# Patient Record
Sex: FEMALE | Race: BLACK OR AFRICAN AMERICAN | NOT HISPANIC OR LATINO | Employment: OTHER | URBAN - METROPOLITAN AREA
[De-identification: names, ages, dates, MRNs, and addresses within clinical notes are randomized per-mention and may not be internally consistent; named-entity substitution may affect disease eponyms.]

---

## 2017-01-13 DIAGNOSIS — K86.81 EXOCRINE PANCREATIC INSUFFICIENCY: ICD-10-CM

## 2017-01-13 RX ORDER — MILNACIPRAN HYDROCHLORIDE 100 MG/1
TABLET, FILM COATED ORAL
Qty: 60 TABLET | Refills: 1 | Status: SHIPPED | OUTPATIENT
Start: 2017-01-13 | End: 2017-02-20 | Stop reason: SDUPTHER

## 2017-01-13 RX ORDER — HYDROXYCHLOROQUINE SULFATE 200 MG/1
TABLET, FILM COATED ORAL
Qty: 45 TABLET | Refills: 2 | Status: SHIPPED | OUTPATIENT
Start: 2017-01-13 | End: 2017-04-14 | Stop reason: SDUPTHER

## 2017-01-13 RX ORDER — PANCRELIPASE 36000; 180000; 114000 [USP'U]/1; [USP'U]/1; [USP'U]/1
CAPSULE, DELAYED RELEASE PELLETS ORAL
Qty: 240 CAPSULE | Status: CANCELLED | OUTPATIENT
Start: 2017-01-13

## 2017-01-20 ENCOUNTER — HOSPITAL ENCOUNTER (OUTPATIENT)
Dept: RADIOLOGY | Facility: HOSPITAL | Age: 57
Discharge: HOME | End: 2017-01-20
Attending: SURGERY
Payer: MEDICARE

## 2017-01-20 DIAGNOSIS — C50.811 MALIGNANT NEOPLASM OF OVERLAPPING SITES OF RIGHT FEMALE BREAST: ICD-10-CM

## 2017-01-20 PROCEDURE — 77066 DX MAMMO INCL CAD BI: CPT | Mod: 26,,, | Performed by: RADIOLOGY

## 2017-01-20 PROCEDURE — 77062 BREAST TOMOSYNTHESIS BI: CPT | Mod: 26,,, | Performed by: RADIOLOGY

## 2017-01-20 PROCEDURE — 77066 DX MAMMO INCL CAD BI: CPT | Mod: TC

## 2017-01-25 ENCOUNTER — OFFICE VISIT (OUTPATIENT)
Dept: RHEUMATOLOGY | Facility: CLINIC | Age: 57
End: 2017-01-25
Payer: MEDICARE

## 2017-01-25 ENCOUNTER — LAB VISIT (OUTPATIENT)
Dept: LAB | Facility: HOSPITAL | Age: 57
End: 2017-01-25
Attending: INTERNAL MEDICINE
Payer: MEDICARE

## 2017-01-25 VITALS
WEIGHT: 130.69 LBS | HEART RATE: 94 BPM | DIASTOLIC BLOOD PRESSURE: 89 MMHG | BODY MASS INDEX: 22.31 KG/M2 | SYSTOLIC BLOOD PRESSURE: 154 MMHG | TEMPERATURE: 98 F | HEIGHT: 64 IN

## 2017-01-25 DIAGNOSIS — M32.9 SYSTEMIC LUPUS ERYTHEMATOSUS, UNSPECIFIED SLE TYPE, UNSPECIFIED ORGAN INVOLVEMENT STATUS: ICD-10-CM

## 2017-01-25 DIAGNOSIS — M89.9 DISORDER OF BONE AND CARTILAGE: ICD-10-CM

## 2017-01-25 DIAGNOSIS — M32.9 SYSTEMIC LUPUS ERYTHEMATOSUS, UNSPECIFIED SLE TYPE, UNSPECIFIED ORGAN INVOLVEMENT STATUS: Primary | ICD-10-CM

## 2017-01-25 DIAGNOSIS — M85.80 OSTEOPENIA: ICD-10-CM

## 2017-01-25 DIAGNOSIS — R53.83 FATIGUE, UNSPECIFIED TYPE: ICD-10-CM

## 2017-01-25 DIAGNOSIS — D84.9 IMMUNOSUPPRESSION: ICD-10-CM

## 2017-01-25 DIAGNOSIS — M94.9 DISORDER OF BONE AND CARTILAGE: ICD-10-CM

## 2017-01-25 DIAGNOSIS — M79.7 FIBROMYALGIA: ICD-10-CM

## 2017-01-25 LAB
BACTERIA #/AREA URNS AUTO: NORMAL /HPF
BILIRUB UR QL STRIP: NEGATIVE
CLARITY UR REFRACT.AUTO: ABNORMAL
COLOR UR AUTO: YELLOW
CREAT UR-MCNC: >450 MG/DL
GLUCOSE UR QL STRIP: NEGATIVE
HGB UR QL STRIP: NEGATIVE
HYALINE CASTS UR QL AUTO: 0 /LPF
KETONES UR QL STRIP: NEGATIVE
LEUKOCYTE ESTERASE UR QL STRIP: NEGATIVE
MICROSCOPIC COMMENT: NORMAL
NITRITE UR QL STRIP: NEGATIVE
PH UR STRIP: 5 [PH] (ref 5–8)
PROT UR QL STRIP: ABNORMAL
PROT UR-MCNC: 22 MG/DL
PROT/CREAT RATIO, UR: ABNORMAL
RBC #/AREA URNS AUTO: 1 /HPF (ref 0–4)
SP GR UR STRIP: >1.03 (ref 1–1.03)
URN SPEC COLLECT METH UR: ABNORMAL
UROBILINOGEN UR STRIP-ACNC: NEGATIVE EU/DL
WBC #/AREA URNS AUTO: 2 /HPF (ref 0–5)

## 2017-01-25 PROCEDURE — 82570 ASSAY OF URINE CREATININE: CPT

## 2017-01-25 PROCEDURE — 81001 URINALYSIS AUTO W/SCOPE: CPT

## 2017-01-25 PROCEDURE — 99999 PR PBB SHADOW E&M-EST. PATIENT-LVL III: CPT | Mod: PBBFAC,,, | Performed by: INTERNAL MEDICINE

## 2017-01-25 PROCEDURE — 99215 OFFICE O/P EST HI 40 MIN: CPT | Mod: S$PBB,,, | Performed by: INTERNAL MEDICINE

## 2017-01-25 RX ORDER — PREDNISONE 5 MG/1
5 TABLET ORAL DAILY
COMMUNITY
End: 2017-11-27 | Stop reason: SDUPTHER

## 2017-01-25 NOTE — PROGRESS NOTES
"Subjective:       Patient ID: Efe Horowitz is a 57 y.o. female.    Chief Complaint: Lupus      HPI:  Efe Horowitz is a 57 y.o. female with history of lupus since age 39.   Her manifestations include joint pains, headache, positive DERRICK, and a   double-stranded DNA. She also has an equivocal anticardiolipin IgM.   She has been treated with Plaquenil 1-1/2 tablets daily. Eye exam 11/2016 was normal.     In addition to lupus, she has a   history of ulcerative colitis, osteopenia, fibromyalgia as well.   In 2009 she had a colectomy with ileostomy and in May 2011 the ileostomy  was closed. History of bilateral carpal tunnel.      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  She was treated with surgical resection.  Now with another lesion breast suspected to be lymphoma.     Review of Systems   Constitutional: Negative for fatigue.   Eyes:        Dry eyes   Respiratory: Positive for cough.    Cardiovascular: Negative.    Gastrointestinal: Positive for diarrhea.        Difficulty swallowing   Endocrine: Negative.    Genitourinary: Negative.    Musculoskeletal: Negative.    Allergic/Immunologic: Negative.    Neurological: Positive for headaches.   Hematological: Negative.    Psychiatric/Behavioral: Negative.          Objective:     Visit Vitals    BP (!) 154/89 (BP Location: Left arm, Patient Position: Sitting, BP Method: Automatic)    Pulse 94    Temp 97.8 °F (36.6 °C) (Oral)    Ht 5' 4" (1.626 m)    Wt 59.3 kg (130 lb 11.2 oz)    BMI 22.43 kg/m2        Physical Exam   Constitutional: She is oriented to person, place, and time and well-developed, well-nourished, and in no distress.   HENT:   Head: Normocephalic and atraumatic.   Eyes: Conjunctivae and EOM are normal.   Neck: Neck supple.   Cardiovascular: Normal rate, regular rhythm and normal heart sounds.    Pulmonary/Chest: Effort normal and breath sounds normal.   Abdominal: Soft. Bowel sounds are normal.   Neurological: She is alert and " oriented to person, place, and time. Gait normal.   Skin: Skin is warm and dry.     Mild erythema on dorsum of foot where shoe strings cross   Psychiatric: Mood and affect normal.   Musculoskeletal: She exhibits no edema, tenderness or deformity.       LABS    Component      Latest Ref Rng & Units 11/15/2016   Sodium      136 - 145 mmol/L 140   Potassium      3.5 - 5.1 mmol/L 3.8   Chloride      95 - 110 mmol/L 105   CO2      23 - 29 mmol/L 26   Glucose      70 - 110 mg/dL 85   BUN, Bld      6 - 20 mg/dL 12   Creatinine      0.5 - 1.4 mg/dL 0.9   Calcium      8.7 - 10.5 mg/dL 9.2   Total Protein      6.0 - 8.4 g/dL 6.8   Albumin      3.5 - 5.2 g/dL 3.6   Total Bilirubin      0.1 - 1.0 mg/dL 0.3   Alkaline Phosphatase      55 - 135 U/L 52 (L)   AST      10 - 40 U/L 20   ALT      10 - 44 U/L 14   Anion Gap      8 - 16 mmol/L 9   eGFR if African American      >60 mL/min/1.73 m:2 >60.0   eGFR if non African American      >60 mL/min/1.73 m:2 >60.0   WBC      3.90 - 12.70 K/uL 7.81   RBC      4.00 - 5.40 M/uL 4.72   Hemoglobin      12.0 - 16.0 g/dL 12.4   Hematocrit      37.0 - 48.5 % 39.5   MCV      82 - 98 fL 84   MCH      27.0 - 31.0 pg 26.3 (L)   MCHC      32.0 - 36.0 % 31.4 (L)   RDW      11.5 - 14.5 % 14.4   Platelets      150 - 350 K/uL 274   MPV      9.2 - 12.9 fL 9.9   Gran #      1.8 - 7.7 K/uL 4.7          Assessment:       1. Lupus. Lupus manifestations include joint pains, headache, positive DERRICK, and a   double-stranded DNA. Now with ulcer in mouth for 3 weeks.  Now improved but nose with ulcers.  Also alopecia.  SLEDAI= 4 without labs  2. Fatigue   3. Encounter for long-term (current) use of other medications   4. Myalgia and myositis. Fibromyalgia on gabapentin and Savella   5. Trochanteric bursitis. Bilateral now requesting injection.  6. Shoulder pain. Stable   7. Vitamin D deficiency disease. Now normal  8. Suspected Shingles. Past time for anti-viral. Did not receive treatment. Rash has improved  9.  Migraine  10.  Osteopenia lumbar spine and femoral neck.  Reclast x1 stopped 3 years agoper patient due to improvement.   11.  Chronic steroid use.  Endocrine gives for adrenal insufficiency.  12.  Ulcerative colitis  13.  Hematuria.  Followed by nephrology    Plan:       1.  Labs and DEXA  2. Patient to continue Plaquenil 30 mg daily.  Eye exam normal 11/2016  3. Continue neurontin 800 mg/800 mg/ 1200 mg in evening which is helping.          RTO in 3-4 months.  Patient seen face to face for 40 minutes and greater than 50% spent in counseling regarding lupus symptom flare.

## 2017-01-25 NOTE — MR AVS SNAPSHOT
[facility] - Rheumatology  [address]  [place] [address]  Phone: [phone]  Fax: [phone]                  Efe Horowitz   2017 10:00 AM   Office Visit    Description:  Female : 1960   Provider:  Idania Allison MD   Department:  [facility] - Rheumatology           Reason for Visit     Lupus           Diagnoses this Visit        Comments    Systemic lupus erythematosus, unspecified SLE type, unspecified organ involvement status    -  Primary     Immunosuppression         Fibromyalgia         Fatigue, unspecified type         Osteopenia         Disorder of bone and cartilage                To Do List           Future Appointments        Provider Department Dept Phone    2017 10:40 AM LAB, APPOINTMENT [place] [facility] [phone]    2017 8:45 AM MD [facility] Breast Surgery [phone]    2017 10:00 AM [facility], DEXA1 [facility]-Bone Mineral Density [phone]    2017 10:30 AM Idania Allison MD [facility] Rheumatology [phone]      Goals (5 Years of Data)     None      Follow-Up and Disposition     Return in about 3 months (around 2017).    Follow-up and Disposition History      [facility]     [facility] -  Assistance  Registered nurses in the [facility] provide clinical advisement, health education, appointment booking, and other advisory services.  Call for this free service at [phone].             Medications           Message regarding Medications     Verify the changes and/or additions to your medication regime listed below are the same as discussed with your clinician today.  If any of these changes or additions are incorrect, please notify your healthcare provider.             Verify that the below list of medications is an accurate representation of the medications you are currently taking.  If none reported, the list may be  blank. If incorrect, please contact your healthcare provider. Carry this list with you in case of emergency.           Current Medications     predniSONE (DELTASONE) 5 MG tablet Take 5 mg by mouth once daily.    albuterol (ACCUNEB) 0.63 mg/3 mL Nebu Take 3 mLs (0.63 mg total) by nebulization every 6 (six) hours as needed.    atorvastatin (LIPITOR) 10 MG tablet Take 10 mg by mouth once daily.    cyanocobalamin 1,000 mcg/mL injection Inject 1 mL (1,000 mcg total) into the muscle once a week. 1 Solution Injection monthly.  B12 injection weekly for  3 weeks then every  2 weeks    ergocalciferol (VITAMIN D2) 50,000 unit Cap Take 2 capsules (100,000 Units total) by mouth every 7 days.    ferrous sulfate 325 mg (65 mg iron) Tab Take by mouth. 1 Tablet Oral Every day.  Take one 250mg vitamin C pill every 12 hours which will help your bodyabsorb more of the iron.    gabapentin (NEURONTIN) 800 MG tablet Take 1 tablet (800 mg total) by mouth 3 (three) times daily.    hydroxychloroquine (PLAQUENIL) 200 mg tablet TAKE ONE & ONE-HALF TABLETS BY MOUTH DAILY    lipase-protease-amylase 36,000-114,000- 180,000 unit CpDR Take 2 capsules by mouth 3 (three) times daily with meals. Take one with each snack    loperamide (IMODIUM) 2 mg capsule TAKE 1 TO 2 CAPSULES BY MOUTH FOUR TIMES DAILY    lorazepam (ATIVAN) 1 MG tablet Take 1 tablet (1 mg total) by mouth every evening. 1 Tablet Oral Q8H-12 hours    niacin 100 MG Tab Take by mouth. 1 Tablet Oral At bedtime    ondansetron (ZOFRAN) 4 MG tablet Take 1 tablet (4 mg total) by mouth every 12 (twelve) hours as needed for Nausea.    oxycodone-acetaminophen (PERCOCET) 5-325 mg per tablet Take 1 tablet by mouth every 4 (four) hours as needed for Pain.    polycarbophil (FIBERCON) 625 mg tablet Take by mouth. 1 Tablet Oral Every 12 hours.  Take with 12 ounces of water with each pill.    potassium chloride SA (K-DUR,KLOR-CON) 20 MEQ tablet Take 1 tablet (20 mEq total) by mouth every 12 (twelve)  "hours.    rizatriptan (MAXALT) 10 MG tablet Take 1 tablet (10 mg total) by mouth every 6 (six) hours as needed.    SAVELLA 100 mg Tab TAKE ONE TABLET BY MOUTH TWICE DAILY    zoledronic acid-mannitol & water (RECLAST) 5 mg/100 mL Soln Inject into the vein. 1 Solution Intravenous Yearly    zolpidem (AMBIEN) 5 MG Tab Take 1 tablet (5 mg total) by mouth nightly as needed.           Clinical Reference Information           Vital Signs - Last Recorded  Most recent update: 1/25/2017  9:47 AM by Nereida James MA    BP Pulse Temp Ht Wt BMI    (!) 154/89 (BP Location: Left arm, Patient Position: Sitting, BP Method: Automatic) 94 97.8 °F (36.6 °C) (Oral) 5' 4" (1.626 m) 59.3 kg (130 lb 11.2 oz) 22.43 kg/m2      Blood Pressure          Most Recent Value    BP  (!)  154/89      Allergies as of 1/25/2017     Aspirin    Hydrocortisone    Vicodin [Hydrocodone-acetaminophen]    Asacol [Mesalamine]      Immunizations Administered on Date of Encounter - 1/25/2017     None      Orders Placed During Today's Visit     Future Labs/Procedures Expected by Expires    Aldolase  1/25/2017 1/25/2018    Anti-DNA antibody, double-stranded  1/25/2017 1/25/2018    C-reactive protein  1/25/2017 1/25/2018    C3 complement  1/25/2017 1/25/2018    C4 complement  1/25/2017 1/25/2018    CBC auto differential  1/25/2017 1/25/2018    CK  1/25/2017 1/25/2018    Comprehensive metabolic panel  1/25/2017 1/25/2018    DXA Bone Density Spine And Hip  1/25/2017 1/25/2018    Protein / creatinine ratio, urine  1/25/2017 3/26/2018    Sedimentation rate, manual  1/25/2017 1/25/2018    Urinalysis  1/25/2017 3/26/2018      "

## 2017-01-26 ENCOUNTER — OFFICE VISIT (OUTPATIENT)
Dept: SURGERY | Facility: CLINIC | Age: 57
End: 2017-01-26
Payer: MEDICARE

## 2017-01-26 ENCOUNTER — PATIENT MESSAGE (OUTPATIENT)
Dept: RHEUMATOLOGY | Facility: CLINIC | Age: 57
End: 2017-01-26

## 2017-01-26 VITALS
HEART RATE: 87 BPM | WEIGHT: 130 LBS | TEMPERATURE: 98 F | BODY MASS INDEX: 22.2 KG/M2 | HEIGHT: 64 IN | SYSTOLIC BLOOD PRESSURE: 134 MMHG | DIASTOLIC BLOOD PRESSURE: 78 MMHG

## 2017-01-26 DIAGNOSIS — C50.211 MALIGNANT NEOPLASM OF UPPER-INNER QUADRANT OF RIGHT FEMALE BREAST: ICD-10-CM

## 2017-01-26 DIAGNOSIS — C50.911 MALIGNANT NEOPLASM OF RIGHT FEMALE BREAST, UNSPECIFIED SITE OF BREAST: Primary | ICD-10-CM

## 2017-01-26 PROCEDURE — 99213 OFFICE O/P EST LOW 20 MIN: CPT | Mod: PBBFAC,PO | Performed by: SURGERY

## 2017-01-26 PROCEDURE — 99214 OFFICE O/P EST MOD 30 MIN: CPT | Mod: S$PBB,,, | Performed by: SURGERY

## 2017-01-26 PROCEDURE — 99999 PR PBB SHADOW E&M-EST. PATIENT-LVL III: CPT | Mod: PBBFAC,,, | Performed by: SURGERY

## 2017-01-26 NOTE — LETTER
[facility] Breast Surgery  [address]  [facility] [address]  Phone: [phone] January 26, 2017      Manjula Mixon MD  [facility] [facility]  [address]  [facility] [address]    Patient: Efe Horowitz   MR Number: [id number]   YOB: 1960   Date of Visit: 1/26/2017     Dear Dr. Mixon:    I saw your patient Efe Horowitz in my Breast Surgery Clinic. Below are the relevant portions of my assessment and plan of care.    Efe Horowitz is a 57-year-old  female who presents with her niece today. She has no subjective complaints. Somehow, she had it incorrectly documented in her records that she had lymphoma when she does not.  She has a known medical history of primary adrenal insufficiency, arthritis, B12 deficiency, iron deficiency anemia, hypopituitary state and lupus who was diagnosed with a T1b triple negative, high-grade invasive ductal carcinoma in the spring of 2016. She underwent a right nipple-sparing mastectomy on 04/27/2016 with immediate autologous tissue IGNACIO flap reconstruction by Dr. Margo Mccloud. The final pathology revealed the high-grade T1b 7 mm lesion with negative sentinel lymph nodes x4. She had one additional negative node.     She presents today feeling tired. She states her vitamin D deficiency replacement dose has been changed from weekly to monthly and this may be contributing to her state of feeling tired. She did not require any adjuvant endocrine therapy or radiotherapy.  She saw Dr. Wright postoperatively for Med/Onc consultation who did not recommend systemic chemotherapy. She denies any weight loss or other changes other than some right upper extremity swelling and pain. She states that the pain is relieved with massaging and manipulation. She denies tenderness to her arm. She has excellent range of motion in her shoulder.  There is mild lymphedema of the right upper extremity. The right breast itself shows that  the nipple areolar complex is well healed with some mild hyperemia to the breast itself. There is no tenderness or cellulitis or signs of infection.  She has approximately a 5 x 6 cm area of fat necrosis in the right lower inner quadrant. She has good symmetry, but may require a left breast lift and mastopexy on the left side in the future. She may undergo revision to excise the area of fat necrosis.     She had a bilateral diag MMG on Friday 1-20-17 which revealed benign findings and the mass in the right lower inner quadrant to be consistent with fat necrosis as expected.  Her clinical breast exam remains unchanged from previous visit.    She had been given a prescription for outpatient physical therapy and occupational therapy due to the mild lymphedema noted in the right upper extremity. Otherwise, she will follow up with me in six months and will follow up with her plastic surgeon, Dr. Mccloud to discuss further revision with potential left mastopexy and lift and the excision of the fat necrosis in the right lower inner quadrant. We will also order a breast MRI per plastic surgery request In anticipation of breast revision surgery. Otherwise, there are no suspicious masses, nodules, densities or lymphadenopathy. There is some mild hyperemia without significant edema to the breast skin. It is nontender and she denies any fevers, chills or night sweats and there are no clinical signs of infection. Follow up with me   in six months as outlined above.     The patient does not have lymphoma.    If you have questions, please do not hesitate to call me. I look forward to following Dareen along with you.    Sincerely,      Sivakumar Muñiz MD  Medical Director, [facility] Breast [place]  Section of General and Oncologic Surgery  [facility]    RLC/hcr     CC  Miah Mccloud MD

## 2017-01-26 NOTE — TELEPHONE ENCOUNTER
Patient informed of mild increase protein in urine and low potassium and need to eat potassium rich foods.  She is to follow with primary doctor.

## 2017-01-26 NOTE — LETTER
January 26, 2017        Manjula Mixon MD  [address] [address]             [facility] Breast Surgery  [address] [facility] [address]  Phone: [phone]   Patient: Efe Horowitz   MR Number: [id number]   YOB: 1960   Date of Visit: 1/26/2017       Dear Dr. Mixon:    Thank you for referring Efe Horowitz to me for evaluation. Attached you will find relevant portions of my assessment and plan of care.    If you have questions, please do not hesitate to call me. I look forward to following Efe Horowitz along with you.    Sincerely,      Sivakumar Muñiz MD            CC  Miah Mccloud MD    Enclosure

## 2017-01-26 NOTE — PROGRESS NOTES
Efe Horowitz is a 57 year-old  female who presents   With her niece today.  She has no subjective complaints.  Somehow, she had it incorrectly  documented in her records that she had lymphoma when she does not.    She has a known medical history of primary adrenal insufficiency, arthritis, B12  deficiency, iron deficiency anemia, hypopituitary state and lupus who was   diagnosed with a T1b triple negative, high-grade invasive ductal carcinoma in   the spring of 2016.     She underwent a right nipple-sparing mastectomy on   04/27/2016 with immediate autologous tissue IGNACIO flap reconstruction by Dr. Margo Mccloud. The final pathology revealed the high-grade T1b 7 mm lesion with   negative sentinel lymph nodes x4. She had one additional negative node. She   presents today feeling tired. She states her vitamin D deficiency replacement   dose has been changed from weekly to monthly and this may be contributing to her  state of feeling tired. She did not require any adjuvant endocrine therapy or   radiotherapy. She saw Dr. Wright postoperatively for Med/Onc consultation who   did not recommend systemic chemotherapy. She denies any weight loss or other   changes other than some right upper extremity swelling and pain. She states   that the pain is relieved with massaging and manipulation. She denies   tenderness to her arm. She has excellent range of motion in her shoulder.   There is mild lymphedema of the right upper extremity. The right breast itself   shows that the nipple areolar complex is well healed with some mild hyperemia to  the breast itself. There is no tenderness or cellulitis or signs of infection.  She has approximately a 5 x 6 cm area of fat necrosis in the right lower inner  quadrant. She has good symmetry, but may require a left breast lift and   mastopexy on the left side in the future. She may undergo revision to excise   the area of fat necrosis.  She had a bilateral diag MMG on  Friday 1-20-17 which revealed  Benign findings and the mass in the R lower inner quadrant to be consistent with fat necrosis as expected.  Her clinical breast exam remains unchanged from previous visit.    She had been given a prescription for outpatient physical  therapy and occupational therapy due to the mild lymphedema noted in the right   upper extremity. Otherwise, she will follow up with me in six months and will   follow up with her plastic surgeon, Dr. Mccloud to discuss further revision with  potential left mastopexy and lift and the excision of the fat necrosis in the   right lower inner quadrant.  We will also order a breast MRI per plastic surgery request  In anticipation of breast revision surgery.  Otherwise, there are no suspicious masses, nodules,  densities or lymphadenopathy. There is some mild hyperemia without significant  edema to the breast skin. It is nontender and she denies any fevers, chills or  night sweats and there are no clinical signs of infection. Follow up with me   in six months as outlined above.    The patient does not have lymphoma.

## 2017-02-16 ENCOUNTER — HOSPITAL ENCOUNTER (OUTPATIENT)
Dept: RADIOLOGY | Facility: HOSPITAL | Age: 57
Discharge: HOME | End: 2017-02-16
Attending: SURGERY
Payer: MEDICARE

## 2017-02-16 DIAGNOSIS — C50.911 MALIGNANT NEOPLASM OF RIGHT FEMALE BREAST, UNSPECIFIED SITE OF BREAST: ICD-10-CM

## 2017-02-16 PROCEDURE — A9577 INJ MULTIHANCE: HCPCS | Performed by: SURGERY

## 2017-02-16 PROCEDURE — 77059 MRI BREAST BILATERAL: CPT | Mod: 26,,, | Performed by: RADIOLOGY

## 2017-02-16 PROCEDURE — 25500020 PHARM REV CODE 255: Performed by: SURGERY

## 2017-02-16 PROCEDURE — 0159T MRI BREAST BILATERAL: CPT | Mod: 26,,, | Performed by: RADIOLOGY

## 2017-02-16 PROCEDURE — 0159T MRI BREAST BILATERAL: CPT | Mod: TC

## 2017-02-16 RX ADMIN — GADOBENATE DIMEGLUMINE 13 ML: 529 INJECTION, SOLUTION INTRAVENOUS at 07:02

## 2017-02-20 RX ORDER — MILNACIPRAN HYDROCHLORIDE 100 MG/1
TABLET, FILM COATED ORAL
Qty: 60 TABLET | Refills: 1 | Status: SHIPPED | OUTPATIENT
Start: 2017-02-20 | End: 2017-05-12 | Stop reason: SDUPTHER

## 2017-02-27 ENCOUNTER — OFFICE VISIT (OUTPATIENT)
Dept: ENDOCRINOLOGY | Facility: CLINIC | Age: 57
End: 2017-02-27
Payer: MEDICARE

## 2017-02-27 VITALS
DIASTOLIC BLOOD PRESSURE: 78 MMHG | HEART RATE: 86 BPM | SYSTOLIC BLOOD PRESSURE: 142 MMHG | HEIGHT: 64 IN | WEIGHT: 128.31 LBS | BODY MASS INDEX: 21.91 KG/M2

## 2017-02-27 DIAGNOSIS — E27.40 HYPOADRENALISM: Primary | ICD-10-CM

## 2017-02-27 DIAGNOSIS — M81.8 OSTEOPOROSIS, IDIOPATHIC: ICD-10-CM

## 2017-02-27 DIAGNOSIS — R51.9 HEADACHE ABOVE THE EYE REGION: ICD-10-CM

## 2017-02-27 DIAGNOSIS — R26.9 ABNORMAL GAIT: ICD-10-CM

## 2017-02-27 DIAGNOSIS — E55.9 VITAMIN D DEFICIENCY DISEASE: ICD-10-CM

## 2017-02-27 PROCEDURE — 99214 OFFICE O/P EST MOD 30 MIN: CPT | Mod: S$PBB,,, | Performed by: INTERNAL MEDICINE

## 2017-02-27 PROCEDURE — 99999 PR PBB SHADOW E&M-EST. PATIENT-LVL III: CPT | Mod: PBBFAC,,, | Performed by: INTERNAL MEDICINE

## 2017-02-27 PROCEDURE — 99213 OFFICE O/P EST LOW 20 MIN: CPT | Mod: PBBFAC | Performed by: INTERNAL MEDICINE

## 2017-02-27 NOTE — MR AVS SNAPSHOT
[facility]  [address]  [facility] [address]  Phone: [phone]  Fax: [phone]                  Efe Horowitz   2017 9:30 AM   Office Visit    Description:  Female : 1960   Provider:  Darin Starkey II, MD   Department:  [facility]ab           Reason for Visit     Hypoadrenalism           Diagnoses this Visit        Comments    Hypoadrenalism    -  Primary     Vitamin D deficiency disease         Osteoporosis, idiopathic         Abnormal gait         Headache above the eye region                To Do List           Future Appointments        Provider Department Dept Phone    3/6/2017 6:45 AM [facility] MRI2 [facility] [phone]    3/21/2017 7:10 AM LAB, HEMONC CANCER BLDG [facility] [phone]    3/21/2017 8:00 AM MD [facility] - Hematology Oncology [phone]    4/15/2017 9:45 AM SPECIMEN, ALEJANDRA [facility] [phone]    4/15/2017 10:00 AM LAB, LAPALCO [facility] [phone]      Goals (5 Years of Data)     None      Follow-Up and Disposition     Return in about 6 months (around 2017).    Follow-up and Disposition History      [facility]     [facility] -  Assistance  Registered nurses in the [facility] provide clinical advisement, health education, appointment booking, and other advisory services.  Call for this free service at [phone].             Medications           Message regarding Medications     Verify the changes and/or additions to your medication regime listed below are the same as discussed with your clinician today.  If any of these changes or additions are incorrect, please notify your healthcare provider.        STOP taking these medications     potassium chloride SA (K-DUR,KLOR-CON) 20 MEQ tablet Take 1 tablet (20 mEq total) by mouth every 12 (twelve) hours.    zoledronic  acid-mannitol & water (RECLAST) 5 mg/100 mL Soln Inject into the vein. 1 Solution Intravenous Yearly           Verify that the below list of medications is an accurate representation of the medications you are currently taking.  If none reported, the list may be blank. If incorrect, please contact your healthcare provider. Carry this list with you in case of emergency.           Current Medications     albuterol (ACCUNEB) 0.63 mg/3 mL Nebu Take 3 mLs (0.63 mg total) by nebulization every 6 (six) hours as needed.    atorvastatin (LIPITOR) 10 MG tablet Take 10 mg by mouth once daily.    cyanocobalamin 1,000 mcg/mL injection Inject 1 mL (1,000 mcg total) into the muscle once a week. 1 Solution Injection monthly.  B12 injection weekly for  3 weeks then every  2 weeks    ergocalciferol (VITAMIN D2) 50,000 unit Cap Take 2 capsules (100,000 Units total) by mouth every 7 days.    ferrous sulfate 325 mg (65 mg iron) Tab Take by mouth. 1 Tablet Oral Every day.  Take one 250mg vitamin C pill every 12 hours which will help your bodyabsorb more of the iron.    gabapentin (NEURONTIN) 800 MG tablet Take 1 tablet (800 mg total) by mouth 3 (three) times daily.    hydroxychloroquine (PLAQUENIL) 200 mg tablet TAKE ONE & ONE-HALF TABLETS BY MOUTH DAILY    lipase-protease-amylase 36,000-114,000- 180,000 unit CpDR Take 2 capsules by mouth 3 (three) times daily with meals. Take one with each snack    loperamide (IMODIUM) 2 mg capsule TAKE 1 TO 2 CAPSULES BY MOUTH FOUR TIMES DAILY    lorazepam (ATIVAN) 1 MG tablet Take 1 tablet (1 mg total) by mouth every evening. 1 Tablet Oral Q8H-12 hours    niacin 100 MG Tab Take by mouth. 1 Tablet Oral At bedtime    ondansetron (ZOFRAN) 4 MG tablet Take 1 tablet (4 mg total) by mouth every 12 (twelve) hours as needed for Nausea.    oxycodone-acetaminophen (PERCOCET) 5-325 mg per tablet Take 1 tablet by mouth every 4 (four) hours as needed for Pain.    polycarbophil (FIBERCON) 625 mg tablet Take by  mouth. 1 Tablet Oral Every 12 hours.  Take with 12 ounces of water with each pill.    predniSONE (DELTASONE) 5 MG tablet Take 5 mg by mouth once daily.    rizatriptan (MAXALT) 10 MG tablet Take 1 tablet (10 mg total) by mouth every 6 (six) hours as needed.    SAVELLA 100 mg Tab TAKE ONE TABLET BY MOUTH TWICE DAILY    zolpidem (AMBIEN) 5 MG Tab Take 1 tablet (5 mg total) by mouth nightly as needed.           Clinical Reference Information           Your Vitals Were     BP                   142/78 (BP Location: Left arm, Patient Position: Sitting, BP Method: Manual)           Blood Pressure          Most Recent Value    BP  (!)  142/78      Allergies as of 2/27/2017     Aspirin    Hydrocortisone    Vicodin [Hydrocodone-acetaminophen]    Asacol [Mesalamine]      Immunizations Administered on Date of Encounter - 2/27/2017     None      Orders Placed During Today's Visit     Future Labs/Procedures Expected by Expires    MRI Brain W WO Contrast  2/27/2017 2/27/2018      Language Assistance Services     ATTENTION: Language assistance services are available, free of charge. Please call [phone].      ATENCIÓN: Si bolivar degroot, tiene a curtis disposición servicios gratuitos de asistencia lingüística. Llame al [phone].     WICHO Ý: N?u b?n nói Ti?ng Vi?t, có các d?ch v? h? tr? ngôn ng? mi?n phí dành cho b?n. G?i s? [phone].         [facility] complies with applicable Federal civil rights laws and does not discriminate on the basis of race, color, national origin, age, disability, or sex.

## 2017-02-27 NOTE — PROGRESS NOTES
Subjective:      Patient ID: Efe Horowitz is a 57 y.o. female.    Chief Complaint:  Hypoadrenalism      History of Present Illness  Ms. Horowitz is a 55 year old woman with adrenal insufficiency and osteoporosis who presents from her PCP for significant fatigue and weakness.  She had a viral illness a few months ago but could not get a Rx for an increased dose of steroids for almost a month.  She continued to take her usual 5mg daily until she got an appointment with her doctor but by then she had significant fatigue and weakness.  She was seen by her PCP 5/19/15 and was given an injection of methylprednisolone 125mg and a Rx for a steroid taper of prednisone (40mg for 4 days, 30mg for 4 days, etc., currently on 30mg).  She was also given Augmentin for bronchitis has had significant diarrhea 5-6x/day, currently on day 6 of 7 days.  Despite the injection of steroids and increased daily steroids she continues to feel weakness and fatigue.    Review of Systems   Constitutional: Positive for activity change, appetite change and fatigue.   HENT: Positive for voice change. Negative for congestion, ear pain, postnasal drip, rhinorrhea, sinus pressure and sore throat.    Eyes: Negative for photophobia and visual disturbance.   Respiratory: Negative for cough, choking, chest tightness and shortness of breath.    Cardiovascular: Negative for chest pain and palpitations.   Gastrointestinal: Positive for diarrhea and nausea. Negative for abdominal distention, abdominal pain, blood in stool, constipation and vomiting.   Genitourinary: Positive for hematuria. Negative for decreased urine volume, dysuria and urgency.        Microscopic hematuria, scheduled for a cystoscopy to investigate    Musculoskeletal: Positive for arthralgias, back pain, joint swelling and myalgias.   Skin: Negative for color change, pallor and rash.   Neurological: Positive for dizziness, weakness, light-headedness, numbness and headaches. Negative for  tremors, seizures, syncope and speech difficulty.        Dizziness/lightheadedness especially when standing   Hematological: Bruises/bleeds easily.   Psychiatric/Behavioral: Positive for confusion and decreased concentration.        Sleeping all the time       Objective:   Physical Exam   Constitutional: She is oriented to person, place, and time. She appears well-developed and well-nourished. No distress.   HENT:   Head: Normocephalic and atraumatic.   Nose: Nose normal.   Mouth/Throat: Oropharynx is clear and moist. No oropharyngeal exudate.   Voice is soft and hoarse    Eyes: Conjunctivae and EOM are normal. Pupils are equal, round, and reactive to light. Right eye exhibits no discharge. Left eye exhibits no discharge. No scleral icterus.   Neck: Normal range of motion. Neck supple. No tracheal deviation present. No thyromegaly present.   Cardiovascular: Normal rate, regular rhythm and normal heart sounds.    Pulmonary/Chest: Effort normal and breath sounds normal. No respiratory distress. She has no wheezes. She has no rales.   Abdominal: Soft. Bowel sounds are normal. She exhibits no distension. There is no tenderness.   Musculoskeletal: Normal range of motion. She exhibits no edema or tenderness.   Lymphadenopathy:     She has no cervical adenopathy.   Neurological: She is alert and oriented to person, place, and time. No cranial nerve deficit. Coordination normal.   Skin: Skin is warm and dry. No rash noted. She is not diaphoretic. No erythema. No pallor.   Psychiatric: She has a normal mood and affect. Her behavior is normal. Judgment and thought content normal.       Lab Review:   TSH  1.122  Ca    9.4  VitD   44    Assessment:     No diagnosis found.   Ms. Horowitz is a 55 year old woman with adrenal insufficiency and osteoporosis who presents from her PCP for significant fatigue and weakness.    Plan:     Adrenal Insufficiency   - continue steroid taper as prescribed by PCP (currently on 30mg daily for 4  days, then 20mg daily for 4 days, then 10mg daily for 4 days, then take 5mg daily continuously)     Osteoporosis   - has had 3 doses of Reclast, last dose 3/14/14, will get dexa scan as scheduled (due 10/2015) and assess for additional treatments   - last dexa done 10/17/13 showed t-score -2.2 at the lumbar spine  - continue 100,000 units vitamin D weekly  - calcium and vitamin D levels within normal limits    Fatigue   - will check TSH/FT4 today  - will check ESR, concern for flare of ulcerative colitis, SLE, fibromyalgia  - will check CBC/CMP    Diarrhea  - patient is on day 6 of 7 Augmentin for bronchitis but having significant diarrhea, instructed patient to stop medication now  - patient is instructed to alert gastroenterologist or PCP if diarrhea persists as this may be a sign of something more serious    Headaches Frequent with gait abnormality.

## 2017-03-06 ENCOUNTER — HOSPITAL ENCOUNTER (OUTPATIENT)
Dept: RADIOLOGY | Facility: HOSPITAL | Age: 57
Discharge: HOME | End: 2017-03-06
Attending: INTERNAL MEDICINE
Payer: MEDICARE

## 2017-03-06 DIAGNOSIS — M81.8 OSTEOPOROSIS, IDIOPATHIC: ICD-10-CM

## 2017-03-06 DIAGNOSIS — R26.9 ABNORMAL GAIT: ICD-10-CM

## 2017-03-06 DIAGNOSIS — R51.9 HEADACHE ABOVE THE EYE REGION: ICD-10-CM

## 2017-03-06 DIAGNOSIS — E27.40 HYPOADRENALISM: ICD-10-CM

## 2017-03-06 DIAGNOSIS — E55.9 VITAMIN D DEFICIENCY DISEASE: ICD-10-CM

## 2017-03-06 PROCEDURE — 25500020 PHARM REV CODE 255: Performed by: INTERNAL MEDICINE

## 2017-03-06 PROCEDURE — 70553 MRI BRAIN STEM W/O & W/DYE: CPT | Mod: TC

## 2017-03-06 PROCEDURE — 70553 MRI BRAIN STEM W/O & W/DYE: CPT | Mod: 26,GC,, | Performed by: RADIOLOGY

## 2017-03-06 PROCEDURE — A9585 GADOBUTROL INJECTION: HCPCS | Performed by: INTERNAL MEDICINE

## 2017-03-06 RX ORDER — GADOBUTROL 604.72 MG/ML
6 INJECTION INTRAVENOUS
Status: COMPLETED | OUTPATIENT
Start: 2017-03-06 | End: 2017-03-06

## 2017-03-06 RX ADMIN — GADOBUTROL 6 ML: 604.72 INJECTION INTRAVENOUS at 07:03

## 2017-03-08 ENCOUNTER — TELEPHONE (OUTPATIENT)
Dept: SURGERY | Facility: CLINIC | Age: 57
End: 2017-03-08

## 2017-03-08 NOTE — TELEPHONE ENCOUNTER
----- Message from Geovanni Mejia sent at 3/8/2017  4:18 PM CST -----  Pt said Dr. Muñiz was suppose to have her mmg results sent to Dr. Rios. Pt sees the doctor on tomorrow. Please call regarding this at [phone]

## 2017-03-14 DIAGNOSIS — Z00.6 EXAMINATION OF PARTICIPANT IN CLINICAL TRIAL: Primary | ICD-10-CM

## 2017-03-14 DIAGNOSIS — C50.919 MALIGNANT NEOPLASM OF FEMALE BREAST, UNSPECIFIED LATERALITY, UNSPECIFIED SITE OF BREAST: ICD-10-CM

## 2017-04-03 RX ORDER — PREDNISONE 5 MG/1
TABLET ORAL
Qty: 100 TABLET | Refills: 1 | Status: SHIPPED | OUTPATIENT
Start: 2017-04-03 | End: 2017-11-14 | Stop reason: SDUPTHER

## 2017-04-05 ENCOUNTER — TELEPHONE (OUTPATIENT)
Dept: NEPHROLOGY | Facility: CLINIC | Age: 57
End: 2017-04-05

## 2017-04-05 NOTE — TELEPHONE ENCOUNTER
----- Message from Yudy Ag MA sent at 4/5/2017 10:29 AM CDT -----  Contact: pt       ----- Message -----     From: Starla Austin     Sent: 4/4/2017  11:56 AM       To: Adri Cervantes Staff    Calling for lab results       Ph  [phone]  thanks

## 2017-04-05 NOTE — TELEPHONE ENCOUNTER
Please see below message from Starla.  Please ask the pt what lab results she would like to know as I don't see any recent labs in epic.

## 2017-04-10 ENCOUNTER — TELEPHONE (OUTPATIENT)
Dept: NEPHROLOGY | Facility: CLINIC | Age: 57
End: 2017-04-10

## 2017-04-10 NOTE — TELEPHONE ENCOUNTER
Her father has not been seen in over 6 months.  Please see if we can fit him the same day as her appoint on the .  If, not, please arrange f/u in 3-4 weeks.  Father's name Agustin Carmona  PAM [birth year]/13[birth year]35.

## 2017-04-10 NOTE — TELEPHONE ENCOUNTER
----- Message from Yudy Ag MA sent at 4/6/2017 10:50 AM CDT -----  Pt.says she has not done labs and is getting ready to do labs.   Would like to ask  You some questions before she has the labs done.    Pt. Says she would rather discuss this w/ you.     Pls call today, at any time. Cell:  [phone].

## 2017-04-14 RX ORDER — HYDROXYCHLOROQUINE SULFATE 200 MG/1
TABLET, FILM COATED ORAL
Qty: 45 TABLET | Refills: 0 | Status: SHIPPED | OUTPATIENT
Start: 2017-04-14 | End: 2017-05-12 | Stop reason: SDUPTHER

## 2017-04-18 ENCOUNTER — TELEPHONE (OUTPATIENT)
Dept: ENDOCRINOLOGY | Facility: CLINIC | Age: 57
End: 2017-04-18

## 2017-04-18 NOTE — TELEPHONE ENCOUNTER
Patient is having breast reconstruction next week. Dr. Tinsley wants to speak with you about the medications. [phone]

## 2017-04-18 NOTE — TELEPHONE ENCOUNTER
----- Message from Floyd Gonzáles sent at 4/18/2017 11:46 AM CDT -----  Contact: Dr. Tinsley  Requesting a call back in regards to dosage of medication before surgery.    Please call [phone].

## 2017-04-19 ENCOUNTER — LAB VISIT (OUTPATIENT)
Dept: LAB | Facility: HOSPITAL | Age: 57
End: 2017-04-19
Attending: INTERNAL MEDICINE
Payer: MEDICARE

## 2017-04-19 ENCOUNTER — OFFICE VISIT (OUTPATIENT)
Dept: HEMATOLOGY/ONCOLOGY | Facility: CLINIC | Age: 57
End: 2017-04-19
Payer: MEDICARE

## 2017-04-19 VITALS
DIASTOLIC BLOOD PRESSURE: 69 MMHG | HEART RATE: 83 BPM | SYSTOLIC BLOOD PRESSURE: 148 MMHG | RESPIRATION RATE: 20 BRPM | BODY MASS INDEX: 22.14 KG/M2 | TEMPERATURE: 98 F | WEIGHT: 129 LBS | OXYGEN SATURATION: 99 %

## 2017-04-19 DIAGNOSIS — C50.911 MALIGNANT NEOPLASM OF RIGHT FEMALE BREAST, UNSPECIFIED SITE OF BREAST: Primary | ICD-10-CM

## 2017-04-19 DIAGNOSIS — C50.919 MALIGNANT NEOPLASM OF FEMALE BREAST, UNSPECIFIED LATERALITY, UNSPECIFIED SITE OF BREAST: ICD-10-CM

## 2017-04-19 DIAGNOSIS — C50.211 MALIGNANT NEOPLASM OF UPPER-INNER QUADRANT OF RIGHT FEMALE BREAST: ICD-10-CM

## 2017-04-19 DIAGNOSIS — Z00.6 EXAMINATION OF PARTICIPANT IN CLINICAL TRIAL: ICD-10-CM

## 2017-04-19 DIAGNOSIS — R19.5 LOOSE STOOLS: ICD-10-CM

## 2017-04-19 LAB
ALBUMIN SERPL BCP-MCNC: 3.8 G/DL
ALP SERPL-CCNC: 61 U/L
ALT SERPL W/O P-5'-P-CCNC: 16 U/L
ANION GAP SERPL CALC-SCNC: 11 MMOL/L
ANISOCYTOSIS BLD QL SMEAR: SLIGHT
AST SERPL-CCNC: 23 U/L
BASOPHILS # BLD AUTO: 0.02 K/UL
BASOPHILS NFR BLD: 0.3 %
BILIRUB SERPL-MCNC: 0.3 MG/DL
BUN SERPL-MCNC: 13 MG/DL
CALCIUM SERPL-MCNC: 9.5 MG/DL
CHLORIDE SERPL-SCNC: 104 MMOL/L
CO2 SERPL-SCNC: 23 MMOL/L
CREAT SERPL-MCNC: 0.8 MG/DL
DIFFERENTIAL METHOD: NORMAL
DRUG STUDY SPECIMEN TYPE: NORMAL
DRUG STUDY TEST NAME: NORMAL
DRUG STUDY TEST RESULT: NORMAL
EOSINOPHIL # BLD AUTO: 0.1 K/UL
EOSINOPHIL NFR BLD: 2.4 %
ERYTHROCYTE [DISTWIDTH] IN BLOOD BY AUTOMATED COUNT: 14.4 %
EST. GFR  (AFRICAN AMERICAN): >60 ML/MIN/1.73 M^2
EST. GFR  (NON AFRICAN AMERICAN): >60 ML/MIN/1.73 M^2
GLUCOSE SERPL-MCNC: 79 MG/DL
HCT VFR BLD AUTO: 39.1 %
HGB BLD-MCNC: 13 G/DL
LYMPHOCYTES # BLD AUTO: 1.6 K/UL
LYMPHOCYTES NFR BLD: 27.8 %
MCH RBC QN AUTO: 27.6 PG
MCHC RBC AUTO-ENTMCNC: 33.2 %
MCV RBC AUTO: 83 FL
MONOCYTES # BLD AUTO: 0.4 K/UL
MONOCYTES NFR BLD: 6.9 %
NEUTROPHILS # BLD AUTO: 3.6 K/UL
NEUTROPHILS NFR BLD: 62.6 %
PLATELET # BLD AUTO: 257 K/UL
PLATELET BLD QL SMEAR: NORMAL
PMV BLD AUTO: 10.9 FL
POTASSIUM SERPL-SCNC: 3.5 MMOL/L
PROT SERPL-MCNC: 7.2 G/DL
RBC # BLD AUTO: 4.71 M/UL
SODIUM SERPL-SCNC: 138 MMOL/L
WBC # BLD AUTO: 5.82 K/UL

## 2017-04-19 PROCEDURE — 80053 COMPREHEN METABOLIC PANEL: CPT

## 2017-04-19 PROCEDURE — 85025 COMPLETE CBC W/AUTO DIFF WBC: CPT

## 2017-04-19 PROCEDURE — 99214 OFFICE O/P EST MOD 30 MIN: CPT | Mod: S$PBB,,, | Performed by: INTERNAL MEDICINE

## 2017-04-19 PROCEDURE — 99000 SPECIMEN HANDLING OFFICE-LAB: CPT

## 2017-04-19 PROCEDURE — 99999 PR PBB SHADOW E&M-EST. PATIENT-LVL III: CPT | Mod: PBBFAC,,, | Performed by: INTERNAL MEDICINE

## 2017-04-19 RX ORDER — LOPERAMIDE HYDROCHLORIDE 2 MG/1
CAPSULE ORAL
Qty: 240 CAPSULE | Refills: 5 | Status: SHIPPED | OUTPATIENT
Start: 2017-04-19 | End: 2017-10-03 | Stop reason: SDUPTHER

## 2017-04-19 NOTE — PROGRESS NOTES
Subjective:       Patient ID: Efe Horowitz is a 57 y.o. female.    Chief Complaint: Follow-up and Diarrhea    HPI     Diarrhea began yesterday AM- starting to improve today  Took Immodium  No fevers or chills  Felt like she drank a lot of fluids    Presents for follow-up of triple negative breast cancer  Has a final pending reconstruction surgery with Dr. Mccloud - to remove fat necrosis and reduce bilateral breasts.  In the interval saw GI and has Upper EUS scheduled to evaluate for pancreatic insufficiency- now on Creon.     Diagnosis: Triple negative Stage 1 ([id number]) right breast carcinoma  Oncology History:  - 1/18/16 screening mammogram:  There is a focal asymmetry seen in the posterior upper-inner region of the right breast.  - 2/1/16 diagnostic mammogram and ultrasound: irregular solid mass with poorly defined margins  measuring 5 millimeters  - 2/19/16 core biopsy  FINAL PATHOLOGIC DIAGNOSIS  RIGHT BREAST, 1:00, BIOPSY:  Invasive ductal carcinoma, high-grade (duct formation - 3; nuclear pleomorphism - 3, mitosis - 2)  ER - Negative (0)  AZ - Negative (0)  HER2 - Negative (0)  - 4/27/16 mastectomy and SLN biopsy  FINAL PATHOLOGIC DIAGNOSIS  RIGHT MASTECTOMY SPECIMEN SHOWING A 7 MM AREA OF INVASIVE DUCT CARCINOMA, GRADE 3  WITH NEGATIVE MARGINS.    TUMOR SIZE: 0.7 CM  HISTOLOGIC TYPE: INVASIVE DUCT CARCINOMA  HISTOLOGIC GRADE: 3, 3, 2; GRADE 3  DCIS: NOT IDENTIFIED  MARGINS: ALL MARGINS OF RESECTION ARE NEGATIVE FOR TUMOR. CLOSEST MARGIN IS THE  POSTERIOR MARGIN AT 2.5 CM  IMMUNE RECEPTOR STUDIES: MS 16-5/1/04: ER - Negative (0)  AZ - Negative (0)  HER2 - Negative (0)  LYMPH NODES: Total number 4- negative  - Genetic testing performed  - With tumor > 0.6 cm she just made guideline cut off to consider adjuvant chemotherapy with TC   However, with her active wound healing issues at that time and other comorbidities adjuvant chemotherapy was not be pursued      Does have multiple comorbidities including  history of lupus and fibromyalgia- she is managed by rheumatology and reports recent flare   - Reports history of easy bruising and states she was borderline on testing for von willebrands in [facility]    Review of Systems   Constitutional: Negative for activity change, appetite change, chills, fatigue (chronic), fever and unexpected weight change.        Reports sweats a lot- taken off estrogen   HENT: Negative for congestion, dental problem, ear pain, hearing loss, mouth sores, nosebleeds, postnasal drip, rhinorrhea, sinus pressure, sneezing, sore throat, tinnitus and trouble swallowing.    Eyes: Negative for redness and visual disturbance (chronic- sees optho next week).   Respiratory: Negative for cough, chest tightness, shortness of breath and wheezing.    Cardiovascular: Negative for chest pain, palpitations and leg swelling.   Gastrointestinal: Positive for diarrhea (related to GI transit hx- better with Immmodium). Negative for abdominal distention, abdominal pain, blood in stool, constipation, nausea and vomiting.        + dysphagia- hx ring- has GI follow up next month   Genitourinary: Negative for decreased urine volume, difficulty urinating, dysuria, frequency, hematuria and urgency.   Musculoskeletal: Positive for arthralgias (wrists, hands). Negative for back pain, gait problem, joint swelling, myalgias, neck pain and neck stiffness.   Skin: Negative for color change, pallor, rash and wound.        Healing well this time   Neurological: Positive for headaches (migraine hx). Negative for dizziness, weakness, light-headedness (still reports chronic issue) and numbness.   Hematological: Negative for adenopathy. Does not bruise/bleed easily.   Psychiatric/Behavioral: Negative for decreased concentration, dysphoric mood and sleep disturbance. The patient is not nervous/anxious.        Objective:      Physical Exam   Constitutional: She is oriented to person, place, and time. She appears well-developed and  well-nourished. No distress.   Presents alone   HENT:   Head: Normocephalic and atraumatic.   Right Ear: External ear normal.   Left Ear: External ear normal.   Nose: Nose normal.   Mouth/Throat: Oropharynx is clear and moist. No oropharyngeal exudate.   + mouth sores   Eyes: Conjunctivae and EOM are normal. Pupils are equal, round, and reactive to light. Right eye exhibits no discharge. Left eye exhibits no discharge. No scleral icterus. Right pupil is round and reactive. Left pupil is round and reactive.   Neck: Normal range of motion. Neck supple. No tracheal deviation present. No thyromegaly present.   Cardiovascular: Normal rate, regular rhythm and intact distal pulses.  Exam reveals no gallop and no friction rub.    Murmur heard.  Pulmonary/Chest: Effort normal and breath sounds normal. No respiratory distress. She has no wheezes. She has no rales. She exhibits no tenderness.   Breasts- right breast no concerning masses (area of fat necrosis) or LAD  Left breast no masses or LAD   Abdominal: Soft. Bowel sounds are normal. She exhibits no distension and no mass. There is no hepatosplenomegaly. There is no tenderness. There is no rebound and no guarding.   No organomegaly   Musculoskeletal: Normal range of motion. She exhibits no edema or tenderness.   Lymphadenopathy:        Head (right side): No submandibular adenopathy present.        Head (left side): No submandibular adenopathy present.     She has no cervical adenopathy.        Right cervical: No superficial cervical, no deep cervical and no posterior cervical adenopathy present.       Left cervical: No superficial cervical, no deep cervical and no posterior cervical adenopathy present.        Right: No inguinal and no supraclavicular adenopathy present.        Left: No inguinal and no supraclavicular adenopathy present.   Neurological: She is alert and oriented to person, place, and time. She has normal strength. No cranial nerve deficit or sensory  deficit. She exhibits normal muscle tone. Coordination normal.   Skin: Skin is warm and dry. No bruising, no lesion, no petechiae and no rash noted. She is not diaphoretic. No erythema. No pallor.   Breast and abdominal wound sites healing well   Psychiatric: She has a normal mood and affect. Her behavior is normal. Judgment and thought content normal. Her mood appears not anxious. She does not exhibit a depressed mood.   Nursing note and vitals reviewed.      Assessment:       1. Malignant neoplasm of right female breast, unspecified site of breast    2. Loose stools        Plan:     1. NEEMA clinically  RTC 3 months  Knows to call for any issues  Next mammogram 1/2018  2. Follows with GI and on prophylactic regimen

## 2017-04-20 ENCOUNTER — TELEPHONE (OUTPATIENT)
Dept: NEPHROLOGY | Facility: CLINIC | Age: 57
End: 2017-04-20

## 2017-04-20 ENCOUNTER — PATIENT MESSAGE (OUTPATIENT)
Dept: RHEUMATOLOGY | Facility: CLINIC | Age: 57
End: 2017-04-20

## 2017-04-20 NOTE — TELEPHONE ENCOUNTER
Please see below message from Starla. Please ask the pt what lab results she would like to know as I don't see any recent labs in epic.     Pt stated that Dr. Rush has already address

## 2017-04-26 ENCOUNTER — TELEPHONE (OUTPATIENT)
Dept: OPHTHALMOLOGY | Facility: CLINIC | Age: 57
End: 2017-04-26

## 2017-05-01 ENCOUNTER — PATIENT MESSAGE (OUTPATIENT)
Dept: RHEUMATOLOGY | Facility: CLINIC | Age: 57
End: 2017-05-01

## 2017-05-06 ENCOUNTER — TELEPHONE (OUTPATIENT)
Dept: GASTROENTEROLOGY | Facility: CLINIC | Age: 57
End: 2017-05-06

## 2017-05-06 DIAGNOSIS — K86.3 CYST AND PSEUDOCYST OF PANCREAS: Primary | ICD-10-CM

## 2017-05-06 DIAGNOSIS — K86.2 CYST AND PSEUDOCYST OF PANCREAS: Primary | ICD-10-CM

## 2017-05-12 RX ORDER — MILNACIPRAN HYDROCHLORIDE 100 MG/1
TABLET, FILM COATED ORAL
Qty: 60 TABLET | Refills: 1 | Status: SHIPPED | OUTPATIENT
Start: 2017-05-12 | End: 2017-07-08 | Stop reason: SDUPTHER

## 2017-05-15 RX ORDER — HYDROXYCHLOROQUINE SULFATE 200 MG/1
TABLET, FILM COATED ORAL
Qty: 45 TABLET | Refills: 2 | Status: SHIPPED | OUTPATIENT
Start: 2017-05-15 | End: 2017-08-03 | Stop reason: SDUPTHER

## 2017-05-17 ENCOUNTER — CLINICAL SUPPORT (OUTPATIENT)
Dept: OPHTHALMOLOGY | Facility: CLINIC | Age: 57
End: 2017-05-17
Payer: MEDICARE

## 2017-05-17 ENCOUNTER — PATIENT MESSAGE (OUTPATIENT)
Dept: ENDOSCOPY | Facility: HOSPITAL | Age: 57
End: 2017-05-17

## 2017-05-17 ENCOUNTER — OFFICE VISIT (OUTPATIENT)
Dept: OPTOMETRY | Facility: CLINIC | Age: 57
End: 2017-05-17
Payer: MEDICARE

## 2017-05-17 DIAGNOSIS — H04.123 DRY EYE SYNDROME, BILATERAL: ICD-10-CM

## 2017-05-17 DIAGNOSIS — Z79.899 ENCOUNTER FOR LONG-TERM (CURRENT) USE OF HIGH-RISK MEDICATION: ICD-10-CM

## 2017-05-17 DIAGNOSIS — M32.9 LUPUS: ICD-10-CM

## 2017-05-17 DIAGNOSIS — M32.9 SYSTEMIC LUPUS ERYTHEMATOSUS, UNSPECIFIED SLE TYPE, UNSPECIFIED ORGAN INVOLVEMENT STATUS: Primary | ICD-10-CM

## 2017-05-17 DIAGNOSIS — Z79.899 LONG-TERM USE OF PLAQUENIL: ICD-10-CM

## 2017-05-17 PROCEDURE — 99999 PR PBB SHADOW E&M-EST. PATIENT-LVL I: CPT | Mod: PBBFAC,,, | Performed by: OPTOMETRIST

## 2017-05-17 PROCEDURE — 92134 CPTRZ OPH DX IMG PST SGM RTA: CPT | Mod: 26,S$PBB,, | Performed by: OPTOMETRIST

## 2017-05-17 PROCEDURE — 92014 COMPRE OPH EXAM EST PT 1/>: CPT | Mod: S$PBB,,, | Performed by: OPTOMETRIST

## 2017-05-17 PROCEDURE — 92083 EXTENDED VISUAL FIELD XM: CPT | Mod: 26,S$PBB,, | Performed by: OPTOMETRIST

## 2017-05-17 NOTE — PROGRESS NOTES
Subjective:       Patient ID: Efe Horowitz is a 57 y.o. female      Chief Complaint   Patient presents with    Concerns About Ocular Health     Plaquenil check; 300mg QD; taking since 1999     History of Present Illness  Dls: 11/2/16 Dr. Magallon    Pt here for 6 month plaquenil ck.   Pt states no changes in vision. Pt c/o dry eyes ou.     Eye meds:   systane gel ou prn     Assessment/Plan:     1. Systemic lupus erythematosus, unspecified SLE type, unspecified organ involvement status  2. Long-term use of Plaquenil  No evidence of plaquenil maculopathy on exam, OCT WNL OU, HVF 10-2 WNL OU. Can continue with plaquenil therapy.  Return in 6 months for HVF 10-2 and OCT macula (rheumatology wants pt followed every 6 months)   - Finch Visual Field - OU - Extended - Both Eyes; Future   - OCT- Retina; Future    3. Dry eye syndrome, bilateral  Continue systane QID OU    Return in about 6 months (around 11/17/2017), or if symptoms worsen or fail to improve, for Plaquenil check, HVF 10-2, OCT macula.

## 2017-05-22 ENCOUNTER — TELEPHONE (OUTPATIENT)
Dept: GASTROENTEROLOGY | Facility: CLINIC | Age: 57
End: 2017-05-22

## 2017-05-25 ENCOUNTER — HOSPITAL ENCOUNTER (OUTPATIENT)
Dept: RADIOLOGY | Facility: CLINIC | Age: 57
Discharge: HOME | End: 2017-05-25
Attending: INTERNAL MEDICINE
Payer: MEDICARE

## 2017-05-25 ENCOUNTER — OFFICE VISIT (OUTPATIENT)
Dept: RHEUMATOLOGY | Facility: CLINIC | Age: 57
End: 2017-05-25
Payer: MEDICARE

## 2017-05-25 VITALS
SYSTOLIC BLOOD PRESSURE: 163 MMHG | BODY MASS INDEX: 21.85 KG/M2 | HEART RATE: 78 BPM | WEIGHT: 128 LBS | DIASTOLIC BLOOD PRESSURE: 86 MMHG | TEMPERATURE: 98 F | HEIGHT: 64 IN

## 2017-05-25 DIAGNOSIS — M94.9 DISORDER OF BONE AND CARTILAGE: ICD-10-CM

## 2017-05-25 DIAGNOSIS — M79.7 FIBROMYALGIA: ICD-10-CM

## 2017-05-25 DIAGNOSIS — R53.83 FATIGUE, UNSPECIFIED TYPE: ICD-10-CM

## 2017-05-25 DIAGNOSIS — D84.9 IMMUNOSUPPRESSION: ICD-10-CM

## 2017-05-25 DIAGNOSIS — M89.9 DISORDER OF BONE AND CARTILAGE: ICD-10-CM

## 2017-05-25 DIAGNOSIS — M32.9 SYSTEMIC LUPUS ERYTHEMATOSUS, UNSPECIFIED SLE TYPE, UNSPECIFIED ORGAN INVOLVEMENT STATUS: Primary | ICD-10-CM

## 2017-05-25 DIAGNOSIS — M85.80 OSTEOPENIA: ICD-10-CM

## 2017-05-25 DIAGNOSIS — M32.9 SYSTEMIC LUPUS ERYTHEMATOSUS, UNSPECIFIED SLE TYPE, UNSPECIFIED ORGAN INVOLVEMENT STATUS: ICD-10-CM

## 2017-05-25 PROCEDURE — 99214 OFFICE O/P EST MOD 30 MIN: CPT | Mod: S$PBB,,, | Performed by: INTERNAL MEDICINE

## 2017-05-25 PROCEDURE — 99213 OFFICE O/P EST LOW 20 MIN: CPT | Mod: PBBFAC,25 | Performed by: INTERNAL MEDICINE

## 2017-05-25 PROCEDURE — 77080 DXA BONE DENSITY AXIAL: CPT | Mod: 26,,, | Performed by: INTERNAL MEDICINE

## 2017-05-25 PROCEDURE — 99999 PR PBB SHADOW E&M-EST. PATIENT-LVL III: CPT | Mod: PBBFAC,,, | Performed by: INTERNAL MEDICINE

## 2017-05-25 ASSESSMENT — ROUTINE ASSESSMENT OF PATIENT INDEX DATA (RAPID3)
PATIENT GLOBAL ASSESSMENT SCORE: 3.5
PAIN SCORE: 6.5
PSYCHOLOGICAL DISTRESS SCORE: 0
TOTAL RAPID3 SCORE: 4
FATIGUE SCORE: 7.5
WHEN YOU AWAKENED IN THE MORNING OVER THE LAST WEEK, PLEASE INDICATE THE AMOUNT OF TIME IT TAKES UNTIL YOU ARE AS LIMBER AS YOU WILL BE FOR THE DAY: 15 MINUTES
MDHAQ FUNCTION SCORE: .6
AM STIFFNESS SCORE: 1, YES

## 2017-05-25 NOTE — PROGRESS NOTES
"Subjective:       Patient ID: Efe Horowitz is a 57 y.o. female.    Chief Complaint: Lupus      HPI:  Efe Horowitz is a 57 y.o. female with history of lupus since age 39.   Her manifestations include joint pains, headache, positive DERRICK, and a   double-stranded DNA. She also has an equivocal anticardiolipin IgM.   She has been treated with Plaquenil 1-1/2 tablets daily. Eye exam 11/2016 was normal.      In addition to lupus, she has a   history of ulcerative colitis, osteopenia, fibromyalgia as well.   In 2009 she had a colectomy with ileostomy and in May 2011 the ileostomy  was closed. History of bilateral carpal tunnel.       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  She was treated with surgical resection.  Another lesion breast suspected to be lymphoma was later felt to be fibrous tissue.       May 1, 2017 had right breast fibrous material removed and reconstruction on [facility] breasts at [facility].     Denies pain today      Review of Systems   Constitutional: Positive for fatigue.   HENT:        Mouth ulcers   Eyes: Negative.    Respiratory: Negative.    Cardiovascular: Negative.    Gastrointestinal: Positive for diarrhea.   Endocrine: Negative.    Genitourinary: Negative.    Musculoskeletal: Negative.    Skin: Negative.    Neurological: Positive for headaches.        Tingling in right foot on dorsum in area of prior fracture   Hematological: Negative.    Psychiatric/Behavioral: Negative.          Objective:   BP (!) 163/86 (BP Location: Left arm, Patient Position: Sitting, BP Method: Automatic)   Pulse 78   Temp 98 °F (36.7 °C) (Oral)   Ht 5' 4" (1.626 m)   Wt 58.1 kg (128 lb)   BMI 21.97 kg/m²      Physical Exam   Constitutional: She is oriented to person, place, and time and well-developed, well-nourished, and in no distress.   HENT:   Head: Normocephalic and atraumatic.   Eyes: Conjunctivae and EOM are normal.   Neck: Neck supple.   Cardiovascular: Normal rate, regular rhythm and normal " heart sounds.    Pulmonary/Chest: Effort normal and breath sounds normal.   Abdominal: Soft. Bowel sounds are normal.   Neurological: She is alert and oriented to person, place, and time. Gait normal.   Skin: Skin is warm and dry.     Psychiatric: Mood and affect normal.   Musculoskeletal: She exhibits no edema or deformity.           LABS    Component      Latest Ref Rng & Units 4/19/2017 4/19/2017           8:21 AM  8:21 AM   WBC      3.90 - 12.70 K/uL  5.82   RBC      4.00 - 5.40 M/uL  4.71   Hemoglobin      12.0 - 16.0 g/dL  13.0   Hematocrit      37.0 - 48.5 %  39.1   MCV      82 - 98 fL  83   MCH      27.0 - 31.0 pg  27.6   MCHC      32.0 - 36.0 %  33.2   RDW      11.5 - 14.5 %  14.4   Platelets      150 - 350 K/uL  257   MPV      9.2 - 12.9 fL  10.9   Gran #      1.8 - 7.7 K/uL  3.6   Lymph #      1.0 - 4.8 K/uL  1.6   Mono #      0.3 - 1.0 K/uL  0.4   Eos #      0.0 - 0.5 K/uL  0.1   Baso #      0.00 - 0.20 K/uL  0.02   Gran%      38.0 - 73.0 %  62.6   Lymph%      18.0 - 48.0 %  27.8   Mono%      4.0 - 15.0 %  6.9   Eosinophil%      0.0 - 8.0 %  2.4   Basophil%      0.0 - 1.9 %  0.3   Platelet Estimate        Appears normal   Aniso        Slight   Differential Method        Automated   Sodium      136 - 145 mmol/L 140 138   Potassium      3.5 - 5.1 mmol/L 3.5 3.5   Chloride      95 - 110 mmol/L 104 104   CO2      23 - 29 mmol/L 25 23   Glucose      70 - 110 mg/dL 75 79   BUN, Bld      6 - 20 mg/dL 12 13   Creatinine      0.5 - 1.4 mg/dL 0.8 0.8   Calcium      8.7 - 10.5 mg/dL 9.7 9.5   Total Protein      6.0 - 8.4 g/dL  7.2   Albumin      3.5 - 5.2 g/dL 3.7 3.8   Total Bilirubin      0.1 - 1.0 mg/dL  0.3   Alkaline Phosphatase      55 - 135 U/L  61   AST      10 - 40 U/L  23   ALT      10 - 44 U/L  16   Anion Gap      8 - 16 mmol/L 11 11   eGFR if African American      >60 mL/min/1.73 m:2 >60.0 >60.0   eGFR if non African American      >60 mL/min/1.73 m:2 >60.0 >60.0   Phosphorus      2.7 - 4.5 mg/dL 3.5     Drug Study Test Name        Drug Study   Drug Study Specimen Type        BLOOD   Drug Study Test Result        Result sent directly to physician   Complement (C-4)      11 - 44 mg/dL  36   Complement (C-3)      50 - 180 mg/dL  104     Component      Latest Ref Rng & Units 1/25/2017             WBC      3.90 - 12.70 K/uL 7.52   RBC      4.00 - 5.40 M/uL 4.90   Hemoglobin      12.0 - 16.0 g/dL 13.5   Hematocrit      37.0 - 48.5 % 41.6   MCV      82 - 98 fL 85   MCH      27.0 - 31.0 pg 27.6   MCHC      32.0 - 36.0 % 32.5   RDW      11.5 - 14.5 % 14.4   Platelets      150 - 350 K/uL 307   MPV      9.2 - 12.9 fL 10.7   Gran #      1.8 - 7.7 K/uL 5.8   Lymph #      1.0 - 4.8 K/uL 1.2   Mono #      0.3 - 1.0 K/uL 0.5   Eos #      0.0 - 0.5 K/uL 0.1   Baso #      0.00 - 0.20 K/uL 0.03   Gran%      38.0 - 73.0 % 76.8 (H)   Lymph%      18.0 - 48.0 % 16.0 (L)   Mono%      4.0 - 15.0 % 6.0   Eosinophil%      0.0 - 8.0 % 0.7   Basophil%      0.0 - 1.9 % 0.4   Platelet Estimate          Aniso          Differential Method       Automated   Sodium      136 - 145 mmol/L 143   Potassium      3.5 - 5.1 mmol/L 3.0 (L)   Chloride      95 - 110 mmol/L 103   CO2      23 - 29 mmol/L 28   Glucose      70 - 110 mg/dL 79   BUN, Bld      6 - 20 mg/dL 15   Creatinine      0.5 - 1.4 mg/dL 0.9   Calcium      8.7 - 10.5 mg/dL 9.5   Total Protein      6.0 - 8.4 g/dL 7.7   Albumin      3.5 - 5.2 g/dL 4.0   Total Bilirubin      0.1 - 1.0 mg/dL 0.5   Alkaline Phosphatase      55 - 135 U/L 63   AST      10 - 40 U/L 19   ALT      10 - 44 U/L 15   Anion Gap      8 - 16 mmol/L 12   eGFR if African American      >60 mL/min/1.73 m:2 >60.0   eGFR if non African American      >60 mL/min/1.73 m:2 >60.0   Specimen UA       Urine, Unspecified   Color, UA      Yellow, Straw, Amanda Yellow   Appearance, UA      Clear Hazy (A)   pH, UA      5.0 - 8.0 5.0   Specific Gravity, UA      1.005 - 1.030 >1.030 (A)   Protein, UA      Negative 1+ (A)   Glucose, UA       Negative Negative   Ketones, UA      Negative Negative   Bilirubin (UA)      Negative Negative   Occult Blood UA      Negative Negative   Nitrite, UA      Negative Negative   Urobilinogen, UA      <2.0 EU/dL Negative   Leukocytes, UA      Negative Negative   Phosphorus      2.7 - 4.5 mg/dL    RBC, UA      0 - 4 /hpf 1   WBC, UA      0 - 5 /hpf 2   Bacteria, UA      None-Occ /hpf Occasional   Hyaline Casts, UA      0-1/lpf /lpf 0   Microscopic Comment       SEE COMMENT   Protein, Urine Random      0 - 15 mg/dL 22 (H)   Creatinine, Random Ur      15.0 - 325.0 mg/dL >450.0 (H)   Prot/Creat Ratio, Ur      0.00 - 0.20 Unable to calculate   Complement (C-4)      11 - 44 mg/dL 38   Complement (C-3)      50 - 180 mg/dL 112   CPK      20 - 180 U/L 136   CRP      0.0 - 8.2 mg/L 0.7   Aldolase      1.2 - 7.6 U/L 4.5   ds DNA Ab      Negative 1:10 Negative 1:10   Sed Rate      0 - 20 mm/Hr 3      Assessment:       1. Lupus. Lupus manifestations include joint pains, headache, positive DERRICK, and a   double-stranded DNA. Now with ulcer in mouth for 3 weeks.  Now improved but nose with ulcers.  Also alopecia.  SLEDAI= 2 without labs  2. Fatigue   3. Encounter for long-term (current) use of other medications   4. Myalgia and myositis. Fibromyalgia on gabapentin and Savella   5. Trochanteric bursitis. Bilateral now requesting injection.  6. Shoulder pain. Stable   7. Vitamin D deficiency disease. Now normal  8. Suspected Shingles. Past time for anti-viral. Did not receive treatment. Rash has improved  9. Migraine.  Three times a week.  Treated with Maxalt by primary doctor which helps.     10.  Osteopenia lumbar spine and femoral neck.  Reclast x1 stopped 3 years ago per patient due to improvement.   Preliminary DEXA osteopenia of femoral neck and lumbar spine FRAX with steroid included does not suggest treatment.  11.  Chronic steroid use.  Endocrine gives for adrenal insufficiency.  12.  Ulcerative colitis  13.  Hematuria.  Followed  by nephrology      Plan:       1.  Labs   2. Patient to continue Plaquenil 300 mg daily.  Eye exam normal 5/2017  3. Continue neurontin 800 mg/800 mg/ 1200 mg in evening which is still helping.   4. Elevated BP.  Repeat /80           RTO in 3-4 months.  Patient seen face to face for 40 minutes and greater than 50% spent in counseling regarding lupus symptom flare.

## 2017-05-26 ENCOUNTER — PATIENT MESSAGE (OUTPATIENT)
Dept: RHEUMATOLOGY | Facility: CLINIC | Age: 57
End: 2017-05-26

## 2017-05-30 ENCOUNTER — TELEPHONE (OUTPATIENT)
Dept: GASTROENTEROLOGY | Facility: CLINIC | Age: 57
End: 2017-05-30

## 2017-05-30 NOTE — TELEPHONE ENCOUNTER
Attempted to contact patient to schedule EUS. Left message with family member to have patient call me back.

## 2017-05-31 ENCOUNTER — TELEPHONE (OUTPATIENT)
Dept: GASTROENTEROLOGY | Facility: CLINIC | Age: 57
End: 2017-05-31

## 2017-05-31 NOTE — TELEPHONE ENCOUNTER
----- Message from Ondina Desai sent at 5/31/2017 10:10 AM CDT -----  Contact: self - [phone]  Mendez - returning your call - please call patient at [phone]

## 2017-06-02 ENCOUNTER — PATIENT MESSAGE (OUTPATIENT)
Dept: RHEUMATOLOGY | Facility: CLINIC | Age: 57
End: 2017-06-02

## 2017-06-07 ENCOUNTER — TELEPHONE (OUTPATIENT)
Dept: RHEUMATOLOGY | Facility: CLINIC | Age: 57
End: 2017-06-07

## 2017-06-07 NOTE — TELEPHONE ENCOUNTER
----- Message from Darin Starkey II, MD sent at 6/5/2017 11:33 AM CDT -----  I agree with you. This note was typed with all fingers.  Thanks  belem

## 2017-06-21 ENCOUNTER — LAB VISIT (OUTPATIENT)
Dept: LAB | Facility: HOSPITAL | Age: 57
End: 2017-06-21
Attending: INTERNAL MEDICINE
Payer: MEDICARE

## 2017-06-21 ENCOUNTER — TELEPHONE (OUTPATIENT)
Dept: GASTROENTEROLOGY | Facility: CLINIC | Age: 57
End: 2017-06-21

## 2017-06-21 DIAGNOSIS — K31.83 ACHLORHYDRIA: ICD-10-CM

## 2017-06-21 DIAGNOSIS — E55.9 VITAMIN D DEFICIENCY: ICD-10-CM

## 2017-06-21 DIAGNOSIS — K52.9 IBD (INFLAMMATORY BOWEL DISEASE): ICD-10-CM

## 2017-06-21 DIAGNOSIS — D64.9 LOW HEMOGLOBIN: ICD-10-CM

## 2017-06-21 DIAGNOSIS — K52.9 IBD (INFLAMMATORY BOWEL DISEASE): Primary | ICD-10-CM

## 2017-06-21 LAB
25(OH)D3+25(OH)D2 SERPL-MCNC: 41 NG/ML
FERRITIN SERPL-MCNC: 53 NG/ML
HGB BLD-MCNC: 12.2 G/DL
IRON SERPL-MCNC: 119 UG/DL
MAGNESIUM SERPL-MCNC: 2 MG/DL
SATURATED IRON: 29 %
TOTAL IRON BINDING CAPACITY: 411 UG/DL
TRANSFERRIN SERPL-MCNC: 278 MG/DL
VIT B12 SERPL-MCNC: 756 PG/ML

## 2017-06-21 PROCEDURE — 82306 VITAMIN D 25 HYDROXY: CPT

## 2017-06-21 PROCEDURE — 82607 VITAMIN B-12: CPT

## 2017-06-21 PROCEDURE — 83735 ASSAY OF MAGNESIUM: CPT

## 2017-06-21 PROCEDURE — 83540 ASSAY OF IRON: CPT

## 2017-06-21 PROCEDURE — 36415 COLL VENOUS BLD VENIPUNCTURE: CPT

## 2017-06-21 PROCEDURE — 85018 HEMOGLOBIN: CPT

## 2017-06-21 PROCEDURE — 82728 ASSAY OF FERRITIN: CPT

## 2017-06-22 ENCOUNTER — TELEPHONE (OUTPATIENT)
Dept: GASTROENTEROLOGY | Facility: CLINIC | Age: 57
End: 2017-06-22

## 2017-06-22 NOTE — TELEPHONE ENCOUNTER
----- Message from Reese Villalta MD sent at 6/21/2017  5:17 PM CDT -----  Yvonne - please tell Efe that she is not anemic

## 2017-06-22 NOTE — TELEPHONE ENCOUNTER
----- Message from Reese Villalta MD sent at 6/21/2017  6:28 PM CDT -----  Jessica - please tell Efe that her labs look good.

## 2017-07-05 ENCOUNTER — OFFICE VISIT (OUTPATIENT)
Dept: NEPHROLOGY | Facility: CLINIC | Age: 57
End: 2017-07-05
Payer: MEDICARE

## 2017-07-05 VITALS
BODY MASS INDEX: 21.86 KG/M2 | WEIGHT: 128.06 LBS | SYSTOLIC BLOOD PRESSURE: 148 MMHG | OXYGEN SATURATION: 99 % | DIASTOLIC BLOOD PRESSURE: 78 MMHG | HEART RATE: 86 BPM | HEIGHT: 64 IN

## 2017-07-05 DIAGNOSIS — I10 ESSENTIAL HYPERTENSION: Primary | ICD-10-CM

## 2017-07-05 DIAGNOSIS — M32.9 SYSTEMIC LUPUS ERYTHEMATOSUS, UNSPECIFIED SLE TYPE, UNSPECIFIED ORGAN INVOLVEMENT STATUS: ICD-10-CM

## 2017-07-05 PROCEDURE — 99214 OFFICE O/P EST MOD 30 MIN: CPT | Mod: S$PBB,,, | Performed by: INTERNAL MEDICINE

## 2017-07-05 PROCEDURE — 99999 PR PBB SHADOW E&M-EST. PATIENT-LVL III: CPT | Mod: PBBFAC,,, | Performed by: INTERNAL MEDICINE

## 2017-07-05 PROCEDURE — 99213 OFFICE O/P EST LOW 20 MIN: CPT | Mod: PBBFAC | Performed by: INTERNAL MEDICINE

## 2017-07-05 NOTE — PROGRESS NOTES
Subjective:       Patient ID: Efe Horowitz is a 57 y.o. Black or  female who presents for follow-up evaluation of No chief complaint on file.    HPI This is a 57-year-old -American female with   history of systemic lupus diagnosed 16 years ago, fibromyalgia, ulcerative   colitis and appendectomy, lupus who is coming in for f/u . The patient denies any macroscopic hematuria. She complains of fatigue  And HA's at times. Her creatinines have been stable. She has  no   proteinuria on the urine protein creatinine ratios for the past two to three   years and has had microscopic hematuria on UAs off and on for the past three   years or so but no sig RBC's. She states her blood pressures at home in the 140's/70-80. Follows in oncology with breast cancer.    PAST MEDICAL HISTORY: Lupus 16 years ago, fibromyalgia, colitis, appendectomy,   and cholecystectomy, breast cancer.     SOCIAL HISTORY: Does not smoke, does not drink. She works part-time as a   .     FAMILY HISTORY: Her dad has CKD from hypertension and diabetes. Her nephew had   a kidney transplant after a motor vehicle accident.    Review of Systems   Constitutional: Positive for fatigue.   Eyes: Negative for discharge.   Respiratory: Negative for cough, shortness of breath and wheezing.    Cardiovascular: Negative for chest pain and palpitations.   Gastrointestinal: Negative for abdominal pain, diarrhea, nausea and vomiting.   Genitourinary: Negative for dysuria, frequency, hematuria and urgency.   Musculoskeletal: Positive for back pain.   Skin: Negative for color change and rash.   Psychiatric/Behavioral: Negative for confusion.   All other systems reviewed and are negative.      Objective:      Physical Exam   Constitutional: She is oriented to person, place, and time. She appears well-developed and well-nourished.   HENT:   Right Ear: External ear normal.   Left Ear: External ear normal.   Nose: Nose normal.    Mouth/Throat: Normal dentition.   Eyes: Conjunctivae, EOM and lids are normal. Pupils are equal, round, and reactive to light.   Neck: Trachea normal. No thyroid mass present.   Cardiovascular: Normal rate and regular rhythm.  Exam reveals no friction rub.    No murmur heard.  No lower extremity edema   Pulmonary/Chest: Effort normal and breath sounds normal. No respiratory distress. She has no decreased breath sounds. She has no rales.   Abdominal: Soft. Bowel sounds are normal. She exhibits no mass. There is no tenderness. There is no rebound. No hernia.   Musculoskeletal: She exhibits no edema.   Neurological: She is alert and oriented to person, place, and time.   Skin: Skin is warm and dry. No rash noted. No erythema.   Psychiatric: She has a normal mood and affect. Judgment normal. Her mood appears not anxious. She does not exhibit a depressed mood.   Oriented to time, place and person.   Vitals reviewed.      Assessment:       No diagnosis found.    Plan:         This is a 57 year-old -American female with   longstanding systemic lupus, but no history of lupus nephritis, who is coming in   for f/u per:   1. Renal. Her creatinines have been stable at 0.8. She has 100 mg to no   proteinuria for the past two or three years with microscopic hematuria on   several UAs intermittently but no sig RBC's. No proteinuria currently. She does not appear to have lupus nephritis, but will need to be monitored closely. I do not think renal biopsy is warranted at   this time, but she does need close followup and all of this was explained in   detail to the patient. She denies any NSAIDs. Consider ace-I if bp's lenin. Hydrate well.   2. Blood pressures are slightly higher -asked her to monitor bp's and to take HCTZ 25 mg every day instead of every other day and call us.  we can initiate ace-I if needed additionally in the future.   3. Microscopic hematuria. renal ultrasound stable. She denies any   history of kidney  stones. Saw urology-ct urogram negative. Recent UA negative for proteinuria. Her complements have been stable.   4.Anemia: hgb stable along with iron.   Return in 6 months.

## 2017-07-06 DIAGNOSIS — K52.9 IBD (INFLAMMATORY BOWEL DISEASE): Primary | ICD-10-CM

## 2017-07-06 RX ORDER — POLYETHYLENE GLYCOL 3350, SODIUM SULFATE ANHYDROUS, SODIUM BICARBONATE, SODIUM CHLORIDE, POTASSIUM CHLORIDE 236; 22.74; 6.74; 5.86; 2.97 G/4L; G/4L; G/4L; G/4L; G/4L
4 POWDER, FOR SOLUTION ORAL ONCE
Qty: 4000 ML | Refills: 0 | Status: SHIPPED | OUTPATIENT
Start: 2017-07-06 | End: 2017-07-06

## 2017-07-10 RX ORDER — MILNACIPRAN HYDROCHLORIDE 100 MG/1
TABLET, FILM COATED ORAL
Qty: 60 TABLET | Refills: 1 | Status: SHIPPED | OUTPATIENT
Start: 2017-07-10 | End: 2017-09-06 | Stop reason: SDUPTHER

## 2017-07-24 ENCOUNTER — SURGERY (OUTPATIENT)
Age: 57
End: 2017-07-24

## 2017-07-24 ENCOUNTER — HOSPITAL ENCOUNTER (OUTPATIENT)
Facility: HOSPITAL | Age: 57
Discharge: HOME | End: 2017-07-24
Attending: INTERNAL MEDICINE | Admitting: INTERNAL MEDICINE
Payer: MEDICARE

## 2017-07-24 ENCOUNTER — ANESTHESIA (OUTPATIENT)
Dept: ENDOSCOPY | Facility: HOSPITAL | Age: 57
End: 2017-07-24
Payer: MEDICARE

## 2017-07-24 ENCOUNTER — ANESTHESIA EVENT (OUTPATIENT)
Dept: ENDOSCOPY | Facility: HOSPITAL | Age: 57
End: 2017-07-24
Payer: MEDICARE

## 2017-07-24 VITALS
WEIGHT: 126 LBS | BODY MASS INDEX: 21.51 KG/M2 | RESPIRATION RATE: 24 BRPM | SYSTOLIC BLOOD PRESSURE: 118 MMHG | HEART RATE: 91 BPM | HEIGHT: 64 IN | DIASTOLIC BLOOD PRESSURE: 74 MMHG | TEMPERATURE: 98 F | OXYGEN SATURATION: 100 % | RESPIRATION RATE: 18 BRPM

## 2017-07-24 DIAGNOSIS — K52.9 IBD (INFLAMMATORY BOWEL DISEASE): ICD-10-CM

## 2017-07-24 PROCEDURE — 44386 ENDOSCOPY BOWEL POUCH/BIOP: CPT | Performed by: INTERNAL MEDICINE

## 2017-07-24 PROCEDURE — 63600175 PHARM REV CODE 636 W HCPCS: Performed by: NURSE ANESTHETIST, CERTIFIED REGISTERED

## 2017-07-24 PROCEDURE — D9220A PRA ANESTHESIA: Mod: CRNA,,, | Performed by: NURSE ANESTHETIST, CERTIFIED REGISTERED

## 2017-07-24 PROCEDURE — 37000008 HC ANESTHESIA 1ST 15 MINUTES: Performed by: INTERNAL MEDICINE

## 2017-07-24 PROCEDURE — 25000003 PHARM REV CODE 250: Performed by: INTERNAL MEDICINE

## 2017-07-24 PROCEDURE — 27201012 HC FORCEPS, HOT/COLD, DISP: Performed by: INTERNAL MEDICINE

## 2017-07-24 PROCEDURE — 37000009 HC ANESTHESIA EA ADD 15 MINS: Performed by: INTERNAL MEDICINE

## 2017-07-24 PROCEDURE — 44386 ENDOSCOPY BOWEL POUCH/BIOP: CPT | Mod: ,,, | Performed by: INTERNAL MEDICINE

## 2017-07-24 PROCEDURE — 88305 TISSUE EXAM BY PATHOLOGIST: CPT | Performed by: PATHOLOGY

## 2017-07-24 PROCEDURE — 88305 TISSUE EXAM BY PATHOLOGIST: CPT | Mod: 26,,, | Performed by: PATHOLOGY

## 2017-07-24 PROCEDURE — D9220A PRA ANESTHESIA: Mod: ANES,,, | Performed by: ANESTHESIOLOGY

## 2017-07-24 RX ORDER — LIDOCAINE HCL/PF 100 MG/5ML
SYRINGE (ML) INTRAVENOUS
Status: DISCONTINUED | OUTPATIENT
Start: 2017-07-24 | End: 2017-07-24

## 2017-07-24 RX ORDER — PROPOFOL 10 MG/ML
VIAL (ML) INTRAVENOUS
Status: DISCONTINUED | OUTPATIENT
Start: 2017-07-24 | End: 2017-07-24

## 2017-07-24 RX ORDER — PROPOFOL 10 MG/ML
VIAL (ML) INTRAVENOUS CONTINUOUS PRN
Status: DISCONTINUED | OUTPATIENT
Start: 2017-07-24 | End: 2017-07-24

## 2017-07-24 RX ORDER — SODIUM CHLORIDE 9 MG/ML
INJECTION, SOLUTION INTRAVENOUS CONTINUOUS
Status: DISCONTINUED | OUTPATIENT
Start: 2017-07-24 | End: 2017-07-24 | Stop reason: HOSPADM

## 2017-07-24 RX ADMIN — PROPOFOL 150 MCG/KG/MIN: 10 INJECTION, EMULSION INTRAVENOUS at 03:07

## 2017-07-24 RX ADMIN — LIDOCAINE HYDROCHLORIDE 50 MG: 20 INJECTION, SOLUTION INTRAVENOUS at 03:07

## 2017-07-24 RX ADMIN — SODIUM CHLORIDE: 0.9 INJECTION, SOLUTION INTRAVENOUS at 02:07

## 2017-07-24 RX ADMIN — PROPOFOL 100 MG: 10 INJECTION, EMULSION INTRAVENOUS at 03:07

## 2017-07-24 NOTE — ANESTHESIA PREPROCEDURE EVALUATION
07/24/2017  Efe Horowitz is a 57 y.o., female.    Pre-op Assessment    I have reviewed the Patient Summary Reports.      I have reviewed the Medications.     Review of Systems  Anesthesia Hx:  No problems with previous Anesthesia Denies Hx of Anesthetic complications  History of prior surgery of interest to airway management or planning:  Denies Personal Hx of Anesthesia complications.   Social:  Non-Smoker, No Alcohol Use    Hematology/Oncology:         -- Anemia: Current/Recent Cancer. Breast   EENT/Dental:EENT/Dental Normal   Cardiovascular:    Denies Angina.    Pulmonary:   Asthma Denies Shortness of breath.    Renal/:  Renal/ Normal     Hepatic/GI:   PUD, GERD    Neurological:   Neuromuscular Disease, Headaches    Endocrine:  Endocrine Normal        Physical Exam  General:  Well nourished, Obesity    Airway/Jaw/Neck:  Airway Findings: Mouth Opening: Normal Tongue: Normal  General Airway Assessment: Adult  Mallampati: II  Jaw/Neck Findings:  Neck ROM: Normal ROM     Eyes/Ears/Nose:  EYES/EARS/NOSE FINDINGS: Normal   Dental:  Dental Findings: In tact   Chest/Lungs:  Chest/Lungs Findings: Clear to auscultation, Normal Respiratory Rate     Heart/Vascular:  Heart Findings: Rate: Normal  Rhythm: Regular Rhythm  Sounds: Normal     Abdomen:  Abdomen Findings: Normal    Musculoskeletal:  Musculoskeletal Findings: Normal   Skin:  Skin Findings: Normal    Mental Status:  Mental Status Findings:  Cooperative, Alert and Oriented         Anesthesia Plan  Type of Anesthesia, risks & benefits discussed:  Anesthesia Type:  general  Patient's Preference:   Intra-op Monitoring Plan: standard ASA monitors  Intra-op Monitoring Plan Comments:   Post Op Pain Control Plan:   Post Op Pain Control Plan Comments:   Induction:   IV  Beta Blocker:  Patient is not currently on a Beta-Blocker (No further documentation  required).       Informed Consent: Patient understands risks and agrees with Anesthesia plan.  Questions answered. Anesthesia consent signed with patient.  ASA Score: 3     Day of Surgery Review of History & Physical:    H&P update referred to the provider.         Ready For Surgery From Anesthesia Perspective.     Patient Active Problem List   Diagnosis    Attention to other artificial opening of digestive tract    Iron deficiency anemia    Proteins serum plasma low    Vitamin D deficiency disease    Vitamin B12 deficiency    Hyperlipidemia    UC (ulcerative colitis)    Weakness generalized    Chronic use of steroids    Lumbar and sacral spondylarthritis    Migraine syndrome    Fatigue    Encounter for long-term (current) use of other medications    Hypoadrenalism    Ulcerative colitis    S/P total colectomy    GERD (gastroesophageal reflux disease)    Vitamin D imbalance    Hypokalemia    Osteoporosis, idiopathic    Myalgia and myositis    Trochanteric bursitis    Shoulder pain    Encounter for monitoring proton pump inhibitor therapy    Dysphagia    Acute bronchitis    Wheezing on auscultation    Nausea    Senile osteoporosis    Long term current use of systemic steroids    Chronic LBP    Fibromyalgia    Lupus (systemic lupus erythematosus)    Microhematuria    Bilateral low back pain without sciatica    Proteinuria    Immunosuppression    Malignant neoplasm of upper-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    Preop testing    Loose stools    Malignant neoplasm of right breast    Wound healing, delayed    Exocrine pancreatic insufficiency    Iron deficiency    Family history of pancreatic cancer    Osteopenia    Disorder of bone and cartilage    Abnormal gait    Headache above the eye region    IBD (inflammatory bowel disease)

## 2017-07-24 NOTE — TRANSFER OF CARE
"Anesthesia Transfer of Care Note    Patient: Efe Horowitz    Procedure(s) Performed: Procedure(s) (LRB):  ILEOSCOPY (N/A)    Patient location: PACU    Anesthesia Type: general    Transport from OR: Transported from OR on room air with adequate spontaneous ventilation    Post pain: adequate analgesia    Post assessment: no apparent anesthetic complications    Post vital signs: stable    Level of consciousness: sedated    Nausea/Vomiting: no nausea/vomiting    Complications: none    Transfer of care protocol was followed      Last vitals:   Visit Vitals  /73   Pulse 87   Temp 36.7 °C (98 °F)   Resp 16   Ht 5' 4" (1.626 m)   Wt 57.2 kg (126 lb)   SpO2 100%   Breastfeeding? No   BMI 21.63 kg/m²     "

## 2017-07-24 NOTE — ANESTHESIA POSTPROCEDURE EVALUATION
"Anesthesia Post Evaluation    Patient: Efe Horowitz    Procedure(s) Performed: Procedure(s) (LRB):  ILEOSCOPY (N/A)    Final Anesthesia Type: general  Patient location during evaluation: GI PACU  Patient participation: Yes- Able to Participate  Level of consciousness: awake and alert and oriented  Post-procedure vital signs: reviewed and stable  Pain management: adequate  Airway patency: patent  PONV status at discharge: No PONV  Anesthetic complications: no      Cardiovascular status: blood pressure returned to baseline and hemodynamically stable  Respiratory status: unassisted, spontaneous ventilation and room air  Hydration status: euvolemic  Follow-up not needed.        Visit Vitals  /78   Pulse 86   Temp 36.7 °C (98 °F)   Resp 18   Ht 5' 4" (1.626 m)   Wt 57.2 kg (126 lb)   SpO2 99%   Breastfeeding? No   BMI 21.63 kg/m²       Pain/Natalie Score: Pain Assessment Performed: Yes (7/24/2017  4:26 PM)  Presence of Pain: denies (7/24/2017  4:26 PM)  Natalie Score: 10 (7/24/2017  4:15 PM)      "

## 2017-07-24 NOTE — H&P
[facility]  History & Physical    Subjective:      Chief Complaint/Reason for Admission:     ileoscopy    Efe Horwoitz is a 57 y.o. female.    Past Medical History:   Diagnosis Date    Acid reflux     Adrenal insufficiency, primary     Arthritis     Asthma     B12 deficiency anemia     Blood transfusion 2010    Breast cancer 2/2016    Right breast infiltrating ductal CA    Cataract     Chronic pain     Deep vein thrombosis     Dry eyes     Esophagitis, unspecified     Fibromyalgia     Heart murmur     Hyperlipidemia     Hypopituitary dwarfism     Iron deficiency anemia     Lupus     Migraines, neuralgic     Nonspecific ulcerative colitis     OP (osteoporosis)     history of reclast    Osteopenia     Schatzki's ring     Steroid sulfatase deficiency     Ulcerative colitis     Vitamin D deficiency disease      Past Surgical History:   Procedure Laterality Date    APPENDECTOMY      BREAST BIOPSY Right 2/2016    IDC    BREAST SURGERY      CHOLECYSTECTOMY      COLON SURGERY      HYSTERECTOMY      OOPHORECTOMY Bilateral     restorative proctectomy      total abdominal colectomy with ileostomy  2010     Family History   Problem Relation Age of Onset    Diabetes Father     Hyperlipidemia Father     Hypertension Father     Hyperlipidemia Mother     Cancer Maternal Aunt      pancreatic cancer, late 50s at dx    Pancreatic cancer Maternal Aunt     Cancer Maternal Aunt      Breast CA    Breast cancer Maternal Aunt 55    Cancer Cousin      breast CA    Breast cancer Cousin      50s at dx    Breast cancer Other     No Known Problems Sister     No Known Problems Brother     Cancer Maternal Uncle      pancrease cancer    Pancreatic cancer Maternal Uncle     No Known Problems Paternal Aunt     No Known Problems Paternal Uncle     No Known Problems Maternal Grandmother     No Known Problems Maternal Grandfather     No Known Problems Paternal Grandmother      No Known Problems Paternal Grandfather     Amblyopia Neg Hx     Blindness Neg Hx     Cataracts Neg Hx     Macular degeneration Neg Hx     Retinal detachment Neg Hx     Strabismus Neg Hx     Stroke Neg Hx     Thyroid disease Neg Hx     Glaucoma Neg Hx     Ovarian cancer Neg Hx     Celiac disease Neg Hx     Colon cancer Neg Hx     Colon polyps Neg Hx     Esophageal cancer Neg Hx     Liver cancer Neg Hx     Rectal cancer Neg Hx     Stomach cancer Neg Hx     Ulcerative colitis Neg Hx     Inflammatory bowel disease Neg Hx      Social History   Substance Use Topics    Smoking status: Never Smoker    Smokeless tobacco: Never Used    Alcohol use No       PTA Medications   Medication Sig    atorvastatin (LIPITOR) 10 MG tablet Take 10 mg by mouth once daily.    ergocalciferol (VITAMIN D2) 50,000 unit Cap Take 2 capsules (100,000 Units total) by mouth every 7 days. (Patient taking differently: Take 50,000 Units by mouth every 7 days. Every Wednesday)    ferrous sulfate 325 mg (65 mg iron) Tab Take by mouth. 1 Tablet Oral Every day.  Take one 250mg vitamin C pill every 12 hours which will help your bodyabsorb more of the iron.    gabapentin (NEURONTIN) 800 MG tablet Take 1 tablet (800 mg total) by mouth 3 (three) times daily. (Patient taking differently: Take 800 mg by mouth 4 (four) times daily. )    hydroxychloroquine (PLAQUENIL) 200 mg tablet TAKE 1 AND 1/2 TABLETS BY MOUTH DAILY    lipase-protease-amylase 36,000-114,000- 180,000 unit CpDR Take 2 capsules by mouth 3 (three) times daily with meals. Take one with each snack    loperamide (IMODIUM) 2 mg capsule TAKE 1 TO 2 CAPSULES BY MOUTH FOUR TIMES DAILY    lorazepam (ATIVAN) 1 MG tablet Take 1 tablet (1 mg total) by mouth every evening. 1 Tablet Oral Q8H-12 hours    niacin 100 MG Tab Take by mouth. 1 Tablet Oral At bedtime    ondansetron (ZOFRAN) 4 MG tablet Take 1 tablet (4 mg total) by mouth every 12 (twelve) hours as needed for  "Nausea.    oxycodone-acetaminophen (PERCOCET) 5-325 mg per tablet Take 1 tablet by mouth every 4 (four) hours as needed for Pain.    polycarbophil (FIBERCON) 625 mg tablet Take by mouth. 1 Tablet Oral Every 12 hours.  Take with 12 ounces of water with each pill.    predniSONE (DELTASONE) 5 MG tablet Take 5 mg by mouth once daily.    predniSONE (DELTASONE) 5 MG tablet TAKE AS DIRECTED INCREASE to 10mg FOR 3 days then resume 5mg dose    rizatriptan (MAXALT) 10 MG tablet Take 1 tablet (10 mg total) by mouth every 6 (six) hours as needed. (Patient taking differently: Take 10 mg by mouth every 6 (six) hours as needed for Migraine. )    SAVELLA 100 mg Tab TAKE ONE TABLET BY MOUTH DAILY    zolpidem (AMBIEN) 5 MG Tab Take 1 tablet (5 mg total) by mouth nightly as needed.    albuterol (ACCUNEB) 0.63 mg/3 mL Nebu Take 3 mLs (0.63 mg total) by nebulization every 6 (six) hours as needed.    cyanocobalamin 1,000 mcg/mL injection Inject 1 mL (1,000 mcg total) into the muscle once a week. 1 Solution Injection monthly.  B12 injection weekly for  3 weeks then every  2 weeks (Patient taking differently: Inject 1,000 mcg into the muscle once a week. Every Saturday)     Review of patient's allergies indicates:   Allergen Reactions    Aspirin      Other reaction(s): "hemorrhage"  hemorrhage    Hydrocortisone      Other reaction(s): sick  sick    Vicodin [hydrocodone-acetaminophen]      Other reaction(s): hyperactivity    Asacol [mesalamine]      Other reaction(s): Hallucinations  hallucinations        Review of Systems   Constitutional: Negative for chills, fever and weight loss.   Respiratory: Negative for shortness of breath and wheezing.    Cardiovascular: Negative for chest pain.   Gastrointestinal: Negative for abdominal pain, blood in stool, constipation, diarrhea and melena.       Objective:      Vital Signs (Most Recent)  Temp: 98.5 °F (36.9 °C) (07/24/17 1442)  Pulse: 93 (07/24/17 1442)  Resp: 16 (07/24/17 " 1442)  BP: (!) 158/83 (07/24/17 1442)  SpO2: 100 % (07/24/17 1442)    Vital Signs Range (Last 24H):  Temp:  [98.5 °F (36.9 °C)]   Pulse:  [93]   Resp:  [16]   BP: (158)/(83)   SpO2:  [100 %]     Physical Exam   Constitutional: She appears well-developed and well-nourished.   Cardiovascular: Normal rate.    Pulmonary/Chest: Effort normal and breath sounds normal.   Abdominal: Soft. Bowel sounds are normal.   Skin: Skin is warm and dry.   Psychiatric: She has a normal mood and affect. Her behavior is normal. Judgment and thought content normal.           Assessment:      Active Hospital Problems    Diagnosis  POA    IBD (inflammatory bowel disease) [K52.9]  Yes      Resolved Hospital Problems    Diagnosis Date Resolved POA   No resolved problems to display.       Plan:    ileoscopy for IBD surveillance

## 2017-07-25 ENCOUNTER — OFFICE VISIT (OUTPATIENT)
Dept: SURGERY | Facility: CLINIC | Age: 57
End: 2017-07-25
Payer: MEDICARE

## 2017-07-25 VITALS
SYSTOLIC BLOOD PRESSURE: 146 MMHG | TEMPERATURE: 98 F | BODY MASS INDEX: 21.68 KG/M2 | HEART RATE: 85 BPM | WEIGHT: 127 LBS | HEIGHT: 64 IN | DIASTOLIC BLOOD PRESSURE: 78 MMHG

## 2017-07-25 DIAGNOSIS — Z17.1 MALIGNANT NEOPLASM OF UPPER-INNER QUADRANT OF RIGHT BREAST IN FEMALE, ESTROGEN RECEPTOR NEGATIVE: Primary | ICD-10-CM

## 2017-07-25 DIAGNOSIS — C50.211 MALIGNANT NEOPLASM OF UPPER-INNER QUADRANT OF RIGHT BREAST IN FEMALE, ESTROGEN RECEPTOR NEGATIVE: Primary | ICD-10-CM

## 2017-07-25 PROCEDURE — 99999 PR PBB SHADOW E&M-EST. PATIENT-LVL III: CPT | Mod: PBBFAC,,, | Performed by: SURGERY

## 2017-07-25 PROCEDURE — 99213 OFFICE O/P EST LOW 20 MIN: CPT | Mod: S$PBB,,, | Performed by: SURGERY

## 2017-07-25 PROCEDURE — 99213 OFFICE O/P EST LOW 20 MIN: CPT | Mod: PBBFAC,PO | Performed by: SURGERY

## 2017-07-25 NOTE — LETTER
[facility]  [address]  [facility] [address]  Phone: [phone]  Fax: [phone] July 27, 2017      Manjula Mixon MD  [address] [place] [id number]    Patient: Efe Horowitz   MR Number: [id number]   YOB: 1960   Date of Visit: 7/25/2017     Dear Dr. Mixon:    Thank you for referring Efe Horowitz to me for evaluation.     The patient presents for routine follow-up.  She has a known medical history of primary adrenal insufficiency, arthritis, B12 deficiency, iron deficiency anemia, hypopituitary state and lupus who was diagnosed with a T1b triple negative, high-grade invasive ductal carcinoma in the Spring of 2016.     She underwent a right nipple-sparing mastectomy on 04/27/2016 with immediate autologous tissue IGNACIO flap reconstruction by Dr. Margo Mccloud. The final pathology revealed the high-grade T1b 7 mm lesion with negative sentinel lymph nodes x4. She had one additional negative node.     She underwent right nipple sparing mastectomy in April 2016 for a stage IA lymph node negative T1b 7 mm grade 3 triple negative cancer from the right breast.  Surgical margins were clear by 2.5 cm and she had 4 negative sentinel nodes.    She has completed all of her reconstruction with Dr. Mccloud and has an excellent symmetrical cosmetic result.  There are no suspicious masses nodules densities skin changes or lymphadenopathy on either side.  There are no suspicious skin changes.  Good symmetry with inferior radial incisions following reconstruction and revision.  No significant fat necrosis noted clinically today on exam.    Clinically [facility].  She was not recommended for any adjuvant therapy because of the small T1b node negative cancer although triple negative. We will refer her to survivorship clinic  She will follow-up with me again in 6 months for routine clinical examination as part of ongoing breast cancer screening  surveillance follow-up.  We will place an order for a contralateral left diagnostic mammogram at that point as well in January 2018 when she would be due for annual mammogram on the remaining left breast.    If you have questions, please do not hesitate to call me. I look forward to following Efe Horowitz along with you.    Sincerely,      Sivakumar Muñiz MD  Medical Director, [facility]  Section of General and Oncologic Surgery  [facility]    RL[facility]/hcr

## 2017-07-26 NOTE — PROGRESS NOTES
Subjective:      Patient ID: Efe Horowitz is a 57 y.o. female.    Chief Complaint: Breast Cancer Screening (CBE/Hx of Right Breast Cancer ([facility]ORSHIP))      HPI: (PF, EPF - 1-3) (Detailed, Comp, - 4) patient presents for survivorship visit. Previously seen by Dr Muñiz, also by me for genetic counseling. Followed by Dr Wright however she was not recommended for adjuvant chemotherapy.     2- core biopsy right breast with triple negative IDC  4- right mastectomy with 7mm IDC, negative SN. No adjuvant therapy. IGNACIO flap reconstruction  Negative Integrated [facility] 2/2016    Bilateral mmg 1/2017 with fat necrosis associated with right IGNACIO flap, no abnormality left breast      Review of Systems   Constitutional: Negative for appetite change, fatigue and fever.   Respiratory: Negative for cough and shortness of breath.    Cardiovascular: Negative for chest pain.   Musculoskeletal: Negative for back pain.     Objective:   Physical Exam   Pulmonary/Chest:   Clinical exam not conducted , patient seen by Dr Snyder 2 days ago with normal exam reported      Assessment:       1. Triple negative malignant neoplasm of breast    2. Family history of breast cancer    3. Encounter for genetic counseling    4. Family history of pancreatic cancer        Plan:     see survivorship care plan  Clinically [facility]  Patient to f/u with Dr Muñiz in Jan 2018 with left diag mmg.     Discussed additional family history of breast and pancreatic cancer reported today, previous negative BRCA1/2. Discussed other tumor suppressor genes which can account for hereditary cancers and option for multigene testing. Patient desires to proceed. Discussed types of results and medical management based on any mutation reported would be specific to at risk cancers associated with that gene. Buccal sample collected and sent to [facility] for updated myRisk, will phone her once results are available.  Time in counseling today  45 min , total time 45 min

## 2017-07-27 ENCOUNTER — TELEPHONE (OUTPATIENT)
Dept: GASTROENTEROLOGY | Facility: CLINIC | Age: 57
End: 2017-07-27

## 2017-07-27 ENCOUNTER — OFFICE VISIT (OUTPATIENT)
Dept: SURGERY | Facility: CLINIC | Age: 57
End: 2017-07-27
Payer: MEDICARE

## 2017-07-27 VITALS
WEIGHT: 128 LBS | SYSTOLIC BLOOD PRESSURE: 154 MMHG | DIASTOLIC BLOOD PRESSURE: 95 MMHG | HEIGHT: 64 IN | TEMPERATURE: 98 F | BODY MASS INDEX: 21.85 KG/M2 | HEART RATE: 85 BPM

## 2017-07-27 DIAGNOSIS — Z80.3 FAMILY HISTORY OF BREAST CANCER: ICD-10-CM

## 2017-07-27 DIAGNOSIS — Z80.0 FAMILY HISTORY OF PANCREATIC CANCER: ICD-10-CM

## 2017-07-27 DIAGNOSIS — Z85.3 HX OF BREAST CANCER: Primary | ICD-10-CM

## 2017-07-27 DIAGNOSIS — C50.919 TRIPLE NEGATIVE MALIGNANT NEOPLASM OF BREAST: Primary | ICD-10-CM

## 2017-07-27 PROCEDURE — 99214 OFFICE O/P EST MOD 30 MIN: CPT | Mod: S$PBB,,, | Performed by: NURSE PRACTITIONER

## 2017-07-27 PROCEDURE — 99999 PR PBB SHADOW E&M-EST. PATIENT-LVL IV: CPT | Mod: PBBFAC,,, | Performed by: NURSE PRACTITIONER

## 2017-07-27 PROCEDURE — 99214 OFFICE O/P EST MOD 30 MIN: CPT | Mod: PBBFAC,PO | Performed by: NURSE PRACTITIONER

## 2017-07-27 NOTE — TELEPHONE ENCOUNTER
----- Message from Reese Villalta MD sent at 7/26/2017  5:12 PM CDT -----  Jessica - please tell Efe that her Ileoscopy pathology was benign and no dysplasia.    SPECIMEN  1) Ileo anal anastomosis; rectum cuff area, history of IBD. Rule out dysplasia.    FINAL PATHOLOGIC DIAGNOSIS    ILEOANAL ANASTOMOSIS (BIOPSY):  -Squamous and colonic junctional mucosa with mild reactive and hyperplastic changes  -Negative for significant active inflammation  -No granulomas  -No viral inclusions  -No dysplasia  Diagnosed by: Maren Beauchamp M.D.

## 2017-07-27 NOTE — PROGRESS NOTES
The patient presents for routine follow-up.    She has a known medical history of primary adrenal insufficiency, arthritis, B12  deficiency, iron deficiency anemia, hypopituitary state and lupus who was   diagnosed with a T1b triple negative, high-grade invasive ductal carcinoma in   the spring of 2016.      She underwent a right nipple-sparing mastectomy on   04/27/2016 with immediate autologous tissue IGNACIO flap reconstruction by Dr. Margo Mccloud. The final pathology revealed the high-grade T1b 7 mm lesion with   negative sentinel lymph nodes x4. She had one additional negative node.     She underwent right nipple sparing mastectomy in April 2016 for a stage IA lymph node negative T1b 7 mm grade 3 triple negative cancer from the right breast.  Surgical margins were clear by 2.5 cm and she had 4 negative sentinel nodes.    She has completed all of her reconstruction with Dr. Mccloud and has an excellent symmetrical cosmetic result.  There are no suspicious masses nodules densities skin changes or lymphadenopathy on either side.  There are no suspicious skin changes.  Good symmetry with inferior radial incisions following reconstruction and revision.  No significant fat necrosis noted clinically today on exam    Clinically [facility]  She was not recommended for any adjuvant therapy because of the small T1b node negative cancer although triple negative.  We will refer her to survivorship clinic  She will follow-up with me again in 6 months for routine clinical examination as part of ongoing breast cancer screening surveillance follow-up.  We will place an order for a contralateral left diagnostic mammogram at that point as well in January 2018 when she would be due for annual mammogram on the remaining left breast.

## 2017-07-27 NOTE — LETTER
July 27, 2017      Sivakumar Muñiz MD  [address] [facility] [address]           [facility] Breast Surgery  [address] [facility] [address]  Phone: [phone]  Fax: [phone]          Patient: Efe Horowitz   MR Number: [id number]   YOB: 1960   Date of Visit: 7/27/2017       Dear Dr. Sivakumar Muñiz:    Thank you for referring Efe Horowitz to me for evaluation. Attached you will find relevant portions of my assessment and plan of care.    If you have questions, please do not hesitate to call me. I look forward to following Efe Horowitz along with you.    Sincerely,    Shannan Smith, ABIGAIL    Enclosure  CC:  No Recipients    If you would like to receive this communication electronically, please contact [email or web address] or [phone] to request more information on [employer] access.    For providers and/or their staff who would like to refer a patient to [facility], please contact us through our one-stop-shop provider referral line, [facility], at [phone].    If you feel you have received this communication in error or would no longer like to receive these types of communications, please e-mail [email or web address]

## 2017-07-31 ENCOUNTER — TELEPHONE (OUTPATIENT)
Dept: ENDOSCOPY | Facility: HOSPITAL | Age: 57
End: 2017-07-31

## 2017-08-03 RX ORDER — HYDROXYCHLOROQUINE SULFATE 200 MG/1
TABLET, FILM COATED ORAL
Qty: 45 TABLET | Refills: 0 | Status: SHIPPED | OUTPATIENT
Start: 2017-08-03 | End: 2017-09-06 | Stop reason: SDUPTHER

## 2017-08-07 ENCOUNTER — TELEPHONE (OUTPATIENT)
Dept: ENDOSCOPY | Facility: HOSPITAL | Age: 57
End: 2017-08-07

## 2017-08-08 ENCOUNTER — DOCUMENTATION ONLY (OUTPATIENT)
Dept: SURGERY | Facility: CLINIC | Age: 57
End: 2017-08-08

## 2017-08-08 NOTE — PROGRESS NOTES
Results received from [facility], negative [facility] with Ifeanyi, she was phoned and results discussed

## 2017-08-10 ENCOUNTER — TELEPHONE (OUTPATIENT)
Dept: ENDOSCOPY | Facility: HOSPITAL | Age: 57
End: 2017-08-10

## 2017-08-28 ENCOUNTER — SURGERY (OUTPATIENT)
Age: 57
End: 2017-08-28

## 2017-08-28 ENCOUNTER — ANESTHESIA (OUTPATIENT)
Dept: ENDOSCOPY | Facility: HOSPITAL | Age: 57
End: 2017-08-28
Payer: MEDICARE

## 2017-08-28 ENCOUNTER — HOSPITAL ENCOUNTER (OUTPATIENT)
Facility: HOSPITAL | Age: 57
Discharge: HOME | End: 2017-08-28
Attending: INTERNAL MEDICINE | Admitting: INTERNAL MEDICINE
Payer: MEDICARE

## 2017-08-28 ENCOUNTER — ANESTHESIA EVENT (OUTPATIENT)
Dept: ENDOSCOPY | Facility: HOSPITAL | Age: 57
End: 2017-08-28
Payer: MEDICARE

## 2017-08-28 VITALS
RESPIRATION RATE: 18 BRPM | TEMPERATURE: 98 F | OXYGEN SATURATION: 99 % | DIASTOLIC BLOOD PRESSURE: 68 MMHG | HEIGHT: 63 IN | WEIGHT: 126 LBS | BODY MASS INDEX: 22.32 KG/M2 | SYSTOLIC BLOOD PRESSURE: 120 MMHG | HEART RATE: 73 BPM

## 2017-08-28 DIAGNOSIS — K86.2 PANCREAS CYST: ICD-10-CM

## 2017-08-28 PROCEDURE — 43259 EGD US EXAM DUODENUM/JEJUNUM: CPT | Performed by: INTERNAL MEDICINE

## 2017-08-28 PROCEDURE — 37000009 HC ANESTHESIA EA ADD 15 MINS: Performed by: INTERNAL MEDICINE

## 2017-08-28 PROCEDURE — D9220A PRA ANESTHESIA: Mod: CRNA,,, | Performed by: NURSE ANESTHETIST, CERTIFIED REGISTERED

## 2017-08-28 PROCEDURE — D9220A PRA ANESTHESIA: Mod: ANES,,, | Performed by: ANESTHESIOLOGY

## 2017-08-28 PROCEDURE — 43259 EGD US EXAM DUODENUM/JEJUNUM: CPT | Mod: ,,, | Performed by: INTERNAL MEDICINE

## 2017-08-28 PROCEDURE — 63600175 PHARM REV CODE 636 W HCPCS: Performed by: NURSE ANESTHETIST, CERTIFIED REGISTERED

## 2017-08-28 PROCEDURE — 25000003 PHARM REV CODE 250: Performed by: NURSE ANESTHETIST, CERTIFIED REGISTERED

## 2017-08-28 PROCEDURE — 37000008 HC ANESTHESIA 1ST 15 MINUTES: Performed by: INTERNAL MEDICINE

## 2017-08-28 RX ORDER — SODIUM CHLORIDE 9 MG/ML
INJECTION, SOLUTION INTRAVENOUS CONTINUOUS PRN
Status: DISCONTINUED | OUTPATIENT
Start: 2017-08-28 | End: 2017-08-28

## 2017-08-28 RX ORDER — FENTANYL CITRATE 50 UG/ML
INJECTION, SOLUTION INTRAMUSCULAR; INTRAVENOUS
Status: DISCONTINUED | OUTPATIENT
Start: 2017-08-28 | End: 2017-08-28

## 2017-08-28 RX ORDER — LIDOCAINE HCL/PF 100 MG/5ML
SYRINGE (ML) INTRAVENOUS
Status: DISCONTINUED | OUTPATIENT
Start: 2017-08-28 | End: 2017-08-28

## 2017-08-28 RX ORDER — PROPOFOL 10 MG/ML
VIAL (ML) INTRAVENOUS CONTINUOUS PRN
Status: DISCONTINUED | OUTPATIENT
Start: 2017-08-28 | End: 2017-08-28

## 2017-08-28 RX ORDER — ONDANSETRON 2 MG/ML
INJECTION INTRAMUSCULAR; INTRAVENOUS
Status: DISCONTINUED | OUTPATIENT
Start: 2017-08-28 | End: 2017-08-28

## 2017-08-28 RX ORDER — PROPOFOL 10 MG/ML
VIAL (ML) INTRAVENOUS
Status: DISCONTINUED | OUTPATIENT
Start: 2017-08-28 | End: 2017-08-28

## 2017-08-28 RX ADMIN — FENTANYL CITRATE 25 MCG: 50 INJECTION, SOLUTION INTRAMUSCULAR; INTRAVENOUS at 10:08

## 2017-08-28 RX ADMIN — LIDOCAINE HYDROCHLORIDE 50 MG: 20 INJECTION, SOLUTION INTRAVENOUS at 10:08

## 2017-08-28 RX ADMIN — PROPOFOL 150 MCG/KG/MIN: 10 INJECTION, EMULSION INTRAVENOUS at 10:08

## 2017-08-28 RX ADMIN — PROPOFOL 30 MG: 10 INJECTION, EMULSION INTRAVENOUS at 10:08

## 2017-08-28 RX ADMIN — PROPOFOL 10 MG: 10 INJECTION, EMULSION INTRAVENOUS at 10:08

## 2017-08-28 RX ADMIN — SODIUM CHLORIDE: 0.9 INJECTION, SOLUTION INTRAVENOUS at 09:08

## 2017-08-28 RX ADMIN — ONDANSETRON 4 MG: 2 INJECTION INTRAMUSCULAR; INTRAVENOUS at 10:08

## 2017-08-28 RX ADMIN — FENTANYL CITRATE 50 MCG: 50 INJECTION, SOLUTION INTRAMUSCULAR; INTRAVENOUS at 10:08

## 2017-08-28 NOTE — ANESTHESIA RELEASE NOTE
"Anesthesia Release from PACU Note    Patient: Efe Horowitz    Procedure(s) Performed: Procedure(s) (LRB):  ULTRASOUND-ENDOSCOPIC-UPPER (N/A)    Anesthesia type: general    Post pain: Adequate analgesia    Post assessment: no apparent anesthetic complications, tolerated procedure well and no evidence of recall    Last Vitals:   Visit Vitals  /64 (BP Location: Left arm, Patient Position: Lying)   Pulse 81   Temp 36.6 °C (97.9 °F) (Temporal)   Resp 18   Ht 5' 3" (1.6 m)   Wt 57.2 kg (126 lb)   SpO2 96%   Breastfeeding? No   BMI 22.32 kg/m²       Post vital signs: stable    Level of consciousness: awake, alert  and oriented    Nausea/Vomiting: no nausea/no vomiting    Complications: none    Airway Patency: patent    Respiratory: unassisted    Cardiovascular: stable and blood pressure at baseline    Hydration: euvolemic  "

## 2017-08-28 NOTE — DISCHARGE INSTRUCTIONS
Endoscopic Ultrasound (EUS)    An endoscopic ultrasound (EUS) is a test to look at the inside of your gastrointestinal (GI) tract. It's commonly used to look for cancers or growths in the esophagus, stomach, pancreas, liver, and rectum. It can help to stage cancer (see how advanced a cancer is). EUS may also be used to help diagnose certain diseases or to drain cysts or abscesses.  What is EUS?  EUS shows both ultrasound images and live video of the GI tract. During the test, a flexible tube called an endoscope (scope) is used. At the end of the scope is a tiny video camera and light. The video camera sends live images to a monitor. The scope also contains a very small ultrasound device. This uses sound waves to create images and send them to a monitor.  A needle is passed through the scope. The needle can be used take a small sample of tissue for testing. This is called a biopsy. The needle can be used to take a sample of fluid. This is called fine-needle aspiration (FNA).  Risks and possible complications of EUS  Risks and possible complications include the following:  · Bleeding  · Infection  · A perforation (hole) in the digestive tract   · Risks of sedation or anesthesia   Before the test  Be prepared prior to the test:  · Tell your healthcare provider what medicine you take. This includes vitamins, herbs, and over-the-counter medicine. It also includes any blood thinners, such as warfarin, clopidogrel, ibuprofen, or daily aspirin. Ask your healthcare provider if you need to stop taking some or all of them before the test.  · You may be prescribed antibiotics to take before or after the test. This depends on the area being studied and what is done during the test. These medicines help prevent infection.  · Carefully follow the instructions for preparing for the test to make sure results are accurate. Instructions may include:  ¨ If youre having an EUS of the upper GI tract (esophagus, stomach, duodenum,  pancreas, liver):  § Do not eat or drink for 6 hours before the test.  ¨ If youre having an EUS of the lower GI tract (rectum):  § Before the test, do bowel prep as instructed to clean your rectum of stool. This may involve a clear liquid diet and using a laxative (liquid or pills) the night before the test. Or it may mean doing one or more enemas the morning of the test.  § Do not eat or drink for 6 hours before the test.  · Be sure to arrive on time at the facility. Bring your identification and health insurance card. Leave valuables at home. If you have them, bring X-rays or other test results with you.  Let the healthcare provider know  For your safety, tell the healthcare provider if you:  · Take insulin. Your dose may need to be changed on the day of your test.  · Are allergic to latex.  · Have any other allergies.  · Are taking blood thinners.   During the test  An endoscopic ultrasound usually takes place in a hospital. The procedure itself may take 1 to 2 hours. You will likely go home soon afterward. During the test:  · You lie on your left side on an exam table.  · An intravenous (IV) line will be put into a vein in your arm or hand. This line supplies fluids and medicines. To keep you comfortable during the test, you will be given a sedative medicine. This medicine prevents discomfort and will make you sleepy.  · If you are having an EUS of the upper GI tract, local anesthetic may be sprayed in your throat. This will help you be more comfortable as the healthcare provider inserts the scope. The healthcare provider then gently puts the flexible scope into your mouth or nose and down your throat.  · If youre having an EUS of the lower GI tract, the healthcare provider gently puts the flexible scope into your anus.  · During the test, the scope sends live video and ultrasound images from inside your body to nearby monitors. These are used to examine your GI tract. Specialized procedures, such as drainage,  are done as needed.  · The healthcare provider may discuss the results with you soon after the test. Biopsy results take several  days.  · In most cases, you can go home within a few hours of the test. When you leave the facility, have an adult family member or friend drive you, even if you don't feel that sleepy.  After the test  Here is what to expect after the test:  · You may feel tired from the sedative. This should wear off by the end of the day.  · If you had an upper digestive endoscopy, your throat may feel sore for a day or two. Over-the-counter sore throat lozenges and spray should help.  · You can eat and drink normally as soon as the test is done.  When to call the healthcare provider  Call your healthcare provider if you notice any of the following:  · Fever of 100.4°F (38.0°C) or higher, or as advised by your healthcare provider  · Shortness of breath  · Vomiting blood, blood in stool, or black stools  · Coughing or hoarse voice that wont go away   Date Last Reviewed: 7/1/2016  © 2588-4759 [employer]. [address] [place] [address]. All rights reserved. This information is not intended as a substitute for professional medical care. Always follow your healthcare professional's instructions.

## 2017-08-28 NOTE — H&P
History & Physical - Short Stay  Gastroenterology      SUBJECTIVE:     Procedure: EUS    Chief Complaint/Indication for Procedure: pancreas cyst    History of Present Illness:  Patient is a 57 y.o. female with pancreas cyst coming for EUS surveillance. Aunt and Uncle from mother side had pancreas cancer. Pt had breast Ca.    PTA Medications   Medication Sig    atorvastatin (LIPITOR) 10 MG tablet Take 10 mg by mouth once daily.    cyanocobalamin 1,000 mcg/mL injection Inject 1 mL (1,000 mcg total) into the muscle once a week. 1 Solution Injection monthly.  B12 injection weekly for  3 weeks then every  2 weeks (Patient taking differently: Inject 1,000 mcg into the muscle once a week. Every Saturday)    ergocalciferol (VITAMIN D2) 50,000 unit Cap Take 2 capsules (100,000 Units total) by mouth every 7 days. (Patient taking differently: Take 50,000 Units by mouth every 7 days. Every Wednesday)    ferrous sulfate 325 mg (65 mg iron) Tab Take by mouth. 1 Tablet Oral Every day.  Take one 250mg vitamin C pill every 12 hours which will help your bodyabsorb more of the iron.    gabapentin (NEURONTIN) 800 MG tablet Take 1 tablet (800 mg total) by mouth 3 (three) times daily. (Patient taking differently: Take 800 mg by mouth 4 (four) times daily. )    hydroxychloroquine (PLAQUENIL) 200 mg tablet TAKE 1 AND 1/2 TABLETS BY MOUTH DAILY    lipase-protease-amylase 36,000-114,000- 180,000 unit CpDR Take 2 capsules by mouth 3 (three) times daily with meals. Take one with each snack    loperamide (IMODIUM) 2 mg capsule TAKE 1 TO 2 CAPSULES BY MOUTH FOUR TIMES DAILY    lorazepam (ATIVAN) 1 MG tablet Take 1 tablet (1 mg total) by mouth every evening. 1 Tablet Oral Q8H-12 hours    niacin 100 MG Tab Take by mouth. 1 Tablet Oral At bedtime    ondansetron (ZOFRAN) 4 MG tablet Take 1 tablet (4 mg total) by mouth every 12 (twelve) hours as needed for Nausea.    oxycodone-acetaminophen (PERCOCET) 5-325 mg per tablet Take 1 tablet by  "mouth every 4 (four) hours as needed for Pain.    polycarbophil (FIBERCON) 625 mg tablet Take by mouth. 1 Tablet Oral Every 12 hours.  Take with 12 ounces of water with each pill.    predniSONE (DELTASONE) 5 MG tablet Take 5 mg by mouth once daily.    predniSONE (DELTASONE) 5 MG tablet TAKE AS DIRECTED INCREASE to 10mg FOR 3 days then resume 5mg dose    rizatriptan (MAXALT) 10 MG tablet Take 1 tablet (10 mg total) by mouth every 6 (six) hours as needed. (Patient taking differently: Take 10 mg by mouth every 6 (six) hours as needed for Migraine. )    SAVELLA 100 mg Tab TAKE ONE TABLET BY MOUTH DAILY    zolpidem (AMBIEN) 5 MG Tab Take 1 tablet (5 mg total) by mouth nightly as needed.    albuterol (ACCUNEB) 0.63 mg/3 mL Nebu Take 3 mLs (0.63 mg total) by nebulization every 6 (six) hours as needed.       Review of patient's allergies indicates:   Allergen Reactions    Aspirin      Other reaction(s): "hemorrhage"  hemorrhage    Hydrocortisone      Other reaction(s): sick  sick    Vicodin [hydrocodone-acetaminophen]      Other reaction(s): hyperactivity    Asacol [mesalamine]      Other reaction(s): Hallucinations  hallucinations        Past Medical History:   Diagnosis Date    Acid reflux     Adrenal insufficiency, primary     Arthritis     Asthma     B12 deficiency anemia     Blood transfusion 2010    Breast cancer 2/2016    Right breast infiltrating ductal CA    Cataract     Chronic pain     Deep vein thrombosis     Dry eyes     Esophagitis, unspecified     Fibromyalgia     Heart murmur     Hyperlipidemia     Hypopituitary dwarfism     Iron deficiency anemia     Lupus     Migraines, neuralgic     Nonspecific ulcerative colitis     OP (osteoporosis)     history of reclast    Osteopenia     Schatzki's ring     Steroid sulfatase deficiency     Ulcerative colitis     Vitamin D deficiency disease      Past Surgical History:   Procedure Laterality Date    APPENDECTOMY      BREAST BIOPSY " Right 2/2016    [facility]    BREAST SURGERY      CHOLECYSTECTOMY      COLON SURGERY      HYSTERECTOMY      OOPHORECTOMY Bilateral     restorative proctectomy      total abdominal colectomy with ileostomy  2010     Family History   Problem Relation Age of Onset    Diabetes Father     Hyperlipidemia Father     Hypertension Father     Hyperlipidemia Mother     Cancer Maternal Aunt      pancreatic cancer, late 50s at dx    Pancreatic cancer Maternal Aunt     Breast cancer Maternal Aunt 55    Breast cancer Cousin 28    Breast cancer Other 45    No Known Problems Sister     No Known Problems Brother     Cancer Maternal Uncle 65     pancreatic cancer    Pancreatic cancer Maternal Uncle     No Known Problems Paternal Aunt     No Known Problems Paternal Uncle     No Known Problems Maternal Grandmother     No Known Problems Maternal Grandfather     No Known Problems Paternal Grandmother     No Known Problems Paternal Grandfather     Breast cancer Maternal Cousin 50    Cancer Other 25     liver cancer    Amblyopia Neg Hx     Blindness Neg Hx     Cataracts Neg Hx     Macular degeneration Neg Hx     Retinal detachment Neg Hx     Strabismus Neg Hx     Stroke Neg Hx     Thyroid disease Neg Hx     Glaucoma Neg Hx     Ovarian cancer Neg Hx     Celiac disease Neg Hx     Colon cancer Neg Hx     Colon polyps Neg Hx     Esophageal cancer Neg Hx     Liver cancer Neg Hx     Rectal cancer Neg Hx     Stomach cancer Neg Hx     Ulcerative colitis Neg Hx     Inflammatory bowel disease Neg Hx      Social History   Substance Use Topics    Smoking status: Never Smoker    Smokeless tobacco: Never Used    Alcohol use No       Review of Systems:  Constitutional: no fever or chills  Gastrointestinal: no nausea or vomiting, no abdominal pain or change in bowel habits    OBJECTIVE:     Vital Signs (Most Recent)  Temp: 98.1 °F (36.7 °C) (08/28/17 0947)  Pulse: 79 (08/28/17 0947)  Resp: 18 (08/28/17 0947)  BP:  (!) 160/72 (08/28/17 0947)  SpO2: 100 % (08/28/17 0947)    Physical Exam:  General: well developed, well nourished  Abdomen: soft, non-tender non-distented; bowel sounds normal; no masses,  no organomegaly         ASSESSMENT/PLAN:     Patient is a 57 y.o. female with pancreas cyst coming for EUS surveillance.     Plan: EUS    Anesthesia Plan: Moderate Sedation    ASA Grade: ASA 2 - Patient with mild systemic disease with no functional limitations

## 2017-08-28 NOTE — ANESTHESIA PREPROCEDURE EVALUATION
08/28/2017  Efe Horowitz is a 57 y.o., female.    Pre-op Assessment    I have reviewed the Patient Summary Reports.      I have reviewed the Medications.     Review of Systems  Anesthesia Hx:  No problems with previous Anesthesia Denies Hx of Anesthetic complications  History of prior surgery of interest to airway management or planning:  Denies Personal Hx of Anesthesia complications.   Social:  Non-Smoker, No Alcohol Use    Hematology/Oncology:         -- Anemia: Current/Recent Cancer. Breast   EENT/Dental:EENT/Dental Normal   Cardiovascular:    Denies Angina.    Pulmonary:   Asthma Denies Shortness of breath.    Renal/:  Renal/ Normal     Hepatic/GI:   PUD, GERD    Neurological:   Neuromuscular Disease, Headaches    Endocrine:  Endocrine Normal        Physical Exam  General:  Well nourished, Obesity    Airway/Jaw/Neck:  Airway Findings: Mouth Opening: Normal Tongue: Normal  General Airway Assessment: Adult  Mallampati: II  Jaw/Neck Findings:  Neck ROM: Normal ROM     Eyes/Ears/Nose:  EYES/EARS/NOSE FINDINGS: Normal   Dental:  Dental Findings: In tact   Chest/Lungs:  Chest/Lungs Findings: Clear to auscultation, Normal Respiratory Rate     Heart/Vascular:  Heart Findings: Rate: Normal  Rhythm: Regular Rhythm  Sounds: Normal     Abdomen:  Abdomen Findings: Normal    Musculoskeletal:  Musculoskeletal Findings: Normal   Skin:  Skin Findings: Normal    Mental Status:  Mental Status Findings:  Cooperative, Alert and Oriented         Anesthesia Plan  Type of Anesthesia, risks & benefits discussed:  Anesthesia Type:  general  Patient's Preference:   Intra-op Monitoring Plan: standard ASA monitors  Intra-op Monitoring Plan Comments:   Post Op Pain Control Plan:   Post Op Pain Control Plan Comments:   Induction:   IV  Beta Blocker:  Patient is not currently on a Beta-Blocker (No further documentation  required).       Informed Consent: Patient understands risks and agrees with Anesthesia plan.  Questions answered. Anesthesia consent signed with patient.  ASA Score: 3     Day of Surgery Review of History & Physical:    H&P update referred to the provider.         Ready For Surgery From Anesthesia Perspective.     Patient Active Problem List   Diagnosis    Attention to other artificial opening of digestive tract    Iron deficiency anemia    Proteins serum plasma low    Vitamin D deficiency disease    Vitamin B12 deficiency    Hyperlipidemia    UC (ulcerative colitis)    Weakness generalized    Chronic use of steroids    Lumbar and sacral spondylarthritis    Migraine syndrome    Fatigue    Encounter for long-term (current) use of other medications    Hypoadrenalism    Ulcerative colitis    S/P total colectomy    GERD (gastroesophageal reflux disease)    Vitamin D imbalance    Hypokalemia    Osteoporosis, idiopathic    Myalgia and myositis    Trochanteric bursitis    Shoulder pain    Encounter for monitoring proton pump inhibitor therapy    Dysphagia    Acute bronchitis    Wheezing on auscultation    Nausea    Senile osteoporosis    Long term current use of systemic steroids    Chronic LBP    Fibromyalgia    Lupus (systemic lupus erythematosus)    Microhematuria    Bilateral low back pain without sciatica    Proteinuria    Immunosuppression    Malignant neoplasm of upper-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    Preop testing    Loose stools    Malignant neoplasm of right breast    Wound healing, delayed    Exocrine pancreatic insufficiency    Iron deficiency    Family history of pancreatic cancer    Osteopenia    Disorder of bone and cartilage    Abnormal gait    Headache above the eye region    IBD (inflammatory bowel disease)    Pancreas cyst

## 2017-08-28 NOTE — PLAN OF CARE
Discharge instructions given and explained to patient and family with verbalization of understanding all instructions. Prescription given and explained next time and doses of each medication. Patients v/s stable, denies n/v and tolerating po, rates pain level tolerable, IV removed, and family at bedside for patient discharge home.     Waiting for Dr. Jagdeep Finn to come talk to patient prior to discharge.

## 2017-08-28 NOTE — ANESTHESIA POSTPROCEDURE EVALUATION
"Anesthesia Post Evaluation    Patient: Efe Horowitz    Procedure(s) Performed: Procedure(s) (LRB):  ULTRASOUND-ENDOSCOPIC-UPPER (N/A)    Final Anesthesia Type: general  Patient location during evaluation: PACU  Patient participation: Yes- Able to Participate  Level of consciousness: awake and alert  Post-procedure vital signs: reviewed and stable  Pain management: adequate  Airway patency: patent  PONV status at discharge: No PONV  Anesthetic complications: no      Cardiovascular status: blood pressure returned to baseline and hemodynamically stable  Respiratory status: unassisted, spontaneous ventilation and room air  Hydration status: euvolemic  Follow-up not needed.        Visit Vitals  /64 (BP Location: Left arm, Patient Position: Lying)   Pulse 81   Temp 36.6 °C (97.9 °F) (Temporal)   Resp 18   Ht 5' 3" (1.6 m)   Wt 57.2 kg (126 lb)   SpO2 96%   Breastfeeding? No   BMI 22.32 kg/m²       Pain/Natalie Score: Pain Assessment Performed: Yes (8/28/2017 10:41 AM)  Presence of Pain: denies (8/28/2017 10:41 AM)  Pain Rating Prior to Med Admin: 7 (8/28/2017  9:48 AM)  Natalie Score: 9 (8/28/2017 10:41 AM)      "

## 2017-08-28 NOTE — PROGRESS NOTES
Per sarah, Dr. Jagdeep Finn just finished the procedure he was in, but he is now at a conference.  Will page MD and wait for return call.

## 2017-08-28 NOTE — PATIENT INSTRUCTIONS
Discharge Summary/Instructions after an Endoscopic Procedure  Patient Name: Efe Horowitz  Patient MRN: [id number]  Patient YOB: 1960 Monday, August 28, 2017  Hong Finn MD  RESTRICTIONS:  During your procedure today, you received medications for sedation.  These   medications may affect your judgment, balance and coordination.  Therefore,   for 24 hours, you have the following restrictions:   - DO NOT drive a car, operate machinery, make legal/financial decisions,   sign important papers or drink alcohol.    ACTIVITY:  The following day: return to full activity including work, except no heavy   lifting, straining or running for 3 days if polyps were removed.  DIET:  Eat and drink normally unless instructed otherwise.  TREATMENT FOR COMMON SIDE EFFECTS:  - Mild abdominal pain, belching, bloating or excessive gas: rest, eat   lightly and use a heating pad.  - Sore Throat: treat with throat lozenges and/or gargle with warm salt   water.  SYMPTOMS TO WATCH FOR AND REPORT TO YOUR PHYSICIAN:  1. Abdominal pain or bloating, other than gas cramps.  2. Chest pain.  3. Back pain.  4. Chills or fever occurring within 24 hours after the procedure.  5. Rectal bleeding, which would show as bright red, maroon, or black stools.   (A tablespoon of blood from the rectum is not serious, especially if   hemorrhoids are present.)  6. Vomiting.  7. Weakness or dizziness.  8. Because air was used during the procedure, expelling large amounts of air   from your rectum or belching is normal.  9. If a bowel prep was taken, you may not have a bowel movement for 1-3   days.  This is normal.  GO DIRECTLY TO THE EMERGENCY ROOM IF YOU HAVE ANY OF THE FOLLOWING:   Difficulty breathing  Chills and/or fever over 101 F   Persistent vomiting and/or vomiting blood   Severe abdominal pain   Severe chest pain   Black, tarry stools   Bleeding- more than one tablespoon  Your doctor recommends these additional instructions:  If any  biopsies were taken, your doctorâs clinic will call you in 1 to 2   weeks with any results.  You are being discharged to home.   Resume your previous diet.   Continue your present medications.  For questions, problems or results please call your physician - Hong Finn MD at Work:  [phone].  [facility] [place], EMERGENCY ROOM PHONE NUMBER: [phone]  IF A COMPLICATION OR EMERGENCY SITUATION ARISES AND YOU ARE UNABLE TO REACH   YOUR PHYSICIAN - GO DIRECTLY TO THE EMERGENCY ROOM.  Hong Finn MD  8/28/2017 11:06:40 AM  This report has been verified and signed electronically.

## 2017-08-28 NOTE — TRANSFER OF CARE
"Anesthesia Transfer of Care Note    Patient: Efe Horowitz    Procedure(s) Performed: Procedure(s) (LRB):  ULTRASOUND-ENDOSCOPIC-UPPER (N/A)    Patient location: PACU    Anesthesia Type: general    Transport from OR: Transported from OR on room air with adequate spontaneous ventilation    Post pain: adequate analgesia    Post assessment: no apparent anesthetic complications and tolerated procedure well    Post vital signs: stable    Level of consciousness: awake, alert and oriented    Nausea/Vomiting: no nausea/vomiting    Complications: none    Transfer of care protocol was followed      Last vitals:   Visit Vitals  BP (!) 160/72 (BP Location: Left arm, Patient Position: Lying)   Pulse 79   Temp 36.7 °C (98.1 °F) (Oral)   Resp 18   Ht 5' 3" (1.6 m)   Wt 57.2 kg (126 lb)   SpO2 100%   Breastfeeding? No   BMI 22.32 kg/m²     "

## 2017-08-28 NOTE — PROGRESS NOTES
When patient was updated, she said she wants to just be discharged and have Dr. Jagdeep Finn call her.  Aram notified of this and voicemail left for Dr. Jagdeep Finn to notify of this.

## 2017-08-28 NOTE — PROGRESS NOTES
Patient disconnected from monitor and dressed while waiting for Dr. Jagdeep Finn still.  Completely recovered from anesthesia.

## 2017-09-06 DIAGNOSIS — K86.81 EXOCRINE PANCREATIC INSUFFICIENCY: ICD-10-CM

## 2017-09-06 RX ORDER — PANCRELIPASE 36000; 180000; 114000 [USP'U]/1; [USP'U]/1; [USP'U]/1
CAPSULE, DELAYED RELEASE PELLETS ORAL
Qty: 240 CAPSULE | Status: CANCELLED | OUTPATIENT
Start: 2017-09-06

## 2017-09-07 RX ORDER — HYDROXYCHLOROQUINE SULFATE 200 MG/1
TABLET, FILM COATED ORAL
Qty: 45 TABLET | Refills: 2 | Status: SHIPPED | OUTPATIENT
Start: 2017-09-07 | End: 2017-11-29 | Stop reason: SDUPTHER

## 2017-09-26 ENCOUNTER — HOSPITAL ENCOUNTER (OUTPATIENT)
Dept: RADIOLOGY | Facility: HOSPITAL | Age: 57
Discharge: HOME | End: 2017-09-26
Attending: INTERNAL MEDICINE
Payer: MEDICARE

## 2017-09-26 ENCOUNTER — OFFICE VISIT (OUTPATIENT)
Dept: RHEUMATOLOGY | Facility: CLINIC | Age: 57
End: 2017-09-26
Payer: MEDICARE

## 2017-09-26 VITALS
DIASTOLIC BLOOD PRESSURE: 92 MMHG | TEMPERATURE: 98 F | HEART RATE: 83 BPM | WEIGHT: 128.69 LBS | SYSTOLIC BLOOD PRESSURE: 133 MMHG | BODY MASS INDEX: 21.97 KG/M2 | HEIGHT: 64 IN

## 2017-09-26 DIAGNOSIS — M79.671 PAIN IN BOTH FEET: ICD-10-CM

## 2017-09-26 DIAGNOSIS — M79.641 PAIN IN BOTH HANDS: ICD-10-CM

## 2017-09-26 DIAGNOSIS — M79.7 FIBROMYALGIA: ICD-10-CM

## 2017-09-26 DIAGNOSIS — M79.672 PAIN IN BOTH FEET: ICD-10-CM

## 2017-09-26 DIAGNOSIS — M32.9 SYSTEMIC LUPUS ERYTHEMATOSUS, UNSPECIFIED SLE TYPE, UNSPECIFIED ORGAN INVOLVEMENT STATUS: Primary | ICD-10-CM

## 2017-09-26 DIAGNOSIS — D84.9 IMMUNOSUPPRESSION: ICD-10-CM

## 2017-09-26 DIAGNOSIS — M79.642 PAIN IN BOTH HANDS: ICD-10-CM

## 2017-09-26 DIAGNOSIS — R53.83 FATIGUE, UNSPECIFIED TYPE: ICD-10-CM

## 2017-09-26 PROCEDURE — 99999 PR PBB SHADOW E&M-EST. PATIENT-LVL III: CPT | Mod: PBBFAC,,, | Performed by: INTERNAL MEDICINE

## 2017-09-26 PROCEDURE — 3080F DIAST BP >= 90 MM HG: CPT | Mod: ,,, | Performed by: INTERNAL MEDICINE

## 2017-09-26 PROCEDURE — 99214 OFFICE O/P EST MOD 30 MIN: CPT | Mod: S$PBB,,, | Performed by: INTERNAL MEDICINE

## 2017-09-26 PROCEDURE — 77077 JOINT SURVEY SINGLE VIEW: CPT | Mod: 26,,, | Performed by: RADIOLOGY

## 2017-09-26 PROCEDURE — 77077 JOINT SURVEY SINGLE VIEW: CPT | Mod: TC

## 2017-09-26 PROCEDURE — 99213 OFFICE O/P EST LOW 20 MIN: CPT | Mod: PBBFAC,25 | Performed by: INTERNAL MEDICINE

## 2017-09-26 PROCEDURE — 3075F SYST BP GE 130 - 139MM HG: CPT | Mod: ,,, | Performed by: INTERNAL MEDICINE

## 2017-09-26 ASSESSMENT — ROUTINE ASSESSMENT OF PATIENT INDEX DATA (RAPID3)
FATIGUE SCORE: 7
PAIN SCORE: 8
PSYCHOLOGICAL DISTRESS SCORE: 1.1
MDHAQ FUNCTION SCORE: 1.2
WHEN YOU AWAKENED IN THE MORNING OVER THE LAST WEEK, PLEASE INDICATE THE AMOUNT OF TIME IT TAKES UNTIL YOU ARE AS LIMBER AS YOU WILL BE FOR THE DAY: 1 HOUR
PATIENT GLOBAL ASSESSMENT SCORE: 7.5
TOTAL RAPID3 SCORE: 6.5
AM STIFFNESS SCORE: 1, YES

## 2017-09-26 NOTE — PROGRESS NOTES
Subjective:       Patient ID: Efe Horowitz is a 57 y.o. female.    Chief Complaint: Lupus      HPI:  Efe Horowitz is a 57 y.o. female with history of lupus since age 39.   Her manifestations include joint pains, headache, positive DERRICK, and a   double-stranded DNA. She also has an equivocal anticardiolipin IgM.   She has been treated with Plaquenil 1-1/2 tablets daily. Eye exam 11/2016 was normal.      In addition to lupus, she has a   history of ulcerative colitis, osteopenia, fibromyalgia as well.   In 2009 she had a colectomy with ileostomy and in May 2011 the ileostomy  was closed. History of bilateral carpal tunnel.       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  She was treated with surgical resection.  Another lesion breast suspected to be lymphoma was later felt to be fibrous tissue.      May 1, 2017 had right breast fibrous material removed and reconstruction on both breasts at [facility].            Currently doing acupuncture by Dr. Mao on Prytania which helps headaches.  Also receiving it for hands and feet.  Pain is 7/10 sharp hands and feet.    All day stiffness.  Improves with rubbing, rest and heat.  Worsens with hitting.   She goes 2x a week for 6 weeks.      Esophageal US was negative for pancreatic cancer.  She has risk due to uncle and aunt (maternal) with pancreas cancer and her breast cancer.      Review of Systems   Constitutional: Positive for fatigue.   HENT:        Oral ulcer 3 weeks ago; improves with warm liquid  Also frequently in nose that respond OTC ointment   Eyes: Negative.    Respiratory: Negative.    Cardiovascular: Negative.    Gastrointestinal: Negative.    Endocrine: Negative.    Genitourinary: Negative.    Musculoskeletal: Positive for arthralgias.   Skin: Negative.    Allergic/Immunologic: Negative.    Neurological: Positive for headaches.   Hematological: Negative.    Psychiatric/Behavioral: Negative.          Objective:   BP (!) 133/92 (BP Location:  "Right arm, Patient Position: Sitting, BP Method: Small (Automatic))   Pulse 83   Temp 97.9 °F (36.6 °C) (Oral)   Ht 5' 4" (1.626 m)   Wt 58.4 kg (128 lb 11.2 oz)   BMI 22.09 kg/m²      Physical Exam   Constitutional: She is oriented to person, place, and time and well-developed, well-nourished, and in no distress.   HENT:   Head: Normocephalic and atraumatic.   Eyes: Conjunctivae and EOM are normal.   Neck: Neck supple.   Cardiovascular: Normal rate, regular rhythm and normal heart sounds.    Pulmonary/Chest: Effort normal and breath sounds normal.   Abdominal: Soft. Bowel sounds are normal.   Neurological: She is alert and oriented to person, place, and time. Gait normal.   Skin: Skin is warm and dry.     Psychiatric: Mood and affect normal.   Musculoskeletal:   28 joint count: 0 swollen and 20 tender (MCPs)  Positive squeeze test MTPs           LABS    Component      Latest Ref Rng & Units 6/21/2017 5/25/2017   WBC      3.90 - 12.70 K/uL  5.70   RBC      4.00 - 5.40 M/uL  4.20   Hemoglobin      12.0 - 16.0 g/dL 12.2 11.7 (L)   Hematocrit      37.0 - 48.5 %  36.4 (L)   MCV      82 - 98 fL  87   MCH      27.0 - 31.0 pg  27.9   MCHC      32.0 - 36.0 %  32.1   RDW      11.5 - 14.5 %  14.8 (H)   Platelets      150 - 350 K/uL  186   MPV      9.2 - 12.9 fL  11.4   Gran #      1.8 - 7.7 K/uL  3.6   Lymph #      1.0 - 4.8 K/uL  1.4   Mono #      0.3 - 1.0 K/uL  0.5   Eos #      0.0 - 0.5 K/uL  0.3   Baso #      0.00 - 0.20 K/uL  0.03   Gran%      38.0 - 73.0 %  62.8   Lymph%      18.0 - 48.0 %  23.9   Mono%      4.0 - 15.0 %  8.4   Eosinophil%      0.0 - 8.0 %  4.4   Basophil%      0.0 - 1.9 %  0.5   Platelet Estimate        Appears normal   Differential Method        Automated   Sodium      136 - 145 mmol/L  142   Potassium      3.5 - 5.1 mmol/L  3.5   Chloride      95 - 110 mmol/L  104   CO2      23 - 29 mmol/L  26   Glucose      70 - 110 mg/dL  61 (L)   BUN, Bld      6 - 20 mg/dL  11   Creatinine      0.5 - 1.4 " mg/dL  0.8   Calcium      8.7 - 10.5 mg/dL  9.7   Total Protein      6.0 - 8.4 g/dL  7.5   Albumin      3.5 - 5.2 g/dL  4.0   Total Bilirubin      0.1 - 1.0 mg/dL  0.3   Alkaline Phosphatase      55 - 135 U/L  78   AST      10 - 40 U/L  17   ALT      10 - 44 U/L  12   Anion Gap      8 - 16 mmol/L  12   eGFR if African American      >60 mL/min/1.73 m:2  >60.0   eGFR if non African American      >60 mL/min/1.73 m:2  >60.0   Specimen UA        Urine, Unspecified   Color, UA      Yellow, Straw, Amanda  Yellow   Appearance, UA      Clear  Clear   pH, UA      5.0 - 8.0  5.0   Specific Gravity, UA      1.005 - 1.030  1.020   Protein, UA      Negative  Negative   Glucose, UA      Negative  Negative   Ketones, UA      Negative  Negative   Bilirubin (UA)      Negative  Negative   Occult Blood UA      Negative  1+ (A)   Nitrite, UA      Negative  Negative   Urobilinogen, UA      <2.0 EU/dL  Negative   Leukocytes, UA      Negative  Negative   RBC, UA      0 - 4 /hpf  1   WBC, UA      0 - 5 /hpf  0   Bacteria, UA      None-Occ /hpf  Occasional   Microscopic Comment        SEE COMMENT   Iron      30 - 160 ug/dL 119    Transferrin      200 - 375 mg/dL 278    TIBC      250 - 450 ug/dL 411    Saturated Iron      20 - 50 % 29    Protein, Urine Random      0 - 15 mg/dL  <7   Creatinine, Random Ur      15.0 - 325.0 mg/dL  128.0   Prot/Creat Ratio, Ur      0.00 - 0.20  Unable to calculate   Aldolase      1.2 - 7.6 U/L  4.5   Complement (C-3)      50 - 180 mg/dL  126   Complement (C-4)      11 - 44 mg/dL  41   CPK      20 - 180 U/L  76   CRP      0.0 - 8.2 mg/L  4.0   Sed Rate      0 - 20 mm/Hr  11   ds DNA Ab      Negative 1:10  Negative 1:10   Magnesium      1.6 - 2.6 mg/dL 2.0    Vitamin B-12      210 - 950 pg/mL 756    Vit D, 25-Hydroxy      30 - 96 ng/mL 41    Ferritin      20.0 - 300.0 ng/mL 53         Assessment:       1. Lupus. Lupus manifestations include joint pains, headache, positive DERRICK, and a   double-stranded DNA. Now with  ulcer in mouth for 3 weeks.  Now with ulcers oral.  Also alopecia.  SLEDAI=  without labs  2. Fatigue   3. Encounter for long-term (current) use of other medications   4. Myalgia and myositis. Fibromyalgia on gabapentin and Savella   5. Trochanteric bursitis. Bilateral now requesting injection.  6. Shoulder pain. Stable   7. Vitamin D deficiency disease. Now normal  8. Suspected Shingles. Past time for anti-viral. Did not receive treatment. Rash has improved  9. Migraine.  Doing acupuncture with Dr. Mao.  Treated with Maxalt by primary doctor which helps.     10.  Osteopenia lumbar spine and femoral neck.  Reclast x1 stopped 3 years ago per patient due to improvement.   Preliminary DEXA osteopenia of femoral neck and lumbar spine FRAX with steroid included does not suggest treatment.  11.  Chronic steroid use.  Endocrine gives for adrenal insufficiency.  12.  Ulcerative colitis  13.  Hematuria.  Followed by nephrology  14.  Hand pain and feet bilateral MCPs as well as MTPs  Plan:       1.  Labs.  Check RF and CCP.  Arthritis survey x-rays  2. Patient to continue Plaquenil 300 mg daily.  Eye exam normal 5/2017  3. Continue neurontin 800 mg/800 mg/ 1200 mg in evening is still helping.            RTO in 3-4 months.  Patient seen face to face for 40 minutes and greater than 50% spent in counseling regarding lupus symptom flare.

## 2017-09-27 ENCOUNTER — PATIENT MESSAGE (OUTPATIENT)
Dept: RHEUMATOLOGY | Facility: CLINIC | Age: 57
End: 2017-09-27

## 2017-09-27 NOTE — TELEPHONE ENCOUNTER
Potassium is low.  Patient to discuss with primary doctor.   Patient also informed of no evidence of RA on labs or x-rays and no evidence of lupus flare.

## 2017-10-05 RX ORDER — LOPERAMIDE HYDROCHLORIDE 2 MG/1
CAPSULE ORAL
Qty: 240 CAPSULE | Refills: 5 | Status: SHIPPED | OUTPATIENT
Start: 2017-10-05 | End: 2018-04-03 | Stop reason: SDUPTHER

## 2017-11-02 RX ORDER — MILNACIPRAN HYDROCHLORIDE 100 MG/1
TABLET, FILM COATED ORAL
Qty: 60 TABLET | Refills: 1 | Status: SHIPPED | OUTPATIENT
Start: 2017-11-02 | End: 2017-12-26 | Stop reason: SDUPTHER

## 2017-11-14 RX ORDER — PREDNISONE 5 MG/1
TABLET ORAL
Qty: 100 TABLET | Refills: 0 | Status: SHIPPED | OUTPATIENT
Start: 2017-11-14 | End: 2018-07-24 | Stop reason: SDUPTHER

## 2017-11-27 ENCOUNTER — TELEPHONE (OUTPATIENT)
Dept: ENDOCRINOLOGY | Facility: CLINIC | Age: 57
End: 2017-11-27

## 2017-11-27 DIAGNOSIS — E27.40 HYPOADRENALISM: Primary | ICD-10-CM

## 2017-11-27 RX ORDER — PREDNISONE 5 MG/1
5 TABLET ORAL DAILY
Qty: 90 TABLET | Refills: 3 | Status: SHIPPED | OUTPATIENT
Start: 2017-11-27 | End: 2020-03-02 | Stop reason: SDUPTHER

## 2017-11-27 RX ORDER — LIDOCAINE 50 MG/G
1 PATCH TOPICAL DAILY
Qty: 30 PATCH | Refills: 12 | Status: SHIPPED | OUTPATIENT
Start: 2017-11-27 | End: 2022-11-07 | Stop reason: SDUPTHER

## 2017-11-29 RX ORDER — HYDROXYCHLOROQUINE SULFATE 200 MG/1
TABLET, FILM COATED ORAL
Qty: 45 TABLET | Refills: 2 | Status: SHIPPED | OUTPATIENT
Start: 2017-11-29 | End: 2018-02-16 | Stop reason: SDUPTHER

## 2017-12-01 ENCOUNTER — OFFICE VISIT (OUTPATIENT)
Dept: OPTOMETRY | Facility: CLINIC | Age: 57
End: 2017-12-01
Payer: MEDICARE

## 2017-12-01 ENCOUNTER — CLINICAL SUPPORT (OUTPATIENT)
Dept: OPHTHALMOLOGY | Facility: CLINIC | Age: 57
End: 2017-12-01
Payer: MEDICARE

## 2017-12-01 DIAGNOSIS — Z79.899 LONG-TERM USE OF PLAQUENIL: ICD-10-CM

## 2017-12-01 DIAGNOSIS — H25.13 NUCLEAR SCLEROSIS OF BOTH EYES: ICD-10-CM

## 2017-12-01 DIAGNOSIS — M32.9 SYSTEMIC LUPUS ERYTHEMATOSUS, UNSPECIFIED SLE TYPE, UNSPECIFIED ORGAN INVOLVEMENT STATUS: Primary | ICD-10-CM

## 2017-12-01 PROCEDURE — 92014 COMPRE OPH EXAM EST PT 1/>: CPT | Mod: S$PBB,,, | Performed by: OPTOMETRIST

## 2017-12-01 PROCEDURE — 99999 PR PBB SHADOW E&M-EST. PATIENT-LVL I: CPT | Mod: PBBFAC,,, | Performed by: OPTOMETRIST

## 2017-12-01 PROCEDURE — 92134 CPTRZ OPH DX IMG PST SGM RTA: CPT | Mod: PBBFAC,PO | Performed by: OPTOMETRIST

## 2017-12-01 PROCEDURE — 99211 OFF/OP EST MAY X REQ PHY/QHP: CPT | Mod: PBBFAC,PO | Performed by: OPTOMETRIST

## 2017-12-01 PROCEDURE — 92083 EXTENDED VISUAL FIELD XM: CPT | Mod: PBBFAC,PO | Performed by: OPTOMETRIST

## 2017-12-01 NOTE — PROGRESS NOTES
Subjective:       Patient ID: Efe Horowitz is a 57 y.o. female      Chief Complaint   Patient presents with    Concerns About Ocular Health     Plaquenil exam      History of Present Illness  Plaquenil f/u exam   DLS 5- Dr. Magallon      Pt here for 6 month plaquenil ck.   Pt states no change noticed in va since last visit denies pain. Still   using reading glasses OTC working well.   (-)Flashes (-)Floaters  (-)Photophobia  (-)Glare    Eye meds:   systane gel ou prn---  Dry eyes gtts help but still feels dryness OU    Denies eye sx     Fam hx :Glaucoma & Cataract - Father         Assessment/Plan:     1. Systemic lupus erythematosus, unspecified SLE type, unspecified organ involvement status  2. Long-term use of Plaquenil  No evidence of plaquenil maculopathy on exam, can continue with plaquenil therapy. OCT and HVF test stable on exam today. Color vision normal. Return in 6 months for HVF 10-2 and OCT macula.   - Finch Visual Field - OU - Extended - Both Eyes  - OCT- Retina  - Finch Visual Field - OU - Extended - Both Eyes; Future  - Posterior Segment OCT Retina-Both eyes; Future    3. Nuclear sclerosis of both eyes  Educated pt on presence of cataracts and effects on vision. No surgery at this time. Recheck in one year.    Return in about 6 months (around 6/1/2018) for Plaquenil check, HVF 10-2, OCT macula.

## 2017-12-27 RX ORDER — MILNACIPRAN HYDROCHLORIDE 100 MG/1
TABLET, FILM COATED ORAL
Qty: 60 TABLET | Refills: 1 | Status: SHIPPED | OUTPATIENT
Start: 2017-12-27 | End: 2018-02-16 | Stop reason: SDUPTHER

## 2018-01-09 ENCOUNTER — LAB VISIT (OUTPATIENT)
Dept: LAB | Facility: HOSPITAL | Age: 58
End: 2018-01-09
Attending: INTERNAL MEDICINE
Payer: MEDICARE

## 2018-01-09 DIAGNOSIS — I10 ESSENTIAL HYPERTENSION: ICD-10-CM

## 2018-01-09 DIAGNOSIS — E27.40 HYPOADRENALISM: ICD-10-CM

## 2018-01-09 DIAGNOSIS — M32.9 SYSTEMIC LUPUS ERYTHEMATOSUS, UNSPECIFIED SLE TYPE, UNSPECIFIED ORGAN INVOLVEMENT STATUS: ICD-10-CM

## 2018-01-09 LAB
ALBUMIN SERPL BCP-MCNC: 3.8 G/DL
ALP SERPL-CCNC: 67 U/L
ALP SERPL-CCNC: 67 U/L
ALT SERPL W/O P-5'-P-CCNC: 18 U/L
ALT SERPL W/O P-5'-P-CCNC: 18 U/L
ANION GAP SERPL CALC-SCNC: 10 MMOL/L
AST SERPL-CCNC: 21 U/L
AST SERPL-CCNC: 21 U/L
BILIRUB SERPL-MCNC: 0.3 MG/DL
BILIRUB SERPL-MCNC: 0.3 MG/DL
BUN SERPL-MCNC: 18 MG/DL
C3 SERPL-MCNC: 115 MG/DL
C4 SERPL-MCNC: 39 MG/DL
CALCIUM SERPL-MCNC: 9.4 MG/DL
CHLORIDE SERPL-SCNC: 100 MMOL/L
CO2 SERPL-SCNC: 31 MMOL/L
CREAT SERPL-MCNC: 0.8 MG/DL
EST. GFR  (AFRICAN AMERICAN): >60 ML/MIN/1.73 M^2
EST. GFR  (NON AFRICAN AMERICAN): >60 ML/MIN/1.73 M^2
GLUCOSE SERPL-MCNC: 74 MG/DL
MAGNESIUM SERPL-MCNC: 2 MG/DL
PHOSPHATE SERPL-MCNC: 3.5 MG/DL
POTASSIUM SERPL-SCNC: 2.8 MMOL/L
PROT SERPL-MCNC: 7.5 G/DL
PROT SERPL-MCNC: 7.5 G/DL
PTH-INTACT SERPL-MCNC: 79 PG/ML
SODIUM SERPL-SCNC: 141 MMOL/L

## 2018-01-09 PROCEDURE — 86160 COMPLEMENT ANTIGEN: CPT

## 2018-01-09 PROCEDURE — 86160 COMPLEMENT ANTIGEN: CPT | Mod: 59

## 2018-01-09 PROCEDURE — 80053 COMPREHEN METABOLIC PANEL: CPT

## 2018-01-09 PROCEDURE — 83735 ASSAY OF MAGNESIUM: CPT

## 2018-01-09 PROCEDURE — 83970 ASSAY OF PARATHORMONE: CPT

## 2018-01-09 PROCEDURE — 36415 COLL VENOUS BLD VENIPUNCTURE: CPT | Mod: PO

## 2018-01-09 PROCEDURE — 80069 RENAL FUNCTION PANEL: CPT

## 2018-01-12 ENCOUNTER — TELEPHONE (OUTPATIENT)
Dept: NEPHROLOGY | Facility: CLINIC | Age: 58
End: 2018-01-12

## 2018-01-12 LAB — RENIN PLAS-CCNC: 1.2 NG/ML/H

## 2018-01-16 ENCOUNTER — OFFICE VISIT (OUTPATIENT)
Dept: ENDOCRINOLOGY | Facility: CLINIC | Age: 58
End: 2018-01-16
Payer: MEDICARE

## 2018-01-16 ENCOUNTER — HOSPITAL ENCOUNTER (OUTPATIENT)
Dept: RADIOLOGY | Facility: HOSPITAL | Age: 58
Discharge: HOME | End: 2018-01-16
Attending: INTERNAL MEDICINE
Payer: MEDICARE

## 2018-01-16 VITALS
DIASTOLIC BLOOD PRESSURE: 80 MMHG | WEIGHT: 127.19 LBS | SYSTOLIC BLOOD PRESSURE: 120 MMHG | HEART RATE: 67 BPM | HEIGHT: 64 IN | BODY MASS INDEX: 21.71 KG/M2

## 2018-01-16 DIAGNOSIS — E87.6 HYPOKALEMIA: ICD-10-CM

## 2018-01-16 DIAGNOSIS — R05.9 COUGH: ICD-10-CM

## 2018-01-16 DIAGNOSIS — M81.8 OSTEOPOROSIS, IDIOPATHIC: Primary | ICD-10-CM

## 2018-01-16 DIAGNOSIS — M81.8 OSTEOPOROSIS, IDIOPATHIC: ICD-10-CM

## 2018-01-16 PROCEDURE — 99999 PR PBB SHADOW E&M-EST. PATIENT-LVL III: CPT | Mod: PBBFAC,,, | Performed by: INTERNAL MEDICINE

## 2018-01-16 PROCEDURE — 99213 OFFICE O/P EST LOW 20 MIN: CPT | Mod: PBBFAC | Performed by: INTERNAL MEDICINE

## 2018-01-16 PROCEDURE — 71046 X-RAY EXAM CHEST 2 VIEWS: CPT | Mod: 26,,, | Performed by: RADIOLOGY

## 2018-01-16 PROCEDURE — 99214 OFFICE O/P EST MOD 30 MIN: CPT | Mod: S$PBB,,, | Performed by: INTERNAL MEDICINE

## 2018-01-16 PROCEDURE — 71046 X-RAY EXAM CHEST 2 VIEWS: CPT | Mod: TC

## 2018-01-16 NOTE — PROGRESS NOTES
Subjective:      Patient ID: Efe Horowitz is a 58 y.o. female.    Chief Complaint:  Hypothyroidism      History of Present Illness  Ms. Horowitz is a 55 year old woman with adrenal insufficiency and osteoporosis who presents from her PCP for significant fatigue and weakness.  She had a viral illness a few months ago but could not get a Rx for an increased dose of steroids for almost a month.  She continued to take her usual 5mg daily until she got an appointment with her doctor but by then she had significant fatigue and weakness.  She was seen by her PCP 5/19/15 and was given an injection of methylprednisolone 125mg and a Rx for a steroid taper of prednisone (40mg for 4 days, 30mg for 4 days, etc., currently on 30mg).  She was also given Augmentin for bronchitis has had significant diarrhea 5-6x/day, currently on day 6 of 7 days.  Despite the injection of steroids and increased daily steroids she continues to feel weakness and fatigue.    Review of Systems   Constitutional: Positive for activity change, appetite change and fatigue.   HENT: Positive for voice change. Negative for congestion, ear pain, postnasal drip, rhinorrhea, sinus pressure and sore throat.    Eyes: Negative for photophobia and visual disturbance.   Respiratory: Negative for cough, choking, chest tightness and shortness of breath.    Cardiovascular: Negative for chest pain and palpitations.   Gastrointestinal: Positive for diarrhea and nausea. Negative for abdominal distention, abdominal pain, blood in stool, constipation and vomiting.   Genitourinary: Positive for hematuria. Negative for decreased urine volume, dysuria and urgency.        Microscopic hematuria, scheduled for a cystoscopy to investigate    Musculoskeletal: Positive for arthralgias, back pain, gait problem, joint swelling and myalgias.   Skin: Negative for color change, pallor and rash.   Neurological: Positive for dizziness, weakness, light-headedness, numbness and  headaches. Negative for tremors, seizures, syncope and speech difficulty.        Dizziness/lightheadedness especially when standing   Hematological: Bruises/bleeds easily.   Psychiatric/Behavioral: Positive for confusion and decreased concentration.        Sleeping all the time       Objective:   Physical Exam   Constitutional: She is oriented to person, place, and time. She appears well-developed and well-nourished. No distress.   HENT:   Head: Normocephalic and atraumatic.   Nose: Nose normal.   Mouth/Throat: Oropharynx is clear and moist. No oropharyngeal exudate.   Voice is soft and hoarse    Eyes: Conjunctivae and EOM are normal. Pupils are equal, round, and reactive to light. Right eye exhibits no discharge. Left eye exhibits no discharge. No scleral icterus.   Neck: Normal range of motion. Neck supple. No tracheal deviation present. No thyromegaly present.   Cardiovascular: Normal rate, regular rhythm and normal heart sounds.    Pulmonary/Chest: Effort normal and breath sounds normal. No respiratory distress. She has no wheezes. She has no rales.   Abdominal: Soft. Bowel sounds are normal. She exhibits no distension. There is no tenderness.   Musculoskeletal: Normal range of motion. She exhibits no edema or tenderness.   Lymphadenopathy:     She has no cervical adenopathy.   Neurological: She is alert and oriented to person, place, and time. No cranial nerve deficit. Coordination normal.   Skin: Skin is warm and dry. No rash noted. She is not diaphoretic. No erythema. No pallor.   Psychiatric: She has a normal mood and affect. Her behavior is normal. Judgment and thought content normal.       Lab Review:   TSH  1.122  Ca    9.4  VitD   44    Assessment:     No diagnosis found.   Ms. Horowitz is a 55 year old woman with adrenal insufficiency and osteoporosis who presents from her PCP for significant fatigue and weakness.    Plan:     Adrenal Insufficiency   - continue steroid taper as prescribed by PCP (currently  on 30mg daily for 4 days, then 20mg daily for 4 days, then 10mg daily for 4 days, then take 5mg daily continuously)     Osteoporosis   - has had 3 doses of Reclast, last dose 3/14/14, will get dexa scan as scheduled (due 10/2015) and assess for additional treatments   - last dexa done 10/17/13 showed t-score -2.2 at the lumbar spine  - continue 100,000 units vitamin D weekly  - calcium and vitamin D levels within normal limits    Fatigue   - will check TSH/FT4 today  - will check ESR, concern for flare of ulcerative colitis, SLE, fibromyalgia  - will check CBC/CMP    Diarrhea  - patient is on day 6 of 7 Augmentin for bronchitis but having significant diarrhea, instructed patient to stop medication now  - patient is instructed to alert gastroenterologist or PCP if diarrhea persists as this may be a sign of something more serious    Hypokalemia  Hold HCTZ for now.  Chest xray today because of 6 week cough

## 2018-01-17 ENCOUNTER — TELEPHONE (OUTPATIENT)
Dept: RHEUMATOLOGY | Facility: CLINIC | Age: 58
End: 2018-01-17

## 2018-01-17 NOTE — TELEPHONE ENCOUNTER
Patient reports increase in RBC in urine that endocrinology was concerned.  The lab was ordered by Dr. Rush nephrology  She has had a cold for 4 weeks.    Currently no foam in urine and no hematuria or dark urine.  Labs show  RBCs in urine in the past.    Will schedule for follow up and monitor.  Patient to report any changes ASAP.

## 2018-01-18 ENCOUNTER — TELEPHONE (OUTPATIENT)
Dept: RHEUMATOLOGY | Facility: CLINIC | Age: 58
End: 2018-01-18

## 2018-01-25 ENCOUNTER — OFFICE VISIT (OUTPATIENT)
Dept: RHEUMATOLOGY | Facility: CLINIC | Age: 58
End: 2018-01-25
Payer: MEDICARE

## 2018-01-25 VITALS
DIASTOLIC BLOOD PRESSURE: 92 MMHG | SYSTOLIC BLOOD PRESSURE: 163 MMHG | WEIGHT: 129.31 LBS | HEART RATE: 96 BPM | TEMPERATURE: 98 F | BODY MASS INDEX: 22.07 KG/M2 | HEIGHT: 64 IN

## 2018-01-25 DIAGNOSIS — M79.7 FIBROMYALGIA: ICD-10-CM

## 2018-01-25 DIAGNOSIS — M32.9 SYSTEMIC LUPUS ERYTHEMATOSUS, UNSPECIFIED SLE TYPE, UNSPECIFIED ORGAN INVOLVEMENT STATUS: Primary | ICD-10-CM

## 2018-01-25 DIAGNOSIS — D84.9 IMMUNOSUPPRESSION: ICD-10-CM

## 2018-01-25 DIAGNOSIS — M70.62 TROCHANTERIC BURSITIS OF BOTH HIPS: ICD-10-CM

## 2018-01-25 DIAGNOSIS — M70.61 TROCHANTERIC BURSITIS OF BOTH HIPS: ICD-10-CM

## 2018-01-25 PROCEDURE — 99214 OFFICE O/P EST MOD 30 MIN: CPT | Mod: 25,S$PBB,, | Performed by: INTERNAL MEDICINE

## 2018-01-25 PROCEDURE — 99999 PR PBB SHADOW E&M-EST. PATIENT-LVL III: CPT | Mod: PBBFAC,,, | Performed by: INTERNAL MEDICINE

## 2018-01-25 PROCEDURE — 20610 DRAIN/INJ JOINT/BURSA W/O US: CPT | Mod: 50,S$PBB,, | Performed by: INTERNAL MEDICINE

## 2018-01-25 PROCEDURE — 20610 DRAIN/INJ JOINT/BURSA W/O US: CPT | Mod: 50,PBBFAC | Performed by: INTERNAL MEDICINE

## 2018-01-25 PROCEDURE — 99213 OFFICE O/P EST LOW 20 MIN: CPT | Mod: PBBFAC,25 | Performed by: INTERNAL MEDICINE

## 2018-01-25 RX ORDER — TRIAMCINOLONE ACETONIDE 40 MG/ML
80 INJECTION, SUSPENSION INTRA-ARTICULAR; INTRAMUSCULAR
Status: COMPLETED | OUTPATIENT
Start: 2018-01-25 | End: 2018-01-25

## 2018-01-25 RX ADMIN — TRIAMCINOLONE ACETONIDE 80 MG: 40 INJECTION, SUSPENSION INTRA-ARTICULAR; INTRAMUSCULAR at 02:01

## 2018-01-25 ASSESSMENT — ROUTINE ASSESSMENT OF PATIENT INDEX DATA (RAPID3)
FATIGUE SCORE: 8.5
AM STIFFNESS SCORE: 1, YES
PATIENT GLOBAL ASSESSMENT SCORE: 8
WHEN YOU AWAKENED IN THE MORNING OVER THE LAST WEEK, PLEASE INDICATE THE AMOUNT OF TIME IT TAKES UNTIL YOU ARE AS LIMBER AS YOU WILL BE FOR THE DAY: 1 HOUR
MDHAQ FUNCTION SCORE: .5
TOTAL RAPID3 SCORE: 5.89
PSYCHOLOGICAL DISTRESS SCORE: 0
PAIN SCORE: 8

## 2018-01-25 ASSESSMENT — SYSTEMIC LUPUS ERYTHEMATOSUS DISEASE ACTIVITY INDEX (SLEDAI): TOTAL_SCORE: 6

## 2018-01-25 NOTE — PROGRESS NOTES
"Subjective:       Patient ID: Efe Horowitz is a 58 y.o. female.    Chief Complaint: Lupus      HPI:  Efe Horowitz is a 58 y.o. female with history of lupus since age 39.   Her manifestations include joint pains, headache, positive DERRICK, and a   double-stranded DNA. She also has an equivocal anticardiolipin IgM.   She has been treated with Plaquenil 1-1/2 tablets daily. Eye exam 12/2017 was normal.      In addition to lupus, she has a   history of ulcerative colitis, osteopenia, fibromyalgia as well.   In 2009 she had a colectomy with ileostomy and in May 2011 the ileostomy  was closed. History of bilateral carpal tunnel.       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  She was treated with surgical resection.  Another lesion breast suspected to be lymphoma was later felt to be fibrous tissue.      May 1, 2017 had right breast fibrous material removed and reconstruction on both breasts at [facility].       Interval History:  Bilateral hand pain for 4 months.  Improve with exercises and warm water.  Right hip pain x 2weeks that travels down to knee.  Previously improved with trochanteric bursa.  Pain 8/10 ache.      Review of Systems   Constitutional: Positive for fatigue.   HENT: Positive for mouth sores.    Respiratory: Positive for cough (now resolving) and shortness of breath (now resolving; uses breathing machine; Primary gave shot for breathing).    Endocrine: Negative.    Genitourinary: Negative.    Musculoskeletal: Positive for arthralgias.   Allergic/Immunologic: Negative.    Neurological: Negative.    Hematological: Negative.    Psychiatric/Behavioral: Negative.          Objective:   BP (!) 163/92 (BP Location: Right arm, Patient Position: Sitting, BP Method: Small (Automatic))   Pulse 96   Temp 98.1 °F (36.7 °C) (Oral)   Ht 5' 4" (1.626 m)   Wt 58.7 kg (129 lb 4.8 oz)   BMI 22.19 kg/m²      Physical Exam   Constitutional: She is oriented to person, place, and time and well-developed, " well-nourished, and in no distress.   HENT:   Head: Normocephalic and atraumatic.   Eyes: Conjunctivae and EOM are normal.   Neck: Neck supple.   Cardiovascular: Normal rate, regular rhythm and normal heart sounds.    Pulmonary/Chest: Effort normal and breath sounds normal.   Abdominal: Soft. Bowel sounds are normal.   Neurological: She is alert and oriented to person, place, and time. Gait normal.   Skin: Skin is warm and dry.     Psychiatric: Mood and affect normal.   Musculoskeletal:   Right trochanteric bursa painful on palpation           LABS    Component      Latest Ref Rng & Units 1/9/2018 1/9/2018 1/9/2018 1/9/2018           7:55 AM  7:55 AM  7:55 AM  7:44 AM   Sodium      136 - 145 mmol/L 141 141 141    Potassium      3.5 - 5.1 mmol/L 2.8 (L) 2.8 (L) 2.8 (L)    Chloride      95 - 110 mmol/L 100 100 100    CO2      23 - 29 mmol/L 31 (H) 31 (H) 31 (H)    Glucose      70 - 110 mg/dL 74 74 74    BUN, Bld      6 - 20 mg/dL 18 18 18    Creatinine      0.5 - 1.4 mg/dL 0.8 0.8 0.8    Calcium      8.7 - 10.5 mg/dL 9.4 9.4 9.4    Total Protein      6.0 - 8.4 g/dL 7.5 7.5     Albumin      3.5 - 5.2 g/dL 3.8 3.8 3.8    Total Bilirubin      0.1 - 1.0 mg/dL 0.3 0.3     Alkaline Phosphatase      55 - 135 U/L 67 67     AST      10 - 40 U/L 21 21     ALT      10 - 44 U/L 18 18     Anion Gap      8 - 16 mmol/L 10 10 10    eGFR if African American      >60 mL/min/1.73 m:2 >60.0 >60.0 >60.0    eGFR if non African American      >60 mL/min/1.73 m:2 >60.0 >60.0 >60.0    Phosphorus      2.7 - 4.5 mg/dL   3.5    Specimen UA          Urine, Clean Catch   Color, UA      Yellow, Straw, Amanda    Yellow   Appearance, UA      Clear    Hazy (A)   pH, UA      5.0 - 8.0    5.0   Specific Gravity, UA      1.005 - 1.030    1.030   Protein, UA      Negative    Negative   Glucose, UA      Negative    Negative   Ketones, UA      Negative    Negative   Bilirubin (UA)      Negative    Negative   Occult Blood UA      Negative    1+ (A)   Nitrite,  UA      Negative    Negative   Urobilinogen, UA      <2.0 EU/dL    Negative   Leukocytes, UA      Negative    1+ (A)   RBC, UA      0 - 4 /hpf    6 (H)   WBC, UA      0 - 5 /hpf    5   Bacteria, UA      None-Occ /hpf    Occasional   Squam Epithel, UA      /hpf    1   Microscopic Comment          SEE COMMENT   Protein, Urine Random      0 - 15 mg/dL    11   Creatinine, Random Ur      15.0 - 325.0 mg/dL    248.0   Prot/Creat Ratio, Ur      0.00 - 0.20    0.04   PTH      9.0 - 77.0 pg/mL   79.0 (H)    Complement (C-3)      50 - 180 mg/dL   115    Complement (C-4)      11 - 44 mg/dL   39    Renin Activity      ng/mL/h   1.2    Magnesium      1.6 - 2.6 mg/dL   2.0       X-ray with OA changes in right knee, neck and hands R>L  Assessment:       1. Lupus. Lupus manifestations include joint pains, headache, positive DERRICK, and a   double-stranded DNA. Now with recent ulcers oral.  Also alopecia.  SLEDAI=6 without labs  2. Fatigue   3. Encounter for long-term (current) use of other medications   4. Myalgia and myositis. Fibromyalgia on gabapentin and Savella   5. Trochanteric bursitis. Bilateral now requesting injection.  6. Shoulder pain. Stable   7. Vitamin D deficiency disease. Now normal  8. Suspected Shingles. Past time for anti-viral. Did not receive treatment. Rash has improved  9. Migraine.  Doing acupuncture with Dr. Mao.  Treated with Maxalt by primary doctor which helps.     10.  Osteopenia lumbar spine and femoral neck.  Reclast x1 stopped 3 years ago per patient due to improvement.   Preliminary DEXA osteopenia of femoral neck and lumbar spine FRAX with steroid included does not suggest treatment.  11.  Chronic steroid use.  Endocrine gives for adrenal insufficiency.  12.  Ulcerative colitis  13.  Hematuria.  Followed by nephrology  13.  Hematuria.  Followed by nephrology  14.  Stress with dealing with son with mental illness  Plan:       1.  Labs.    2. Patient to continue Plaquenil 300 mg daily.  Eye exam  normal 5/2017  3. Continue neurontin 800 mg/800 mg/1200 mg in evening.  4. Procedure Note   I  have explained the risks, benefits, and alternatives of aspiration of the joint.  The patient voices understanding and questions have been answered.  The patient agrees to proceed.    The bilateral trochanteric bursa  was prepped with 2% chlorhexidine gluconate and 70% isopropyl alcohol (ChloraPrep).  The area was numbed with topical refrigerant.  A 21 g needle was introduced into trochanteric bursa.  No fluid aspirated.  40 mg Kenalog and 2 cc lidocaine injected.  Hemostasis obtained.   The patient tolerated the procedure well.                RTO in 3-4 months.  Patient seen face to face for 40 minutes and greater than 50% spent in counseling regarding lupus symptom flare.

## 2018-02-16 RX ORDER — HYDROXYCHLOROQUINE SULFATE 200 MG/1
TABLET, FILM COATED ORAL
Qty: 45 TABLET | Refills: 0 | Status: SHIPPED | OUTPATIENT
Start: 2018-02-16 | End: 2018-04-03 | Stop reason: SDUPTHER

## 2018-02-16 RX ORDER — MILNACIPRAN HYDROCHLORIDE 100 MG/1
TABLET, FILM COATED ORAL
Qty: 60 TABLET | Refills: 1 | Status: SHIPPED | OUTPATIENT
Start: 2018-02-16 | End: 2018-05-08 | Stop reason: SDUPTHER

## 2018-02-27 ENCOUNTER — TELEPHONE (OUTPATIENT)
Dept: NEPHROLOGY | Facility: CLINIC | Age: 58
End: 2018-02-27

## 2018-03-02 DIAGNOSIS — C50.911 MALIGNANT NEOPLASM OF RIGHT FEMALE BREAST, UNSPECIFIED ESTROGEN RECEPTOR STATUS, UNSPECIFIED SITE OF BREAST: Primary | ICD-10-CM

## 2018-03-05 ENCOUNTER — OFFICE VISIT (OUTPATIENT)
Dept: NEPHROLOGY | Facility: CLINIC | Age: 58
End: 2018-03-05
Payer: MEDICARE

## 2018-03-05 ENCOUNTER — LAB VISIT (OUTPATIENT)
Dept: LAB | Facility: HOSPITAL | Age: 58
End: 2018-03-05
Payer: MEDICARE

## 2018-03-05 VITALS
WEIGHT: 126.31 LBS | OXYGEN SATURATION: 99 % | BODY MASS INDEX: 21.56 KG/M2 | HEIGHT: 64 IN | HEART RATE: 98 BPM | DIASTOLIC BLOOD PRESSURE: 68 MMHG | SYSTOLIC BLOOD PRESSURE: 144 MMHG

## 2018-03-05 DIAGNOSIS — I10 ESSENTIAL HYPERTENSION: ICD-10-CM

## 2018-03-05 DIAGNOSIS — I10 ESSENTIAL HYPERTENSION: Primary | ICD-10-CM

## 2018-03-05 LAB
CREAT UR-MCNC: 32 MG/DL
PROT UR-MCNC: <7 MG/DL
PROT/CREAT RATIO, UR: NORMAL

## 2018-03-05 PROCEDURE — 99213 OFFICE O/P EST LOW 20 MIN: CPT | Mod: S$PBB,,, | Performed by: INTERNAL MEDICINE

## 2018-03-05 PROCEDURE — 82570 ASSAY OF URINE CREATININE: CPT

## 2018-03-05 PROCEDURE — 99999 PR PBB SHADOW E&M-EST. PATIENT-LVL II: CPT | Mod: PBBFAC,,, | Performed by: INTERNAL MEDICINE

## 2018-03-05 PROCEDURE — 99212 OFFICE O/P EST SF 10 MIN: CPT | Mod: PBBFAC | Performed by: INTERNAL MEDICINE

## 2018-03-05 NOTE — PROGRESS NOTES
Subjective:       Patient ID: Efe Horowitz is a 58 y.o. Black or  female who presents for follow-up evaluation of No chief complaint on file.    HPI This is a 58 -year-old -American female with   history of systemic lupus diagnosed 16 years ago, fibromyalgia, ulcerative   colitis and appendectomy, lupus who is coming in for f/u . The patient denies any macroscopic hematuria. She complains of fatigue  And HA's at times. Her creatinines have been stable. She has  no   proteinuria on the urine protein creatinine ratios for the past two to three   years and has had microscopic hematuria on UAs off and on for the past three   years or so but no sig RBC's. She states her blood pressures at home in the 140's/70-80. Follows in oncology with breast cancer.    PAST MEDICAL HISTORY: Lupus 16 years ago, fibromyalgia, colitis, appendectomy,   and cholecystectomy, breast cancer.     SOCIAL HISTORY: Does not smoke, does not drink. She works part-time as a   .     FAMILY HISTORY: Her dad has CKD from hypertension and diabetes. Her nephew had   a kidney transplant after a motor vehicle accident.    Review of Systems   Constitutional: Positive for fatigue.   Eyes: Negative for discharge.   Respiratory: Negative for cough, shortness of breath and wheezing.    Cardiovascular: Negative for chest pain and palpitations.   Gastrointestinal: Negative for abdominal pain, diarrhea, nausea and vomiting.   Genitourinary: Negative for dysuria, frequency, hematuria and urgency.   Musculoskeletal: Negative for back pain.        R knee pain.   Skin: Negative for color change and rash.   Psychiatric/Behavioral: Negative for confusion.   All other systems reviewed and are negative.      Objective:      Physical Exam   Constitutional: She is oriented to person, place, and time. She appears well-developed and well-nourished.   HENT:   Right Ear: External ear normal.   Left Ear: External ear normal.   Nose: Nose  normal.   Mouth/Throat: Normal dentition.   Eyes: Conjunctivae, EOM and lids are normal. Pupils are equal, round, and reactive to light.   Neck: Trachea normal. No thyroid mass present.   Cardiovascular: Normal rate and regular rhythm.  Exam reveals no friction rub.    No murmur heard.  No lower extremity edema   Pulmonary/Chest: Effort normal and breath sounds normal. No respiratory distress. She has no decreased breath sounds. She has no rales.   Abdominal: Soft. Bowel sounds are normal. She exhibits no mass. There is no tenderness. There is no rebound. No hernia.   Musculoskeletal: She exhibits no edema.   Neurological: She is alert and oriented to person, place, and time.   Skin: Skin is warm and dry. No rash noted. No erythema.   Psychiatric: She has a normal mood and affect. Judgment normal. Her mood appears not anxious. She does not exhibit a depressed mood.   Oriented to time, place and person.   Vitals reviewed.      Assessment:       No diagnosis found.    Plan:         This is a 57 year-old -American female with   longstanding systemic lupus, but no history of lupus nephritis, who is coming in   for f/u per:   1. Renal. Her creatinines have been stable at 0.8. She has 100 mg to no   proteinuria for the past two or three years with microscopic hematuria on   several UAs intermittently but no sig RBC's. No proteinuria currently. She does not appear to have lupus nephritis, but will need to be monitored closely. I do not think renal biopsy is warranted at   this time, but she does need close followup and all of this was explained in   detail to the patient. She denies any NSAIDs. Consider ace-I if bp's lenin. Hydrate well.   2. Blood pressures are slightly higher -asked her to monitor bp's and on  HCTZ 25 mg  we can initiate ace-I if needed additionally in the future. Potassium low and started on kcl 20 meq daily by pcp. Recheck potassium and yony and pra levels.   3. Microscopic hematuria. renal  ultrasound stable. She denies any   history of kidney stones. Saw urology-ct urogram negative. Recent UA negative for proteinuria. Her complements have been stable.   4.Anemia: hgb stable along with iron.   Return in 6 months.

## 2018-03-27 ENCOUNTER — HOSPITAL ENCOUNTER (OUTPATIENT)
Dept: RADIOLOGY | Facility: HOSPITAL | Age: 58
Discharge: HOME | End: 2018-03-27
Attending: SURGERY
Payer: MEDICARE

## 2018-03-27 ENCOUNTER — OFFICE VISIT (OUTPATIENT)
Dept: SURGERY | Facility: CLINIC | Age: 58
End: 2018-03-27
Payer: MEDICARE

## 2018-03-27 VITALS
DIASTOLIC BLOOD PRESSURE: 88 MMHG | BODY MASS INDEX: 21.51 KG/M2 | HEIGHT: 64 IN | WEIGHT: 126 LBS | HEART RATE: 75 BPM | SYSTOLIC BLOOD PRESSURE: 141 MMHG | TEMPERATURE: 98 F

## 2018-03-27 DIAGNOSIS — Z17.1 MALIGNANT NEOPLASM OF UPPER-INNER QUADRANT OF RIGHT BREAST IN FEMALE, ESTROGEN RECEPTOR NEGATIVE: Primary | ICD-10-CM

## 2018-03-27 DIAGNOSIS — C50.911 MALIGNANT NEOPLASM OF RIGHT FEMALE BREAST, UNSPECIFIED ESTROGEN RECEPTOR STATUS, UNSPECIFIED SITE OF BREAST: ICD-10-CM

## 2018-03-27 DIAGNOSIS — C50.211 MALIGNANT NEOPLASM OF UPPER-INNER QUADRANT OF RIGHT BREAST IN FEMALE, ESTROGEN RECEPTOR NEGATIVE: Primary | ICD-10-CM

## 2018-03-27 PROCEDURE — 99213 OFFICE O/P EST LOW 20 MIN: CPT | Mod: S$PBB,,, | Performed by: SURGERY

## 2018-03-27 PROCEDURE — 77061 BREAST TOMOSYNTHESIS UNI: CPT | Mod: TC,PO,LT

## 2018-03-27 PROCEDURE — 77065 DX MAMMO INCL CAD UNI: CPT | Mod: TC,PO,LT

## 2018-03-27 PROCEDURE — 99213 OFFICE O/P EST LOW 20 MIN: CPT | Mod: PBBFAC,PO | Performed by: SURGERY

## 2018-03-27 PROCEDURE — 99999 PR PBB SHADOW E&M-EST. PATIENT-LVL III: CPT | Mod: PBBFAC,,, | Performed by: SURGERY

## 2018-03-27 PROCEDURE — 77065 DX MAMMO INCL CAD UNI: CPT | Mod: 26,LT,, | Performed by: RADIOLOGY

## 2018-03-27 PROCEDURE — 77061 BREAST TOMOSYNTHESIS UNI: CPT | Mod: 26,LT,, | Performed by: RADIOLOGY

## 2018-03-28 NOTE — PROGRESS NOTES
The patient presents for routine follow-up.     She has a known medical history of primary adrenal insufficiency, arthritis, B12  deficiency, iron deficiency anemia, hypopituitary state and lupus who was   diagnosed with a T1b triple negative, high-grade invasive ductal carcinoma in   the spring of 2016.      She underwent a right nipple-sparing mastectomy on   04/27/2016 with immediate autologous tissue IGNACIO flap reconstruction by Dr. Margo Mccloud. The final pathology revealed the high-grade T1b 7 mm lesion with   negative sentinel lymph nodes x4. She had one additional negative node.      She underwent right nipple sparing mastectomy in April 2016 for a stage IA lymph node negative T1b 7 mm grade 3 triple negative cancer from the right breast.  Surgical margins were clear by 2.5 cm and she had 4 negative sentinel nodes.     She has completed all of her reconstruction with Dr. Mccloud and has an excellent symmetrical cosmetic result.  There are no suspicious masses nodules densities skin changes or lymphadenopathy on either side.  There are no suspicious skin changes.  Good symmetry with inferior radial incisions following reconstruction and revision.  Some minimal peripheral fat necrosis noted bilaterally clinically today on exam     Clinically [facility]  She was not recommended for any adjuvant therapy because of the small T1b node negative cancer although triple negative.  She has been evaluated in survivorship clinic  She will follow-up with me again in 1 year for routine clinical examination as part of ongoing breast cancer screening surveillance follow-up.  We will place an order for a contralateral left diagnostic mammogram at that point as well in one year when she would be due for annual mammogram on the remaining left breast.

## 2018-04-03 RX ORDER — HYDROXYCHLOROQUINE SULFATE 200 MG/1
TABLET, FILM COATED ORAL
Qty: 45 TABLET | Refills: 0 | Status: SHIPPED | OUTPATIENT
Start: 2018-04-03 | End: 2018-05-08 | Stop reason: SDUPTHER

## 2018-04-03 RX ORDER — LOPERAMIDE HYDROCHLORIDE 2 MG/1
CAPSULE ORAL
Qty: 240 CAPSULE | Refills: 3 | Status: SHIPPED | OUTPATIENT
Start: 2018-04-03 | End: 2019-05-11 | Stop reason: SDUPTHER

## 2018-04-04 NOTE — TELEPHONE ENCOUNTER
----- Message from Bryant Holt MD sent at 4/3/2018  5:32 PM CDT -----  Contact: self  I would recommend her see her primary doctor to make sure it is not a herpes infection.  ----- Message -----  From: Nereida James MA  Sent: 4/3/2018   3:48 PM  To: Bryant Holt MD    Please advise pt  ----- Message -----  From: Thaddeus Interiano  Sent: 4/3/2018   3:37 PM  To: Chela [facility]OW[facility] Staff    Patient states per Dr Jiang on last visit to call and speak to Rashida if she should have the sores to appear again in her mouth. Patient states now the sores are in mouth, tongue, lips on neck and chest area.No solution found before the template release date of 8/31/2018 Patient can be reached at

## 2018-04-04 NOTE — TELEPHONE ENCOUNTER
Spoke with pt and informed her of the suggestion per Dr. Holt. She will reach out to her primary doctor today.  Rashida

## 2018-04-18 ENCOUNTER — HOSPITAL ENCOUNTER (OUTPATIENT)
Dept: RADIOLOGY | Facility: HOSPITAL | Age: 58
Discharge: HOME | End: 2018-04-18
Attending: INTERNAL MEDICINE
Payer: MEDICARE

## 2018-04-18 DIAGNOSIS — M54.31 SCIATICA OF RIGHT SIDE: Primary | ICD-10-CM

## 2018-04-18 DIAGNOSIS — M54.31 RIGHT SIDED SCIATICA: Primary | ICD-10-CM

## 2018-04-18 DIAGNOSIS — M48.061 SPINAL STENOSIS OF LUMBAR REGION: ICD-10-CM

## 2018-04-18 DIAGNOSIS — K86.81 EXOCRINE PANCREATIC INSUFFICIENCY: ICD-10-CM

## 2018-04-18 DIAGNOSIS — M54.31 SCIATICA OF RIGHT SIDE: ICD-10-CM

## 2018-04-18 PROCEDURE — 72100 X-RAY EXAM L-S SPINE 2/3 VWS: CPT | Mod: 26,,, | Performed by: RADIOLOGY

## 2018-04-18 PROCEDURE — 73521 X-RAY EXAM HIPS BI 2 VIEWS: CPT | Mod: 26,,, | Performed by: RADIOLOGY

## 2018-04-18 PROCEDURE — 72120 X-RAY BEND ONLY L-S SPINE: CPT | Mod: 26,,, | Performed by: RADIOLOGY

## 2018-04-18 PROCEDURE — 72120 X-RAY BEND ONLY L-S SPINE: CPT | Mod: TC,FY,PO

## 2018-04-18 PROCEDURE — 73521 X-RAY EXAM HIPS BI 2 VIEWS: CPT | Mod: TC,FY,PO

## 2018-04-20 DIAGNOSIS — K86.81 EXOCRINE PANCREATIC INSUFFICIENCY: ICD-10-CM

## 2018-04-20 RX ORDER — PANCRELIPASE 36000; 180000; 114000 [USP'U]/1; [USP'U]/1; [USP'U]/1
CAPSULE, DELAYED RELEASE PELLETS ORAL
Qty: 240 CAPSULE | Status: CANCELLED | OUTPATIENT
Start: 2018-04-20

## 2018-04-20 RX ORDER — MILNACIPRAN HYDROCHLORIDE 100 MG/1
TABLET, FILM COATED ORAL
Qty: 60 TABLET | OUTPATIENT
Start: 2018-04-20

## 2018-04-27 ENCOUNTER — OFFICE VISIT (OUTPATIENT)
Dept: HEMATOLOGY/ONCOLOGY | Facility: CLINIC | Age: 58
End: 2018-04-27
Payer: MEDICARE

## 2018-04-27 ENCOUNTER — HOSPITAL ENCOUNTER (OUTPATIENT)
Dept: RADIOLOGY | Facility: HOSPITAL | Age: 58
Discharge: HOME | End: 2018-04-27
Attending: INTERNAL MEDICINE
Payer: MEDICARE

## 2018-04-27 VITALS
OXYGEN SATURATION: 99 % | TEMPERATURE: 98 F | WEIGHT: 127.19 LBS | SYSTOLIC BLOOD PRESSURE: 185 MMHG | BODY MASS INDEX: 21.71 KG/M2 | HEART RATE: 89 BPM | DIASTOLIC BLOOD PRESSURE: 81 MMHG | HEIGHT: 64 IN | RESPIRATION RATE: 16 BRPM

## 2018-04-27 DIAGNOSIS — M32.9 SYSTEMIC LUPUS ERYTHEMATOSUS, UNSPECIFIED SLE TYPE, UNSPECIFIED ORGAN INVOLVEMENT STATUS: ICD-10-CM

## 2018-04-27 DIAGNOSIS — M79.604 PAIN OF RIGHT LOWER EXTREMITY: ICD-10-CM

## 2018-04-27 DIAGNOSIS — C50.011 MALIGNANT NEOPLASM OF AREOLA OF RIGHT BREAST IN FEMALE, ESTROGEN RECEPTOR NEGATIVE: ICD-10-CM

## 2018-04-27 DIAGNOSIS — Z17.1 MALIGNANT NEOPLASM OF AREOLA OF RIGHT BREAST IN FEMALE, ESTROGEN RECEPTOR NEGATIVE: ICD-10-CM

## 2018-04-27 DIAGNOSIS — M79.604 PAIN OF RIGHT LOWER EXTREMITY: Primary | ICD-10-CM

## 2018-04-27 PROCEDURE — 73590 X-RAY EXAM OF LOWER LEG: CPT | Mod: TC,RT

## 2018-04-27 PROCEDURE — 73590 X-RAY EXAM OF LOWER LEG: CPT | Mod: 26,RT,, | Performed by: RADIOLOGY

## 2018-04-27 PROCEDURE — 99214 OFFICE O/P EST MOD 30 MIN: CPT | Mod: S$PBB,,, | Performed by: INTERNAL MEDICINE

## 2018-04-27 PROCEDURE — 99999 PR PBB SHADOW E&M-EST. PATIENT-LVL V: CPT | Mod: PBBFAC,,, | Performed by: INTERNAL MEDICINE

## 2018-04-27 PROCEDURE — 99215 OFFICE O/P EST HI 40 MIN: CPT | Mod: PBBFAC,25 | Performed by: INTERNAL MEDICINE

## 2018-04-27 NOTE — PROGRESS NOTES
Subjective:       Patient ID: Eef Horowitz is a 58 y.o. female.    Chief Complaint: Follow-up    HPI     Presents for follow-up of triple negative breast cancer    Reports in the interval her lupus has flared  Required recent steroid boost for symptoms  Biggest issue is pain in her right tibia  Reports that she discussed with her PCP - had recent hip and back imaging but not this area yet  Pain is all the time and makes her uncomfortable to walk at times     Diagnosis: Triple negative Stage 1 ([id number]) right breast carcinoma  Oncology History:  - 1/18/16 screening mammogram:  There is a focal asymmetry seen in the posterior upper-inner region of the right breast.  - 2/1/16 diagnostic mammogram and ultrasound: irregular solid mass with poorly defined margins  measuring 5 millimeters  - 2/19/16 core biopsy  FINAL PATHOLOGIC DIAGNOSIS  RIGHT BREAST, 1:00, BIOPSY:  Invasive ductal carcinoma, high-grade (duct formation - 3; nuclear pleomorphism - 3, mitosis - 2)  ER - Negative (0)  KS - Negative (0)  HER2 - Negative (0)  - 4/27/16 mastectomy and SLN biopsy  FINAL PATHOLOGIC DIAGNOSIS  RIGHT MASTECTOMY SPECIMEN SHOWING A 7 MM AREA OF INVASIVE DUCT CARCINOMA, GRADE 3  WITH NEGATIVE MARGINS.    TUMOR SIZE: 0.7 CM  HISTOLOGIC TYPE: INVASIVE DUCT CARCINOMA  HISTOLOGIC GRADE: 3, 3, 2; GRADE 3  DCIS: NOT IDENTIFIED  MARGINS: ALL MARGINS OF RESECTION ARE NEGATIVE FOR TUMOR. CLOSEST MARGIN IS THE  POSTERIOR MARGIN AT 2.5 CM  IMMUNE RECEPTOR STUDIES: MS 16-5/1/04: ER - Negative (0)  KS - Negative (0)  HER2 - Negative (0)  LYMPH NODES: Total number 4- negative  - Genetic testing performed  - With tumor > 0.6 cm she just made guideline cut off to consider adjuvant chemotherapy with TC   However, with her active wound healing issues at that time and other comorbidities adjuvant chemotherapy was not be pursued   - She completed all of her reconstruction with Dr. Mccloud   Does have multiple comorbidities including history of  lupus and fibromyalgia- she is managed by rheumatology and reports recent flare   - Reports history of easy bruising and states she was borderline on testing for von willebrands in [facility]    Review of Systems   Constitutional: Negative for activity change, appetite change, chills, fatigue (chronic), fever and unexpected weight change.        Reports sweats a lot- taken off estrogen   HENT: Negative for congestion, dental problem, ear pain, hearing loss, mouth sores, nosebleeds, postnasal drip, rhinorrhea, sinus pressure, sneezing, sore throat, tinnitus and trouble swallowing.    Eyes: Negative for redness and visual disturbance (chronic- sees optho next week).   Respiratory: Positive for shortness of breath (chronic, relates to her hiatal hernia). Negative for cough, chest tightness and wheezing.    Cardiovascular: Negative for chest pain, palpitations and leg swelling.   Gastrointestinal: Positive for nausea. Negative for abdominal distention, abdominal pain, blood in stool, constipation, diarrhea and vomiting.        + dysphagia- hx ring  + GERD, + hiatal hernia   Genitourinary: Negative for decreased urine volume, difficulty urinating, dysuria, frequency, hematuria and urgency.   Musculoskeletal: Positive for arthralgias (see above) and back pain. Negative for gait problem, joint swelling, myalgias, neck pain and neck stiffness.   Skin: Negative for color change, pallor, rash and wound.        Healing well this time   Neurological: Positive for headaches (migraine hx, chronic). Negative for dizziness, weakness, light-headedness (still reports chronic issue) and numbness.   Hematological: Negative for adenopathy. Does not bruise/bleed easily.   Psychiatric/Behavioral: Negative for decreased concentration, dysphoric mood and sleep disturbance. The patient is not nervous/anxious.        Objective:      Physical Exam   Constitutional: She is oriented to person, place, and time. She appears well-developed and  well-nourished. No distress.   Presents alone   HENT:   Head: Normocephalic and atraumatic.   Right Ear: External ear normal.   Left Ear: External ear normal.   Nose: Nose normal.   Mouth/Throat: Oropharynx is clear and moist. No oropharyngeal exudate.   + mouth sores   Eyes: Conjunctivae and EOM are normal. Pupils are equal, round, and reactive to light. Right eye exhibits no discharge. Left eye exhibits no discharge. No scleral icterus. Right pupil is round and reactive. Left pupil is round and reactive.   Neck: Normal range of motion. Neck supple. No tracheal deviation present. No thyromegaly present.   Cardiovascular: Normal rate, regular rhythm and intact distal pulses.  Exam reveals no gallop and no friction rub.    Murmur heard.  Pulmonary/Chest: Effort normal and breath sounds normal. No respiratory distress. She has no wheezes. She has no rales. She exhibits no tenderness.   Breasts- right breast no concerning masses (area of fat necrosis) or LAD  Left breast no masses or LAD   Abdominal: Soft. Bowel sounds are normal. She exhibits no distension and no mass. There is no hepatosplenomegaly. There is no tenderness. There is no rebound and no guarding.   No organomegaly   Musculoskeletal: Normal range of motion. She exhibits no edema or tenderness.   Lymphadenopathy:        Head (right side): No submandibular adenopathy present.        Head (left side): No submandibular adenopathy present.     She has no cervical adenopathy.        Right cervical: No superficial cervical, no deep cervical and no posterior cervical adenopathy present.       Left cervical: No superficial cervical, no deep cervical and no posterior cervical adenopathy present.        Right: No inguinal and no supraclavicular adenopathy present.        Left: No inguinal and no supraclavicular adenopathy present.   Neurological: She is alert and oriented to person, place, and time. She has normal strength. No cranial nerve deficit or sensory  deficit. She exhibits normal muscle tone. Coordination normal.   Skin: Skin is warm and dry. No bruising, no lesion, no petechiae and no rash noted. She is not diaphoretic. No erythema. No pallor.   Breast and abdominal wound sites healing well   Psychiatric: She has a normal mood and affect. Her behavior is normal. Judgment and thought content normal. Her mood appears not anxious. She does not exhibit a depressed mood.   Nursing note and vitals reviewed.    Labs- reviewed  Assessment:       1. Pain of right lower extremity    2. Malignant neoplasm of areola of right breast in female, estrogen receptor negative    3. Systemic lupus erythematosus, unspecified SLE type, unspecified organ involvement status        Plan:     1. xrays today  2. She was not able to receive adjuvant chemotherapy due to delayed wound healing past 12 weeks and a SLE flare  She remains on surveillance  RTC 3 months with labs  Next mammogram due 3/2019  Has labs next week with Dr. Tinsley and we will check those as well  3. Patient to contact Dr. Julian with concerns for flare        Distress Screening Results: Psychosocial Distress screening score of Distress Score: 0 noted and reviewed. No intervention indicated.

## 2018-04-30 DIAGNOSIS — D50.9 IRON DEFICIENCY ANEMIA, UNSPECIFIED IRON DEFICIENCY ANEMIA TYPE: Primary | ICD-10-CM

## 2018-05-08 ENCOUNTER — LAB VISIT (OUTPATIENT)
Dept: LAB | Facility: HOSPITAL | Age: 58
End: 2018-05-08
Payer: MEDICARE

## 2018-05-08 DIAGNOSIS — Z79.899 NEED FOR PROPHYLACTIC CHEMOTHERAPY: ICD-10-CM

## 2018-05-08 DIAGNOSIS — K51.90 MILD CHRONIC ULCERATIVE COLITIS: ICD-10-CM

## 2018-05-08 DIAGNOSIS — N08 LUPOID NEPHRITIS: Primary | ICD-10-CM

## 2018-05-08 DIAGNOSIS — E55.9 AVITAMINOSIS D: ICD-10-CM

## 2018-05-08 DIAGNOSIS — M32.10 LUPOID NEPHRITIS: Primary | ICD-10-CM

## 2018-05-08 DIAGNOSIS — D50.8 IRON DEFICIENCY ANEMIA SECONDARY TO INADEQUATE DIETARY IRON INTAKE: ICD-10-CM

## 2018-05-08 LAB
25(OH)D3+25(OH)D2 SERPL-MCNC: 37 NG/ML
ALBUMIN SERPL BCP-MCNC: 3.8 G/DL
ALP SERPL-CCNC: 65 U/L
ALT SERPL W/O P-5'-P-CCNC: 16 U/L
ANION GAP SERPL CALC-SCNC: 10 MMOL/L
AST SERPL-CCNC: 20 U/L
BASOPHILS # BLD AUTO: 0.06 K/UL
BASOPHILS NFR BLD: 0.8 %
BILIRUB SERPL-MCNC: 0.3 MG/DL
BUN SERPL-MCNC: 11 MG/DL
CALCIUM SERPL-MCNC: 9.6 MG/DL
CHLORIDE SERPL-SCNC: 103 MMOL/L
CHOLEST SERPL-MCNC: 199 MG/DL
CHOLEST/HDLC SERPL: 2.6 {RATIO}
CO2 SERPL-SCNC: 30 MMOL/L
CREAT SERPL-MCNC: 0.7 MG/DL
CRP SERPL-MCNC: 2.5 MG/L
DIFFERENTIAL METHOD: ABNORMAL
EOSINOPHIL # BLD AUTO: 0.2 K/UL
EOSINOPHIL NFR BLD: 2.1 %
ERYTHROCYTE [DISTWIDTH] IN BLOOD BY AUTOMATED COUNT: 14.8 %
ERYTHROCYTE [SEDIMENTATION RATE] IN BLOOD BY WESTERGREN METHOD: 4 MM/HR
EST. GFR  (AFRICAN AMERICAN): >60 ML/MIN/1.73 M^2
EST. GFR  (NON AFRICAN AMERICAN): >60 ML/MIN/1.73 M^2
ESTIMATED AVG GLUCOSE: 117 MG/DL
FOLATE SERPL-MCNC: 10 NG/ML
GLUCOSE SERPL-MCNC: 73 MG/DL
HBA1C MFR BLD HPLC: 5.7 %
HCT VFR BLD AUTO: 41.7 %
HDLC SERPL-MCNC: 78 MG/DL
HDLC SERPL: 39.2 %
HGB BLD-MCNC: 12.6 G/DL
IMM GRANULOCYTES # BLD AUTO: 0.07 K/UL
IMM GRANULOCYTES NFR BLD AUTO: 1 %
IRON SERPL-MCNC: 82 UG/DL
LDLC SERPL CALC-MCNC: 99.6 MG/DL
LYMPHOCYTES # BLD AUTO: 2.1 K/UL
LYMPHOCYTES NFR BLD: 28.4 %
MAGNESIUM SERPL-MCNC: 2.1 MG/DL
MCH RBC QN AUTO: 27 PG
MCHC RBC AUTO-ENTMCNC: 30.2 G/DL
MCV RBC AUTO: 89 FL
MONOCYTES # BLD AUTO: 0.8 K/UL
MONOCYTES NFR BLD: 10.3 %
NEUTROPHILS # BLD AUTO: 4.2 K/UL
NEUTROPHILS NFR BLD: 57.4 %
NONHDLC SERPL-MCNC: 121 MG/DL
NRBC BLD-RTO: 0 /100 WBC
PLATELET # BLD AUTO: 360 K/UL
PMV BLD AUTO: 10.4 FL
POTASSIUM SERPL-SCNC: 3.6 MMOL/L
PROT SERPL-MCNC: 7 G/DL
RBC # BLD AUTO: 4.67 M/UL
SATURATED IRON: 19 %
SODIUM SERPL-SCNC: 143 MMOL/L
TOTAL IRON BINDING CAPACITY: 440 UG/DL
TRANSFERRIN SERPL-MCNC: 297 MG/DL
TRIGL SERPL-MCNC: 107 MG/DL
VIT B12 SERPL-MCNC: 885 PG/ML
WBC # BLD AUTO: 7.26 K/UL

## 2018-05-08 PROCEDURE — 85025 COMPLETE CBC W/AUTO DIFF WBC: CPT

## 2018-05-08 PROCEDURE — 83036 HEMOGLOBIN GLYCOSYLATED A1C: CPT

## 2018-05-08 PROCEDURE — 85651 RBC SED RATE NONAUTOMATED: CPT

## 2018-05-08 PROCEDURE — 36415 COLL VENOUS BLD VENIPUNCTURE: CPT | Mod: PO

## 2018-05-08 PROCEDURE — 82746 ASSAY OF FOLIC ACID SERUM: CPT

## 2018-05-08 PROCEDURE — 82306 VITAMIN D 25 HYDROXY: CPT

## 2018-05-08 PROCEDURE — 83735 ASSAY OF MAGNESIUM: CPT

## 2018-05-08 PROCEDURE — 80061 LIPID PANEL: CPT

## 2018-05-08 PROCEDURE — 83540 ASSAY OF IRON: CPT

## 2018-05-08 PROCEDURE — 80053 COMPREHEN METABOLIC PANEL: CPT

## 2018-05-08 PROCEDURE — 82607 VITAMIN B-12: CPT

## 2018-05-08 PROCEDURE — 86140 C-REACTIVE PROTEIN: CPT

## 2018-05-08 RX ORDER — HYDROXYCHLOROQUINE SULFATE 200 MG/1
TABLET, FILM COATED ORAL
Qty: 45 TABLET | Refills: 3 | Status: SHIPPED | OUTPATIENT
Start: 2018-05-08 | End: 2018-09-04 | Stop reason: SDUPTHER

## 2018-05-09 RX ORDER — MILNACIPRAN HYDROCHLORIDE 100 MG/1
TABLET, FILM COATED ORAL
Qty: 60 TABLET | Refills: 1 | Status: SHIPPED | OUTPATIENT
Start: 2018-05-09 | End: 2021-05-17

## 2018-05-16 DIAGNOSIS — M47.26 OTHER SPONDYLOSIS WITH RADICULOPATHY, LUMBAR REGION: Primary | ICD-10-CM

## 2018-05-16 DIAGNOSIS — M16.6 OTHER BILATERAL SECONDARY OSTEOARTHRITIS OF HIP: ICD-10-CM

## 2018-05-21 ENCOUNTER — HOSPITAL ENCOUNTER (OUTPATIENT)
Dept: RADIOLOGY | Facility: HOSPITAL | Age: 58
Discharge: HOME | End: 2018-05-21
Attending: INTERNAL MEDICINE
Payer: MEDICARE

## 2018-05-21 DIAGNOSIS — M16.6 OTHER BILATERAL SECONDARY OSTEOARTHRITIS OF HIP: ICD-10-CM

## 2018-05-21 DIAGNOSIS — M47.26 OTHER SPONDYLOSIS WITH RADICULOPATHY, LUMBAR REGION: ICD-10-CM

## 2018-05-21 PROCEDURE — 73721 MRI JNT OF LWR EXTRE W/O DYE: CPT | Mod: TC,LT

## 2018-05-21 PROCEDURE — 73721 MRI JNT OF LWR EXTRE W/O DYE: CPT | Mod: 26,RT,, | Performed by: RADIOLOGY

## 2018-05-21 PROCEDURE — 73721 MRI JNT OF LWR EXTRE W/O DYE: CPT | Mod: 26,LT,, | Performed by: RADIOLOGY

## 2018-05-21 PROCEDURE — 72148 MRI LUMBAR SPINE W/O DYE: CPT | Mod: TC

## 2018-05-21 PROCEDURE — 73721 MRI JNT OF LWR EXTRE W/O DYE: CPT | Mod: TC,RT

## 2018-05-21 PROCEDURE — 72148 MRI LUMBAR SPINE W/O DYE: CPT | Mod: 26,,, | Performed by: RADIOLOGY

## 2018-05-28 ENCOUNTER — TELEPHONE (OUTPATIENT)
Dept: GASTROENTEROLOGY | Facility: CLINIC | Age: 58
End: 2018-05-28

## 2018-05-28 DIAGNOSIS — K86.3 CYST AND PSEUDOCYST OF PANCREAS: Primary | ICD-10-CM

## 2018-05-28 DIAGNOSIS — K86.2 CYST AND PSEUDOCYST OF PANCREAS: Primary | ICD-10-CM

## 2018-06-04 ENCOUNTER — TELEPHONE (OUTPATIENT)
Dept: GASTROENTEROLOGY | Facility: CLINIC | Age: 58
End: 2018-06-04

## 2018-06-07 ENCOUNTER — TELEPHONE (OUTPATIENT)
Dept: GASTROENTEROLOGY | Facility: CLINIC | Age: 58
End: 2018-06-07

## 2018-06-07 NOTE — TELEPHONE ENCOUNTER
Spoke with patient. EUS scheduled for 8/6 at 8a. Reviewed prep instructions. Ms Horowitz verbalized understanding.

## 2018-06-27 ENCOUNTER — TELEPHONE (OUTPATIENT)
Dept: ENDOCRINOLOGY | Facility: CLINIC | Age: 58
End: 2018-06-27

## 2018-06-27 DIAGNOSIS — E27.40 ADRENAL INSUFFICIENCY: ICD-10-CM

## 2018-06-27 DIAGNOSIS — E55.9 VITAMIN D DEFICIENCY: Primary | ICD-10-CM

## 2018-06-27 NOTE — TELEPHONE ENCOUNTER
Labs scheduled for 7/18/18 as ordered per Dr. Starkey.  Appointment reminder letter mailed to address on file.

## 2018-07-03 RX ORDER — MILNACIPRAN HYDROCHLORIDE 100 MG/1
TABLET, FILM COATED ORAL
Qty: 60 TABLET | OUTPATIENT
Start: 2018-07-03

## 2018-07-11 ENCOUNTER — TELEPHONE (OUTPATIENT)
Dept: ENDOSCOPY | Facility: HOSPITAL | Age: 58
End: 2018-07-11

## 2018-07-12 ENCOUNTER — TELEPHONE (OUTPATIENT)
Dept: GASTROENTEROLOGY | Facility: CLINIC | Age: 58
End: 2018-07-12

## 2018-07-12 NOTE — TELEPHONE ENCOUNTER
----- Message from Ondina Desai sent at 7/12/2018 12:30 PM CDT -----  Contact: self - [phone]  lan - calling about her pancreas meds - please call patient at [phone]

## 2018-07-24 ENCOUNTER — OFFICE VISIT (OUTPATIENT)
Dept: ENDOCRINOLOGY | Facility: CLINIC | Age: 58
End: 2018-07-24
Payer: MEDICARE

## 2018-07-24 VITALS
WEIGHT: 125 LBS | SYSTOLIC BLOOD PRESSURE: 170 MMHG | HEART RATE: 98 BPM | RESPIRATION RATE: 20 BRPM | HEIGHT: 64 IN | BODY MASS INDEX: 21.34 KG/M2 | DIASTOLIC BLOOD PRESSURE: 102 MMHG

## 2018-07-24 DIAGNOSIS — E27.40 HYPOADRENALISM: Primary | ICD-10-CM

## 2018-07-24 PROCEDURE — 99213 OFFICE O/P EST LOW 20 MIN: CPT | Mod: PBBFAC | Performed by: INTERNAL MEDICINE

## 2018-07-24 PROCEDURE — 99999 PR PBB SHADOW E&M-EST. PATIENT-LVL III: CPT | Mod: PBBFAC,,, | Performed by: INTERNAL MEDICINE

## 2018-07-24 PROCEDURE — 99214 OFFICE O/P EST MOD 30 MIN: CPT | Mod: S$PBB,,, | Performed by: INTERNAL MEDICINE

## 2018-07-24 RX ORDER — PREDNISONE 5 MG/1
TABLET ORAL
Qty: 100 TABLET | Refills: 0 | Status: SHIPPED | OUTPATIENT
Start: 2018-07-24 | End: 2018-10-16 | Stop reason: SDUPTHER

## 2018-07-24 RX ORDER — AMLODIPINE BESYLATE 2.5 MG/1
2.5 TABLET ORAL DAILY
Qty: 30 TABLET | Refills: 11 | Status: SHIPPED | OUTPATIENT
Start: 2018-07-24 | End: 2019-07-10 | Stop reason: SDUPTHER

## 2018-07-24 NOTE — PROGRESS NOTES
Subjective:      Patient ID: Efe Horowitz is a 58 y.o. female.    Chief Complaint:  Osteoporosis      History of Present Illness  Ms. Horowitz is a 55 year old woman with adrenal insufficiency and osteoporosis who presents from her PCP for significant fatigue and weakness.  She had a viral illness a few months ago but could not get a Rx for an increased dose of steroids for almost a month.  She continued to take her usual 5mg daily until she got an appointment with her doctor but by then she had significant fatigue and weakness.  She was seen by her PCP 5/19/15 and was given an injection of methylprednisolone 125mg and a Rx for a steroid taper of prednisone (40mg for 4 days, 30mg for 4 days, etc., currently on 30mg).  She was also given Augmentin for bronchitis has had significant diarrhea 5-6x/day, currently on day 6 of 7 days.  Despite the injection of steroids and increased daily steroids she continues to feel weakness and fatigue.    Review of Systems   Constitutional: Positive for activity change, appetite change and fatigue.   HENT: Positive for voice change. Negative for congestion, ear pain, postnasal drip, rhinorrhea, sinus pressure and sore throat.         Sores in mouth   Eyes: Negative for photophobia and visual disturbance.   Respiratory: Negative for cough, choking, chest tightness and shortness of breath.    Cardiovascular: Negative for chest pain and palpitations.   Gastrointestinal: Positive for diarrhea and nausea. Negative for abdominal distention, abdominal pain, blood in stool, constipation and vomiting.   Genitourinary: Positive for hematuria. Negative for decreased urine volume, dysuria and urgency.        Microscopic hematuria, scheduled for a cystoscopy to investigate    Musculoskeletal: Positive for arthralgias, back pain, gait problem, joint swelling and myalgias.   Skin: Negative for color change, pallor and rash.   Neurological: Positive for dizziness, weakness, light-headedness,  numbness and headaches. Negative for tremors, seizures, syncope and speech difficulty.        Dizziness/lightheadedness especially when standing   Hematological: Bruises/bleeds easily.   Psychiatric/Behavioral: Positive for confusion and decreased concentration.        Sleeping all the time       Objective:   Physical Exam   Constitutional: She is oriented to person, place, and time. She appears well-developed and well-nourished. No distress.   HENT:   Head: Normocephalic and atraumatic.   Nose: Nose normal.   Mouth/Throat: Oropharynx is clear and moist. No oropharyngeal exudate.   Voice is soft and hoarse    Eyes: Conjunctivae and EOM are normal. Pupils are equal, round, and reactive to light. Right eye exhibits no discharge. Left eye exhibits no discharge. No scleral icterus.   Neck: Normal range of motion. Neck supple. No tracheal deviation present. No thyromegaly present.   Cardiovascular: Normal rate, regular rhythm and normal heart sounds.    Pulmonary/Chest: Effort normal and breath sounds normal. No respiratory distress. She has no wheezes. She has no rales.   Abdominal: Soft. Bowel sounds are normal. She exhibits no distension. There is no tenderness.   Musculoskeletal: Normal range of motion. She exhibits no edema or tenderness.   Lymphadenopathy:     She has no cervical adenopathy.   Neurological: She is alert and oriented to person, place, and time. No cranial nerve deficit. Coordination normal.   Skin: Skin is warm and dry. No rash noted. She is not diaphoretic. No erythema. No pallor.   Psychiatric: She has a normal mood and affect. Her behavior is normal. Judgment and thought content normal.       Lab Review:   TSH  1.122  Ca    9.4  VitD   44    Assessment:     No diagnosis found.   Ms. Horowitz is a 55 year old woman with adrenal insufficiency and osteoporosis who presents from her PCP for significant fatigue and weakness.    Plan:     Adrenal Insufficiency   - continue steroid taper as prescribed by  PCP (currently on 30mg daily for 4 days, then 20mg daily for 4 days, then 10mg daily for 4 days, then take 5mg daily continuously)     Osteoporosis   - has had 3 doses of Reclast, last dose 3/14/14, will get dexa scan as scheduled (due 10/2015) and assess for additional treatments   - last dexa done 10/17/13 showed t-score -2.2 at the lumbar spine  - continue 100,000 units vitamin D weekly  - calcium and vitamin D levels within normal limits    Fatigue   - will check TSH/FT4 today  - will check ESR, concern for flare of ulcerative colitis, SLE, fibromyalgia  - will check CBC/CMP    Diarrhea  - patient is on day 6 of 7 Augmentin for bronchitis but having significant diarrhea, instructed patient to stop medication now  - patient is instructed to alert gastroenterologist or PCP if diarrhea persists as this may be a sign of something more serious    Hypertension  Add Norvasc 2.5 daily as needed for hypertension.  Increase prednisone to 7.5 mg daily during stress or nausea.

## 2018-07-26 NOTE — PROGRESS NOTES
Subjective:       Patient ID: Efe Horowitz is a 58 y.o. female.    Chief Complaint: Breast Cancer    HPI     Presents for follow-up of triple negative breast cancer. Reports in the interval her lupus has flared and she required a steroid boost last week. Pain is all the time and makes her uncomfortable to walk at times. She continues to have occasional mouth sores. No new symptoms noted.      Diagnosis: Triple negative Stage 1 ([id number]) right breast carcinoma  Oncology History:  - 1/18/16 screening mammogram:  There is a focal asymmetry seen in the posterior upper-inner region of the right breast.  - 2/1/16 diagnostic mammogram and ultrasound: irregular solid mass with poorly defined margins  measuring 5 millimeters  - 2/19/16 core biopsy  FINAL PATHOLOGIC DIAGNOSIS  RIGHT BREAST, 1:00, BIOPSY:  Invasive ductal carcinoma, high-grade (duct formation - 3; nuclear pleomorphism - 3, mitosis - 2)  ER - Negative (0)  MS - Negative (0)  HER2 - Negative (0)  - 4/27/16 mastectomy and SLN biopsy  FINAL PATHOLOGIC DIAGNOSIS  RIGHT MASTECTOMY SPECIMEN SHOWING A 7 MM AREA OF INVASIVE DUCT CARCINOMA, GRADE 3  WITH NEGATIVE MARGINS.    TUMOR SIZE: 0.7 CM  HISTOLOGIC TYPE: INVASIVE DUCT CARCINOMA  HISTOLOGIC GRADE: 3, 3, 2; GRADE 3  DCIS: NOT IDENTIFIED  MARGINS: ALL MARGINS OF RESECTION ARE NEGATIVE FOR TUMOR. CLOSEST MARGIN IS THE  POSTERIOR MARGIN AT 2.5 CM  IMMUNE RECEPTOR STUDIES: MS 16-5/1/04: ER - Negative (0)  MS - Negative (0)  HER2 - Negative (0)  LYMPH NODES: Total number 4- negative  - Genetic testing performed  - With tumor > 0.6 cm she just made guideline cut off to consider adjuvant chemotherapy with TC   However, with her active wound healing issues at that time and other comorbidities adjuvant chemotherapy was not be pursued   - She completed all of her reconstruction with Dr. Mccloud   Does have multiple comorbidities including history of lupus and fibromyalgia- she is managed by rheumatology and reports  recent flare   - Reports history of easy bruising and states she was borderline on testing for von [employer]br[employer] in [facility]    Review of Systems   Constitutional: Negative for activity change, appetite change, chills, fatigue (chronic), fever and unexpected weight change.        Reports sweats a lot- taken off estrogen   HENT: Negative for congestion, dental problem, ear pain, hearing loss, mouth sores, nosebleeds, postnasal drip, rhinorrhea, sinus pressure, sneezing, sore throat, tinnitus and trouble swallowing.    Eyes: Negative for redness and visual disturbance (chronic- sees optho next week).   Respiratory: Positive for shortness of breath (chronic, relates to her hiatal hernia). Negative for cough, chest tightness and wheezing.    Cardiovascular: Negative for chest pain, palpitations and leg swelling.   Gastrointestinal: Positive for nausea. Negative for abdominal distention, abdominal pain, blood in stool, constipation, diarrhea and vomiting.        + dysphagia- hx ring  + GERD, + hiatal hernia   Genitourinary: Negative for decreased urine volume, difficulty urinating, dysuria, frequency, hematuria and urgency.   Musculoskeletal: Positive for arthralgias (see above) and back pain. Negative for gait problem, joint swelling, myalgias, neck pain and neck stiffness.   Skin: Negative for color change, pallor, rash and wound.        Healing well this time   Neurological: Positive for light-headedness (still reports chronic issue) and headaches (migraine hx, chronic). Negative for dizziness, weakness and numbness.   Hematological: Negative for adenopathy. Does not bruise/bleed easily.   Psychiatric/Behavioral: Negative for decreased concentration, dysphoric mood and sleep disturbance. The patient is not nervous/anxious.        Objective:      Physical Exam   Constitutional: She is oriented to person, place, and time. She appears well-developed and well-nourished. No distress.   Presents alone   HENT:   Head:  Normocephalic and atraumatic.   Right Ear: External ear normal.   Left Ear: External ear normal.   Nose: Nose normal.   Mouth/Throat: Oropharynx is clear and moist. No oropharyngeal exudate.   Eyes: Conjunctivae and EOM are normal. Pupils are equal, round, and reactive to light. Right eye exhibits no discharge. Left eye exhibits no discharge. No scleral icterus. Right pupil is round and reactive. Left pupil is round and reactive.   Neck: Normal range of motion. Neck supple. No tracheal deviation present. No thyromegaly present.   Cardiovascular: Normal rate, regular rhythm and intact distal pulses.  Exam reveals no gallop and no friction rub.    Murmur (soft systolic) heard.  Pulmonary/Chest: Effort normal and breath sounds normal. No respiratory distress. She has no wheezes. She has no rales. She exhibits no tenderness.   Breasts- right breast no concerning masses (area of fat necrosis) or LAD. Area of hyperpigmentation to upper outer quad that has been there for >1 year according to patient.   Left breast no masses or LAD   Abdominal: Soft. Bowel sounds are normal. She exhibits no distension and no mass. There is no hepatosplenomegaly. There is no tenderness. There is no rebound and no guarding.   No organomegaly   Musculoskeletal: Normal range of motion. She exhibits no edema or tenderness.   Lymphadenopathy:        Head (right side): No submandibular adenopathy present.        Head (left side): No submandibular adenopathy present.     She has no cervical adenopathy.        Right cervical: No superficial cervical, no deep cervical and no posterior cervical adenopathy present.       Left cervical: No superficial cervical, no deep cervical and no posterior cervical adenopathy present.        Right: No inguinal and no supraclavicular adenopathy present.        Left: No inguinal and no supraclavicular adenopathy present.   Neurological: She is alert and oriented to person, place, and time. She has normal strength. No  cranial nerve deficit or sensory deficit. She exhibits normal muscle tone. Coordination normal.   Skin: Skin is warm and dry. No bruising, no lesion, no petechiae and no rash noted. She is not diaphoretic. No erythema. No pallor.   Breast and abdominal wound sites healing well   Psychiatric: She has a normal mood and affect. Her behavior is normal. Judgment and thought content normal. Her mood appears not anxious. She does not exhibit a depressed mood.   Nursing note and vitals reviewed.      WBC   Date Value Ref Range Status   07/30/2018 6.32 3.90 - 12.70 K/uL Final     Hemoglobin   Date Value Ref Range Status   07/30/2018 13.0 12.0 - 16.0 g/dL Final     Hematocrit   Date Value Ref Range Status   07/30/2018 43.0 37.0 - 48.5 % Final     Platelets   Date Value Ref Range Status   07/30/2018 182 150 - 350 K/uL Final     Gran # (ANC)   Date Value Ref Range Status   07/30/2018 3.4 1.8 - 7.7 K/uL Final     Comment:     The ANC is based on a white cell differential from an   automated cell counter. It has not been microscopically   reviewed for the presence of abnormal cells. Clinical   correlation is required.         Chemistry        Component Value Date/Time     07/30/2018 0923    K 3.5 07/30/2018 0923     07/30/2018 0923    CO2 26 07/30/2018 0923    BUN 7 07/30/2018 0923    CREATININE 0.7 07/30/2018 0923    GLU 95 07/30/2018 0923        Component Value Date/Time    CALCIUM 9.2 07/30/2018 0923    ALKPHOS 63 07/30/2018 0923    AST 23 07/30/2018 0923    ALT 18 07/30/2018 0923    BILITOT 0.6 07/30/2018 0923    ESTGFRAFRICA >60.0 07/30/2018 0923    EGFRNONAA >60.0 07/30/2018 0923            Assessment:       1. Malignant neoplasm of upper-inner quadrant of right breast in female, estrogen receptor negative    2. Pancreas cyst    3. Systemic lupus erythematosus, unspecified SLE type, unspecified organ involvement status        Plan:       Patient is doing well and is clinically stable. Labs reviewed. RTC in 3  months to see Dr. Wright with a CBC, CMP.   Next mammogram due 3/2019. She remains on surveillance.   2. Ultrasound scheduled for next week according to patient.  3. Continue follow up with rheumatology Dr. Jiang.           Patient is in agreement with the proposed treatment plan. All questions were answered to the patient's satisfaction. Pt knows to call clinic if anything is needed before the next clinic visit.      CE [facility]  Hematology & Oncology  [address] [place] [id number]  ph. [phone]  Fax. [phone]     30 minutes were spent in coordination of patient's care, record review and counseling. More than 50% of the time was face-to-face.              Distress Screening Results: Psychosocial Distress screening score of Distress Score: 0 noted and reviewed. No intervention indicated.

## 2018-07-30 ENCOUNTER — LAB VISIT (OUTPATIENT)
Dept: LAB | Facility: HOSPITAL | Age: 58
End: 2018-07-30
Attending: INTERNAL MEDICINE
Payer: MEDICARE

## 2018-07-30 ENCOUNTER — OFFICE VISIT (OUTPATIENT)
Dept: HEMATOLOGY/ONCOLOGY | Facility: CLINIC | Age: 58
End: 2018-07-30
Payer: MEDICARE

## 2018-07-30 VITALS
HEIGHT: 63 IN | SYSTOLIC BLOOD PRESSURE: 137 MMHG | DIASTOLIC BLOOD PRESSURE: 82 MMHG | RESPIRATION RATE: 16 BRPM | WEIGHT: 127.19 LBS | OXYGEN SATURATION: 100 % | HEART RATE: 85 BPM | TEMPERATURE: 98 F | BODY MASS INDEX: 22.54 KG/M2

## 2018-07-30 DIAGNOSIS — C50.211 MALIGNANT NEOPLASM OF UPPER-INNER QUADRANT OF RIGHT BREAST IN FEMALE, ESTROGEN RECEPTOR NEGATIVE: Primary | ICD-10-CM

## 2018-07-30 DIAGNOSIS — Z17.1 MALIGNANT NEOPLASM OF UPPER-INNER QUADRANT OF RIGHT BREAST IN FEMALE, ESTROGEN RECEPTOR NEGATIVE: Primary | ICD-10-CM

## 2018-07-30 DIAGNOSIS — K86.2 PANCREAS CYST: ICD-10-CM

## 2018-07-30 DIAGNOSIS — M32.9 SYSTEMIC LUPUS ERYTHEMATOSUS, UNSPECIFIED SLE TYPE, UNSPECIFIED ORGAN INVOLVEMENT STATUS: ICD-10-CM

## 2018-07-30 DIAGNOSIS — D50.9 IRON DEFICIENCY ANEMIA, UNSPECIFIED IRON DEFICIENCY ANEMIA TYPE: ICD-10-CM

## 2018-07-30 LAB
ALBUMIN SERPL BCP-MCNC: 3.6 G/DL
ALP SERPL-CCNC: 63 U/L
ALT SERPL W/O P-5'-P-CCNC: 18 U/L
ANION GAP SERPL CALC-SCNC: 11 MMOL/L
AST SERPL-CCNC: 23 U/L
BILIRUB SERPL-MCNC: 0.6 MG/DL
BUN SERPL-MCNC: 7 MG/DL
CALCIUM SERPL-MCNC: 9.2 MG/DL
CHLORIDE SERPL-SCNC: 105 MMOL/L
CO2 SERPL-SCNC: 26 MMOL/L
CREAT SERPL-MCNC: 0.7 MG/DL
ERYTHROCYTE [DISTWIDTH] IN BLOOD BY AUTOMATED COUNT: 14.1 %
EST. GFR  (AFRICAN AMERICAN): >60 ML/MIN/1.73 M^2
EST. GFR  (NON AFRICAN AMERICAN): >60 ML/MIN/1.73 M^2
GLUCOSE SERPL-MCNC: 95 MG/DL
HCT VFR BLD AUTO: 43 %
HGB BLD-MCNC: 13 G/DL
IMM GRANULOCYTES # BLD AUTO: 0.04 K/UL
MCH RBC QN AUTO: 27.3 PG
MCHC RBC AUTO-ENTMCNC: 30.2 G/DL
MCV RBC AUTO: 90 FL
NEUTROPHILS # BLD AUTO: 3.4 K/UL
PLATELET # BLD AUTO: 182 K/UL
PMV BLD AUTO: 10.8 FL
POTASSIUM SERPL-SCNC: 3.5 MMOL/L
PROT SERPL-MCNC: 6.9 G/DL
RBC # BLD AUTO: 4.77 M/UL
SODIUM SERPL-SCNC: 142 MMOL/L
WBC # BLD AUTO: 6.32 K/UL

## 2018-07-30 PROCEDURE — 80053 COMPREHEN METABOLIC PANEL: CPT

## 2018-07-30 PROCEDURE — 99999 PR PBB SHADOW E&M-EST. PATIENT-LVL V: CPT | Mod: PBBFAC,,, | Performed by: NURSE PRACTITIONER

## 2018-07-30 PROCEDURE — 99215 OFFICE O/P EST HI 40 MIN: CPT | Mod: PBBFAC | Performed by: NURSE PRACTITIONER

## 2018-07-30 PROCEDURE — 99213 OFFICE O/P EST LOW 20 MIN: CPT | Mod: S$PBB,,, | Performed by: NURSE PRACTITIONER

## 2018-07-30 PROCEDURE — 36415 COLL VENOUS BLD VENIPUNCTURE: CPT

## 2018-07-30 PROCEDURE — 85027 COMPLETE CBC AUTOMATED: CPT

## 2018-08-05 ENCOUNTER — ANESTHESIA EVENT (OUTPATIENT)
Dept: ENDOSCOPY | Facility: HOSPITAL | Age: 58
End: 2018-08-05
Payer: MEDICARE

## 2018-08-06 ENCOUNTER — ANESTHESIA (OUTPATIENT)
Dept: ENDOSCOPY | Facility: HOSPITAL | Age: 58
End: 2018-08-06
Payer: MEDICARE

## 2018-08-06 ENCOUNTER — HOSPITAL ENCOUNTER (OUTPATIENT)
Facility: HOSPITAL | Age: 58
Discharge: HOME | End: 2018-08-06
Attending: INTERNAL MEDICINE | Admitting: INTERNAL MEDICINE
Payer: MEDICARE

## 2018-08-06 VITALS
TEMPERATURE: 98 F | WEIGHT: 123 LBS | DIASTOLIC BLOOD PRESSURE: 60 MMHG | OXYGEN SATURATION: 98 % | HEART RATE: 78 BPM | SYSTOLIC BLOOD PRESSURE: 136 MMHG | RESPIRATION RATE: 14 BRPM | BODY MASS INDEX: 21 KG/M2 | HEIGHT: 64 IN

## 2018-08-06 DIAGNOSIS — K86.2 PANCREAS CYST: Primary | ICD-10-CM

## 2018-08-06 PROCEDURE — 43242 EGD US FINE NEEDLE BX/ASPIR: CPT | Mod: ,,, | Performed by: INTERNAL MEDICINE

## 2018-08-06 PROCEDURE — 88305 TISSUE EXAM BY PATHOLOGIST: CPT | Performed by: PATHOLOGY

## 2018-08-06 PROCEDURE — 25000003 PHARM REV CODE 250: Performed by: INTERNAL MEDICINE

## 2018-08-06 PROCEDURE — 37000009 HC ANESTHESIA EA ADD 15 MINS: Performed by: INTERNAL MEDICINE

## 2018-08-06 PROCEDURE — 88341 IMHCHEM/IMCYTCHM EA ADD ANTB: CPT | Mod: 26,,, | Performed by: PATHOLOGY

## 2018-08-06 PROCEDURE — 63600175 PHARM REV CODE 636 W HCPCS: Performed by: NURSE ANESTHETIST, CERTIFIED REGISTERED

## 2018-08-06 PROCEDURE — D9220A PRA ANESTHESIA: Mod: ANES,,, | Performed by: ANESTHESIOLOGY

## 2018-08-06 PROCEDURE — 88342 IMHCHEM/IMCYTCHM 1ST ANTB: CPT | Mod: 26,,, | Performed by: PATHOLOGY

## 2018-08-06 PROCEDURE — D9220A PRA ANESTHESIA: Mod: CRNA,,, | Performed by: NURSE ANESTHETIST, CERTIFIED REGISTERED

## 2018-08-06 PROCEDURE — 37000008 HC ANESTHESIA 1ST 15 MINUTES: Performed by: INTERNAL MEDICINE

## 2018-08-06 PROCEDURE — 88341 IMHCHEM/IMCYTCHM EA ADD ANTB: CPT | Performed by: PATHOLOGY

## 2018-08-06 PROCEDURE — 88305 TISSUE EXAM BY PATHOLOGIST: CPT | Mod: 26,,, | Performed by: PATHOLOGY

## 2018-08-06 PROCEDURE — 43242 EGD US FINE NEEDLE BX/ASPIR: CPT | Performed by: INTERNAL MEDICINE

## 2018-08-06 RX ORDER — HYDROMORPHONE HYDROCHLORIDE 1 MG/ML
0.2 INJECTION, SOLUTION INTRAMUSCULAR; INTRAVENOUS; SUBCUTANEOUS EVERY 5 MIN PRN
Status: DISCONTINUED | OUTPATIENT
Start: 2018-08-06 | End: 2018-08-06 | Stop reason: HOSPADM

## 2018-08-06 RX ORDER — PROPOFOL 10 MG/ML
VIAL (ML) INTRAVENOUS
Status: DISCONTINUED | OUTPATIENT
Start: 2018-08-06 | End: 2018-08-06

## 2018-08-06 RX ORDER — PROPOFOL 10 MG/ML
VIAL (ML) INTRAVENOUS CONTINUOUS PRN
Status: DISCONTINUED | OUTPATIENT
Start: 2018-08-06 | End: 2018-08-06

## 2018-08-06 RX ORDER — SODIUM CHLORIDE 0.9 % (FLUSH) 0.9 %
3 SYRINGE (ML) INJECTION
Status: DISCONTINUED | OUTPATIENT
Start: 2018-08-06 | End: 2018-08-06 | Stop reason: HOSPADM

## 2018-08-06 RX ORDER — SODIUM CHLORIDE 9 MG/ML
INJECTION, SOLUTION INTRAVENOUS CONTINUOUS
Status: DISCONTINUED | OUTPATIENT
Start: 2018-08-06 | End: 2018-08-06 | Stop reason: HOSPADM

## 2018-08-06 RX ORDER — LIDOCAINE HCL/PF 100 MG/5ML
SYRINGE (ML) INTRAVENOUS
Status: DISCONTINUED | OUTPATIENT
Start: 2018-08-06 | End: 2018-08-06

## 2018-08-06 RX ORDER — FENTANYL CITRATE 50 UG/ML
INJECTION, SOLUTION INTRAMUSCULAR; INTRAVENOUS
Status: DISCONTINUED | OUTPATIENT
Start: 2018-08-06 | End: 2018-08-06

## 2018-08-06 RX ADMIN — FENTANYL CITRATE 25 MCG: 50 INJECTION, SOLUTION INTRAMUSCULAR; INTRAVENOUS at 08:08

## 2018-08-06 RX ADMIN — LIDOCAINE HYDROCHLORIDE 100 MG: 20 INJECTION, SOLUTION INTRAVENOUS at 08:08

## 2018-08-06 RX ADMIN — PROPOFOL 30 MG: 10 INJECTION, EMULSION INTRAVENOUS at 08:08

## 2018-08-06 RX ADMIN — SODIUM CHLORIDE: 0.9 INJECTION, SOLUTION INTRAVENOUS at 07:08

## 2018-08-06 RX ADMIN — PROPOFOL 150 MCG/KG/MIN: 10 INJECTION, EMULSION INTRAVENOUS at 08:08

## 2018-08-06 NOTE — DISCHARGE INSTRUCTIONS
Endoscopic Ultrasound (EUS)    An endoscopic ultrasound (EUS) is a test to look at the inside of your gastrointestinal (GI) tract. It's commonly used to look for cancers or growths in the esophagus, stomach, pancreas, liver, and rectum. It can help to stage cancer (see how advanced a cancer is). EUS may also be used to help diagnose certain diseases or to drain cysts or abscesses.  What is EUS?  EUS shows both ultrasound images and live video of the GI tract. During the test, a flexible tube called an endoscope (scope) is used. At the end of the scope is a tiny video camera and light. The video camera sends live images to a monitor. The scope also contains a very small ultrasound device. This uses sound waves to create images and send them to a monitor.  A needle is passed through the scope. The needle can be used take a small sample of tissue for testing. This is called a biopsy. The needle can be used to take a sample of fluid. This is called fine-needle aspiration (FNA).  Risks and possible complications of EUS  Risks and possible complications include the following:  · Bleeding  · Infection  · A perforation (hole) in the digestive tract   · Risks of sedation or anesthesia   Before the test  Be prepared prior to the test:  · Tell your healthcare provider what medicine you take. This includes vitamins, herbs, and over-the-counter medicine. It also includes any blood thinners, such as warfarin, clopidogrel, ibuprofen, or daily aspirin. Ask your healthcare provider if you need to stop taking some or all of them before the test.  · You may be prescribed antibiotics to take before or after the test. This depends on the area being studied and what is done during the test. These medicines help prevent infection.  · Carefully follow the instructions for preparing for the test to make sure results are accurate. Instructions may include:  ¨ If youre having an EUS of the upper GI tract (esophagus, stomach, duodenum,  pancreas, liver):  § Do not eat or drink for 6 hours before the test.  ¨ If youre having an EUS of the lower GI tract (rectum):  § Before the test, do bowel prep as instructed to clean your rectum of stool. This may involve a clear liquid diet and using a laxative (liquid or pills) the night before the test. Or it may mean doing one or more enemas the morning of the test.  § Do not eat or drink for 6 hours before the test.  · Be sure to arrive on time at the facility. Bring your identification and health insurance card. Leave valuables at home. If you have them, bring X-rays or other test results with you.  Let the healthcare provider know  For your safety, tell the healthcare provider if you:  · Take insulin. Your dose may need to be changed on the day of your test.  · Are allergic to latex.  · Have any other allergies.  · Are taking blood thinners.   During the test  An endoscopic ultrasound usually takes place in a hospital. The procedure itself may take 1 to 2 hours. You will likely go home soon afterward. During the test:  · You lie on your left side on an exam table.  · An intravenous (IV) line will be put into a vein in your arm or hand. This line supplies fluids and medicines. To keep you comfortable during the test, you will be given a sedative medicine. This medicine prevents discomfort and will make you sleepy.  · If you are having an EUS of the upper GI tract, local anesthetic may be sprayed in your throat. This will help you be more comfortable as the healthcare provider inserts the scope. The healthcare provider then gently puts the flexible scope into your mouth or nose and down your throat.  · If youre having an EUS of the lower GI tract, the healthcare provider gently puts the flexible scope into your anus.  · During the test, the scope sends live video and ultrasound images from inside your body to nearby monitors. These are used to examine your GI tract. Specialized procedures, such as drainage,  are done as needed.  · The healthcare provider may discuss the results with you soon after the test. Biopsy results take several  days.  · In most cases, you can go home within a few hours of the test. When you leave the facility, have an adult family member or friend drive you, even if you don't feel that sleepy.  After the test  Here is what to expect after the test:  · You may feel tired from the sedative. This should wear off by the end of the day.  · If you had an upper digestive endoscopy, your throat may feel sore for a day or two. Over-the-counter sore throat lozenges and spray should help.  · You can eat and drink normally as soon as the test is done.  When to call the healthcare provider  Call your healthcare provider if you notice any of the following:  · Fever of 100.4°F (38.0°C) or higher, or as advised by your healthcare provider  · Shortness of breath  · Vomiting blood, blood in stool, or black stools  · Coughing or hoarse voice that wont go away   Date Last Reviewed: 7/1/2016  © 3386-2263 [employer]. [address] [place] [id number]. All rights reserved. This information is not intended as a substitute for professional medical care. Always follow your healthcare professional's instructions.

## 2018-08-06 NOTE — ANESTHESIA PREPROCEDURE EVALUATION
08/06/2018  Efe Horowitz is a 58 y.o., female     Patient Active Problem List   Diagnosis    Attention to other artificial opening of digestive tract    Iron deficiency anemia    Proteins serum plasma low    Vitamin D deficiency disease    Vitamin B12 deficiency    Hyperlipidemia    UC (ulcerative colitis)    Weakness generalized    Chronic use of steroids    Lumbar and sacral spondylarthritis    Migraine syndrome    Fatigue    Encounter for long-term (current) use of other medications    Hypoadrenalism    Ulcerative colitis    S/P total colectomy    GERD (gastroesophageal reflux disease)    Vitamin D imbalance    Hypokalemia    Osteoporosis, idiopathic    Myalgia and myositis    Trochanteric bursitis    Shoulder pain    Encounter for monitoring proton pump inhibitor therapy    Dysphagia    Acute bronchitis    Wheezing on auscultation    Nausea    Senile osteoporosis    Long term current use of systemic steroids    Chronic LBP    Fibromyalgia    Lupus (systemic lupus erythematosus)    Microhematuria    Bilateral low back pain without sciatica    Proteinuria    Immunosuppression    Malignant neoplasm of upper-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    Preop testing    Loose stools    Malignant neoplasm of right breast    Wound healing, delayed    Exocrine pancreatic insufficiency    Iron deficiency    Family history of pancreatic cancer    Osteopenia    Disorder of bone and cartilage    Abnormal gait    Headache above the eye region    IBD (inflammatory bowel disease)    Pancreas cyst    Pain in both hands    Pain in both feet    Cough         Anesthesia Evaluation    I have reviewed the Patient Summary Reports.    I have reviewed the Nursing Notes.   I have reviewed the Medications.     Review of Systems  Anesthesia Hx:  No problems with previous  Anesthesia    Cardiovascular:  Cardiovascular Normal     Pulmonary:   Asthma    Renal/:  Renal/ Normal     Hepatic/GI:   PUD, GERD    Neurological:   Neuromuscular Disease, Headaches    Endocrine:   Adrenal insufficiency, on 5mg prednisone daily       Physical Exam  General:  Well nourished    Airway/Jaw/Neck:  Airway Findings: Mouth Opening: Normal Tongue: Normal  General Airway Assessment: Adult  Mallampati: II  TM Distance: Normal, at least 6 cm       Chest/Lungs:  Chest/Lungs Findings: Clear to auscultation, Normal Respiratory Rate     Heart/Vascular:  Heart Findings: Rate: Normal  Rhythm: Regular Rhythm             Anesthesia Plan  Type of Anesthesia, risks & benefits discussed:  Anesthesia Type:  general, MAC  Patient's Preference:   Intra-op Monitoring Plan: standard ASA monitors  Intra-op Monitoring Plan Comments:   Post Op Pain Control Plan: multimodal analgesia and IV/PO Opioids PRN  Post Op Pain Control Plan Comments:   Induction:   IV  Beta Blocker:  Patient is not currently on a Beta-Blocker (No further documentation required).       Informed Consent: Patient understands risks and agrees with Anesthesia plan.  Questions answered. Anesthesia consent signed with patient.  ASA Score: 3     Day of Surgery Review of History & Physical:            Ready For Surgery From Anesthesia Perspective.

## 2018-08-06 NOTE — [employer] PATIENT INSTRUCTIONS
Discharge Summary/Instructions after an Endoscopic Procedure  Patient Name: Efe Horowitz  Patient MRN: [id number]  Patient YOB: 1960 Monday, August 06, 2018  Hong Finn MD  RESTRICTIONS:  During your procedure today, you received medications for sedation.  These   medications may affect your judgment, balance and coordination.  Therefore,   for 24 hours, you have the following restrictions:   - DO NOT drive a car, operate machinery, make legal/financial decisions,   sign important papers or drink alcohol.    ACTIVITY:  Today: no heavy lifting, straining or running due to procedural   sedation/anesthesia.  The following day: return to full activity including work.  DIET:  Eat and drink normally unless instructed otherwise.     TREATMENT FOR COMMON SIDE EFFECTS:  - Mild abdominal pain, nausea, belching, bloating or excessive gas:  rest,   eat lightly and use a heating pad.  - Sore Throat: treat with throat lozenges and/or gargle with warm salt   water.  - Because air was used during the procedure, expelling large amounts of air   from your rectum or belching is normal.  - If a bowel prep was taken, you may not have a bowel movement for 1-3 days.    This is normal.  SYMPTOMS TO WATCH FOR AND REPORT TO YOUR PHYSICIAN:  1. Abdominal pain or bloating, other than gas cramps.  2. Chest pain.  3. Back pain.  4. Signs of infection such as: chills or fever occurring within 24 hours   after the procedure.  5. Rectal bleeding, which would show as bright red, maroon, or black stools.   (A tablespoon of blood from the rectum is not serious, especially if   hemorrhoids are present.)  6. Vomiting.  7. Weakness or dizziness.  GO DIRECTLY TO THE NEAREST EMERGENCY ROOM IF YOU HAVE ANY OF THE FOLLOWING:      Difficulty breathing              Chills and/or fever over 101 F   Persistent vomiting and/or vomiting blood   Severe abdominal pain   Severe chest pain   Black, tarry stools   Bleeding- more than one  tablespoon   Any other symptom or condition that you feel may need urgent attention  Your doctor recommends these additional instructions:  If any biopsies were taken, your doctors clinic will contact you in 1 to 2   weeks with any results.  - Discharge patient to home.   - Resume previous diet.   - Continue present medications.   - Return to referring physician.   - Await cytology results.   - MRCP in 1 year for surveillance.  For questions, problems or results please call your physician - Hong Finn MD at Work:  [phone].  [facility], EMERGENCY ROOM PHONE NUMBER: [phone]  IF A COMPLICATION OR EMERGENCY SITUATION ARISES AND YOU ARE UNABLE TO REACH   YOUR PHYSICIAN - GO DIRECTLY TO THE EMERGENCY ROOM.  Hong Finn MD  8/6/2018 9:18:03 AM  This report has been verified and signed electronically.  [employer]

## 2018-08-06 NOTE — PLAN OF CARE
Plan of care discussed with patient, her brother, and her father. Understanding verbalized. Pt states can discuss results with her brother and her father

## 2018-08-06 NOTE — H&P
History & Physical - Short Stay  Gastroenterology      SUBJECTIVE:     Procedure: EUS    Chief Complaint/Indication for Procedure: pancreas cyst    History of Present Illness:  Patient is a 58 y.o. female  With pancreas cyst, hx of breast ca and fhx of pan ca coming for surveillance.     PTA Medications   Medication Sig    amLODIPine (NORVASC) 2.5 MG tablet Take 1 tablet (2.5 mg total) by mouth once daily.    atorvastatin (LIPITOR) 10 MG tablet Take 10 mg by mouth once daily.    cyanocobalamin 1,000 mcg/mL injection Inject 1 mL (1,000 mcg total) into the muscle once a week. 1 Solution Injection monthly.  B12 injection weekly for  3 weeks then every  2 weeks (Patient taking differently: Inject 1,000 mcg into the muscle once a week. Every Saturday)    ergocalciferol (VITAMIN D2) 50,000 unit Cap Take 2 capsules (100,000 Units total) by mouth every 7 days. (Patient taking differently: Take 50,000 Units by mouth every 7 days. Every Wednesday)    ferrous sulfate 325 mg (65 mg iron) Tab Take by mouth. 1 Tablet Oral Every day.  Take one 250mg vitamin C pill every 12 hours which will help your bodyabsorb more of the iron.    gabapentin (NEURONTIN) 800 MG tablet Take 1 tablet (800 mg total) by mouth 3 (three) times daily. (Patient taking differently: Take 800 mg by mouth 4 (four) times daily. )    hydroxychloroquine (PLAQUENIL) 200 mg tablet TAKE 1 AND 1/2 TABLETS BY MOUTH EVERY DAY    lidocaine (LIDODERM) 5 % Place 1 patch onto the skin once daily. Remove & Discard patch within 12 hours or as directed by MD    lipase-protease-amylase 36,000-114,000- 180,000 unit CpDR Take 2 capsules by mouth 3 (three) times daily with meals. Take one with each snack    loperamide (IMODIUM) 2 mg capsule TAKE 1 TO 2 CAPSULES BY MOUTH FOUR TIMES DAILY    lorazepam (ATIVAN) 1 MG tablet Take 1 tablet (1 mg total) by mouth every evening. 1 Tablet Oral Q8H-12 hours    niacin 100 MG Tab Take by mouth. 1 Tablet Oral At bedtime     "ondansetron (ZOFRAN) 4 MG tablet Take 1 tablet (4 mg total) by mouth every 12 (twelve) hours as needed for Nausea.    oxycodone-acetaminophen (PERCOCET) 5-325 mg per tablet Take 1 tablet by mouth every 4 (four) hours as needed for Pain.    polycarbophil (FIBERCON) 625 mg tablet Take by mouth. 1 Tablet Oral Every 12 hours.  Take with 12 ounces of water with each pill.    predniSONE (DELTASONE) 5 MG tablet Take 1 tablet (5 mg total) by mouth once daily.    predniSONE (DELTASONE) 5 MG tablet TAKE AS DIRECTED INCREASE to 10mg FOR 3 days then resume 5mg dose    rizatriptan (MAXALT) 10 MG tablet Take 1 tablet (10 mg total) by mouth every 6 (six) hours as needed. (Patient taking differently: Take 10 mg by mouth every 6 (six) hours as needed for Migraine. )    SAVELLA 100 mg Tab TAKE ONE TABLET BY MOUTH TWICE DAILY    zolpidem (AMBIEN) 5 MG Tab Take 1 tablet (5 mg total) by mouth nightly as needed.    albuterol (ACCUNEB) 0.63 mg/3 mL Nebu Take 3 mLs (0.63 mg total) by nebulization every 6 (six) hours as needed.       Review of patient's allergies indicates:   Allergen Reactions    Aspirin      Other reaction(s): "hemorrhage"  hemorrhage    Hydrocortisone Nausea Only     Other reaction(s): sick  sick    Lactose intolerance (lactase) [lactase] Nausea And Vomiting    Vicodin [hydrocodone-acetaminophen]      Other reaction(s): hyperactivity    Asacol [mesalamine] Hallucinations     Other reaction(s): Hallucinations  hallucinations        Past Medical History:   Diagnosis Date    Acid reflux     Adrenal insufficiency, primary     Arthritis     Asthma     B12 deficiency anemia     Blood transfusion 2010    Breast cancer 2/2016    Right breast infiltrating ductal CA    Cataract     Chronic pain     Deep vein thrombosis     Dry eyes     Esophagitis, unspecified     Fibromyalgia     General anesthetics causing adverse effect in therapeutic use     "slow to wake up bc I don't have an immune system    Heart " murmur     History of colon polyps     Hyperlipidemia     Hypopituitary dwarfism     Iron deficiency anemia     Lupus     Migraines, neuralgic     Nonspecific ulcerative colitis     OP (osteoporosis)     history of reclast    Osteopenia     Schatzki's ring     Steroid sulfatase deficiency     Ulcerative colitis     Vitamin D deficiency disease      Past Surgical History:   Procedure Laterality Date    APPENDECTOMY      BREAST BIOPSY Right 2/2016    [facility]    BREAST SURGERY      CHOLECYSTECTOMY      COLON SURGERY      HYSTERECTOMY      OOPHORECTOMY Bilateral     restorative proctectomy      total abdominal colectomy with ileostomy  2010    TOTAL REDUCTION MAMMOPLASTY Left 2017     Family History   Problem Relation Age of Onset    Diabetes Father     Hyperlipidemia Father     Hypertension Father     Hyperlipidemia Mother     Cancer Maternal Aunt         pancreatic cancer, late 50s at dx    Pancreatic cancer Maternal Aunt     Breast cancer Maternal Aunt 55    Breast cancer Cousin 28    Breast cancer Other 45    No Known Problems Sister     No Known Problems Brother     Cancer Maternal Uncle 65        pancreatic cancer    Pancreatic cancer Maternal Uncle     No Known Problems Paternal Aunt     No Known Problems Paternal Uncle     No Known Problems Maternal Grandmother     No Known Problems Maternal Grandfather     No Known Problems Paternal Grandmother     No Known Problems Paternal Grandfather     Breast cancer Maternal Cousin 50    Cancer Other 25        liver cancer    Amblyopia Neg Hx     Blindness Neg Hx     Cataracts Neg Hx     Macular degeneration Neg Hx     Retinal detachment Neg Hx     Strabismus Neg Hx     Stroke Neg Hx     Thyroid disease Neg Hx     Glaucoma Neg Hx     Ovarian cancer Neg Hx     Celiac disease Neg Hx     Colon cancer Neg Hx     Colon polyps Neg Hx     Esophageal cancer Neg Hx     Liver cancer Neg Hx     Rectal cancer Neg Hx     Stomach  cancer Neg Hx     Ulcerative colitis Neg Hx     Inflammatory bowel disease Neg Hx      Social History   Substance Use Topics    Smoking status: Never Smoker    Smokeless tobacco: Never Used    Alcohol use No            OBJECTIVE:     Vital Signs (Most Recent)  Temp: 97.9 °F (36.6 °C) (08/06/18 0728)  Pulse: 79 (08/06/18 0728)  Resp: 18 (08/06/18 0728)  BP: (!) 152/87 (08/06/18 0730)  SpO2: 99 % (08/06/18 0728)       ASSESSMENT/PLAN:     Patient is a 58 y.o. female  With pancreas cyst, hx of breast ca and fhx of pan ca coming for surveillance.     Plan: EUS    Anesthesia Plan: Moderate Sedation    ASA Grade: ASA 2 - Patient with mild systemic disease with no functional limitations

## 2018-08-06 NOTE — PLAN OF CARE
Plan of care reviewed with pt & family, all verbalized understanding, pt progressing with plan of care, denies nausea, back pain is at baseline, tolerating PO, reviewed all DC instructions, home meds, when to call MD, when to follow-up, answered questions.

## 2018-08-06 NOTE — TRANSFER OF CARE
"Anesthesia Transfer of Care Note    Patient: Efe Horowitz    Procedure(s) Performed: Procedure(s) (LRB):  ULTRASOUND, ENDOSCOPIC, UPPER GI TRACT (N/A)    Patient location: PACU    Anesthesia Type: general    Transport from OR: Transported from OR on room air with adequate spontaneous ventilation    Post pain: adequate analgesia    Post assessment: no apparent anesthetic complications    Post vital signs: stable    Level of consciousness: awake and alert    Nausea/Vomiting: no nausea/vomiting    Complications: none    Transfer of care protocol was followed      Last vitals:   Visit Vitals  /74 (BP Location: Left arm, Patient Position: Lying)   Pulse 83   Temp 36.7 °C (98.1 °F) (Temporal)   Resp 18   Ht 5' 4" (1.626 m)   Wt 55.8 kg (123 lb)   SpO2 100%   Breastfeeding? No   BMI 21.11 kg/m²     "

## 2018-08-06 NOTE — ANESTHESIA POSTPROCEDURE EVALUATION
"Anesthesia Post Evaluation    Patient: Efe Horowitz    Procedure(s) Performed: Procedure(s) (LRB):  ULTRASOUND, ENDOSCOPIC, UPPER GI TRACT (N/A)    Final Anesthesia Type: general  Patient location during evaluation: PACU  Patient participation: Yes- Able to Participate  Level of consciousness: awake and alert  Post-procedure vital signs: reviewed and stable  Pain management: adequate  Airway patency: patent  PONV status at discharge: No PONV  Anesthetic complications: no      Cardiovascular status: blood pressure returned to baseline  Respiratory status: unassisted  Hydration status: euvolemic  Follow-up not needed.        Visit Vitals  /60   Pulse 78   Temp 36.7 °C (98.1 °F) (Temporal)   Resp 14   Ht 5' 4" (1.626 m)   Wt 55.8 kg (123 lb)   SpO2 98%   Breastfeeding? No   BMI 21.11 kg/m²       Pain/Natalie Score: Pain Assessment Performed: Yes (8/6/2018  9:38 AM)  Presence of Pain: complains of pain/discomfort (8/6/2018  9:38 AM)  Pain Rating Prior to Med Admin: 7 (8/6/2018  9:38 AM)  Natalie Score: 10 (8/6/2018  9:38 AM)      "

## 2018-08-09 ENCOUNTER — TELEPHONE (OUTPATIENT)
Dept: GASTROENTEROLOGY | Facility: CLINIC | Age: 58
End: 2018-08-09

## 2018-08-09 NOTE — TELEPHONE ENCOUNTER
Patient called and stated that she was having nosebleeds and passing some blood from her nose through her mouth. She was feeling weak. Discussed with Dr maddy Finn. Informed patient it is not likely from the procedure she has on Monday, but due to the bleeding and weakness, it was recommended that she go to Ed. Ms Horowitz verbalized understating.

## 2018-08-11 ENCOUNTER — PATIENT MESSAGE (OUTPATIENT)
Dept: ENDOSCOPY | Facility: HOSPITAL | Age: 58
End: 2018-08-11

## 2018-08-12 NOTE — TELEPHONE ENCOUNTER
Message   Received: Yesterday   Message Contents   MD So Ojeda MA   Caller: Unspecified (2 days ago,  6:02 PM)             ER

## 2018-08-21 ENCOUNTER — TELEPHONE (OUTPATIENT)
Dept: GASTROENTEROLOGY | Facility: CLINIC | Age: 58
End: 2018-08-21

## 2018-08-21 NOTE — TELEPHONE ENCOUNTER
----- Message from Mireya Turpin sent at 8/21/2018 11:48 AM CDT -----  Contact: self [phone]  Pt called asking to speak to Jessica in regarding her Biopsy results    Contact: self [phone]

## 2018-08-23 ENCOUNTER — TELEPHONE (OUTPATIENT)
Dept: GASTROENTEROLOGY | Facility: CLINIC | Age: 58
End: 2018-08-23

## 2018-08-23 NOTE — TELEPHONE ENCOUNTER
----- Message from Jocelyn Chas sent at 8/23/2018 10:18 AM CDT -----  Contact: Self- [phone]  Jagdeep Finn- pt called to determine if her biopsy results were in yet- please contact pt at [phone]

## 2018-09-04 RX ORDER — HYDROXYCHLOROQUINE SULFATE 200 MG/1
TABLET, FILM COATED ORAL
Qty: 45 TABLET | Refills: 3 | Status: SHIPPED | OUTPATIENT
Start: 2018-09-04 | End: 2018-12-12 | Stop reason: SDUPTHER

## 2018-10-16 ENCOUNTER — LAB VISIT (OUTPATIENT)
Dept: LAB | Facility: HOSPITAL | Age: 58
End: 2018-10-16
Attending: INTERNAL MEDICINE
Payer: MEDICARE

## 2018-10-16 ENCOUNTER — OFFICE VISIT (OUTPATIENT)
Dept: GASTROENTEROLOGY | Facility: CLINIC | Age: 58
End: 2018-10-16
Payer: MEDICARE

## 2018-10-16 VITALS
HEIGHT: 60 IN | HEART RATE: 94 BPM | DIASTOLIC BLOOD PRESSURE: 81 MMHG | SYSTOLIC BLOOD PRESSURE: 134 MMHG | BODY MASS INDEX: 24.54 KG/M2 | WEIGHT: 125 LBS

## 2018-10-16 DIAGNOSIS — K52.9 IBD (INFLAMMATORY BOWEL DISEASE): ICD-10-CM

## 2018-10-16 DIAGNOSIS — R10.84 GENERALIZED ABDOMINAL PAIN: ICD-10-CM

## 2018-10-16 DIAGNOSIS — K86.2 PANCREAS CYST: ICD-10-CM

## 2018-10-16 DIAGNOSIS — Z79.899 ENCOUNTER FOR MONITORING LONG-TERM PROTON PUMP INHIBITOR THERAPY: ICD-10-CM

## 2018-10-16 DIAGNOSIS — E87.6 LOW BLOOD POTASSIUM: Primary | ICD-10-CM

## 2018-10-16 DIAGNOSIS — K52.9 IBD (INFLAMMATORY BOWEL DISEASE): Primary | ICD-10-CM

## 2018-10-16 DIAGNOSIS — Z51.81 ENCOUNTER FOR MONITORING LONG-TERM PROTON PUMP INHIBITOR THERAPY: ICD-10-CM

## 2018-10-16 DIAGNOSIS — K86.81 EXOCRINE PANCREATIC INSUFFICIENCY: ICD-10-CM

## 2018-10-16 LAB
25(OH)D3+25(OH)D2 SERPL-MCNC: 39 NG/ML
ALBUMIN SERPL BCP-MCNC: 4.1 G/DL
ALP SERPL-CCNC: 70 U/L
ALT SERPL W/O P-5'-P-CCNC: 17 U/L
ANION GAP SERPL CALC-SCNC: 10 MMOL/L
AST SERPL-CCNC: 30 U/L
BASOPHILS # BLD AUTO: 0.05 K/UL
BASOPHILS NFR BLD: 0.7 %
BILIRUB DIRECT SERPL-MCNC: 0.2 MG/DL
BILIRUB SERPL-MCNC: 0.5 MG/DL
BUN SERPL-MCNC: 12 MG/DL
CALCIUM SERPL-MCNC: 10.1 MG/DL
CHLORIDE SERPL-SCNC: 103 MMOL/L
CO2 SERPL-SCNC: 30 MMOL/L
CREAT SERPL-MCNC: 0.9 MG/DL
DIFFERENTIAL METHOD: ABNORMAL
EOSINOPHIL # BLD AUTO: 0.1 K/UL
EOSINOPHIL NFR BLD: 1.6 %
ERYTHROCYTE [DISTWIDTH] IN BLOOD BY AUTOMATED COUNT: 14.5 %
EST. GFR  (AFRICAN AMERICAN): >60 ML/MIN/1.73 M^2
EST. GFR  (NON AFRICAN AMERICAN): >60 ML/MIN/1.73 M^2
GLUCOSE SERPL-MCNC: 88 MG/DL
HCT VFR BLD AUTO: 42 %
HGB BLD-MCNC: 13.4 G/DL
IMM GRANULOCYTES # BLD AUTO: 0.03 K/UL
IMM GRANULOCYTES NFR BLD AUTO: 0.4 %
INR PPP: 0.9
LIPASE SERPL-CCNC: 31 U/L
LYMPHOCYTES # BLD AUTO: 0.9 K/UL
LYMPHOCYTES NFR BLD: 12.3 %
MAGNESIUM SERPL-MCNC: 2.2 MG/DL
MCH RBC QN AUTO: 27.3 PG
MCHC RBC AUTO-ENTMCNC: 31.9 G/DL
MCV RBC AUTO: 86 FL
MONOCYTES # BLD AUTO: 0.4 K/UL
MONOCYTES NFR BLD: 6.1 %
NEUTROPHILS # BLD AUTO: 5.5 K/UL
NEUTROPHILS NFR BLD: 78.9 %
NRBC BLD-RTO: 0 /100 WBC
PLATELET # BLD AUTO: 344 K/UL
PMV BLD AUTO: 10.2 FL
POTASSIUM SERPL-SCNC: 3.1 MMOL/L
PROT SERPL-MCNC: 7.7 G/DL
PROTHROMBIN TIME: 9.9 SEC
RBC # BLD AUTO: 4.9 M/UL
SODIUM SERPL-SCNC: 143 MMOL/L
VIT B12 SERPL-MCNC: 929 PG/ML
WBC # BLD AUTO: 7.02 K/UL

## 2018-10-16 PROCEDURE — 84590 ASSAY OF VITAMIN A: CPT

## 2018-10-16 PROCEDURE — 99214 OFFICE O/P EST MOD 30 MIN: CPT | Mod: S$PBB,,, | Performed by: INTERNAL MEDICINE

## 2018-10-16 PROCEDURE — 99214 OFFICE O/P EST MOD 30 MIN: CPT | Mod: PBBFAC | Performed by: INTERNAL MEDICINE

## 2018-10-16 PROCEDURE — 80048 BASIC METABOLIC PNL TOTAL CA: CPT

## 2018-10-16 PROCEDURE — 80076 HEPATIC FUNCTION PANEL: CPT

## 2018-10-16 PROCEDURE — 83735 ASSAY OF MAGNESIUM: CPT

## 2018-10-16 PROCEDURE — 82306 VITAMIN D 25 HYDROXY: CPT

## 2018-10-16 PROCEDURE — 83516 IMMUNOASSAY NONANTIBODY: CPT | Mod: 59

## 2018-10-16 PROCEDURE — 85610 PROTHROMBIN TIME: CPT

## 2018-10-16 PROCEDURE — 84446 ASSAY OF VITAMIN E: CPT

## 2018-10-16 PROCEDURE — 85025 COMPLETE CBC W/AUTO DIFF WBC: CPT

## 2018-10-16 PROCEDURE — 99999 PR PBB SHADOW E&M-EST. PATIENT-LVL IV: CPT | Mod: PBBFAC,,, | Performed by: INTERNAL MEDICINE

## 2018-10-16 PROCEDURE — 82607 VITAMIN B-12: CPT

## 2018-10-16 PROCEDURE — 83690 ASSAY OF LIPASE: CPT

## 2018-10-16 RX ORDER — POTASSIUM CHLORIDE 750 MG/1
20 TABLET, EXTENDED RELEASE ORAL DAILY
Qty: 180 TABLET | Refills: 3 | Status: SHIPPED | OUTPATIENT
Start: 2018-10-16 | End: 2019-05-21 | Stop reason: DRUGHIGH

## 2018-10-16 NOTE — PROGRESS NOTES
CHIEF COMPLAINT:  Abdominal pain.    HISTORY OF PRESENT ILLNESS:  This is a 58-year-old -American female with   a past medical history of GERD, ulcerative colitis, status post colon resection   for inflammatory bowel disease and also underwent a IGNACIO free flap procedure for   breast cancer.  She has fibromyalgia, osteoporosis, history of right breast   cancer, had a full complete therapeutic mastectomy with nipple sparing technique   with right axillary sentinel node biopsy x2 and injection of the right breast   with dye and sentinel dissection by Dr. Muñiz, followed by Heme/Onc.  No   chemo or radiation was recommended.  The patient is not on any antiinflammatory   bowel disease medicines.  She has a low fecal elastase.  Her imaging of her   pancreas showed no pancreatic lesion.  She takes Creon 36,000 lipase unit pills.    She takes 2 pills with each meal, 1 with a snack.  She knows not to take the   Creon if she is skipping meals or not eating.  She does not feel like there is   much difference with it.  It does seem to her that it makes her have more   diarrhea when she is on the Creon.  She takes Imodium, using 1 to 2 pills a day.    REVIEW OF SYSTEMS:  CONSTITUTIONAL:  No fever, fatigue or weight loss.  ENT:  No difficulty swallowing or sore throat.  No odynophagia.  No dysphagia.    No hoarseness.  CARDIOVASCULAR:  No chest pain or palpitations.  RESPIRATORY:  No shortness of breath or cough.  GENITOURINARY:  No dysuria, urgency or frequency.  MUSCULOSKELETAL:  She has fibromyalgia.  SKIN:  No itching or rash.  NEUROLOGIC:  No headache, syncope or stroke.  PSYCHIATRIC:  No uncontrolled depression or anxiety.  ENDOCRINE:  No cold or heat intolerance.  LYMPHATICS:  No lymphadenopathy.  GASTROINTESTINAL:  No nausea or vomiting.  No abdominal pain.  No blood in her   stool.  She averages about 3 bowel movements a day, often pasty.    ALLERGIES:  SHE IS ALLERGIC TO ASACOL, VICODIN, HYDROCORTISONE AND  ASPIRIN.    PAST MEDICAL HISTORY:  As above.    PAST SURGICAL HISTORY:  Breast cancer surgery on her right breast, IGNACIO flap and   a free flap removal of the right breast.  She has had abdominal hysterectomy,   appendectomy, bilateral salpingo-oophorectomy, total proctocolectomy with   restorative proctocolectomy and ileostomy closure and laparoscopic   cholecystectomy.    FAMILY HISTORY:  Nobody with colon cancer.  Nobody with inflammatory bowel   disease.  Mom's sister with pancreatic cancer.  Mom's brother with pancreatic   cancer.  Two second-degree family members with pancreatic cancer.  They are both   smokers.  She does not have a first-degree relative with pancreatic cancer.    She knows if she gets a third second-degree relative with pancreatic cancer or   first-degree relative with pancreatic cancer, she needs to let us know.  She   will need pancreatic surveillance, so she can undergo screening.  Her 2   second-degree relatives and her first-degree relative should undergo genetic   testing for cancers.  Nobody with FAP, attenuated FAP, MAP or Benjamin syndrome.    SOCIAL HISTORY:  A nonsmoker and nondrinker.  She was  once,    about 18-3/4 years ago.  She has a biological son who is 40.  She adopted her   great nephew who is 11.    PHYSICAL EXAMINATION:  VITAL SIGNS:  With chaperone in the room today, Jessica, blood pressure is   134/81, pulse is 94, 5 feet tall and 125 pounds.  GENERAL APPEARANCE:  Well nourished, well developed, not in any acute distress.  ABDOMEN:  Soft.  No guarding.  No rebound.  No tenderness.  No palpable   organomegaly.  No bruits.  No pulsatile masses.  No stigmata of chronic liver   disease.  No appreciative ascites or hernias.  Normoactive bowel sounds.  CARDIOVASCULAR:  S1 and S2 without murmurs, gallops or rubs.  RESPIRATORY:  Clear to auscultation bilaterally without wheezes, rhonchi or   rales.  SKIN:  No petechiae or rash on exposed skin areas.  NEUROLOGIC:   Alert and oriented x4.  PSYCHIATRIC:  Normal speech, mentation and affect.  LYMPHATICS:  No cervical or supraclavicular lymphadenopathy.  ENDOCRINE:  No appreciative thyromegaly.    MEDICAL DECISION MAKING:  As above.  CT enterography talk given.  EGD talk   given.    IMPRESSION AND PLAN:  Abdominal pain, history of inflammatory bowel disease and   a pancreatic cyst.  She is followed by [facility] for pancreatic cyst.    EUS is up-to-date.  We will plan for CT enterography of the small bowel to rule   out small bowel Crohn's.  We will do an EGD and a flex sig for further   evaluation to look for recurrence of small bowel disease.  We will do fat   soluble vitamins.  I will have the patient return to clinic in 3 months.      SEC/IN  dd: 10/16/2018 17:32:59 (CDT)  td: 10/17/2018 13:18:18 (CDT)  Doc ID   #[id number]  Job ID #[id number]    CC:

## 2018-10-17 ENCOUNTER — TELEPHONE (OUTPATIENT)
Dept: GASTROENTEROLOGY | Facility: CLINIC | Age: 58
End: 2018-10-17

## 2018-10-17 NOTE — TELEPHONE ENCOUNTER
----- Message from Reese Villalta MD sent at 10/16/2018  3:04 PM CDT -----  Jessica -please tell Efe that her serum potassium is low again and recommend she take her potasium pill as directed Rx sent to her pharmacy and recommend recheck potassium with a BMP lab in 2 weeks - orders placed and recommend she follow up her low potassium level with her Primary care MD at next visit.

## 2018-10-18 DIAGNOSIS — Z12.11 COLON CANCER SCREENING: Primary | ICD-10-CM

## 2018-10-18 LAB
GLIADIN PEPTIDE IGA SER-ACNC: 12 UNITS
GLIADIN PEPTIDE IGG SER-ACNC: 2 UNITS
IGA SERPL-MCNC: 206 MG/DL
TTG IGA SER IA-ACNC: 7 UNITS
TTG IGG SER IA-ACNC: 3 UNITS

## 2018-10-18 RX ORDER — POLYETHYLENE GLYCOL 3350, SODIUM SULFATE ANHYDROUS, SODIUM BICARBONATE, SODIUM CHLORIDE, POTASSIUM CHLORIDE 236; 22.74; 6.74; 5.86; 2.97 G/4L; G/4L; G/4L; G/4L; G/4L
4 POWDER, FOR SOLUTION ORAL ONCE
Qty: 4000 ML | Refills: 0 | Status: SHIPPED | OUTPATIENT
Start: 2018-10-18 | End: 2018-10-18

## 2018-10-19 LAB
A-TOCOPHEROL VIT E SERPL-MCNC: 1655 UG/DL (ref 500–1800)
VIT A SERPL-MCNC: 59 UG/DL (ref 38–106)

## 2018-10-22 ENCOUNTER — HOSPITAL ENCOUNTER (OUTPATIENT)
Dept: RADIOLOGY | Facility: HOSPITAL | Age: 58
Discharge: HOME | End: 2018-10-22
Attending: INTERNAL MEDICINE
Payer: MEDICARE

## 2018-10-22 DIAGNOSIS — R10.84 GENERALIZED ABDOMINAL PAIN: ICD-10-CM

## 2018-10-22 PROCEDURE — 25500020 PHARM REV CODE 255: Performed by: INTERNAL MEDICINE

## 2018-10-22 PROCEDURE — 74177 CT ABD & PELVIS W/CONTRAST: CPT | Mod: TC

## 2018-10-22 PROCEDURE — 74177 CT ABD & PELVIS W/CONTRAST: CPT | Mod: 26,,, | Performed by: RADIOLOGY

## 2018-10-22 RX ADMIN — IOHEXOL 125 ML: 350 INJECTION, SOLUTION INTRAVENOUS at 07:10

## 2018-10-31 ENCOUNTER — LAB VISIT (OUTPATIENT)
Dept: LAB | Facility: HOSPITAL | Age: 58
End: 2018-10-31
Attending: INTERNAL MEDICINE
Payer: MEDICARE

## 2018-10-31 DIAGNOSIS — E87.6 LOW BLOOD POTASSIUM: ICD-10-CM

## 2018-10-31 LAB
ANION GAP SERPL CALC-SCNC: 9 MMOL/L
BUN SERPL-MCNC: 13 MG/DL
CALCIUM SERPL-MCNC: 9.8 MG/DL
CHLORIDE SERPL-SCNC: 103 MMOL/L
CO2 SERPL-SCNC: 29 MMOL/L
CREAT SERPL-MCNC: 0.8 MG/DL
EST. GFR  (AFRICAN AMERICAN): >60 ML/MIN/1.73 M^2
EST. GFR  (NON AFRICAN AMERICAN): >60 ML/MIN/1.73 M^2
GLUCOSE SERPL-MCNC: 92 MG/DL
POTASSIUM SERPL-SCNC: 3.7 MMOL/L
SODIUM SERPL-SCNC: 141 MMOL/L

## 2018-10-31 PROCEDURE — 80048 BASIC METABOLIC PNL TOTAL CA: CPT

## 2018-10-31 PROCEDURE — 36415 COLL VENOUS BLD VENIPUNCTURE: CPT | Mod: PO

## 2018-11-16 ENCOUNTER — LAB VISIT (OUTPATIENT)
Dept: LAB | Facility: HOSPITAL | Age: 58
End: 2018-11-16
Attending: INTERNAL MEDICINE
Payer: MEDICARE

## 2018-11-16 ENCOUNTER — OFFICE VISIT (OUTPATIENT)
Dept: HEMATOLOGY/ONCOLOGY | Facility: CLINIC | Age: 58
End: 2018-11-16
Payer: MEDICARE

## 2018-11-16 VITALS
RESPIRATION RATE: 18 BRPM | TEMPERATURE: 98 F | HEART RATE: 96 BPM | WEIGHT: 125.25 LBS | DIASTOLIC BLOOD PRESSURE: 66 MMHG | SYSTOLIC BLOOD PRESSURE: 141 MMHG | HEIGHT: 60 IN | BODY MASS INDEX: 24.59 KG/M2 | OXYGEN SATURATION: 99 %

## 2018-11-16 DIAGNOSIS — C50.211 MALIGNANT NEOPLASM OF UPPER-INNER QUADRANT OF RIGHT BREAST IN FEMALE, ESTROGEN RECEPTOR NEGATIVE: ICD-10-CM

## 2018-11-16 DIAGNOSIS — Z17.1 MALIGNANT NEOPLASM OF UPPER-INNER QUADRANT OF RIGHT BREAST IN FEMALE, ESTROGEN RECEPTOR NEGATIVE: ICD-10-CM

## 2018-11-16 DIAGNOSIS — C50.211 MALIGNANT NEOPLASM OF UPPER-INNER QUADRANT OF RIGHT BREAST IN FEMALE, ESTROGEN RECEPTOR NEGATIVE: Primary | ICD-10-CM

## 2018-11-16 DIAGNOSIS — Z17.1 MALIGNANT NEOPLASM OF UPPER-INNER QUADRANT OF RIGHT BREAST IN FEMALE, ESTROGEN RECEPTOR NEGATIVE: Primary | ICD-10-CM

## 2018-11-16 LAB
ALBUMIN SERPL BCP-MCNC: 4.1 G/DL
ALP SERPL-CCNC: 64 U/L
ALT SERPL W/O P-5'-P-CCNC: 19 U/L
ANION GAP SERPL CALC-SCNC: 9 MMOL/L
AST SERPL-CCNC: 26 U/L
BILIRUB SERPL-MCNC: 0.3 MG/DL
BUN SERPL-MCNC: 15 MG/DL
CALCIUM SERPL-MCNC: 10.3 MG/DL
CHLORIDE SERPL-SCNC: 104 MMOL/L
CO2 SERPL-SCNC: 29 MMOL/L
CREAT SERPL-MCNC: 0.8 MG/DL
ERYTHROCYTE [DISTWIDTH] IN BLOOD BY AUTOMATED COUNT: 13.8 %
EST. GFR  (AFRICAN AMERICAN): >60 ML/MIN/1.73 M^2
EST. GFR  (NON AFRICAN AMERICAN): >60 ML/MIN/1.73 M^2
GLUCOSE SERPL-MCNC: 93 MG/DL
HCT VFR BLD AUTO: 38.9 %
HGB BLD-MCNC: 11.9 G/DL
IMM GRANULOCYTES # BLD AUTO: 0.02 K/UL
MCH RBC QN AUTO: 26.2 PG
MCHC RBC AUTO-ENTMCNC: 30.6 G/DL
MCV RBC AUTO: 86 FL
NEUTROPHILS # BLD AUTO: 5.7 K/UL
PLATELET # BLD AUTO: 330 K/UL
PMV BLD AUTO: 10.2 FL
POTASSIUM SERPL-SCNC: 4.1 MMOL/L
PROT SERPL-MCNC: 7.5 G/DL
RBC # BLD AUTO: 4.55 M/UL
SODIUM SERPL-SCNC: 142 MMOL/L
WBC # BLD AUTO: 7.31 K/UL

## 2018-11-16 PROCEDURE — 36415 COLL VENOUS BLD VENIPUNCTURE: CPT

## 2018-11-16 PROCEDURE — 80053 COMPREHEN METABOLIC PANEL: CPT

## 2018-11-16 PROCEDURE — 99213 OFFICE O/P EST LOW 20 MIN: CPT | Mod: PBBFAC | Performed by: INTERNAL MEDICINE

## 2018-11-16 PROCEDURE — 85027 COMPLETE CBC AUTOMATED: CPT

## 2018-11-16 PROCEDURE — 99214 OFFICE O/P EST MOD 30 MIN: CPT | Mod: S$PBB,,, | Performed by: INTERNAL MEDICINE

## 2018-11-16 PROCEDURE — 99999 PR PBB SHADOW E&M-EST. PATIENT-LVL III: CPT | Mod: PBBFAC,,, | Performed by: INTERNAL MEDICINE

## 2018-11-16 NOTE — PROGRESS NOTES
Subjective:       Patient ID: Efe Horowitz is a 58 y.o. female.    Chief Complaint: Follow-up    HPI     Presents for follow-up of triple negative breast cancer.   Weather is making her lupus and arthritis act up  She currently has a discoid rash and mouth sores  Weight stable  No new pain issues    Has upcoming GI scope with Dr. Villalta     Diagnosis: Triple negative Stage 1 ([id number]) right breast carcinoma  Oncology History:  - 1/18/16 screening mammogram:  There is a focal asymmetry seen in the posterior upper-inner region of the right breast.  - 2/1/16 diagnostic mammogram and ultrasound: irregular solid mass with poorly defined margins  measuring 5 millimeters  - 2/19/16 core biopsy  FINAL PATHOLOGIC DIAGNOSIS  RIGHT BREAST, 1:00, BIOPSY:  Invasive ductal carcinoma, high-grade (duct formation - 3; nuclear pleomorphism - 3, mitosis - 2)  ER - Negative (0)  MI - Negative (0)  HER2 - Negative (0)  - 4/27/16 mastectomy and SLN biopsy  FINAL PATHOLOGIC DIAGNOSIS  RIGHT MASTECTOMY SPECIMEN SHOWING A 7 MM AREA OF INVASIVE DUCT CARCINOMA, GRADE 3  WITH NEGATIVE MARGINS.    TUMOR SIZE: 0.7 CM  HISTOLOGIC TYPE: INVASIVE DUCT CARCINOMA  HISTOLOGIC GRADE: 3, 3, 2; GRADE 3  DCIS: NOT IDENTIFIED  MARGINS: ALL MARGINS OF RESECTION ARE NEGATIVE FOR TUMOR. CLOSEST MARGIN IS THE  POSTERIOR MARGIN AT 2.5 CM  IMMUNE RECEPTOR STUDIES: MS 16-5/1/04: ER - Negative (0)  MI - Negative (0)  HER2 - Negative (0)  LYMPH NODES: Total number 4- negative  - Genetic testing performed  - With tumor > 0.6 cm she just made guideline cut off to consider adjuvant chemotherapy with TC   However, with her active wound healing issues at that time and other comorbidities adjuvant chemotherapy was not be pursued   - She completed all of her reconstruction with Dr. Mccloud   Does have multiple comorbidities including history of lupus and fibromyalgia- she is managed by rheumatology and reports recent flare   - Reports history of easy bruising  and states she was borderline on testing for von willebrands in [facility]    Review of Systems   Constitutional: Negative for activity change, appetite change, chills, fatigue (chronic), fever and unexpected weight change.        Reports sweats a lot- taken off estrogen   HENT: Negative for congestion, dental problem, ear pain, hearing loss, mouth sores, nosebleeds, postnasal drip, rhinorrhea, sinus pressure, sneezing, sore throat, tinnitus and trouble swallowing.    Eyes: Negative for redness and visual disturbance (chronic- sees optho next week).   Respiratory: Positive for shortness of breath (chronic, relates to her hiatal hernia). Negative for cough, chest tightness and wheezing.    Cardiovascular: Negative for chest pain, palpitations and leg swelling.   Gastrointestinal: Positive for nausea. Negative for abdominal distention, abdominal pain, blood in stool, constipation, diarrhea and vomiting.        + dysphagia- hx ring  + GERD, + hiatal hernia   Genitourinary: Negative for decreased urine volume, difficulty urinating, dysuria, frequency, hematuria and urgency.   Musculoskeletal: Positive for arthralgias (see above) and back pain. Negative for gait problem, joint swelling, myalgias, neck pain and neck stiffness.   Skin: Negative for color change, pallor, rash and wound.        Healing well this time   Neurological: Positive for light-headedness (still reports chronic issue) and headaches (migraine hx, chronic). Negative for dizziness, weakness and numbness.   Hematological: Negative for adenopathy. Does not bruise/bleed easily.   Psychiatric/Behavioral: Negative for decreased concentration, dysphoric mood and sleep disturbance. The patient is not nervous/anxious.        Objective:      Physical Exam   Constitutional: She is oriented to person, place, and time. She appears well-developed and well-nourished. No distress.   Presents alone   HENT:   Head: Normocephalic and atraumatic.   Right Ear: External ear  normal.   Left Ear: External ear normal.   Nose: Nose normal.   Mouth/Throat: Oropharynx is clear and moist. No oropharyngeal exudate.   Eyes: Conjunctivae and EOM are normal. Pupils are equal, round, and reactive to light. Right eye exhibits no discharge. Left eye exhibits no discharge. No scleral icterus. Right pupil is round and reactive. Left pupil is round and reactive.   Neck: Normal range of motion. Neck supple. No tracheal deviation present. No thyromegaly present.   Cardiovascular: Normal rate, regular rhythm and intact distal pulses. Exam reveals no gallop and no friction rub.   Murmur (soft systolic) heard.  Pulmonary/Chest: Effort normal and breath sounds normal. No respiratory distress. She has no wheezes. She has no rales. She exhibits no tenderness.   Breasts- right breast no concerning masses (area of fat necrosis) or LAD. Area of hyperpigmentation to upper outer quad that has been there for >1 year according to patient.   Left breast no masses or LAD   Abdominal: Soft. Bowel sounds are normal. She exhibits no distension and no mass. There is no hepatosplenomegaly. There is no tenderness. There is no rebound and no guarding.   No organomegaly   Musculoskeletal: Normal range of motion. She exhibits no edema or tenderness.   Lymphadenopathy:        Head (right side): No submandibular adenopathy present.        Head (left side): No submandibular adenopathy present.     She has no cervical adenopathy.        Right cervical: No superficial cervical, no deep cervical and no posterior cervical adenopathy present.       Left cervical: No superficial cervical, no deep cervical and no posterior cervical adenopathy present.        Right: No inguinal and no supraclavicular adenopathy present.        Left: No inguinal and no supraclavicular adenopathy present.   Neurological: She is alert and oriented to person, place, and time. She has normal strength. No cranial nerve deficit or sensory deficit. She exhibits  normal muscle tone. Coordination normal.   Skin: Skin is warm and dry. No bruising, no lesion, no petechiae and no rash noted. She is not diaphoretic. No erythema. No pallor.   Breast and abdominal wound sites healing well   Psychiatric: She has a normal mood and affect. Her behavior is normal. Judgment and thought content normal. Her mood appears not anxious. She does not exhibit a depressed mood.   Nursing note and vitals reviewed.   Labs- reviewed   Assessment:       1. Malignant neoplasm of upper-inner quadrant of right breast in female, estrogen receptor negative        Plan:     1. Continue q 3 month follow up  Knows to call for any clinical changes    She requests follow-up with Dr. Jiang      25 minutes total

## 2018-11-19 ENCOUNTER — TELEPHONE (OUTPATIENT)
Dept: RHEUMATOLOGY | Facility: CLINIC | Age: 58
End: 2018-11-19

## 2018-11-19 ENCOUNTER — LAB VISIT (OUTPATIENT)
Dept: LAB | Facility: HOSPITAL | Age: 58
End: 2018-11-19
Attending: INTERNAL MEDICINE
Payer: MEDICARE

## 2018-11-19 DIAGNOSIS — M94.9 DISORDER OF BONE AND CARTILAGE: ICD-10-CM

## 2018-11-19 DIAGNOSIS — M89.9 DISORDER OF BONE AND CARTILAGE: ICD-10-CM

## 2018-11-19 DIAGNOSIS — R53.83 FATIGUE, UNSPECIFIED TYPE: ICD-10-CM

## 2018-11-19 DIAGNOSIS — D84.9 IMMUNOSUPPRESSION: ICD-10-CM

## 2018-11-19 DIAGNOSIS — M32.9 SYSTEMIC LUPUS ERYTHEMATOSUS, UNSPECIFIED SLE TYPE, UNSPECIFIED ORGAN INVOLVEMENT STATUS: ICD-10-CM

## 2018-11-19 DIAGNOSIS — M32.9 SYSTEMIC LUPUS ERYTHEMATOSUS, UNSPECIFIED SLE TYPE, UNSPECIFIED ORGAN INVOLVEMENT STATUS: Primary | ICD-10-CM

## 2018-11-19 DIAGNOSIS — E55.9 VITAMIN D DEFICIENCY DISEASE: ICD-10-CM

## 2018-11-19 LAB
BASOPHILS # BLD AUTO: 0.01 K/UL
BASOPHILS NFR BLD: 0.1 %
C3 SERPL-MCNC: 115 MG/DL
C4 SERPL-MCNC: 41 MG/DL
CK SERPL-CCNC: 122 U/L
CRP SERPL-MCNC: 0.8 MG/L
DIFFERENTIAL METHOD: ABNORMAL
EOSINOPHIL # BLD AUTO: 0.1 K/UL
EOSINOPHIL NFR BLD: 1.1 %
ERYTHROCYTE [DISTWIDTH] IN BLOOD BY AUTOMATED COUNT: 14.1 %
ERYTHROCYTE [SEDIMENTATION RATE] IN BLOOD BY WESTERGREN METHOD: 12 MM/HR
HCT VFR BLD AUTO: 40.2 %
HGB BLD-MCNC: 12.7 G/DL
LYMPHOCYTES # BLD AUTO: 2 K/UL
LYMPHOCYTES NFR BLD: 23.9 %
MCH RBC QN AUTO: 27 PG
MCHC RBC AUTO-ENTMCNC: 31.6 G/DL
MCV RBC AUTO: 86 FL
MONOCYTES # BLD AUTO: 0.7 K/UL
MONOCYTES NFR BLD: 8.1 %
NEUTROPHILS # BLD AUTO: 5.7 K/UL
NEUTROPHILS NFR BLD: 66.6 %
PLATELET # BLD AUTO: 353 K/UL
PMV BLD AUTO: 10.3 FL
RBC # BLD AUTO: 4.7 M/UL
WBC # BLD AUTO: 8.49 K/UL

## 2018-11-19 PROCEDURE — 86225 DNA ANTIBODY NATIVE: CPT

## 2018-11-19 PROCEDURE — 85652 RBC SED RATE AUTOMATED: CPT

## 2018-11-19 PROCEDURE — 86160 COMPLEMENT ANTIGEN: CPT

## 2018-11-19 PROCEDURE — 82550 ASSAY OF CK (CPK): CPT

## 2018-11-19 PROCEDURE — 85025 COMPLETE CBC W/AUTO DIFF WBC: CPT

## 2018-11-19 PROCEDURE — 86140 C-REACTIVE PROTEIN: CPT

## 2018-11-19 PROCEDURE — 86160 COMPLEMENT ANTIGEN: CPT | Mod: 59

## 2018-11-19 PROCEDURE — 36415 COLL VENOUS BLD VENIPUNCTURE: CPT

## 2018-11-19 NOTE — TELEPHONE ENCOUNTER
----- Message from Shaila Wright MD sent at 11/19/2018  2:42 PM CST -----  Patient has a current SLE flare  Any way you can get her in?

## 2018-11-19 NOTE — TELEPHONE ENCOUNTER
Received message that patient is having lupus flare.  Patient reports sores in mouth and nose.  [facility] 4:30 pm  And come to office at 8 am tomorrow.

## 2018-11-20 ENCOUNTER — OFFICE VISIT (OUTPATIENT)
Dept: RHEUMATOLOGY | Facility: CLINIC | Age: 58
End: 2018-11-20
Payer: MEDICARE

## 2018-11-20 VITALS — HEIGHT: 60 IN | BODY MASS INDEX: 24.46 KG/M2

## 2018-11-20 DIAGNOSIS — D84.9 IMMUNOSUPPRESSION: ICD-10-CM

## 2018-11-20 DIAGNOSIS — M32.9 SYSTEMIC LUPUS ERYTHEMATOSUS, UNSPECIFIED SLE TYPE, UNSPECIFIED ORGAN INVOLVEMENT STATUS: Primary | ICD-10-CM

## 2018-11-20 DIAGNOSIS — M85.80 OSTEOPENIA, UNSPECIFIED LOCATION: ICD-10-CM

## 2018-11-20 DIAGNOSIS — R53.83 FATIGUE, UNSPECIFIED TYPE: ICD-10-CM

## 2018-11-20 LAB — DSDNA AB SER-ACNC: NORMAL [IU]/ML

## 2018-11-20 PROCEDURE — 99214 OFFICE O/P EST MOD 30 MIN: CPT | Mod: S$PBB,,, | Performed by: INTERNAL MEDICINE

## 2018-11-20 PROCEDURE — 99213 OFFICE O/P EST LOW 20 MIN: CPT | Mod: PBBFAC | Performed by: INTERNAL MEDICINE

## 2018-11-20 PROCEDURE — 99999 PR PBB SHADOW E&M-EST. PATIENT-LVL III: CPT | Mod: PBBFAC,,, | Performed by: INTERNAL MEDICINE

## 2018-11-20 ASSESSMENT — ROUTINE ASSESSMENT OF PATIENT INDEX DATA (RAPID3)
FATIGUE SCORE: 7.5
TOTAL RAPID3 SCORE: 6
MDHAQ FUNCTION SCORE: .6
PSYCHOLOGICAL DISTRESS SCORE: 0
AM STIFFNESS SCORE: 1, YES
PATIENT GLOBAL ASSESSMENT SCORE: 6.5
PAIN SCORE: 9.5
WHEN YOU AWAKENED IN THE MORNING OVER THE LAST WEEK, PLEASE INDICATE THE AMOUNT OF TIME IT TAKES UNTIL YOU ARE AS LIMBER AS YOU WILL BE FOR THE DAY: 2 HOURS

## 2018-11-20 NOTE — PROGRESS NOTES
Subjective:       Patient ID: Efe Horowitz is a 58 y.o. female.    Chief Complaint: Lupus    HPI:  Efe Horowitz is a 58 y.o. female with history of lupus since age 39.   Her manifestations include joint pains, headache, positive DERRICK, and a   double-stranded DNA. She also has an equivocal anticardiolipin IgM.   She has been treated with Plaquenil 1-1/2 tablets daily. Eye exam 12/2017 was normal.      In addition to lupus, she has a   history of ulcerative colitis, osteopenia, fibromyalgia as well.   In 2009 she had a colectomy with ileostomy and in May 2011 the ileostomy  was closed. History of bilateral carpal tunnel.       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  She was treated with surgical resection.  Another lesion breast suspected to be lymphoma was later felt to be fibrous tissue.      May 1, 2017 had right breast fibrous material removed and reconstruction on both breasts at [facility].       Interval History:  Reports recurrent oral and nose.  Also had ulcers chest.  Last episode was a week ago.  Saw primary doctor previously regarding oral ulcers and was given steroid shot and swish medication.   She is having significant abdominal issues.  GI will do scope.    Bilateral hand pain continues.  Also feet and back hurt.   Improve with exercises and warm water.  Right hip pain now improved.  Pain 9/10 ache.      Review of Systems   Constitutional: Positive for fatigue.   HENT: Positive for mouth sores.    Eyes: Positive for redness.        Dry eyes   Respiratory: Positive for cough (occasionall green sputum). Negative for shortness of breath.    Gastrointestinal: Positive for diarrhea.   Endocrine: Negative.    Genitourinary: Negative.    Musculoskeletal: Positive for arthralgias.   Skin: Negative.    Allergic/Immunologic: Negative.    Neurological: Negative.    Hematological: Negative.    Psychiatric/Behavioral: Negative.          Objective:   Ht 5' (1.524 m)   BMI 24.46 kg/m²   131/72    HR=88      Physical Exam   Constitutional: She is oriented to person, place, and time and well-developed, well-nourished, and in no distress.   HENT:   Head: Normocephalic and atraumatic.   Eyes: Conjunctivae and EOM are normal.   Neck: Neck supple.   Cardiovascular: Normal rate, regular rhythm and normal heart sounds.    Pulmonary/Chest: Effort normal and breath sounds normal.   Abdominal: Soft. Bowel sounds are normal.   Neurological: She is alert and oriented to person, place, and time. Gait normal.   Skin: Skin is warm and dry.     Psychiatric: Mood and affect normal.   Musculoskeletal:   Right trochanteric bursa painful on palpation           LABS    Component      Latest Ref Rng & Units 1/9/2018 1/9/2018 1/9/2018 1/9/2018           7:55 AM  7:55 AM  7:55 AM  7:44 AM   Sodium      136 - 145 mmol/L 141 141 141    Potassium      3.5 - 5.1 mmol/L 2.8 (L) 2.8 (L) 2.8 (L)    Chloride      95 - 110 mmol/L 100 100 100    CO2      23 - 29 mmol/L 31 (H) 31 (H) 31 (H)    Glucose      70 - 110 mg/dL 74 74 74    BUN, Bld      6 - 20 mg/dL 18 18 18    Creatinine      0.5 - 1.4 mg/dL 0.8 0.8 0.8    Calcium      8.7 - 10.5 mg/dL 9.4 9.4 9.4    Total Protein      6.0 - 8.4 g/dL 7.5 7.5     Albumin      3.5 - 5.2 g/dL 3.8 3.8 3.8    Total Bilirubin      0.1 - 1.0 mg/dL 0.3 0.3     Alkaline Phosphatase      55 - 135 U/L 67 67     AST      10 - 40 U/L 21 21     ALT      10 - 44 U/L 18 18     Anion Gap      8 - 16 mmol/L 10 10 10    eGFR if African American      >60 mL/min/1.73 m:2 >60.0 >60.0 >60.0    eGFR if non African American      >60 mL/min/1.73 m:2 >60.0 >60.0 >60.0    Phosphorus      2.7 - 4.5 mg/dL   3.5    Specimen UA          Urine, Clean Catch   Color, UA      Yellow, Straw, Amanda    Yellow   Appearance, UA      Clear    Hazy (A)   pH, UA      5.0 - 8.0    5.0   Specific Gravity, UA      1.005 - 1.030    1.030   Protein, UA      Negative    Negative   Glucose, UA      Negative    Negative   Ketones, UA      Negative     Negative   Bilirubin (UA)      Negative    Negative   Occult Blood UA      Negative    1+ (A)   Nitrite, UA      Negative    Negative   Urobilinogen, UA      <2.0 EU/dL    Negative   Leukocytes, UA      Negative    1+ (A)   RBC, UA      0 - 4 /hpf    6 (H)   WBC, UA      0 - 5 /hpf    5   Bacteria, UA      None-Occ /hpf    Occasional   Squam Epithel, UA      /hpf    1   Microscopic Comment          SEE COMMENT   Protein, Urine Random      0 - 15 mg/dL    11   Creatinine, Random Ur      15.0 - 325.0 mg/dL    248.0   Prot/Creat Ratio, Ur      0.00 - 0.20    0.04   PTH      9.0 - 77.0 pg/mL   79.0 (H)    Complement (C-3)      50 - 180 mg/dL   115    Complement (C-4)      11 - 44 mg/dL   39    Renin Activity      ng/mL/h   1.2    Magnesium      1.6 - 2.6 mg/dL   2.0         Assessment:       1. Lupus. Lupus manifestations include joint pains, headache, positive DERRICK, and a   double-stranded DNA. With recent ulcers oral.  Also alopecia.  SLEDAI=2 still pending dsDNA  2. Fatigue   3. Encounter for long-term (current) use of other medications   4. Myalgia and myositis. Fibromyalgia on gabapentin and Savella   5. Trochanteric bursitis. Bilateral now requesting injection.  6. Shoulder pain. Stable   7. Vitamin D deficiency disease. Now normal  8. Suspected Shingles. Past time for anti-viral. Did not receive treatment. Rash has improved  9. Migraine.  Doing acupuncture with Dr. Mao.  Treated with Maxalt by primary doctor which helps.     10.  Osteopenia lumbar spine and femoral neck.  Reclast x1 stopped 3 years ago per patient due to improvement.   Preliminary DEXA osteopenia of femoral neck and lumbar spine FRAX with steroid included does not suggest treatment.  11.  Chronic steroid use.  Endocrine gives for adrenal insufficiency.  12.  Ulcerative colitis  13.  Hematuria.  Followed by nephrology    14.  Stress with dealing with son with mental illness  Plan:       1.  Labs reviewed.  Await DSDNA.    2. Patient to continue  Plaquenil 300 mg daily.  Eye exam normal 6/2018  3. Continue neurontin 800 mg/800 mg/1200 mg in evening.                RTO in 3 months/prn.

## 2018-12-10 ENCOUNTER — HOSPITAL ENCOUNTER (OUTPATIENT)
Facility: HOSPITAL | Age: 58
Discharge: HOME | End: 2018-12-10
Attending: INTERNAL MEDICINE | Admitting: INTERNAL MEDICINE
Payer: MEDICARE

## 2018-12-10 ENCOUNTER — TELEPHONE (OUTPATIENT)
Dept: GASTROENTEROLOGY | Facility: CLINIC | Age: 58
End: 2018-12-10

## 2018-12-10 ENCOUNTER — ANESTHESIA (OUTPATIENT)
Dept: ENDOSCOPY | Facility: HOSPITAL | Age: 58
End: 2018-12-10
Payer: MEDICARE

## 2018-12-10 ENCOUNTER — DOCUMENTATION ONLY (OUTPATIENT)
Dept: GASTROENTEROLOGY | Facility: CLINIC | Age: 58
End: 2018-12-10

## 2018-12-10 ENCOUNTER — ANESTHESIA EVENT (OUTPATIENT)
Dept: ENDOSCOPY | Facility: HOSPITAL | Age: 58
End: 2018-12-10
Payer: MEDICARE

## 2018-12-10 VITALS
OXYGEN SATURATION: 100 % | WEIGHT: 125 LBS | SYSTOLIC BLOOD PRESSURE: 122 MMHG | BODY MASS INDEX: 21.34 KG/M2 | RESPIRATION RATE: 20 BRPM | HEIGHT: 64 IN | DIASTOLIC BLOOD PRESSURE: 72 MMHG | TEMPERATURE: 98 F | HEART RATE: 88 BPM

## 2018-12-10 DIAGNOSIS — K60.2 ANAL FISSURE: ICD-10-CM

## 2018-12-10 DIAGNOSIS — K62.5 ANAL BLEEDING: Primary | ICD-10-CM

## 2018-12-10 DIAGNOSIS — K52.9 IBD (INFLAMMATORY BOWEL DISEASE): ICD-10-CM

## 2018-12-10 PROCEDURE — 43239 EGD BIOPSY SINGLE/MULTIPLE: CPT | Mod: 51,GC,, | Performed by: INTERNAL MEDICINE

## 2018-12-10 PROCEDURE — 25000003 PHARM REV CODE 250: Performed by: INTERNAL MEDICINE

## 2018-12-10 PROCEDURE — 63600175 PHARM REV CODE 636 W HCPCS: Performed by: NURSE ANESTHETIST, CERTIFIED REGISTERED

## 2018-12-10 PROCEDURE — 44386 ENDOSCOPY BOWEL POUCH/BIOP: CPT | Performed by: INTERNAL MEDICINE

## 2018-12-10 PROCEDURE — 27201012 HC FORCEPS, HOT/COLD, DISP: Performed by: INTERNAL MEDICINE

## 2018-12-10 PROCEDURE — 43239 EGD BIOPSY SINGLE/MULTIPLE: CPT | Performed by: INTERNAL MEDICINE

## 2018-12-10 PROCEDURE — 37000008 HC ANESTHESIA 1ST 15 MINUTES: Performed by: INTERNAL MEDICINE

## 2018-12-10 PROCEDURE — 88305 TISSUE EXAM BY PATHOLOGIST: CPT | Performed by: PATHOLOGY

## 2018-12-10 PROCEDURE — 44386 ENDOSCOPY BOWEL POUCH/BIOP: CPT | Mod: ,,, | Performed by: INTERNAL MEDICINE

## 2018-12-10 PROCEDURE — E9220 PRA ENDO ANESTHESIA: HCPCS | Mod: ,,, | Performed by: NURSE ANESTHETIST, CERTIFIED REGISTERED

## 2018-12-10 PROCEDURE — 37000009 HC ANESTHESIA EA ADD 15 MINS: Performed by: INTERNAL MEDICINE

## 2018-12-10 PROCEDURE — 25000003 PHARM REV CODE 250: Performed by: NURSE ANESTHETIST, CERTIFIED REGISTERED

## 2018-12-10 RX ORDER — GLYCOPYRROLATE 0.2 MG/ML
INJECTION INTRAMUSCULAR; INTRAVENOUS
Status: DISCONTINUED | OUTPATIENT
Start: 2018-12-10 | End: 2018-12-10

## 2018-12-10 RX ORDER — FENTANYL CITRATE 50 UG/ML
INJECTION, SOLUTION INTRAMUSCULAR; INTRAVENOUS
Status: DISCONTINUED | OUTPATIENT
Start: 2018-12-10 | End: 2018-12-10

## 2018-12-10 RX ORDER — PROPOFOL 10 MG/ML
VIAL (ML) INTRAVENOUS
Status: DISCONTINUED | OUTPATIENT
Start: 2018-12-10 | End: 2018-12-10

## 2018-12-10 RX ORDER — LIDOCAINE HCL/PF 100 MG/5ML
SYRINGE (ML) INTRAVENOUS
Status: DISCONTINUED | OUTPATIENT
Start: 2018-12-10 | End: 2018-12-10

## 2018-12-10 RX ORDER — SODIUM CHLORIDE 0.9 % (FLUSH) 0.9 %
3 SYRINGE (ML) INJECTION
Status: DISCONTINUED | OUTPATIENT
Start: 2018-12-10 | End: 2018-12-10 | Stop reason: HOSPADM

## 2018-12-10 RX ORDER — PROPOFOL 10 MG/ML
VIAL (ML) INTRAVENOUS CONTINUOUS PRN
Status: DISCONTINUED | OUTPATIENT
Start: 2018-12-10 | End: 2018-12-10

## 2018-12-10 RX ORDER — SODIUM CHLORIDE 9 MG/ML
INJECTION, SOLUTION INTRAVENOUS CONTINUOUS
Status: DISCONTINUED | OUTPATIENT
Start: 2018-12-10 | End: 2018-12-10 | Stop reason: HOSPADM

## 2018-12-10 RX ADMIN — PROPOFOL 100 MCG/KG/MIN: 10 INJECTION, EMULSION INTRAVENOUS at 12:12

## 2018-12-10 RX ADMIN — FENTANYL CITRATE 50 MCG: 50 INJECTION, SOLUTION INTRAMUSCULAR; INTRAVENOUS at 11:12

## 2018-12-10 RX ADMIN — LIDOCAINE HYDROCHLORIDE 60 MG: 20 INJECTION, SOLUTION INTRAVENOUS at 11:12

## 2018-12-10 RX ADMIN — SODIUM CHLORIDE: 0.9 INJECTION, SOLUTION INTRAVENOUS at 10:12

## 2018-12-10 RX ADMIN — PROPOFOL 30 MG: 10 INJECTION, EMULSION INTRAVENOUS at 11:12

## 2018-12-10 RX ADMIN — GLYCOPYRROLATE 0.2 MG: 0.2 INJECTION, SOLUTION INTRAMUSCULAR; INTRAVENOUS at 11:12

## 2018-12-10 RX ADMIN — PROPOFOL 70 MG: 10 INJECTION, EMULSION INTRAVENOUS at 11:12

## 2018-12-10 NOTE — DISCHARGE INSTRUCTIONS
Sigmoidoscopy    Sigmoidoscopy is a procedure used to view the lower colon and rectum. This test can help find the source of belly pain, rectal bleeding, and changes in bowel habits. Sigmoidoscopy is also used as part of the screening for colorectal cancer. It is done using a sigmoidoscope, a flexible tube with a viewing lens and light.  If youre 50 or over, the American Cancer Society recommends having this test in addition to stool tests, every 5 years to screen for colorectal cancer. Your healthcare provider may also recommend other colon cancer screening methods such as colonoscopy.   Getting ready  Here is how to prepare:  · Be sure to tell your healthcare provider about any medicines you take. Also tell your healthcare provider about any health conditions you may have.  · Ask your healthcare provider about the risks of the test. These include bleeding and bowel puncture.  · Your rectum and colon must be empty for the test, so be sure to follow the diet and bowel prep instructions. Otherwise the test may need to be rescheduled.  During the test  Here is what to expect:  · The test is done in the healthcare providers office or in a hospital endoscopy unit. You may wear a gown or a drape over your lower body.  · The procedure usually takes 10 to 20 minutes.  · The healthcare provider does a digital rectal exam to check for anal and rectal problems. The rectum is lubricated and the scope inserted.  · You may have a feeling similar to needing to have a bowel movement. You may also feel pressure when air is pumped into the colon This is done so that the healthcare provider can get a better view. Its expected that you will pass gas during the procedure.  After the test  Here is what to expect:  · Usually youll discuss the results with your healthcare provider right away, unless youre having other tests.  · If biopsies (tissue samples) were taken, you'll want to ask when to contact the for results.   · Try to  pass all the gas right after the test. Otherwise you may have bloating and cramping.  · After the test you can go back to your normal eating and other activities.  When to call your healthcare provider  Call if you have any of the following after the procedure:  · Pain in your belly  · Fever  · Dizziness or weakness  · Excessive rectal bleeding. Slight bleeding or spotting is normal, especially if a biopsy was taken.   Date Last Reviewed: 7/1/2016 © 2000-2017 [employer]. [address] [place] [address]. All rights reserved. This information is not intended as a substitute for professional medical care. Always follow your healthcare professional's instructions.        Upper GI Endoscopy     During endoscopy, a long, flexible tube is used to view the inside of your upper GI tract.      Upper GI endoscopy allows your healthcare provider to look directly into the beginning of your gastrointestinal (GI) tract. The esophagus, stomach, and duodenum (the first part of the small intestine) make up the upper GI tract.   Before the exam  Follow these and any other instructions you are given before your endoscopy. If you dont follow the healthcare providers instructions carefully, the test may need to be canceled or done over:  · Don't eat or drink anything after midnight the night before your exam. If your exam is in the afternoon, drink only clear liquids in the morning. Don't eat or drink anything for 8 hours before the exam. In some cases, you may be able to take medicines with sips of water until 2 hours before the procedure. Speak with your healthcare provider about this.   · Bring your X-rays and any other test results you have.  · Because you will be sedated, arrange for an adult to drive you home after the exam.  · Tell your healthcare provider before the exam if you are taking any medicines or have any medical problems.  The procedure  Here is what to expect:  · You will lie on the  endoscopy table. Usually patients lie on the left side.  · You will be monitored and given oxygen.  · Your throat may be numbed with a spray or gargle. You are given medicine through an intravenous (IV) line that will help you relax and remain comfortable. You may be awake or asleep during the procedure.  · The healthcare provider will put the endoscope in your mouth and down your esophagus. It is thinner than most pieces of food that you swallow. It will not affect your breathing. The medicine helps keep you from gagging.  · Air is put into your GI tract to expand it. It can make you burp.  · During the procedure, the healthcare provider can take biopsies (tissue samples), remove abnormalities, such as polyps, or treat abnormalities through a variety of devices placed through the endoscope. You will not feel this.   · The endoscope carries images of your upper GI tract to a video screen. If you are awake, you may be able to look at the images.  · After the procedure is done, you will rest for a time. An adult must drive you home.  When to call your healthcare provider  Contact your healthcare provider if you have:  · Black or tarry stools, or blood in your stool  · Fever  · Pain in your belly that does not go away  · Nausea and vomiting, or vomiting blood   Date Last Reviewed: 7/1/2016  © 4512-1884 [employer]. [address] [place] [address], [place] [address]. All rights reserved. This information is not intended as a substitute for professional medical care. Always follow your healthcare professional's instructions.

## 2018-12-10 NOTE — TRANSFER OF CARE
"Anesthesia Transfer of Care Note    Patient: Efe Horowitz    Procedure(s) Performed: Procedure(s) (LRB):  EGD (ESOPHAGOGASTRODUODENOSCOPY) (N/A)  SIGMOIDOSCOPY, FLEXIBLE (N/A)    Patient location: GI    Anesthesia Type: general    Transport from OR: Transported from OR on room air with adequate spontaneous ventilation    Post pain: adequate analgesia    Post assessment: no apparent anesthetic complications and tolerated procedure well    Post vital signs: stable    Level of consciousness: sedated    Nausea/Vomiting: no nausea/vomiting    Complications: none    Transfer of care protocol was followed      Last vitals:   Visit Vitals  /84 (BP Location: Left arm, Patient Position: Lying)   Pulse 81   Temp 36.6 °C (97.9 °F) (Skin)   Resp 16   Ht 5' 4" (1.626 m)   Wt 56.7 kg (125 lb)   SpO2 99%   Breastfeeding? No   BMI 21.46 kg/m²     "

## 2018-12-10 NOTE — H&P
"[facility]  History & Physical    Subjective:      Chief Complaint/Reason for Admission:    EGd and flex sig    Efe Horowitz is a 58 y.o. female.    Past Medical History:   Diagnosis Date    Acid reflux     Adrenal insufficiency, primary     Arthritis     Asthma     B12 deficiency anemia     Blood transfusion 2010    Breast cancer 2/2016    Right breast infiltrating ductal CA    Cataract     Chronic pain     Deep vein thrombosis     Dry eyes     Esophagitis, unspecified     Fibromyalgia     General anesthetics causing adverse effect in therapeutic use     "slow to wake up bc I don't have an immune system    Heart murmur     History of colon polyps     Hyperlipidemia     Hypopituitary dwarfism     Iron deficiency anemia     Lupus     Migraines, neuralgic     Nonspecific ulcerative colitis     OP (osteoporosis)     history of reclast    Osteopenia     Pancreatic cyst     Schatzki's ring     Steroid sulfatase deficiency     Ulcerative colitis     Vitamin D deficiency disease      Past Surgical History:   Procedure Laterality Date    APPENDECTOMY      BIOPSY-SENTINEL NODE Right 4/27/2016    [facility] OR    BREAST BIOPSY Right 2/2016    [facility]    BREAST SURGERY      CHOLECYSTECTOMY      COLON SURGERY      IGNACIO FREE FLAP Right 4/27/2016    [facility]formed by [facility]iah [facility]cloud [facility] at [facility] OR    DISSECTION-AXILLARY LYMPH NODE Right 4/27/2016    Performed by [facility] at [facility] OR    EGD (ESOPHAGOGASTRODUODENOSCOPY) N/A 4/3/2013    Performed by Reese Villalta MD at [facility] (4TH FLR)    ENDOSCOPIC ULTRASOUND OF UPPER GASTROINTESTINAL TRACT N/A 8/6/2018    Procedure: ULTRASOUND, ENDOSCOPIC, UPPER GI TRACT;  Surgeon: Hong Finn MD;  Location: [facility] (2ND FLR);  Service: Endoscopy;  Laterality: N/A;    ESOPHAGOGASTRODUODENOSCOPY (EGD) N/A 6/13/2016    Performed by Reese Villalta MD at [facility] (4TH FLR)    " ESOPHAGOGASTRODUODENOSCOPY (EGD) N/A 7/31/2015    [facility] at [facility] (4[facility] F[facility])    HYSTERECTOMY      ILEOSCOPY N/A 7/24/2017    Performed by [facility]alta MD at [facility] ENDO (4TH FLR)    ILEOSCOPY N/A 9/25/2013    Performed by [facility] at [facility] (4TH FLR)    INJECTION-SENTINEL NODE Right 4/27/2016    Performed by [facility] at [facility] OR    MASTECTOMY- NIPPLE SPARING RIGHT Right 4/27/2016    Performed by [facility]umar [facility]iz [facility] at [facility] OR    MOTILITY STUDY, ESOPHAGUS, USING HIGH RESOLUTION MANOMETRY N/A 4/3/2013    Performed by Reese [facility]alta MD at [facility] (4TH FLR)    OOPHORECTOMY Bilateral     PH MONITORING, ESOPHAGUS, WIRELESS, (OFF REFLUX MEDS) N/A 4/3/2013    Performed by Reese [facility]alta [facility] at [facility] ENDO (4TH FLR)    restorative proctectomy      total abdominal colectomy with ileostomy  2010    TOTAL REDUCTION MAMMOPLASTY Left 2017    ULTRASOUND, ENDOSCOPIC, UPPER GI TRACT N/A 8/6/2018    Performed by Hong Finn MD at [facility] ENDO (2ND FLR)    ULTRASOUND-ENDOSCOPIC-UPPER N/A 8/28/2017    Performed by Hong Finn [facility] at [facility] ENDO (2ND FLR)    ULTRASOUND-ENDOSCOPIC-UPPER N/A 8/29/2016    Performed by Ramón Mendez MD at [facility] ENDO (2ND FLR)    UPPER ENDOSCOPIC ULTRASOUND W/ FNA       Family History   Problem Relation Age of Onset    Diabetes Father     Hyperlipidemia Father     Hypertension Father     Hyperlipidemia Mother     Cancer Maternal Aunt         pancreatic cancer, late 50s at dx    Pancreatic cancer Maternal Aunt     Breast cancer Maternal Aunt 55    Breast cancer Cousin 28    Breast cancer Other 45    No Known Problems Sister     No Known Problems Brother     Cancer Maternal Uncle 65        pancreatic cancer    Pancreatic cancer Maternal Uncle     No Known Problems Paternal Aunt     No Known Problems Paternal Uncle     No Known Problems Maternal Grandmother     No Known Problems Maternal Grandfather      No Known Problems Paternal Grandmother     No Known Problems Paternal Grandfather     Breast cancer Maternal Cousin 50    Cancer Other 25        liver cancer    Amblyopia Neg Hx     Blindness Neg Hx     Cataracts Neg Hx     Macular degeneration Neg Hx     Retinal detachment Neg Hx     Strabismus Neg Hx     Stroke Neg Hx     Thyroid disease Neg Hx     Glaucoma Neg Hx     Ovarian cancer Neg Hx     Celiac disease Neg Hx     Colon cancer Neg Hx     Colon polyps Neg Hx     Esophageal cancer Neg Hx     Liver cancer Neg Hx     Rectal cancer Neg Hx     Stomach cancer Neg Hx     Ulcerative colitis Neg Hx     Inflammatory bowel disease Neg Hx      Social History     Tobacco Use    Smoking status: Never Smoker    Smokeless tobacco: Never Used   Substance Use Topics    Alcohol use: No     Alcohol/week: 0.0 oz    Drug use: No       PTA Medications   Medication Sig    albuterol (ACCUNEB) 0.63 mg/3 mL Nebu Take 3 mLs (0.63 mg total) by nebulization every 6 (six) hours as needed.    amLODIPine (NORVASC) 2.5 MG tablet Take 1 tablet (2.5 mg total) by mouth once daily.    atorvastatin (LIPITOR) 10 MG tablet Take 10 mg by mouth once daily.    cyanocobalamin 1,000 mcg/mL injection Inject 1 mL (1,000 mcg total) into the muscle once a week. 1 Solution Injection monthly.  B12 injection weekly for  3 weeks then every  2 weeks (Patient taking differently: Inject 1,000 mcg into the muscle once a week. Every Saturday)    ergocalciferol (VITAMIN D2) 50,000 unit Cap Take 2 capsules (100,000 Units total) by mouth every 7 days. (Patient taking differently: Take 50,000 Units by mouth every 7 days. Every Wednesday)    ferrous sulfate 325 mg (65 mg iron) Tab Take by mouth. 1 Tablet Oral Every day.  Take one 250mg vitamin C pill every 12 hours which will help your bodyabsorb more of the iron.    gabapentin (NEURONTIN) 800 MG tablet Take 1 tablet (800 mg total) by mouth 3 (three) times daily. (Patient taking  "differently: Take 800 mg by mouth 4 (four) times daily. )    hydroxychloroquine (PLAQUENIL) 200 mg tablet TAKE 1 AND 1/2 TABLETS BY MOUTH EVERY DAY    lidocaine (LIDODERM) 5 % Place 1 patch onto the skin once daily. Remove & Discard patch within 12 hours or as directed by MD    lipase-protease-amylase 36,000-114,000- 180,000 unit CpDR Take 2 capsules by mouth 3 (three) times daily with meals. Take one with each snack    loperamide (IMODIUM) 2 mg capsule TAKE 1 TO 2 CAPSULES BY MOUTH FOUR TIMES DAILY    lorazepam (ATIVAN) 1 MG tablet Take 1 tablet (1 mg total) by mouth every evening. 1 Tablet Oral Q8H-12 hours    niacin 100 MG Tab Take by mouth. 1 Tablet Oral At bedtime    ondansetron (ZOFRAN) 4 MG tablet Take 1 tablet (4 mg total) by mouth every 12 (twelve) hours as needed for Nausea.    polycarbophil (FIBERCON) 625 mg tablet Take by mouth. 1 Tablet Oral Every 12 hours.  Take with 12 ounces of water with each pill.    potassium chloride SA (K-DUR,KLOR-CON) 10 MEQ tablet Take 2 tablets (20 mEq total) by mouth once daily.    predniSONE (DELTASONE) 5 MG tablet Take 1 tablet (5 mg total) by mouth once daily.    rizatriptan (MAXALT) 10 MG tablet Take 1 tablet (10 mg total) by mouth every 6 (six) hours as needed. (Patient taking differently: Take 10 mg by mouth every 6 (six) hours as needed for Migraine. )    SAVELLA 100 mg Tab TAKE ONE TABLET BY MOUTH TWICE DAILY    zolpidem (AMBIEN) 5 MG Tab Take 1 tablet (5 mg total) by mouth nightly as needed.    oxycodone-acetaminophen (PERCOCET) 5-325 mg per tablet Take 1 tablet by mouth every 4 (four) hours as needed for Pain.     Review of patient's allergies indicates:   Allergen Reactions    Aspirin      Other reaction(s): "hemorrhage"  hemorrhage    Hydrocortisone Nausea Only     Other reaction(s): sick  sick    Lactose intolerance (lactase) [lactase] Nausea And Vomiting    Vicodin [hydrocodone-acetaminophen]      Other reaction(s): hyperactivity    Asacol " [mesalamine] Hallucinations     Other reaction(s): Hallucinations  hallucinations        Review of Systems   Constitutional: Negative for chills, fever and weight loss.   Respiratory: Negative for shortness of breath and wheezing.    Cardiovascular: Negative for chest pain.   Gastrointestinal: Positive for abdominal pain. Negative for blood in stool, constipation and melena.       Objective:      Vital Signs (Most Recent)  Temp: 97.9 °F (36.6 °C) (12/10/18 1041)  Pulse: 81 (12/10/18 1041)  Resp: 16 (12/10/18 1041)  BP: 131/84 (12/10/18 1041)  SpO2: 99 % (12/10/18 1041)    Vital Signs Range (Last 24H):  Temp:  [97.9 °F (36.6 °C)]   Pulse:  [81]   Resp:  [16]   BP: (131)/(84)   SpO2:  [99 %]     Physical Exam   Constitutional: She appears well-developed and well-nourished.   Cardiovascular: Normal rate.   Pulmonary/Chest: Effort normal.   Abdominal: Soft.   Skin: Skin is warm and dry.   Psychiatric: She has a normal mood and affect. Her behavior is normal. Judgment and thought content normal.         Assessment:      Active Hospital Problems    Diagnosis  POA    IBD (inflammatory bowel disease) [K52.9]  Yes      Resolved Hospital Problems   No resolved problems to display.       Plan:    EGD and flex sign for abdominal pain and CTE unrevealing.

## 2018-12-10 NOTE — ANESTHESIA PREPROCEDURE EVALUATION
"   Past Medical History:   Diagnosis Date    Acid reflux     Adrenal insufficiency, primary     Arthritis     Asthma     B12 deficiency anemia     Blood transfusion 2010    Breast cancer 2/2016    Right breast infiltrating ductal CA    Cataract     Chronic pain     Deep vein thrombosis     Dry eyes     Esophagitis, unspecified     Fibromyalgia     General anesthetics causing adverse effect in therapeutic use     "slow to wake up bc I don't have an immune system    Heart murmur     History of colon polyps     Hyperlipidemia     Hypopituitary dwarfism     Iron deficiency anemia     Lupus     Migraines, neuralgic     Nonspecific ulcerative colitis     OP (osteoporosis)     history of reclast    Osteopenia     Pancreatic cyst     Schatzki's ring     Steroid sulfatase deficiency     Ulcerative colitis     Vitamin D deficiency disease      Past Surgical History:   Procedure Laterality Date    APPENDECTOMY      BIOPSY-SENTINEL NODE Right 4/27/2016    Performed by [facility]akumar Mu[facility]iz MD at [facility] OR    BREAST BIOPSY Right 2/2016    [facility]    BREAST SURGERY      CHOLECYSTECTOMY      COLON SURGERY      IGNACIO FREE FLAP Right 4/27/2016    Performed by Miah Mccloud MD at [facility] OR    DISSECTION-AXILLARY LYMPH NODE Right 4/27/2016    Performed by [facility]umar Muñiz MD at [facility] OR    EGD (ESOPHAGOGASTRODUODENOSCOPY) N/A 4/3/2013    Performed by Reese Villalta MD at [facility] (4TH FLR)    ENDOSCOPIC ULTRASOUND OF UPPER GASTROINTESTINAL TRACT N/A 8/6/2018    Procedure: ULTRASOUND, ENDOSCOPIC, UPPER GI TRACT;  Surgeon: Hong Finn MD;  Location: [facility] (2ND FLR);  Service: Endoscopy;  Laterality: N/A;    ESOPHAGOGASTRODUODENOSCOPY (EGD) N/A 6/13/2016    Performed by Reese Villalta MD at [facility] (4TH FLR)    ESOPHAGOGASTRODUODENOSCOPY (EGD) N/A 7/31/2015    Performed by Reese Villalta MD at [facility] (4TH FLR)    HYSTERECTOMY      ILEOSCOPY N/A 7/24/2017    Performed by Reese" ADAM Villalta MD at [facility] ENDO (4TH FLR)    ILEOSCOPY N/A 9/25/2013    Performed by Reese Villalta MD at [facility] ENDO (4TH FLR)    INJECTION-SENTINEL NODE Right 4/27/2016    Performed by [facility] MD at [facility] OR    MASTECTOMY- NIPPLE SPARING RIGHT Right 4/27/2016    Performed by [facility] MD at [facility] OR    MOTILITY STUDY, ESOPHAGUS, USING HIGH RESOLUTION MANOMETRY N/A 4/3/2013    Performed by Reese Villalta MD at [facility] ENDO (4TH FLR)    OOPHORECTOMY Bilateral     PH MONITORING, ESOPHAGUS, WIRELESS, (OFF REFLUX MEDS) N/A 4/3/2013    Performed by Reese Villalta MD at [facility] ENDO (4TH FLR)    restorative proctectomy      total abdominal colectomy with ileostomy  2010    TOTAL REDUCTION MAMMOPLASTY Left 2017    ULTRASOUND, ENDOSCOPIC, UPPER GI TRACT N/A 8/6/2018    Performed by Hong Finn MD at [facility] ENDO (2ND FLR)    ULTRASOUND-ENDOSCOPIC-UPPER N/A 8/28/2017    Performed by Hong Finn MD at [facility] ENDO (2ND FLR)    ULTRASOUND-ENDOSCOPIC-UPPER N/A 8/29/2016    Performed by Ramón Mendez MD at [facility] (2ND FLR)    UPPER ENDOSCOPIC ULTRASOUND W/ FNA       Patient Active Problem List   Diagnosis    Attention to other artificial opening of digestive tract    Iron deficiency anemia    Proteins serum plasma low    Vitamin D deficiency disease    Vitamin B12 deficiency    Hyperlipidemia    UC (ulcerative colitis)    Weakness generalized    Chronic use of steroids    Lumbar and sacral spondylarthritis    Migraine syndrome    Fatigue    Encounter for long-term (current) use of other medications    Hypoadrenalism    Ulcerative colitis    S/P total colectomy    GERD (gastroesophageal reflux disease)    Vitamin D imbalance    Hypokalemia    Myalgia and myositis    Trochanteric bursitis    Shoulder pain    Encounter for monitoring proton pump inhibitor therapy    Dysphagia    Acute bronchitis    Wheezing on auscultation    Nausea    Senile osteoporosis     Long term current use of systemic steroids    Chronic LBP    Fibromyalgia    Lupus (systemic lupus erythematosus)    Microhematuria    Bilateral low back pain without sciatica    Proteinuria    Immunosuppression    Malignant neoplasm of upper-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    Preop testing    Loose stools    Malignant neoplasm of right breast    Wound healing, delayed    Exocrine pancreatic insufficiency    Iron deficiency    Family history of pancreatic cancer    Osteopenia    Disorder of bone and cartilage    Abnormal gait    Headache above the eye region    IBD (inflammatory bowel disease)    Pancreas cyst    Pain in both hands    Pain in both feet    Cough     Body mass index is 21.46 kg/m².  2D Echo:  No results found for this or any previous visit.    Please See ROS/PMH and Active Problem List above                                                                                                              12/10/2018  Efe Horowitz is a 58 y.o., female.    Anesthesia Evaluation    I have reviewed the Patient Summary Reports.    I have reviewed the Nursing Notes.   I have reviewed the Medications.     Review of Systems  Anesthesia Hx:  No problems with previous Anesthesia  History of prior surgery of interest to airway management or planning: Previous anesthesia: General, MAC 04/27/16; Placement Time: 0726; Method of Intubation: Beauchamp; Inserted by: CRNA; Airway Device: Endotracheal Tube; Mask Ventilation: Easy - oral; Intubated: Postinduction; Blade: Ramone #3; Airway Device Size: 7.0; Style: Cuffed; Cuff Inflation: Minimal occlusive pressure; Inflation Amount: 6; Placement Verified By: Auscultation, Capnometry; Grade: Grade I; Complicating Factors: None; Intubation Findings: Positive EtCO2, Bilateral breath sounds, Atraumatic/Condition of teeth unchanged;  Depth of Inserti with general anesthesia.  Procedure performed at an [facility] Facility.  08/06/18 ULTRASOUND,  ENDOSCOPIC, UPPER GI TRACT (N/  with MAC.  Procedure performed at an [facility] Facility.   Cardiovascular:  Cardiovascular Normal     Pulmonary:   Asthma    Renal/:  Renal/ Normal     Hepatic/GI:   PUD, GERD    Neurological:   Neuromuscular Disease, Headaches    Endocrine:   Adrenal insufficiency, on 5mg prednisone daily       Physical Exam  General:  Well nourished    Airway/Jaw/Neck:  Airway Findings: Mouth Opening: Normal Tongue: Normal  General Airway Assessment: Adult  Mallampati: II  TM Distance: Normal, at least 6 cm       Chest/Lungs:  Chest/Lungs Findings: Clear to auscultation, Normal Respiratory Rate  No abnormal breath sounds, asthma under good contro.     Heart/Vascular:  Heart Findings: Rate: Normal  Rhythm: Regular Rhythm        Mental Status:  Mental Status Findings:  Cooperative, Alert and Oriented         Anesthesia Plan  Type of Anesthesia, risks & benefits discussed:  Anesthesia Type:  general  Patient's Preference: gen  Intra-op Monitoring Plan: standard ASA monitors  Intra-op Monitoring Plan Comments:   Post Op Pain Control Plan: multimodal analgesia and IV/PO Opioids PRN  Post Op Pain Control Plan Comments: Iv>po  Induction:   IV  Beta Blocker:  Patient is not currently on a Beta-Blocker (No further documentation required).       Informed Consent: Patient understands risks and agrees with Anesthesia plan.  Questions answered. Anesthesia consent signed with patient.  ASA Score: 3     Day of Surgery Review of History & Physical:    H&P update referred to the surgeon.         Ready For Surgery From Anesthesia Perspective.

## 2018-12-10 NOTE — PROGRESS NOTES
Jessica - please tell Efe I sent Rx to [facility] in [place] for a topical medicine to apply every 8 hours to distal rectal area after each BM.  Very important to was hands well after applying medication she can use it for 7 days.    Jessica - please schedule Efe for CRS clinic apt with Dr. Maxwell for follow up anal fissure.

## 2018-12-10 NOTE — [employer]OVATION PATIENT INSTRUCTIONS
Discharge Summary/Instructions after an Endoscopic Procedure  Patient Name: Efe Horowitz  Patient MRN: [id number]  Patient YOB: 1960  Monday, December 10, 2018  Reese Villalta MD  RESTRICTIONS:  During your procedure today, you received medications for sedation.  These   medications may affect your judgment, balance and coordination.  Therefore,   for 24 hours, you have the following restrictions:   - DO NOT drive a car, operate machinery, make legal/financial decisions,   sign important papers or drink alcohol.    ACTIVITY:  Today: no heavy lifting, straining or running due to procedural   sedation/anesthesia.  The following day: return to full activity including work.  DIET:  Eat and drink normally unless instructed otherwise.     TREATMENT FOR COMMON SIDE EFFECTS:  - Mild abdominal pain, nausea, belching, bloating or excessive gas:  rest,   eat lightly and use a heating pad.  - Sore Throat: treat with throat lozenges and/or gargle with warm salt   water.  - Because air was used during the procedure, expelling large amounts of air   from your rectum or belching is normal.  - If a bowel prep was taken, you may not have a bowel movement for 1-3 days.    This is normal.  SYMPTOMS TO WATCH FOR AND REPORT TO YOUR PHYSICIAN:  1. Abdominal pain or bloating, other than gas cramps.  2. Chest pain.  3. Back pain.  4. Signs of infection such as: chills or fever occurring within 24 hours   after the procedure.  5. Rectal bleeding, which would show as bright red, maroon, or black stools.   (A tablespoon of blood from the rectum is not serious, especially if   hemorrhoids are present.)  6. Vomiting.  7. Weakness or dizziness.  GO DIRECTLY TO THE NEAREST EMERGENCY ROOM IF YOU HAVE ANY OF THE FOLLOWING:      Difficulty breathing              Chills and/or fever over 101 F   Persistent vomiting and/or vomiting blood   Severe abdominal pain   Severe chest pain   Black, tarry stools   Bleeding- more than one  tablespoon   Any other symptom or condition that you feel may need urgent attention  Your doctor recommends these additional instructions:  If any biopsies were taken, your doctors clinic will contact you in 1 to 2   weeks with any results.  - Discharge patient to home.   - Follow an antireflux regimen.   - Await pathology results.   - Telephone endoscopist for pathology results in 2 weeks.   - Return to GI clinic.   - The findings and recommendations were discussed with the patient.  For questions, problems or results please call your physician - Reese Villalta MD at Work:  [phone].  [facility], EMERGENCY ROOM PHONE NUMBER: [phone]  IF A COMPLICATION OR EMERGENCY SITUATION ARISES AND YOU ARE UNABLE TO REACH   YOUR PHYSICIAN - GO DIRECTLY TO THE EMERGENCY ROOM.  Reese Villalta MD  12/10/2018 12:04:18 PM  This report has been verified and signed electronically.  [employer]OVATION

## 2018-12-10 NOTE — PROVATION PATIENT INSTRUCTIONS
Discharge Summary/Instructions after an Endoscopic Procedure  Patient Name: Efe Horowitz  Patient MRN: [id number]  Patient YOB: 1960  Monday, December 10, 2018  Reese Villalta MD  RESTRICTIONS:  During your procedure today, you received medications for sedation.  These   medications may affect your judgment, balance and coordination.  Therefore,   for 24 hours, you have the following restrictions:   - DO NOT drive a car, operate machinery, make legal/financial decisions,   sign important papers or drink alcohol.    ACTIVITY:  Today: no heavy lifting, straining or running due to procedural   sedation/anesthesia.  The following day: return to full activity including work.  DIET:  Eat and drink normally unless instructed otherwise.     TREATMENT FOR COMMON SIDE EFFECTS:  - Mild abdominal pain, nausea, belching, bloating or excessive gas:  rest,   eat lightly and use a heating pad.  - Sore Throat: treat with throat lozenges and/or gargle with warm salt   water.  - Because air was used during the procedure, expelling large amounts of air   from your rectum or belching is normal.  - If a bowel prep was taken, you may not have a bowel movement for 1-3 days.    This is normal.  SYMPTOMS TO WATCH FOR AND REPORT TO YOUR PHYSICIAN:  1. Abdominal pain or bloating, other than gas cramps.  2. Chest pain.  3. Back pain.  4. Signs of infection such as: chills or fever occurring within 24 hours   after the procedure.  5. Rectal bleeding, which would show as bright red, maroon, or black stools.   (A tablespoon of blood from the rectum is not serious, especially if   hemorrhoids are present.)  6. Vomiting.  7. Weakness or dizziness.  GO DIRECTLY TO THE NEAREST EMERGENCY ROOM IF YOU HAVE ANY OF THE FOLLOWING:      Difficulty breathing              Chills and/or fever over 101 F   Persistent vomiting and/or vomiting blood   Severe abdominal pain   Severe chest pain   Black, tarry stools   Bleeding- more than one  tablespoon   Any other symptom or condition that you feel may need urgent attention  Your doctor recommends these additional instructions:  If any biopsies were taken, your doctors clinic will contact you in 1 to 2   weeks with any results.  - Discharge patient to home.   - Await pathology results.   - Telephone endoscopist for pathology results in 2 weeks.   - Return to GI clinic.   - The findings and recommendations were discussed with the patient.  For questions, problems or results please call your physician - Reese Villalta MD at Work:  [phone].  [facility], EMERGENCY ROOM PHONE NUMBER: [phone]  IF A COMPLICATION OR EMERGENCY SITUATION ARISES AND YOU ARE UNABLE TO REACH   YOUR PHYSICIAN - GO DIRECTLY TO THE EMERGENCY ROOM.  Reese Villalta MD  12/10/2018 12:32:25 PM  This report has been verified and signed electronically.  [employer]ATION

## 2018-12-11 NOTE — ANESTHESIA POSTPROCEDURE EVALUATION
"Anesthesia Post Evaluation    Patient: Efe Horowitz    Procedure(s) Performed: Procedure(s) (LRB):  EGD (ESOPHAGOGASTRODUODENOSCOPY) (N/A)  SIGMOIDOSCOPY, FLEXIBLE (N/A)    Final Anesthesia Type: general  Patient location during evaluation: PACU  Patient participation: Yes- Able to Participate  Level of consciousness: awake and alert  Post-procedure vital signs: reviewed and stable  Pain management: adequate  Airway patency: patent  PONV status at discharge: No PONV  Anesthetic complications: no      Cardiovascular status: blood pressure returned to baseline  Respiratory status: unassisted  Hydration status: euvolemic  Follow-up not needed.  Comments: Pt seen prior to DC, note entered now.          Visit Vitals  /72   Pulse 88   Temp 36.4 °C (97.5 °F)   Resp 20   Ht 5' 4" (1.626 m)   Wt 56.7 kg (125 lb)   SpO2 100%   Breastfeeding? No   BMI 21.46 kg/m²       Pain/Natalie Score: Natalie Score: 10 (12/10/2018 12:51 PM)        "

## 2018-12-12 DIAGNOSIS — K86.81 EXOCRINE PANCREATIC INSUFFICIENCY: ICD-10-CM

## 2018-12-12 DIAGNOSIS — M54.31 SCIATICA OF RIGHT SIDE: ICD-10-CM

## 2018-12-13 RX ORDER — HYDROXYCHLOROQUINE SULFATE 200 MG/1
TABLET, FILM COATED ORAL
Qty: 45 TABLET | Refills: 3 | Status: SHIPPED | OUTPATIENT
Start: 2018-12-13 | End: 2019-04-24 | Stop reason: SDUPTHER

## 2018-12-17 ENCOUNTER — TELEPHONE (OUTPATIENT)
Dept: ENDOSCOPY | Facility: HOSPITAL | Age: 58
End: 2018-12-17

## 2018-12-17 RX ORDER — PANCRELIPASE 36000; 180000; 114000 [USP'U]/1; [USP'U]/1; [USP'U]/1
CAPSULE, DELAYED RELEASE PELLETS ORAL
Qty: 240 CAPSULE | Refills: 5 | Status: SHIPPED | OUTPATIENT
Start: 2018-12-17 | End: 2019-09-10 | Stop reason: SDUPTHER

## 2018-12-19 ENCOUNTER — TELEPHONE (OUTPATIENT)
Dept: GASTROENTEROLOGY | Facility: CLINIC | Age: 58
End: 2018-12-19

## 2018-12-19 NOTE — TELEPHONE ENCOUNTER
----- Message from Ondina Desai sent at 12/19/2018 10:09 AM CST -----  Contact: self - [phone]  lan - returning your call - please call patient at

## 2019-01-11 ENCOUNTER — OFFICE VISIT (OUTPATIENT)
Dept: SURGERY | Facility: CLINIC | Age: 59
End: 2019-01-11
Payer: MEDICARE

## 2019-01-11 VITALS
HEIGHT: 60 IN | DIASTOLIC BLOOD PRESSURE: 71 MMHG | HEART RATE: 80 BPM | SYSTOLIC BLOOD PRESSURE: 145 MMHG | WEIGHT: 124.56 LBS | BODY MASS INDEX: 24.45 KG/M2

## 2019-01-11 DIAGNOSIS — K62.5 RECTAL BLEEDING: Primary | ICD-10-CM

## 2019-01-11 PROCEDURE — 46600 DIAGNOSTIC ANOSCOPY SPX: CPT | Mod: S$PBB,,, | Performed by: COLON & RECTAL SURGERY

## 2019-01-11 PROCEDURE — 99999 PR PBB SHADOW E&M-EST. PATIENT-LVL III: CPT | Mod: PBBFAC,,, | Performed by: COLON & RECTAL SURGERY

## 2019-01-11 PROCEDURE — 99999 PR PBB SHADOW E&M-EST. PATIENT-LVL III: ICD-10-PCS | Mod: PBBFAC,,, | Performed by: COLON & RECTAL SURGERY

## 2019-01-11 PROCEDURE — 46600 DIAGNOSTIC ANOSCOPY SPX: CPT | Mod: PBBFAC | Performed by: COLON & RECTAL SURGERY

## 2019-01-11 PROCEDURE — 99203 OFFICE O/P NEW LOW 30 MIN: CPT | Mod: 25,S$PBB,, | Performed by: COLON & RECTAL SURGERY

## 2019-01-11 PROCEDURE — 99213 OFFICE O/P EST LOW 20 MIN: CPT | Mod: PBBFAC | Performed by: COLON & RECTAL SURGERY

## 2019-01-11 PROCEDURE — 46600 PR DIAG2STIC A2SCOPY: ICD-10-PCS | Mod: S$PBB,,, | Performed by: COLON & RECTAL SURGERY

## 2019-01-11 PROCEDURE — 99203 PR OFFICE/OUTPT VISIT, NEW, LEVL III, 30-44 MIN: ICD-10-PCS | Mod: 25,S$PBB,, | Performed by: COLON & RECTAL SURGERY

## 2019-01-11 NOTE — PROGRESS NOTES
Subjective:       Patient ID: Efe Horowitz is a 59 y.o. female.    Chief Complaint: Pilonidal Disease    HPI new patient. Known to me from resort of procto colectomy in 2011.  She has done well since that time but over the last 4 months has had intermittent rectal bleeding. She has had alternating issues with hard stool and loose stool.  A 1 point she had stopped her Imodium but now has resumed it. Denies abdominal pain. She had pouchoscopy by Dr. Renee last month which showed normal pouch mucosa.    Review of Systems   Constitutional: Negative for chills and fever.   Respiratory: Negative for cough and shortness of breath.    Cardiovascular: Negative for chest pain and palpitations.   Gastrointestinal: Negative for nausea and vomiting.   Genitourinary: Negative for dysuria and urgency.   Neurological: Negative for seizures and numbness.       Objective:      Physical Exam   Constitutional: She is oriented to person, place, and time. She appears well-developed and well-nourished.   Eyes: Conjunctivae and EOM are normal.   Pulmonary/Chest: Effort normal. No respiratory distress.   Abdominal: Soft. She exhibits no distension.   Genitourinary:   Genitourinary Comments: Anoscopy:  No evidence of internal hemorrhoid with prolapse.  Normal perianal skin. Digital rectal exam no mass.   Musculoskeletal: Normal range of motion. She exhibits no edema.   Neurological: She is alert and oriented to person, place, and time.   Skin: Skin is warm and dry.   Psychiatric: She has a normal mood and affect. Her behavior is normal.       Assessment:       1. Rectal bleeding        Plan:       Regular bowel function with diet and p.r.n. Imodium.  Topical Calmoseptine.  Follow-up as needed.

## 2019-01-11 NOTE — LETTER
January 11, 2019      Reese Villalta MD  [address]  [facility] [address]           [facility]  [address]  [facility] [address]  Phone: [phone]          Patient: Efe Horowitz   MR Number: [id number]   YOB: 1960   Date of Visit: 1/11/2019       Dear Dr. Reese Villalta:    Thank you for referring Efe Horowitz to me for evaluation. Attached you will find relevant portions of my assessment and plan of care.    If you have questions, please do not hesitate to call me. I look forward to following Efe Horowitz along with you.    Sincerely,    Aleajndro Maxwell MD    Enclosure  CC:  No Recipients    If you would like to receive this communication electronically, please contact [email or web address] or [phone] to request more information on [employer] Link access.    For providers and/or their staff who would like to refer a patient to [facility], please contact us through our one-stop-shop provider referral line, [facility] [place], at [phone].    If you feel you have received this communication in error or would no longer like to receive these types of communications, please e-mail [email or web address]

## 2019-01-24 ENCOUNTER — TELEPHONE (OUTPATIENT)
Dept: GASTROENTEROLOGY | Facility: CLINIC | Age: 59
End: 2019-01-24

## 2019-01-24 NOTE — TELEPHONE ENCOUNTER
----- Message from Jocelyn Doherty sent at 1/24/2019 10:24 AM CST -----  Contact: Self- [phone]  Ambar- pt called to schedule f/u appt- received notice in the mail- please contact pt at [phone]

## 2019-02-15 ENCOUNTER — LAB VISIT (OUTPATIENT)
Dept: LAB | Facility: HOSPITAL | Age: 59
End: 2019-02-15
Attending: INTERNAL MEDICINE
Payer: MEDICARE

## 2019-02-15 DIAGNOSIS — R53.83 FATIGUE, UNSPECIFIED TYPE: ICD-10-CM

## 2019-02-15 DIAGNOSIS — M32.9 SYSTEMIC LUPUS ERYTHEMATOSUS, UNSPECIFIED SLE TYPE, UNSPECIFIED ORGAN INVOLVEMENT STATUS: ICD-10-CM

## 2019-02-15 DIAGNOSIS — D84.9 IMMUNOSUPPRESSION: ICD-10-CM

## 2019-02-15 LAB
ALBUMIN SERPL BCP-MCNC: 3.7 G/DL
ALP SERPL-CCNC: 55 U/L
ALT SERPL W/O P-5'-P-CCNC: 16 U/L
ANION GAP SERPL CALC-SCNC: 11 MMOL/L
AST SERPL-CCNC: 22 U/L
BASOPHILS # BLD AUTO: 0.05 K/UL
BASOPHILS NFR BLD: 0.8 %
BILIRUB SERPL-MCNC: 0.4 MG/DL
BUN SERPL-MCNC: 12 MG/DL
C3 SERPL-MCNC: 107 MG/DL
C4 SERPL-MCNC: 35 MG/DL
CALCIUM SERPL-MCNC: 9.4 MG/DL
CHLORIDE SERPL-SCNC: 100 MMOL/L
CK SERPL-CCNC: 159 U/L
CO2 SERPL-SCNC: 29 MMOL/L
CREAT SERPL-MCNC: 0.8 MG/DL
CRP SERPL-MCNC: 0.9 MG/L
DIFFERENTIAL METHOD: ABNORMAL
EOSINOPHIL # BLD AUTO: 0.2 K/UL
EOSINOPHIL NFR BLD: 3 %
ERYTHROCYTE [DISTWIDTH] IN BLOOD BY AUTOMATED COUNT: 14.6 %
EST. GFR  (AFRICAN AMERICAN): >60 ML/MIN/1.73 M^2
EST. GFR  (NON AFRICAN AMERICAN): >60 ML/MIN/1.73 M^2
GLUCOSE SERPL-MCNC: 83 MG/DL
HCT VFR BLD AUTO: 39.7 %
HGB BLD-MCNC: 12.1 G/DL
IMM GRANULOCYTES # BLD AUTO: 0.03 K/UL
IMM GRANULOCYTES NFR BLD AUTO: 0.5 %
LYMPHOCYTES # BLD AUTO: 1.6 K/UL
LYMPHOCYTES NFR BLD: 27.4 %
MCH RBC QN AUTO: 26.3 PG
MCHC RBC AUTO-ENTMCNC: 30.5 G/DL
MCV RBC AUTO: 86 FL
MONOCYTES # BLD AUTO: 0.7 K/UL
MONOCYTES NFR BLD: 11.1 %
NEUTROPHILS # BLD AUTO: 3.4 K/UL
NEUTROPHILS NFR BLD: 57.2 %
NRBC BLD-RTO: 0 /100 WBC
PLATELET # BLD AUTO: 306 K/UL
PMV BLD AUTO: 10.8 FL
POTASSIUM SERPL-SCNC: 3.4 MMOL/L
PROT SERPL-MCNC: 6.7 G/DL
RBC # BLD AUTO: 4.6 M/UL
SODIUM SERPL-SCNC: 140 MMOL/L
WBC # BLD AUTO: 5.94 K/UL

## 2019-02-15 PROCEDURE — 86160 COMPLEMENT ANTIGEN: CPT | Mod: 59

## 2019-02-15 PROCEDURE — 82550 ASSAY OF CK (CPK): CPT

## 2019-02-15 PROCEDURE — 86160 COMPLEMENT ANTIGEN: CPT

## 2019-02-15 PROCEDURE — 85025 COMPLETE CBC W/AUTO DIFF WBC: CPT

## 2019-02-15 PROCEDURE — 86140 C-REACTIVE PROTEIN: CPT

## 2019-02-15 PROCEDURE — 86225 DNA ANTIBODY NATIVE: CPT

## 2019-02-15 PROCEDURE — 80053 COMPREHEN METABOLIC PANEL: CPT

## 2019-02-15 PROCEDURE — 36415 COLL VENOUS BLD VENIPUNCTURE: CPT | Mod: PO

## 2019-02-18 ENCOUNTER — PATIENT MESSAGE (OUTPATIENT)
Dept: RHEUMATOLOGY | Facility: CLINIC | Age: 59
End: 2019-02-18

## 2019-02-18 LAB — DSDNA AB SER-ACNC: NORMAL [IU]/ML

## 2019-02-19 ENCOUNTER — OFFICE VISIT (OUTPATIENT)
Dept: RHEUMATOLOGY | Facility: CLINIC | Age: 59
End: 2019-02-19
Payer: MEDICARE

## 2019-02-19 VITALS
HEART RATE: 86 BPM | DIASTOLIC BLOOD PRESSURE: 77 MMHG | BODY MASS INDEX: 25.76 KG/M2 | SYSTOLIC BLOOD PRESSURE: 124 MMHG | HEIGHT: 60 IN | WEIGHT: 131.19 LBS

## 2019-02-19 DIAGNOSIS — R43.0 LOSS OF SMELL: ICD-10-CM

## 2019-02-19 DIAGNOSIS — M70.61 TROCHANTERIC BURSITIS OF BOTH HIPS: ICD-10-CM

## 2019-02-19 DIAGNOSIS — M32.9 SYSTEMIC LUPUS ERYTHEMATOSUS, UNSPECIFIED SLE TYPE, UNSPECIFIED ORGAN INVOLVEMENT STATUS: Primary | ICD-10-CM

## 2019-02-19 DIAGNOSIS — M70.62 TROCHANTERIC BURSITIS OF BOTH HIPS: ICD-10-CM

## 2019-02-19 DIAGNOSIS — M79.7 FIBROMYALGIA: ICD-10-CM

## 2019-02-19 DIAGNOSIS — R53.83 FATIGUE, UNSPECIFIED TYPE: ICD-10-CM

## 2019-02-19 DIAGNOSIS — D84.9 IMMUNOSUPPRESSION: ICD-10-CM

## 2019-02-19 PROCEDURE — 99215 PR OFFICE/OUTPT VISIT, EST, LEVL V, 40-54 MIN: ICD-10-PCS | Mod: 25,S$PBB,, | Performed by: INTERNAL MEDICINE

## 2019-02-19 PROCEDURE — 20610 DRAIN/INJ JOINT/BURSA W/O US: CPT | Mod: 50,S$PBB,, | Performed by: INTERNAL MEDICINE

## 2019-02-19 PROCEDURE — 20610 DRAIN/INJ JOINT/BURSA W/O US: CPT | Mod: 50,PBBFAC | Performed by: INTERNAL MEDICINE

## 2019-02-19 PROCEDURE — 99999 PR PBB SHADOW E&M-EST. PATIENT-LVL IV: CPT | Mod: PBBFAC,,, | Performed by: INTERNAL MEDICINE

## 2019-02-19 PROCEDURE — 99999 PR PBB SHADOW E&M-EST. PATIENT-LVL IV: ICD-10-PCS | Mod: PBBFAC,,, | Performed by: INTERNAL MEDICINE

## 2019-02-19 PROCEDURE — 99215 OFFICE O/P EST HI 40 MIN: CPT | Mod: 25,S$PBB,, | Performed by: INTERNAL MEDICINE

## 2019-02-19 PROCEDURE — 99214 OFFICE O/P EST MOD 30 MIN: CPT | Mod: PBBFAC,25 | Performed by: INTERNAL MEDICINE

## 2019-02-19 PROCEDURE — 20610 PR DRAIN/INJECT LARGE JOINT/BURSA: ICD-10-PCS | Mod: 50,S$PBB,, | Performed by: INTERNAL MEDICINE

## 2019-02-19 RX ORDER — TRIAMCINOLONE ACETONIDE 40 MG/ML
80 INJECTION, SUSPENSION INTRA-ARTICULAR; INTRAMUSCULAR
Status: COMPLETED | OUTPATIENT
Start: 2019-02-19 | End: 2019-02-19

## 2019-02-19 RX ADMIN — TRIAMCINOLONE ACETONIDE 80 MG: 40 INJECTION, SUSPENSION INTRA-ARTICULAR; INTRAMUSCULAR at 10:02

## 2019-02-19 ASSESSMENT — ROUTINE ASSESSMENT OF PATIENT INDEX DATA (RAPID3)
FATIGUE SCORE: 6
AM STIFFNESS SCORE: 1, YES
TOTAL RAPID3 SCORE: 5.39
PATIENT GLOBAL ASSESSMENT SCORE: 6
PSYCHOLOGICAL DISTRESS SCORE: 0
MDHAQ FUNCTION SCORE: .5
WHEN YOU AWAKENED IN THE MORNING OVER THE LAST WEEK, PLEASE INDICATE THE AMOUNT OF TIME IT TAKES UNTIL YOU ARE AS LIMBER AS YOU WILL BE FOR THE DAY: 1.50
PAIN SCORE: 8.5

## 2019-03-18 RX ORDER — PREDNISONE 5 MG/1
TABLET ORAL
Qty: 100 TABLET | Refills: 0 | Status: SHIPPED | OUTPATIENT
Start: 2019-03-18 | End: 2019-04-23

## 2019-04-01 ENCOUNTER — OFFICE VISIT (OUTPATIENT)
Dept: OTOLARYNGOLOGY | Facility: CLINIC | Age: 59
End: 2019-04-01
Payer: MEDICARE

## 2019-04-01 VITALS
WEIGHT: 126.31 LBS | DIASTOLIC BLOOD PRESSURE: 84 MMHG | SYSTOLIC BLOOD PRESSURE: 141 MMHG | HEART RATE: 101 BPM | HEIGHT: 64 IN | BODY MASS INDEX: 21.56 KG/M2

## 2019-04-01 DIAGNOSIS — R43.0 ANOSMIA: Primary | ICD-10-CM

## 2019-04-01 PROCEDURE — 3077F PR MOST RECENT SYSTOLIC BLOOD PRESSURE >= 140 MM HG: ICD-10-PCS | Mod: CPTII,S$GLB,, | Performed by: OTOLARYNGOLOGY

## 2019-04-01 PROCEDURE — 3077F SYST BP >= 140 MM HG: CPT | Mod: CPTII,S$GLB,, | Performed by: OTOLARYNGOLOGY

## 2019-04-01 PROCEDURE — 3008F PR BODY MASS INDEX (BMI) DOCUMENTED: ICD-10-PCS | Mod: CPTII,S$GLB,, | Performed by: OTOLARYNGOLOGY

## 2019-04-01 PROCEDURE — 99204 PR OFFICE/OUTPT VISIT, NEW, LEVL IV, 45-59 MIN: ICD-10-PCS | Mod: 25,S$GLB,, | Performed by: OTOLARYNGOLOGY

## 2019-04-01 PROCEDURE — 31231 NASAL ENDOSCOPY DX: CPT | Mod: S$GLB,,, | Performed by: OTOLARYNGOLOGY

## 2019-04-01 PROCEDURE — 99999 PR PBB SHADOW E&M-EST. PATIENT-LVL III: CPT | Mod: PBBFAC,,, | Performed by: OTOLARYNGOLOGY

## 2019-04-01 PROCEDURE — 99204 OFFICE O/P NEW MOD 45 MIN: CPT | Mod: 25,S$GLB,, | Performed by: OTOLARYNGOLOGY

## 2019-04-01 PROCEDURE — 3079F DIAST BP 80-89 MM HG: CPT | Mod: CPTII,S$GLB,, | Performed by: OTOLARYNGOLOGY

## 2019-04-01 PROCEDURE — 3079F PR MOST RECENT DIASTOLIC BLOOD PRESSURE 80-89 MM HG: ICD-10-PCS | Mod: CPTII,S$GLB,, | Performed by: OTOLARYNGOLOGY

## 2019-04-01 PROCEDURE — 99999 PR PBB SHADOW E&M-EST. PATIENT-LVL III: ICD-10-PCS | Mod: PBBFAC,,, | Performed by: OTOLARYNGOLOGY

## 2019-04-01 PROCEDURE — 3008F BODY MASS INDEX DOCD: CPT | Mod: CPTII,S$GLB,, | Performed by: OTOLARYNGOLOGY

## 2019-04-01 PROCEDURE — 31231 PR NASAL ENDOSCOPY, DX: ICD-10-PCS | Mod: S$GLB,,, | Performed by: OTOLARYNGOLOGY

## 2019-04-01 NOTE — LETTER
April 2, 2019      Idania Allison MD  [address]  [facility] [address]           [facility] - O[facility]hinol[facility]ology  [address]  [facility] [address]  Phone: [phone]  Fax: [phone]          Patient: Efe Horowitz   MR Number: [id number]   YOB: 1960   Date of Visit: 4/1/2019       Dear Dr. Idania Allison:    Thank you for referring Efe Horowitz to me for evaluation. Attached you will find relevant portions of my assessment and plan of care.    If you have questions, please do not hesitate to call me. I look forward to following Efe Horowitz along with you.    Sincerely,    [place] ROBERTH Choi III, MD    Enclosure  CC:  No Recipients    If you would like to receive this communication electronically, please contact [email or web address] or [phone] to request more information on [employer] Link access.    For providers and/or their staff who would like to refer a patient to [facility], please contact us through our one-stop-shop provider referral line, [facility], at [phone].    If you feel you have received this communication in error or would no longer like to receive these types of communications, please e-mail [email or web address]

## 2019-04-03 ENCOUNTER — HOSPITAL ENCOUNTER (OUTPATIENT)
Dept: RADIOLOGY | Facility: HOSPITAL | Age: 59
Discharge: HOME | End: 2019-04-03
Attending: OTOLARYNGOLOGY
Payer: MEDICARE

## 2019-04-03 ENCOUNTER — TELEPHONE (OUTPATIENT)
Dept: SURGERY | Facility: CLINIC | Age: 59
End: 2019-04-03

## 2019-04-03 DIAGNOSIS — Z85.3 HISTORY OF MALIGNANT NEOPLASM OF RIGHT BREAST: Primary | ICD-10-CM

## 2019-04-03 DIAGNOSIS — R43.0 ANOSMIA: ICD-10-CM

## 2019-04-03 PROCEDURE — 70486 CT MAXILLOFACIAL W/O DYE: CPT | Mod: TC

## 2019-04-03 PROCEDURE — 70486 CT MEDTRONIC SINUSES WITHOUT: ICD-10-PCS | Mod: 26,,, | Performed by: RADIOLOGY

## 2019-04-03 PROCEDURE — 70486 CT MAXILLOFACIAL W/O DYE: CPT | Mod: 26,,, | Performed by: RADIOLOGY

## 2019-04-03 NOTE — TELEPHONE ENCOUNTER
Called and spoke with patient. She is scheduled for both her mammo and her appointment for 4/23/19 at 10:15am, and 11am with Dr. Muñiz. Patient voiced understanding of appointment date and time. Reminder letters sent to patient.    ----- Message from Martina Edmondson RN sent at 4/3/2019  2:36 PM CDT -----  Peterson Garcia,  The order is in for her contralateral mammo, will you please schedule her whenever you can.  ThanksMartina  ----- Message -----  From: Radha Estes MA  Sent: 4/2/2019   2:30 PM  To: Martina Edmondson RN        ----- Message -----  From: Edward Interiano  Sent: 4/1/2019   3:03 PM  To: Antonino KEEN Staff    Reason for call:Pt calling to schedule CBE and MMG, pt doesn't have any mammo orders in her chart.        Communication Preference:[phone]    Additional Information:

## 2019-04-03 NOTE — CONSULTS
Ms. Horowitz presents referred by Dr. Jiang for consultation.    VITAL SIGNS:  Per nurses' notes.    CHIEF COMPLAINT:  Anosmia.    HISTORY OF PRESENT ILLNESS:  This is a 59-year-old female who states that she   began losing her sense of smell last December, approximately three and a half   months ago.  She states that her taste is beginning to decrease as well.  Her   NOSE score is 35.  She does state that she gets sinus infections relatively   regularly two to three times annually treated with antibiotics and steroids.    ADDITIONAL PAST MEDICAL HISTORY:  Positive for acid reflux, adrenal   insufficiency, fibromyalgia, breast cancer, lupus and heart murmur as well as   ulcerative colitis.    PAST SURGICAL HISTORY:  Positive for abdominal colectomy with ileostomy,   cholecystectomy, hysterectomy, appendectomy, breast surgery, upper GI and lower   GI scopes.    FAMILY HISTORY: Positive for breast cancer, diabetes and hypertension.    SOCIAL HISTORY:  Nonsmoker.  Nondrinker.    MEDICATIONS AND ALLERGIES:  Per MedCard.    PHYSICAL EXAMINATION:  GENERAL APPEARANCE:  Well-developed, well-nourished 59-year-old black female in   no apparent distress.  Communication ability is good.  EAR NOTE AND THROAT:  Ears are clear.  External nose shows good valve support.    Intranasally, septum relatively straight, 1 to 2+ turbinates.  Airway is clear   at this time.  Examination with a flexible nasal endoscopy with scope #[id number]   shows no masses, polyps, or other lesions intranasally.  No purulence noted.    The tonsils and palate are normal.  NECK:  Supple.  Thyroid with no masses.  LYMPHATICS:  No nodes.  RESPIRATORY:  Effort is normal.  EYES:  Ocular mobility normal.  NEUROLOGIC:  Cranial nerves 2-12 are grossly intact.    LUNGS:  Auscultation is clear.    The remainder of exam is essentially normal.    IMPRESSION AND PLAN:  A 59-year-old black female with progressive anosmia and   dysgeusia over the last 3-1/2 months:  I have  discussed my findings with her in   detail as well as my recommendations for treatment.  My recommendation would be   CT scan of her sinuses.  She will contact us after this is completed to arrange   for followup.      HARVEY/IN  dd: 04/02/2019 10:33:31 (CDT)  td: 04/02/2019 19:50:57 (CDT)  Doc ID   #[id number]  Job ID #[id number]    CC:

## 2019-04-09 ENCOUNTER — TELEPHONE (OUTPATIENT)
Dept: OTOLARYNGOLOGY | Facility: CLINIC | Age: 59
End: 2019-04-09

## 2019-04-23 ENCOUNTER — OFFICE VISIT (OUTPATIENT)
Dept: SURGERY | Facility: CLINIC | Age: 59
End: 2019-04-23
Payer: MEDICARE

## 2019-04-23 ENCOUNTER — HOSPITAL ENCOUNTER (OUTPATIENT)
Dept: RADIOLOGY | Facility: HOSPITAL | Age: 59
Discharge: HOME | End: 2019-04-23
Attending: SURGERY
Payer: MEDICARE

## 2019-04-23 VITALS
BODY MASS INDEX: 21.63 KG/M2 | DIASTOLIC BLOOD PRESSURE: 85 MMHG | TEMPERATURE: 97 F | HEIGHT: 64 IN | HEART RATE: 92 BPM | SYSTOLIC BLOOD PRESSURE: 165 MMHG

## 2019-04-23 VITALS — BODY MASS INDEX: 21.51 KG/M2 | WEIGHT: 126 LBS | HEIGHT: 64 IN

## 2019-04-23 DIAGNOSIS — Z85.3 HISTORY OF MALIGNANT NEOPLASM OF RIGHT BREAST: ICD-10-CM

## 2019-04-23 DIAGNOSIS — Z85.3 HX OF BREAST CANCER: Primary | ICD-10-CM

## 2019-04-23 PROCEDURE — 99999 PR PBB SHADOW E&M-EST. PATIENT-LVL III: CPT | Mod: PBBFAC,,, | Performed by: SURGERY

## 2019-04-23 PROCEDURE — 3077F PR MOST RECENT SYSTOLIC BLOOD PRESSURE >= 140 MM HG: ICD-10-PCS | Mod: CPTII,S$GLB,, | Performed by: SURGERY

## 2019-04-23 PROCEDURE — 3008F PR BODY MASS INDEX (BMI) DOCUMENTED: ICD-10-PCS | Mod: CPTII,S$GLB,, | Performed by: SURGERY

## 2019-04-23 PROCEDURE — 99213 OFFICE O/P EST LOW 20 MIN: CPT | Mod: S$GLB,,, | Performed by: SURGERY

## 2019-04-23 PROCEDURE — 77065 MAMMO DIGITAL DIAGNOSTIC LEFT WITH TOMOSYNTHESIS_CAD: ICD-10-PCS | Mod: 26,LT,, | Performed by: RADIOLOGY

## 2019-04-23 PROCEDURE — 3079F DIAST BP 80-89 MM HG: CPT | Mod: CPTII,S$GLB,, | Performed by: SURGERY

## 2019-04-23 PROCEDURE — 3008F BODY MASS INDEX DOCD: CPT | Mod: CPTII,S$GLB,, | Performed by: SURGERY

## 2019-04-23 PROCEDURE — 77061 BREAST TOMOSYNTHESIS UNI: CPT | Mod: 26,LT,, | Performed by: RADIOLOGY

## 2019-04-23 PROCEDURE — 99213 PR OFFICE/OUTPT VISIT, EST, LEVL III, 20-29 MIN: ICD-10-PCS | Mod: S$GLB,,, | Performed by: SURGERY

## 2019-04-23 PROCEDURE — 3077F SYST BP >= 140 MM HG: CPT | Mod: CPTII,S$GLB,, | Performed by: SURGERY

## 2019-04-23 PROCEDURE — 77065 DX MAMMO INCL CAD UNI: CPT | Mod: 26,LT,, | Performed by: RADIOLOGY

## 2019-04-23 PROCEDURE — 77065 DX MAMMO INCL CAD UNI: CPT | Mod: TC,PO,LT

## 2019-04-23 PROCEDURE — 77061 MAMMO DIGITAL DIAGNOSTIC LEFT WITH TOMOSYNTHESIS_CAD: ICD-10-PCS | Mod: 26,LT,, | Performed by: RADIOLOGY

## 2019-04-23 PROCEDURE — 99999 PR PBB SHADOW E&M-EST. PATIENT-LVL III: ICD-10-PCS | Mod: PBBFAC,,, | Performed by: SURGERY

## 2019-04-23 PROCEDURE — 77061 BREAST TOMOSYNTHESIS UNI: CPT | Mod: TC,PO,LT

## 2019-04-23 PROCEDURE — 3079F PR MOST RECENT DIASTOLIC BLOOD PRESSURE 80-89 MM HG: ICD-10-PCS | Mod: CPTII,S$GLB,, | Performed by: SURGERY

## 2019-04-23 NOTE — MEDICAL/APP STUDENT
Efe Horowitz is a 59 y.o. female with a history of right total complete therapeutic mastectomy with nipple sparring technique and 2 right axillary sentinel lymph node biopsies due to T1bN0 triple negative tumor of right breast. Patient now presents for 1 month follow up. Complains of some itching under armpit and redness around right nipple. Otherwise with no complaints.    Mammogram earlier today was with no evidence of malignancy.    PMH  Lupus  Ulcerative colitis  Fibromyalgia  B12 deficiency  Vit D deficiency  Fibroids    Current Outpatient Medications on File Prior to Visit   Medication Sig Dispense Refill    albuterol (ACCUNEB) 0.63 mg/3 mL Nebu Take 3 mLs (0.63 mg total) by nebulization every 6 (six) hours as needed. 3 mL 6    amLODIPine (NORVASC) 2.5 MG tablet Take 1 tablet (2.5 mg total) by mouth once daily. 30 tablet 11    atorvastatin (LIPITOR) 10 MG tablet Take 10 mg by mouth once daily.      CREON 36,000-114,000- 180,000 unit CpDR TAKE TWO CAPSULES BY MOUTH THREE TIMES DAILY WITH meals AND ONE CAPSULE WITH each snack 240 capsule 5    cyanocobalamin 1,000 mcg/mL injection Inject 1 mL (1,000 mcg total) into the muscle once a week. 1 Solution Injection monthly.  B12 injection weekly for  3 weeks then every  2 weeks (Patient taking differently: Inject 1,000 mcg into the muscle once a week. Every Saturday) 30 mL 6    diltiazem HCl (DILTIAZEM 2% CREAM) APPLY TO ANAL AREA EVERY 8 HOURS FOR 7 DAYS  1    ergocalciferol (VITAMIN D2) 50,000 unit Cap Take 2 capsules (100,000 Units total) by mouth every 7 days. (Patient taking differently: Take 50,000 Units by mouth every 7 days. Every Wednesday) 8 capsule 6    ferrous sulfate 325 mg (65 mg iron) Tab Take by mouth. 1 Tablet Oral Every day.  Take one 250mg vitamin C pill every 12 hours which will help your bodyabsorb more of the iron.      gabapentin (NEURONTIN) 800 MG tablet Take 1 tablet (800 mg total) by mouth 3 (three) times daily. (Patient  taking differently: Take 800 mg by mouth 4 (four) times daily. ) 270 tablet 6    hydroxychloroquine (PLAQUENIL) 200 mg tablet TAKE 1 AND 1/2 TABLETS BY MOUTH EVERY DAY 45 tablet 3    lidocaine (LIDODERM) 5 % Place 1 patch onto the skin once daily. Remove & Discard patch within 12 hours or as directed by MD 30 patch 12    loperamide (IMODIUM) 2 mg capsule TAKE 1 TO 2 CAPSULES BY MOUTH FOUR TIMES DAILY 240 capsule 3    lorazepam (ATIVAN) 1 MG tablet Take 1 tablet (1 mg total) by mouth every evening. 1 Tablet Oral Q8H-12 hours 30 tablet 5    niacin 100 MG Tab Take by mouth. 1 Tablet Oral At bedtime      ondansetron (ZOFRAN) 4 MG tablet Take 1 tablet (4 mg total) by mouth every 12 (twelve) hours as needed for Nausea. 30 tablet 6    polycarbophil (FIBERCON) 625 mg tablet Take by mouth. 1 Tablet Oral Every 12 hours.  Take with 12 ounces of water with each pill.      potassium chloride SA (K-DUR,KLOR-CON) 10 MEQ tablet Take 2 tablets (20 mEq total) by mouth once daily. 180 tablet 3    predniSONE (DELTASONE) 5 MG tablet Take 1 tablet (5 mg total) by mouth once daily. 90 tablet 3    rizatriptan (MAXALT) 10 MG tablet Take 1 tablet (10 mg total) by mouth every 6 (six) hours as needed. (Patient taking differently: Take 10 mg by mouth every 6 (six) hours as needed for Migraine. ) 30 tablet 6    SAVELLA 100 mg Tab TAKE ONE TABLET BY MOUTH TWICE DAILY 60 tablet 1    zolpidem (AMBIEN) 5 MG Tab Take 1 tablet (5 mg total) by mouth nightly as needed. 30 tablet 4    [DISCONTINUED] predniSONE (DELTASONE) 5 MG tablet TAKE TWO TABLETS BY MOUTH EVERY DAY FOR 3 DAYS then TAKE ONE TABLET DAILY 100 tablet 0     No current facility-administered medications on file prior to visit.            PSHx   Hysterectomy (due to fibroids)  Oophorectomy  Cholecystectomy  Total colectomy with reconstruction    FH  Patient reports that 4 aunts and 3 cousins have had breast cancer    SH  Not employed  Lives at home with parents  Has 2 sons, (1  adopted)    Denies smoking cigarettes  Denies drinking alcohol    Physical Exam     Vitals:    04/23/19 1207   BP: (!) 165/85   Pulse: 92   Temp: 96.9 °F (36.1 °C)     Patient right breast with some mild erythema around nipple. Otherwise surgical scars are well healed. Denies tenderness. No swelling, erythema, abnormal warmth.    Assessment and Plan  Patient is a 60 yo female with a history of right breast cancer treated with complete therapeutic mastectomy with nipple sparring technique and 2 sentinel node biopsies who presents for 1 year mammogram and follow-up evaluation.    Will schedule patient for follow-up in one year as history, physical exam, and imaging are all benign.     Rodolfo LAUREN  04/23/2019 1:35PM

## 2019-04-24 RX ORDER — HYDROXYCHLOROQUINE SULFATE 200 MG/1
TABLET, FILM COATED ORAL
Qty: 45 TABLET | Refills: 3 | Status: SHIPPED | OUTPATIENT
Start: 2019-04-24 | End: 2019-09-10 | Stop reason: SDUPTHER

## 2019-04-26 DIAGNOSIS — Z79.899 LONG-TERM USE OF PLAQUENIL: ICD-10-CM

## 2019-04-26 DIAGNOSIS — M32.9 SYSTEMIC LUPUS ERYTHEMATOSUS, UNSPECIFIED SLE TYPE, UNSPECIFIED ORGAN INVOLVEMENT STATUS: Primary | ICD-10-CM

## 2019-04-27 PROBLEM — Z85.3 HX OF BREAST CANCER: Status: ACTIVE | Noted: 2019-04-27

## 2019-04-27 NOTE — PROGRESS NOTES
Efe Horowitz is a 59 y.o. female with a history of right total complete therapeutic mastectomy with nipple sparring technique and 2 right axillary sentinel lymph node biopsies due to T1bN0 triple negative tumor of right breast. Patient now presents for 1 month follow up. Complains of some itching under armpit and redness around right nipple. Otherwise with no complaints.     Mammogram earlier today was with no evidence of malignancy.     PMH  Lupus  Ulcerative colitis  Fibromyalgia  B12 deficiency  Vit D deficiency  Fibroids            Current Outpatient Medications on File Prior to Visit   Medication Sig Dispense Refill    albuterol (ACCUNEB) 0.63 mg/3 mL Nebu Take 3 mLs (0.63 mg total) by nebulization every 6 (six) hours as needed. 3 mL 6    amLODIPine (NORVASC) 2.5 MG tablet Take 1 tablet (2.5 mg total) by mouth once daily. 30 tablet 11    atorvastatin (LIPITOR) 10 MG tablet Take 10 mg by mouth once daily.        CREON 36,000-114,000- 180,000 unit CpDR TAKE TWO CAPSULES BY MOUTH THREE TIMES DAILY WITH meals AND ONE CAPSULE WITH each snack 240 capsule 5    cyanocobalamin 1,000 mcg/mL injection Inject 1 mL (1,000 mcg total) into the muscle once a week. 1 Solution Injection monthly.  B12 injection weekly for  3 weeks then every  2 weeks (Patient taking differently: Inject 1,000 mcg into the muscle once a week. Every Saturday) 30 mL 6    diltiazem HCl (DILTIAZEM 2% CREAM) APPLY TO ANAL AREA EVERY 8 HOURS FOR 7 DAYS   1    ergocalciferol (VITAMIN D2) 50,000 unit Cap Take 2 capsules (100,000 Units total) by mouth every 7 days. (Patient taking differently: Take 50,000 Units by mouth every 7 days. Every Wednesday) 8 capsule 6    ferrous sulfate 325 mg (65 mg iron) Tab Take by mouth. 1 Tablet Oral Every day.  Take one 250mg vitamin C pill every 12 hours which will help your bodyabsorb more of the iron.        gabapentin (NEURONTIN) 800 MG tablet Take 1 tablet (800 mg total) by mouth 3 (three) times daily.  (Patient taking differently: Take 800 mg by mouth 4 (four) times daily. ) 270 tablet 6    hydroxychloroquine (PLAQUENIL) 200 mg tablet TAKE 1 AND 1/2 TABLETS BY MOUTH EVERY DAY 45 tablet 3    lidocaine (LIDODERM) 5 % Place 1 patch onto the skin once daily. Remove & Discard patch within 12 hours or as directed by MD 30 patch 12    loperamide (IMODIUM) 2 mg capsule TAKE 1 TO 2 CAPSULES BY MOUTH FOUR TIMES DAILY 240 capsule 3    lorazepam (ATIVAN) 1 MG tablet Take 1 tablet (1 mg total) by mouth every evening. 1 Tablet Oral Q8H-12 hours 30 tablet 5    niacin 100 MG Tab Take by mouth. 1 Tablet Oral At bedtime        ondansetron (ZOFRAN) 4 MG tablet Take 1 tablet (4 mg total) by mouth every 12 (twelve) hours as needed for Nausea. 30 tablet 6    polycarbophil (FIBERCON) 625 mg tablet Take by mouth. 1 Tablet Oral Every 12 hours.  Take with 12 ounces of water with each pill.        potassium chloride SA (K-DUR,KLOR-CON) 10 MEQ tablet Take 2 tablets (20 mEq total) by mouth once daily. 180 tablet 3    predniSONE (DELTASONE) 5 MG tablet Take 1 tablet (5 mg total) by mouth once daily. 90 tablet 3    rizatriptan (MAXALT) 10 MG tablet Take 1 tablet (10 mg total) by mouth every 6 (six) hours as needed. (Patient taking differently: Take 10 mg by mouth every 6 (six) hours as needed for Migraine. ) 30 tablet 6    SAVELLA 100 mg Tab TAKE ONE TABLET BY MOUTH TWICE DAILY 60 tablet 1    zolpidem (AMBIEN) 5 MG Tab Take 1 tablet (5 mg total) by mouth nightly as needed. 30 tablet 4    [DISCONTINUED] predniSONE (DELTASONE) 5 MG tablet TAKE TWO TABLETS BY MOUTH EVERY DAY FOR 3 DAYS then TAKE ONE TABLET DAILY 100 tablet 0      No current facility-administered medications on file prior to visit.                PSHx   Hysterectomy (due to fibroids)  Oophorectomy  Cholecystectomy  Total colectomy with reconstruction     FH  Patient reports that 4 aunts and 3 cousins have had breast cancer     SH  Not employed  Lives at home with  parents  Has 2 sons, (1 adopted)     Denies smoking cigarettes  Denies drinking alcohol     Physical Exam          Vitals:     04/23/19 1207   BP: (!) 165/85   Pulse: 92   Temp: 96.9 °F (36.1 °C)      Patient right breast with some mild erythema around nipple. Otherwise surgical scars are well healed. Denies tenderness. No swelling, erythema, abnormal warmth.     Assessment and Plan  Patient is a 58 yo female with a history of right breast cancer treated with complete therapeutic mastectomy with nipple sparring technique and 2 sentinel node biopsies who presents for 1 year mammogram and follow-up evaluation.     Will schedule patient for follow-up in one year as history, physical exam, and imaging are all benign.      Rodolfo Salas  M3  04/23/2019 1:35PM           I have personally taken the history and examined this patient and agree with the resident's note as stated above.  NEEMA  Left MMG today was WNL.  Images reviewed.  Pt with loss of cleavage at midline with reconstruction but otherwise she is NEEMA  No suspicious masses, skin changes, or LAD on B CBE.  F/U with me again in 1 year for B clinical exam with left screening MMG with reena.

## 2019-05-07 ENCOUNTER — CLINICAL SUPPORT (OUTPATIENT)
Dept: OPHTHALMOLOGY | Facility: CLINIC | Age: 59
End: 2019-05-07
Payer: MEDICARE

## 2019-05-07 ENCOUNTER — TELEPHONE (OUTPATIENT)
Dept: ENDOSCOPY | Facility: HOSPITAL | Age: 59
End: 2019-05-07

## 2019-05-07 ENCOUNTER — OFFICE VISIT (OUTPATIENT)
Dept: OPTOMETRY | Facility: CLINIC | Age: 59
End: 2019-05-07
Payer: MEDICARE

## 2019-05-07 DIAGNOSIS — Z79.899 LONG-TERM USE OF PLAQUENIL: ICD-10-CM

## 2019-05-07 DIAGNOSIS — H25.13 NUCLEAR SCLEROSIS OF BOTH EYES: ICD-10-CM

## 2019-05-07 DIAGNOSIS — H16.223 KERATOCONJUNCTIVITIS SICCA OF BOTH EYES NOT SPECIFIED AS SJOGREN'S: ICD-10-CM

## 2019-05-07 DIAGNOSIS — M32.9 SYSTEMIC LUPUS ERYTHEMATOSUS, UNSPECIFIED SLE TYPE, UNSPECIFIED ORGAN INVOLVEMENT STATUS: ICD-10-CM

## 2019-05-07 DIAGNOSIS — R93.2 ABNORMAL FINDINGS ON DIAGNOSTIC IMAGING OF LIVER AND BILIARY TRACT: ICD-10-CM

## 2019-05-07 DIAGNOSIS — M32.9 SYSTEMIC LUPUS ERYTHEMATOSUS, UNSPECIFIED SLE TYPE, UNSPECIFIED ORGAN INVOLVEMENT STATUS: Primary | ICD-10-CM

## 2019-05-07 DIAGNOSIS — K86.2 PANCREATIC CYST: ICD-10-CM

## 2019-05-07 PROCEDURE — 92083 HUMPHREY VISUAL FIELD - OU - BOTH EYES: ICD-10-PCS | Mod: S$GLB,,, | Performed by: OPTOMETRIST

## 2019-05-07 PROCEDURE — 99999 PR PBB SHADOW E&M-EST. PATIENT-LVL I: ICD-10-PCS | Mod: PBBFAC,,, | Performed by: OPTOMETRIST

## 2019-05-07 PROCEDURE — 92014 COMPRE OPH EXAM EST PT 1/>: CPT | Mod: S$GLB,,, | Performed by: OPTOMETRIST

## 2019-05-07 PROCEDURE — 92134 POSTERIOR SEGMENT OCT RETINA (OCULAR COHERENCE TOMOGRAPHY)-BOTH EYES: ICD-10-PCS | Mod: S$GLB,,, | Performed by: OPTOMETRIST

## 2019-05-07 PROCEDURE — 92134 CPTRZ OPH DX IMG PST SGM RTA: CPT | Mod: S$GLB,,, | Performed by: OPTOMETRIST

## 2019-05-07 PROCEDURE — 92083 EXTENDED VISUAL FIELD XM: CPT | Mod: S$GLB,,, | Performed by: OPTOMETRIST

## 2019-05-07 PROCEDURE — 99999 PR PBB SHADOW E&M-EST. PATIENT-LVL I: CPT | Mod: PBBFAC,,, | Performed by: OPTOMETRIST

## 2019-05-07 PROCEDURE — 92014 PR EYE EXAM, EST PATIENT,COMPREHESV: ICD-10-PCS | Mod: S$GLB,,, | Performed by: OPTOMETRIST

## 2019-05-07 RX ORDER — CYCLOSPORINE 0.5 MG/ML
1 EMULSION OPHTHALMIC 2 TIMES DAILY
Qty: 60 VIAL | Refills: 11 | Status: SHIPPED | OUTPATIENT
Start: 2019-05-07 | End: 2020-05-11 | Stop reason: SDUPTHER

## 2019-05-07 NOTE — PROGRESS NOTES
Subjective:       Patient ID: Efe Horowitz is a 59 y.o. female      Chief Complaint   Patient presents with    Concerns About Ocular Health    Plaquenil Eye Exam     History of Present Illness  HPI     Dls: 12/1/17 Dr. Magallon     58 y/o female presents today for plaquenil ck.  Pt states no changes in vision. Pt c/o dry eyes ou.   Pt wears otc readers.     No tearing  + itching  No burning  No pain  + ha's  No floaters  No flashes    Eye meds  systane gel ou bid          Last edited by Sofie Marmolejo on 5/7/2019  2:58 PM. (History)      Assessment/Plan:     1. Systemic lupus erythematosus, unspecified SLE type, unspecified organ involvement status  2. Long-term use of Plaquenil  No evidence of plaquenil maculopathy on exam, can continue with plaquenil therapy. OCT RNFL and HVF WNL OU today. Color vision normal. Return in 1 years for DFE, HVF 10-2, and OCT macula.     3. Keratoconjunctivitis sicca of both eyes not specified as Sjogren's  Pt using Systane BID OU but still having MICHAEL. Start restasis BID OU. Advised pt may take 4-6 weeks before notice any improvement in symptoms. If unable to get restasis, can try refresh gena 3 OTC.    - cycloSPORINE (RESTASIS) 0.05 % ophthalmic emulsion; Place 0.4 mLs (1 drop total) into both eyes 2 (two) times daily.  Dispense: 60 vial; Refill: 11    4. Nuclear sclerosis of both eyes  Educated pt on presence of cataracts and effects on vision. No surgery at this time. Recheck in one year.    Follow up in about 1 year (around 5/7/2020) for Plaquenil check, HVF 10-2, OCT macula.

## 2019-05-16 ENCOUNTER — TELEPHONE (OUTPATIENT)
Dept: GASTROENTEROLOGY | Facility: CLINIC | Age: 59
End: 2019-05-16

## 2019-05-16 ENCOUNTER — PATIENT MESSAGE (OUTPATIENT)
Dept: RHEUMATOLOGY | Facility: CLINIC | Age: 59
End: 2019-05-16

## 2019-05-16 ENCOUNTER — LAB VISIT (OUTPATIENT)
Dept: LAB | Facility: HOSPITAL | Age: 59
End: 2019-05-16
Attending: INTERNAL MEDICINE
Payer: MEDICARE

## 2019-05-16 ENCOUNTER — OFFICE VISIT (OUTPATIENT)
Dept: GASTROENTEROLOGY | Facility: CLINIC | Age: 59
End: 2019-05-16
Payer: MEDICARE

## 2019-05-16 VITALS
HEIGHT: 64 IN | BODY MASS INDEX: 21.94 KG/M2 | SYSTOLIC BLOOD PRESSURE: 139 MMHG | DIASTOLIC BLOOD PRESSURE: 91 MMHG | WEIGHT: 128.5 LBS | HEART RATE: 96 BPM

## 2019-05-16 DIAGNOSIS — Z51.81 ENCOUNTER FOR MONITORING LONG-TERM PROTON PUMP INHIBITOR THERAPY: ICD-10-CM

## 2019-05-16 DIAGNOSIS — Z79.899 ENCOUNTER FOR MONITORING LONG-TERM PROTON PUMP INHIBITOR THERAPY: ICD-10-CM

## 2019-05-16 DIAGNOSIS — K21.9 GASTROESOPHAGEAL REFLUX DISEASE, ESOPHAGITIS PRESENCE NOT SPECIFIED: Primary | ICD-10-CM

## 2019-05-16 DIAGNOSIS — K21.9 GASTROESOPHAGEAL REFLUX DISEASE, ESOPHAGITIS PRESENCE NOT SPECIFIED: ICD-10-CM

## 2019-05-16 DIAGNOSIS — E87.6 LOW BLOOD POTASSIUM: Primary | ICD-10-CM

## 2019-05-16 DIAGNOSIS — K44.9 HIATAL HERNIA: ICD-10-CM

## 2019-05-16 LAB
25(OH)D3+25(OH)D2 SERPL-MCNC: 53 NG/ML (ref 30–96)
ALBUMIN SERPL BCP-MCNC: 4.2 G/DL (ref 3.5–5.2)
ALP SERPL-CCNC: 59 U/L (ref 55–135)
ALT SERPL W/O P-5'-P-CCNC: 20 U/L (ref 10–44)
ANION GAP SERPL CALC-SCNC: 11 MMOL/L (ref 8–16)
AST SERPL-CCNC: 25 U/L (ref 10–40)
BILIRUB SERPL-MCNC: 0.4 MG/DL (ref 0.1–1)
BUN SERPL-MCNC: 17 MG/DL (ref 8–23)
CALCIUM SERPL-MCNC: 9.9 MG/DL (ref 8.7–10.5)
CHLORIDE SERPL-SCNC: 102 MMOL/L (ref 95–110)
CO2 SERPL-SCNC: 29 MMOL/L (ref 23–29)
CREAT SERPL-MCNC: 0.9 MG/DL (ref 0.5–1.4)
EST. GFR  (AFRICAN AMERICAN): >60 ML/MIN/1.73 M^2
EST. GFR  (NON AFRICAN AMERICAN): >60 ML/MIN/1.73 M^2
GLUCOSE SERPL-MCNC: 78 MG/DL (ref 70–110)
MAGNESIUM SERPL-MCNC: 2.2 MG/DL (ref 1.6–2.6)
POTASSIUM SERPL-SCNC: 3.2 MMOL/L (ref 3.5–5.1)
PROT SERPL-MCNC: 7.8 G/DL (ref 6–8.4)
SODIUM SERPL-SCNC: 142 MMOL/L (ref 136–145)
VIT B12 SERPL-MCNC: 704 PG/ML (ref 210–950)

## 2019-05-16 PROCEDURE — 99999 PR PBB SHADOW E&M-EST. PATIENT-LVL IV: ICD-10-PCS | Mod: PBBFAC,,, | Performed by: INTERNAL MEDICINE

## 2019-05-16 PROCEDURE — 99213 PR OFFICE/OUTPT VISIT, EST, LEVL III, 20-29 MIN: ICD-10-PCS | Mod: S$GLB,,, | Performed by: INTERNAL MEDICINE

## 2019-05-16 PROCEDURE — 80053 COMPREHEN METABOLIC PANEL: CPT | Mod: 91

## 2019-05-16 PROCEDURE — 99213 OFFICE O/P EST LOW 20 MIN: CPT | Mod: S$GLB,,, | Performed by: INTERNAL MEDICINE

## 2019-05-16 PROCEDURE — 3008F PR BODY MASS INDEX (BMI) DOCUMENTED: ICD-10-PCS | Mod: CPTII,S$GLB,, | Performed by: INTERNAL MEDICINE

## 2019-05-16 PROCEDURE — 3008F BODY MASS INDEX DOCD: CPT | Mod: CPTII,S$GLB,, | Performed by: INTERNAL MEDICINE

## 2019-05-16 PROCEDURE — 82607 VITAMIN B-12: CPT

## 2019-05-16 PROCEDURE — 99999 PR PBB SHADOW E&M-EST. PATIENT-LVL IV: CPT | Mod: PBBFAC,,, | Performed by: INTERNAL MEDICINE

## 2019-05-16 PROCEDURE — 82306 VITAMIN D 25 HYDROXY: CPT

## 2019-05-16 PROCEDURE — 3075F SYST BP GE 130 - 139MM HG: CPT | Mod: CPTII,S$GLB,, | Performed by: INTERNAL MEDICINE

## 2019-05-16 PROCEDURE — 83735 ASSAY OF MAGNESIUM: CPT

## 2019-05-16 PROCEDURE — 3080F DIAST BP >= 90 MM HG: CPT | Mod: CPTII,S$GLB,, | Performed by: INTERNAL MEDICINE

## 2019-05-16 PROCEDURE — 3080F PR MOST RECENT DIASTOLIC BLOOD PRESSURE >= 90 MM HG: ICD-10-PCS | Mod: CPTII,S$GLB,, | Performed by: INTERNAL MEDICINE

## 2019-05-16 PROCEDURE — 3075F PR MOST RECENT SYSTOLIC BLOOD PRESS GE 130-139MM HG: ICD-10-PCS | Mod: CPTII,S$GLB,, | Performed by: INTERNAL MEDICINE

## 2019-05-16 RX ORDER — POTASSIUM CHLORIDE 20 MEQ/1
20 TABLET, EXTENDED RELEASE ORAL DAILY
Qty: 30 TABLET | Refills: 2 | Status: SHIPPED | OUTPATIENT
Start: 2019-05-16 | End: 2019-08-14

## 2019-05-16 RX ORDER — PANTOPRAZOLE SODIUM 20 MG/1
20 TABLET, DELAYED RELEASE ORAL EVERY 12 HOURS
Qty: 60 TABLET | Refills: 11 | Status: SHIPPED | OUTPATIENT
Start: 2019-05-16 | End: 2020-10-13 | Stop reason: SDUPTHER

## 2019-05-16 NOTE — TELEPHONE ENCOUNTER
----- Message from Reese Villalta MD sent at 5/16/2019  3:20 PM CDT -----  Yuri - please tell Dardenise that her serum potassium is low and recommend she take one K-dur 20 potassium mEq pill each day and follow up her low potassium with her primary care MD to see if long term potassium replacement is needed.    Please order repeat bmp in 2 weeks - orders placed.

## 2019-05-16 NOTE — PROGRESS NOTES
[facility] Consultation Note    Reason for Consult:  The primary encounter diagnosis was Gastroesophageal reflux disease, esophagitis presence not specified. Diagnoses of Hiatal hernia and Encounter for monitoring long-term proton pump inhibitor therapy were also pertinent to this visit.    PCP:   Manjula Mixon       Referring MD:  No referring provider defined for this encounter.    Initial History of Present Illness (HPI):  This is a 59 y.o. female here for evaluation of for reflux and long-term PPI use.  Patient's fecal elastase is normal. Her Creon she will let me know how she is doing off Creon in about 4 weeks.  She feels like her reflux is better controlled on twice daily PPI will switch her to pantoprazole 20 mg every 12 hr best taken 45 min before breakfast 45 min before dinner.  She will let me know if she can afford that.  EGD shows hiatal hernia but no Miranda's no erosive esophagitis.  She really feels like once a day does not control her symptoms she is not interested in surgery.    Abdominal pain - no  Reflux - the os but well controlled on twice daily PPI  Dysphagia - no   Bowel habits - normal  GI bleeding - none  NSAID usage - none    Interval HPI 05/16/2019:  The patient's last visit with me was on 10/16/2018.      ROS:  Constitutional: No fevers, chills, No weight loss  ENT:  No heartburn no dysphagia no odynophagia no hoarseness  CV: No chest pain, no palpitation  Pulm: No cough, No shortness of breath, no wheezing  Ophtho: No vision changes  GI: see HPI  Derm: No rash, no itching  Heme: No lymphadenopathy, No easy bruising  MSK: No significant arthritis  : No dysuria, No hematuria  Endo: No hot or cold intolerance  Neuro: No syncope, No seizure, no strokes  Psych: No uncontrolled anxiety, No uncontrolled depression    Medical History:  has a past medical history of Acid reflux, Adrenal insufficiency, primary, Arthritis, Asthma, B12 deficiency anemia,  Blood transfusion (2010), Breast cancer (2/2016), Cataract, Chronic pain, Deep vein thrombosis, Dry eyes, Esophagitis, unspecified, Fibromyalgia, General anesthetics causing adverse effect in therapeutic use, Heart murmur, History of colon polyps, Hyperlipidemia, Hypopituitary dwarfism, Iron deficiency anemia, Lupus, Migraines, neuralgic, Nonspecific ulcerative colitis, OP (osteoporosis), Osteopenia, Pancreatic cyst, Schatzki's ring, Steroid sulfatase deficiency, Ulcerative colitis, and Vitamin D deficiency disease.    Surgical History:  has a past surgical history that includes restorative proctectomy; total abdominal colectomy with ileostomy (2010); Cholecystectomy; Hysterectomy; Oophorectomy (Bilateral); Appendectomy; Breast biopsy (Right, 2/2016); Colon surgery; Breast surgery; Total Reduction Mammoplasty (Left, 2017); Upper endoscopic ultrasound w/ FNA; Endoscopic ultrasound of upper gastrointestinal tract (N/A, 8/6/2018); Esophagogastroduodenoscopy (N/A, 12/10/2018); Flexible sigmoidoscopy (N/A, 12/10/2018); Mastectomy; and Breast reconstruction.    Family History: family history includes Breast cancer (age of onset: 28) in her cousin; Breast cancer (age of onset: 45) in her other; Breast cancer (age of onset: 50) in her maternal cousin; Breast cancer (age of onset: 55) in her maternal aunt; Cancer in her maternal aunt; Cancer (age of onset: 25) in her other; Cancer (age of onset: 65) in her maternal uncle; Diabetes in her father; Hyperlipidemia in her father and mother; Hypertension in her father; No Known Problems in her brother, maternal grandfather, maternal grandmother, paternal aunt, paternal grandfather, paternal grandmother, paternal uncle, and sister; Pancreatic cancer in her maternal aunt and maternal uncle..     Social History:  reports that she has never smoked. She has never used smokeless tobacco. She reports that she does not drink alcohol or use drugs.    Review of patient's allergies  "indicates:   Allergen Reactions    Aspirin      Other reaction(s): "hemorrhage"  hemorrhage    Hydrocortisone Nausea Only     Other reaction(s): sick  sick    Lactose intolerance (lactase) [lactase] Nausea And Vomiting    Vicodin [hydrocodone-acetaminophen]      Other reaction(s): hyperactivity    Asacol [mesalamine] Hallucinations     Other reaction(s): Hallucinations  hallucinations       Medication List with Changes/Refills   New Medications    PANTOPRAZOLE (PROTONIX) 20 MG TABLET    Take 1 tablet (20 mg total) by mouth every 12 (twelve) hours.   Current Medications    ALBUTEROL (ACCUNEB) 0.63 MG/3 ML NEBU    Take 3 mLs (0.63 mg total) by nebulization every 6 (six) hours as needed.    AMLODIPINE (NORVASC) 2.5 MG TABLET    Take 1 tablet (2.5 mg total) by mouth once daily.    ATORVASTATIN (LIPITOR) 10 MG TABLET    Take 10 mg by mouth once daily.    CREON 36,000-114,000- 180,000 UNIT CPDR    TAKE TWO CAPSULES BY MOUTH THREE TIMES DAILY WITH meals AND ONE CAPSULE WITH each snack    CYANOCOBALAMIN 1,000 MCG/ML INJECTION    Inject 1 mL (1,000 mcg total) into the muscle once a week. 1 Solution Injection monthly.  B12 injection weekly for  3 weeks then every  2 weeks    CYCLOSPORINE (RESTASIS) 0.05 % OPHTHALMIC EMULSION    Place 0.4 mLs (1 drop total) into both eyes 2 (two) times daily.    DILTIAZEM HCL (DILTIAZEM 2% CREAM)    APPLY TO ANAL AREA EVERY 8 HOURS FOR 7 DAYS    ERGOCALCIFEROL (VITAMIN D2) 50,000 UNIT CAP    Take 2 capsules (100,000 Units total) by mouth every 7 days.    FERROUS SULFATE 325 MG (65 MG IRON) TAB    Take by mouth. 1 Tablet Oral Every day.  Take one 250mg vitamin C pill every 12 hours which will help your bodyabsorb more of the iron.    GABAPENTIN (NEURONTIN) 800 MG TABLET    Take 1 tablet (800 mg total) by mouth 3 (three) times daily.    HYDROXYCHLOROQUINE (PLAQUENIL) 200 MG TABLET    TAKE 1 AND 1/2 TABLETS BY MOUTH EVERY DAY    LIDOCAINE (LIDODERM) 5 %    Place 1 patch onto the skin once " "daily. Remove & Discard patch within 12 hours or as directed by MD    LOPERAMIDE (IMODIUM) 2 MG CAPSULE    TAKE 1 TO 2 CAPSULES BY MOUTH FOUR TIMES DAILY    LORAZEPAM (ATIVAN) 1 MG TABLET    Take 1 tablet (1 mg total) by mouth every evening. 1 Tablet Oral Q8H-12 hours    NIACIN 100 MG TAB    Take by mouth. 1 Tablet Oral At bedtime    ONDANSETRON (ZOFRAN) 4 MG TABLET    Take 1 tablet (4 mg total) by mouth every 12 (twelve) hours as needed for Nausea.    POLYCARBOPHIL (FIBERCON) 625 MG TABLET    Take by mouth. 1 Tablet Oral Every 12 hours.  Take with 12 ounces of water with each pill.    POTASSIUM CHLORIDE SA (K-DUR,KLOR-CON) 10 MEQ TABLET    Take 2 tablets (20 mEq total) by mouth once daily.    PREDNISONE (DELTASONE) 5 MG TABLET    Take 1 tablet (5 mg total) by mouth once daily.    RIZATRIPTAN (MAXALT) 10 MG TABLET    Take 1 tablet (10 mg total) by mouth every 6 (six) hours as needed.    SAVELLA 100 MG TAB    TAKE ONE TABLET BY MOUTH TWICE DAILY    ZOLPIDEM (AMBIEN) 5 MG TAB    Take 1 tablet (5 mg total) by mouth nightly as needed.         Objective Findings:    Vital Signs:  BP (!) 139/91   Pulse 96   Ht 5' 4" (1.626 m)   Wt 58.3 kg (128 lb 8.5 oz)   BMI 22.06 kg/m²   Body mass index is 22.06 kg/m².    Physical Exam:  General Appearance: Well appearing in no acute distress  Eyes:    No scleral icterus  ENT:  No lesions or masses   Lungs: CTA bilaterally, no wheezes, no rhonchi, no rales  Heart:  S1, S2 normal, no murmurs heard  Abdomen:  Non distended, soft, no guarding, no rebound, no tenderness, no appreciated ascites, no bruits, no hepatosplenomegaly,  No CVA tenderness, no appreciated hernias  Musculoskeletal:  No major joint deformities  Skin: No petechiae or rash on exposed skin areas  Neurologic:  Alert and oriented x4  Psychiatric:  Normal speech mentation and affect    Labs:  Lab Results   Component Value Date    WBC 5.94 02/15/2019    HGB 12.1 02/15/2019    HCT 39.7 02/15/2019     02/15/2019    " CHOL 199 05/08/2018    TRIG 107 05/08/2018    HDL 78 (H) 05/08/2018    ALT 16 02/15/2019    AST 22 02/15/2019     02/15/2019    K 3.4 (L) 02/15/2019     02/15/2019    CREATININE 0.8 02/15/2019    BUN 12 02/15/2019    CO2 29 02/15/2019    TSH 0.686 06/06/2016    INR 0.9 10/16/2018    HGBA1C 5.7 (H) 05/08/2018           Imaging:    Endoscopy:      Medical Decision Making:        Assessment:  1. Gastroesophageal reflux disease, esophagitis presence not specified    2. Hiatal hernia    3. Encounter for monitoring long-term proton pump inhibitor therapy         Recommendations:   1.  GERD well controlled on double-dose PPI will try the lowest dose that controls her symptoms once a day does not control her symptoms.  Will switch to pantoprazole 20 mg every 12 hr she will let me know in a month how it is controlling her heartburn symptoms.  EGD is up-to-date  No worrisome symptoms or findings.  2.  Fecal elastase now normal she will get off her Creon show message me in 4 weeks to see if she feels like she needs it again if she does will recheck a stool for fecal elastase.  3.  Return to GI clinic p.r.n.  No follow-ups on file.      Order summary:  Orders Placed This Encounter    Vitamin D    Vitamin B12    Magnesium    Comprehensive metabolic panel    CBC auto differential    pantoprazole (PROTONIX) 20 MG tablet         Thank you so much for allowing me to participate in the care of Efe StephensonEliel Villalta MD

## 2019-05-16 NOTE — TELEPHONE ENCOUNTER
Dr. Villalta,  April/Beverly in the lab states pt needs a recollect on lab work, She states blood was clotted for CBC ,  Please advise

## 2019-05-16 NOTE — TELEPHONE ENCOUNTER
----- Message from Ondina Desai sent at 5/16/2019 12:51 PM CDT -----  Contact: april/lab - [id number]  lan    Needs Advice    Reason for call: needs recollect on patient - purple top was clotted        Communication Preference: april/lab - [id number]    Additional Information:

## 2019-05-19 RX ORDER — LOPERAMIDE HYDROCHLORIDE 2 MG/1
CAPSULE ORAL
Qty: 240 CAPSULE | Refills: 3 | Status: SHIPPED | OUTPATIENT
Start: 2019-05-19 | End: 2019-09-10 | Stop reason: SDUPTHER

## 2019-05-21 ENCOUNTER — LAB VISIT (OUTPATIENT)
Dept: LAB | Facility: HOSPITAL | Age: 59
End: 2019-05-21
Attending: INTERNAL MEDICINE
Payer: MEDICARE

## 2019-05-21 ENCOUNTER — OFFICE VISIT (OUTPATIENT)
Dept: RHEUMATOLOGY | Facility: CLINIC | Age: 59
End: 2019-05-21
Payer: MEDICARE

## 2019-05-21 ENCOUNTER — TELEPHONE (OUTPATIENT)
Dept: GASTROENTEROLOGY | Facility: CLINIC | Age: 59
End: 2019-05-21

## 2019-05-21 VITALS
WEIGHT: 130.06 LBS | DIASTOLIC BLOOD PRESSURE: 89 MMHG | HEART RATE: 92 BPM | SYSTOLIC BLOOD PRESSURE: 143 MMHG | HEIGHT: 64 IN | BODY MASS INDEX: 22.2 KG/M2

## 2019-05-21 DIAGNOSIS — M32.9 SYSTEMIC LUPUS ERYTHEMATOSUS, UNSPECIFIED SLE TYPE, UNSPECIFIED ORGAN INVOLVEMENT STATUS: Primary | ICD-10-CM

## 2019-05-21 DIAGNOSIS — D84.9 IMMUNOSUPPRESSION: ICD-10-CM

## 2019-05-21 DIAGNOSIS — E78.5 HYPERLIPIDEMIA, UNSPECIFIED HYPERLIPIDEMIA TYPE: ICD-10-CM

## 2019-05-21 DIAGNOSIS — K21.9 GASTROESOPHAGEAL REFLUX DISEASE, ESOPHAGITIS PRESENCE NOT SPECIFIED: ICD-10-CM

## 2019-05-21 DIAGNOSIS — R53.83 FATIGUE, UNSPECIFIED TYPE: ICD-10-CM

## 2019-05-21 DIAGNOSIS — M79.7 FIBROMYALGIA: ICD-10-CM

## 2019-05-21 DIAGNOSIS — R43.0 LOSS OF SMELL: ICD-10-CM

## 2019-05-21 LAB
BASOPHILS # BLD AUTO: 0.03 K/UL (ref 0–0.2)
BASOPHILS NFR BLD: 0.4 % (ref 0–1.9)
CHOLEST SERPL-MCNC: 276 MG/DL (ref 120–199)
CHOLEST/HDLC SERPL: 3.3 {RATIO} (ref 2–5)
DIFFERENTIAL METHOD: ABNORMAL
EOSINOPHIL # BLD AUTO: 0.1 K/UL (ref 0–0.5)
EOSINOPHIL NFR BLD: 0.7 % (ref 0–8)
ERYTHROCYTE [DISTWIDTH] IN BLOOD BY AUTOMATED COUNT: 14.9 % (ref 11.5–14.5)
HCT VFR BLD AUTO: 41.7 % (ref 37–48.5)
HDLC SERPL-MCNC: 84 MG/DL (ref 40–75)
HDLC SERPL: 30.4 % (ref 20–50)
HGB BLD-MCNC: 13.3 G/DL (ref 12–16)
IMM GRANULOCYTES # BLD AUTO: 0.06 K/UL (ref 0–0.04)
IMM GRANULOCYTES NFR BLD AUTO: 0.7 % (ref 0–0.5)
LDLC SERPL CALC-MCNC: 151.6 MG/DL (ref 63–159)
LYMPHOCYTES # BLD AUTO: 1.1 K/UL (ref 1–4.8)
LYMPHOCYTES NFR BLD: 13.1 % (ref 18–48)
MCH RBC QN AUTO: 27.8 PG (ref 27–31)
MCHC RBC AUTO-ENTMCNC: 31.9 G/DL (ref 32–36)
MCV RBC AUTO: 87 FL (ref 82–98)
MONOCYTES # BLD AUTO: 0.5 K/UL (ref 0.3–1)
MONOCYTES NFR BLD: 6.1 % (ref 4–15)
NEUTROPHILS # BLD AUTO: 6.6 K/UL (ref 1.8–7.7)
NEUTROPHILS NFR BLD: 79 % (ref 38–73)
NONHDLC SERPL-MCNC: 192 MG/DL
NRBC BLD-RTO: 0 /100 WBC
PLATELET # BLD AUTO: 314 K/UL (ref 150–350)
PMV BLD AUTO: 9.9 FL (ref 9.2–12.9)
RBC # BLD AUTO: 4.78 M/UL (ref 4–5.4)
TRIGL SERPL-MCNC: 202 MG/DL (ref 30–150)
WBC # BLD AUTO: 8.35 K/UL (ref 3.9–12.7)

## 2019-05-21 PROCEDURE — 3079F PR MOST RECENT DIASTOLIC BLOOD PRESSURE 80-89 MM HG: ICD-10-PCS | Mod: CPTII,S$GLB,, | Performed by: INTERNAL MEDICINE

## 2019-05-21 PROCEDURE — 99999 PR PBB SHADOW E&M-EST. PATIENT-LVL IV: CPT | Mod: PBBFAC,,, | Performed by: INTERNAL MEDICINE

## 2019-05-21 PROCEDURE — 3079F DIAST BP 80-89 MM HG: CPT | Mod: CPTII,S$GLB,, | Performed by: INTERNAL MEDICINE

## 2019-05-21 PROCEDURE — 99214 OFFICE O/P EST MOD 30 MIN: CPT | Mod: S$GLB,,, | Performed by: INTERNAL MEDICINE

## 2019-05-21 PROCEDURE — 3077F PR MOST RECENT SYSTOLIC BLOOD PRESSURE >= 140 MM HG: ICD-10-PCS | Mod: CPTII,S$GLB,, | Performed by: INTERNAL MEDICINE

## 2019-05-21 PROCEDURE — 99999 PR PBB SHADOW E&M-EST. PATIENT-LVL IV: ICD-10-PCS | Mod: PBBFAC,,, | Performed by: INTERNAL MEDICINE

## 2019-05-21 PROCEDURE — 3008F BODY MASS INDEX DOCD: CPT | Mod: CPTII,S$GLB,, | Performed by: INTERNAL MEDICINE

## 2019-05-21 PROCEDURE — 99214 PR OFFICE/OUTPT VISIT, EST, LEVL IV, 30-39 MIN: ICD-10-PCS | Mod: S$GLB,,, | Performed by: INTERNAL MEDICINE

## 2019-05-21 PROCEDURE — 80061 LIPID PANEL: CPT

## 2019-05-21 PROCEDURE — 85025 COMPLETE CBC W/AUTO DIFF WBC: CPT

## 2019-05-21 PROCEDURE — 3077F SYST BP >= 140 MM HG: CPT | Mod: CPTII,S$GLB,, | Performed by: INTERNAL MEDICINE

## 2019-05-21 PROCEDURE — 3008F PR BODY MASS INDEX (BMI) DOCUMENTED: ICD-10-PCS | Mod: CPTII,S$GLB,, | Performed by: INTERNAL MEDICINE

## 2019-05-21 ASSESSMENT — SYSTEMIC LUPUS ERYTHEMATOSUS DISEASE ACTIVITY INDEX (SLEDAI): TOTAL_SCORE: 2

## 2019-05-21 ASSESSMENT — ROUTINE ASSESSMENT OF PATIENT INDEX DATA (RAPID3)
TOTAL RAPID3 SCORE: 5.78
FATIGUE SCORE: 8
WHEN YOU AWAKENED IN THE MORNING OVER THE LAST WEEK, PLEASE INDICATE THE AMOUNT OF TIME IT TAKES UNTIL YOU ARE AS LIMBER AS YOU WILL BE FOR THE DAY: 30 MINUTES
MDHAQ FUNCTION SCORE: .4
PAIN SCORE: 8
PSYCHOLOGICAL DISTRESS SCORE: 0
PATIENT GLOBAL ASSESSMENT SCORE: 8
AM STIFFNESS SCORE: 1, YES

## 2019-05-21 NOTE — PROGRESS NOTES
"Subjective:       Patient ID: Efe Horowitz is a 59 y.o. female.    Chief Complaint: Lupus    HPI:  Efe Horowitz is a 59 y.o. female with history of lupus since age 39.   Her manifestations include joint pains, headache, positive DERRICK, and a   double-stranded DNA. She also has an equivocal anticardiolipin IgM.   She has been treated with Plaquenil 1-1/2 tablets daily. Eye exam 12/2017 was normal.      In addition to lupus, she has a   history of ulcerative colitis, osteopenia, fibromyalgia as well.   In 2009 she had a colectomy with ileostomy and in May 2011 the ileostomy  was closed. History of bilateral carpal tunnel.       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  She was treated with surgical resection.  Another lesion breast suspected to be lymphoma was later felt to be fibrous tissue.      May 1, 2017 had right breast fibrous material removed and reconstruction on both breasts at [facility].       Interval History:  Reports recurrent oral ulcers, nasal ulcers and hair loss.   Hair loss improving.  Ulcers were last month.     Review of Systems   Constitutional: Positive for fatigue.   HENT: Negative for mouth sores.         Tongue sore   Eyes: Positive for redness.        Dry eyes   Respiratory: Negative for cough and shortness of breath.    Gastrointestinal: Positive for diarrhea.   Endocrine: Negative.    Genitourinary: Negative.    Musculoskeletal: Positive for arthralgias.   Skin: Negative.    Allergic/Immunologic: Negative.    Neurological: Positive for headaches.   Hematological: Negative.    Psychiatric/Behavioral: Negative.          Objective:   BP (!) 143/89   Pulse 92   Ht 5' 4" (1.626 m)   Wt 59 kg (130 lb 1.1 oz)   BMI 22.33 kg/m²         Physical Exam   Constitutional: She is oriented to person, place, and time and well-developed, well-nourished, and in no distress.   HENT:   Head: Normocephalic and atraumatic.   Eyes: Conjunctivae and EOM are normal.   Neck: Neck supple. "   Cardiovascular: Normal rate, regular rhythm and normal heart sounds.    Pulmonary/Chest: Effort normal and breath sounds normal.   Abdominal: Soft. Bowel sounds are normal.   Neurological: She is alert and oriented to person, place, and time. Gait normal.   Skin: Skin is warm and dry.     Psychiatric: Mood and affect normal.   Musculoskeletal:   No pain on palpation bilateral trochanteric bursa.           LABS      Component      Latest Ref Rng & Units 5/16/2019 5/16/2019 5/16/2019          12:09 PM 12:09 PM 11:37 AM   WBC      3.90 - 12.70 K/uL  7.23    RBC      4.00 - 5.40 M/uL  4.68    Hemoglobin      12.0 - 16.0 g/dL  12.9    Hematocrit      37.0 - 48.5 %  40.7    MCV      82 - 98 fL  87    MCH      27.0 - 31.0 pg  27.6    MCHC      32.0 - 36.0 g/dL  31.7 (L)    RDW      11.5 - 14.5 %  15.0 (H)    Platelets      150 - 350 K/uL  298    MPV      9.2 - 12.9 fL  10.5    Immature Granulocytes      0.0 - 0.5 %  0.7 (H)    Gran # (ANC)      1.8 - 7.7 K/uL  5.2    Immature Grans (Abs)      0.00 - 0.04 K/uL  0.05 (H)    Lymph #      1.0 - 4.8 K/uL  1.3    Mono #      0.3 - 1.0 K/uL  0.6    Eos #      0.0 - 0.5 K/uL  0.1    Baso #      0.00 - 0.20 K/uL  0.04    nRBC      0 /100 WBC  0    Gran%      38.0 - 73.0 %  72.3    Lymph%      18.0 - 48.0 %  17.3 (L)    Mono%      4.0 - 15.0 %  7.9    Eosinophil%      0.0 - 8.0 %  1.2    Basophil%      0.0 - 1.9 %  0.6    Differential Method        Automated    Sodium      136 - 145 mmol/L 141 142    Potassium      3.5 - 5.1 mmol/L 3.3 (L) 3.2 (L)    Chloride      95 - 110 mmol/L 103 102    CO2      23 - 29 mmol/L 27 29    Glucose      70 - 110 mg/dL 78 78    BUN, Bld      8 - 23 mg/dL 18 17    Creatinine      0.5 - 1.4 mg/dL 0.9 0.9    Calcium      8.7 - 10.5 mg/dL 10.2 9.9    PROTEIN TOTAL      6.0 - 8.4 g/dL 7.8 7.8    Albumin      3.5 - 5.2 g/dL 4.2 4.2    BILIRUBIN TOTAL      0.1 - 1.0 mg/dL 0.4 0.4    Alkaline Phosphatase      55 - 135 U/L 60 59    AST      10 - 40 U/L 27 25     ALT      10 - 44 U/L 22 20    Anion Gap      8 - 16 mmol/L 11 11    eGFR if African American      >60 mL/min/1.73 m:2 >60.0 >60.0    eGFR if non African American      >60 mL/min/1.73 m:2 >60.0 >60.0    Specimen UA         Urine, Unspecified   Color, UA      Yellow, Straw, Amanda   Yellow   Appearance, UA      Clear   Clear   pH, UA      5.0 - 8.0   5.0   Specific Gravity, UA      1.005 - 1.030   1.015   Protein, UA      Negative   Negative   Glucose, UA      Negative   Negative   Ketones, UA      Negative   Negative   Bilirubin (UA)      Negative   Negative   Occult Blood UA      Negative   Negative   NITRITE UA      Negative   Negative   Leukocytes, UA      Negative   Negative   Protein, Urine Random      0 - 15 mg/dL   <7   Creatinine, Random Ur      15.0 - 325.0 mg/dL   57.0   Prot/Creat Ratio, Ur      0.00 - 0.20   Unable to calculate   ds DNA Ab      Negative 1:10  Negative 1:10    Complement (C-3)      50 - 180 mg/dL  124    Complement (C-4)      11 - 44 mg/dL  41    CRP      0.0 - 8.2 mg/L  1.6    Sed Rate      0 - 36 mm/Hr  15    Vit D, 25-Hydroxy      30 - 96 ng/mL  53    Vitamin B-12      210 - 950 pg/mL  704    Magnesium      1.6 - 2.6 mg/dL  2.2        Assessment:       1. Lupus. Lupus manifestations include joint pains, headache, positive DERRICK, and a   double-stranded DNA. One month ago had ulcers oral and nasal as well as scabs on scalp.  Also alopecia.  SLEDAI=2  2. Fatigue   3. Encounter for long-term (current) use of other medications   4. Myalgia and myositis. Fibromyalgia on gabapentin and Savella   5. Trochanteric bursitis. Bilateral now requesting injection.  6. Shoulder pain. Stable   7. Vitamin D deficiency disease. Now normal  8. Suspected Shingles.   9. Migraine.  Did acupuncture with Dr. Tinsley.  Treated with Maxalt by primary doctor which helps.     10.  Osteopenia lumbar spine and femoral neck.  Reclast x1 stopped 3 years ago per patient due to improvement.   11.  Chronic steroid use.   Endocrine gives for adrenal insufficiency.  12.  Ulcerative colitis  13.  Hematuria.  Followed by nephrology  14.  Stress with dealing with son with mental illness  15.  Loss of smell since Nov/Dec.  May relate to sinus issues.  Evaluated by ENT but no cause found.  Continues to have loss of smell  Plan:       1.  Labs reviewed.    2. Patient to continue Plaquenil 300 mg daily.  Eye exam normal 5/2019  3. Continue neurontin 800 mg/800 mg/1200 mg in evening.                 RTO in 3 months/prn.

## 2019-05-23 ENCOUNTER — PATIENT MESSAGE (OUTPATIENT)
Dept: RHEUMATOLOGY | Facility: CLINIC | Age: 59
End: 2019-05-23

## 2019-06-18 ENCOUNTER — TELEPHONE (OUTPATIENT)
Dept: GASTROENTEROLOGY | Facility: CLINIC | Age: 59
End: 2019-06-18

## 2019-06-18 ENCOUNTER — TELEPHONE (OUTPATIENT)
Dept: ENDOSCOPY | Facility: HOSPITAL | Age: 59
End: 2019-06-18

## 2019-06-18 NOTE — TELEPHONE ENCOUNTER
----- Message from Jocelyn Doherty sent at 6/18/2019 12:01 PM CDT -----  Contact: Self- [phone]  Ambar- pt returning missed call from staff in regards to scheduling MRI- please contact pt at [phone]

## 2019-06-18 NOTE — TELEPHONE ENCOUNTER
----- Message from Jocelyn Doherty sent at 6/18/2019 12:01 PM CDT -----  Contact: Self- [phone]  Ambar- pt returning missed call from staff in regards to scheduling MRI- please contact pt at [phone]

## 2019-07-10 ENCOUNTER — TELEPHONE (OUTPATIENT)
Dept: ENDOCRINOLOGY | Facility: CLINIC | Age: 59
End: 2019-07-10

## 2019-07-10 RX ORDER — AMLODIPINE BESYLATE 2.5 MG/1
TABLET ORAL
Qty: 30 TABLET | Refills: 0 | Status: SHIPPED | OUTPATIENT
Start: 2019-07-10 | End: 2021-02-08 | Stop reason: SDUPTHER

## 2019-07-10 NOTE — TELEPHONE ENCOUNTER
Called spoke with pt regarding Rx Refill informed pt that  is retired advising pt to reach out to PCP for father refills but rx was sent over to her pharmacy. Pt stated she will be she her provider this week and will get it done then Also schedule pt an appointment to see  per her request

## 2019-08-13 ENCOUNTER — HOSPITAL ENCOUNTER (OUTPATIENT)
Dept: RADIOLOGY | Facility: HOSPITAL | Age: 59
Discharge: HOME | End: 2019-08-13
Attending: INTERNAL MEDICINE
Payer: MEDICARE

## 2019-08-13 DIAGNOSIS — K86.2 PANCREATIC CYST: ICD-10-CM

## 2019-08-13 DIAGNOSIS — R93.2 ABNORMAL FINDINGS ON DIAGNOSTIC IMAGING OF LIVER AND BILIARY TRACT: ICD-10-CM

## 2019-08-13 PROCEDURE — 74183 MRI ABDOMEN WITH AND WO_INC MRCP: ICD-10-PCS | Mod: 26,,, | Performed by: RADIOLOGY

## 2019-08-13 PROCEDURE — A9585 GADOBUTROL INJECTION: HCPCS | Performed by: INTERNAL MEDICINE

## 2019-08-13 PROCEDURE — 76376 3D RENDER W/INTRP POSTPROCES: CPT | Mod: 26,,, | Performed by: RADIOLOGY

## 2019-08-13 PROCEDURE — 76376 3D RENDER W/INTRP POSTPROCES: CPT | Mod: TC

## 2019-08-13 PROCEDURE — 76376 MRI ABDOMEN WITH AND WO_INC MRCP: ICD-10-PCS | Mod: 26,,, | Performed by: RADIOLOGY

## 2019-08-13 PROCEDURE — 74183 MRI ABD W/O CNTR FLWD CNTR: CPT | Mod: 26,,, | Performed by: RADIOLOGY

## 2019-08-13 PROCEDURE — 25500020 PHARM REV CODE 255: Performed by: INTERNAL MEDICINE

## 2019-08-13 RX ORDER — GADOBUTROL 604.72 MG/ML
10 INJECTION INTRAVENOUS
Status: COMPLETED | OUTPATIENT
Start: 2019-08-13 | End: 2019-08-13

## 2019-08-13 RX ADMIN — GADOBUTROL 10 ML: 604.72 INJECTION INTRAVENOUS at 10:08

## 2019-08-14 ENCOUNTER — TELEPHONE (OUTPATIENT)
Dept: ENDOSCOPY | Facility: HOSPITAL | Age: 59
End: 2019-08-14

## 2019-08-14 NOTE — TELEPHONE ENCOUNTER
----- Message from Hong Finn MD sent at 8/14/2019 10:13 AM CDT -----  Stable panc cyst. Repeat mrcp in 1 year

## 2019-08-15 LAB
CREAT SERPL-MCNC: 0.7 MG/DL (ref 0.5–1.4)
SAMPLE: NORMAL

## 2019-08-23 ENCOUNTER — HOSPITAL ENCOUNTER (OUTPATIENT)
Dept: RADIOLOGY | Facility: HOSPITAL | Age: 59
Discharge: HOME | End: 2019-08-23
Attending: INTERNAL MEDICINE
Payer: MEDICARE

## 2019-08-23 DIAGNOSIS — M54.50 LOW BACK PAIN: ICD-10-CM

## 2019-08-23 PROCEDURE — 72148 MRI LUMBAR SPINE W/O DYE: CPT | Mod: 26,,, | Performed by: RADIOLOGY

## 2019-08-23 PROCEDURE — 72148 MRI LUMBAR SPINE W/O DYE: CPT | Mod: TC

## 2019-08-23 PROCEDURE — 72148 MRI LUMBAR SPINE WITHOUT CONTRAST: ICD-10-PCS | Mod: 26,,, | Performed by: RADIOLOGY

## 2019-08-28 ENCOUNTER — TELEPHONE (OUTPATIENT)
Dept: GASTROENTEROLOGY | Facility: CLINIC | Age: 59
End: 2019-08-28

## 2019-08-28 ENCOUNTER — PATIENT MESSAGE (OUTPATIENT)
Dept: SURGERY | Facility: CLINIC | Age: 59
End: 2019-08-28

## 2019-08-28 NOTE — TELEPHONE ENCOUNTER
----- Message from Margaret Ho sent at 8/28/2019 11:39 AM CDT -----  Contact: pt  Type:  Needs Medical Advice    Who Called: call pt   Would the patient rather a call back or a response via [facility]? call  Best Call Back Number: [phone]  Additional Information:

## 2019-08-28 NOTE — TELEPHONE ENCOUNTER
Returned patient's call.     Mrs. Horowitz is calling to speak with Dr. Villalta about her surgery.     Message routed to Dr. Villalta.

## 2019-09-10 ENCOUNTER — TELEPHONE (OUTPATIENT)
Dept: RHEUMATOLOGY | Facility: CLINIC | Age: 59
End: 2019-09-10

## 2019-09-10 DIAGNOSIS — K86.81 EXOCRINE PANCREATIC INSUFFICIENCY: ICD-10-CM

## 2019-09-10 DIAGNOSIS — M54.31 SCIATICA OF RIGHT SIDE: ICD-10-CM

## 2019-09-10 RX ORDER — PANCRELIPASE 36000; 180000; 114000 [USP'U]/1; [USP'U]/1; [USP'U]/1
CAPSULE, DELAYED RELEASE PELLETS ORAL
Qty: 240 CAPSULE | Refills: 5 | Status: SHIPPED | OUTPATIENT
Start: 2019-09-10 | End: 2020-08-04

## 2019-09-10 RX ORDER — HYDROXYCHLOROQUINE SULFATE 200 MG/1
TABLET, FILM COATED ORAL
Qty: 45 TABLET | Refills: 3 | Status: SHIPPED | OUTPATIENT
Start: 2019-09-10 | End: 2020-01-21

## 2019-09-10 NOTE — TELEPHONE ENCOUNTER
----- Message from Traci Alegria MA sent at 9/9/2019  4:36 PM CDT -----  Contact: self/ [phone]  Seeing a Neurosurgeon tomorrow over by [facility] - I've re-scheduled your appt to 9/17- she is possibly having surgery on 9/25/19, when would she need to hold the Plaquenil before surgery ??  Please advise #L  ----- Message -----  From: Elder Stapleton  Sent: 9/9/2019  11:06 AM  To: Chela Emerson A Staff    Patient would like a call back to speak with a nurse about her appt for tomorrow. Patient would like to come in before 1/6/19. Patient can not come in tomorrow due to pre-testing for a surgery that she will  Be having.

## 2019-09-10 NOTE — TELEPHONE ENCOUNTER
Staff to inform patient that Plaquenil is a mild immunosuppressant and does not need to be held.  If neurosurgeon would prefer it be held then she can hold it one week before procedure and resume once surgeon says it is healed.

## 2019-09-11 ENCOUNTER — OFFICE VISIT (OUTPATIENT)
Dept: ENDOCRINOLOGY | Facility: CLINIC | Age: 59
End: 2019-09-11
Payer: MEDICARE

## 2019-09-11 VITALS
WEIGHT: 123.88 LBS | HEIGHT: 64 IN | BODY MASS INDEX: 21.15 KG/M2 | HEART RATE: 75 BPM | SYSTOLIC BLOOD PRESSURE: 120 MMHG | DIASTOLIC BLOOD PRESSURE: 80 MMHG

## 2019-09-11 DIAGNOSIS — M80.00XG AGE-RELATED OSTEOPOROSIS WITH CURRENT PATHOLOGICAL FRACTURE WITH DELAYED HEALING: ICD-10-CM

## 2019-09-11 DIAGNOSIS — Z78.0 ASYMPTOMATIC POSTMENOPAUSAL ESTROGEN DEFICIENCY: Primary | ICD-10-CM

## 2019-09-11 DIAGNOSIS — E27.40 HYPOADRENALISM: ICD-10-CM

## 2019-09-11 DIAGNOSIS — Z79.52 LONG TERM CURRENT USE OF SYSTEMIC STEROIDS: ICD-10-CM

## 2019-09-11 PROCEDURE — 3079F DIAST BP 80-89 MM HG: CPT | Mod: CPTII,S$GLB,, | Performed by: INTERNAL MEDICINE

## 2019-09-11 PROCEDURE — 99214 OFFICE O/P EST MOD 30 MIN: CPT | Mod: S$GLB,,, | Performed by: INTERNAL MEDICINE

## 2019-09-11 PROCEDURE — 99999 PR PBB SHADOW E&M-EST. PATIENT-LVL III: ICD-10-PCS | Mod: PBBFAC,,, | Performed by: INTERNAL MEDICINE

## 2019-09-11 PROCEDURE — 3074F SYST BP LT 130 MM HG: CPT | Mod: CPTII,S$GLB,, | Performed by: INTERNAL MEDICINE

## 2019-09-11 PROCEDURE — 3008F PR BODY MASS INDEX (BMI) DOCUMENTED: ICD-10-PCS | Mod: CPTII,S$GLB,, | Performed by: INTERNAL MEDICINE

## 2019-09-11 PROCEDURE — 3079F PR MOST RECENT DIASTOLIC BLOOD PRESSURE 80-89 MM HG: ICD-10-PCS | Mod: CPTII,S$GLB,, | Performed by: INTERNAL MEDICINE

## 2019-09-11 PROCEDURE — 3008F BODY MASS INDEX DOCD: CPT | Mod: CPTII,S$GLB,, | Performed by: INTERNAL MEDICINE

## 2019-09-11 PROCEDURE — 99999 PR PBB SHADOW E&M-EST. PATIENT-LVL III: CPT | Mod: PBBFAC,,, | Performed by: INTERNAL MEDICINE

## 2019-09-11 PROCEDURE — 3074F PR MOST RECENT SYSTOLIC BLOOD PRESSURE < 130 MM HG: ICD-10-PCS | Mod: CPTII,S$GLB,, | Performed by: INTERNAL MEDICINE

## 2019-09-11 PROCEDURE — 99214 PR OFFICE/OUTPT VISIT, EST, LEVL IV, 30-39 MIN: ICD-10-PCS | Mod: S$GLB,,, | Performed by: INTERNAL MEDICINE

## 2019-09-11 NOTE — LETTER
September 11, 2019        Dr. Jareth Gastelum - Endocrinology 6th [place]  [address]  [facility] [address]  Phone: [phone]   Patient: Efe Horowitz   MR Number: [id number]   YOB: 1960   Date of Visit: 9/11/2019       Dear Dr. Templeton and Pre Operative Team:    Ms. Efe Horowitz is a 59 year old woman with secondary adrenal insufficiency on chronic steroid treatment. Before general anesthesia she will need a stress dose of hydrocortisone 100 mg IV X 1 before surgery and one dose after surgery. She will need to resume her usual prednisone dose following surgery.         If you have questions, please do not hesitate to call me. I look forward to following Efe Horowitz along with you.    Sincerely,      Ena Srinivasan MD

## 2019-09-11 NOTE — ASSESSMENT & PLAN NOTE
Long history of steroid use due to UC  Unable to locate 21 OH Ab results  For now continue prednisone, would consider switching to HC in the future  Has surgery planned for later in the month    Letter written explaining need for stress dose steroids (HC 50 mg IV every eight hours to give first dose before surgery)  Continue prednisone once discharged home     Sick day rules reviewed and hand out provided.

## 2019-09-11 NOTE — ASSESSMENT & PLAN NOTE
Try and switch to HC for better side effect profile  Will reach out once recovery from surgery is complete.

## 2019-09-11 NOTE — ASSESSMENT & PLAN NOTE
Risk factors include:  Steroid use (at times high dose steroids), postmenopausal status and vertebral and foot fractures   Considering her high risk for fractures consider:  Anabolic therapy following surgery v reclast v prolia     Has elevated PTH in the past, unclear. D levels are normal.   Alk phos is normal   Has history of breast cancer, but did not receive any radiation treatment or other exposure to radiation.

## 2019-09-11 NOTE — LETTER
September 11, 2019                     [facility] - Endocrinology 6th [place]  [address]  [facility] [address]  Phone: [phone]   Patient: Efe Horowitz   MR Number: [id number]   YOB: 1960   Date of Visit: 9/11/2019       Dear Dr. Templeton and Pre Operative Team:    Ms. Efe Horowitz is a 59 year old woman with secondary adrenal insufficiency on chronic steroid treatment. Before general anesthesia she will need a stress dose of hydrocortisone 50 mg IV first dose before surgery and second dose once after surgery. May need additional steroids depending on ability to take medications. She will need to resume her usual prednisone dose following surgery.         If you have questions, please do not hesitate to call me. I look forward to following Efe Horowitz along with you.      If you have questions, please do not hesitate to call me. I look forward to following Efe Horowitz along with you.    Sincerely,      Ena Srinivasan MD            CC  No Recipients    Enclosure

## 2019-09-14 ENCOUNTER — TELEPHONE (OUTPATIENT)
Dept: ENDOCRINOLOGY | Facility: CLINIC | Age: 59
End: 2019-09-14

## 2019-09-14 DIAGNOSIS — M80.00XG AGE-RELATED OSTEOPOROSIS WITH CURRENT PATHOLOGICAL FRACTURE WITH DELAYED HEALING: Primary | ICD-10-CM

## 2019-09-14 DIAGNOSIS — M48.50XA VERTEBRAL COMPRESSION FRACTURE: ICD-10-CM

## 2019-09-14 NOTE — TELEPHONE ENCOUNTER
Results for orders placed or performed in visit on 09/11/19   PTH, intact   Result Value Ref Range    PTH, Intact 59.0 9.0 - 77.0 pg/mL   Renal function panel   Result Value Ref Range    Glucose 83 70 - 110 mg/dL    Sodium 143 136 - 145 mmol/L    Potassium 4.1 3.5 - 5.1 mmol/L    Chloride 106 95 - 110 mmol/L    CO2 24 23 - 29 mmol/L    BUN, Bld 16 8 - 23 mg/dL    Calcium 9.8 8.7 - 10.5 mg/dL    Creatinine 0.8 0.5 - 1.4 mg/dL    Albumin 4.1 3.5 - 5.2 g/dL    Phosphorus 2.9 2.7 - 4.5 mg/dL    eGFR if African American >60.0 >60 mL/min/1.73 m^2    eGFR if non African American >60.0 >60 mL/min/1.73 m^2    Anion Gap 13 8 - 16 mmol/L       Normal alk phos (60 U/L 5/2019)  Normal PTH  Betzy calcium and vitamin D (53 ng/ml 5/2019)    Try tymlos prior to kyphoplasty.

## 2019-09-16 ENCOUNTER — HOSPITAL ENCOUNTER (OUTPATIENT)
Dept: RADIOLOGY | Facility: CLINIC | Age: 59
Discharge: HOME | End: 2019-09-16
Attending: INTERNAL MEDICINE
Payer: MEDICARE

## 2019-09-16 ENCOUNTER — TELEPHONE (OUTPATIENT)
Dept: PHARMACY | Facility: CLINIC | Age: 59
End: 2019-09-16

## 2019-09-16 ENCOUNTER — OFFICE VISIT (OUTPATIENT)
Dept: RHEUMATOLOGY | Facility: CLINIC | Age: 59
End: 2019-09-16
Payer: MEDICARE

## 2019-09-16 VITALS
WEIGHT: 127 LBS | HEART RATE: 78 BPM | DIASTOLIC BLOOD PRESSURE: 78 MMHG | SYSTOLIC BLOOD PRESSURE: 143 MMHG | BODY MASS INDEX: 22.5 KG/M2 | HEIGHT: 63 IN

## 2019-09-16 DIAGNOSIS — Z78.0 ASYMPTOMATIC POSTMENOPAUSAL ESTROGEN DEFICIENCY: ICD-10-CM

## 2019-09-16 DIAGNOSIS — M32.9 SYSTEMIC LUPUS ERYTHEMATOSUS, UNSPECIFIED SLE TYPE, UNSPECIFIED ORGAN INVOLVEMENT STATUS: Primary | ICD-10-CM

## 2019-09-16 DIAGNOSIS — M80.00XG AGE-RELATED OSTEOPOROSIS WITH CURRENT PATHOLOGICAL FRACTURE WITH DELAYED HEALING: ICD-10-CM

## 2019-09-16 DIAGNOSIS — D84.9 IMMUNOSUPPRESSION: ICD-10-CM

## 2019-09-16 DIAGNOSIS — M79.7 FIBROMYALGIA: ICD-10-CM

## 2019-09-16 DIAGNOSIS — R53.83 FATIGUE, UNSPECIFIED TYPE: ICD-10-CM

## 2019-09-16 PROCEDURE — 99214 PR OFFICE/OUTPT VISIT, EST, LEVL IV, 30-39 MIN: ICD-10-PCS | Mod: S$GLB,,, | Performed by: INTERNAL MEDICINE

## 2019-09-16 PROCEDURE — 3008F PR BODY MASS INDEX (BMI) DOCUMENTED: ICD-10-PCS | Mod: CPTII,S$GLB,, | Performed by: INTERNAL MEDICINE

## 2019-09-16 PROCEDURE — 99999 PR PBB SHADOW E&M-EST. PATIENT-LVL III: CPT | Mod: PBBFAC,,, | Performed by: INTERNAL MEDICINE

## 2019-09-16 PROCEDURE — 77080 DEXA BONE DENSITY SPINE HIP: ICD-10-PCS | Mod: 26,,, | Performed by: INTERNAL MEDICINE

## 2019-09-16 PROCEDURE — 3077F PR MOST RECENT SYSTOLIC BLOOD PRESSURE >= 140 MM HG: ICD-10-PCS | Mod: CPTII,S$GLB,, | Performed by: INTERNAL MEDICINE

## 2019-09-16 PROCEDURE — 3078F DIAST BP <80 MM HG: CPT | Mod: CPTII,S$GLB,, | Performed by: INTERNAL MEDICINE

## 2019-09-16 PROCEDURE — 99214 OFFICE O/P EST MOD 30 MIN: CPT | Mod: S$GLB,,, | Performed by: INTERNAL MEDICINE

## 2019-09-16 PROCEDURE — 77080 DXA BONE DENSITY AXIAL: CPT | Mod: TC

## 2019-09-16 PROCEDURE — 3078F PR MOST RECENT DIASTOLIC BLOOD PRESSURE < 80 MM HG: ICD-10-PCS | Mod: CPTII,S$GLB,, | Performed by: INTERNAL MEDICINE

## 2019-09-16 PROCEDURE — 3008F BODY MASS INDEX DOCD: CPT | Mod: CPTII,S$GLB,, | Performed by: INTERNAL MEDICINE

## 2019-09-16 PROCEDURE — 77080 DXA BONE DENSITY AXIAL: CPT | Mod: 26,,, | Performed by: INTERNAL MEDICINE

## 2019-09-16 PROCEDURE — 99999 PR PBB SHADOW E&M-EST. PATIENT-LVL III: ICD-10-PCS | Mod: PBBFAC,,, | Performed by: INTERNAL MEDICINE

## 2019-09-16 PROCEDURE — 3077F SYST BP >= 140 MM HG: CPT | Mod: CPTII,S$GLB,, | Performed by: INTERNAL MEDICINE

## 2019-09-16 ASSESSMENT — ROUTINE ASSESSMENT OF PATIENT INDEX DATA (RAPID3)
PATIENT GLOBAL ASSESSMENT SCORE: 7.5
PAIN SCORE: 10
AM STIFFNESS SCORE: 1, YES
FATIGUE SCORE: 7.5
PSYCHOLOGICAL DISTRESS SCORE: 2.2
TOTAL RAPID3 SCORE: 6.61
MDHAQ FUNCTION SCORE: .7

## 2019-09-16 NOTE — PROGRESS NOTES
"Subjective:       Patient ID: Efe Horowitz is a 59 y.o. female.    Chief Complaint: Lupus    HPI:  Efe Horowitz is a 59 y.o. female with history of lupus since age 39.   Her manifestations include joint pains, headache, positive DERRICK, and a   double-stranded DNA. She also has an equivocal anticardiolipin IgM.   She has been treated with Plaquenil 1-1/2 tablets daily. Eye exam 12/2017 was normal.      In addition to lupus, she has a   history of ulcerative colitis, osteopenia, fibromyalgia as well.   In 2009 she had a colectomy with ileostomy and in May 2011 the ileostomy  was closed. History of bilateral carpal tunnel.       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  She was treated with surgical resection.  Another lesion breast suspected to be lymphoma was later felt to be fibrous tissue.      May 1, 2017 had right breast fibrous material removed and reconstruction on both breasts at [facility].       Interval History:  Developed back pain and found to have L3 vertebral fractures (top and bottom) on MRI.  [place] [facility] [place]   [place]. Srinivasan in endocrine recommended [facility].     Reports recurrent nasal ulcers (2 weeks ago) and hair loss.   Hair loss with lesions.    Review of Systems   Constitutional: Positive for fatigue.   HENT: Negative for mouth sores.         Tongue sore   Eyes: Positive for redness.        Dry eyes   Respiratory: Negative for cough and shortness of breath.    Gastrointestinal: Positive for diarrhea.   Endocrine: Negative.    Genitourinary: Negative.    Musculoskeletal: Positive for arthralgias.   Skin: Negative.    Allergic/Immunologic: Negative.    Neurological: Positive for headaches.   Hematological: Negative.    Psychiatric/Behavioral: Negative.          Objective:   BP (!) 143/78 (BP Location: Right arm, Patient Position: Sitting, BP Method: Medium (Automatic))   Pulse 78   Ht 5' 3" (1.6 m)   Wt 57.6 kg (126 lb 15.8 oz)   BMI 22.49 kg/m²    "      Physical Exam   Constitutional: She is oriented to person, place, and time and well-developed, well-nourished, and in no distress.   HENT:   Head: Normocephalic and atraumatic.   Eyes: Conjunctivae and EOM are normal.   Neck: Neck supple.   Cardiovascular: Normal rate, regular rhythm and normal heart sounds.    Pulmonary/Chest: Effort normal and breath sounds normal.   Abdominal: Soft. Bowel sounds are normal.   Neurological: She is alert and oriented to person, place, and time. Gait normal.   Skin: Skin is warm and dry.     Psychiatric: Mood and affect normal.   Musculoskeletal:   No pain on palpation bilateral trochanteric bursa.           LABS      Component      Latest Ref Rng & Units 9/11/2019 8/13/2019 5/21/2019 5/16/2019             12:09 PM   WBC      3.90 - 12.70 K/uL   8.35    RBC      4.00 - 5.40 M/uL   4.78    Hemoglobin      12.0 - 16.0 g/dL   13.3    Hematocrit      37.0 - 48.5 %   41.7    MCV      82 - 98 fL   87    MCH      27.0 - 31.0 pg   27.8    MCHC      32.0 - 36.0 g/dL   31.9 (L)    RDW      11.5 - 14.5 %   14.9 (H)    Platelets      150 - 350 K/uL   314    MPV      9.2 - 12.9 fL   9.9    Immature Granulocytes      0.0 - 0.5 %   0.7 (H)    Gran # (ANC)      1.8 - 7.7 K/uL   6.6    Immature Grans (Abs)      0.00 - 0.04 K/uL   0.06 (H)    Lymph #      1.0 - 4.8 K/uL   1.1    Mono #      0.3 - 1.0 K/uL   0.5    Eos #      0.0 - 0.5 K/uL   0.1    Baso #      0.00 - 0.20 K/uL   0.03    nRBC      0 /100 WBC   0    Gran%      38.0 - 73.0 %   79.0 (H)    Lymph%      18.0 - 48.0 %   13.1 (L)    Mono%      4.0 - 15.0 %   6.1    Eosinophil%      0.0 - 8.0 %   0.7    Basophil%      0.0 - 1.9 %   0.4    Differential Method         Automated    Sodium      136 - 145 mmol/L 143  142 141   Potassium      3.5 - 5.1 mmol/L 4.1  3.7 3.3 (L)   Chloride      95 - 110 mmol/L 106  103 103   CO2      23 - 29 mmol/L 24  28 27   Glucose      70 - 110 mg/dL 83  86 78   BUN, Bld      8 - 23 mg/dL 16  9 18    Creatinine      0.5 - 1.4 mg/dL 0.8  0.7 0.9   Calcium      8.7 - 10.5 mg/dL 9.8  10.2 10.2   PROTEIN TOTAL      6.0 - 8.4 g/dL    7.8   Albumin      3.5 - 5.2 g/dL 4.1   4.2   BILIRUBIN TOTAL      0.1 - 1.0 mg/dL    0.4   Alkaline Phosphatase      55 - 135 U/L    60   AST      10 - 40 U/L    27   ALT      10 - 44 U/L    22   Anion Gap      8 - 16 mmol/L 13  11 11   eGFR if African American      >60 mL/min/1.73 m:2 >60.0  >60.0 >60.0   eGFR if non African American      >60 mL/min/1.73 m:2 >60.0  >60.0 >60.0   Phosphorus      2.7 - 4.5 mg/dL 2.9      Cholesterol      120 - 199 mg/dL   276 (H)    Triglycerides      30 - 150 mg/dL   202 (H)    HDL      40 - 75 mg/dL   84 (H)    LDL Cholesterol External      63.0 - 159.0 mg/dL   151.6    Hdl/Cholesterol Ratio      20.0 - 50.0 %   30.4    Total Cholesterol/HDL Ratio      2.0 - 5.0   3.3    Non-HDL Cholesterol      mg/dL   192    POC Creatinine      0.5 - 1.4 mg/dL  0.7     Sample        VENOUS     PTH      9.0 - 77.0 pg/mL 59.0        Component      Latest Ref Rng & Units 5/16/2019 5/16/2019          12:09 PM 11:37 AM   WBC      3.90 - 12.70 K/uL 7.23    RBC      4.00 - 5.40 M/uL 4.68    Hemoglobin      12.0 - 16.0 g/dL 12.9    Hematocrit      37.0 - 48.5 % 40.7    MCV      82 - 98 fL 87    MCH      27.0 - 31.0 pg 27.6    MCHC      32.0 - 36.0 g/dL 31.7 (L)    RDW      11.5 - 14.5 % 15.0 (H)    Platelets      150 - 350 K/uL 298    MPV      9.2 - 12.9 fL 10.5    Immature Granulocytes      0.0 - 0.5 % 0.7 (H)    Gran # (ANC)      1.8 - 7.7 K/uL 5.2    Immature Grans (Abs)      0.00 - 0.04 K/uL 0.05 (H)    Lymph #      1.0 - 4.8 K/uL 1.3    Mono #      0.3 - 1.0 K/uL 0.6    Eos #      0.0 - 0.5 K/uL 0.1    Baso #      0.00 - 0.20 K/uL 0.04    nRBC      0 /100 WBC 0    Gran%      38.0 - 73.0 % 72.3    Lymph%      18.0 - 48.0 % 17.3 (L)    Mono%      4.0 - 15.0 % 7.9    Eosinophil%      0.0 - 8.0 % 1.2    Basophil%      0.0 - 1.9 % 0.6    Differential Method       Automated     Sodium      136 - 145 mmol/L 142    Potassium      3.5 - 5.1 mmol/L 3.2 (L)    Chloride      95 - 110 mmol/L 102    CO2      23 - 29 mmol/L 29    Glucose      70 - 110 mg/dL 78    BUN, Bld      8 - 23 mg/dL 17    Creatinine      0.5 - 1.4 mg/dL 0.9    Calcium      8.7 - 10.5 mg/dL 9.9    PROTEIN TOTAL      6.0 - 8.4 g/dL 7.8    Albumin      3.5 - 5.2 g/dL 4.2    BILIRUBIN TOTAL      0.1 - 1.0 mg/dL 0.4    Alkaline Phosphatase      55 - 135 U/L 59    AST      10 - 40 U/L 25    ALT      10 - 44 U/L 20    Anion Gap      8 - 16 mmol/L 11    eGFR if African American      >60 mL/min/1.73 m:2 >60.0    eGFR if non African American      >60 mL/min/1.73 m:2 >60.0    Phosphorus      2.7 - 4.5 mg/dL     Specimen UA        Urine, Unspecified   Color, UA      Yellow, Straw, Amanda  Yellow   Appearance, UA      Clear  Clear   pH, UA      5.0 - 8.0  5.0   Specific Gravity, UA      1.005 - 1.030  1.015   Protein, UA      Negative  Negative   Glucose, UA      Negative  Negative   Ketones, UA      Negative  Negative   Bilirubin (UA)      Negative  Negative   Occult Blood UA      Negative  Negative   NITRITE UA      Negative  Negative   Leukocytes, UA      Negative  Negative   Cholesterol      120 - 199 mg/dL     Triglycerides      30 - 150 mg/dL     HDL      40 - 75 mg/dL     LDL Cholesterol External      63.0 - 159.0 mg/dL     Hdl/Cholesterol Ratio      20.0 - 50.0 %     Total Cholesterol/HDL Ratio      2.0 - 5.0     Non-HDL Cholesterol      mg/dL     Protein, Urine Random      0 - 15 mg/dL  <7   Creatinine, Random Ur      15.0 - 325.0 mg/dL  57.0   Prot/Creat Ratio, Ur      0.00 - 0.20  Unable to calculate   POC Creatinine      0.5 - 1.4 mg/dL     Sample           ds DNA Ab      Negative 1:10 Negative 1:10    Complement (C-3)      50 - 180 mg/dL 124    Complement (C-4)      11 - 44 mg/dL 41    CRP      0.0 - 8.2 mg/L 1.6    Sed Rate      0 - 36 mm/Hr 15    Vit D, 25-Hydroxy      30 - 96 ng/mL 53    Vitamin B-12      210 - 950  pg/mL 704    Magnesium      1.6 - 2.6 mg/dL 2.2        Assessment:       1. Lupus. Lupus manifestations include joint pains, headache, positive DERRICK, and a   double-stranded DNA. Has fatigue and alopecia.  SLEDAI=2 without labs  2. Fatigue   3. Encounter for long-term (current) use of other medications   4. Myalgia and myositis. Fibromyalgia on gabapentin and Savella   5. Trochanteric bursitis. Bilateral now requesting injection.  6. Shoulder pain. Stable   7. Vitamin D deficiency disease. Now normal  8. Suspected Shingles.   9. Migraine.  Did acupuncture with Dr. Tinsley.  Treated with Maxalt by primary doctor which helps.     10.  Osteoporosis based vertebral fracture.  Reclast in the past.  Dr. Srinivasan plans to do Forteo.   11.  Chronic steroid use.  Endocrine gives for adrenal insufficiency.  12.  Ulcerative colitis  13.  Hematuria.  Followed by nephrology  14.  Stress with dealing with son with mental illness  15.  Loss of smell since Nov/Dec 2018.  May relate to sinus issues.  Evaluated by ENT but no cause found.  Continues to have loss of smell  Plan:       1.  Labs reviewed.    2. Patient to continue Plaquenil 300 mg daily.  Eye exam normal 5/2019  3. Continue neurontin 800 mg/800 mg/1200 mg in evening.  4. Proceed with Forteo from endocrine  5. Patient to get flu vaccination.                 RTO in 3 months/prn.

## 2019-09-16 NOTE — TELEPHONE ENCOUNTER
Informed Patient  that [facility] received prescription for Tymlos and prior authorization is required.  [facility] will be back in touch once insurance determination is received.

## 2019-09-16 NOTE — Clinical Note
The patient informed me that you will be giving her Forteo and wanted to know my thoughts.  I have no objections to her using Forteo.

## 2019-09-17 ENCOUNTER — PATIENT MESSAGE (OUTPATIENT)
Dept: RHEUMATOLOGY | Facility: CLINIC | Age: 59
End: 2019-09-17

## 2019-09-18 DIAGNOSIS — E87.6 LOW BLOOD POTASSIUM: ICD-10-CM

## 2019-09-18 RX ORDER — POTASSIUM CHLORIDE 750 MG/1
TABLET, EXTENDED RELEASE ORAL
Qty: 180 TABLET | Refills: 3 | Status: SHIPPED | OUTPATIENT
Start: 2019-09-18 | End: 2020-08-10

## 2019-09-20 ENCOUNTER — TELEPHONE (OUTPATIENT)
Dept: ENDOCRINOLOGY | Facility: CLINIC | Age: 59
End: 2019-09-20

## 2019-09-20 NOTE — TELEPHONE ENCOUNTER
Talk to jessie for [facility]. Nita contact us about pa for tylos inj but dr saini sent it  To [facility] where they do PA themselves. Alvin had confirmed they are working on the PA with [facility].

## 2019-09-20 NOTE — TELEPHONE ENCOUNTER
Left a message regard her IV script said 100 but I dr saini letter and chart notes said to patient take 50mg IV. Gave clinic number to call back.

## 2019-09-20 NOTE — TELEPHONE ENCOUNTER
DOCUMENTATION ONLY  The prior authorization request for Tymlos 80 mcg was denied by the patient's insurance.   Forwarded to the clinical pharmacist for review. Mi RAGSDALE

## 2019-09-20 NOTE — TELEPHONE ENCOUNTER
----- Message from Marybel Mitchell sent at 9/20/2019  8:36 AM CDT -----  Contact:    Teresa  [phone]     [facility]  Pharmacy  /       Checking the status of a PA for medication  abaloparatide (TYMLOS) 80 mcg (3,120 mcg/1.56 mL) PnIj ....    They have two questions that needs to be answered ... Ref #  [id number]...   Call  Back to verify

## 2019-09-20 NOTE — TELEPHONE ENCOUNTER
----- Message from Yudith Zhong sent at 9/20/2019  9:55 AM CDT -----  Contact: self [phone]      ----- Message -----  From: Elif Comer  Sent: 9/20/2019   8:58 AM  To: Nellie CASTANO Staff    Pt states she had a script to get a shot states it says 100mg Iv states the other script says 50 mg which one is correct states she is having surgery one has a signature and the other one dont please call back to discuss

## 2019-09-23 ENCOUNTER — TELEPHONE (OUTPATIENT)
Dept: PHARMACY | Facility: CLINIC | Age: 59
End: 2019-09-23

## 2019-09-23 RX ORDER — LOPERAMIDE HYDROCHLORIDE 2 MG/1
CAPSULE ORAL
Qty: 240 CAPSULE | Refills: 3 | Status: SHIPPED | OUTPATIENT
Start: 2019-09-23 | End: 2021-06-22

## 2019-09-24 ENCOUNTER — TELEPHONE (OUTPATIENT)
Dept: PHARMACY | Facility: CLINIC | Age: 59
End: 2019-09-24

## 2019-09-24 DIAGNOSIS — M48.56XS NON-TRAUMATIC COMPRESSION FRACTURE OF THIRD LUMBAR VERTEBRA, SEQUELA: ICD-10-CM

## 2019-09-24 DIAGNOSIS — M80.00XG AGE-RELATED OSTEOPOROSIS WITH CURRENT PATHOLOGICAL FRACTURE WITH DELAYED HEALING: Primary | ICD-10-CM

## 2019-09-24 RX ORDER — TERIPARATIDE 250 UG/ML
20 INJECTION, SOLUTION SUBCUTANEOUS DAILY
Qty: 2.4 ML | Refills: 11 | Status: SHIPPED | OUTPATIENT
Start: 2019-09-24 | End: 2020-10-08 | Stop reason: SDUPTHER

## 2019-09-24 NOTE — TELEPHONE ENCOUNTER
LVM for callback to inform patient that [facility] received prescription for Forteo and prior authorization is required.  [facility] will be back in touch once insurance determination is received.

## 2019-09-26 NOTE — TELEPHONE ENCOUNTER
DOCUMENTATION ONLY:  Prior authorization for  Forteo 20 mcg #2.4 mL/28 approved from 09/26/2019 to 09/25/2021    Co-pay: $0.00    Patient Assistance IS NOT required. Sending to the clinical pharmacist for  and shipment. Mi RAGSDALE

## 2019-10-01 ENCOUNTER — PATIENT MESSAGE (OUTPATIENT)
Dept: RHEUMATOLOGY | Facility: CLINIC | Age: 59
End: 2019-10-01

## 2019-10-01 ENCOUNTER — TELEPHONE (OUTPATIENT)
Dept: ENDOCRINOLOGY | Facility: CLINIC | Age: 59
End: 2019-10-01

## 2019-10-01 ENCOUNTER — TELEPHONE (OUTPATIENT)
Dept: PHARMACY | Facility: CLINIC | Age: 59
End: 2019-10-01

## 2019-10-01 DIAGNOSIS — M80.00XG AGE-RELATED OSTEOPOROSIS WITH CURRENT PATHOLOGICAL FRACTURE WITH DELAYED HEALING: Primary | ICD-10-CM

## 2019-10-01 NOTE — TELEPHONE ENCOUNTER
----- Message from Idania Allison MD sent at 9/16/2019 10:13 AM CDT -----  The patient informed me that you will be giving her Forteo and wanted to know my thoughts.  I have no objections to her using Forteo.

## 2019-10-15 NOTE — TELEPHONE ENCOUNTER
Initial Forteo consult completed on 10/16. Forteo will be shipped on 10/16 to arrive at patient's temporary home address on 10/17 via [employer] [place]. $0 copay. Patient plans to start Forteo on 10/17.  Address confirmed, CC on file. Confirmed 2 patient identifiers - name and . Therapy Appropriate.    Indication: osteoporosis  Goals of Therapy: slow disease progression, strengthen bones    Injection experience: patient is familiar with insulin delivery pens that she has assisted her father with.     Counseled patient on administration directions:  -Keep your FORTEO delivery device in the refrigerator between 36° to 46°F (2° to 8°C).  -Do not use FORTEO after the expiration date printed on the delivery device and packaging. Throw away the -FORTEO delivery device after 28 days even if it has medicine in it   - Wash hands before and after injection.  - Monthly RX will come with pen needles and alcohol swabs.  - Patient may inject in either the tops of the thighs or abdomen- but at least 2 inches away from belly button.  Patient was instructed to rotate injections sites.  - Patient is to wipe down the injection site with the alcohol pad, wait to dry.    1. Pull off white cap,   2. Screw on new pen needle, remove outer cap.   3. Set dose by pulling out black injection button until it stops - make sure red line shows.  4. Remove inner needle cover.  5. Gently squeeze the area of the cleaned skin and hold it firmly. Insert needle straight into the skin. Push black injection button until it stops - hold and count to 5 to ensure complete dose administered.  6. Pull the needle from skin and confirm dose - black button all the way in confirms full dose administered.  7. Use large needle cover to remove needle safely and recap.    - Patient will use sharps container to discard all injectable items.    Missed Doses: never take 2 doses in one day    DDIs: Medication list reviewed. No DDIs or allergies encountered with Forteo.     Patient  was counseled on possible side effects: dizziness, joint pain, upset stomach.  Patient advised to administer injection while seated and to watch for symptoms of orthostatic hypotension.    PMH:  Smoking: never  Alcohol: never  Falls: none  Fracture History: 2: 2018: foot; 2019: back.   Pain: 8; Health QoL 7 (10-best). Missed work: (disabled); Canceled Plans x2. Exercises at home on treadmill about 3x weekly. Annual 5 mile cancer walk. Stretches often. No stairs in home, but they don't give her trouble. Arm strength - post surgery don't lift more than 10 pounds. Fine with vacuum, etc.      Patient informed that online website has step-by-step instruction video (website address sent via [employer]) and a 24 hour nursing contact number; Provided Forteo Connect  support number ([phone]). Advised that [facility] also has an on-call pharmacist 24 hours a day. Patient verbalized understanding. Consultation included: the importance of compliance; the importance of laboratory monitoring; and the importance of keeping all follow up appointments. Reviewed importance of Ca + Vit D supplements and weight bearing exercises. Fall risk factors reviewed.  Patient understands to report any medication changes to [facility] and provider. All questions answered and addressed to patients satisfaction. RPh will touchbase with patient 1 week from start, [facility] to contact patient in 3 weeks for refills.     Silvia Almaguer, PharmD  Specialty Pharmacy Clinical Pharmacist  [facility]  P: [phone]

## 2019-11-01 ENCOUNTER — LAB VISIT (OUTPATIENT)
Dept: LAB | Facility: HOSPITAL | Age: 59
End: 2019-11-01
Attending: INTERNAL MEDICINE
Payer: MEDICARE

## 2019-11-01 DIAGNOSIS — M80.00XG AGE-RELATED OSTEOPOROSIS WITH CURRENT PATHOLOGICAL FRACTURE WITH DELAYED HEALING: ICD-10-CM

## 2019-11-01 LAB
ANION GAP SERPL CALC-SCNC: 11 MMOL/L (ref 8–16)
BUN SERPL-MCNC: 14 MG/DL (ref 8–23)
CALCIUM SERPL-MCNC: 9.7 MG/DL (ref 8.7–10.5)
CHLORIDE SERPL-SCNC: 104 MMOL/L (ref 95–110)
CO2 SERPL-SCNC: 27 MMOL/L (ref 23–29)
CREAT SERPL-MCNC: 0.8 MG/DL (ref 0.5–1.4)
EST. GFR  (AFRICAN AMERICAN): >60 ML/MIN/1.73 M^2
EST. GFR  (NON AFRICAN AMERICAN): >60 ML/MIN/1.73 M^2
GLUCOSE SERPL-MCNC: 85 MG/DL (ref 70–110)
POTASSIUM SERPL-SCNC: 3.9 MMOL/L (ref 3.5–5.1)
SODIUM SERPL-SCNC: 142 MMOL/L (ref 136–145)

## 2019-11-01 PROCEDURE — 36415 COLL VENOUS BLD VENIPUNCTURE: CPT | Mod: PO

## 2019-11-01 PROCEDURE — 80048 BASIC METABOLIC PNL TOTAL CA: CPT

## 2019-11-07 ENCOUNTER — TELEPHONE (OUTPATIENT)
Dept: PHARMACY | Facility: CLINIC | Age: 59
End: 2019-11-07

## 2019-11-07 NOTE — TELEPHONE ENCOUNTER
Refill call regarding Forteo at [facility]. Will prepare for shipment on  to arrive 11/15 with pt consent. Copay 0.00. Patient has not reported any new allergies/ side effects. Pt taking medication(s) as directed with no questions or concerns for RPH. Also no new medications. and Address verified.   ~9 days on hand medication expires on

## 2019-12-12 ENCOUNTER — TELEPHONE (OUTPATIENT)
Dept: PHARMACY | Facility: CLINIC | Age: 59
End: 2019-12-12

## 2019-12-16 NOTE — PROGRESS NOTES
Subjective:      Patient ID: Efe Horowitz is a 59 y.o. female.    Chief Complaint:  No chief complaint on file.    History of Present Illness  Efe Horowitz is here for follow up of osteoporosis and AI.  Previously seen by Dr. Srinivasan.  Last seen 9/11/19.  This is their first visit with me.      S/p lumbar kyphoplasty 9/25/19. Tolerated procedure well.  Overall, doing well.     With regards to osteoporosis:   Diagnosed 1990s.    BMD: 9/16/19   Lumbar Spine: Lumbar bone mineral density L1-L4 is 0.881g/cm2, which is a T-score of -1.5. The Z-score is -0.8.  Bone mineral density at L3/L4 is increased compared to adjacent vertebrae, consistent with diagnosis of vertebral compression fracture.  Total Hip: Total hip bone mineral density is 0.819g/cm2.  The T-score is -1.0, and the Z-score is -0.5.  Femoral neck: Bone mineral density is 0.710g/cm2 and the T-score is -1.3 and the Z-score is -0.6 g/cm2.  There is a 4.0% risk of a major osteoporotic fracture and a 0.3% risk of hip fracture in the next 10 years (FRAX adjusted with Trabecular Bone Score).  Compared with previous DXA, BMD at the lumbar spine has declined by 3.2%, and the BMD at the total hip has remained stable.  IMPRESSION:  1.  Osteoporosis based on osteopenic range bone density and presence of fragility fracture.  2.  Bone mineral density at L3/L4 is increased compared to adjacent vertebrae, consistent with a diagnosis of vertebral compression fracture.  3.  Compared with previous DXA, BMD at the lumbar spine has declined by 3.2%, and the BMD at the total hip has remained stable.  RECOMMENDATIONS of [facility]:  1.  Calcium 8497-6161 mg daily and vitamin D 800-1000 units daily, adequate exercise.  2.  Agree with treatment plan of starting abaloparatide.  3.  Repeat BMD in 2 years    Medications:   Actonel  Reclast 2013, 2014, 2017  Forteo 9/2019 - takes nightly     Denies any SE.     Calcium intake: None  Vit  D intake: Ergo 50,000 twice weekly     Weight bearing exercise: Active.   Falls: Denies  Fractures: vertebral fracture  Significant height loss (>2 inches): Denies    Family history: + grandmother    Menopause:   Hysterectomy in 1985, ovaries intact.    Tobacco Use: Denies  Alcohol Use: Denies  Glucocorticoid History: + daily prednisone 5mg  Anticoagulant Use: Denies.   GERD/PPI Use: +  History of Malabsorption: Denies  Antiseizure Medications: Denies  History of Thyroid Disease: Denies. Recent thyroid labs indicate subclinical hyperthyroidism.   Kidney Stones/ Kidney Disease: Denies  Personal history of kidney stones: Denies  Family history of kidney stones: Denies.  Family history of bone disease or fracture: Denies.  Has history of right breast cancer (2018), but did not receive any radiation treatment or other exposure to radiation.     With regards to AI:  Diagnosed 2004; appears to be secondary to chronic steroid use for UC (diagnosed at 18y.o.)    Current Meds:  Prednisone 5mg daily (per patient, can NOT take HC)     Denies fatigue  + weakness, occasionally  Denies loss of appetite  Denies weight loss   Denies hyperpigmentation on palms of hands and soles of feet  Denies  hypotensive symptoms- lightheadedness when standing  Denies nausea and vomiting    Denies history of adrenal crisis.    Has a medic alert tag: NO     Has an emergent Dexamethasone prefilled injection pen: Patient thinks so, will check and let me know.      Review of Systems   Constitutional: Negative for fatigue.   Eyes: Negative for visual disturbance.   Respiratory: Negative for shortness of breath.    Cardiovascular: Negative for chest pain.   Gastrointestinal: Negative for abdominal pain.   Musculoskeletal: Negative for arthralgias.   Skin: Negative for wound.   Neurological: Positive for weakness. Negative for headaches.   Hematological: Does not bruise/bleed easily.   Psychiatric/Behavioral: Negative for sleep disturbance.  "    Objective:   Physical Exam   Neck: No thyromegaly present.   Cardiovascular: Normal rate.   No edema present   Pulmonary/Chest: Effort normal.   Abdominal: Soft.   Vitals reviewed.    Visit Vitals  /82 (BP Location: Right arm, Patient Position: Sitting, BP Method: Medium (Automatic))   Resp 16   Ht 5' 3" (1.6 m)   Wt 56.8 kg (125 lb 3.5 oz)   BMI 22.18 kg/m²     Body mass index is 22.18 kg/m².    Lab Review:   Lab Results   Component Value Date    HGBA1C 5.7 (H) 05/08/2018    HGBA1C 5.9 01/12/2013       Lab Results   Component Value Date    CHOL 276 (H) 05/21/2019    HDL 84 (H) 05/21/2019    LDLCALC 151.6 05/21/2019    TRIG 202 (H) 05/21/2019    CHOLHDL 30.4 05/21/2019     Lab Results   Component Value Date     11/01/2019    K 3.9 11/01/2019     11/01/2019    CO2 27 11/01/2019    GLU 85 11/01/2019    BUN 14 11/01/2019    CREATININE 0.8 11/01/2019    CALCIUM 9.7 11/01/2019    PROT 7.7 09/16/2019    ALBUMIN 4.3 09/16/2019    BILITOT 0.3 09/16/2019    ALKPHOS 79 09/16/2019    AST 22 09/16/2019    ALT 18 09/16/2019    ANIONGAP 11 11/01/2019    ESTGFRAFRICA >60.0 11/01/2019    EGFRNONAA >60.0 11/01/2019    TSH 0.360 (L) 09/16/2019     Vit D, 25-Hydroxy   Date Value Ref Range Status   05/16/2019 53 30 - 96 ng/mL Final     Comment:     Vitamin D deficiency.........<10 ng/mL                              Vitamin D insufficiency......10-29 ng/mL       Vitamin D sufficiency........> or equal to 30 ng/mL  Vitamin D toxicity............>100 ng/mL       Assessment and Plan     1. Age-related osteoporosis with current pathological fracture with delayed healing  Comprehensive metabolic panel    Vitamin D    PTH, intact    Procollagen Type 1 Propeptide   2. Hypoadrenalism     3. Vitamin D deficiency disease     4. Malignant neoplasm of upper-inner quadrant of right breast in female, estrogen receptor negative     5. Hyperlipidemia, unspecified hyperlipidemia type     6. Subclinical hyperthyroidism  TSH   7. " Vitamin D deficiency  Vitamin D   8. Abnormal TSH       Age-related osteoporosis with current pathological fracture with delayed healing  -- Risks include menopause, +FH, glucocorticoid use, PPI therapy.  -- Reviewed basics of quantity versus quality  -- Reassuring they are not fracturing recently.  History of vertebral fracture, s/p lumbar kyphoplasty 9/25/19.  -- Recommend:  -Pharmacological therapy is recommended for patients with osteopenia if the 10 year probability of a hip fracture is >3% and 10 year probability of other major osteoporotic fractures is >20%.  Treatment options and potential side effects discussed for PO bisphosphonates, reclast, Denosumab, and Teriparatide.   -Treatment: Forteo (started 9/2019).    PTH, calcium, vitamin D, alk phos all unremarkable.  Denies SE.  Right breast cancer 2018 without radiation treatment.    Repeat labs with PTH Vit D CMP and P1NP prior to appointment in 6 months.     -Calcium and Vitamin D RDD provided.  -Exercise: recommended.  -Fall precautions made in the home.  -Alerted that if dental work needs to be done it should be done prior to initiating therapy. Dental health: UTD.  -- Repeat DEXA scan 9/2021.    Hypoadrenalism  -- Chronic steroid use from .  -- CONTINUE: Prednisone 5mg daily.  Discussed switching to HC- patient reports can not use HC?   -- Sick day precautions discussed in detail.  -- Encouraged patient to get a medic alert tag.  -- Patient will let me know if she has an emergency injection at home.       Vitamin D deficiency disease  -- CONTINUE: ergo 50,000 twice weekly.    Malignant neoplasm of upper-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  -- Noting not on active treatment or radiation treatment.     Hyperlipidemia  -- Continue current medications.     Subclinical hyperthyroidism  -- Check TFTs with next set of labs.     Follow up in about 6 months (around 6/18/2020).    Case discussed with Dr. Srinivasan.  Recommendations were discussed with the patient  in detail  The patient verbalized understanding and agrees with the plan outlined as above.

## 2019-12-18 ENCOUNTER — OFFICE VISIT (OUTPATIENT)
Dept: ENDOCRINOLOGY | Facility: CLINIC | Age: 59
End: 2019-12-18
Payer: MEDICARE

## 2019-12-18 VITALS
SYSTOLIC BLOOD PRESSURE: 130 MMHG | DIASTOLIC BLOOD PRESSURE: 82 MMHG | BODY MASS INDEX: 22.19 KG/M2 | WEIGHT: 125.25 LBS | RESPIRATION RATE: 16 BRPM | HEIGHT: 63 IN

## 2019-12-18 DIAGNOSIS — E05.90 SUBCLINICAL HYPERTHYROIDISM: ICD-10-CM

## 2019-12-18 DIAGNOSIS — E78.5 HYPERLIPIDEMIA, UNSPECIFIED HYPERLIPIDEMIA TYPE: ICD-10-CM

## 2019-12-18 DIAGNOSIS — E27.40 HYPOADRENALISM: ICD-10-CM

## 2019-12-18 DIAGNOSIS — M80.00XG AGE-RELATED OSTEOPOROSIS WITH CURRENT PATHOLOGICAL FRACTURE WITH DELAYED HEALING: Primary | ICD-10-CM

## 2019-12-18 DIAGNOSIS — C50.211 MALIGNANT NEOPLASM OF UPPER-INNER QUADRANT OF RIGHT BREAST IN FEMALE, ESTROGEN RECEPTOR NEGATIVE: ICD-10-CM

## 2019-12-18 DIAGNOSIS — R79.89 ABNORMAL TSH: ICD-10-CM

## 2019-12-18 DIAGNOSIS — Z17.1 MALIGNANT NEOPLASM OF UPPER-INNER QUADRANT OF RIGHT BREAST IN FEMALE, ESTROGEN RECEPTOR NEGATIVE: ICD-10-CM

## 2019-12-18 DIAGNOSIS — E55.9 VITAMIN D DEFICIENCY DISEASE: ICD-10-CM

## 2019-12-18 DIAGNOSIS — E55.9 VITAMIN D DEFICIENCY: ICD-10-CM

## 2019-12-18 PROCEDURE — 99214 PR OFFICE/OUTPT VISIT, EST, LEVL IV, 30-39 MIN: ICD-10-PCS | Mod: S$GLB,,, | Performed by: NURSE PRACTITIONER

## 2019-12-18 PROCEDURE — 3075F PR MOST RECENT SYSTOLIC BLOOD PRESS GE 130-139MM HG: ICD-10-PCS | Mod: CPTII,S$GLB,, | Performed by: NURSE PRACTITIONER

## 2019-12-18 PROCEDURE — 3008F PR BODY MASS INDEX (BMI) DOCUMENTED: ICD-10-PCS | Mod: CPTII,S$GLB,, | Performed by: NURSE PRACTITIONER

## 2019-12-18 PROCEDURE — 99214 OFFICE O/P EST MOD 30 MIN: CPT | Mod: S$GLB,,, | Performed by: NURSE PRACTITIONER

## 2019-12-18 PROCEDURE — 3008F BODY MASS INDEX DOCD: CPT | Mod: CPTII,S$GLB,, | Performed by: NURSE PRACTITIONER

## 2019-12-18 PROCEDURE — 3075F SYST BP GE 130 - 139MM HG: CPT | Mod: CPTII,S$GLB,, | Performed by: NURSE PRACTITIONER

## 2019-12-18 PROCEDURE — 99999 PR PBB SHADOW E&M-EST. PATIENT-LVL V: CPT | Mod: PBBFAC,,, | Performed by: NURSE PRACTITIONER

## 2019-12-18 PROCEDURE — 3079F DIAST BP 80-89 MM HG: CPT | Mod: CPTII,S$GLB,, | Performed by: NURSE PRACTITIONER

## 2019-12-18 PROCEDURE — 3079F PR MOST RECENT DIASTOLIC BLOOD PRESSURE 80-89 MM HG: ICD-10-PCS | Mod: CPTII,S$GLB,, | Performed by: NURSE PRACTITIONER

## 2019-12-18 PROCEDURE — 99999 PR PBB SHADOW E&M-EST. PATIENT-LVL V: ICD-10-PCS | Mod: PBBFAC,,, | Performed by: NURSE PRACTITIONER

## 2019-12-18 NOTE — ASSESSMENT & PLAN NOTE
-- Risks include menopause, +FH, glucocorticoid use, PPI therapy.  -- Reviewed basics of quantity versus quality  -- Reassuring they are not fracturing recently.  History of vertebral fracture, s/p lumbar kyphoplasty 9/25/19.  -- Recommend:  -Pharmacological therapy is recommended for patients with osteopenia if the 10 year probability of a hip fracture is >3% and 10 year probability of other major osteoporotic fractures is >20%.  Treatment options and potential side effects discussed for PO bisphosphonates, reclast, Denosumab, and Teriparatide.   -Treatment: Forteo (started 9/2019).    PTH, calcium, vitamin D, alk phos all unremarkable.  Denies SE.  Right breast cancer 2018 without radiation treatment.    Repeat labs with PTH Vit D CMP and P1NP prior to appointment in 6 months.     -Calcium and Vitamin D RDD provided.  -Exercise: recommended.  -Fall precautions made in the home.  -Alerted that if dental work needs to be done it should be done prior to initiating therapy. Dental health: UTD.  -- Repeat DEXA scan 9/2021.

## 2019-12-18 NOTE — ASSESSMENT & PLAN NOTE
-- Chronic steroid use from UC.  -- CONTINUE: Prednisone 5mg daily.  Discussed switching to HC- patient reports can not use HC?   -- Sick day precautions discussed in detail.  -- Encouraged patient to get a medic alert tag.  -- Patient will let me know if she has an emergency injection at home.

## 2019-12-31 ENCOUNTER — OFFICE VISIT (OUTPATIENT)
Dept: URGENT CARE | Facility: CLINIC | Age: 59
End: 2019-12-31
Payer: MEDICARE

## 2019-12-31 VITALS
TEMPERATURE: 101 F | RESPIRATION RATE: 18 BRPM | BODY MASS INDEX: 22.15 KG/M2 | WEIGHT: 125 LBS | HEART RATE: 100 BPM | HEIGHT: 63 IN | DIASTOLIC BLOOD PRESSURE: 86 MMHG | OXYGEN SATURATION: 98 % | SYSTOLIC BLOOD PRESSURE: 137 MMHG

## 2019-12-31 DIAGNOSIS — J20.9 ACUTE PURULENT BRONCHITIS: Primary | ICD-10-CM

## 2019-12-31 DIAGNOSIS — R50.9 FEVER, UNSPECIFIED FEVER CAUSE: ICD-10-CM

## 2019-12-31 LAB
CTP QC/QA: YES
FLUAV AG NPH QL: NEGATIVE
FLUBV AG NPH QL: NEGATIVE

## 2019-12-31 PROCEDURE — 87804 INFLUENZA ASSAY W/OPTIC: CPT | Mod: QW,S$GLB,, | Performed by: NURSE PRACTITIONER

## 2019-12-31 PROCEDURE — 71046 X-RAY EXAM CHEST 2 VIEWS: CPT | Mod: S$GLB,,, | Performed by: RADIOLOGY

## 2019-12-31 PROCEDURE — 87804 POCT INFLUENZA A/B: ICD-10-PCS | Mod: 59,QW,S$GLB, | Performed by: NURSE PRACTITIONER

## 2019-12-31 PROCEDURE — 71046 XR CHEST PA AND LATERAL: ICD-10-PCS | Mod: S$GLB,,, | Performed by: RADIOLOGY

## 2019-12-31 PROCEDURE — 99214 PR OFFICE/OUTPT VISIT, EST, LEVL IV, 30-39 MIN: ICD-10-PCS | Mod: 25,S$GLB,, | Performed by: NURSE PRACTITIONER

## 2019-12-31 PROCEDURE — 99214 OFFICE O/P EST MOD 30 MIN: CPT | Mod: 25,S$GLB,, | Performed by: NURSE PRACTITIONER

## 2019-12-31 RX ORDER — DOXYCYCLINE 100 MG/1
100 CAPSULE ORAL 2 TIMES DAILY
Qty: 14 CAPSULE | Refills: 0 | Status: SHIPPED | OUTPATIENT
Start: 2019-12-31 | End: 2020-01-07

## 2019-12-31 RX ORDER — BENZONATATE 100 MG/1
100 CAPSULE ORAL 3 TIMES DAILY PRN
Qty: 30 CAPSULE | Refills: 0 | Status: SHIPPED | OUTPATIENT
Start: 2019-12-31 | End: 2020-01-10

## 2019-12-31 NOTE — PATIENT INSTRUCTIONS
PLEASE READ YOUR DISCHARGE INSTRUCTIONS ENTIRELY AS IT CONTAINS IMPORTANT INFORMATION.        Please return or see your primary care doctor if you develop new or worsening symptoms.     Please arrange follow up with your primary medical clinic as soon as possible. You must understand that you've received an Urgent Care treatment only and that you may be released before all of your medical problems are known or treated. You, the patient, will arrange for follow up as instructed. If your symptoms worsen or fail to improve you should go to the Emergency Room.    What Is Acute Bronchitis?  Acute bronchitis is when the airways in your lungs (bronchial tubes) become red and swollen (inflamed). It is usually caused by a viral infection. But it can also occur because of a bacteria or allergen. Symptoms include a cough that produces yellow or greenish mucus and can last for days or sometimes weeks.  Inside healthy lungs    Air travels in and out of the lungs through the airways. The linings of these airways produce sticky mucus. This mucus traps particles that enter the lungs. Tiny structures called cilia then sweep the particles out of the airways.     Healthy airway: Airways are normally open. Air moves in and out easily.      Healthy cilia: Tiny, hairlike cilia sweep mucus and particles up and out of the airways.   Lungs with bronchitis  Bronchitis often occurs with a cold or the flu virus. The airways become inflamed (red and swollen). There is a deep hacking cough from the extra mucus. Other symptoms may include:  · Wheezing  · Chest discomfort  · Shortness of breath  · Mild fever  A second infection, this time due to bacteria, may then occur. And airways irritated by allergens or smoke are more likely to get infected.        Inflamed airway: Inflammation and extra mucus narrow the airway, causing shortness of breath.      Impaired cilia: Extra mucus impairs cilia, causing congestion and wheezing. Smoking makes the  problem worse.   Making a diagnosis  A physical exam, health history, and certain tests help your healthcare provider make the diagnosis.  Health history  Your healthcare provider will ask you about your symptoms.  The exam  Your provider listens to your chest for signs of congestion. He or she may also check your ears, nose, and throat.  Possible tests  · A sputum test for bacteria. This requires a sample of mucus from your lungs.  · A nasal or throat swab. This tests to see if you have a bacterial infection.  · A chest X-ray. This is done if your healthcare provider thinks you have pneumonia.  · Tests to check for an underlying condition. Other tests may be done to check for things such as allergies, asthma, or COPD (chronic obstructive pulmonary disease). You may need to see a specialist for more lung function testing.  Treating a cough  The main treatment for bronchitis is easing symptoms. Avoiding smoke, allergens, and other things that trigger coughing can often help. If the infection is bacterial, you may be given antibiotics. During the illness, it's important to get plenty of sleep. To ease symptoms:  · Dont smoke. Also avoid secondhand smoke.  · Use a humidifier. Or try breathing in steam from a hot shower. This may help loosen mucus.  · Drink a lot of water and juice. They can soothe the throat and may help thin mucus.  · Sit up or use extra pillows when in bed. This helps to lessen coughing and congestion.  · Ask your provider about using medicine. Ask about using cough medicine, pain and fever medicine, or a decongestant.  Antibiotics  Most cases of bronchitis are caused by cold or flu viruses. They dont need antibiotics to treat them, even if your mucus is thick and green or yellow. Antibiotics dont treat viral illness and antibiotics have not been shown to have any benefit in cases of acute bronchitis. Taking antibiotics when they are not needed increases your risk of getting an infection later  that is antibiotic-resistant. Antibiotics can also cause severe cases of diarrhea that require other antibiotics to treat.  It is important that you accept your healthcare provider's opinion to not use antibiotics. Your provider will prescribe antibiotics if the infection is caused by bacteria. If they are prescribed:  · Take all of the medicine. Take the medicine until it is used up, even if symptoms have improved. If you dont, the bronchitis may come back.  · Take the medicines as directed. For instance, some medicines should be taken with food.  · Ask about side effects. Ask your provider or pharmacist what side effects are common, and what to do about them.  Follow-up care  You should see your provider again in 2 to 3 weeks. By this time, symptoms should have improved. An infection that lasts longer may mean you have a more serious problem.  Prevention  · Avoid tobacco smoke. If you smoke, quit. Stay away from smoky places. Ask friends and family not to smoke around you, or in your home or car.  · Get checked for allergies.  · Ask your provider about getting a yearly flu shot. Also ask about pneumococcal or pneumonia shots.  · Wash your hands often. This helps reduce the chance of picking up viruses that cause colds and flu.  Call your healthcare provider if:  · Symptoms worsen, or you have new symptoms  · Breathing problems worsen or  become severe  · Symptoms dont get better within a week, or within 3 days of taking antibiotics   Date Last Reviewed: 2/1/2017  © 0506-7234 [employer]. [address] [place] [address], [place] [address]. All rights reserved. This information is not intended as a substitute for professional medical care. Always follow your healthcare professional's instructions.

## 2019-12-31 NOTE — PROGRESS NOTES
"Subjective:       Patient ID: Efe Horowitz is a 59 y.o. female.    Vitals:  height is 5' 3" (1.6 m) and weight is 56.7 kg (125 lb). Her temperature is 101 °F (38.3 °C) (abnormal). Her blood pressure is 137/86 and her pulse is 100. Her respiration is 18 and oxygen saturation is 98%.     Chief Complaint: URI    59-year-old female with multiple co morbidities here with complaint of cough and congestion for past 3 days, fever started this morning, patient reports she takes steroids 5 mg every day and was told by her doctor to increase the dosage if she start to cough as patient has history of asthma, patient took Tylenol this    URI    This is a new problem. Episode onset: 2. The problem has been gradually worsening. The maximum temperature recorded prior to her arrival was 100.4 - 100.9 F. Associated symptoms include congestion, coughing, headaches, rhinorrhea, sinus pain and a sore throat. Pertinent negatives include no ear pain, nausea, rash, vomiting or wheezing. She has tried nothing for the symptoms. The treatment provided no relief.       Constitution: Positive for fatigue and fever. Negative for chills and sweating.   HENT: Positive for congestion, postnasal drip, sinus pain, sinus pressure and sore throat. Negative for ear pain and voice change.    Neck: Negative for painful lymph nodes.   Eyes: Negative for eye redness.   Respiratory: Positive for cough and sputum production. Negative for chest tightness, bloody sputum, COPD, shortness of breath, stridor, wheezing and asthma.    Gastrointestinal: Negative for nausea and vomiting.   Musculoskeletal: Negative for muscle ache.   Skin: Negative for rash.   Allergic/Immunologic: Negative for seasonal allergies and asthma.   Neurological: Positive for headaches.   Hematologic/Lymphatic: Negative for swollen lymph nodes.       Objective:      Physical Exam   Constitutional: She is oriented to person, place, and time. She appears well-developed and " well-nourished. She is cooperative.  Non-toxic appearance. She does not have a sickly appearance. She does not appear ill. No distress.   HENT:   Head: Normocephalic and atraumatic.   Right Ear: Hearing, tympanic membrane, external ear and ear canal normal.   Left Ear: Hearing, tympanic membrane, external ear and ear canal normal.   Nose: No mucosal edema, rhinorrhea or nasal deformity. No epistaxis. Right sinus exhibits maxillary sinus tenderness. Right sinus exhibits no frontal sinus tenderness. Left sinus exhibits maxillary sinus tenderness. Left sinus exhibits no frontal sinus tenderness.   Mouth/Throat: Uvula is midline, oropharynx is clear and moist and mucous membranes are normal. No trismus in the jaw. Normal dentition. No uvula swelling. No oropharyngeal exudate, posterior oropharyngeal edema, posterior oropharyngeal erythema, tonsillar abscesses or cobblestoning.   Thick PND   Eyes: Conjunctivae and lids are normal. No scleral icterus.   Neck: Trachea normal, full passive range of motion without pain and phonation normal. Neck supple. No neck rigidity. No edema and no erythema present.   Cardiovascular: Normal rate, regular rhythm, normal heart sounds, intact distal pulses and normal pulses.   Pulmonary/Chest: Effort normal and breath sounds normal. No respiratory distress. She has no decreased breath sounds. She has no wheezes. She has no rhonchi.   Abdominal: Normal appearance.   Musculoskeletal: Normal range of motion. She exhibits no edema or deformity.   Lymphadenopathy:     She has no cervical adenopathy.        Right cervical: No superficial cervical adenopathy present.       Left cervical: No superficial cervical adenopathy present.   Neurological: She is alert and oriented to person, place, and time. She exhibits normal muscle tone. Coordination normal.   Skin: Skin is warm, dry, intact, not diaphoretic and not pale.   Psychiatric: She has a normal mood and affect. Her speech is normal and  behavior is normal. Judgment and thought content normal. Cognition and memory are normal.   Nursing note and vitals reviewed.    Results for orders placed or performed in visit on 12/31/19   POCT Influenza A/B   Result Value Ref Range    Rapid Influenza A Ag Negative Negative    Rapid Influenza B Ag Negative Negative     Acceptable Yes      X-ray Chest Pa And Lateral    Result Date: 12/31/2019  EXAMINATION: XR CHEST PA AND LATERAL CLINICAL HISTORY: Fever, unspecified FINDINGS: Two views: Heart mediastinum are normal lungs are clear there is postoperative change in mild DJD.     No acute process seen. Electronically signed by: Willy Pal MD Date:    12/31/2019 Time:    12:19    Patient will follow up with primary if symptom worsens or does not improve and will confirm the steroid dosage. Patient voiced understanding and in agreement with current treatment plan.        Assessment:       1. Acute purulent bronchitis    2. Fever, unspecified fever cause        Plan:         Acute purulent bronchitis  -     doxycycline (MONODOX) 100 MG capsule; Take 1 capsule (100 mg total) by mouth 2 (two) times daily. for 7 days  Dispense: 14 capsule; Refill: 0  -     benzonatate (TESSALON) 100 MG capsule; Take 1 capsule (100 mg total) by mouth 3 (three) times daily as needed for Cough.  Dispense: 30 capsule; Refill: 0    Fever, unspecified fever cause  -     POCT Influenza A/B  -     X-Ray Chest PA And Lateral; Future; Expected date: 12/31/2019      Patient Instructions     PLEASE READ YOUR DISCHARGE INSTRUCTIONS ENTIRELY AS IT CONTAINS IMPORTANT INFORMATION.        Please return or see your primary care doctor if you develop new or worsening symptoms.     Please arrange follow up with your primary medical clinic as soon as possible. You must understand that you've received an Urgent Care treatment only and that you may be released before all of your medical problems are known or treated. You, the patient, will  arrange for follow up as instructed. If your symptoms worsen or fail to improve you should go to the Emergency Room.    What Is Acute Bronchitis?  Acute bronchitis is when the airways in your lungs (bronchial tubes) become red and swollen (inflamed). It is usually caused by a viral infection. But it can also occur because of a bacteria or allergen. Symptoms include a cough that produces yellow or greenish mucus and can last for days or sometimes weeks.  Inside healthy lungs    Air travels in and out of the lungs through the airways. The linings of these airways produce sticky mucus. This mucus traps particles that enter the lungs. Tiny structures called cilia then sweep the particles out of the airways.     Healthy airway: Airways are normally open. Air moves in and out easily.      Healthy cilia: Tiny, hairlike cilia sweep mucus and particles up and out of the airways.   Lungs with bronchitis  Bronchitis often occurs with a cold or the flu virus. The airways become inflamed (red and swollen). There is a deep hacking cough from the extra mucus. Other symptoms may include:  · Wheezing  · Chest discomfort  · Shortness of breath  · Mild fever  A second infection, this time due to bacteria, may then occur. And airways irritated by allergens or smoke are more likely to get infected.        Inflamed airway: Inflammation and extra mucus narrow the airway, causing shortness of breath.      Impaired cilia: Extra mucus impairs cilia, causing congestion and wheezing. Smoking makes the problem worse.   Making a diagnosis  A physical exam, health history, and certain tests help your healthcare provider make the diagnosis.  Health history  Your healthcare provider will ask you about your symptoms.  The exam  Your provider listens to your chest for signs of congestion. He or she may also check your ears, nose, and throat.  Possible tests  · A sputum test for bacteria. This requires a sample of mucus from your lungs.  · A nasal or  throat swab. This tests to see if you have a bacterial infection.  · A chest X-ray. This is done if your healthcare provider thinks you have pneumonia.  · Tests to check for an underlying condition. Other tests may be done to check for things such as allergies, asthma, or COPD (chronic obstructive pulmonary disease). You may need to see a specialist for more lung function testing.  Treating a cough  The main treatment for bronchitis is easing symptoms. Avoiding smoke, allergens, and other things that trigger coughing can often help. If the infection is bacterial, you may be given antibiotics. During the illness, it's important to get plenty of sleep. To ease symptoms:  · Dont smoke. Also avoid secondhand smoke.  · Use a humidifier. Or try breathing in steam from a hot shower. This may help loosen mucus.  · Drink a lot of water and juice. They can soothe the throat and may help thin mucus.  · Sit up or use extra pillows when in bed. This helps to lessen coughing and congestion.  · Ask your provider about using medicine. Ask about using cough medicine, pain and fever medicine, or a decongestant.  Antibiotics  Most cases of bronchitis are caused by cold or flu viruses. They dont need antibiotics to treat them, even if your mucus is thick and green or yellow. Antibiotics dont treat viral illness and antibiotics have not been shown to have any benefit in cases of acute bronchitis. Taking antibiotics when they are not needed increases your risk of getting an infection later that is antibiotic-resistant. Antibiotics can also cause severe cases of diarrhea that require other antibiotics to treat.  It is important that you accept your healthcare provider's opinion to not use antibiotics. Your provider will prescribe antibiotics if the infection is caused by bacteria. If they are prescribed:  · Take all of the medicine. Take the medicine until it is used up, even if symptoms have improved. If you dont, the bronchitis may  come back.  · Take the medicines as directed. For instance, some medicines should be taken with food.  · Ask about side effects. Ask your provider or pharmacist what side effects are common, and what to do about them.  Follow-up care  You should see your provider again in 2 to 3 weeks. By this time, symptoms should have improved. An infection that lasts longer may mean you have a more serious problem.  Prevention  · Avoid tobacco smoke. If you smoke, quit. Stay away from smoky places. Ask friends and family not to smoke around you, or in your home or car.  · Get checked for allergies.  · Ask your provider about getting a yearly flu shot. Also ask about pneumococcal or pneumonia shots.  · Wash your hands often. This helps reduce the chance of picking up viruses that cause colds and flu.  Call your healthcare provider if:  · Symptoms worsen, or you have new symptoms  · Breathing problems worsen or  become severe  · Symptoms dont get better within a week, or within 3 days of taking antibiotics   Date Last Reviewed: 2/1/2017  © 9802-2537 [employer], [employer]. [address] [place] [address], [place] [address]. All rights reserved. This information is not intended as a substitute for professional medical care. Always follow your healthcare professional's instructions.

## 2020-01-10 ENCOUNTER — TELEPHONE (OUTPATIENT)
Dept: PHARMACY | Facility: CLINIC | Age: 60
End: 2020-01-10

## 2020-01-21 RX ORDER — HYDROXYCHLOROQUINE SULFATE 200 MG/1
TABLET, FILM COATED ORAL
Qty: 45 TABLET | Refills: 3 | Status: SHIPPED | OUTPATIENT
Start: 2020-01-21 | End: 2021-07-20

## 2020-02-03 ENCOUNTER — TELEPHONE (OUTPATIENT)
Dept: PHARMACY | Facility: CLINIC | Age: 60
End: 2020-02-03

## 2020-02-11 NOTE — TELEPHONE ENCOUNTER
Refill call regarding Forteo at [facility]. Will prepare for shipment with consent of patient on  to arrive . Copay 8.95 AR, patient mails in a money order to cover payment. Patient has not reported any new allergies/ side effects. Pt taking medication(s) as directed with no questions or concerns for [facility]. Also no new medications. and Address verified. 7 days of medication on hand with swabs being shipped on this shipment.

## 2020-03-02 RX ORDER — PREDNISONE 5 MG/1
5 TABLET ORAL DAILY
Qty: 90 TABLET | Refills: 3 | Status: SHIPPED | OUTPATIENT
Start: 2020-03-02 | End: 2021-02-15

## 2020-03-02 NOTE — TELEPHONE ENCOUNTER
----- Message from Thaddeus Interiano sent at 3/2/2020 10:10 AM CST -----  Contact: Self  Rx Refill/Request     Is this a Refill or New Rx:  Refill    Rx Name and Strength:  predniSONE (DELTASONE) 5 MG tablet    Preferred Pharmacy with phone number: [facility]/pharmacy #[id number] [phone] (Phone)  [phone] (Fax)    Communication Preference: [phone]    Additional Information: Pt is completely out of her medication Pt states she have been out of her medication for a month Pt ask for a call

## 2020-03-03 ENCOUNTER — OFFICE VISIT (OUTPATIENT)
Dept: URGENT CARE | Facility: CLINIC | Age: 60
End: 2020-03-03
Payer: MEDICARE

## 2020-03-03 VITALS
RESPIRATION RATE: 16 BRPM | HEIGHT: 63 IN | TEMPERATURE: 97 F | WEIGHT: 125 LBS | BODY MASS INDEX: 22.15 KG/M2 | OXYGEN SATURATION: 98 % | HEART RATE: 78 BPM | DIASTOLIC BLOOD PRESSURE: 74 MMHG | SYSTOLIC BLOOD PRESSURE: 117 MMHG

## 2020-03-03 DIAGNOSIS — S92.354A NONDISPLACED FRACTURE OF FIFTH METATARSAL BONE, RIGHT FOOT, INITIAL ENCOUNTER FOR CLOSED FRACTURE: Primary | ICD-10-CM

## 2020-03-03 PROCEDURE — 99214 PR OFFICE/OUTPT VISIT, EST, LEVL IV, 30-39 MIN: ICD-10-PCS | Mod: S$GLB,,, | Performed by: PHYSICIAN ASSISTANT

## 2020-03-03 PROCEDURE — 73630 XR FOOT COMPLETE 3 VIEW RIGHT: ICD-10-PCS | Mod: RT,S$GLB,, | Performed by: RADIOLOGY

## 2020-03-03 PROCEDURE — 99214 OFFICE O/P EST MOD 30 MIN: CPT | Mod: S$GLB,,, | Performed by: PHYSICIAN ASSISTANT

## 2020-03-03 PROCEDURE — 73630 X-RAY EXAM OF FOOT: CPT | Mod: RT,S$GLB,, | Performed by: RADIOLOGY

## 2020-03-03 NOTE — PROGRESS NOTES
"Subjective:       Patient ID: Eef Horowitz is a 60 y.o. female.    Vitals:  height is 5' 3" (1.6 m) and weight is 56.7 kg (125 lb). Her temperature is 97.2 °F (36.2 °C). Her blood pressure is 117/74 and her pulse is 78. Her respiration is 16 and oxygen saturation is 98%.     Chief Complaint: Foot Pain    Pt states this morning she was walking her dog and she stepped on something, maybe a rock and heard a pop in her right foot. Only foot is in pain and hurts to walk on. She did not take any medication or ice the area yet. Pt reports a history of osteoporosis and believes she fractured her foot.    Foot Pain   This is a new problem. The current episode started today. The problem occurs constantly. The problem has been unchanged. Associated symptoms include arthralgias and joint swelling. Pertinent negatives include no chest pain, chills, congestion, coughing, fatigue, fever, headaches, myalgias, nausea, rash, sore throat, vertigo, vomiting or weakness. She has tried nothing for the symptoms.       Constitution: Negative for chills, fatigue and fever.   HENT: Negative for congestion and sore throat.    Neck: Negative for painful lymph nodes.   Cardiovascular: Negative for chest pain and leg swelling.   Eyes: Negative for double vision and blurred vision.   Respiratory: Negative for cough and shortness of breath.    Gastrointestinal: Negative for nausea, vomiting and diarrhea.   Genitourinary: Negative for dysuria, frequency, urgency and history of kidney stones.   Musculoskeletal: Positive for pain, trauma, joint pain and joint swelling. Negative for muscle cramps and muscle ache.   Skin: Negative for color change, pale, rash and bruising.   Allergic/Immunologic: Negative for seasonal allergies.   Neurological: Negative for dizziness, history of vertigo, light-headedness, passing out and headaches.   Hematologic/Lymphatic: Negative for swollen lymph nodes.   Psychiatric/Behavioral: Negative for nervous/anxious, " sleep disturbance and depression. The patient is not nervous/anxious.        Objective:      Physical Exam   Constitutional: She is oriented to person, place, and time. She appears well-developed and well-nourished. She is cooperative.  Non-toxic appearance. She does not appear ill. No distress.   HENT:   Head: Normocephalic and atraumatic.   Right Ear: External ear normal.   Left Ear: External ear normal.   Nose: Nose normal.   Mouth/Throat: Oropharynx is clear and moist.   Eyes: Conjunctivae, EOM and lids are normal. Right eye exhibits no discharge. Left eye exhibits no discharge. No scleral icterus.   Neck: Trachea normal, normal range of motion, full passive range of motion without pain and phonation normal. Neck supple.   Cardiovascular: Normal rate, regular rhythm and normal heart sounds. Exam reveals no gallop.   No murmur heard.  Pulmonary/Chest: Effort normal and breath sounds normal. No respiratory distress. She has no decreased breath sounds. She has no wheezes. She has no rhonchi. She has no rales.   Musculoskeletal: She exhibits no edema or deformity.        Right foot: There is tenderness and bony tenderness. There is normal range of motion, no swelling, normal capillary refill, no crepitus, no deformity and no laceration.        Feet:    Neurological: She is alert and oriented to person, place, and time. Coordination normal.   Skin: Skin is warm, dry, intact, not diaphoretic and not pale. not right foot  Psychiatric: She has a normal mood and affect. Her speech is normal and behavior is normal. Judgment and thought content normal. Cognition and memory are normal.   Nursing note and vitals reviewed.        Assessment:       1. Nondisplaced fracture of fifth metatarsal bone, right foot, initial encounter for closed fracture      Xr Foot Complete 3 View Right    Result Date: 3/3/2020  EXAMINATION: XR FOOT COMPLETE 3 VIEW RIGHT CLINICAL HISTORY: Unspecified injury of right foot, initial encounter  FINDINGS: There is a fracture of the base of the 5th metatarsal.  There is good alignment and no complication. Electronically signed by: Willy Pal MD Date:    03/03/2020 Time:    09:51    Plan:       Patient has a post op shoe she is wearing. She requests one for her foot. Gave patient script and she will take to supply [employer]. Discussed rice therapy. Also referred to ortho for follow up.    Nondisplaced fracture of fifth metatarsal bone, right foot, initial encounter for closed fracture  -     XR FOOT COMPLETE 3 VIEW RIGHT; Future; Expected date: 03/03/2020  -     Cancel: ORTHOPEDIC BRACING FOR HOME USE - LOWER EXTREMITY  -     ORTHOPEDIC BRACING FOR HOME USE - LOWER EXTREMITY  -     Ambulatory referral/consult to Orthopedics      Patient Instructions   Take prescription to orthopedic store for a post-op shoe.   Elevate extremity, apply ice, and take ibuprofen or tylenol as needed for pain.  Follow up with ortho (call [phone] to schedule an appointment.)    Please follow up with your primary care provider within 2-5 days if your signs and symptoms have not resolved or worsen.     If your condition worsens or fails to improve we recommend that you receive another evaluation at the emergency room immediately or contact your primary medical clinic to discuss your concerns.   You must understand that you have received an Urgent Care treatment only and that you may be released before all of your medical problems are known or treated. You, the patient, will arrange for follow up care as instructed.         Foot Fracture  You have a broken bone (fracture) in your foot. This will cause pain, swelling, and often bruising. It will usually take about 4 to 8 weeks to heal. A foot fracture may be treated with a special shoe, splint, cast, or boot.  Home care  Follow these guidelines when caring for yourself at home:  · You may be given a splint, cast, shoe, or boot to keep the injured area from moving. Unless you were  told otherwise, use crutches or a walker. Dont put weight on the injured foot until your health care provider says you can do so. (You can rent crutches and a walker at many pharmacies and surgical or orthopedic supply stores.) Dont put weight on a splint, or it will break.  · Keep your leg elevated to reduce pain and swelling. When sleeping, put a pillow under the injured leg. When sitting, support the injured leg so it is above your waist. This is very important during the first 2 days (48 hours).  · Put an ice pack on the injured area. Do this for 20 minutes every 1 to 2 hours the first day for pain relief. You can make an ice pack by wrapping a plastic bag of ice cubes in a thin towel. As the ice melts, be careful that the splint, cast, boot, or shoe doesnt get wet. You can place the ice pack directly over the splint or cast. Unless told otherwise, you can open the boot or shoe to apply the ice pack. Continue using the ice pack 3 to 4 times a day for the next 2 days. Then use the ice pack as needed to ease pain and swelling.  · Keep the splint, cast, boot, or shoe dry. When bathing, protect it with a large plastic bag, rubber-banded at the top end. If a fiberglass splint or cast or boot gets wet, you can dry it with a hair dryer. Unless told otherwise, you can take off the boot or shoe to bathe.  · You may use acetaminophen or ibuprofen to control pain, unless another pain medicine was prescribed. If you have chronic liver or kidney disease, talk with your healthcare provider before using these medicines. Also talk with your provider if youve had a stomach ulcer or gastrointestinal bleeding.  · Dont put creams or objects under the cast if you have itching.  Follow-up care  Follow up with your healthcare provider, or as advised. This is to make sure the bone is healing the way it should. If you were given a splint, it may be changed to a cast or boot at your follow-up visit.  X-rays may be taken. You will be  told of any new findings that may affect your care.  When to seek medical advice  Call your healthcare provider right away if any of these occur:  · The cast or splint cracks  · The plaster cast or splint becomes wet or soft  · The fiberglass cast or splint stays wet for more than 24 hours  · Bad odor from the cast or wound fluid stains the cast  · Tightness or pain under the cast or splint gets worse  · Toes become swollen, cold, blue, numb, or tingly  · You cant move your toes  · Skin around cast or splint becomes red  · Fever of 100.4ºF (38ºC) or higher, or as directed by your healthcare provider  Date Last Reviewed: 2/1/2017 © 2000-2017 [employer]. [address] [place] [address], [place] [address]. All rights reserved. This information is not intended as a substitute for professional medical care. Always follow your healthcare professional's instructions.

## 2020-03-03 NOTE — PATIENT INSTRUCTIONS
Take prescription to orthopedic store for a post-op shoe.   Elevate extremity, apply ice, and take ibuprofen or tylenol as needed for pain.  Follow up with ortho (call [phone] to schedule an appointment.)    Please follow up with your primary care provider within 2-5 days if your signs and symptoms have not resolved or worsen.     If your condition worsens or fails to improve we recommend that you receive another evaluation at the emergency room immediately or contact your primary medical clinic to discuss your concerns.   You must understand that you have received an Urgent Care treatment only and that you may be released before all of your medical problems are known or treated. You, the patient, will arrange for follow up care as instructed.         Foot Fracture  You have a broken bone (fracture) in your foot. This will cause pain, swelling, and often bruising. It will usually take about 4 to 8 weeks to heal. A foot fracture may be treated with a special shoe, splint, cast, or boot.  Home care  Follow these guidelines when caring for yourself at home:  · You may be given a splint, cast, shoe, or boot to keep the injured area from moving. Unless you were told otherwise, use crutches or a walker. Dont put weight on the injured foot until your health care provider says you can do so. (You can rent crutches and a walker at many pharmacies and surgical or orthopedic supply stores.) Dont put weight on a splint, or it will break.  · Keep your leg elevated to reduce pain and swelling. When sleeping, put a pillow under the injured leg. When sitting, support the injured leg so it is above your waist. This is very important during the first 2 days (48 hours).  · Put an ice pack on the injured area. Do this for 20 minutes every 1 to 2 hours the first day for pain relief. You can make an ice pack by wrapping a plastic bag of ice cubes in a thin towel. As the ice melts, be careful that the splint, cast, boot, or shoe  doesnt get wet. You can place the ice pack directly over the splint or cast. Unless told otherwise, you can open the boot or shoe to apply the ice pack. Continue using the ice pack 3 to 4 times a day for the next 2 days. Then use the ice pack as needed to ease pain and swelling.  · Keep the splint, cast, boot, or shoe dry. When bathing, protect it with a large plastic bag, rubber-banded at the top end. If a fiberglass splint or cast or boot gets wet, you can dry it with a hair dryer. Unless told otherwise, you can take off the boot or shoe to bathe.  · You may use acetaminophen or ibuprofen to control pain, unless another pain medicine was prescribed. If you have chronic liver or kidney disease, talk with your healthcare provider before using these medicines. Also talk with your provider if youve had a stomach ulcer or gastrointestinal bleeding.  · Dont put creams or objects under the cast if you have itching.  Follow-up care  Follow up with your healthcare provider, or as advised. This is to make sure the bone is healing the way it should. If you were given a splint, it may be changed to a cast or boot at your follow-up visit.  X-rays may be taken. You will be told of any new findings that may affect your care.  When to seek medical advice  Call your healthcare provider right away if any of these occur:  · The cast or splint cracks  · The plaster cast or splint becomes wet or soft  · The fiberglass cast or splint stays wet for more than 24 hours  · Bad odor from the cast or wound fluid stains the cast  · Tightness or pain under the cast or splint gets worse  · Toes become swollen, cold, blue, numb, or tingly  · You cant move your toes  · Skin around cast or splint becomes red  · Fever of 100.4ºF (38ºC) or higher, or as directed by your healthcare provider  Date Last Reviewed: 2/1/2017  © 3976-3946 [employer]. [address] [place] [address], [place] [address]. All rights reserved. This information is not  intended as a substitute for professional medical care. Always follow your healthcare professional's instructions.

## 2020-03-05 ENCOUNTER — OFFICE VISIT (OUTPATIENT)
Dept: ORTHOPEDICS | Facility: CLINIC | Age: 60
End: 2020-03-05
Payer: MEDICARE

## 2020-03-05 VITALS — BODY MASS INDEX: 22.15 KG/M2 | WEIGHT: 125 LBS | HEIGHT: 63 IN

## 2020-03-05 DIAGNOSIS — S92.351A DISPLACED FRACTURE OF FIFTH METATARSAL BONE, RIGHT FOOT, INITIAL ENCOUNTER FOR CLOSED FRACTURE: Primary | ICD-10-CM

## 2020-03-05 PROCEDURE — 3008F PR BODY MASS INDEX (BMI) DOCUMENTED: ICD-10-PCS | Mod: CPTII,S$GLB,, | Performed by: PHYSICIAN ASSISTANT

## 2020-03-05 PROCEDURE — 99214 PR OFFICE/OUTPT VISIT, EST, LEVL IV, 30-39 MIN: ICD-10-PCS | Mod: S$GLB,,, | Performed by: PHYSICIAN ASSISTANT

## 2020-03-05 PROCEDURE — 99999 PR PBB SHADOW E&M-EST. PATIENT-LVL IV: CPT | Mod: PBBFAC,,, | Performed by: PHYSICIAN ASSISTANT

## 2020-03-05 PROCEDURE — 99999 PR PBB SHADOW E&M-EST. PATIENT-LVL IV: ICD-10-PCS | Mod: PBBFAC,,, | Performed by: PHYSICIAN ASSISTANT

## 2020-03-05 PROCEDURE — 3008F BODY MASS INDEX DOCD: CPT | Mod: CPTII,S$GLB,, | Performed by: PHYSICIAN ASSISTANT

## 2020-03-05 PROCEDURE — 99214 OFFICE O/P EST MOD 30 MIN: CPT | Mod: S$GLB,,, | Performed by: PHYSICIAN ASSISTANT

## 2020-03-05 NOTE — PROGRESS NOTES
Subjective:      Patient ID: Efe Horowitz is a 60 y.o. female.    Chief Complaint: Pain and Injury of the Right Foot    HPI    Patient is a 60 year old female who presents to clinic for follow up from urgent care for right fifth metatarsal fracture that occurred on 03/02/2020 secondary to inverting her ankle. Patient has been in a post op shoe. She stated that her pain is controlled and she is able to ambulate unassisted with minimal pain. She is not taking pain medication. Denied numbness or tingling.     Reports history of osteoporosis.     Review of Systems   Constitution: Negative for chills and fever.   Cardiovascular: Negative for chest pain.   Respiratory: Negative for cough and shortness of breath.    Skin: Negative for color change, dry skin, itching, nail changes, poor wound healing and rash.   Musculoskeletal:        Right fifth metatarsal fracture.    Neurological: Negative for dizziness.   Psychiatric/Behavioral: Negative for altered mental status. The patient is not nervous/anxious.    All other systems reviewed and are negative.        Objective:      General    Constitutional: She is oriented to person, place, and time. She appears well-developed and well-nourished. No distress.   HENT:   Head: Atraumatic.   Eyes: Conjunctivae are normal.   Cardiovascular: Normal rate.    Pulmonary/Chest: Effort normal.   Neurological: She is alert and oriented to person, place, and time.   Psychiatric: She has a normal mood and affect. Her behavior is normal.         Right Ankle/Foot Exam     Tenderness   The patient is tender to palpation of the fifth metatarsal base.    Range of Motion   The patient has normal right ankle ROM.    Other   Sensation: normal    Comments:  Strength not tested due to fracture.           RADS: reviewed by myself:   There is a fracture of the base of the 5th metatarsal.  There is good alignment and no complication.  Assessment:       Encounter Diagnosis   Name Primary?     Displaced fracture of fifth metatarsal bone, right foot, initial encounter for closed fracture Yes          Plan:       Discussed plan with the patient. At this time she will continued the post operative shoe with ambulation. Rest ice and elevate to reduce swelling. She is to return to clinic in 3 weeks at that time x-ray of her foot is needed weightbearing. Will probably continue shoe 6-8 week pending healing.

## 2020-03-06 ENCOUNTER — TELEPHONE (OUTPATIENT)
Dept: PHARMACY | Facility: CLINIC | Age: 60
End: 2020-03-06

## 2020-03-18 ENCOUNTER — TELEPHONE (OUTPATIENT)
Dept: ORTHOPEDICS | Facility: CLINIC | Age: 60
End: 2020-03-18

## 2020-03-18 DIAGNOSIS — S92.351A DISPLACED FRACTURE OF FIFTH METATARSAL BONE, RIGHT FOOT, INITIAL ENCOUNTER FOR CLOSED FRACTURE: Primary | ICD-10-CM

## 2020-03-18 NOTE — TELEPHONE ENCOUNTER
----- Message from Keyshawn Newman sent at 3/18/2020  1:23 PM CDT -----  Contact: self   pt stated that she is returning the missed call in regards to her 03/26 appointment and her x- ray. shes asking for a call back       Contact info-  [phone]

## 2020-03-18 NOTE — TELEPHONE ENCOUNTER
Notified pt. Due to the COVID-19 concern. Pt will be schedule at the [facility] for xray of right foot 3/24/20 at 8:00 am. JC Fermin PA-C will notified pt with x-ray results. Patient states verbal understanding and has no further questions.

## 2020-03-24 ENCOUNTER — TELEPHONE (OUTPATIENT)
Dept: ORTHOPEDICS | Facility: CLINIC | Age: 60
End: 2020-03-24

## 2020-03-24 ENCOUNTER — HOSPITAL ENCOUNTER (OUTPATIENT)
Dept: RADIOLOGY | Facility: HOSPITAL | Age: 60
Discharge: HOME | End: 2020-03-24
Attending: PHYSICIAN ASSISTANT
Payer: MEDICARE

## 2020-03-24 DIAGNOSIS — S92.351A DISPLACED FRACTURE OF FIFTH METATARSAL BONE, RIGHT FOOT, INITIAL ENCOUNTER FOR CLOSED FRACTURE: ICD-10-CM

## 2020-03-24 PROCEDURE — 73630 X-RAY EXAM OF FOOT: CPT | Mod: TC,FY,PO,RT

## 2020-03-24 PROCEDURE — 73630 X-RAY EXAM OF FOOT: CPT | Mod: 26,RT,, | Performed by: RADIOLOGY

## 2020-03-24 PROCEDURE — 73630 XR FOOT COMPLETE 3 VIEW RIGHT: ICD-10-PCS | Mod: 26,RT,, | Performed by: RADIOLOGY

## 2020-03-24 NOTE — TELEPHONE ENCOUNTER
Called and Informed patient of appointment on 4/21/20 @ 7 am and mailed. Patient states verbal understanding and has no further questions.

## 2020-03-24 NOTE — TELEPHONE ENCOUNTER
Called and reviewed x-ray results with the patient;   RADS: Healing fracture at the base of the 5th metatarsal bone remain unchanged position as compared to the previous study.    She reports that she is doing ok. She stated that she is using the post op shoe to assist with ambulation. Informed her to continue this for the next 3-4 weeks then can transition into a regular shoe as the pain allows. She will follow up in clinic in 4 weeks. Appointment to be made.

## 2020-03-30 ENCOUNTER — TELEPHONE (OUTPATIENT)
Dept: GASTROENTEROLOGY | Facility: CLINIC | Age: 60
End: 2020-03-30

## 2020-03-30 NOTE — TELEPHONE ENCOUNTER
Called pt.  Appt offered and accepted for 5/6 at 10:30 a.m.  ----- Message from Yokasta Nascimento MA sent at 3/30/2020  9:03 AM CDT -----  Contact: [phone]  Pt would like to set up her May recall appt.

## 2020-04-07 ENCOUNTER — TELEPHONE (OUTPATIENT)
Dept: PHARMACY | Facility: CLINIC | Age: 60
End: 2020-04-07

## 2020-04-13 ENCOUNTER — TELEPHONE (OUTPATIENT)
Dept: PHARMACY | Facility: CLINIC | Age: 60
End: 2020-04-13

## 2020-04-13 ENCOUNTER — TELEPHONE (OUTPATIENT)
Dept: ENDOSCOPY | Facility: HOSPITAL | Age: 60
End: 2020-04-13

## 2020-04-13 DIAGNOSIS — R93.5 ABNORMAL FINDINGS ON DIAGNOSTIC IMAGING OF OTHER ABDOMINAL REGIONS, INCLUDING RETROPERITONEUM: ICD-10-CM

## 2020-04-13 DIAGNOSIS — K86.2 PANCREATIC CYST: ICD-10-CM

## 2020-04-14 ENCOUNTER — TELEPHONE (OUTPATIENT)
Dept: ORTHOPEDICS | Facility: CLINIC | Age: 60
End: 2020-04-14

## 2020-04-14 DIAGNOSIS — S92.351A DISPLACED FRACTURE OF FIFTH METATARSAL BONE, RIGHT FOOT, INITIAL ENCOUNTER FOR CLOSED FRACTURE: Primary | ICD-10-CM

## 2020-04-14 NOTE — TELEPHONE ENCOUNTER
Left voice message;  Due to the COVID-19 concern. Pt will be schedule at the [facility] for xray of right  foot 4/21/20 at 9:30 am. JC Fermin PA-C will notified pt with x-ray results. Virtual visit on 4/21/20 at 10:00 am. Pt is aware that she need to be available 15 min before the virtual visit.

## 2020-04-16 ENCOUNTER — TELEPHONE (OUTPATIENT)
Dept: PHARMACY | Facility: CLINIC | Age: 60
End: 2020-04-16

## 2020-04-16 NOTE — TELEPHONE ENCOUNTER
Refill and followup call for Forteo. Patient confirmed need of the refill. Will [employer] on  to arrive on  with patient consent. Copay $0 at 004. Address confirmed. Patient has 4 doses remaining (4 day supply)/ next injection due (daily). Patient denies missed doses and no side effects. Stomach is numb and doesn't feel injections. Rotates injection sites. Also does a cholesterol injection every 2 weeks.  No new medications/allergies/medical conditions. Relevant labs are stable. No ER/Urgent care visits in past month. No Sharps container, pen needles, or alcohol swabs needed. Patient taking the medication as directed.   Ms. Horowitz reports:   Tried and Failed meds: reclast  Smoker: never  Alcohol: never  Hx Oral glucocorticoids >5 mg/d of prednisone for >3 months (ever): yes  Recent Falls: none  Fracture Hx: broke foot 1 month ago; vertebral fracture in 2019 addressed with surgery.   Recent trips to Urgent Care or the Emergency Room: yes: before Coronavirus fractured foot. Wearing a boot for 2 more weeks.   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    Allergies reviewed and med rec completed. No DDIs with Tymlos encountered. Patient understands to report any medication changes to [facility] and provider.  Patient denies any further questions. Confirmed 2 patient identifiers - Name and .

## 2020-04-21 ENCOUNTER — OFFICE VISIT (OUTPATIENT)
Dept: ORTHOPEDICS | Facility: CLINIC | Age: 60
End: 2020-04-21
Payer: MEDICARE

## 2020-04-21 ENCOUNTER — HOSPITAL ENCOUNTER (OUTPATIENT)
Dept: RADIOLOGY | Facility: HOSPITAL | Age: 60
Discharge: HOME | End: 2020-04-21
Attending: PHYSICIAN ASSISTANT
Payer: MEDICARE

## 2020-04-21 DIAGNOSIS — S92.351A DISPLACED FRACTURE OF FIFTH METATARSAL BONE, RIGHT FOOT, INITIAL ENCOUNTER FOR CLOSED FRACTURE: Primary | ICD-10-CM

## 2020-04-21 DIAGNOSIS — S92.351A DISPLACED FRACTURE OF FIFTH METATARSAL BONE, RIGHT FOOT, INITIAL ENCOUNTER FOR CLOSED FRACTURE: ICD-10-CM

## 2020-04-21 PROCEDURE — 73630 XR FOOT COMPLETE 3 VIEW RIGHT: ICD-10-PCS | Mod: 26,RT,, | Performed by: RADIOLOGY

## 2020-04-21 PROCEDURE — 73630 X-RAY EXAM OF FOOT: CPT | Mod: 26,RT,, | Performed by: RADIOLOGY

## 2020-04-21 PROCEDURE — 99442 PR PHYSICIAN TELEPHONE EVALUATION 11-20 MIN: ICD-10-PCS | Mod: 95,,, | Performed by: PHYSICIAN ASSISTANT

## 2020-04-21 PROCEDURE — 73630 X-RAY EXAM OF FOOT: CPT | Mod: TC,FY,PO,RT

## 2020-04-21 PROCEDURE — 99442 PR PHYSICIAN TELEPHONE EVALUATION 11-20 MIN: CPT | Mod: 95,,, | Performed by: PHYSICIAN ASSISTANT

## 2020-04-21 NOTE — PROGRESS NOTES
Attempted virtual visit which did not work. Visit done by phone visit.       Patient ID: Efe Horowitz is a 60 y.o. female.    Chief Complaint: No chief complaint on file.    HPI    Patient is a 60 year old female who was seen for follow up f for right fifth metatarsal fracture that occurred on 03/02/2020 secondary to inverting her ankle. Patient has been in a post op shoe. She stated that her pain is controlled and she is able to ambulate unassisted with minimal pain. She is not taking pain medication. Denied numbness or tingling.     Reports history of osteoporosis.     Review of Systems   Constitution: Negative for chills and fever.   Cardiovascular: Negative for chest pain.   Respiratory: Negative for cough and shortness of breath.    Skin: Negative for color change, dry skin, itching, nail changes, poor wound healing and rash.   Musculoskeletal:        Right fifth metatarsal fracture.    Neurological: Negative for dizziness.   Psychiatric/Behavioral: Negative for altered mental status. The patient is not nervous/anxious.    All other systems reviewed and are negative.          RADS: reviewed by myself:   Fracture involving the base of the right 5th metatarsal bone with no detrimental change in the position and alignment of the fracture fragments when compared to the prior examination.    Fracture line is still quite evident without evidence for bridging callus formation  Assessment:       Encounter Diagnosis   Name Primary?    Displaced fracture of fifth metatarsal bone, right foot, initial encounter for closed fracture Yes          Plan:       Discussed plan with the patient. At this time she will continued the post operative shoe with ambulation and wean to regular shoe as pain allows. Rest ice and elevate to reduce swelling. She is to return to clinic in 6-8 weeks at that time x-ray of her foot is needed weightbearing.    Telemedicine/Virtual Visit Documentation:     The patient location is:  home    The chief complaint leading to consultation is: see HPI    VISIT TYPE X   Virtual visit with synchronous audio and video    Telephone E/M service x     Total time spent with patient: see X stanislav on chart below.   More than half of the time was spent counseling or coordinating care including prognosis, differential diagnosis, risks and benefits of treatment, instructions, compliance risk reductions     EST MINUTES X   55347 5    95724 10    22179 15    84566 25    51689 40    NEW     19445 10    41268 20    05758 30    42126 45    53423 60    PHONE      5-10    55343 11-20 x   99443 21-30

## 2020-05-06 ENCOUNTER — OFFICE VISIT (OUTPATIENT)
Dept: GASTROENTEROLOGY | Facility: CLINIC | Age: 60
End: 2020-05-06
Payer: MEDICARE

## 2020-05-06 DIAGNOSIS — R10.84 GENERALIZED ABDOMINAL PAIN: ICD-10-CM

## 2020-05-06 DIAGNOSIS — R10.31 RIGHT LOWER QUADRANT ABDOMINAL PAIN: ICD-10-CM

## 2020-05-06 DIAGNOSIS — R10.32 LLQ PAIN: ICD-10-CM

## 2020-05-06 DIAGNOSIS — Z51.81 ENCOUNTER FOR MONITORING LONG-TERM PROTON PUMP INHIBITOR THERAPY: Primary | ICD-10-CM

## 2020-05-06 DIAGNOSIS — Z79.899 ENCOUNTER FOR MONITORING LONG-TERM PROTON PUMP INHIBITOR THERAPY: Primary | ICD-10-CM

## 2020-05-06 DIAGNOSIS — K86.81 EXOCRINE PANCREATIC INSUFFICIENCY: ICD-10-CM

## 2020-05-06 PROCEDURE — 99214 PR OFFICE/OUTPT VISIT, EST, LEVL IV, 30-39 MIN: ICD-10-PCS | Mod: 95,,, | Performed by: INTERNAL MEDICINE

## 2020-05-06 PROCEDURE — 99214 OFFICE O/P EST MOD 30 MIN: CPT | Mod: 95,,, | Performed by: INTERNAL MEDICINE

## 2020-05-06 NOTE — Clinical Note
Alize please schedule patient for fasting lab work tomorrow at 8:00 a.m. Thursday May 7, 2020 at [facility].  Orders placedPlease schedule her in urgent CT scan of the abdomen pelvis for abdominal pain orders placed.Please have her return to clinic video visit with me in 4 weeks via telemedicine

## 2020-05-06 NOTE — PROGRESS NOTES
The patient location is: At home  The chief complaint leading to consultation is:  Right lower and left lower quadrant abdominal pain and epigastric pain  Visit type:  Telemedicine video visit per [facility] [place] department of Health COVID-19 pandemic mandates.  Total time spent with patient:  20 min  Each patient to whom he or she provides medical services by telemedicine is:  (1) informed of the relationship between the physician and patient and the respective role of any other health care provider with respect to management of the patient; and (2) notified that he or she may decline to receive medical services by telemedicine and may withdraw from such care at any time.    Notes:  See below        [facility] Consultation Note    Reason for Consult:  The primary encounter diagnosis was Encounter for monitoring long-term proton pump inhibitor therapy. Diagnoses of Exocrine pancreatic insufficiency, Right lower quadrant abdominal pain, LLQ pain, and Generalized abdominal pain were also pertinent to this visit.    PCP:   Manjula Mixon       Referring MD:  No referring provider defined for this encounter.    Initial History of Present Illness (HPI):  This is a 60 y.o. female telemedicine patient history total proctocolectomy for ulcerative colitis.  Patient states she has been having abdominal pain left lower left upper quadrant pain maybe a 5/10 on a 10 point scale it is intermittent comes and goes can be pretty severe along with epigastric pain.  She does have a pancreatic cyst that is followed by advanced endoscopy she has an MRI scheduled for August of 2020.  Patient denies any fever chills no shortness of breath no chest pain no coughing no COVID-19 symptoms her bowel movements have been stable.  She is worried some things going wrong with her abdomen.  She is taking her pancreas exocrine replacement enzymes.  She knows not to take home if she skipping meals.  She only takes an  when she eats a large meal or snacks.  Patient states her abdominal pain is new getting worse she is very concerned about it.  No dysuria urgency or frequency no hematuria    Abdominal pain - as above  Reflux - well controlled on her PPI  Dysphagia - no   Bowel habits - normal normal status post total proctocolectomy  GI bleeding - none  NSAID usage - rarely    Interval HPI 05/06/2020:  The patient's last visit with me was on 5/16/2019.      ROS:  Constitutional: No fevers, chills, No weight loss  ENT:  No heartburn no dysphagia no odynophagia no hoarseness  CV: No chest pain, no palpitation  Pulm: No cough, No shortness of breath, no wheezing  Ophtho: No vision changes  GI: see HPI  Derm: No rash, no itching  Heme: No lymphadenopathy, No easy bruising  MSK:  Some arthritis  : No dysuria, No hematuria  Endo: No hot or cold intolerance  Neuro: No syncope, No seizure, no strokes  Psych: No uncontrolled anxiety, No uncontrolled depression    Medical History:  has a past medical history of Acid reflux, Adrenal insufficiency, primary, Arthritis, Asthma, B12 deficiency anemia, Blood transfusion (2010), Breast cancer (2/2016), Cataract, Chronic pain, Deep vein thrombosis, Dry eyes, Esophagitis, unspecified, Fibromyalgia, General anesthetics causing adverse effect in therapeutic use, Heart murmur, History of colon polyps, Hyperlipidemia, Hypopituitary dwarfism, Iron deficiency anemia, Lupus, Migraines, neuralgic, Nonspecific ulcerative colitis, OP (osteoporosis), Osteopenia, Pancreatic cyst, Schatzki's ring, Steroid sulfatase deficiency, Ulcerative colitis, and Vitamin D deficiency disease.    Surgical History:  has a past surgical history that includes restorative proctectomy; total abdominal colectomy with ileostomy (2010); Cholecystectomy; Hysterectomy; Oophorectomy (Bilateral); Appendectomy; Breast biopsy (Right, 2/2016); Colon surgery; Breast surgery; Total Reduction Mammoplasty (Left, 2017); Upper endoscopic  "ultrasound w/ FNA; Endoscopic ultrasound of upper gastrointestinal tract (N/A, 8/6/2018); Esophagogastroduodenoscopy (N/A, 12/10/2018); Flexible sigmoidoscopy (N/A, 12/10/2018); Mastectomy; and Breast reconstruction.    Family History: family history includes Breast cancer (age of onset: 28) in her cousin; Breast cancer (age of onset: 45) in her other; Breast cancer (age of onset: 50) in her maternal cousin; Breast cancer (age of onset: 55) in her maternal aunt; Cancer in her maternal aunt; Cancer (age of onset: 25) in her other; Cancer (age of onset: 65) in her maternal uncle; Diabetes in her father; Hyperlipidemia in her father and mother; Hypertension in her father; No Known Problems in her brother, maternal grandfather, maternal grandmother, paternal aunt, paternal grandfather, paternal grandmother, paternal uncle, and sister; Pancreatic cancer in her maternal aunt and maternal uncle..     Social History:  reports that she has never smoked. She has never used smokeless tobacco. She reports that she does not drink alcohol or use drugs.    Review of patient's allergies indicates:   Allergen Reactions    Aspirin      Other reaction(s): "hemorrhage"  hemorrhage    Hydrocortisone Nausea Only     Other reaction(s): sick  sick    Lactose intolerance (lactase) [lactase] Nausea And Vomiting    Vicodin [hydrocodone-acetaminophen]      Other reaction(s): hyperactivity    Asacol [mesalamine] Hallucinations     Other reaction(s): Hallucinations  hallucinations       Medication List with Changes/Refills   Current Medications    ALBUTEROL (ACCUNEB) 0.63 MG/3 ML NEBU    Take 3 mLs (0.63 mg total) by nebulization every 6 (six) hours as needed.    ALIROCUMAB (PRALUENT PEN) 75 MG/ML PNIJ    Inject 75 mg into the skin every 14 (fourteen) days.    AMLODIPINE (NORVASC) 2.5 MG TABLET    TAKE ONE TABLET BY MOUTH EVERY DAY    ATORVASTATIN (LIPITOR) 10 MG TABLET    Take 10 mg by mouth once daily.    CREON 36,000-114,000- 180,000 " UNIT CPDR    TAKE TWO CAPSULES BY MOUTH THREE TIMES DAILY WITH meals AND ONE CAPSULE WITH each snack    CYANOCOBALAMIN 1,000 MCG/ML INJECTION    Inject 1 mL (1,000 mcg total) into the muscle once a week. 1 Solution Injection monthly.  B12 injection weekly for  3 weeks then every  2 weeks    CYCLOSPORINE (RESTASIS) 0.05 % OPHTHALMIC EMULSION    Place 0.4 mLs (1 drop total) into both eyes 2 (two) times daily.    DILTIAZEM HCL (DILTIAZEM 2% CREAM)    APPLY TO ANAL AREA EVERY 8 HOURS FOR 7 DAYS    ERGOCALCIFEROL (VITAMIN D2) 50,000 UNIT CAP    Take 2 capsules (100,000 Units total) by mouth every 7 days.    FERROUS SULFATE 325 MG (65 MG IRON) TAB    Take by mouth. 1 Tablet Oral Every day.  Take one 250mg vitamin C pill every 12 hours which will help your bodyabsorb more of the iron.    GABAPENTIN (NEURONTIN) 800 MG TABLET    Take 1 tablet (800 mg total) by mouth 3 (three) times daily.    HYDROXYCHLOROQUINE (PLAQUENIL) 200 MG TABLET    TAKE 1 AND 1/2 TABLETS BY MOUTH EVERY DAY    LIDOCAINE (LIDODERM) 5 %    Place 1 patch onto the skin once daily. Remove & Discard patch within 12 hours or as directed by MD    LOPERAMIDE (IMODIUM) 2 MG CAPSULE    TAKE 1 TO 2 CAPSULES BY MOUTH FOUR TIMES DAILY    LORAZEPAM (ATIVAN) 1 MG TABLET    Take 1 tablet (1 mg total) by mouth every evening. 1 Tablet Oral Q8H-12 hours    MULTIVITAMIN/IRON/FOLIC ACID (CENTRUM WOMEN ORAL)    Take 1 tablet by mouth once daily.    NIACIN 100 MG TAB    Take by mouth. 1 Tablet Oral At bedtime    ONDANSETRON (ZOFRAN) 4 MG TABLET    Take 1 tablet (4 mg total) by mouth every 12 (twelve) hours as needed for Nausea.    PANTOPRAZOLE (PROTONIX) 20 MG TABLET    Take 1 tablet (20 mg total) by mouth every 12 (twelve) hours.    POLYCARBOPHIL (FIBERCON) 625 MG TABLET    Take by mouth. 1 Tablet Oral Every 12 hours.  Take with 12 ounces of water with each pill.    POTASSIUM CHLORIDE SA (K-DUR,KLOR-CON) 10 MEQ TABLET    TAKE TWO TABLETS BY MOUTH once DAILY    PREDNISONE  (DELTASONE) 5 MG TABLET    Take 1 tablet (5 mg total) by mouth once daily.    RIZATRIPTAN (MAXALT) 10 MG TABLET    Take 1 tablet (10 mg total) by mouth every 6 (six) hours as needed.    SAVELLA 100 MG TAB    TAKE ONE TABLET BY MOUTH TWICE DAILY    TERIPARATIDE (FORTEO) 20 MCG/DOSE - 600 MCG/2.4 ML PNIJ    Inject 0.08 mLs (20 mcg total) into the skin once daily.    ZOLPIDEM (AMBIEN) 5 MG TAB    Take 1 tablet (5 mg total) by mouth nightly as needed.         Objective Findings:    Vital Signs:  There were no vitals taken for this visit.  There is no height or weight on file to calculate BMI.    Physical Exam:  Telemedicine video visit per [facility] [place] department of Health COVID-19 pandemic mandates  General Appearance: Well appearing in no acute distress  Neurologic:  Alert and oriented x4  Psychiatric:  Normal speech mentation and affect    Labs:  Lab Results   Component Value Date    WBC 6.99 09/16/2019    HGB 12.8 09/16/2019    HCT 42.2 09/16/2019     09/16/2019    CHOL 276 (H) 05/21/2019    TRIG 202 (H) 05/21/2019    HDL 84 (H) 05/21/2019    ALT 18 09/16/2019    AST 22 09/16/2019     11/01/2019    K 3.9 11/01/2019     11/01/2019    CREATININE 0.8 11/01/2019    BUN 14 11/01/2019    CO2 27 11/01/2019    TSH 0.360 (L) 09/16/2019    INR 0.9 10/16/2018    HGBA1C 5.7 (H) 05/08/2018                 Medical Decision Making:  Prior EGD and notes reviewed prior EUS reviewed  Lab work reviewed prior imaging study reviewed CT scan images personally reviewed by me.  Lab work talk given fasting tomorrow  Pancreatic enzyme replacement talk given she knows not to take her Creon if she is not eating her skipping a meal  She knows not to take too much due to a possibility of fibrosing colopathy  EGD talk given      Assessment:  1. Encounter for monitoring long-term proton pump inhibitor therapy    2. Exocrine pancreatic insufficiency    3. Right lower quadrant abdominal pain    4. LLQ pain    5. Generalized  abdominal pain         Recommendations:   1.  New onset abdominal pain in a person is status post a colonic resection due to ulcerative colitis  Will get fasting lab work tomorrow get an urgent CT scan of the abdomen pelvis for further evaluation.  2.  Pancreas exocrine insufficiency and pancreas cyst followed by advanced endoscopy she is in a surveillance program  3.  Return to GI clinic in 4 weeks for follow-up sooner if symptoms worsen.    Follow up in about 4 weeks (around 6/3/2020).      Order summary:  Orders Placed This Encounter    CT Abdomen Pelvis W Wo Contrast    Vitamin B12    Vitamin D    Magnesium    Hepatic function panel    Basic metabolic panel    CBC auto differential    Lipase    Vitamin A    Protime-INR    Vitamin E    Creatinine, serum    Case request GI: EGD (ESOPHAGOGASTRODUODENOSCOPY)         Thank you so much for allowing me to participate in the care of Efe Villalta MD

## 2020-05-07 ENCOUNTER — TELEPHONE (OUTPATIENT)
Dept: GASTROENTEROLOGY | Facility: CLINIC | Age: 60
End: 2020-05-07

## 2020-05-07 ENCOUNTER — LAB VISIT (OUTPATIENT)
Dept: LAB | Facility: HOSPITAL | Age: 60
End: 2020-05-07
Attending: INTERNAL MEDICINE
Payer: MEDICARE

## 2020-05-07 DIAGNOSIS — R10.84 GENERALIZED ABDOMINAL PAIN: ICD-10-CM

## 2020-05-07 DIAGNOSIS — Z79.899 ENCOUNTER FOR MONITORING LONG-TERM PROTON PUMP INHIBITOR THERAPY: ICD-10-CM

## 2020-05-07 DIAGNOSIS — Z51.81 ENCOUNTER FOR MONITORING LONG-TERM PROTON PUMP INHIBITOR THERAPY: ICD-10-CM

## 2020-05-07 DIAGNOSIS — R10.31 RIGHT LOWER QUADRANT ABDOMINAL PAIN: ICD-10-CM

## 2020-05-07 DIAGNOSIS — D64.9 LOW HEMOGLOBIN: Primary | ICD-10-CM

## 2020-05-07 DIAGNOSIS — K86.81 EXOCRINE PANCREATIC INSUFFICIENCY: ICD-10-CM

## 2020-05-07 DIAGNOSIS — R10.32 LLQ PAIN: ICD-10-CM

## 2020-05-07 LAB
25(OH)D3+25(OH)D2 SERPL-MCNC: 40 NG/ML (ref 30–96)
ALBUMIN SERPL BCP-MCNC: 3.8 G/DL (ref 3.5–5.2)
ALP SERPL-CCNC: 71 U/L (ref 55–135)
ALT SERPL W/O P-5'-P-CCNC: 13 U/L (ref 10–44)
ANION GAP SERPL CALC-SCNC: 11 MMOL/L (ref 8–16)
AST SERPL-CCNC: 21 U/L (ref 10–40)
BASOPHILS # BLD AUTO: 0.04 K/UL (ref 0–0.2)
BASOPHILS NFR BLD: 0.7 % (ref 0–1.9)
BILIRUB DIRECT SERPL-MCNC: 0.2 MG/DL (ref 0.1–0.3)
BILIRUB SERPL-MCNC: 0.3 MG/DL (ref 0.1–1)
BUN SERPL-MCNC: 16 MG/DL (ref 8–23)
CALCIUM SERPL-MCNC: 9.4 MG/DL (ref 8.7–10.5)
CHLORIDE SERPL-SCNC: 101 MMOL/L (ref 95–110)
CO2 SERPL-SCNC: 31 MMOL/L (ref 23–29)
CREAT SERPL-MCNC: 0.9 MG/DL (ref 0.5–1.4)
CREAT SERPL-MCNC: 0.9 MG/DL (ref 0.5–1.4)
DIFFERENTIAL METHOD: ABNORMAL
EOSINOPHIL # BLD AUTO: 0.1 K/UL (ref 0–0.5)
EOSINOPHIL NFR BLD: 2.3 % (ref 0–8)
ERYTHROCYTE [DISTWIDTH] IN BLOOD BY AUTOMATED COUNT: 14.2 % (ref 11.5–14.5)
EST. GFR  (AFRICAN AMERICAN): >60 ML/MIN/1.73 M^2
EST. GFR  (AFRICAN AMERICAN): >60 ML/MIN/1.73 M^2
EST. GFR  (NON AFRICAN AMERICAN): >60 ML/MIN/1.73 M^2
EST. GFR  (NON AFRICAN AMERICAN): >60 ML/MIN/1.73 M^2
FERRITIN SERPL-MCNC: 71 NG/ML (ref 20–300)
GLUCOSE SERPL-MCNC: 86 MG/DL (ref 70–110)
HCT VFR BLD AUTO: 39.7 % (ref 37–48.5)
HGB BLD-MCNC: 11.9 G/DL (ref 12–16)
IMM GRANULOCYTES # BLD AUTO: 0.01 K/UL (ref 0–0.04)
IMM GRANULOCYTES NFR BLD AUTO: 0.2 % (ref 0–0.5)
INR PPP: 1 (ref 0.8–1.2)
IRON SERPL-MCNC: 95 UG/DL (ref 30–160)
LIPASE SERPL-CCNC: 21 U/L (ref 4–60)
LYMPHOCYTES # BLD AUTO: 2 K/UL (ref 1–4.8)
LYMPHOCYTES NFR BLD: 33.7 % (ref 18–48)
MAGNESIUM SERPL-MCNC: 1.5 MG/DL (ref 1.6–2.6)
MCH RBC QN AUTO: 26.3 PG (ref 27–31)
MCHC RBC AUTO-ENTMCNC: 30 G/DL (ref 32–36)
MCV RBC AUTO: 88 FL (ref 82–98)
MONOCYTES # BLD AUTO: 0.5 K/UL (ref 0.3–1)
MONOCYTES NFR BLD: 8.7 % (ref 4–15)
NEUTROPHILS # BLD AUTO: 3.3 K/UL (ref 1.8–7.7)
NEUTROPHILS NFR BLD: 54.4 % (ref 38–73)
NRBC BLD-RTO: 0 /100 WBC
PLATELET # BLD AUTO: 250 K/UL (ref 150–350)
PMV BLD AUTO: 11.8 FL (ref 9.2–12.9)
POTASSIUM SERPL-SCNC: 3.1 MMOL/L (ref 3.5–5.1)
PROT SERPL-MCNC: 6.8 G/DL (ref 6–8.4)
PROTHROMBIN TIME: 10 SEC (ref 9–12.5)
RBC # BLD AUTO: 4.52 M/UL (ref 4–5.4)
SATURATED IRON: 23 % (ref 20–50)
SODIUM SERPL-SCNC: 143 MMOL/L (ref 136–145)
TOTAL IRON BINDING CAPACITY: 407 UG/DL (ref 250–450)
TRANSFERRIN SERPL-MCNC: 275 MG/DL (ref 200–375)
VIT B12 SERPL-MCNC: 1033 PG/ML (ref 210–950)
WBC # BLD AUTO: 5.99 K/UL (ref 3.9–12.7)

## 2020-05-07 PROCEDURE — 82306 VITAMIN D 25 HYDROXY: CPT

## 2020-05-07 PROCEDURE — 82728 ASSAY OF FERRITIN: CPT

## 2020-05-07 PROCEDURE — 85025 COMPLETE CBC W/AUTO DIFF WBC: CPT

## 2020-05-07 PROCEDURE — 83735 ASSAY OF MAGNESIUM: CPT

## 2020-05-07 PROCEDURE — 83690 ASSAY OF LIPASE: CPT

## 2020-05-07 PROCEDURE — 80048 BASIC METABOLIC PNL TOTAL CA: CPT

## 2020-05-07 PROCEDURE — 85610 PROTHROMBIN TIME: CPT

## 2020-05-07 PROCEDURE — 83540 ASSAY OF IRON: CPT

## 2020-05-07 PROCEDURE — 84590 ASSAY OF VITAMIN A: CPT

## 2020-05-07 PROCEDURE — 82607 VITAMIN B-12: CPT

## 2020-05-07 PROCEDURE — 36415 COLL VENOUS BLD VENIPUNCTURE: CPT | Mod: PO

## 2020-05-07 PROCEDURE — 80076 HEPATIC FUNCTION PANEL: CPT

## 2020-05-07 PROCEDURE — 84446 ASSAY OF VITAMIN E: CPT

## 2020-05-07 NOTE — TELEPHONE ENCOUNTER
----- Message from Caren Tovar sent at 5/7/2020 11:58 AM CDT -----  Contact: pt: [phone]  Pt is returning call to the clinic       Please contact pt: [phone]

## 2020-05-07 NOTE — TELEPHONE ENCOUNTER
----- Message from Reese Villalta MD sent at 5/6/2020 11:17 AM CDT -----  Alize please schedule patient for fasting lab work tomorrow at 8:00 a.m. Thursday May 7, 2020 at [facility].  Orders placed    Please schedule her in urgent CT scan of the abdomen pelvis for abdominal pain orders placed.    Please have her return to clinic video visit with me in 4 weeks via telemedicine

## 2020-05-08 ENCOUNTER — HOSPITAL ENCOUNTER (OUTPATIENT)
Dept: RADIOLOGY | Facility: HOSPITAL | Age: 60
Discharge: HOME | End: 2020-05-08
Attending: INTERNAL MEDICINE
Payer: MEDICARE

## 2020-05-08 DIAGNOSIS — R10.31 RIGHT LOWER QUADRANT ABDOMINAL PAIN: ICD-10-CM

## 2020-05-08 DIAGNOSIS — R10.32 LLQ PAIN: ICD-10-CM

## 2020-05-08 DIAGNOSIS — R10.84 GENERALIZED ABDOMINAL PAIN: ICD-10-CM

## 2020-05-08 PROCEDURE — 74177 CT ABD & PELVIS W/CONTRAST: CPT | Mod: 26,,, | Performed by: RADIOLOGY

## 2020-05-08 PROCEDURE — 74177 CT ABD & PELVIS W/CONTRAST: CPT | Mod: TC

## 2020-05-08 PROCEDURE — 25500020 PHARM REV CODE 255: Performed by: INTERNAL MEDICINE

## 2020-05-08 PROCEDURE — 74177 CT ABDOMEN PELVIS WITH CONTRAST: ICD-10-PCS | Mod: 26,,, | Performed by: RADIOLOGY

## 2020-05-08 RX ADMIN — IOHEXOL 75 ML: 350 INJECTION, SOLUTION INTRAVENOUS at 03:05

## 2020-05-09 ENCOUNTER — DOCUMENTATION ONLY (OUTPATIENT)
Dept: GASTROENTEROLOGY | Facility: CLINIC | Age: 60
End: 2020-05-09

## 2020-05-09 DIAGNOSIS — E87.6 LOW BLOOD POTASSIUM: Primary | ICD-10-CM

## 2020-05-09 DIAGNOSIS — R79.0 LOW BLOOD MAGNESIUM: ICD-10-CM

## 2020-05-09 NOTE — PROGRESS NOTES
VERY IMPORTANT:    Alize please order magnesium & BMP next week to recheck Efe's low potassium and magnesium level.    Efe please make sure you increase potassium containing foods in your diet.    Make sure you get repeat  those lab work next week.      Food Sources of Potassium  Bananas, oranges, cantaloupe, honeydew, apricots, grapefruit (some dried fruits, such as prunes, raisins, and dates, are also high in potassium)  Cooked spinach.  Cooked broccoli.  Potatoes.  Sweet potatoes.  Mushrooms.  Peas.  Cucumbers.

## 2020-05-11 ENCOUNTER — TELEPHONE (OUTPATIENT)
Dept: PHARMACY | Facility: CLINIC | Age: 60
End: 2020-05-11

## 2020-05-11 ENCOUNTER — TELEPHONE (OUTPATIENT)
Dept: GASTROENTEROLOGY | Facility: CLINIC | Age: 60
End: 2020-05-11

## 2020-05-11 DIAGNOSIS — H16.223 KERATOCONJUNCTIVITIS SICCA OF BOTH EYES NOT SPECIFIED AS SJOGREN'S: ICD-10-CM

## 2020-05-11 RX ORDER — CYCLOSPORINE 0.5 MG/ML
1 EMULSION OPHTHALMIC 2 TIMES DAILY
Qty: 60 VIAL | Refills: 11 | Status: SHIPPED | OUTPATIENT
Start: 2020-05-11 | End: 2021-05-20

## 2020-05-11 NOTE — TELEPHONE ENCOUNTER
----- Message from Reese Villalta MD sent at 5/9/2020 12:22 PM CDT -----  VERY IMPORTANT:    Alize please order magnesium & BMP next week to recheck Efe's low potassium and magnesium level.    Efe please make sure you increase potassium containing foods in your diet.    Make sure you get repeat  those lab work next week.      Food Sources of Potassium  Bananas, oranges, cantaloupe, honeydew, apricots, grapefruit (some dried fruits, such as prunes, raisins, and dates, are also high in potassium)  Cooked spinach.  Cooked broccoli.  Potatoes.  Sweet potatoes.  Mushrooms.  Peas.  Cucumbers.

## 2020-05-11 NOTE — PROGRESS NOTES
Keratoconjunctivitis sicca of both eyes not specified as Sjogren's  -     cycloSPORINE (RESTASIS) 0.05 % ophthalmic emulsion; Place 1 drop into both eyes 2 (two) times daily.  Dispense: 60 vial; Refill: 11

## 2020-05-12 LAB
A-TOCOPHEROL VIT E SERPL-MCNC: 973 UG/DL (ref 500–1800)
VIT A SERPL-MCNC: 63 UG/DL (ref 38–106)

## 2020-05-13 ENCOUNTER — TELEPHONE (OUTPATIENT)
Dept: GASTROENTEROLOGY | Facility: CLINIC | Age: 60
End: 2020-05-13

## 2020-05-13 NOTE — TELEPHONE ENCOUNTER
----- Message from Margaret Ho sent at 5/13/2020  8:59 AM CDT -----  Alize -- pt is returning your call. Call back # [phone]

## 2020-05-13 NOTE — TELEPHONE ENCOUNTER
Called and relayed results to pt.  Informed pt of appt for labs scheduled on 05/15 and pt appreciated the call.

## 2020-05-15 ENCOUNTER — LAB VISIT (OUTPATIENT)
Dept: LAB | Facility: HOSPITAL | Age: 60
End: 2020-05-15
Attending: INTERNAL MEDICINE
Payer: MEDICARE

## 2020-05-15 DIAGNOSIS — E87.6 LOW BLOOD POTASSIUM: ICD-10-CM

## 2020-05-15 DIAGNOSIS — R79.0 LOW BLOOD MAGNESIUM: ICD-10-CM

## 2020-05-15 LAB
ANION GAP SERPL CALC-SCNC: 7 MMOL/L (ref 8–16)
BUN SERPL-MCNC: 14 MG/DL (ref 8–23)
CALCIUM SERPL-MCNC: 9.6 MG/DL (ref 8.7–10.5)
CHLORIDE SERPL-SCNC: 105 MMOL/L (ref 95–110)
CO2 SERPL-SCNC: 31 MMOL/L (ref 23–29)
CREAT SERPL-MCNC: 0.8 MG/DL (ref 0.5–1.4)
EST. GFR  (AFRICAN AMERICAN): >60 ML/MIN/1.73 M^2
EST. GFR  (NON AFRICAN AMERICAN): >60 ML/MIN/1.73 M^2
GLUCOSE SERPL-MCNC: 93 MG/DL (ref 70–110)
MAGNESIUM SERPL-MCNC: 1.6 MG/DL (ref 1.6–2.6)
POTASSIUM SERPL-SCNC: 3.9 MMOL/L (ref 3.5–5.1)
SODIUM SERPL-SCNC: 143 MMOL/L (ref 136–145)

## 2020-05-15 PROCEDURE — 83735 ASSAY OF MAGNESIUM: CPT

## 2020-05-15 PROCEDURE — 36415 COLL VENOUS BLD VENIPUNCTURE: CPT | Mod: PO

## 2020-05-15 PROCEDURE — 80048 BASIC METABOLIC PNL TOTAL CA: CPT

## 2020-05-18 ENCOUNTER — TELEPHONE (OUTPATIENT)
Dept: GASTROENTEROLOGY | Facility: CLINIC | Age: 60
End: 2020-05-18

## 2020-05-18 NOTE — TELEPHONE ENCOUNTER
Called and relayed results to pt.  Pt verbalized understanding and asked if her labs resulted.  Informed pt lab results haven't been read yet and once reviewed we will contact her.  Pt appreciated the call.

## 2020-05-18 NOTE — TELEPHONE ENCOUNTER
----- Message from Reese Villalta MD sent at 5/17/2020 12:34 PM CDT -----  Alize please tell dark green her CT scan of her abdomen shows no acute findings stable liver findings for many years consistent with benign collection of blood vessels hemangioma.    Appears that patient has MRCP orders placed by advanced endoscopy for follow-up of her pancreas and bile duct.  Bile Ducts: Mildly prominent extrahepatic common bile duct measuring up to approximately 1.1 cm with normal tapering at the level of the ampulla.  This appears relatively similar when compared to prior examination dated 10/22/2018.    Pancreas: Questionable punctate hypodensity within the pancreatic tail, better characterized on prior MR examination dated 08/13 19 and may represent a small IPMN (axial series 3, image 42).  No peripancreatic inflammatory stranding.

## 2020-05-21 ENCOUNTER — TELEPHONE (OUTPATIENT)
Dept: PHARMACY | Facility: CLINIC | Age: 60
End: 2020-05-21

## 2020-05-21 NOTE — TELEPHONE ENCOUNTER
Call to offer replacement options for Forteo. Patient requested replacement be shipped to her address today 5/21 to receive 5/22. Patient requested that alcohol swabs and needles be included. Verified address, patient had no further questions or concerns.    Heber Mitchell, PharmD  Clinical Pharmacist  [facility]  P: [phone]

## 2020-06-01 RX ORDER — LIDOCAINE 50 MG/G
1 PATCH TOPICAL DAILY
Qty: 30 PATCH | Refills: 12 | OUTPATIENT
Start: 2020-06-01

## 2020-06-12 ENCOUNTER — TELEPHONE (OUTPATIENT)
Dept: PHARMACY | Facility: CLINIC | Age: 60
End: 2020-06-12

## 2020-06-12 NOTE — TELEPHONE ENCOUNTER
Refill call regarding Forteo from [facility]. Patient reached and informed of copay of $0 @004. Patients next dose is scheduled for 06/18 shipping out 06/16 for 06/17 arrival with patients consent.

## 2020-06-16 ENCOUNTER — TELEPHONE (OUTPATIENT)
Dept: ORTHOPEDICS | Facility: CLINIC | Age: 60
End: 2020-06-16

## 2020-06-16 DIAGNOSIS — S92.351A DISPLACED FRACTURE OF FIFTH METATARSAL BONE, RIGHT FOOT, INITIAL ENCOUNTER FOR CLOSED FRACTURE: Primary | ICD-10-CM

## 2020-06-16 NOTE — TELEPHONE ENCOUNTER
----- Message from Art Hernandez sent at 6/16/2020 10:22 AM CDT -----  Regarding: Xray  Contact: self  Mg   Pt needs nurse to give her a call concerning xray     Contact [phone]

## 2020-06-16 NOTE — TELEPHONE ENCOUNTER
Spoke with pt. Pt is schedule for x-ray at [facility] 6/16/20 @ 7:00 am. Reschedule w/ RR on 6/17/20 @ 9:45 am. Patient states verbal understanding and has no further questions.

## 2020-06-17 ENCOUNTER — HOSPITAL ENCOUNTER (OUTPATIENT)
Dept: RADIOLOGY | Facility: HOSPITAL | Age: 60
Discharge: HOME | End: 2020-06-17
Attending: PHYSICIAN ASSISTANT
Payer: MEDICARE

## 2020-06-17 ENCOUNTER — OFFICE VISIT (OUTPATIENT)
Dept: ORTHOPEDICS | Facility: CLINIC | Age: 60
End: 2020-06-17
Payer: MEDICARE

## 2020-06-17 DIAGNOSIS — S92.351K CLOSED FRACTURE OF BASE OF FIFTH METATARSAL BONE WITH NONUNION, RIGHT: Primary | ICD-10-CM

## 2020-06-17 DIAGNOSIS — S92.351A DISPLACED FRACTURE OF FIFTH METATARSAL BONE, RIGHT FOOT, INITIAL ENCOUNTER FOR CLOSED FRACTURE: ICD-10-CM

## 2020-06-17 PROCEDURE — 73630 X-RAY EXAM OF FOOT: CPT | Mod: TC,FY,PO,RT

## 2020-06-17 PROCEDURE — 73630 XR FOOT COMPLETE 3 VIEW RIGHT: ICD-10-PCS | Mod: 26,RT,, | Performed by: RADIOLOGY

## 2020-06-17 PROCEDURE — 99441 PR PHYSICIAN TELEPHONE EVALUATION 5-10 MIN: CPT | Mod: 95,,, | Performed by: PHYSICIAN ASSISTANT

## 2020-06-17 PROCEDURE — 73630 X-RAY EXAM OF FOOT: CPT | Mod: 26,RT,, | Performed by: RADIOLOGY

## 2020-06-17 PROCEDURE — 99441 PR PHYSICIAN TELEPHONE EVALUATION 5-10 MIN: ICD-10-PCS | Mod: 95,,, | Performed by: PHYSICIAN ASSISTANT

## 2020-06-18 NOTE — PROGRESS NOTES
Established Patient - Audio Only Telehealth Visit     The patient location is: home  The chief complaint leading to consultation is: follow up fifth metatarsal fracture  Visit type: Virtual visit with audio only (telephone)  Total time spent with patient: 5       The reason for the audio only service rather than synchronous audio and video virtual visit was related to technical difficulties or patient preference/necessity.     Each patient to whom I provide medical services by telemedicine is:  (1) informed of the relationship between the physician and patient and the respective role of any other health care provider with respect to management of the patient; and (2) notified that they may decline to receive medical services by telemedicine and may withdraw from such care at any time. Patient verbally consented to receive this service via voice-only telephone call.    HPI     Patient is a 60 year old female who was seen for follow up for right fifth metatarsal fracture that occurred on 03/02/2020 secondary to inverting her ankle. Patient has transitioned to a tennis shoe. She stated that she does not really have any pain, occasional swelling. She is not taking pain medication. Denied numbness or tingling.      Reports history of osteoporosis.      Review of Systems   Constitution: Negative for chills and fever.   Cardiovascular: Negative for chest pain.   Respiratory: Negative for cough and shortness of breath.    Skin: Negative for color change, dry skin, itching, nail changes, poor wound healing and rash.   Musculoskeletal:        Right fifth metatarsal fracture.    Neurological: Negative for dizziness.   Psychiatric/Behavioral: Negative for altered mental status. The patient is not nervous/anxious.    All other systems reviewed and are negative.           RADS: reviewed by myself:   Fracture involving the base of the right 5th metatarsal bone with no detrimental change in the position and alignment of the  fracture fragments when compared to the prior examination.    Fracture line is still quite evident without evidence for bridging callus formation  Assessment:            Encounter Diagnosis   Name Primary?    Displaced fracture of fifth metatarsal bone, right foot, Yes          Plan:       Discussed plan with the patient. At this time she will continued to advance her shoewear and activity as toelrated. Rest ice and elevate to reduce swelling. Discussed that her fracture may develop into a non-union. We will continue to monitor, but no surgical intervention as long as no pain or loss of function. At that time x-ray of her foot is needed weightbearing.    She did also complain of bilateral trigger fingers. She is not interested in a hand clinic evaluation or possible injection. She will call if appointment is needed.      This service was not originating from a related E/M service provided within the previous 7 days nor will  to an E/M service or procedure within the next 24 hours or my soonest available appointment.  Prevailing standard of care was able to be met in this audio-only visit.      Telemedicine/Virtual Visit Documentation:     The patient location is: home    The chief complaint leading to consultation is: see HPI    VISIT TYPE X   Virtual visit with synchronous audio and video    Telephone E/M service x     Total time spent with patient: see X stanislva on chart below.   More than half of the time was spent counseling or coordinating care including prognosis, differential diagnosis, risks and benefits of treatment, instructions, compliance risk reductions     EST MINUTES X   [id number] 5    [id number] 10    [id number] 15    [id number] 25    [id number] 40    NEW     [id number] 10    67875 20    19753 30    65113 45    05605 60    PHONE      5-10 x   99442 11-20    53875 21-30

## 2020-07-10 ENCOUNTER — TELEPHONE (OUTPATIENT)
Dept: PHARMACY | Facility: CLINIC | Age: 60
End: 2020-07-10

## 2020-08-04 ENCOUNTER — PATIENT MESSAGE (OUTPATIENT)
Dept: RHEUMATOLOGY | Facility: CLINIC | Age: 60
End: 2020-08-04

## 2020-08-04 DIAGNOSIS — M79.641 BILATERAL HAND PAIN: ICD-10-CM

## 2020-08-04 DIAGNOSIS — D84.9 IMMUNOSUPPRESSION: ICD-10-CM

## 2020-08-04 DIAGNOSIS — M32.9 SYSTEMIC LUPUS ERYTHEMATOSUS, UNSPECIFIED SLE TYPE, UNSPECIFIED ORGAN INVOLVEMENT STATUS: Primary | ICD-10-CM

## 2020-08-04 DIAGNOSIS — M79.642 BILATERAL HAND PAIN: ICD-10-CM

## 2020-08-04 DIAGNOSIS — R53.83 FATIGUE, UNSPECIFIED TYPE: ICD-10-CM

## 2020-08-04 DIAGNOSIS — M79.7 FIBROMYALGIA: ICD-10-CM

## 2020-08-10 ENCOUNTER — HOSPITAL ENCOUNTER (OUTPATIENT)
Dept: RADIOLOGY | Facility: HOSPITAL | Age: 60
Discharge: HOME | End: 2020-08-10
Attending: INTERNAL MEDICINE
Payer: MEDICARE

## 2020-08-10 ENCOUNTER — TELEPHONE (OUTPATIENT)
Dept: PHARMACY | Facility: CLINIC | Age: 60
End: 2020-08-10

## 2020-08-10 ENCOUNTER — OFFICE VISIT (OUTPATIENT)
Dept: RHEUMATOLOGY | Facility: CLINIC | Age: 60
End: 2020-08-10
Payer: MEDICARE

## 2020-08-10 VITALS
HEIGHT: 62 IN | WEIGHT: 124.31 LBS | HEART RATE: 69 BPM | TEMPERATURE: 98 F | SYSTOLIC BLOOD PRESSURE: 136 MMHG | BODY MASS INDEX: 22.88 KG/M2 | DIASTOLIC BLOOD PRESSURE: 82 MMHG

## 2020-08-10 DIAGNOSIS — M65.4 DE QUERVAIN'S TENOSYNOVITIS, RIGHT: ICD-10-CM

## 2020-08-10 DIAGNOSIS — M79.642 BILATERAL HAND PAIN: Primary | ICD-10-CM

## 2020-08-10 DIAGNOSIS — M79.641 BILATERAL HAND PAIN: ICD-10-CM

## 2020-08-10 DIAGNOSIS — K51.919 ULCERATIVE COLITIS WITH COMPLICATION, UNSPECIFIED LOCATION: ICD-10-CM

## 2020-08-10 DIAGNOSIS — M79.642 BILATERAL HAND PAIN: ICD-10-CM

## 2020-08-10 DIAGNOSIS — R53.83 FATIGUE, UNSPECIFIED TYPE: ICD-10-CM

## 2020-08-10 DIAGNOSIS — M79.7 FIBROMYALGIA: ICD-10-CM

## 2020-08-10 DIAGNOSIS — M79.641 BILATERAL HAND PAIN: Primary | ICD-10-CM

## 2020-08-10 DIAGNOSIS — D84.9 IMMUNOSUPPRESSION: ICD-10-CM

## 2020-08-10 DIAGNOSIS — M32.9 SYSTEMIC LUPUS ERYTHEMATOSUS, UNSPECIFIED SLE TYPE, UNSPECIFIED ORGAN INVOLVEMENT STATUS: ICD-10-CM

## 2020-08-10 PROCEDURE — 73130 XR HAND COMPLETE 3 VIEWS BILATERAL: ICD-10-PCS | Mod: 26,50,, | Performed by: RADIOLOGY

## 2020-08-10 PROCEDURE — 3075F PR MOST RECENT SYSTOLIC BLOOD PRESS GE 130-139MM HG: ICD-10-PCS | Mod: CPTII,S$GLB,, | Performed by: INTERNAL MEDICINE

## 2020-08-10 PROCEDURE — 73130 X-RAY EXAM OF HAND: CPT | Mod: 26,50,, | Performed by: RADIOLOGY

## 2020-08-10 PROCEDURE — 3079F DIAST BP 80-89 MM HG: CPT | Mod: CPTII,S$GLB,, | Performed by: INTERNAL MEDICINE

## 2020-08-10 PROCEDURE — 73130 X-RAY EXAM OF HAND: CPT | Mod: TC,50

## 2020-08-10 PROCEDURE — 3079F PR MOST RECENT DIASTOLIC BLOOD PRESSURE 80-89 MM HG: ICD-10-PCS | Mod: CPTII,S$GLB,, | Performed by: INTERNAL MEDICINE

## 2020-08-10 PROCEDURE — 3008F BODY MASS INDEX DOCD: CPT | Mod: CPTII,S$GLB,, | Performed by: INTERNAL MEDICINE

## 2020-08-10 PROCEDURE — 99214 PR OFFICE/OUTPT VISIT, EST, LEVL IV, 30-39 MIN: ICD-10-PCS | Mod: S$GLB,,, | Performed by: INTERNAL MEDICINE

## 2020-08-10 PROCEDURE — 99999 PR PBB SHADOW E&M-EST. PATIENT-LVL IV: CPT | Mod: PBBFAC,,, | Performed by: INTERNAL MEDICINE

## 2020-08-10 PROCEDURE — 3075F SYST BP GE 130 - 139MM HG: CPT | Mod: CPTII,S$GLB,, | Performed by: INTERNAL MEDICINE

## 2020-08-10 PROCEDURE — 3008F PR BODY MASS INDEX (BMI) DOCUMENTED: ICD-10-PCS | Mod: CPTII,S$GLB,, | Performed by: INTERNAL MEDICINE

## 2020-08-10 PROCEDURE — 99214 OFFICE O/P EST MOD 30 MIN: CPT | Mod: S$GLB,,, | Performed by: INTERNAL MEDICINE

## 2020-08-10 PROCEDURE — 99999 PR PBB SHADOW E&M-EST. PATIENT-LVL IV: ICD-10-PCS | Mod: PBBFAC,,, | Performed by: INTERNAL MEDICINE

## 2020-08-10 RX ORDER — DICLOFENAC SODIUM 10 MG/G
2 GEL TOPICAL 4 TIMES DAILY
Qty: 2 TUBE | Refills: 2 | Status: SHIPPED | OUTPATIENT
Start: 2020-08-10 | End: 2020-10-16

## 2020-08-10 ASSESSMENT — ROUTINE ASSESSMENT OF PATIENT INDEX DATA (RAPID3)
PAIN SCORE: 9.5
MDHAQ FUNCTION SCORE: 0.4
TOTAL RAPID3 SCORE: 5.94
AM STIFFNESS SCORE: 1, YES
PATIENT GLOBAL ASSESSMENT SCORE: 7
PSYCHOLOGICAL DISTRESS SCORE: 1.1

## 2020-08-10 NOTE — PATIENT INSTRUCTIONS
Understanding De Quervain Tenosynovitis    De Quervain tenosynovitis is a condition that can cause wrist and thumb pain. Tendons connect muscles in your wrist and forearm to the bones in your thumb. The tendons have a protective cover (sheath). The sheaths lining makes a fluid that lets the tendons slide easily when you straighten your wrist and thumb. If any of these tendons are irritated or injured, they can become swollen and inflamed. This is called de Quervain tenosynovitis.  How to say it  Dionicio ten-oh-sin-oh-VY-tis   What causes de Quervain tenosynovitis?  This condition is most often caused from overuse. For example, making the same wrist motions over and over can irritate the tendons. This includes doing things like unscrewing jar lids or grasping a tool. Activities such as typing, playing racquet sports, knitting, and texting can also lead to the condition.  Symptoms of de Quervain tenosynovitis  You may have pain, soreness, redness, and swelling along the side of your wrist and the base of your thumb. You may feel pain when you pinch or grasp things, turn or touch your wrist, or make a fist. Your thumb may catch or make a crackling sound when you move it.  Treatment for de Quervain tenosynovitis  Treatments may include:  · Resting the wrist and thumb. This involves limiting movements that make your symptoms worse. You also may need to avoid certain hobbies, sports, and types of work for a time.  · Cold packs. These help reduce pain and swelling.  · Prescription or over-the-counter pain medicines. These help relieve pain and swelling.  · Splint or brace. This helps keep the thumb and wrist from moving and gives time for your tendons to heal.  · Exercises or physical therapy. These help stretch, strengthen, and improve the range of motion in your wrist and thumb.  · Shots of medicine into the area around the tendon. These may help relieve symptoms for a time.  · Surgery. You may need surgery if  other treatments dont relieve symptoms. During surgery, the surgeon releases the sheath that surrounds the tendons so the tendons can move more easily.  Possible complications of de Quervain tenosynovitis  Without proper care and treatment, healing may take longer than normal. Also, symptoms may continue or get worse. Over time, the problem may become long-term (chronic). This can make it hard to use your wrist and thumb for normal activities.  When to call your healthcare provider  Call your healthcare provider right away if you have any of these:  · Fever of 100.4°F (38°C) or higher, or as directed  · Symptoms that dont get better with treatment, or get worse  · Pain, numbness, or coldness in the hand  · New symptoms   Date Last Reviewed: 3/10/2016  © 3316-1988 [employer]. [address] [place] [address], [place] [address]. All rights reserved. This information is not intended as a substitute for professional medical care. Always follow your healthcare professional's instructions.        Diclofenac skin gel  What is this medicine?  DICLOFENAC (dye KLOE fen ak) is a non-steroidal anti-inflammatory drug (NSAID). The 1% skin gel is used to treat osteoarthritis of the hands or knees. The 3% skin gel is used to treat actinic keratosis.  How should I use this medicine?  This medicine is for external use only. Follow the directions on the prescription label. Wash hands before and after use. Do not get this medicine in your eyes. If you do, rinse out with plenty of cool tap water. Use your doses at regular intervals. Do not use your medicine more often than directed.  A special MedGuide will be given to you by the pharmacist with each prescription and refill of the 1% gel. Be sure to read this information carefully each time.  Talk to your pediatrician regarding the use of this medicine in children. Special care may be needed. The 3% gel is not approved for use in children.  What side effects may I notice from  receiving this medicine?  Side effects that you should report to your doctor or health care professional as soon as possible:  · allergic reactions like skin rash, itching or hives, swelling of the face, lips, or tongue  · black or bloody stools, blood in the urine or vomit  · blurred vision  · chest pain  · difficulty breathing or wheezing  · nausea or vomiting  · redness, blistering, peeling or loosening of the skin, including inside the mouth  · slurred speech or weakness on one side of the body  · trouble passing urine or change in the amount of urine  · unexplained weight gain or swelling  · unusually weak or tired  · yellowing of eyes or skin  Side effects that usually do not require medical attention (report to your doctor or health care professional if they continue or are bothersome):  · dizziness  · dry skin  · headache  · heartburn  · increased sensitivity to the sun  · stomach pain  · tingling at the application site  What may interact with this medicine?  · aspirin  · NSAIDs, medicines for pain and inflammation, like ibuprofen or naproxen  Do not use any other skin products without telling your doctor or health care professional.  What if I miss a dose?  If you miss a dose, use it as soon as you can. If it is almost time for your next dose, use only that dose. Do not use double or extra doses.  Where should I keep my medicine?  Keep out of the reach of children.  Store the 1% gel at room temperature between 15 and 30 degrees C (59 and 86 degrees F). Store the 3% gel at room temperature between 20 and 25 degrees C (68 and 77 degrees F). Protect from light. Throw away any unused medicine after the expiration date.  What should I tell my health care provider before I take this medicine?  They need to know if you have any of these conditions:  · asthma  · bleeding problems  · coronary artery bypass graft (CABG) surgery within the past 2 weeks  · heart disease  · high blood pressure  · if you frequently  drink alcohol containing drinks  · kidney disease  · liver disease  · open or infected skin  · stomach problems  · an unusual or allergic reaction to diclofenac, aspirin, other NSAIDs, other medicines, benzyl alcohol (3% gel only), foods, dyes, or preservatives  · pregnant or trying to get pregnant  · breast-feeding  What should I watch for while using this medicine?  Tell your doctor or healthcare professional if your symptoms do not start to get better or if they get worse. You will need to follow up with your health care provider to monitor your progress. You may need to be treated for up to 3 months if you are using the 3% gel, but the full effect may not occur until 1 month after stopping treatment. If you develop a severe skin reaction, contact your doctor or health care professional immediately.  This medicine can make you more sensitive to the sun. Keep out of the sun. If you cannot avoid being in the sun, wear protective clothing and use sunscreen. Do not use sun lamps or tanning beds/booths.  Do not take medicines such as ibuprofen and naproxen with this medicine. Side effects such as stomach upset, nausea, or ulcers may be more likely to occur. Many medicines available without a prescription should not be taken with this medicine.  This medicine does not prevent heart attack or stroke. In fact, this medicine may increase the chance of a heart attack or stroke. The chance may increase with longer use of this medicine and in people who have heart disease. If you take aspirin to prevent heart attack or stroke, talk with your doctor or health care professional.  This medicine can cause ulcers and bleeding in the stomach and intestines at any time during treatment. Do not smoke cigarettes or drink alcohol. These increase irritation to your stomach and can make it more susceptible to damage from this medicine. Ulcers and bleeding can happen without warning symptoms and can cause death.  You may get drowsy or  dizzy. Do not drive, use machinery, or do anything that needs mental alertness until you know how this medicine affects you. Do not stand or sit up quickly, especially if you are an older patient. This reduces the risk of dizzy or fainting spells.  This medicine can cause you to bleed more easily. Try to avoid damage to your teeth and gums when you brush or floss your teeth.  NOTE:This sheet is a summary. It may not cover all possible information. If you have questions about this medicine, talk to your doctor, pharmacist, or health care provider. Copyright© 2017 [employer]

## 2020-08-10 NOTE — PROGRESS NOTES
"Subjective:       Patient ID: Efe Horowitz is a 60 y.o. female.    Chief Complaint: Lupus    HPI:  Efe Horowitz is a 60 y.o. female with history of lupus since age 39.   Her manifestations include joint pains, headache, positive DERRICK, and a   double-stranded DNA. She also has an equivocal anticardiolipin IgM.   She has been treated with Plaquenil 1-1/2 tablets daily. Eye exam 12/2017 was normal.      In addition to lupus, she has a   history of ulcerative colitis, osteopenia, fibromyalgia as well.   In 2009 she had a colectomy with ileostomy and in May 2011 the ileostomy  was closed. History of bilateral carpal tunnel.       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  She was treated with surgical resection.  Another lesion breast suspected to be lymphoma was later felt to be fibrous tissue.      May 1, 2017 had right breast fibrous material removed and reconstruction on both breasts at [facility].       Interval History:  Now on injection for cholesterol.  Still on Forteo with Dr. Srinivasan for osteoporosis with L3 vertebral fractures (top and bottom) on MRI.    Reports recurrent nasal ulcers (1 month ago), oral ulcer (1 months ago) and hair loss persists with intermittent sores.    Review of Systems   Constitutional: Positive for fatigue.   HENT: Negative for mouth sores.         Tongue and nasal sore   Eyes: Negative for redness.        Dry eyes   Respiratory: Negative for cough and shortness of breath.    Gastrointestinal: Positive for diarrhea.   Endocrine: Negative.    Genitourinary: Negative.    Musculoskeletal: Positive for arthralgias.   Skin: Negative.    Allergic/Immunologic: Negative.    Neurological: Positive for headaches.   Hematological: Negative.    Psychiatric/Behavioral: Negative.          Objective:   /82 (BP Location: Right arm, Patient Position: Sitting, BP Method: Medium (Automatic))   Pulse 69   Temp 98 °F (36.7 °C) (Oral)   Ht 5' 2" (1.575 m)   Wt 56.4 kg (124 lb 5.4 oz)  "  BMI 22.74 kg/m²         Physical Exam   Constitutional: She is oriented to person, place, and time and well-developed, well-nourished, and in no distress.   HENT:   Head: Normocephalic and atraumatic.   Eyes: Conjunctivae and EOM are normal.   Neck: Neck supple.   Cardiovascular: Normal rate, regular rhythm and normal heart sounds.    Pulmonary/Chest: Effort normal and breath sounds normal.   Abdominal: Soft. Bowel sounds are normal.   Neurological: She is alert and oriented to person, place, and time. Gait normal.   Skin: Skin is warm and dry.     Psychiatric: Mood and affect normal.   Musculoskeletal:      Comments: Pain on palpation bilateral trochanteric bursa.  Multiple Heberdens and fran  +Finklestine of right  Pain at DIPs           LABS      Component      Latest Ref Rng & Units 5/7/2020 5/7/2020           8:25 AM  8:25 AM   WBC      3.90 - 12.70 K/uL  5.99   RBC      4.00 - 5.40 M/uL  4.52   Hemoglobin      12.0 - 16.0 g/dL  11.9 (L)   Hematocrit      37.0 - 48.5 %  39.7   MCV      82 - 98 fL  88   MCH      27.0 - 31.0 pg  26.3 (L)   MCHC      32.0 - 36.0 g/dL  30.0 (L)   RDW      11.5 - 14.5 %  14.2   Platelets      150 - 350 K/uL  250   MPV      9.2 - 12.9 fL  11.8   Immature Granulocytes      0.0 - 0.5 %  0.2   Gran # (ANC)      1.8 - 7.7 K/uL  3.3   Immature Grans (Abs)      0.00 - 0.04 K/uL  0.01   Lymph #      1.0 - 4.8 K/uL  2.0   Mono #      0.3 - 1.0 K/uL  0.5   Eos #      0.0 - 0.5 K/uL  0.1   Baso #      0.00 - 0.20 K/uL  0.04   nRBC      0 /100 WBC  0   Gran%      38.0 - 73.0 %  54.4   Lymph%      18.0 - 48.0 %  33.7   Mono%      4.0 - 15.0 %  8.7   Eosinophil%      0.0 - 8.0 %  2.3   Basophil%      0.0 - 1.9 %  0.7   Differential Method        Automated   Sodium      136 - 145 mmol/L  143   Potassium      3.5 - 5.1 mmol/L  3.1 (L)   Chloride      95 - 110 mmol/L  101   CO2      23 - 29 mmol/L  31 (H)   Glucose      70 - 110 mg/dL  86   BUN, Bld      8 - 23 mg/dL  16   Creatinine      0.5  - 1.4 mg/dL 0.9 0.9   Calcium      8.7 - 10.5 mg/dL  9.4   Anion Gap      8 - 16 mmol/L  11   eGFR if African American      >60 mL/min/1.73 m:2 >60.0 >60.0   eGFR if non African American      >60 mL/min/1.73 m:2 >60.0 >60.0   PROTEIN TOTAL      6.0 - 8.4 g/dL  6.8   Albumin      3.5 - 5.2 g/dL  3.8   BILIRUBIN TOTAL      0.1 - 1.0 mg/dL  0.3   Bilirubin, Direct      0.1 - 0.3 mg/dL  0.2   AST      10 - 40 U/L  21   ALT      10 - 44 U/L  13   Alkaline Phosphatase      55 - 135 U/L  71   Iron      30 - 160 ug/dL  95   Transferrin      200 - 375 mg/dL  275   TIBC      250 - 450 ug/dL  407   Saturated Iron      20 - 50 %  23   Protime      9.0 - 12.5 sec  10.0   INR      0.8 - 1.2  1.0   Vitamin B-12      210 - 950 pg/mL  1033 (H)   Vit D, 25-Hydroxy      30 - 96 ng/mL  40   Magnesium      1.6 - 2.6 mg/dL  1.5 (L)   Lipase      4 - 60 U/L  21   Retinol, serum      38 - 106 ug/dL  63   Vitamin E      500 - 1800 ug/dL  973   Ferritin      20.0 - 300.0 ng/mL  71       Assessment:       1. Lupus. Lupus manifestations include joint pains, headache, positive DERRICK, and a   double-stranded DNA. Has fatigue and alopecia.  SLEDAI=2 without labs  2. Fatigue   3. Encounter for long-term (current) use of other medications   4. Myalgia and myositis. Fibromyalgia on gabapentin and Savella   5. Trochanteric bursitis. Bilateral now requesting injection.  6. Shoulder pain. Stable   7. Vitamin D deficiency disease. Now normal  8. Suspected Shingles.   9. Migraine.  Did acupuncture with Dr. Tinsley.  Treated with Maxalt by primary doctor which helps.     10.  Osteoporosis based vertebral fracture.  Reclast in the past.  Dr. Srinivasan plans to do Forteo.   11.  Chronic steroid use.  Endocrine gives for adrenal insufficiency.  12.  Ulcerative colitis  13.  Hematuria.  Followed by nephrology  14.  Stress with dealing with son with mental illness  15.  Loss of smell since Nov/Dec 2018.  May be relate to sinus issues.  Evaluated by ENT but no cause  found.  Continues to have loss of smell  16. Bilateral hand pain. Suspect OA and DeQuervains of right hand    Plan:       1.  Labs ordered.  X-ray hands  2.  Continue Plaquenil 300 mg daily.  Eye exam normal 5/2019 rescheduled due to COVID.   Will get it this month  3. Continue neurontin 800 mg/800 mg/1200 mg in evening.  4. On Forteo from endocrine  5. Voltaren gel hands  6. SPICA splint OTC                   RTO in 3 months/prn.

## 2020-08-11 ENCOUNTER — PATIENT MESSAGE (OUTPATIENT)
Dept: RHEUMATOLOGY | Facility: CLINIC | Age: 60
End: 2020-08-11

## 2020-08-14 ENCOUNTER — HOSPITAL ENCOUNTER (OUTPATIENT)
Dept: RADIOLOGY | Facility: HOSPITAL | Age: 60
Discharge: HOME | End: 2020-08-14
Attending: INTERNAL MEDICINE
Payer: MEDICARE

## 2020-08-14 DIAGNOSIS — K86.2 PANCREATIC CYST: ICD-10-CM

## 2020-08-14 DIAGNOSIS — R93.5 ABNORMAL FINDINGS ON DIAGNOSTIC IMAGING OF OTHER ABDOMINAL REGIONS, INCLUDING RETROPERITONEUM: ICD-10-CM

## 2020-08-14 PROCEDURE — 74181 MRI ABDOMEN W/O CONTRAST: CPT | Mod: TC

## 2020-08-14 PROCEDURE — 76376 MRI ABDOMEN WITHOUT CONTRAST MRCP: ICD-10-PCS | Mod: 26,,, | Performed by: RADIOLOGY

## 2020-08-14 PROCEDURE — 74181 MRI ABDOMEN W/O CONTRAST: CPT | Mod: 26,,, | Performed by: RADIOLOGY

## 2020-08-14 PROCEDURE — 76376 3D RENDER W/INTRP POSTPROCES: CPT | Mod: 26,,, | Performed by: RADIOLOGY

## 2020-08-14 PROCEDURE — 74181 MRI ABDOMEN WITHOUT CONTRAST MRCP: ICD-10-PCS | Mod: 26,,, | Performed by: RADIOLOGY

## 2020-08-14 PROCEDURE — 76376 3D RENDER W/INTRP POSTPROCES: CPT | Mod: TC

## 2020-09-03 ENCOUNTER — OFFICE VISIT (OUTPATIENT)
Dept: GASTROENTEROLOGY | Facility: CLINIC | Age: 60
End: 2020-09-03
Payer: MEDICARE

## 2020-09-03 VITALS
HEIGHT: 62 IN | BODY MASS INDEX: 22.07 KG/M2 | SYSTOLIC BLOOD PRESSURE: 143 MMHG | WEIGHT: 119.94 LBS | HEART RATE: 74 BPM | DIASTOLIC BLOOD PRESSURE: 81 MMHG

## 2020-09-03 DIAGNOSIS — E61.1 IRON DEFICIENCY: Primary | ICD-10-CM

## 2020-09-03 DIAGNOSIS — K51.00 ULCERATIVE PANCOLITIS WITHOUT COMPLICATION: ICD-10-CM

## 2020-09-03 DIAGNOSIS — E53.8 VITAMIN B12 DEFICIENCY: ICD-10-CM

## 2020-09-03 DIAGNOSIS — K86.81 EXOCRINE PANCREATIC INSUFFICIENCY: ICD-10-CM

## 2020-09-03 DIAGNOSIS — K52.9 IBD (INFLAMMATORY BOWEL DISEASE): ICD-10-CM

## 2020-09-03 DIAGNOSIS — K86.2 PANCREAS CYST: ICD-10-CM

## 2020-09-03 DIAGNOSIS — Z90.49 S/P TOTAL COLECTOMY: ICD-10-CM

## 2020-09-03 PROCEDURE — 3008F PR BODY MASS INDEX (BMI) DOCUMENTED: ICD-10-PCS | Mod: CPTII,S$GLB,, | Performed by: INTERNAL MEDICINE

## 2020-09-03 PROCEDURE — 3077F PR MOST RECENT SYSTOLIC BLOOD PRESSURE >= 140 MM HG: ICD-10-PCS | Mod: CPTII,S$GLB,, | Performed by: INTERNAL MEDICINE

## 2020-09-03 PROCEDURE — 3079F DIAST BP 80-89 MM HG: CPT | Mod: CPTII,S$GLB,, | Performed by: INTERNAL MEDICINE

## 2020-09-03 PROCEDURE — 99214 PR OFFICE/OUTPT VISIT, EST, LEVL IV, 30-39 MIN: ICD-10-PCS | Mod: S$GLB,,, | Performed by: INTERNAL MEDICINE

## 2020-09-03 PROCEDURE — 99999 PR PBB SHADOW E&M-EST. PATIENT-LVL V: ICD-10-PCS | Mod: PBBFAC,,, | Performed by: INTERNAL MEDICINE

## 2020-09-03 PROCEDURE — 3079F PR MOST RECENT DIASTOLIC BLOOD PRESSURE 80-89 MM HG: ICD-10-PCS | Mod: CPTII,S$GLB,, | Performed by: INTERNAL MEDICINE

## 2020-09-03 PROCEDURE — 3077F SYST BP >= 140 MM HG: CPT | Mod: CPTII,S$GLB,, | Performed by: INTERNAL MEDICINE

## 2020-09-03 PROCEDURE — 99999 PR PBB SHADOW E&M-EST. PATIENT-LVL V: CPT | Mod: PBBFAC,,, | Performed by: INTERNAL MEDICINE

## 2020-09-03 PROCEDURE — 3008F BODY MASS INDEX DOCD: CPT | Mod: CPTII,S$GLB,, | Performed by: INTERNAL MEDICINE

## 2020-09-03 PROCEDURE — 99214 OFFICE O/P EST MOD 30 MIN: CPT | Mod: S$GLB,,, | Performed by: INTERNAL MEDICINE

## 2020-09-03 NOTE — PROGRESS NOTES
[facility] Consultation Note    Reason for Consult:  The primary encounter diagnosis was Iron deficiency. Diagnoses of Ulcerative pancolitis without complication, S/P total colectomy, Exocrine pancreatic insufficiency, IBD (inflammatory bowel disease), Pancreas cyst, and Vitamin B12 deficiency were also pertinent to this visit.    PCP:   Manjula Mixon       Referring MD:  No referring provider defined for this encounter.    Initial History of Present Illness (HPI):  This is a 60 y.o. female here for evaluation of ulcerative colitis in exocrine pancreas insufficiency.  Patient has has a past surgical history that includes restorative proctectomy; total abdominal colectomy with ileostomy (2010); Cholecystectomy; Hysterectomy; Oophorectomy (Bilateral); Appendectomy; Breast biopsy (Right, 2/2016); Colon surgery; Breast surgery; Total Reduction Mammoplasty (Left, 2017); Upper endoscopic ultrasound w/ FNA; Endoscopic ultrasound of upper gastrointestinal tract (N/A, 8/6/2018); Esophagogastroduodenoscopy (N/A, 12/10/2018); Flexible sigmoidoscopy (N/A, 12/10/2018); Mastectomy; and Breast reconstruction.  Patient is followed by [facility] for pancreas MRI is up-to-date patient without a colon LFTs look normal she takes Creon 2 pills with every meal 1 with the snack eats about 2 meals a day.  No blood in her stool no diarrhea no constipation no fever chills no weight loss no shortness of breath no chest pain no gait imbalance.    Abdominal pain - no  Reflux - no  Dysphagia - no   Bowel habits - normal  GI bleeding - none  NSAID usage - none    Interval HPI 09/03/2020:  The patient's last visit with me was on 5/6/2020.      ROS:  Constitutional: No fevers, chills, No weight loss  ENT:  No heartburn no dysphagia no odynophagia no hoarseness  CV: No chest pain, no palpitation  Pulm: No cough, No shortness of breath, no wheezing  Ophtho: No vision changes  GI: see HPI  Derm: No rash, no itching  Heme:  No lymphadenopathy, No easy bruising  MSK: No significant arthritis  : No dysuria, No hematuria  Endo: No hot or cold intolerance  Neuro: No syncope, No seizure, no strokes  Psych: No uncontrolled anxiety, No uncontrolled depression    Medical History:  has a past medical history of Acid reflux, Adrenal insufficiency, primary, Arthritis, Asthma, B12 deficiency anemia, Blood transfusion (2010), Breast cancer (2/2016), Cataract, Chronic pain, Deep vein thrombosis, Dry eyes, Esophagitis, unspecified, Fibromyalgia, General anesthetics causing adverse effect in therapeutic use, Heart murmur, History of colon polyps, Hyperlipidemia, Hypopituitary dwarfism, Iron deficiency anemia, Lupus, Migraines, neuralgic, Nonspecific ulcerative colitis, OP (osteoporosis), Osteopenia, Pancreatic cyst, Schatzki's ring, Steroid sulfatase deficiency, Ulcerative colitis, and Vitamin D deficiency disease.    Surgical History:  has a past surgical history that includes restorative proctectomy; total abdominal colectomy with ileostomy (2010); Cholecystectomy; Hysterectomy; Oophorectomy (Bilateral); Appendectomy; Breast biopsy (Right, 2/2016); Colon surgery; Breast surgery; Total Reduction Mammoplasty (Left, 2017); Upper endoscopic ultrasound w/ FNA; Endoscopic ultrasound of upper gastrointestinal tract (N/A, 8/6/2018); Esophagogastroduodenoscopy (N/A, 12/10/2018); Flexible sigmoidoscopy (N/A, 12/10/2018); Mastectomy; and Breast reconstruction.    Family History: family history includes Breast cancer (age of onset: 28) in her cousin; Breast cancer (age of onset: 45) in her other; Breast cancer (age of onset: 50) in her maternal cousin; Breast cancer (age of onset: 55) in her maternal aunt; Cancer in her maternal aunt; Cancer (age of onset: 25) in her other; Cancer (age of onset: 65) in her maternal uncle; Diabetes in her father; Hyperlipidemia in her father and mother; Hypertension in her father; No Known Problems in her brother, maternal  "grandfather, maternal grandmother, paternal aunt, paternal grandfather, paternal grandmother, paternal uncle, and sister; Pancreatic cancer in her maternal aunt and maternal uncle..     Social History:  reports that she has never smoked. She has never used smokeless tobacco. She reports that she does not drink alcohol or use drugs.    Review of patient's allergies indicates:   Allergen Reactions    Aspirin      Other reaction(s): "hemorrhage"  hemorrhage    Hydrocortisone Nausea Only     Other reaction(s): sick  sick    Lactose intolerance (lactase) [lactase] Nausea And Vomiting    Vicodin [hydrocodone-acetaminophen]      Other reaction(s): hyperactivity    Asacol [mesalamine] Hallucinations     Other reaction(s): Hallucinations  hallucinations       Medication List with Changes/Refills   Current Medications    ALBUTEROL (ACCUNEB) 0.63 MG/3 ML NEBU    Take 3 mLs (0.63 mg total) by nebulization every 6 (six) hours as needed.    ALIROCUMAB (PRALUENT PEN) 75 MG/ML PNIJ    Inject 75 mg into the skin every 14 (fourteen) days.    AMLODIPINE (NORVASC) 2.5 MG TABLET    TAKE ONE TABLET BY MOUTH EVERY DAY    ATORVASTATIN (LIPITOR) 10 MG TABLET    Take 10 mg by mouth once daily.    CREON 36,000-114,000- 180,000 UNIT CPDR    TAKE TWO CAPSULES BY MOUTH THREE TIMES DAILY WITH MEALS AND ONE CAPSULE WITH EACH SNACK    CYANOCOBALAMIN 1,000 MCG/ML INJECTION    Inject 1 mL (1,000 mcg total) into the muscle once a week. 1 Solution Injection monthly.  B12 injection weekly for  3 weeks then every  2 weeks    CYCLOSPORINE (RESTASIS) 0.05 % OPHTHALMIC EMULSION    Place 1 drop into both eyes 2 (two) times daily.    DICLOFENAC SODIUM (VOLTAREN) 1 % GEL    Apply 2 g topically 4 (four) times daily.    DILTIAZEM HCL (DILTIAZEM 2% CREAM)    APPLY TO ANAL AREA EVERY 8 HOURS FOR 7 DAYS    ERGOCALCIFEROL (VITAMIN D2) 50,000 UNIT CAP    Take 2 capsules (100,000 Units total) by mouth every 7 days.    FERROUS SULFATE 325 MG (65 MG IRON) TAB    " "Take by mouth. 1 Tablet Oral Every day.  Take one 250mg vitamin C pill every 12 hours which will help your bodyabsorb more of the iron.    GABAPENTIN (NEURONTIN) 800 MG TABLET    Take 1 tablet (800 mg total) by mouth 3 (three) times daily.    HYDROXYCHLOROQUINE (PLAQUENIL) 200 MG TABLET    TAKE 1 AND 1/2 TABLETS BY MOUTH EVERY DAY    LIDOCAINE (LIDODERM) 5 %    Place 1 patch onto the skin once daily. Remove & Discard patch within 12 hours or as directed by MD    LOPERAMIDE (IMODIUM) 2 MG CAPSULE    TAKE 1 TO 2 CAPSULES BY MOUTH FOUR TIMES DAILY    LORAZEPAM (ATIVAN) 1 MG TABLET    Take 1 tablet (1 mg total) by mouth every evening. 1 Tablet Oral Q8H-12 hours    MULTIVITAMIN/IRON/FOLIC ACID (CENTRUM WOMEN ORAL)    Take 1 tablet by mouth once daily.    NIACIN 100 MG TAB    Take by mouth. 1 Tablet Oral At bedtime    ONDANSETRON (ZOFRAN) 4 MG TABLET    Take 1 tablet (4 mg total) by mouth every 12 (twelve) hours as needed for Nausea.    PANTOPRAZOLE (PROTONIX) 20 MG TABLET    Take 1 tablet (20 mg total) by mouth every 12 (twelve) hours.    POLYCARBOPHIL (FIBERCON) 625 MG TABLET    Take by mouth. 1 Tablet Oral Every 12 hours.  Take with 12 ounces of water with each pill.    POTASSIUM CHLORIDE SA (K-DUR,KLOR-CON) 10 MEQ TABLET    TAKE TWO TABLETS BY MOUTH ONCE DAILY    PREDNISONE (DELTASONE) 5 MG TABLET    Take 1 tablet (5 mg total) by mouth once daily.    RIZATRIPTAN (MAXALT) 10 MG TABLET    Take 1 tablet (10 mg total) by mouth every 6 (six) hours as needed.    SAVELLA 100 MG TAB    TAKE ONE TABLET BY MOUTH TWICE DAILY    TERIPARATIDE (FORTEO) 20 MCG/DOSE - 600 MCG/2.4 ML PNIJ    Inject 0.08 mLs (20 mcg total) into the skin once daily.    ZOLPIDEM (AMBIEN) 5 MG TAB    Take 1 tablet (5 mg total) by mouth nightly as needed.         Objective Findings:    Vital Signs:  BP (!) 143/81 (BP Location: Right arm, Patient Position: Sitting)   Pulse 74   Ht 5' 2" (1.575 m)   Wt 54.4 kg (119 lb 14.9 oz)   BMI 21.94 kg/m²   Body " mass index is 21.94 kg/m².    Physical Exam:  General Appearance: Well appearing in no acute distress  Eyes:    No scleral icterus  ENT:  No lesions or masses   Lungs: CTA bilaterally, no wheezes, no rhonchi, no rales  Heart:  S1, S2 normal, no murmurs heard  Abdomen:  Non distended, soft, no guarding, no rebound, no tenderness, no appreciated ascites, no bruits, no hepatosplenomegaly,  No CVA tenderness, no appreciated hernias  Musculoskeletal:  No major joint deformities  Skin: No petechiae or rash on exposed skin areas  Neurologic:  Alert and oriented x4  Psychiatric:  Normal speech mentation and affect    Labs:  Lab Results   Component Value Date    WBC 5.88 08/10/2020    HGB 11.8 (L) 08/10/2020    HCT 37.7 08/10/2020     08/10/2020    CHOL 276 (H) 05/21/2019    TRIG 202 (H) 05/21/2019    HDL 84 (H) 05/21/2019    ALT 14 08/10/2020    AST 21 08/10/2020     08/10/2020    K 4.1 08/10/2020     08/10/2020    CREATININE 0.8 08/10/2020    BUN 16 08/10/2020    CO2 29 08/10/2020    TSH 0.360 (L) 09/16/2019    INR 1.0 05/07/2020    HGBA1C 5.7 (H) 05/08/2018               Medical Decision Making:  Prior CT scan images personally reviewed by myself  MRI reviewed  Prior endoscopies reviewed and path  Labs reviewed  Creon talk given she knows only take it when she eats 2 meals she is given a meal not to take it  Lab work talk given      Assessment:  1. Iron deficiency    2. Ulcerative pancolitis without complication    3. S/P total colectomy    4. Exocrine pancreatic insufficiency    5. IBD (inflammatory bowel disease)    6. Pancreas cyst    7. Vitamin B12 deficiency         Recommendations:   1.  Ulcerative colitis status post estorative proctectomy; total abdominal colectomy with ileostomy (2010).  Patient is doing well not on any medicine LFTs look good.  2.  Exocrine pancreas insufficiency patient doing well in 2 pills a Creon with every meal 1 with a snack.  Will check fat soluble vitamins.  3.   History of iron deficiency anemia recheck labs.   4.  Pancreatic cyst followed by a yes with imaging.  5.  Return GI clinic in 6 months for follow-up.      No follow-ups on file.      Order summary:  Orders Placed This Encounter    Hemoglobin    Ferritin    Iron and TIBC    Vitamin D    Vitamin B12    Vitamin A    Vitamin E    Protime-INR         Thank you so much for allowing me to participate in the care of Efe Villalta MD

## 2020-09-10 ENCOUNTER — TELEPHONE (OUTPATIENT)
Dept: PHARMACY | Facility: CLINIC | Age: 60
End: 2020-09-10

## 2020-09-10 ENCOUNTER — PATIENT OUTREACH (OUTPATIENT)
Dept: ADMINISTRATIVE | Facility: HOSPITAL | Age: 60
End: 2020-09-10

## 2020-09-10 NOTE — PROGRESS NOTES
Health Maintenance Due   Topic Date Due    HIV Screening  01/07/1975    TETANUS VACCINE  01/07/1976    Shingles Vaccine (1 of 2) 01/07/2008    Pneumococcal Vaccine (Highest Risk) (2 of 3 - PCV13) 06/23/2015    Colorectal Cancer Screening  12/08/2019     Chart review completed.

## 2020-09-12 DIAGNOSIS — D50.9 IRON DEFICIENCY ANEMIA, UNSPECIFIED IRON DEFICIENCY ANEMIA TYPE: Primary | ICD-10-CM

## 2020-09-12 RX ORDER — FERROUS SULFATE 325(65) MG
325 TABLET ORAL EVERY 12 HOURS
Qty: 60 TABLET | Refills: 3 | Status: SHIPPED | OUTPATIENT
Start: 2020-09-12 | End: 2020-12-13 | Stop reason: SDUPTHER

## 2020-09-14 ENCOUNTER — TELEPHONE (OUTPATIENT)
Dept: INTERNAL MEDICINE | Facility: CLINIC | Age: 60
End: 2020-09-14

## 2020-09-14 NOTE — TELEPHONE ENCOUNTER
lft msg to call back an let me know what her mother needs, also stated she can send a portal message

## 2020-09-14 NOTE — TELEPHONE ENCOUNTER
----- Message from Shonda Anguiano MD sent at 9/14/2020  8:31 AM CDT -----  This is not our patient but her mom is, I believe.  Please call and get more info on what they need.  ----- Message -----  From: Elenita Palmer  Sent: 9/11/2020  10:57 AM CDT  To: Shonda Anguiano MD    Patient is requesting a phone call from the doctor.

## 2020-09-15 ENCOUNTER — TELEPHONE (OUTPATIENT)
Dept: GASTROENTEROLOGY | Facility: CLINIC | Age: 60
End: 2020-09-15

## 2020-09-15 NOTE — TELEPHONE ENCOUNTER
----- Message from Reese Villalta MD sent at 9/12/2020  5:16 PM CDT -----  Alize- please tell patient that they are iron deficient and anemic and recommend that they take ferrous sulfate one 325mg pill every 12 hours for next 4 months.    Please order repeat fasting Hemoglobin, Iron/TIBC, and Ferritin in 12 weeks - Orders placed.

## 2020-09-22 ENCOUNTER — TELEPHONE (OUTPATIENT)
Dept: INTERNAL MEDICINE | Facility: CLINIC | Age: 60
End: 2020-09-22

## 2020-09-22 NOTE — TELEPHONE ENCOUNTER
----- Message from Irina Ochoa sent at 9/22/2020  3:51 PM CDT -----  Regarding: returning call  Contact: [phone]  Patient Returning Call from [facility]    Who Left Message for Patient: unknown     Communication Preference: [phone]    Additional Information:  Pt states that she is returning a call about her appt that need to be scheduled for this Friday. Please call to advise.

## 2020-10-08 DIAGNOSIS — M48.56XS NON-TRAUMATIC COMPRESSION FRACTURE OF THIRD LUMBAR VERTEBRA, SEQUELA: ICD-10-CM

## 2020-10-08 DIAGNOSIS — M80.00XG AGE-RELATED OSTEOPOROSIS WITH CURRENT PATHOLOGICAL FRACTURE WITH DELAYED HEALING: ICD-10-CM

## 2020-10-09 RX ORDER — TERIPARATIDE 250 UG/ML
20 INJECTION, SOLUTION SUBCUTANEOUS DAILY
Qty: 2.4 ML | Refills: 2 | Status: SHIPPED | OUTPATIENT
Start: 2020-10-09 | End: 2021-01-08 | Stop reason: SDUPTHER

## 2020-10-13 ENCOUNTER — TELEPHONE (OUTPATIENT)
Dept: PHARMACY | Facility: CLINIC | Age: 60
End: 2020-10-13

## 2020-10-13 ENCOUNTER — OFFICE VISIT (OUTPATIENT)
Dept: INTERNAL MEDICINE | Facility: CLINIC | Age: 60
End: 2020-10-13
Payer: MEDICARE

## 2020-10-13 VITALS
OXYGEN SATURATION: 98 % | HEIGHT: 62 IN | WEIGHT: 120.38 LBS | HEART RATE: 97 BPM | DIASTOLIC BLOOD PRESSURE: 84 MMHG | SYSTOLIC BLOOD PRESSURE: 124 MMHG | BODY MASS INDEX: 22.15 KG/M2

## 2020-10-13 DIAGNOSIS — E78.5 HYPERLIPIDEMIA, UNSPECIFIED HYPERLIPIDEMIA TYPE: ICD-10-CM

## 2020-10-13 DIAGNOSIS — K21.9 GASTROESOPHAGEAL REFLUX DISEASE WITHOUT ESOPHAGITIS: ICD-10-CM

## 2020-10-13 DIAGNOSIS — C50.211 MALIGNANT NEOPLASM OF UPPER-INNER QUADRANT OF RIGHT BREAST IN FEMALE, ESTROGEN RECEPTOR NEGATIVE: ICD-10-CM

## 2020-10-13 DIAGNOSIS — K21.9 GASTROESOPHAGEAL REFLUX DISEASE: ICD-10-CM

## 2020-10-13 DIAGNOSIS — Z17.1 MALIGNANT NEOPLASM OF UPPER-INNER QUADRANT OF RIGHT BREAST IN FEMALE, ESTROGEN RECEPTOR NEGATIVE: ICD-10-CM

## 2020-10-13 DIAGNOSIS — E27.40 HYPOADRENALISM: ICD-10-CM

## 2020-10-13 DIAGNOSIS — K44.9 HIATAL HERNIA: ICD-10-CM

## 2020-10-13 DIAGNOSIS — M47.817 LUMBAR AND SACRAL SPONDYLARTHRITIS: ICD-10-CM

## 2020-10-13 DIAGNOSIS — M81.8 OSTEOPOROSIS, IDIOPATHIC: ICD-10-CM

## 2020-10-13 DIAGNOSIS — I10 HYPERTENSION, UNSPECIFIED TYPE: ICD-10-CM

## 2020-10-13 DIAGNOSIS — E53.8 VITAMIN B12 DEFICIENCY: ICD-10-CM

## 2020-10-13 DIAGNOSIS — K51.019 ULCERATIVE PANCOLITIS WITH COMPLICATION: ICD-10-CM

## 2020-10-13 DIAGNOSIS — Z79.899 ENCOUNTER FOR MONITORING LONG-TERM PROTON PUMP INHIBITOR THERAPY: ICD-10-CM

## 2020-10-13 DIAGNOSIS — M32.9 SYSTEMIC LUPUS ERYTHEMATOSUS, UNSPECIFIED SLE TYPE, UNSPECIFIED ORGAN INVOLVEMENT STATUS: Primary | ICD-10-CM

## 2020-10-13 DIAGNOSIS — Z12.31 SCREENING MAMMOGRAM, ENCOUNTER FOR: ICD-10-CM

## 2020-10-13 DIAGNOSIS — Z90.49 S/P TOTAL COLECTOMY: ICD-10-CM

## 2020-10-13 DIAGNOSIS — D50.8 OTHER IRON DEFICIENCY ANEMIA: ICD-10-CM

## 2020-10-13 DIAGNOSIS — G43.909 MIGRAINE SYNDROME: ICD-10-CM

## 2020-10-13 DIAGNOSIS — D84.9 IMMUNOSUPPRESSION: ICD-10-CM

## 2020-10-13 DIAGNOSIS — Z51.81 ENCOUNTER FOR MONITORING PROTON PUMP INHIBITOR THERAPY: ICD-10-CM

## 2020-10-13 DIAGNOSIS — G89.4 CHRONIC PAIN SYNDROME: ICD-10-CM

## 2020-10-13 DIAGNOSIS — R11.0 NAUSEA: ICD-10-CM

## 2020-10-13 DIAGNOSIS — Z79.899 ENCOUNTER FOR MONITORING PROTON PUMP INHIBITOR THERAPY: ICD-10-CM

## 2020-10-13 DIAGNOSIS — E05.90 SUBCLINICAL HYPERTHYROIDISM: ICD-10-CM

## 2020-10-13 DIAGNOSIS — K86.81 EXOCRINE PANCREATIC INSUFFICIENCY: ICD-10-CM

## 2020-10-13 DIAGNOSIS — R13.10 DYSPHAGIA, UNSPECIFIED TYPE: ICD-10-CM

## 2020-10-13 DIAGNOSIS — Z51.81 ENCOUNTER FOR MONITORING LONG-TERM PROTON PUMP INHIBITOR THERAPY: ICD-10-CM

## 2020-10-13 DIAGNOSIS — K51.00 ULCERATIVE PANCOLITIS WITHOUT COMPLICATION: ICD-10-CM

## 2020-10-13 PROCEDURE — 3079F DIAST BP 80-89 MM HG: CPT | Mod: CPTII,S$GLB,, | Performed by: NURSE PRACTITIONER

## 2020-10-13 PROCEDURE — 99214 PR OFFICE/OUTPT VISIT, EST, LEVL IV, 30-39 MIN: ICD-10-PCS | Mod: S$GLB,,, | Performed by: NURSE PRACTITIONER

## 2020-10-13 PROCEDURE — 3074F SYST BP LT 130 MM HG: CPT | Mod: CPTII,S$GLB,, | Performed by: NURSE PRACTITIONER

## 2020-10-13 PROCEDURE — 3008F BODY MASS INDEX DOCD: CPT | Mod: CPTII,S$GLB,, | Performed by: NURSE PRACTITIONER

## 2020-10-13 PROCEDURE — 99999 PR PBB SHADOW E&M-EST. PATIENT-LVL V: ICD-10-PCS | Mod: PBBFAC,,, | Performed by: NURSE PRACTITIONER

## 2020-10-13 PROCEDURE — 3008F PR BODY MASS INDEX (BMI) DOCUMENTED: ICD-10-PCS | Mod: CPTII,S$GLB,, | Performed by: NURSE PRACTITIONER

## 2020-10-13 PROCEDURE — 99214 OFFICE O/P EST MOD 30 MIN: CPT | Mod: S$GLB,,, | Performed by: NURSE PRACTITIONER

## 2020-10-13 PROCEDURE — 3079F PR MOST RECENT DIASTOLIC BLOOD PRESSURE 80-89 MM HG: ICD-10-PCS | Mod: CPTII,S$GLB,, | Performed by: NURSE PRACTITIONER

## 2020-10-13 PROCEDURE — 99999 PR PBB SHADOW E&M-EST. PATIENT-LVL V: CPT | Mod: PBBFAC,,, | Performed by: NURSE PRACTITIONER

## 2020-10-13 PROCEDURE — 3074F PR MOST RECENT SYSTOLIC BLOOD PRESSURE < 130 MM HG: ICD-10-PCS | Mod: CPTII,S$GLB,, | Performed by: NURSE PRACTITIONER

## 2020-10-13 RX ORDER — OXYCODONE AND ACETAMINOPHEN 5; 325 MG/1; MG/1
1 TABLET ORAL EVERY 12 HOURS PRN
Qty: 60 EACH | Refills: 0 | Status: SHIPPED | OUTPATIENT
Start: 2020-10-13 | End: 2021-02-08 | Stop reason: SDUPTHER

## 2020-10-13 RX ORDER — CYANOCOBALAMIN 1000 UG/ML
1000 INJECTION, SOLUTION INTRAMUSCULAR; SUBCUTANEOUS WEEKLY
Qty: 30 ML | Refills: 6 | Status: SHIPPED | OUTPATIENT
Start: 2020-10-13 | End: 2020-12-22 | Stop reason: SDUPTHER

## 2020-10-13 RX ORDER — ONDANSETRON 4 MG/1
4 TABLET, FILM COATED ORAL EVERY 12 HOURS PRN
Qty: 30 TABLET | Refills: 6 | Status: SHIPPED | OUTPATIENT
Start: 2020-10-13

## 2020-10-13 RX ORDER — HYDROCHLOROTHIAZIDE 25 MG/1
25 TABLET ORAL DAILY
Qty: 30 TABLET | Refills: 11
Start: 2020-10-13 | End: 2021-02-08 | Stop reason: SDUPTHER

## 2020-10-13 RX ORDER — RIZATRIPTAN BENZOATE 10 MG/1
10 TABLET ORAL EVERY 6 HOURS PRN
Qty: 60 TABLET | Refills: 0 | Status: SHIPPED | OUTPATIENT
Start: 2020-10-13 | End: 2023-03-06 | Stop reason: SDUPTHER

## 2020-10-13 RX ORDER — PANTOPRAZOLE SODIUM 20 MG/1
20 TABLET, DELAYED RELEASE ORAL EVERY 12 HOURS
Qty: 60 TABLET | Refills: 11 | Status: SHIPPED | OUTPATIENT
Start: 2020-10-13 | End: 2021-02-08

## 2020-10-13 RX ORDER — ZOLPIDEM TARTRATE 5 MG/1
5 TABLET ORAL NIGHTLY PRN
Qty: 30 TABLET | Refills: 4 | Status: SHIPPED | OUTPATIENT
Start: 2020-10-13 | End: 2021-02-08 | Stop reason: SDUPTHER

## 2020-10-13 NOTE — PROGRESS NOTES
Internal Medicine Annual Exam       CHIEF COMPLAINT     The patient, Efe Horowitz, who is a 60 y.o. female Lupus, ulcerative colitis, osteopneia, FM, hisotry of breast lymphoma presents for an annual exam.    HPI   Previous PCP - Dr Arlene Jordan - now doing  medicine    Lupus- followed by Dr Allison, last visit 8/10/2020. Since age 39.   manifestations include joint pains, headache, positive DERRICK, and a   double-stranded DNA. She also has an equivocal anticardiolipin IgM.   She has been treated with Plaquenil 1-1/2 tablets daily. Eye exam - Needs      FM- taking prednisone, savella and gabapentin     Chronic pain - taking oxycodone as needed sparingly - was controlled by internal medicine -  last filled 8/2019 oxycodone 5-325mg     UC- 2009 colectomy, with ileostomy and 5/11 ileostomy was closed.   Followed by Dr Villalta. Last visit 9/3/2020  CRCS- followed by lan - EGD and c-scope discussed.     KAITY- started on ferrous sulfate     Migraines- taking maxalt as needed for abortive treatment     Exocrine pancreatic insufficiency and pancreatic cyst - followed by GI. AS for pancreas MRI is up-to-date patient without a colon LFTs look normal she takes Creon 2 pills with every meal 1 with the snack eats about 2 meals a day    January 2016 diagnosed with lymphoma in breast.  She was treated with surgical resection.  Another lesion breast suspected to be lymphoma was later felt to be fibrous tissue.   May 1, 2017 had right breast fibrous material removed and reconstruction on both breasts at [facility].     Osteoporosis- on forteo from endocrine - Followed by Dr Srinivasan   BMD: 9/16/19   IMPRESSION:  1.  Osteoporosis based on osteopenic range bone density and presence of fragility fracture.  2.  Bone mineral density at L3/L4 is increased compared to adjacent vertebrae, consistent with a diagnosis of vertebral compression fracture.  3.  Compared with previous DXA, BMD at the lumbar spine has  "declined by 3.2%, and the BMD at the total hip has remained stable    Closed fracture of base of fifth metatarsal 3/2/2020    H/o compression fracture in the L-spine s/p +++ at [facility]- followed by cards at [facility] Dr. Jordan last seen 2/2020 - taking crestor and Praluent injections.     HTN- taking amlodipine 2.5mg and HCTZ     HM-   MMG- ordered today   CRCS- needs - scheduled with Dr Villalta   Pvax- UTD 23  Shingles - needs   Flu- UTD     Past Medical History:  Past Medical History:   Diagnosis Date    Acid reflux     Adrenal insufficiency, primary     Arthritis     Asthma     B12 deficiency anemia     Blood transfusion 2010    Breast cancer 2/2016    Right breast infiltrating ductal CA    Cataract     Chronic pain     Deep vein thrombosis     Dry eyes     Esophagitis, unspecified     Fibromyalgia     General anesthetics causing adverse effect in therapeutic use     "slow to wake up bc I don't have an immune system    Heart murmur     History of colon polyps     Hyperlipidemia     Hypopituitary dwarfism     Iron deficiency anemia     Lupus     Migraines, neuralgic     Nonspecific ulcerative colitis     OP (osteoporosis)     history of reclast    Osteopenia     Pancreatic cyst     Schatzki's ring     Steroid sulfatase deficiency     Ulcerative colitis     Vitamin D deficiency disease        Past Surgical History:   Procedure Laterality Date    APPENDECTOMY      BREAST BIOPSY Right 2/2016    [facility]    BREAST RECONSTRUCTION      BREAST SURGERY      CHOLECYSTECTOMY      COLON SURGERY      ENDOSCOPIC ULTRASOUND OF UPPER GASTROINTESTINAL TRACT N/A 8/6/2018    Procedure: ULTRASOUND, ENDOSCOPIC, UPPER GI TRACT;  Surgeon: Hong Finn MD;  Location: [facility] (2ND FLR);  Service: Endoscopy;  Laterality: N/A;    ESOPHAGOGASTRODUODENOSCOPY N/A 12/10/2018    Procedure: EGD (ESOPHAGOGASTRODUODENOSCOPY);  Surgeon: Reese Villalta MD;  Location: [facility] (4TH FLR);  " Service: Endoscopy;  Laterality: N/A;    FLEXIBLE SIGMOIDOSCOPY N/A 12/10/2018    Procedure: SIGMOIDOSCOPY, FLEXIBLE;  Surgeon: Reese Villalta MD;  Location: [facility] ([address] [place] [address]);  Service: Endoscopy;  Laterality: N/A;  Recommend full split bowel prep for this study just like for a colonoscopy    HYSTERECTOMY      MASTECTOMY      OOPHORECTOMY Bilateral     restorative proctectomy      total abdominal colectomy with ileostomy  2010    TOTAL REDUCTION MAMMOPLASTY Left 2017    UPPER ENDOSCOPIC ULTRASOUND W/ FNA          Family History   Problem Relation Age of Onset    Diabetes Father     Hyperlipidemia Father     Hypertension Father     Hyperlipidemia Mother     Cancer Maternal Aunt         pancreatic cancer, late 50s at dx    Pancreatic cancer Maternal Aunt     Breast cancer Maternal Aunt 55    Breast cancer Cousin 28    Breast cancer Other 45    No Known Problems Sister     No Known Problems Brother     Cancer Maternal Uncle 65        pancreatic cancer    Pancreatic cancer Maternal Uncle     No Known Problems Paternal Aunt     No Known Problems Paternal Uncle     No Known Problems Maternal Grandmother     No Known Problems Maternal Grandfather     No Known Problems Paternal Grandmother     No Known Problems Paternal Grandfather     Breast cancer Maternal Cousin 50    Cancer Other 25        liver cancer    Amblyopia Neg Hx     Blindness Neg Hx     Cataracts Neg Hx     Macular degeneration Neg Hx     Retinal detachment Neg Hx     Strabismus Neg Hx     Stroke Neg Hx     Thyroid disease Neg Hx     Glaucoma Neg Hx     Ovarian cancer Neg Hx     Celiac disease Neg Hx     Colon cancer Neg Hx     Colon polyps Neg Hx     Esophageal cancer Neg Hx     Liver cancer Neg Hx     Rectal cancer Neg Hx     Stomach cancer Neg Hx     Ulcerative colitis Neg Hx     Inflammatory bowel disease Neg Hx         Social History     Socioeconomic History    Marital status: Single     Spouse name:  Not on file    Number of children: 1    Years of education: 12 + 2    Highest education level: Not on file   Occupational History    Occupation: disabled due to UC   Social Needs    Financial resource strain: Not on file    Food insecurity     Worry: Not on file     Inability: Not on file    Transportation needs     Medical: Not on file     Non-medical: Not on file   Tobacco Use    Smoking status: Never Smoker    Smokeless tobacco: Never Used   Substance and Sexual Activity    Alcohol use: No     Alcohol/week: 0.0 standard drinks    Drug use: No    Sexual activity: Not Currently     Partners: Male   Lifestyle    Physical activity     Days per week: Not on file     Minutes per session: Not on file    Stress: Not on file   Relationships    Social connections     Talks on phone: Not on file     Gets together: Not on file     Attends Hoahaoism service: Not on file     Active member of club or organization: Not on file     Attends meetings of clubs or organizations: Not on file     Relationship status: Not on file   Other Topics Concern    Not on file   Social History Narrative    Adult Screenings updated and reviewed  6/23/14    Mammogram( for females) last done  10/4/2013    Pap ( for females)s/p hysterectomy    Colonoscopy - Dr Carreno- done for  2013    Flu shot 10/2/2013     Td 2005 due again  2015    Pneumovax done 2008, updated  Today  6/23/14    Zostavax recommended one time at  age  58    Eye exam done 2014    Bone density  10/19/2013 History of reclast due in one year.        Social History     Tobacco Use   Smoking Status Never Smoker   Smokeless Tobacco Never Used        Allergies as of 10/13/2020 - Reviewed 10/13/2020   Allergen Reaction Noted    Aspirin  07/02/2012    Hydrocortisone Nausea Only 12/30/2008    Lactose intolerance (lactase) [lactase] Nausea And Vomiting 08/06/2018    Vicodin [hydrocodone-acetaminophen]  07/02/2012    Asacol [mesalamine] Hallucinations 02/02/2009           Home Medications:  Prior to Admission medications    Medication Sig Start Date End Date Taking? Authorizing Provider   albuterol (ACCUNEB) 0.63 mg/3 mL Nebu Take 3 mLs (0.63 mg total) by nebulization every 6 (six) hours as needed. 2/26/14 5/26/22 Yes Manjula Mixon MD   alirocumab (PRALUENT PEN) 75 mg/mL PnIj Inject 75 mg into the skin every 14 (fourteen) days.   Yes Historical Provider   amLODIPine (NORVASC) 2.5 MG tablet TAKE ONE TABLET BY MOUTH EVERY DAY 7/10/19  Yes Vani Nicolas MD   atorvastatin (LIPITOR) 10 MG tablet Take 10 mg by mouth once daily.   Yes Historical Provider   CREON 36,000-114,000- 180,000 unit CpDR TAKE TWO CAPSULES BY MOUTH THREE TIMES DAILY WITH MEALS AND ONE CAPSULE WITH EACH SNACK 8/4/20  Yes Reese Villalta MD   cyanocobalamin 1,000 mcg/mL injection Inject 1 mL (1,000 mcg total) into the muscle once a week. 1 Solution Injection monthly.  B12 injection weekly for  3 weeks then every  2 weeks  Patient taking differently: Inject 1,000 mcg into the muscle once a week. Every Saturday 10/2/13  Yes Manjula Mixon MD   cycloSPORINE (RESTASIS) 0.05 % ophthalmic emulsion Place 1 drop into both eyes 2 (two) times daily. 5/11/20 5/11/21 Yes Joana Vo, OD   diclofenac sodium (VOLTAREN) 1 % Gel Apply 2 g topically 4 (four) times daily. 8/10/20  Yes Idania Allison MD   diltiazem HCl (DILTIAZEM 2% CREAM) APPLY TO ANAL AREA EVERY 8 HOURS FOR 7 DAYS 12/10/18  Yes Historical Provider   ergocalciferol (VITAMIN D2) 50,000 unit Cap Take 2 capsules (100,000 Units total) by mouth every 7 days.  Patient taking differently: Take 50,000 Units by mouth every 7 days. Every Wednesday 2/26/14  Yes Manjula Mixon MD   ferrous sulfate (FEOSOL) 325 mg (65 mg iron) Tab tablet Take 1 tablet (325 mg total) by mouth every 12 (twelve) hours. 1 Tablet Oral Every day.  Take one 250mg vitamin C pill every 12 hours which will help your bodyabsorb more of the iron.  9/12/20 1/10/21 Yes Reese Villalta MD   gabapentin (NEURONTIN) 800 MG tablet Take 1 tablet (800 mg total) by mouth 3 (three) times daily.  Patient taking differently: Take 800 mg by mouth 4 (four) times daily.  2/26/14  Yes Manjula Mixon MD   hydroxychloroquine (PLAQUENIL) 200 mg tablet TAKE 1 AND 1/2 TABLETS BY MOUTH EVERY DAY 1/21/20  Yes Idania Allison MD   lidocaine (LIDODERM) 5 % Place 1 patch onto the skin once daily. Remove & Discard patch within 12 hours or as directed by MD 11/27/17  Yes Darin Starkey II, MD   loperamide (IMODIUM) 2 mg capsule TAKE 1 TO 2 CAPSULES BY MOUTH FOUR TIMES DAILY 9/23/19  Yes Alejandro Maxwell MD   lorazepam (ATIVAN) 1 MG tablet Take 1 tablet (1 mg total) by mouth every evening. 1 Tablet Oral Q8H-12 hours 2/26/14  Yes Manjula Mixon MD   multivitamin/iron/folic acid (CENTRUM WOMEN ORAL) Take 1 tablet by mouth once daily.   Yes Historical Provider   niacin 100 MG Tab Take by mouth. 1 Tablet Oral At bedtime   Yes Historical Provider   polycarbophil (FIBERCON) 625 mg tablet Take by mouth. 1 Tablet Oral Every 12 hours.  Take with 12 ounces of water with each pill.   Yes Historical Provider   potassium chloride SA (K-DUR,KLOR-CON) 10 MEQ tablet TAKE TWO TABLETS BY MOUTH ONCE DAILY 8/10/20  Yes Reese Villalta MD   predniSONE (DELTASONE) 5 MG tablet Take 1 tablet (5 mg total) by mouth once daily. 3/2/20  Yes Arin Villalpando NP   rizatriptan (MAXALT) 10 MG tablet Take 1 tablet (10 mg total) by mouth every 6 (six) hours as needed.  Patient taking differently: Take 10 mg by mouth every 6 (six) hours as needed for Migraine.  2/26/14  Yes Manjula Mixon MD   SAVELLA 100 mg Tab TAKE ONE TABLET BY MOUTH TWICE DAILY 5/9/18  Yes Rachel Ahn MD   teriparatide (FORTEO) 20 mcg/dose - 600 mcg/2.4 mL PnIj Inject 0.08 mLs (20 mcg total) into the skin once daily. 10/9/20 10/9/21 Yes Arin Villalpando NP   zolpidem (AMBIEN) 5 MG Tab Take  "1 tablet (5 mg total) by mouth nightly as needed. 10/20/15  Yes Bharath Blair NP   ondansetron (ZOFRAN) 4 MG tablet Take 1 tablet (4 mg total) by mouth every 12 (twelve) hours as needed for Nausea.  Patient not taking: Reported on 10/13/2020 2/26/14   Manjula Mixon MD   pantoprazole (PROTONIX) 20 MG tablet Take 1 tablet (20 mg total) by mouth every 12 (twelve) hours. 5/16/19 9/3/20  Reese Villalta MD       Review of Systems:  Review of Systems   Constitutional: Positive for fatigue. Negative for chills and fever.   HENT: Positive for congestion, postnasal drip and sore throat.    Eyes: Negative for visual disturbance.   Respiratory: Negative for cough, shortness of breath and wheezing.    Cardiovascular: Negative for chest pain, palpitations and leg swelling.   Gastrointestinal: Positive for abdominal pain (right lower quad ). Negative for anal bleeding, blood in stool, constipation, diarrhea, nausea and vomiting.   Genitourinary: Negative for dysuria, frequency, pelvic pain and urgency.   Musculoskeletal: Positive for arthralgias, back pain, myalgias and neck pain.   Skin: Negative for rash.   Neurological: Positive for headaches. Negative for dizziness and light-headedness.       Health Maintainence:   Immunizations:  Health Maintenance       Date Due Completion Date    HIV Screening 01/07/1973 ---    TETANUS VACCINE 01/07/1976 ---    Shingles Vaccine (1 of 2) 01/07/2008 ---    Pneumococcal Vaccine (Highest Risk) (2 of 3 - PCV13) 06/23/2015 6/23/2014    Colorectal Cancer Screening 12/08/2019 1/16/2012    Lipid Panel 06/30/2025 6/30/2020           PHYSICAL EXAM     /84 (BP Location: Right arm, Patient Position: Sitting, BP Method: Large (Manual))   Pulse 97   Ht 5' 2" (1.575 m)   Wt 54.6 kg (120 lb 5.9 oz)   SpO2 98%   BMI 22.02 kg/m²  Body mass index is 22.02 kg/m².    Physical Exam  Vitals signs reviewed.   Constitutional:       Appearance: She is well-developed.   HENT:      " Head: Normocephalic.      Right Ear: External ear normal.      Left Ear: External ear normal.      Nose: Nose normal.      Mouth/Throat:      Pharynx: No oropharyngeal exudate.   Eyes:      Pupils: Pupils are equal, round, and reactive to light.   Neck:      Musculoskeletal: Neck supple.      Thyroid: No thyromegaly.      Vascular: No JVD.      Trachea: No tracheal deviation.   Cardiovascular:      Rate and Rhythm: Normal rate and regular rhythm.      Heart sounds: Normal heart sounds. No murmur. No friction rub. No gallop.    Pulmonary:      Effort: Pulmonary effort is normal. No respiratory distress.      Breath sounds: Normal breath sounds. No wheezing or rales.   Abdominal:      General: Bowel sounds are normal. There is no distension.      Palpations: Abdomen is soft.      Tenderness: There is abdominal tenderness in the right lower quadrant. There is no right CVA tenderness, left CVA tenderness, guarding or rebound. Negative signs include Sales's sign, Rovsing's sign, McBurney's sign and psoas sign.       Musculoskeletal: Normal range of motion.         General: No tenderness.   Lymphadenopathy:      Cervical: No cervical adenopathy.   Skin:     General: Skin is warm and dry.      Findings: No rash.   Neurological:      Mental Status: She is alert and oriented to person, place, and time.   Psychiatric:         Behavior: Behavior normal.         LABS     Lab Results   Component Value Date    HGBA1C 5.7 (H) 05/08/2018     CMP  Sodium   Date Value Ref Range Status   08/10/2020 144 136 - 145 mmol/L Final     Potassium   Date Value Ref Range Status   08/10/2020 4.1 3.5 - 5.1 mmol/L Final     Chloride   Date Value Ref Range Status   08/10/2020 106 95 - 110 mmol/L Final     CO2   Date Value Ref Range Status   08/10/2020 29 23 - 29 mmol/L Final     Glucose   Date Value Ref Range Status   08/10/2020 86 70 - 110 mg/dL Final     BUN, Bld   Date Value Ref Range Status   08/10/2020 16 8 - 23 mg/dL Final     Creatinine    Date Value Ref Range Status   08/10/2020 0.8 0.5 - 1.4 mg/dL Final     Calcium   Date Value Ref Range Status   08/10/2020 9.6 8.7 - 10.5 mg/dL Final     Total Protein   Date Value Ref Range Status   08/10/2020 6.8 6.0 - 8.4 g/dL Final     Albumin   Date Value Ref Range Status   08/10/2020 3.8 3.5 - 5.2 g/dL Final     Total Bilirubin   Date Value Ref Range Status   08/10/2020 0.3 0.1 - 1.0 mg/dL Final     Comment:     For infants and newborns, interpretation of results should be based  on gestational age, weight and in agreement with clinical  observations.  Premature Infant recommended reference ranges:  Up to 24 hours.............<8.0 mg/dL  Up to 48 hours............<12.0 mg/dL  3-5 days..................<15.0 mg/dL  6-29 days.................<15.0 mg/dL       Alkaline Phosphatase   Date Value Ref Range Status   08/10/2020 62 55 - 135 U/L Final     AST   Date Value Ref Range Status   08/10/2020 21 10 - 40 U/L Final     ALT   Date Value Ref Range Status   08/10/2020 14 10 - 44 U/L Final     Anion Gap   Date Value Ref Range Status   08/10/2020 9 8 - 16 mmol/L Final     eGFR if    Date Value Ref Range Status   08/10/2020 >60.0 >60 mL/min/1.73 m^2 Final     eGFR if non    Date Value Ref Range Status   08/10/2020 >60.0 >60 mL/min/1.73 m^2 Final     Comment:     Calculation used to obtain the estimated glomerular filtration  rate (eGFR) is the CKD-EPI equation.        Lab Results   Component Value Date    WBC 5.88 08/10/2020    HGB 12.7 09/03/2020    HCT 37.7 08/10/2020    MCV 88 08/10/2020     08/10/2020     Lab Results   Component Value Date    CHOL 276 (H) 05/21/2019    CHOL 199 05/08/2018    CHOL 208 (H) 06/06/2016     Lab Results   Component Value Date    HDL 84 (H) 05/21/2019    HDL 78 (H) 05/08/2018    HDL 79 (H) 06/06/2016     Lab Results   Component Value Date    LDLCALC 151.6 05/21/2019    LDLCALC 99.6 05/08/2018    LDLCALC 98.6 06/06/2016     Lab Results   Component  Value Date    TRIG 202 (H) 05/21/2019    TRIG 107 05/08/2018    TRIG 152 (H) 06/06/2016     Lab Results   Component Value Date    CHOLHDL 30.4 05/21/2019    CHOLHDL 39.2 05/08/2018    CHOLHDL 38.0 06/06/2016     Lab Results   Component Value Date    TSH 0.360 (L) 09/16/2019    Q1QMNUB 4.6 06/06/2016       ASSESSMENT/PLAN     Efe Horowitz is a 60 y.o. female    Systemic lupus erythematosus, unspecified SLE type, unspecified organ involvement status- stable. Will cont plaquenil and prednisone. F/u with rheum. Needs eye exam     Immunosuppression- stable. Labs reviewed. Will cont prednisone and plaquenil.     Hypertension, unspecified type- at goal. Will cont current med.s Low na diet.   -     hydroCHLOROthiazide (HYDRODIURIL) 25 MG tablet; Take 1 tablet (25 mg total) by mouth once daily.  Dispense: 30 tablet; Refill: 11    Lumbar and sacral spondylarthritis- stable. Will cont oxycodone sparingly.   -     oxyCODONE-acetaminophen (PERCOCET) 5-325 mg per tablet; Take 1 tablet by mouth every 12 (twelve) hours as needed for Pain.  Dispense: 60 each; Refill: 0    Chronic pain syndrome- sues oxycodone sparingly, last filled over one year ago. Will fill x 1   -     oxyCODONE-acetaminophen (PERCOCET) 5-325 mg per tablet; Take 1 tablet by mouth every 12 (twelve) hours as needed for Pain.  Dispense: 60 each; Refill: 0    Hyperlipidemia, unspecified hyperlipidemia type- stable. Will cont current meds. F/u with cards     Other iron deficiency anemia- stable. Will cont ferrous sulfate     Subclinical hyperthyroidism- stable. F/u with endocrine     UC (ulcerative colitis)- stable. F/u with GI.   -     ondansetron (ZOFRAN) 4 MG tablet; Take 1 tablet (4 mg total) by mouth every 12 (twelve) hours as needed for Nausea.  Dispense: 30 tablet; Refill: 6    Ulcerative pancolitis without complication- stable. Will cont f/u with GI     S/P total colectomy- resolved.     Exocrine pancreatic insufficiency- stable. Will cont current  meds. F/u with GI     Malignant neoplasm of upper-inner quadrant of right breast in female, estrogen receptor negative- stable. MMG - ordered.     Screening mammogram, encounter for  -     Mammo Digital Screening Bilat; Future; Expected date: 10/13/2020    Osteoporosis, idiopathic- will cont forteo. Dexa UTD repeat in 2 years   -     ondansetron (ZOFRAN) 4 MG tablet; Take 1 tablet (4 mg total) by mouth every 12 (twelve) hours as needed for Nausea.  Dispense: 30 tablet; Refill: 6    Hypoadrenalism- stable. Will f/u with endo     GERD (gastroesophageal reflux disease)  -     ondansetron (ZOFRAN) 4 MG tablet; Take 1 tablet (4 mg total) by mouth every 12 (twelve) hours as needed for Nausea.  Dispense: 30 tablet; Refill: 6    Encounter for monitoring proton pump inhibitor therapy  -     ondansetron (ZOFRAN) 4 MG tablet; Take 1 tablet (4 mg total) by mouth every 12 (twelve) hours as needed for Nausea.  Dispense: 30 tablet; Refill: 6    Dysphagia  -     ondansetron (ZOFRAN) 4 MG tablet; Take 1 tablet (4 mg total) by mouth every 12 (twelve) hours as needed for Nausea.  Dispense: 30 tablet; Refill: 6    Nausea  -     ondansetron (ZOFRAN) 4 MG tablet; Take 1 tablet (4 mg total) by mouth every 12 (twelve) hours as needed for Nausea.  Dispense: 30 tablet; Refill: 6    Gastroesophageal reflux disease  -     pantoprazole (PROTONIX) 20 MG tablet; Take 1 tablet (20 mg total) by mouth every 12 (twelve) hours.  Dispense: 60 tablet; Refill: 11    Hiatal hernia  -     pantoprazole (PROTONIX) 20 MG tablet; Take 1 tablet (20 mg total) by mouth every 12 (twelve) hours.  Dispense: 60 tablet; Refill: 11    Encounter for monitoring long-term proton pump inhibitor therapy  -     pantoprazole (PROTONIX) 20 MG tablet; Take 1 tablet (20 mg total) by mouth every 12 (twelve) hours.  Dispense: 60 tablet; Refill: 11    Migraine syndrome- stable. Will cont maxalt as needed.   -     rizatriptan (MAXALT) 10 MG tablet; Take 1 tablet (10 mg total) by  mouth every 6 (six) hours as needed for Migraine.  Dispense: 60 tablet; Refill: 0    Vitamin B12 deficiency  -     cyanocobalamin 1,000 mcg/mL injection; Inject 1 mL (1,000 mcg total) into the muscle once a week. 1 Solution Injection monthly.  B12 injection weekly for  3 weeks then every  2 weeks  Dispense: 30 mL; Refill: 6    Follow up with PCP in 4-6 months     Tierney CUTLER, CHRIST, FNP-c   Department of Internal Medicine - [facility]  8:38 AM

## 2020-10-13 NOTE — TELEPHONE ENCOUNTER
----- Message from Maggie Reyes sent at 10/13/2020 10:33 AM CDT -----  Contact: self [phone]  Requesting an RX refill or new RX.  Is this a refill or new RX:  new  RX name and strength: zolpidem (AMBIEN) 5 MG Tab  Is this a 30 day or 90 day RX:  30  Pharmacy name and phone # (copy/paste from chart):   [id number] - [place] - [address] [facility] [phone] (Phone)  [phone] (Fax)  Comments:

## 2020-10-16 NOTE — TELEPHONE ENCOUNTER
Call for Forteo followup. Patient reports that she has had no issues with the Forteo pen. She reports no side effects from Forteo. She states that she takes her dose every night before bed. She is storing the medication appropriately in the fridge. She reports no new medications. She states that she is often in pain due to fibromyalgia. She rates her pain a 9/10 on average. She states that her current therapy is effective for when her pain gets severe. Patient declined consult on possible side effects and disease education. Patient has been on 1 year of forteo so far. She rates her QoL a 7/10. Upcoming appointment next week. No other questions or concerns.     Heber Mitchell, PharmD  Clinical Pharmacist  [facility]  P: [phone]

## 2020-10-27 ENCOUNTER — HOSPITAL ENCOUNTER (OUTPATIENT)
Dept: RADIOLOGY | Facility: HOSPITAL | Age: 60
Discharge: HOME | End: 2020-10-27
Attending: NURSE PRACTITIONER
Payer: MEDICARE

## 2020-10-27 DIAGNOSIS — Z12.31 SCREENING MAMMOGRAM, ENCOUNTER FOR: ICD-10-CM

## 2020-10-27 PROCEDURE — 77063 MAMMO DIGITAL SCREENING LEFT WITH TOMO: ICD-10-PCS | Mod: 26,,, | Performed by: RADIOLOGY

## 2020-10-27 PROCEDURE — 77067 MAMMO DIGITAL SCREENING LEFT WITH TOMO: ICD-10-PCS | Mod: 26,,, | Performed by: RADIOLOGY

## 2020-10-27 PROCEDURE — 77067 SCR MAMMO BI INCL CAD: CPT | Mod: 26,,, | Performed by: RADIOLOGY

## 2020-10-27 PROCEDURE — 77063 BREAST TOMOSYNTHESIS BI: CPT | Mod: 26,,, | Performed by: RADIOLOGY

## 2020-10-27 PROCEDURE — 77067 SCR MAMMO BI INCL CAD: CPT | Mod: TC

## 2020-11-02 ENCOUNTER — PATIENT MESSAGE (OUTPATIENT)
Dept: RHEUMATOLOGY | Facility: CLINIC | Age: 60
End: 2020-11-02

## 2020-12-07 ENCOUNTER — LAB VISIT (OUTPATIENT)
Dept: LAB | Facility: HOSPITAL | Age: 60
End: 2020-12-07
Attending: INTERNAL MEDICINE
Payer: MEDICARE

## 2020-12-07 ENCOUNTER — TELEPHONE (OUTPATIENT)
Dept: INTERNAL MEDICINE | Facility: CLINIC | Age: 60
End: 2020-12-07

## 2020-12-07 DIAGNOSIS — D50.9 IRON DEFICIENCY ANEMIA, UNSPECIFIED IRON DEFICIENCY ANEMIA TYPE: ICD-10-CM

## 2020-12-07 LAB
FERRITIN SERPL-MCNC: 67 NG/ML (ref 20–300)
HGB BLD-MCNC: 13.3 G/DL (ref 12–16)
IRON SERPL-MCNC: 51 UG/DL (ref 30–160)
SATURATED IRON: 13 % (ref 20–50)
TOTAL IRON BINDING CAPACITY: 388 UG/DL (ref 250–450)
TRANSFERRIN SERPL-MCNC: 262 MG/DL (ref 200–375)

## 2020-12-07 PROCEDURE — 83540 ASSAY OF IRON: CPT

## 2020-12-07 PROCEDURE — 85018 HEMOGLOBIN: CPT

## 2020-12-07 PROCEDURE — 82728 ASSAY OF FERRITIN: CPT

## 2020-12-07 PROCEDURE — 36415 COLL VENOUS BLD VENIPUNCTURE: CPT | Mod: PO

## 2020-12-07 NOTE — TELEPHONE ENCOUNTER
----- Message from Annetta Jin sent at 12/7/2020 10:00 AM CST -----  Would like to get medical advice.  Symptoms (please be specific): STOMACH PAIN   How long has patient had these symptoms:    Pharmacy name and phone # (copy from chart):    Comments:  PT WOULD LIKE TO BE SEEN THIS WEEK FOR STOMACH PAIN WITH THIS PROVIDER ONLY. NO APPT BEFOE 4/26

## 2020-12-13 DIAGNOSIS — D50.9 IRON DEFICIENCY ANEMIA, UNSPECIFIED IRON DEFICIENCY ANEMIA TYPE: ICD-10-CM

## 2020-12-13 RX ORDER — FERROUS SULFATE 325(65) MG
325 TABLET ORAL EVERY 12 HOURS
Qty: 60 TABLET | Refills: 3 | Status: SHIPPED | OUTPATIENT
Start: 2020-12-13 | End: 2021-04-12

## 2020-12-14 ENCOUNTER — TELEPHONE (OUTPATIENT)
Dept: GASTROENTEROLOGY | Facility: CLINIC | Age: 60
End: 2020-12-14

## 2020-12-14 NOTE — TELEPHONE ENCOUNTER
----- Message from So Sibley MA sent at 12/14/2020  8:09 AM CST -----    ----- Message -----  From: Reese Villalta MD  Sent: 12/13/2020  10:10 AM CST  To: MITRA Senior- please tell patient that they are iron deficient but not anemic and recommend that they take ferrous sulfate one 325mg pill every 12 hours for next 4 months.    Please remind patient that if she takes a vitamin-C pill 250 mg twice daily along with her iron pills she will absorb more iron this way.    Please order repeat fasting Hemoglobin, Iron/TIBC, and Ferritin in 12 weeks - Orders placed.

## 2020-12-15 ENCOUNTER — SPECIALTY PHARMACY (OUTPATIENT)
Dept: PHARMACY | Facility: CLINIC | Age: 60
End: 2020-12-15

## 2020-12-15 NOTE — TELEPHONE ENCOUNTER
Specialty Pharmacy - Refill Coordination    Specialty Medication Orders Linked to Encounter      Most Recent Value   Medication #1  teriparatide (FORTEO) 20 mcg/dose - 600 mcg/2.4 mL PnIj (Order#[id number], Rx#[id number])          Refill Questions - Documented Responses      Most Recent Value   Relationship to patient of person spoken to?  Self   HIPAA/medical authority confirmed?  Yes   Any changes in contact preferences or allowed representatives?  No   Has the patient had any insurance changes?  No   Has the patient had any changes to specialty medication, dose, or instructions?  No   Has the patient started taking any new medications, herbals, or supplements?  No   Has the patient been diagnosed with any new medical conditions?  No   Does the patient have any new allergies to medications or foods?  No   Does the patient have any concerns about side effects?  No   Can the patient store medication/sharps container properly (at the correct temperature, away from children/pets, etc.)?  Yes   Can the patient call emergency services (911) in the event of an emergency?  Yes   Does the patient have any concerns or questions about taking or administering this medication as prescribed?  No   How many doses did the patient miss in the past 4 weeks or since the last fill?  0   How many doses does the patient have on hand?  5   How many days does the patient report on hand quantity will last?  5   Does the number of doses/days supply remaining match pharmacy expected amounts?  Yes   Does the patient feel that this medication is effective?  Yes   During the past 4 weeks, has patient missed any activities due to condition or medication?  No   During the past 4 weeks, did patient have any of the following urgent care visits?  None   How will the patient receive the medication?  Mail   When does the patient need to receive the medication?  12/18/20   Shipping Address  Home   Address in [facility] confirmed and updated if  neccessary?  Yes   Expected Copay ($)  0   Is the patient able to afford the medication copay?  Yes   Payment Method  zero copay   Days supply of Refill  28   Would patient like to speak to a pharmacist?  No   Do you want to trigger an intervention?  No   Do you want to trigger an additional referral task?  No   Refill activity completed?  Yes   Refill activity plan  Refill scheduled   Shipment/Pickup Date:  12/16/20          Current Outpatient Medications   Medication Sig    albuterol (ACCUNEB) 0.63 mg/3 mL Nebu Take 3 mLs (0.63 mg total) by nebulization every 6 (six) hours as needed.    alirocumab (PRALUENT PEN) 75 mg/mL PnIj Inject 75 mg into the skin every 14 (fourteen) days.    amLODIPine (NORVASC) 2.5 MG tablet TAKE ONE TABLET BY MOUTH EVERY DAY    atorvastatin (LIPITOR) 10 MG tablet Take 10 mg by mouth once daily.    CREON 36,000-114,000- 180,000 unit CpDR TAKE TWO CAPSULES BY MOUTH THREE TIMES DAILY WITH MEALS AND ONE CAPSULE WITH EACH SNACK    cyanocobalamin 1,000 mcg/mL injection Inject 1 mL (1,000 mcg total) into the muscle once a week. 1 Solution Injection monthly.  B12 injection weekly for  3 weeks then every  2 weeks    cycloSPORINE (RESTASIS) 0.05 % ophthalmic emulsion Place 1 drop into both eyes 2 (two) times daily.    diclofenac sodium (VOLTAREN) 1 % Gel APPLY 2 GRAMS TO AFFECTED AREA 4 TIMES A DAY    diltiazem HCl (DILTIAZEM 2% CREAM) APPLY TO ANAL AREA EVERY 8 HOURS FOR 7 DAYS    ergocalciferol (VITAMIN D2) 50,000 unit Cap Take 2 capsules (100,000 Units total) by mouth every 7 days. (Patient taking differently: Take 50,000 Units by mouth every 7 days. Every Wednesday)    ferrous sulfate (FEOSOL) 325 mg (65 mg iron) Tab tablet Take 1 tablet (325 mg total) by mouth every 12 (twelve) hours. 1 Tablet Oral Every day.  Take one 250mg vitamin C pill every 12 hours which will help your bodyabsorb more of the iron.    gabapentin (NEURONTIN) 800 MG tablet Take 1 tablet (800 mg total) by mouth 3  (three) times daily. (Patient taking differently: Take 800 mg by mouth 4 (four) times daily. )    hydroCHLOROthiazide (HYDRODIURIL) 25 MG tablet Take 1 tablet (25 mg total) by mouth once daily.    hydroxychloroquine (PLAQUENIL) 200 mg tablet TAKE 1 AND 1/2 TABLETS BY MOUTH EVERY DAY    lidocaine (LIDODERM) 5 % Place 1 patch onto the skin once daily. Remove & Discard patch within 12 hours or as directed by MD    loperamide (IMODIUM) 2 mg capsule TAKE 1 TO 2 CAPSULES BY MOUTH FOUR TIMES DAILY    lorazepam (ATIVAN) 1 MG tablet Take 1 tablet (1 mg total) by mouth every evening. 1 Tablet Oral Q8H-12 hours    multivitamin/iron/folic acid (CENTRUM WOMEN ORAL) Take 1 tablet by mouth once daily.    niacin 100 MG Tab Take by mouth. 1 Tablet Oral At bedtime    ondansetron (ZOFRAN) 4 MG tablet Take 1 tablet (4 mg total) by mouth every 12 (twelve) hours as needed for Nausea.    oxyCODONE-acetaminophen (PERCOCET) 5-325 mg per tablet Take 1 tablet by mouth every 12 (twelve) hours as needed for Pain.    pantoprazole (PROTONIX) 20 MG tablet Take 1 tablet (20 mg total) by mouth every 12 (twelve) hours.    polycarbophil (FIBERCON) 625 mg tablet Take by mouth. 1 Tablet Oral Every 12 hours.  Take with 12 ounces of water with each pill.    potassium chloride SA (K-DUR,KLOR-CON) 10 MEQ tablet TAKE TWO TABLETS BY MOUTH ONCE DAILY    predniSONE (DELTASONE) 5 MG tablet Take 1 tablet (5 mg total) by mouth once daily.    rizatriptan (MAXALT) 10 MG tablet Take 1 tablet (10 mg total) by mouth every 6 (six) hours as needed for Migraine.    SAVELLA 100 mg Tab TAKE ONE TABLET BY MOUTH TWICE DAILY    teriparatide (FORTEO) 20 mcg/dose - 600 mcg/2.4 mL PnIj Inject 0.08 mLs (20 mcg total) into the skin once daily.    zolpidem (AMBIEN) 5 MG Tab Take 1 tablet (5 mg total) by mouth nightly as needed.   Last reviewed on 10/13/2020  8:32 AM by Monica Taylor MA    Review of patient's allergies indicates:   Allergen Reactions    Aspirin   "    Other reaction(s): "hemorrhage"  hemorrhage    Hydrocortisone Nausea Only     Other reaction(s): sick  sick    Lactose intolerance (lactase) [lactase] Nausea And Vomiting    Vicodin [hydrocodone-acetaminophen]      Other reaction(s): hyperactivity    Asacol [mesalamine] Hallucinations     Other reaction(s): Hallucinations  hallucinations    Last reviewed on  12/13/2020 10:08 AM by Reese Villalta      Tasks added this encounter   No tasks added.   Tasks due within next 3 months   12/7/2020 - Refill Call     Alexandrea Rodriguez  [facility]  [address] [place] [id number]  Phone: [phone]  Fax: [phone]      "

## 2020-12-16 ENCOUNTER — TELEPHONE (OUTPATIENT)
Dept: RHEUMATOLOGY | Facility: CLINIC | Age: 60
End: 2020-12-16
Payer: MEDICARE

## 2020-12-22 DIAGNOSIS — E53.8 VITAMIN B12 DEFICIENCY: ICD-10-CM

## 2020-12-22 RX ORDER — CYANOCOBALAMIN 1000 UG/ML
1000 INJECTION, SOLUTION INTRAMUSCULAR; SUBCUTANEOUS WEEKLY
Qty: 30 ML | Refills: 6 | Status: CANCELLED | OUTPATIENT
Start: 2020-12-22

## 2020-12-22 NOTE — TELEPHONE ENCOUNTER
----- Message from Rossy Grider sent at 12/22/2020  2:53 PM CST -----  Regarding: injection  Contact: [phone]  Patient is calling for order for B-12 injection. Send to [facility]. Please call and advise

## 2020-12-29 ENCOUNTER — TELEPHONE (OUTPATIENT)
Dept: UROGYNECOLOGY | Facility: CLINIC | Age: 60
End: 2020-12-29

## 2020-12-29 NOTE — TELEPHONE ENCOUNTER
----- Message from Beatriz Kumariborn sent at 12/29/2020  2:23 PM CST -----  Pt wants to know when she can bring her niece (Brian Newman) by to meet with you before she shadows with you    Pt can be reached at [phone]

## 2020-12-31 RX ORDER — CYANOCOBALAMIN 1000 UG/ML
1000 INJECTION, SOLUTION INTRAMUSCULAR; SUBCUTANEOUS WEEKLY
Qty: 30 ML | Refills: 6 | Status: SHIPPED | OUTPATIENT
Start: 2020-12-31 | End: 2021-12-07

## 2021-01-08 ENCOUNTER — SPECIALTY PHARMACY (OUTPATIENT)
Dept: PHARMACY | Facility: CLINIC | Age: 61
End: 2021-01-08

## 2021-01-08 DIAGNOSIS — M80.00XG AGE-RELATED OSTEOPOROSIS WITH CURRENT PATHOLOGICAL FRACTURE WITH DELAYED HEALING: ICD-10-CM

## 2021-01-08 RX ORDER — TERIPARATIDE 250 UG/ML
20 INJECTION, SOLUTION SUBCUTANEOUS DAILY
Qty: 2.4 ML | Refills: 2 | Status: SHIPPED | OUTPATIENT
Start: 2021-01-08 | End: 2021-04-13 | Stop reason: SDUPTHER

## 2021-01-11 ENCOUNTER — LAB VISIT (OUTPATIENT)
Dept: LAB | Facility: HOSPITAL | Age: 61
End: 2021-01-11
Attending: INTERNAL MEDICINE
Payer: MEDICARE

## 2021-01-11 ENCOUNTER — OFFICE VISIT (OUTPATIENT)
Dept: RHEUMATOLOGY | Facility: CLINIC | Age: 61
End: 2021-01-11
Payer: COMMERCIAL

## 2021-01-11 VITALS
HEART RATE: 74 BPM | WEIGHT: 121.69 LBS | DIASTOLIC BLOOD PRESSURE: 81 MMHG | SYSTOLIC BLOOD PRESSURE: 136 MMHG | BODY MASS INDEX: 22.26 KG/M2

## 2021-01-11 DIAGNOSIS — R53.83 FATIGUE, UNSPECIFIED TYPE: ICD-10-CM

## 2021-01-11 DIAGNOSIS — M32.9 SYSTEMIC LUPUS ERYTHEMATOSUS, UNSPECIFIED SLE TYPE, UNSPECIFIED ORGAN INVOLVEMENT STATUS: Primary | ICD-10-CM

## 2021-01-11 DIAGNOSIS — M79.7 FIBROMYALGIA: ICD-10-CM

## 2021-01-11 DIAGNOSIS — D84.9 IMMUNOSUPPRESSION: ICD-10-CM

## 2021-01-11 DIAGNOSIS — M32.9 SYSTEMIC LUPUS ERYTHEMATOSUS, UNSPECIFIED SLE TYPE, UNSPECIFIED ORGAN INVOLVEMENT STATUS: ICD-10-CM

## 2021-01-11 DIAGNOSIS — Z79.899 LONG-TERM USE OF PLAQUENIL: ICD-10-CM

## 2021-01-11 LAB
ALBUMIN SERPL BCP-MCNC: 4.1 G/DL (ref 3.5–5.2)
ALP SERPL-CCNC: 71 U/L (ref 55–135)
ALT SERPL W/O P-5'-P-CCNC: 14 U/L (ref 10–44)
ANION GAP SERPL CALC-SCNC: 8 MMOL/L (ref 8–16)
AST SERPL-CCNC: 20 U/L (ref 10–40)
BASOPHILS # BLD AUTO: 0.05 K/UL (ref 0–0.2)
BASOPHILS NFR BLD: 0.6 % (ref 0–1.9)
BILIRUB SERPL-MCNC: 0.4 MG/DL (ref 0.1–1)
BUN SERPL-MCNC: 11 MG/DL (ref 8–23)
C3 SERPL-MCNC: 113 MG/DL (ref 50–180)
C4 SERPL-MCNC: 36 MG/DL (ref 11–44)
CALCIUM SERPL-MCNC: 9.7 MG/DL (ref 8.7–10.5)
CHLORIDE SERPL-SCNC: 101 MMOL/L (ref 95–110)
CK SERPL-CCNC: 94 U/L (ref 20–180)
CO2 SERPL-SCNC: 33 MMOL/L (ref 23–29)
CREAT SERPL-MCNC: 0.8 MG/DL (ref 0.5–1.4)
CRP SERPL-MCNC: 1.1 MG/L (ref 0–8.2)
DIFFERENTIAL METHOD: ABNORMAL
EOSINOPHIL # BLD AUTO: 0.1 K/UL (ref 0–0.5)
EOSINOPHIL NFR BLD: 1.3 % (ref 0–8)
ERYTHROCYTE [DISTWIDTH] IN BLOOD BY AUTOMATED COUNT: 14.4 % (ref 11.5–14.5)
ERYTHROCYTE [SEDIMENTATION RATE] IN BLOOD BY WESTERGREN METHOD: 11 MM/HR (ref 0–36)
EST. GFR  (AFRICAN AMERICAN): >60 ML/MIN/1.73 M^2
EST. GFR  (NON AFRICAN AMERICAN): >60 ML/MIN/1.73 M^2
GLUCOSE SERPL-MCNC: 88 MG/DL (ref 70–110)
HCT VFR BLD AUTO: 42.8 % (ref 37–48.5)
HGB BLD-MCNC: 12.8 G/DL (ref 12–16)
IMM GRANULOCYTES # BLD AUTO: 0.02 K/UL (ref 0–0.04)
IMM GRANULOCYTES NFR BLD AUTO: 0.2 % (ref 0–0.5)
LYMPHOCYTES # BLD AUTO: 1.5 K/UL (ref 1–4.8)
LYMPHOCYTES NFR BLD: 18.6 % (ref 18–48)
MCH RBC QN AUTO: 26.6 PG (ref 27–31)
MCHC RBC AUTO-ENTMCNC: 29.9 G/DL (ref 32–36)
MCV RBC AUTO: 89 FL (ref 82–98)
MONOCYTES # BLD AUTO: 0.7 K/UL (ref 0.3–1)
MONOCYTES NFR BLD: 8.9 % (ref 4–15)
NEUTROPHILS # BLD AUTO: 5.8 K/UL (ref 1.8–7.7)
NEUTROPHILS NFR BLD: 70.4 % (ref 38–73)
NRBC BLD-RTO: 0 /100 WBC
PLATELET # BLD AUTO: 272 K/UL (ref 150–350)
PMV BLD AUTO: 10.7 FL (ref 9.2–12.9)
POTASSIUM SERPL-SCNC: 3.8 MMOL/L (ref 3.5–5.1)
PROT SERPL-MCNC: 7.4 G/DL (ref 6–8.4)
RBC # BLD AUTO: 4.81 M/UL (ref 4–5.4)
SODIUM SERPL-SCNC: 142 MMOL/L (ref 136–145)
WBC # BLD AUTO: 8.27 K/UL (ref 3.9–12.7)

## 2021-01-11 PROCEDURE — 86225 DNA ANTIBODY NATIVE: CPT

## 2021-01-11 PROCEDURE — 1125F AMNT PAIN NOTED PAIN PRSNT: CPT | Mod: S$GLB,,, | Performed by: INTERNAL MEDICINE

## 2021-01-11 PROCEDURE — 86140 C-REACTIVE PROTEIN: CPT

## 2021-01-11 PROCEDURE — 86160 COMPLEMENT ANTIGEN: CPT

## 2021-01-11 PROCEDURE — 82550 ASSAY OF CK (CPK): CPT

## 2021-01-11 PROCEDURE — 85652 RBC SED RATE AUTOMATED: CPT

## 2021-01-11 PROCEDURE — 86160 COMPLEMENT ANTIGEN: CPT | Mod: 59

## 2021-01-11 PROCEDURE — 3008F BODY MASS INDEX DOCD: CPT | Mod: CPTII,S$GLB,, | Performed by: INTERNAL MEDICINE

## 2021-01-11 PROCEDURE — 3079F PR MOST RECENT DIASTOLIC BLOOD PRESSURE 80-89 MM HG: ICD-10-PCS | Mod: CPTII,S$GLB,, | Performed by: INTERNAL MEDICINE

## 2021-01-11 PROCEDURE — 36415 COLL VENOUS BLD VENIPUNCTURE: CPT

## 2021-01-11 PROCEDURE — 99214 OFFICE O/P EST MOD 30 MIN: CPT | Mod: S$GLB,,, | Performed by: INTERNAL MEDICINE

## 2021-01-11 PROCEDURE — 3008F PR BODY MASS INDEX (BMI) DOCUMENTED: ICD-10-PCS | Mod: CPTII,S$GLB,, | Performed by: INTERNAL MEDICINE

## 2021-01-11 PROCEDURE — 3075F PR MOST RECENT SYSTOLIC BLOOD PRESS GE 130-139MM HG: ICD-10-PCS | Mod: CPTII,S$GLB,, | Performed by: INTERNAL MEDICINE

## 2021-01-11 PROCEDURE — 80053 COMPREHEN METABOLIC PANEL: CPT

## 2021-01-11 PROCEDURE — 99999 PR PBB SHADOW E&M-EST. PATIENT-LVL II: CPT | Mod: PBBFAC,,, | Performed by: INTERNAL MEDICINE

## 2021-01-11 PROCEDURE — 99999 PR PBB SHADOW E&M-EST. PATIENT-LVL II: ICD-10-PCS | Mod: PBBFAC,,, | Performed by: INTERNAL MEDICINE

## 2021-01-11 PROCEDURE — 3079F DIAST BP 80-89 MM HG: CPT | Mod: CPTII,S$GLB,, | Performed by: INTERNAL MEDICINE

## 2021-01-11 PROCEDURE — 1125F PR PAIN SEVERITY QUANTIFIED, PAIN PRESENT: ICD-10-PCS | Mod: S$GLB,,, | Performed by: INTERNAL MEDICINE

## 2021-01-11 PROCEDURE — 85025 COMPLETE CBC W/AUTO DIFF WBC: CPT

## 2021-01-11 PROCEDURE — 99214 PR OFFICE/OUTPT VISIT, EST, LEVL IV, 30-39 MIN: ICD-10-PCS | Mod: S$GLB,,, | Performed by: INTERNAL MEDICINE

## 2021-01-11 PROCEDURE — 3075F SYST BP GE 130 - 139MM HG: CPT | Mod: CPTII,S$GLB,, | Performed by: INTERNAL MEDICINE

## 2021-01-11 RX ORDER — GABAPENTIN 300 MG/1
300 CAPSULE ORAL NIGHTLY
Qty: 90 CAPSULE | Refills: 3 | Status: SHIPPED | OUTPATIENT
Start: 2021-01-11 | End: 2021-11-29

## 2021-01-11 RX ORDER — NEBIVOLOL HYDROCHLORIDE 10 MG/1
10 TABLET ORAL DAILY
COMMUNITY
Start: 2020-10-28 | End: 2021-02-08 | Stop reason: SDUPTHER

## 2021-01-12 ENCOUNTER — PATIENT MESSAGE (OUTPATIENT)
Dept: RHEUMATOLOGY | Facility: CLINIC | Age: 61
End: 2021-01-12

## 2021-01-12 LAB — DSDNA AB SER-ACNC: NORMAL [IU]/ML

## 2021-01-15 ENCOUNTER — SPECIALTY PHARMACY (OUTPATIENT)
Dept: PHARMACY | Facility: CLINIC | Age: 61
End: 2021-01-15

## 2021-01-25 ENCOUNTER — PATIENT OUTREACH (OUTPATIENT)
Dept: ADMINISTRATIVE | Facility: HOSPITAL | Age: 61
End: 2021-01-25

## 2021-02-07 PROBLEM — I10 BENIGN ESSENTIAL HYPERTENSION: Status: ACTIVE | Noted: 2021-02-07

## 2021-02-08 ENCOUNTER — TELEPHONE (OUTPATIENT)
Dept: SURGERY | Facility: CLINIC | Age: 61
End: 2021-02-08

## 2021-02-08 ENCOUNTER — LAB VISIT (OUTPATIENT)
Dept: LAB | Facility: HOSPITAL | Age: 61
End: 2021-02-08
Attending: INTERNAL MEDICINE
Payer: COMMERCIAL

## 2021-02-08 ENCOUNTER — OFFICE VISIT (OUTPATIENT)
Dept: INTERNAL MEDICINE | Facility: CLINIC | Age: 61
End: 2021-02-08
Payer: MEDICARE

## 2021-02-08 ENCOUNTER — TELEPHONE (OUTPATIENT)
Dept: HEMATOLOGY/ONCOLOGY | Facility: CLINIC | Age: 61
End: 2021-02-08

## 2021-02-08 ENCOUNTER — CLINICAL SUPPORT (OUTPATIENT)
Dept: INTERNAL MEDICINE | Facility: CLINIC | Age: 61
End: 2021-02-08
Payer: MEDICARE

## 2021-02-08 VITALS
DIASTOLIC BLOOD PRESSURE: 80 MMHG | OXYGEN SATURATION: 99 % | BODY MASS INDEX: 22.88 KG/M2 | WEIGHT: 124.31 LBS | HEART RATE: 77 BPM | SYSTOLIC BLOOD PRESSURE: 120 MMHG | HEIGHT: 62 IN

## 2021-02-08 DIAGNOSIS — Z79.899 LONG-TERM USE OF PLAQUENIL: ICD-10-CM

## 2021-02-08 DIAGNOSIS — R73.03 PREDIABETES: ICD-10-CM

## 2021-02-08 DIAGNOSIS — R10.31 ACUTE BILATERAL LOWER ABDOMINAL PAIN: ICD-10-CM

## 2021-02-08 DIAGNOSIS — E87.6 HYPOKALEMIA: ICD-10-CM

## 2021-02-08 DIAGNOSIS — C50.211 MALIGNANT NEOPLASM OF UPPER-INNER QUADRANT OF RIGHT BREAST IN FEMALE, ESTROGEN RECEPTOR NEGATIVE: ICD-10-CM

## 2021-02-08 DIAGNOSIS — R10.30 LOWER ABDOMINAL PAIN: ICD-10-CM

## 2021-02-08 DIAGNOSIS — G89.4 CHRONIC PAIN SYNDROME: ICD-10-CM

## 2021-02-08 DIAGNOSIS — E27.40 HYPOADRENALISM: ICD-10-CM

## 2021-02-08 DIAGNOSIS — R10.32 ACUTE BILATERAL LOWER ABDOMINAL PAIN: ICD-10-CM

## 2021-02-08 DIAGNOSIS — Z17.1 MALIGNANT NEOPLASM OF UPPER-INNER QUADRANT OF RIGHT BREAST IN FEMALE, ESTROGEN RECEPTOR NEGATIVE: ICD-10-CM

## 2021-02-08 DIAGNOSIS — Z76.89 ESTABLISHING CARE WITH NEW DOCTOR, ENCOUNTER FOR: Primary | ICD-10-CM

## 2021-02-08 DIAGNOSIS — I10 BENIGN ESSENTIAL HYPERTENSION: ICD-10-CM

## 2021-02-08 DIAGNOSIS — E05.90 SUBCLINICAL HYPERTHYROIDISM: ICD-10-CM

## 2021-02-08 DIAGNOSIS — E53.8 VITAMIN B12 DEFICIENCY: ICD-10-CM

## 2021-02-08 DIAGNOSIS — Z90.49 S/P TOTAL COLECTOMY: ICD-10-CM

## 2021-02-08 DIAGNOSIS — G47.00 INSOMNIA, UNSPECIFIED TYPE: ICD-10-CM

## 2021-02-08 DIAGNOSIS — K51.819 OTHER ULCERATIVE COLITIS WITH COMPLICATION: ICD-10-CM

## 2021-02-08 DIAGNOSIS — R80.9 PROTEINURIA, UNSPECIFIED TYPE: ICD-10-CM

## 2021-02-08 DIAGNOSIS — E78.2 MIXED HYPERLIPIDEMIA: ICD-10-CM

## 2021-02-08 DIAGNOSIS — M32.9 SYSTEMIC LUPUS ERYTHEMATOSUS, UNSPECIFIED SLE TYPE, UNSPECIFIED ORGAN INVOLVEMENT STATUS: ICD-10-CM

## 2021-02-08 DIAGNOSIS — M80.00XG AGE-RELATED OSTEOPOROSIS WITH CURRENT PATHOLOGICAL FRACTURE WITH DELAYED HEALING: ICD-10-CM

## 2021-02-08 DIAGNOSIS — Z79.52 LONG TERM CURRENT USE OF SYSTEMIC STEROIDS: ICD-10-CM

## 2021-02-08 DIAGNOSIS — K86.2 PANCREAS CYST: ICD-10-CM

## 2021-02-08 DIAGNOSIS — K86.81 EXOCRINE PANCREATIC INSUFFICIENCY: ICD-10-CM

## 2021-02-08 DIAGNOSIS — D50.8 OTHER IRON DEFICIENCY ANEMIA: ICD-10-CM

## 2021-02-08 DIAGNOSIS — I10 HYPERTENSION, UNSPECIFIED TYPE: ICD-10-CM

## 2021-02-08 DIAGNOSIS — E55.9 VITAMIN D DEFICIENCY DISEASE: ICD-10-CM

## 2021-02-08 DIAGNOSIS — D84.9 IMMUNOSUPPRESSION: ICD-10-CM

## 2021-02-08 DIAGNOSIS — M47.817 LUMBAR AND SACRAL SPONDYLARTHRITIS: ICD-10-CM

## 2021-02-08 DIAGNOSIS — K21.9 GASTROESOPHAGEAL REFLUX DISEASE WITHOUT ESOPHAGITIS: ICD-10-CM

## 2021-02-08 PROBLEM — R05.9 COUGH: Status: RESOLVED | Noted: 2018-01-16 | Resolved: 2021-02-08

## 2021-02-08 LAB
CHOLEST SERPL-MCNC: 153 MG/DL (ref 120–199)
CHOLEST/HDLC SERPL: 1.5 {RATIO} (ref 2–5)
ESTIMATED AVG GLUCOSE: 117 MG/DL (ref 68–131)
HBA1C MFR BLD: 5.7 % (ref 4–5.6)
HDLC SERPL-MCNC: 100 MG/DL (ref 40–75)
HDLC SERPL: 65.4 % (ref 20–50)
LDLC SERPL CALC-MCNC: 32.6 MG/DL (ref 63–159)
NONHDLC SERPL-MCNC: 53 MG/DL
T4 FREE SERPL-MCNC: 0.79 NG/DL (ref 0.71–1.51)
TRIGL SERPL-MCNC: 102 MG/DL (ref 30–150)
TSH SERPL DL<=0.005 MIU/L-ACNC: 0.94 UIU/ML (ref 0.4–4)

## 2021-02-08 PROCEDURE — 3074F SYST BP LT 130 MM HG: CPT | Mod: CPTII,S$GLB,, | Performed by: INTERNAL MEDICINE

## 2021-02-08 PROCEDURE — G0009 ADMIN PNEUMOCOCCAL VACCINE: HCPCS | Mod: S$GLB,,, | Performed by: INTERNAL MEDICINE

## 2021-02-08 PROCEDURE — 3008F BODY MASS INDEX DOCD: CPT | Mod: CPTII,S$GLB,, | Performed by: INTERNAL MEDICINE

## 2021-02-08 PROCEDURE — 90670 PNEUMOCOCCAL CONJUGATE VACCINE 13-VALENT LESS THAN 5YO & GREATER THAN: ICD-10-PCS | Mod: S$GLB,,, | Performed by: INTERNAL MEDICINE

## 2021-02-08 PROCEDURE — 1125F PR PAIN SEVERITY QUANTIFIED, PAIN PRESENT: ICD-10-PCS | Mod: S$GLB,,, | Performed by: INTERNAL MEDICINE

## 2021-02-08 PROCEDURE — 80061 LIPID PANEL: CPT

## 2021-02-08 PROCEDURE — 83036 HEMOGLOBIN GLYCOSYLATED A1C: CPT

## 2021-02-08 PROCEDURE — 1125F AMNT PAIN NOTED PAIN PRSNT: CPT | Mod: S$GLB,,, | Performed by: INTERNAL MEDICINE

## 2021-02-08 PROCEDURE — 90670 PCV13 VACCINE IM: CPT | Mod: S$GLB,,, | Performed by: INTERNAL MEDICINE

## 2021-02-08 PROCEDURE — 84439 ASSAY OF FREE THYROXINE: CPT

## 2021-02-08 PROCEDURE — 99999 PR PBB SHADOW E&M-EST. PATIENT-LVL V: ICD-10-PCS | Mod: PBBFAC,,, | Performed by: INTERNAL MEDICINE

## 2021-02-08 PROCEDURE — 3079F DIAST BP 80-89 MM HG: CPT | Mod: CPTII,S$GLB,, | Performed by: INTERNAL MEDICINE

## 2021-02-08 PROCEDURE — 99214 PR OFFICE/OUTPT VISIT, EST, LEVL IV, 30-39 MIN: ICD-10-PCS | Mod: 25,S$GLB,, | Performed by: INTERNAL MEDICINE

## 2021-02-08 PROCEDURE — 84443 ASSAY THYROID STIM HORMONE: CPT

## 2021-02-08 PROCEDURE — 99214 OFFICE O/P EST MOD 30 MIN: CPT | Mod: 25,S$GLB,, | Performed by: INTERNAL MEDICINE

## 2021-02-08 PROCEDURE — 99999 PR PBB SHADOW E&M-EST. PATIENT-LVL V: CPT | Mod: PBBFAC,,, | Performed by: INTERNAL MEDICINE

## 2021-02-08 PROCEDURE — G0009 PNEUMOCOCCAL CONJUGATE VACCINE 13-VALENT LESS THAN 5YO & GREATER THAN: ICD-10-PCS | Mod: S$GLB,,, | Performed by: INTERNAL MEDICINE

## 2021-02-08 PROCEDURE — 36415 COLL VENOUS BLD VENIPUNCTURE: CPT

## 2021-02-08 PROCEDURE — 3074F PR MOST RECENT SYSTOLIC BLOOD PRESSURE < 130 MM HG: ICD-10-PCS | Mod: CPTII,S$GLB,, | Performed by: INTERNAL MEDICINE

## 2021-02-08 PROCEDURE — 3079F PR MOST RECENT DIASTOLIC BLOOD PRESSURE 80-89 MM HG: ICD-10-PCS | Mod: CPTII,S$GLB,, | Performed by: INTERNAL MEDICINE

## 2021-02-08 PROCEDURE — 3008F PR BODY MASS INDEX (BMI) DOCUMENTED: ICD-10-PCS | Mod: CPTII,S$GLB,, | Performed by: INTERNAL MEDICINE

## 2021-02-08 RX ORDER — ATORVASTATIN CALCIUM 10 MG/1
10 TABLET, FILM COATED ORAL DAILY
Qty: 90 TABLET | Refills: 3 | Status: SHIPPED | OUTPATIENT
Start: 2021-02-08 | End: 2021-12-07

## 2021-02-08 RX ORDER — OMEPRAZOLE 20 MG/1
20 CAPSULE, DELAYED RELEASE ORAL 2 TIMES DAILY
Qty: 180 CAPSULE | Refills: 3 | Status: SHIPPED | OUTPATIENT
Start: 2021-02-08 | End: 2021-11-27

## 2021-02-08 RX ORDER — ERGOCALCIFEROL 1.25 MG/1
50000 CAPSULE ORAL
Qty: 24 CAPSULE | Refills: 3 | Status: SHIPPED | OUTPATIENT
Start: 2021-02-08 | End: 2021-09-13 | Stop reason: SDUPTHER

## 2021-02-08 RX ORDER — AMLODIPINE BESYLATE 2.5 MG/1
2.5 TABLET ORAL DAILY
Qty: 90 TABLET | Refills: 3 | Status: SHIPPED | OUTPATIENT
Start: 2021-02-08 | End: 2021-03-23 | Stop reason: DRUGHIGH

## 2021-02-08 RX ORDER — HYDROCHLOROTHIAZIDE 25 MG/1
25 TABLET ORAL DAILY
Qty: 90 TABLET | Refills: 3 | Status: SHIPPED | OUTPATIENT
Start: 2021-02-08 | End: 2022-01-27 | Stop reason: DRUGHIGH

## 2021-02-08 RX ORDER — ZOLPIDEM TARTRATE 5 MG/1
5 TABLET ORAL NIGHTLY PRN
Qty: 30 TABLET | Refills: 4 | Status: SHIPPED | OUTPATIENT
Start: 2021-03-08 | End: 2021-08-23

## 2021-02-08 RX ORDER — NEBIVOLOL HYDROCHLORIDE 10 MG/1
10 TABLET ORAL DAILY
Qty: 90 TABLET | Refills: 3 | Status: SHIPPED | OUTPATIENT
Start: 2021-02-08 | End: 2021-03-23 | Stop reason: DRUGHIGH

## 2021-02-08 RX ORDER — OXYCODONE AND ACETAMINOPHEN 5; 325 MG/1; MG/1
1 TABLET ORAL EVERY 12 HOURS PRN
Qty: 60 EACH | Refills: 0 | Status: SHIPPED | OUTPATIENT
Start: 2021-02-08 | End: 2022-02-23 | Stop reason: SDUPTHER

## 2021-02-09 ENCOUNTER — SPECIALTY PHARMACY (OUTPATIENT)
Dept: PHARMACY | Facility: CLINIC | Age: 61
End: 2021-02-09

## 2021-02-10 DIAGNOSIS — R80.9 PROTEINURIA, UNSPECIFIED TYPE: Primary | ICD-10-CM

## 2021-02-13 ENCOUNTER — HOSPITAL ENCOUNTER (OUTPATIENT)
Dept: RADIOLOGY | Facility: HOSPITAL | Age: 61
Discharge: HOME | End: 2021-02-13
Attending: INTERNAL MEDICINE
Payer: MEDICARE

## 2021-02-13 DIAGNOSIS — K51.819 OTHER ULCERATIVE COLITIS WITH COMPLICATION: ICD-10-CM

## 2021-02-13 DIAGNOSIS — R10.32 ACUTE BILATERAL LOWER ABDOMINAL PAIN: ICD-10-CM

## 2021-02-13 DIAGNOSIS — R10.31 ACUTE BILATERAL LOWER ABDOMINAL PAIN: ICD-10-CM

## 2021-02-13 DIAGNOSIS — R10.30 LOWER ABDOMINAL PAIN: ICD-10-CM

## 2021-02-13 LAB
CREAT SERPL-MCNC: 0.8 MG/DL (ref 0.5–1.4)
SAMPLE: NORMAL

## 2021-02-13 PROCEDURE — 25500020 PHARM REV CODE 255: Performed by: INTERNAL MEDICINE

## 2021-02-13 PROCEDURE — 74177 CT ABD & PELVIS W/CONTRAST: CPT | Mod: TC

## 2021-02-13 PROCEDURE — 74177 CT ABD & PELVIS W/CONTRAST: CPT | Mod: 26,,, | Performed by: RADIOLOGY

## 2021-02-13 PROCEDURE — 74177 CT ABDOMEN PELVIS WITH CONTRAST: ICD-10-PCS | Mod: 26,,, | Performed by: RADIOLOGY

## 2021-02-13 RX ADMIN — IOHEXOL 15 ML: 350 INJECTION, SOLUTION INTRAVENOUS at 09:02

## 2021-02-13 RX ADMIN — IOHEXOL 75 ML: 350 INJECTION, SOLUTION INTRAVENOUS at 10:02

## 2021-02-18 ENCOUNTER — PATIENT OUTREACH (OUTPATIENT)
Dept: ADMINISTRATIVE | Facility: OTHER | Age: 61
End: 2021-02-18

## 2021-02-19 ENCOUNTER — CLINICAL SUPPORT (OUTPATIENT)
Dept: OPHTHALMOLOGY | Facility: CLINIC | Age: 61
End: 2021-02-19
Payer: MEDICARE

## 2021-02-19 ENCOUNTER — OFFICE VISIT (OUTPATIENT)
Dept: OPTOMETRY | Facility: CLINIC | Age: 61
End: 2021-02-19
Payer: MEDICARE

## 2021-02-19 DIAGNOSIS — M32.9 SYSTEMIC LUPUS ERYTHEMATOSUS, UNSPECIFIED SLE TYPE, UNSPECIFIED ORGAN INVOLVEMENT STATUS: Primary | ICD-10-CM

## 2021-02-19 DIAGNOSIS — H52.7 REFRACTIVE ERROR: ICD-10-CM

## 2021-02-19 DIAGNOSIS — Z79.899 LONG-TERM USE OF PLAQUENIL: ICD-10-CM

## 2021-02-19 DIAGNOSIS — H25.13 NUCLEAR SCLEROSIS OF BOTH EYES: ICD-10-CM

## 2021-02-19 DIAGNOSIS — M32.9 SYSTEMIC LUPUS ERYTHEMATOSUS, UNSPECIFIED SLE TYPE, UNSPECIFIED ORGAN INVOLVEMENT STATUS: ICD-10-CM

## 2021-02-19 DIAGNOSIS — H16.223 KERATOCONJUNCTIVITIS SICCA OF BOTH EYES NOT SPECIFIED AS SJOGREN'S: ICD-10-CM

## 2021-02-19 PROCEDURE — 92015 DETERMINE REFRACTIVE STATE: CPT | Mod: S$GLB,,, | Performed by: OPTOMETRIST

## 2021-02-19 PROCEDURE — 92015 PR REFRACTION: ICD-10-PCS | Mod: S$GLB,,, | Performed by: OPTOMETRIST

## 2021-02-19 PROCEDURE — 99999 PR PBB SHADOW E&M-EST. PATIENT-LVL I: ICD-10-PCS | Mod: PBBFAC,,, | Performed by: OPTOMETRIST

## 2021-02-19 PROCEDURE — 99999 PR PBB SHADOW E&M-EST. PATIENT-LVL I: CPT | Mod: PBBFAC,,, | Performed by: OPTOMETRIST

## 2021-02-19 PROCEDURE — 92014 COMPRE OPH EXAM EST PT 1/>: CPT | Mod: S$GLB,,, | Performed by: OPTOMETRIST

## 2021-02-19 PROCEDURE — 92014 PR EYE EXAM, EST PATIENT,COMPREHESV: ICD-10-PCS | Mod: S$GLB,,, | Performed by: OPTOMETRIST

## 2021-02-23 ENCOUNTER — PATIENT MESSAGE (OUTPATIENT)
Dept: RHEUMATOLOGY | Facility: CLINIC | Age: 61
End: 2021-02-23

## 2021-03-04 ENCOUNTER — OFFICE VISIT (OUTPATIENT)
Dept: HEMATOLOGY/ONCOLOGY | Facility: CLINIC | Age: 61
End: 2021-03-04
Payer: MEDICARE

## 2021-03-04 VITALS
RESPIRATION RATE: 18 BRPM | OXYGEN SATURATION: 97 % | SYSTOLIC BLOOD PRESSURE: 148 MMHG | DIASTOLIC BLOOD PRESSURE: 72 MMHG | WEIGHT: 123.69 LBS | HEIGHT: 64 IN | BODY MASS INDEX: 21.11 KG/M2 | HEART RATE: 73 BPM | TEMPERATURE: 98 F

## 2021-03-04 DIAGNOSIS — Z90.49 S/P TOTAL COLECTOMY: ICD-10-CM

## 2021-03-04 DIAGNOSIS — E55.9 VITAMIN D DEFICIENCY DISEASE: ICD-10-CM

## 2021-03-04 DIAGNOSIS — Z17.1 MALIGNANT NEOPLASM OF UPPER-INNER QUADRANT OF RIGHT BREAST IN FEMALE, ESTROGEN RECEPTOR NEGATIVE: Primary | ICD-10-CM

## 2021-03-04 DIAGNOSIS — M32.9 SYSTEMIC LUPUS ERYTHEMATOSUS, UNSPECIFIED SLE TYPE, UNSPECIFIED ORGAN INVOLVEMENT STATUS: ICD-10-CM

## 2021-03-04 DIAGNOSIS — Z12.31 ENCOUNTER FOR SCREENING MAMMOGRAM FOR MALIGNANT NEOPLASM OF BREAST: ICD-10-CM

## 2021-03-04 DIAGNOSIS — C50.211 MALIGNANT NEOPLASM OF UPPER-INNER QUADRANT OF RIGHT BREAST IN FEMALE, ESTROGEN RECEPTOR NEGATIVE: Primary | ICD-10-CM

## 2021-03-04 DIAGNOSIS — K51.918 ULCERATIVE COLITIS WITH OTHER COMPLICATION, UNSPECIFIED LOCATION: ICD-10-CM

## 2021-03-04 DIAGNOSIS — R19.5 LOOSE STOOLS: ICD-10-CM

## 2021-03-04 DIAGNOSIS — K86.2 PANCREAS CYST: ICD-10-CM

## 2021-03-04 PROCEDURE — 3078F DIAST BP <80 MM HG: CPT | Mod: CPTII,S$GLB,, | Performed by: INTERNAL MEDICINE

## 2021-03-04 PROCEDURE — 99215 PR OFFICE/OUTPT VISIT, EST, LEVL V, 40-54 MIN: ICD-10-PCS | Mod: S$GLB,,, | Performed by: INTERNAL MEDICINE

## 2021-03-04 PROCEDURE — 99999 PR PBB SHADOW E&M-EST. PATIENT-LVL III: ICD-10-PCS | Mod: PBBFAC,,, | Performed by: INTERNAL MEDICINE

## 2021-03-04 PROCEDURE — 3078F PR MOST RECENT DIASTOLIC BLOOD PRESSURE < 80 MM HG: ICD-10-PCS | Mod: CPTII,S$GLB,, | Performed by: INTERNAL MEDICINE

## 2021-03-04 PROCEDURE — 99999 PR PBB SHADOW E&M-EST. PATIENT-LVL III: CPT | Mod: PBBFAC,,, | Performed by: INTERNAL MEDICINE

## 2021-03-04 PROCEDURE — 1125F PR PAIN SEVERITY QUANTIFIED, PAIN PRESENT: ICD-10-PCS | Mod: S$GLB,,, | Performed by: INTERNAL MEDICINE

## 2021-03-04 PROCEDURE — 3008F PR BODY MASS INDEX (BMI) DOCUMENTED: ICD-10-PCS | Mod: CPTII,S$GLB,, | Performed by: INTERNAL MEDICINE

## 2021-03-04 PROCEDURE — 3077F PR MOST RECENT SYSTOLIC BLOOD PRESSURE >= 140 MM HG: ICD-10-PCS | Mod: CPTII,S$GLB,, | Performed by: INTERNAL MEDICINE

## 2021-03-04 PROCEDURE — 3077F SYST BP >= 140 MM HG: CPT | Mod: CPTII,S$GLB,, | Performed by: INTERNAL MEDICINE

## 2021-03-04 PROCEDURE — 99215 OFFICE O/P EST HI 40 MIN: CPT | Mod: S$GLB,,, | Performed by: INTERNAL MEDICINE

## 2021-03-04 PROCEDURE — 3008F BODY MASS INDEX DOCD: CPT | Mod: CPTII,S$GLB,, | Performed by: INTERNAL MEDICINE

## 2021-03-04 PROCEDURE — 1125F AMNT PAIN NOTED PAIN PRSNT: CPT | Mod: S$GLB,,, | Performed by: INTERNAL MEDICINE

## 2021-03-08 ENCOUNTER — LAB VISIT (OUTPATIENT)
Dept: LAB | Facility: HOSPITAL | Age: 61
End: 2021-03-08
Attending: INTERNAL MEDICINE
Payer: MEDICARE

## 2021-03-08 DIAGNOSIS — D50.9 IRON DEFICIENCY ANEMIA, UNSPECIFIED IRON DEFICIENCY ANEMIA TYPE: ICD-10-CM

## 2021-03-08 LAB
FERRITIN SERPL-MCNC: 71 NG/ML (ref 20–300)
HGB BLD-MCNC: 12.2 G/DL (ref 12–16)
IRON SERPL-MCNC: 81 UG/DL (ref 30–160)
SATURATED IRON: 20 % (ref 20–50)
TOTAL IRON BINDING CAPACITY: 403 UG/DL (ref 250–450)
TRANSFERRIN SERPL-MCNC: 272 MG/DL (ref 200–375)

## 2021-03-08 PROCEDURE — 82728 ASSAY OF FERRITIN: CPT | Performed by: INTERNAL MEDICINE

## 2021-03-08 PROCEDURE — 83540 ASSAY OF IRON: CPT | Performed by: INTERNAL MEDICINE

## 2021-03-08 PROCEDURE — 36415 COLL VENOUS BLD VENIPUNCTURE: CPT | Performed by: INTERNAL MEDICINE

## 2021-03-08 PROCEDURE — 85018 HEMOGLOBIN: CPT | Performed by: INTERNAL MEDICINE

## 2021-03-15 ENCOUNTER — PATIENT MESSAGE (OUTPATIENT)
Dept: PHARMACY | Facility: CLINIC | Age: 61
End: 2021-03-15

## 2021-03-17 DIAGNOSIS — I10 ESSENTIAL HYPERTENSION: Primary | ICD-10-CM

## 2021-03-19 ENCOUNTER — LAB VISIT (OUTPATIENT)
Dept: LAB | Facility: HOSPITAL | Age: 61
End: 2021-03-19
Attending: INTERNAL MEDICINE
Payer: MEDICARE

## 2021-03-19 DIAGNOSIS — R80.9 PROTEINURIA, UNSPECIFIED TYPE: ICD-10-CM

## 2021-03-19 DIAGNOSIS — I10 ESSENTIAL HYPERTENSION: ICD-10-CM

## 2021-03-19 LAB
25(OH)D3+25(OH)D2 SERPL-MCNC: 46 NG/ML (ref 30–96)
ALBUMIN SERPL BCP-MCNC: 3.8 G/DL (ref 3.5–5.2)
ANION GAP SERPL CALC-SCNC: 13 MMOL/L (ref 8–16)
BUN SERPL-MCNC: 14 MG/DL (ref 8–23)
CALCIUM SERPL-MCNC: 8.9 MG/DL (ref 8.7–10.5)
CHLORIDE SERPL-SCNC: 101 MMOL/L (ref 95–110)
CO2 SERPL-SCNC: 28 MMOL/L (ref 23–29)
CREAT SERPL-MCNC: 0.9 MG/DL (ref 0.5–1.4)
EST. GFR  (AFRICAN AMERICAN): >60 ML/MIN/1.73 M^2
EST. GFR  (NON AFRICAN AMERICAN): >60 ML/MIN/1.73 M^2
GLUCOSE SERPL-MCNC: 88 MG/DL (ref 70–110)
HCT VFR BLD AUTO: 40.3 % (ref 37–48.5)
HCT VFR BLD AUTO: 40.3 % (ref 37–48.5)
HGB BLD-MCNC: 12.7 G/DL (ref 12–16)
PHOSPHATE SERPL-MCNC: 3.4 MG/DL (ref 2.7–4.5)
POTASSIUM SERPL-SCNC: 3.6 MMOL/L (ref 3.5–5.1)
PTH-INTACT SERPL-MCNC: 42 PG/ML (ref 9–77)
SODIUM SERPL-SCNC: 142 MMOL/L (ref 136–145)

## 2021-03-19 PROCEDURE — 85014 HEMATOCRIT: CPT | Performed by: INTERNAL MEDICINE

## 2021-03-19 PROCEDURE — 85018 HEMOGLOBIN: CPT | Performed by: INTERNAL MEDICINE

## 2021-03-19 PROCEDURE — 80069 RENAL FUNCTION PANEL: CPT | Performed by: INTERNAL MEDICINE

## 2021-03-19 PROCEDURE — 36415 COLL VENOUS BLD VENIPUNCTURE: CPT | Mod: PO | Performed by: INTERNAL MEDICINE

## 2021-03-19 PROCEDURE — 83970 ASSAY OF PARATHORMONE: CPT | Performed by: INTERNAL MEDICINE

## 2021-03-19 PROCEDURE — 82306 VITAMIN D 25 HYDROXY: CPT | Performed by: INTERNAL MEDICINE

## 2021-03-20 ENCOUNTER — SPECIALTY PHARMACY (OUTPATIENT)
Dept: PHARMACY | Facility: CLINIC | Age: 61
End: 2021-03-20

## 2021-03-22 ENCOUNTER — PATIENT OUTREACH (OUTPATIENT)
Dept: ADMINISTRATIVE | Facility: OTHER | Age: 61
End: 2021-03-22

## 2021-03-23 ENCOUNTER — TELEPHONE (OUTPATIENT)
Dept: NEPHROLOGY | Facility: CLINIC | Age: 61
End: 2021-03-23

## 2021-03-23 ENCOUNTER — OFFICE VISIT (OUTPATIENT)
Dept: NEPHROLOGY | Facility: CLINIC | Age: 61
End: 2021-03-23
Payer: MEDICARE

## 2021-03-23 VITALS
SYSTOLIC BLOOD PRESSURE: 110 MMHG | HEART RATE: 73 BPM | DIASTOLIC BLOOD PRESSURE: 86 MMHG | OXYGEN SATURATION: 98 % | BODY MASS INDEX: 20.43 KG/M2 | WEIGHT: 119.06 LBS

## 2021-03-23 DIAGNOSIS — M32.9 SYSTEMIC LUPUS ERYTHEMATOSUS, UNSPECIFIED SLE TYPE, UNSPECIFIED ORGAN INVOLVEMENT STATUS: ICD-10-CM

## 2021-03-23 DIAGNOSIS — E87.6 HYPOKALEMIA: ICD-10-CM

## 2021-03-23 DIAGNOSIS — I10 HYPERTENSION, UNSPECIFIED TYPE: Primary | ICD-10-CM

## 2021-03-23 LAB
BILIRUB UR QL STRIP: NEGATIVE
CLARITY UR REFRACT.AUTO: CLEAR
COLOR UR AUTO: YELLOW
GLUCOSE UR QL STRIP: NEGATIVE
HGB UR QL STRIP: NEGATIVE
KETONES UR QL STRIP: NEGATIVE
LEUKOCYTE ESTERASE UR QL STRIP: NEGATIVE
NITRITE UR QL STRIP: NEGATIVE
PH UR STRIP: 5 [PH] (ref 5–8)
PROT UR QL STRIP: NEGATIVE
SP GR UR STRIP: 1.01 (ref 1–1.03)
URN SPEC COLLECT METH UR: NORMAL

## 2021-03-23 PROCEDURE — 3008F PR BODY MASS INDEX (BMI) DOCUMENTED: ICD-10-PCS | Mod: CPTII,S$GLB,, | Performed by: INTERNAL MEDICINE

## 2021-03-23 PROCEDURE — 3079F DIAST BP 80-89 MM HG: CPT | Mod: CPTII,S$GLB,, | Performed by: INTERNAL MEDICINE

## 2021-03-23 PROCEDURE — 99205 OFFICE O/P NEW HI 60 MIN: CPT | Mod: S$GLB,,, | Performed by: INTERNAL MEDICINE

## 2021-03-23 PROCEDURE — 1125F PR PAIN SEVERITY QUANTIFIED, PAIN PRESENT: ICD-10-PCS | Mod: S$GLB,,, | Performed by: INTERNAL MEDICINE

## 2021-03-23 PROCEDURE — 99999 PR PBB SHADOW E&M-EST. PATIENT-LVL IV: ICD-10-PCS | Mod: PBBFAC,,, | Performed by: INTERNAL MEDICINE

## 2021-03-23 PROCEDURE — 3079F PR MOST RECENT DIASTOLIC BLOOD PRESSURE 80-89 MM HG: ICD-10-PCS | Mod: CPTII,S$GLB,, | Performed by: INTERNAL MEDICINE

## 2021-03-23 PROCEDURE — 3008F BODY MASS INDEX DOCD: CPT | Mod: CPTII,S$GLB,, | Performed by: INTERNAL MEDICINE

## 2021-03-23 PROCEDURE — 3074F SYST BP LT 130 MM HG: CPT | Mod: CPTII,S$GLB,, | Performed by: INTERNAL MEDICINE

## 2021-03-23 PROCEDURE — 99205 PR OFFICE/OUTPT VISIT, NEW, LEVL V, 60-74 MIN: ICD-10-PCS | Mod: S$GLB,,, | Performed by: INTERNAL MEDICINE

## 2021-03-23 PROCEDURE — 99999 PR PBB SHADOW E&M-EST. PATIENT-LVL IV: CPT | Mod: PBBFAC,,, | Performed by: INTERNAL MEDICINE

## 2021-03-23 PROCEDURE — 1125F AMNT PAIN NOTED PAIN PRSNT: CPT | Mod: S$GLB,,, | Performed by: INTERNAL MEDICINE

## 2021-03-23 PROCEDURE — 3074F PR MOST RECENT SYSTOLIC BLOOD PRESSURE < 130 MM HG: ICD-10-PCS | Mod: CPTII,S$GLB,, | Performed by: INTERNAL MEDICINE

## 2021-03-23 PROCEDURE — 81003 URINALYSIS AUTO W/O SCOPE: CPT | Performed by: INTERNAL MEDICINE

## 2021-03-23 RX ORDER — LOSARTAN POTASSIUM 50 MG/1
50 TABLET ORAL DAILY
Qty: 90 TABLET | Refills: 3 | Status: SHIPPED | OUTPATIENT
Start: 2021-03-23 | End: 2021-12-07

## 2021-04-01 ENCOUNTER — LAB VISIT (OUTPATIENT)
Dept: LAB | Facility: HOSPITAL | Age: 61
End: 2021-04-01
Attending: INTERNAL MEDICINE
Payer: MEDICARE

## 2021-04-01 DIAGNOSIS — E87.6 HYPOKALEMIA: ICD-10-CM

## 2021-04-01 DIAGNOSIS — M32.9 SYSTEMIC LUPUS ERYTHEMATOSUS, UNSPECIFIED SLE TYPE, UNSPECIFIED ORGAN INVOLVEMENT STATUS: ICD-10-CM

## 2021-04-01 DIAGNOSIS — I10 HYPERTENSION, UNSPECIFIED TYPE: ICD-10-CM

## 2021-04-01 LAB
ALBUMIN SERPL BCP-MCNC: 3.8 G/DL (ref 3.5–5.2)
ANION GAP SERPL CALC-SCNC: 10 MMOL/L (ref 8–16)
BUN SERPL-MCNC: 17 MG/DL (ref 8–23)
CALCIUM SERPL-MCNC: 9.3 MG/DL (ref 8.7–10.5)
CHLORIDE SERPL-SCNC: 102 MMOL/L (ref 95–110)
CO2 SERPL-SCNC: 30 MMOL/L (ref 23–29)
CREAT SERPL-MCNC: 0.8 MG/DL (ref 0.5–1.4)
EST. GFR  (AFRICAN AMERICAN): >60 ML/MIN/1.73 M^2
EST. GFR  (NON AFRICAN AMERICAN): >60 ML/MIN/1.73 M^2
GLUCOSE SERPL-MCNC: 98 MG/DL (ref 70–110)
PHOSPHATE SERPL-MCNC: 3.7 MG/DL (ref 2.7–4.5)
POTASSIUM SERPL-SCNC: 3.5 MMOL/L (ref 3.5–5.1)
SODIUM SERPL-SCNC: 142 MMOL/L (ref 136–145)

## 2021-04-01 PROCEDURE — 82088 ASSAY OF ALDOSTERONE: CPT | Performed by: INTERNAL MEDICINE

## 2021-04-01 PROCEDURE — 36415 COLL VENOUS BLD VENIPUNCTURE: CPT | Mod: PO | Performed by: INTERNAL MEDICINE

## 2021-04-01 PROCEDURE — 80069 RENAL FUNCTION PANEL: CPT | Performed by: INTERNAL MEDICINE

## 2021-04-05 LAB — ALDOST SERPL-MCNC: 15.1 NG/DL

## 2021-04-13 ENCOUNTER — SPECIALTY PHARMACY (OUTPATIENT)
Dept: PHARMACY | Facility: CLINIC | Age: 61
End: 2021-04-13

## 2021-04-13 DIAGNOSIS — M80.00XG AGE-RELATED OSTEOPOROSIS WITH CURRENT PATHOLOGICAL FRACTURE WITH DELAYED HEALING: ICD-10-CM

## 2021-04-13 RX ORDER — TERIPARATIDE 250 UG/ML
20 INJECTION, SOLUTION SUBCUTANEOUS DAILY
Qty: 2.4 ML | Refills: 1 | Status: SHIPPED | OUTPATIENT
Start: 2021-04-13 | End: 2021-06-10 | Stop reason: SDUPTHER

## 2021-04-22 ENCOUNTER — LAB VISIT (OUTPATIENT)
Dept: LAB | Facility: HOSPITAL | Age: 61
End: 2021-04-22
Attending: INTERNAL MEDICINE
Payer: MEDICARE

## 2021-04-22 ENCOUNTER — OFFICE VISIT (OUTPATIENT)
Dept: GASTROENTEROLOGY | Facility: CLINIC | Age: 61
End: 2021-04-22
Payer: MEDICARE

## 2021-04-22 VITALS
DIASTOLIC BLOOD PRESSURE: 95 MMHG | WEIGHT: 127 LBS | BODY MASS INDEX: 23.37 KG/M2 | SYSTOLIC BLOOD PRESSURE: 153 MMHG | HEART RATE: 86 BPM | HEIGHT: 62 IN

## 2021-04-22 DIAGNOSIS — Z79.899 ENCOUNTER FOR MONITORING LONG-TERM PROTON PUMP INHIBITOR THERAPY: ICD-10-CM

## 2021-04-22 DIAGNOSIS — Z51.81 ENCOUNTER FOR MONITORING LONG-TERM PROTON PUMP INHIBITOR THERAPY: ICD-10-CM

## 2021-04-22 DIAGNOSIS — K86.81 EXOCRINE PANCREATIC INSUFFICIENCY: ICD-10-CM

## 2021-04-22 DIAGNOSIS — K51.819 OTHER ULCERATIVE COLITIS WITH COMPLICATION: ICD-10-CM

## 2021-04-22 DIAGNOSIS — R19.7 DIARRHEA IN ADULT PATIENT: Primary | ICD-10-CM

## 2021-04-22 DIAGNOSIS — R19.7 DIARRHEA IN ADULT PATIENT: ICD-10-CM

## 2021-04-22 LAB
ALBUMIN SERPL BCP-MCNC: 3.9 G/DL (ref 3.5–5.2)
ALP SERPL-CCNC: 64 U/L (ref 55–135)
ALT SERPL W/O P-5'-P-CCNC: 15 U/L (ref 10–44)
ANION GAP SERPL CALC-SCNC: 10 MMOL/L (ref 8–16)
AST SERPL-CCNC: 22 U/L (ref 10–40)
BASOPHILS # BLD AUTO: 0.04 K/UL (ref 0–0.2)
BASOPHILS NFR BLD: 0.5 % (ref 0–1.9)
BILIRUB DIRECT SERPL-MCNC: 0.2 MG/DL (ref 0.1–0.3)
BILIRUB SERPL-MCNC: 0.3 MG/DL (ref 0.1–1)
BUN SERPL-MCNC: 10 MG/DL (ref 8–23)
CALCIUM SERPL-MCNC: 9.3 MG/DL (ref 8.7–10.5)
CHLORIDE SERPL-SCNC: 101 MMOL/L (ref 95–110)
CO2 SERPL-SCNC: 31 MMOL/L (ref 23–29)
CREAT SERPL-MCNC: 0.8 MG/DL (ref 0.5–1.4)
CRP SERPL-MCNC: 0.8 MG/L (ref 0–8.2)
DIFFERENTIAL METHOD: ABNORMAL
EOSINOPHIL # BLD AUTO: 0.1 K/UL (ref 0–0.5)
EOSINOPHIL NFR BLD: 1.9 % (ref 0–8)
ERYTHROCYTE [DISTWIDTH] IN BLOOD BY AUTOMATED COUNT: 14.2 % (ref 11.5–14.5)
EST. GFR  (AFRICAN AMERICAN): >60 ML/MIN/1.73 M^2
EST. GFR  (NON AFRICAN AMERICAN): >60 ML/MIN/1.73 M^2
FERRITIN SERPL-MCNC: 72 NG/ML (ref 20–300)
GLUCOSE SERPL-MCNC: 84 MG/DL (ref 70–110)
HCT VFR BLD AUTO: 38.8 % (ref 37–48.5)
HGB BLD-MCNC: 12.3 G/DL (ref 12–16)
IMM GRANULOCYTES # BLD AUTO: 0.02 K/UL (ref 0–0.04)
IMM GRANULOCYTES NFR BLD AUTO: 0.3 % (ref 0–0.5)
INR PPP: 0.9 (ref 0.8–1.2)
IRON SERPL-MCNC: 121 UG/DL (ref 30–160)
LIPASE SERPL-CCNC: 17 U/L (ref 4–60)
LYMPHOCYTES # BLD AUTO: 1.1 K/UL (ref 1–4.8)
LYMPHOCYTES NFR BLD: 14.1 % (ref 18–48)
MCH RBC QN AUTO: 27.5 PG (ref 27–31)
MCHC RBC AUTO-ENTMCNC: 31.7 G/DL (ref 32–36)
MCV RBC AUTO: 87 FL (ref 82–98)
MONOCYTES # BLD AUTO: 0.5 K/UL (ref 0.3–1)
MONOCYTES NFR BLD: 6.3 % (ref 4–15)
NEUTROPHILS # BLD AUTO: 5.8 K/UL (ref 1.8–7.7)
NEUTROPHILS NFR BLD: 76.9 % (ref 38–73)
NRBC BLD-RTO: 0 /100 WBC
PLATELET # BLD AUTO: 241 K/UL (ref 150–450)
PMV BLD AUTO: 11.1 FL (ref 9.2–12.9)
POTASSIUM SERPL-SCNC: 3.7 MMOL/L (ref 3.5–5.1)
PROT SERPL-MCNC: 7.2 G/DL (ref 6–8.4)
PROTHROMBIN TIME: 9.9 SEC (ref 9–12.5)
RBC # BLD AUTO: 4.47 M/UL (ref 4–5.4)
SATURATED IRON: 27 % (ref 20–50)
SODIUM SERPL-SCNC: 142 MMOL/L (ref 136–145)
TOTAL IRON BINDING CAPACITY: 441 UG/DL (ref 250–450)
TRANSFERRIN SERPL-MCNC: 298 MG/DL (ref 200–375)
VIT B12 SERPL-MCNC: >2000 PG/ML (ref 210–950)
WBC # BLD AUTO: 7.52 K/UL (ref 3.9–12.7)

## 2021-04-22 PROCEDURE — 83540 ASSAY OF IRON: CPT | Performed by: INTERNAL MEDICINE

## 2021-04-22 PROCEDURE — 1125F PR PAIN SEVERITY QUANTIFIED, PAIN PRESENT: ICD-10-PCS | Mod: S$GLB,,, | Performed by: INTERNAL MEDICINE

## 2021-04-22 PROCEDURE — 36415 COLL VENOUS BLD VENIPUNCTURE: CPT | Performed by: INTERNAL MEDICINE

## 2021-04-22 PROCEDURE — 83519 RIA NONANTIBODY: CPT | Mod: 59 | Performed by: INTERNAL MEDICINE

## 2021-04-22 PROCEDURE — 99214 OFFICE O/P EST MOD 30 MIN: CPT | Mod: S$GLB,,, | Performed by: INTERNAL MEDICINE

## 2021-04-22 PROCEDURE — 99214 PR OFFICE/OUTPT VISIT, EST, LEVL IV, 30-39 MIN: ICD-10-PCS | Mod: S$GLB,,, | Performed by: INTERNAL MEDICINE

## 2021-04-22 PROCEDURE — 3008F PR BODY MASS INDEX (BMI) DOCUMENTED: ICD-10-PCS | Mod: CPTII,S$GLB,, | Performed by: INTERNAL MEDICINE

## 2021-04-22 PROCEDURE — 86682 HELMINTH ANTIBODY: CPT | Performed by: INTERNAL MEDICINE

## 2021-04-22 PROCEDURE — 85025 COMPLETE CBC W/AUTO DIFF WBC: CPT | Performed by: INTERNAL MEDICINE

## 2021-04-22 PROCEDURE — 99999 PR PBB SHADOW E&M-EST. PATIENT-LVL V: ICD-10-PCS | Mod: PBBFAC,,, | Performed by: INTERNAL MEDICINE

## 2021-04-22 PROCEDURE — 84586 ASSAY OF VIP: CPT | Performed by: INTERNAL MEDICINE

## 2021-04-22 PROCEDURE — 84590 ASSAY OF VITAMIN A: CPT | Performed by: INTERNAL MEDICINE

## 2021-04-22 PROCEDURE — 80076 HEPATIC FUNCTION PANEL: CPT | Performed by: INTERNAL MEDICINE

## 2021-04-22 PROCEDURE — 83690 ASSAY OF LIPASE: CPT | Performed by: INTERNAL MEDICINE

## 2021-04-22 PROCEDURE — 82728 ASSAY OF FERRITIN: CPT | Performed by: INTERNAL MEDICINE

## 2021-04-22 PROCEDURE — 84307 ASSAY OF SOMATOSTATIN: CPT | Performed by: INTERNAL MEDICINE

## 2021-04-22 PROCEDURE — 85610 PROTHROMBIN TIME: CPT | Performed by: INTERNAL MEDICINE

## 2021-04-22 PROCEDURE — 86140 C-REACTIVE PROTEIN: CPT | Performed by: INTERNAL MEDICINE

## 2021-04-22 PROCEDURE — 84446 ASSAY OF VITAMIN E: CPT | Performed by: INTERNAL MEDICINE

## 2021-04-22 PROCEDURE — 1125F AMNT PAIN NOTED PAIN PRSNT: CPT | Mod: S$GLB,,, | Performed by: INTERNAL MEDICINE

## 2021-04-22 PROCEDURE — 80048 BASIC METABOLIC PNL TOTAL CA: CPT | Performed by: INTERNAL MEDICINE

## 2021-04-22 PROCEDURE — 99999 PR PBB SHADOW E&M-EST. PATIENT-LVL V: CPT | Mod: PBBFAC,,, | Performed by: INTERNAL MEDICINE

## 2021-04-22 PROCEDURE — 86753 PROTOZOA ANTIBODY NOS: CPT | Performed by: INTERNAL MEDICINE

## 2021-04-22 PROCEDURE — 82607 VITAMIN B-12: CPT | Performed by: INTERNAL MEDICINE

## 2021-04-22 PROCEDURE — 82542 COL CHROMOTOGRAPHY QUAL/QUAN: CPT | Performed by: INTERNAL MEDICINE

## 2021-04-22 PROCEDURE — 82308 ASSAY OF CALCITONIN: CPT | Performed by: INTERNAL MEDICINE

## 2021-04-22 PROCEDURE — 84630 ASSAY OF ZINC: CPT | Performed by: INTERNAL MEDICINE

## 2021-04-22 PROCEDURE — 83520 IMMUNOASSAY QUANT NOS NONAB: CPT | Performed by: INTERNAL MEDICINE

## 2021-04-22 PROCEDURE — 3008F BODY MASS INDEX DOCD: CPT | Mod: CPTII,S$GLB,, | Performed by: INTERNAL MEDICINE

## 2021-04-23 ENCOUNTER — TELEPHONE (OUTPATIENT)
Dept: ENDOSCOPY | Facility: HOSPITAL | Age: 61
End: 2021-04-23

## 2021-04-23 LAB
CALCIT SERPL-MCNC: <5 PG/ML
TRYPTASE LEVEL: 4.3 NG/ML

## 2021-04-26 LAB
STRONGYLOIDES ANTIBODY IGG: NEGATIVE
ZINC SERPL-MCNC: 77 UG/DL (ref 60–130)

## 2021-04-27 LAB
A-TOCOPHEROL VIT E SERPL-MCNC: 909 UG/DL (ref 500–1800)
E HISTOLYT AB SER IA-ACNC: NEGATIVE
VIT A SERPL-MCNC: 63 UG/DL (ref 38–106)

## 2021-04-28 ENCOUNTER — LAB VISIT (OUTPATIENT)
Dept: LAB | Facility: HOSPITAL | Age: 61
End: 2021-04-28
Attending: INTERNAL MEDICINE
Payer: MEDICARE

## 2021-04-28 DIAGNOSIS — K51.819 OTHER ULCERATIVE COLITIS WITH COMPLICATION: ICD-10-CM

## 2021-04-28 DIAGNOSIS — R19.7 DIARRHEA IN ADULT PATIENT: ICD-10-CM

## 2021-04-28 LAB
C DIFF GDH STL QL: NEGATIVE
C DIFF TOX A+B STL QL IA: NEGATIVE
CRYPTOSP AG STL QL IA: NEGATIVE
E COLI SXT1 STL QL IA: NEGATIVE
E COLI SXT2 STL QL IA: NEGATIVE
G LAMBLIA AG STL QL IA: NEGATIVE
PANC POLYPEPT SERPL-MCNC: 114 PG/ML
VASOACTIVE INTESTINAL POLYPEPTIDE PLASMA: <50 PG/ML

## 2021-04-28 PROCEDURE — 87207 SMEAR SPECIAL STAIN: CPT | Performed by: INTERNAL MEDICINE

## 2021-04-28 PROCEDURE — 89125 SPECIMEN FAT STAIN: CPT | Performed by: INTERNAL MEDICINE

## 2021-04-28 PROCEDURE — 87209 SMEAR COMPLEX STAIN: CPT | Mod: 91 | Performed by: INTERNAL MEDICINE

## 2021-04-28 PROCEDURE — 82656 EL-1 FECAL QUAL/SEMIQ: CPT | Performed by: INTERNAL MEDICINE

## 2021-04-28 PROCEDURE — 87427 SHIGA-LIKE TOXIN AG IA: CPT | Mod: 59 | Performed by: INTERNAL MEDICINE

## 2021-04-28 PROCEDURE — 87015 SPECIMEN INFECT AGNT CONCNTJ: CPT | Performed by: INTERNAL MEDICINE

## 2021-04-28 PROCEDURE — 87329 GIARDIA AG IA: CPT | Performed by: INTERNAL MEDICINE

## 2021-04-28 PROCEDURE — 87046 STOOL CULTR AEROBIC BACT EA: CPT | Performed by: INTERNAL MEDICINE

## 2021-04-28 PROCEDURE — 87449 NOS EACH ORGANISM AG IA: CPT | Performed by: INTERNAL MEDICINE

## 2021-04-28 PROCEDURE — 87798 DETECT AGENT NOS DNA AMP: CPT | Performed by: INTERNAL MEDICINE

## 2021-04-28 PROCEDURE — 87045 FECES CULTURE AEROBIC BACT: CPT | Performed by: INTERNAL MEDICINE

## 2021-04-28 PROCEDURE — 87324 CLOSTRIDIUM AG IA: CPT | Performed by: INTERNAL MEDICINE

## 2021-04-30 LAB
CYCLOSPORA STL SAFRANIN STN: NORMAL
ELASTASE 1, FECAL: 209 MCG/G
ENCEPHALITOZOON BIENEUSI: NEGATIVE
ENCEPHALITOZOON SPECIES: NEGATIVE
FAT STL SUDAN IV STN: NORMAL
MICROSPORIDIA SPECIMEN SOURCE: NORMAL
O+P STL MICRO: NORMAL
O+P STL MICRO: NORMAL

## 2021-05-01 LAB
BACTERIA STL CULT: NORMAL
SOMATOSTAT PLAS-MCNC: 24 PG/ML

## 2021-05-04 ENCOUNTER — TELEPHONE (OUTPATIENT)
Dept: ENDOSCOPY | Facility: HOSPITAL | Age: 61
End: 2021-05-04

## 2021-05-05 LAB — 5OH-INDOLEACETATE SERPL-MCNC: 6 NG/ML

## 2021-05-10 ENCOUNTER — TELEPHONE (OUTPATIENT)
Dept: NEPHROLOGY | Facility: CLINIC | Age: 61
End: 2021-05-10

## 2021-05-10 DIAGNOSIS — E87.6 HYPOKALEMIA: Primary | ICD-10-CM

## 2021-05-10 NOTE — ADDENDUM NOTE
Addended by: BRADFORD GACRIA on: 5/25/2017 11:33 AM     Modules accepted: Orders    
Asthma    Mobitz type 1 second degree AV block    Mobitz type 1 second degree AV block    Mobitz type 1 second degree AV block

## 2021-05-11 ENCOUNTER — SPECIALTY PHARMACY (OUTPATIENT)
Dept: PHARMACY | Facility: CLINIC | Age: 61
End: 2021-05-11

## 2021-05-17 RX ORDER — MILNACIPRAN HYDROCHLORIDE 100 MG/1
TABLET, FILM COATED ORAL
Qty: 30 TABLET | Refills: 1 | Status: SHIPPED | OUTPATIENT
Start: 2021-05-17 | End: 2021-07-21

## 2021-06-08 ENCOUNTER — SPECIALTY PHARMACY (OUTPATIENT)
Dept: PHARMACY | Facility: CLINIC | Age: 61
End: 2021-06-08

## 2021-06-08 DIAGNOSIS — M80.00XG AGE-RELATED OSTEOPOROSIS WITH CURRENT PATHOLOGICAL FRACTURE WITH DELAYED HEALING: ICD-10-CM

## 2021-06-08 RX ORDER — TERIPARATIDE 250 UG/ML
20 INJECTION, SOLUTION SUBCUTANEOUS DAILY
Qty: 2.4 ML | Refills: 1 | OUTPATIENT
Start: 2021-06-08 | End: 2022-06-08

## 2021-06-09 RX ORDER — TERIPARATIDE 250 UG/ML
20 INJECTION, SOLUTION SUBCUTANEOUS DAILY
Qty: 2.4 ML | Refills: 1 | OUTPATIENT
Start: 2021-06-09 | End: 2022-06-09

## 2021-06-10 DIAGNOSIS — M80.00XG AGE-RELATED OSTEOPOROSIS WITH CURRENT PATHOLOGICAL FRACTURE WITH DELAYED HEALING: ICD-10-CM

## 2021-06-10 RX ORDER — TERIPARATIDE 250 UG/ML
20 INJECTION, SOLUTION SUBCUTANEOUS DAILY
Qty: 2.4 ML | Refills: 1 | Status: SHIPPED | OUTPATIENT
Start: 2021-06-10 | End: 2021-08-09 | Stop reason: SDUPTHER

## 2021-06-23 DIAGNOSIS — E55.9 VITAMIN D DEFICIENCY: ICD-10-CM

## 2021-06-23 DIAGNOSIS — R80.9 PROTEINURIA, UNSPECIFIED TYPE: Primary | ICD-10-CM

## 2021-07-07 ENCOUNTER — SPECIALTY PHARMACY (OUTPATIENT)
Dept: PHARMACY | Facility: CLINIC | Age: 61
End: 2021-07-07

## 2021-07-09 ENCOUNTER — LAB VISIT (OUTPATIENT)
Dept: LAB | Facility: HOSPITAL | Age: 61
End: 2021-07-09
Attending: INTERNAL MEDICINE
Payer: MEDICAID

## 2021-07-09 DIAGNOSIS — M32.9 SYSTEMIC LUPUS ERYTHEMATOSUS, UNSPECIFIED SLE TYPE, UNSPECIFIED ORGAN INVOLVEMENT STATUS: ICD-10-CM

## 2021-07-09 DIAGNOSIS — I10 HYPERTENSION, UNSPECIFIED TYPE: ICD-10-CM

## 2021-07-09 DIAGNOSIS — E55.9 VITAMIN D DEFICIENCY: ICD-10-CM

## 2021-07-09 DIAGNOSIS — R80.9 PROTEINURIA, UNSPECIFIED TYPE: ICD-10-CM

## 2021-07-09 DIAGNOSIS — E87.6 HYPOKALEMIA: ICD-10-CM

## 2021-07-09 LAB
25(OH)D3+25(OH)D2 SERPL-MCNC: 49 NG/ML (ref 30–96)
ALBUMIN SERPL BCP-MCNC: 3.6 G/DL (ref 3.5–5.2)
ANION GAP SERPL CALC-SCNC: 9 MMOL/L (ref 8–16)
BUN SERPL-MCNC: 15 MG/DL (ref 8–23)
C3 SERPL-MCNC: 95 MG/DL (ref 50–180)
C4 SERPL-MCNC: 29 MG/DL (ref 11–44)
CALCIUM SERPL-MCNC: 9.5 MG/DL (ref 8.7–10.5)
CHLORIDE SERPL-SCNC: 102 MMOL/L (ref 95–110)
CO2 SERPL-SCNC: 31 MMOL/L (ref 23–29)
CREAT SERPL-MCNC: 0.8 MG/DL (ref 0.5–1.4)
EST. GFR  (AFRICAN AMERICAN): >60 ML/MIN/1.73 M^2
EST. GFR  (NON AFRICAN AMERICAN): >60 ML/MIN/1.73 M^2
GLUCOSE SERPL-MCNC: 92 MG/DL (ref 70–110)
HCT VFR BLD AUTO: 37.4 % (ref 37–48.5)
HGB BLD-MCNC: 11.4 G/DL (ref 12–16)
PHOSPHATE SERPL-MCNC: 4.1 MG/DL (ref 2.7–4.5)
POTASSIUM SERPL-SCNC: 3.4 MMOL/L (ref 3.5–5.1)
PTH-INTACT SERPL-MCNC: 48 PG/ML (ref 9–77)
SODIUM SERPL-SCNC: 142 MMOL/L (ref 136–145)

## 2021-07-09 PROCEDURE — 86160 COMPLEMENT ANTIGEN: CPT | Mod: 59 | Performed by: INTERNAL MEDICINE

## 2021-07-09 PROCEDURE — 85014 HEMATOCRIT: CPT | Performed by: INTERNAL MEDICINE

## 2021-07-09 PROCEDURE — 82306 VITAMIN D 25 HYDROXY: CPT | Performed by: INTERNAL MEDICINE

## 2021-07-09 PROCEDURE — 84244 ASSAY OF RENIN: CPT | Performed by: INTERNAL MEDICINE

## 2021-07-09 PROCEDURE — 36415 COLL VENOUS BLD VENIPUNCTURE: CPT | Mod: PO | Performed by: INTERNAL MEDICINE

## 2021-07-09 PROCEDURE — 82088 ASSAY OF ALDOSTERONE: CPT | Performed by: INTERNAL MEDICINE

## 2021-07-09 PROCEDURE — 80069 RENAL FUNCTION PANEL: CPT | Performed by: INTERNAL MEDICINE

## 2021-07-09 PROCEDURE — 85018 HEMOGLOBIN: CPT | Performed by: INTERNAL MEDICINE

## 2021-07-09 PROCEDURE — 86160 COMPLEMENT ANTIGEN: CPT | Performed by: INTERNAL MEDICINE

## 2021-07-09 PROCEDURE — 83970 ASSAY OF PARATHORMONE: CPT | Performed by: INTERNAL MEDICINE

## 2021-07-12 LAB — ALDOST SERPL-MCNC: 15.2 NG/DL

## 2021-07-14 ENCOUNTER — PATIENT OUTREACH (OUTPATIENT)
Dept: ADMINISTRATIVE | Facility: OTHER | Age: 61
End: 2021-07-14

## 2021-07-14 LAB — RENIN PLAS-CCNC: 1.2 NG/ML/H

## 2021-07-15 ENCOUNTER — OFFICE VISIT (OUTPATIENT)
Dept: NEPHROLOGY | Facility: CLINIC | Age: 61
End: 2021-07-15
Payer: MEDICARE

## 2021-07-15 VITALS
HEART RATE: 84 BPM | DIASTOLIC BLOOD PRESSURE: 94 MMHG | SYSTOLIC BLOOD PRESSURE: 134 MMHG | OXYGEN SATURATION: 96 % | BODY MASS INDEX: 22.98 KG/M2 | WEIGHT: 125.69 LBS

## 2021-07-15 DIAGNOSIS — E87.6 HYPOKALEMIA: Primary | ICD-10-CM

## 2021-07-15 DIAGNOSIS — I10 HYPERTENSION, UNSPECIFIED TYPE: ICD-10-CM

## 2021-07-15 PROCEDURE — 3080F DIAST BP >= 90 MM HG: CPT | Mod: CPTII,S$GLB,, | Performed by: INTERNAL MEDICINE

## 2021-07-15 PROCEDURE — 3008F PR BODY MASS INDEX (BMI) DOCUMENTED: ICD-10-PCS | Mod: CPTII,S$GLB,, | Performed by: INTERNAL MEDICINE

## 2021-07-15 PROCEDURE — 99215 PR OFFICE/OUTPT VISIT, EST, LEVL V, 40-54 MIN: ICD-10-PCS | Mod: S$GLB,,, | Performed by: INTERNAL MEDICINE

## 2021-07-15 PROCEDURE — 99999 PR PBB SHADOW E&M-EST. PATIENT-LVL II: CPT | Mod: PBBFAC,,, | Performed by: INTERNAL MEDICINE

## 2021-07-15 PROCEDURE — 3080F PR MOST RECENT DIASTOLIC BLOOD PRESSURE >= 90 MM HG: ICD-10-PCS | Mod: CPTII,S$GLB,, | Performed by: INTERNAL MEDICINE

## 2021-07-15 PROCEDURE — 99499 RISK ADDL DX/OHS AUDIT: ICD-10-PCS | Mod: HCNC,S$GLB,, | Performed by: INTERNAL MEDICINE

## 2021-07-15 PROCEDURE — 1126F PR PAIN SEVERITY QUANTIFIED, NO PAIN PRESENT: ICD-10-PCS | Mod: S$GLB,,, | Performed by: INTERNAL MEDICINE

## 2021-07-15 PROCEDURE — 99499 UNLISTED E&M SERVICE: CPT | Mod: HCNC,S$GLB,, | Performed by: INTERNAL MEDICINE

## 2021-07-15 PROCEDURE — 99999 PR PBB SHADOW E&M-EST. PATIENT-LVL II: ICD-10-PCS | Mod: PBBFAC,,, | Performed by: INTERNAL MEDICINE

## 2021-07-15 PROCEDURE — 3075F PR MOST RECENT SYSTOLIC BLOOD PRESS GE 130-139MM HG: ICD-10-PCS | Mod: CPTII,S$GLB,, | Performed by: INTERNAL MEDICINE

## 2021-07-15 PROCEDURE — 1126F AMNT PAIN NOTED NONE PRSNT: CPT | Mod: S$GLB,,, | Performed by: INTERNAL MEDICINE

## 2021-07-15 PROCEDURE — 99215 OFFICE O/P EST HI 40 MIN: CPT | Mod: S$GLB,,, | Performed by: INTERNAL MEDICINE

## 2021-07-15 PROCEDURE — 3075F SYST BP GE 130 - 139MM HG: CPT | Mod: CPTII,S$GLB,, | Performed by: INTERNAL MEDICINE

## 2021-07-15 PROCEDURE — 3008F BODY MASS INDEX DOCD: CPT | Mod: CPTII,S$GLB,, | Performed by: INTERNAL MEDICINE

## 2021-07-15 RX ORDER — AMLODIPINE BESYLATE 2.5 MG/1
2.5 TABLET ORAL DAILY
COMMUNITY
End: 2022-06-03 | Stop reason: SDUPTHER

## 2021-07-19 DIAGNOSIS — M32.9 SYSTEMIC LUPUS ERYTHEMATOSUS, UNSPECIFIED SLE TYPE, UNSPECIFIED ORGAN INVOLVEMENT STATUS: Primary | ICD-10-CM

## 2021-07-20 RX ORDER — HYDROXYCHLOROQUINE SULFATE 200 MG/1
TABLET, FILM COATED ORAL
Qty: 45 TABLET | Refills: 3 | Status: SHIPPED | OUTPATIENT
Start: 2021-07-20 | End: 2022-01-05

## 2021-08-08 NOTE — PROGRESS NOTES
Pt  34/1 wks arrives with c/o abd pain, and leaking of fluid that started around 2300 last night, pt states she has had to wear and change a pad today, denies having intercourse, denies bleeding, states last felt the baby move 5 hours ago. Subjective:      Patient ID: Efe Horowitz is a 59 y.o. female.    Chief Complaint:  Osteoporosis      History of Present Illness    Ms. Horowitz is a 59 year old woman with osteoporosis and adrenal insufficiency, vertebral fracture in the context of recent steroid injections.     Two months ago, she developed acute onset back pain not associated with trauma or lifting heavy objects.   She is going to have vertebroplasty. Has pre op scheduled for tomorrow. A few weeks prior to this was feeling badly (weakness, sinus infections) and was treated with steroid injection and prednisone 30 mg with a titration over seven days.      Osteoporosis was diagnosed twenty years ago. She was diagnosed with UC at age 18 years treated with prednisone initially.     Treated with reclast but review of chart demonstrates dose adminstered in 2014. Previous notes mentioning receiving reclast but not listed in chart (2012). Prior treatments include: actonel.   Continues to have back pain. Denies height loss.      Last DXA done over two years ago ([facility]). FRAX did not demonstrate and increased risk for fracture (3.2% and 0.3%). Lowest T score at FN -1.5 (LS -1.2) with normal Z scores at all sites.     Supplements:  Calcium: 500 mg one tablet twice a day   Vitamin D 50,000 IUs twice weekly     Hysterectomy in 1985, ovaries intact     Adrenal insufficiency was diagnosed fifteen years ago. At the time reported weakness. She was seen by Dr. Starkey in 2008, but a few years prior was evaluated by Dr. Salazar at [place] at that time she reported fatigue, weakness, lightheadedness and some BP issues.     Current treatment includes: Prednisone 5 mg daily     Emergency treatment for illness was reviewed. We reviewed her dose increase, initially she thought increasing to 40 mg was appropriate. We discussed rule of three's in case of illness: fever/chills/flu. We also discussed need for IM steroids in case of GI illness as she will not  "be able to absorb oral prednisone.    Review of Systems   Constitutional: Negative for chills and fever.   HENT: Negative for congestion and sinus pressure.    Eyes: Negative for visual disturbance.   Respiratory: Negative for chest tightness and shortness of breath.    Cardiovascular: Negative for chest pain, palpitations and leg swelling.   Gastrointestinal: Positive for abdominal pain and diarrhea (  intermittently). Negative for vomiting.   Genitourinary: Negative for dysuria.   Musculoskeletal: Negative for arthralgias.   Skin: Negative for rash.   Neurological: Negative for weakness.   Hematological: Does not bruise/bleed easily.   Psychiatric/Behavioral: Negative for sleep disturbance.       Objective:   Physical Exam  Vitals:    09/11/19 1108   BP: 120/80   Pulse: 75   Weight: 56.2 kg (123 lb 14.4 oz)   Height: 5' 4" (1.626 m)       BP Readings from Last 3 Encounters:   09/11/19 120/80   05/21/19 (!) 143/89   05/16/19 (!) 139/91     Wt Readings from Last 1 Encounters:   09/11/19 1108 56.2 kg (123 lb 14.4 oz)         Body mass index is 21.27 kg/m².    Lab Review:   Lab Results   Component Value Date    HGBA1C 5.7 (H) 05/08/2018     Lab Results   Component Value Date    CHOL 276 (H) 05/21/2019    HDL 84 (H) 05/21/2019    LDLCALC 151.6 05/21/2019    TRIG 202 (H) 05/21/2019    CHOLHDL 30.4 05/21/2019     Lab Results   Component Value Date     05/21/2019    K 3.7 05/21/2019     05/21/2019    CO2 28 05/21/2019    GLU 86 05/21/2019    BUN 9 05/21/2019    CREATININE 0.7 05/21/2019    CALCIUM 10.2 05/21/2019    PROT 7.8 05/16/2019    PROT 7.8 05/16/2019    ALBUMIN 4.2 05/16/2019    ALBUMIN 4.2 05/16/2019    BILITOT 0.4 05/16/2019    BILITOT 0.4 05/16/2019    ALKPHOS 60 05/16/2019    ALKPHOS 59 05/16/2019    AST 27 05/16/2019    AST 25 05/16/2019    ALT 22 05/16/2019    ALT 20 05/16/2019    ANIONGAP 11 05/21/2019    ESTGFRAFRICA >60.0 05/21/2019    EGFRNONAA >60.0 05/21/2019    TSH 0.686 06/06/2016 "     Results for LINETTE LOPEZ (MRN [id number]) as of 9/11/2019 11:41   Ref. Range 8/4/2016 09:09 1/9/2018 07:55   PTH Latest Ref Range: 9.0 - 77.0 pg/mL 41.0 79.0 (H)     Results for LINETTE LOPEZ (MRN [id number]) as of 9/11/2019 11:41   Ref. Range 5/16/2019 12:09   Vit D, 25-Hydroxy Latest Ref Range: 30 - 96 ng/mL 53   Vitamin B-12 Latest Ref Range: 210 - 950 pg/mL 704     DXA 5/2017:  BONE MINERAL DENSITY RESULTS:  Lumbar Spine: Lumbar bone mineral density L1-L4 is 0.910g/cm2, which is a t-score of -1.2. The z-score is -0.7.    Total Hip: The total hip bone mineral density is 0.823g/cm2.  The t-score is -1.0, and the z-score is -0.6.  Femoral neck BMD is 0.687g/cm2 and the t-score is -1.5.    Assessment and Plan     Age-related osteoporosis with current pathological fracture with delayed healing  Risk factors include:  Steroid use (at times high dose steroids), postmenopausal status and vertebral and foot fractures   Considering her high risk for fractures consider:  Anabolic therapy following surgery v reclast v prolia     Has elevated PTH in the past, unclear. D levels are normal.   Alk phos is normal   Has history of breast cancer, but did not receive any radiation treatment or other exposure to radiation.     Hypoadrenalism  Long history of steroid use due to UC  Unable to locate 21 [place] Ab results  For now continue prednisone, would consider switching to HC in the future  Has surgery planned for later in the month    Letter written explaining need for stress dose steroids (HC 50 mg IV every eight hours to give first dose before surgery)  Continue prednisone once discharged home     Sick day rules reviewed and hand out provided.     Long term current use of systemic steroids  Try and switch to HC for better side effect profile  Will reach out once recovery from surgery is complete.

## 2021-08-09 ENCOUNTER — SPECIALTY PHARMACY (OUTPATIENT)
Dept: PHARMACY | Facility: CLINIC | Age: 61
End: 2021-08-09

## 2021-08-09 ENCOUNTER — PATIENT OUTREACH (OUTPATIENT)
Dept: ADMINISTRATIVE | Facility: HOSPITAL | Age: 61
End: 2021-08-09

## 2021-08-09 ENCOUNTER — PATIENT MESSAGE (OUTPATIENT)
Dept: ADMINISTRATIVE | Facility: HOSPITAL | Age: 61
End: 2021-08-09

## 2021-08-09 DIAGNOSIS — M80.00XG AGE-RELATED OSTEOPOROSIS WITH CURRENT PATHOLOGICAL FRACTURE WITH DELAYED HEALING: ICD-10-CM

## 2021-08-10 RX ORDER — TERIPARATIDE 250 UG/ML
20 INJECTION, SOLUTION SUBCUTANEOUS DAILY
Qty: 2.4 ML | Refills: 0 | Status: SHIPPED | OUTPATIENT
Start: 2021-08-10 | End: 2021-08-11 | Stop reason: SDUPTHER

## 2021-08-11 RX ORDER — TERIPARATIDE 250 UG/ML
20 INJECTION, SOLUTION SUBCUTANEOUS DAILY
Qty: 2.4 ML | Refills: 0 | Status: SHIPPED | OUTPATIENT
Start: 2021-08-11 | End: 2021-12-07

## 2021-08-16 ENCOUNTER — TELEPHONE (OUTPATIENT)
Dept: ENDOSCOPY | Facility: HOSPITAL | Age: 61
End: 2021-08-16

## 2021-08-16 ENCOUNTER — OFFICE VISIT (OUTPATIENT)
Dept: RHEUMATOLOGY | Facility: CLINIC | Age: 61
End: 2021-08-16
Payer: MEDICARE

## 2021-08-16 ENCOUNTER — LAB VISIT (OUTPATIENT)
Dept: LAB | Facility: HOSPITAL | Age: 61
End: 2021-08-16
Attending: INTERNAL MEDICINE
Payer: MEDICARE

## 2021-08-16 VITALS
WEIGHT: 133.38 LBS | SYSTOLIC BLOOD PRESSURE: 129 MMHG | DIASTOLIC BLOOD PRESSURE: 79 MMHG | BODY MASS INDEX: 22.77 KG/M2 | HEART RATE: 73 BPM | HEIGHT: 64 IN

## 2021-08-16 DIAGNOSIS — M79.7 FIBROMYALGIA: ICD-10-CM

## 2021-08-16 DIAGNOSIS — R53.83 FATIGUE, UNSPECIFIED TYPE: ICD-10-CM

## 2021-08-16 DIAGNOSIS — M32.9 SYSTEMIC LUPUS ERYTHEMATOSUS, UNSPECIFIED SLE TYPE, UNSPECIFIED ORGAN INVOLVEMENT STATUS: ICD-10-CM

## 2021-08-16 DIAGNOSIS — M32.9 SYSTEMIC LUPUS ERYTHEMATOSUS, UNSPECIFIED SLE TYPE, UNSPECIFIED ORGAN INVOLVEMENT STATUS: Primary | ICD-10-CM

## 2021-08-16 DIAGNOSIS — D84.9 IMMUNOSUPPRESSION: ICD-10-CM

## 2021-08-16 DIAGNOSIS — M70.62 TROCHANTERIC BURSITIS OF BOTH HIPS: ICD-10-CM

## 2021-08-16 DIAGNOSIS — Z79.899 LONG-TERM USE OF PLAQUENIL: ICD-10-CM

## 2021-08-16 DIAGNOSIS — M70.61 TROCHANTERIC BURSITIS OF BOTH HIPS: ICD-10-CM

## 2021-08-16 LAB
ALBUMIN SERPL BCP-MCNC: 3.9 G/DL (ref 3.5–5.2)
ALP SERPL-CCNC: 59 U/L (ref 55–135)
ALT SERPL W/O P-5'-P-CCNC: 24 U/L (ref 10–44)
ANION GAP SERPL CALC-SCNC: 12 MMOL/L (ref 8–16)
AST SERPL-CCNC: 26 U/L (ref 10–40)
BASOPHILS # BLD AUTO: 0.02 K/UL (ref 0–0.2)
BASOPHILS NFR BLD: 0.3 % (ref 0–1.9)
BILIRUB SERPL-MCNC: 0.4 MG/DL (ref 0.1–1)
BUN SERPL-MCNC: 14 MG/DL (ref 8–23)
C3 SERPL-MCNC: 106 MG/DL (ref 50–180)
C4 SERPL-MCNC: 35 MG/DL (ref 11–44)
CALCIUM SERPL-MCNC: 9.8 MG/DL (ref 8.7–10.5)
CHLORIDE SERPL-SCNC: 101 MMOL/L (ref 95–110)
CO2 SERPL-SCNC: 29 MMOL/L (ref 23–29)
CREAT SERPL-MCNC: 0.8 MG/DL (ref 0.5–1.4)
CRP SERPL-MCNC: 1.3 MG/L (ref 0–8.2)
DIFFERENTIAL METHOD: ABNORMAL
EOSINOPHIL # BLD AUTO: 0.1 K/UL (ref 0–0.5)
EOSINOPHIL NFR BLD: 1.7 % (ref 0–8)
ERYTHROCYTE [DISTWIDTH] IN BLOOD BY AUTOMATED COUNT: 14.4 % (ref 11.5–14.5)
ERYTHROCYTE [SEDIMENTATION RATE] IN BLOOD BY WESTERGREN METHOD: 3 MM/HR (ref 0–36)
EST. GFR  (AFRICAN AMERICAN): >60 ML/MIN/1.73 M^2
EST. GFR  (NON AFRICAN AMERICAN): >60 ML/MIN/1.73 M^2
GLUCOSE SERPL-MCNC: 94 MG/DL (ref 70–110)
HCT VFR BLD AUTO: 39.2 % (ref 37–48.5)
HGB BLD-MCNC: 12.5 G/DL (ref 12–16)
IMM GRANULOCYTES # BLD AUTO: 0.01 K/UL (ref 0–0.04)
IMM GRANULOCYTES NFR BLD AUTO: 0.2 % (ref 0–0.5)
LYMPHOCYTES # BLD AUTO: 1.1 K/UL (ref 1–4.8)
LYMPHOCYTES NFR BLD: 17.6 % (ref 18–48)
MCH RBC QN AUTO: 27.4 PG (ref 27–31)
MCHC RBC AUTO-ENTMCNC: 31.9 G/DL (ref 32–36)
MCV RBC AUTO: 86 FL (ref 82–98)
MONOCYTES # BLD AUTO: 0.6 K/UL (ref 0.3–1)
MONOCYTES NFR BLD: 8.5 % (ref 4–15)
NEUTROPHILS # BLD AUTO: 4.7 K/UL (ref 1.8–7.7)
NEUTROPHILS NFR BLD: 71.7 % (ref 38–73)
NRBC BLD-RTO: 0 /100 WBC
PLATELET # BLD AUTO: 259 K/UL (ref 150–450)
PMV BLD AUTO: 9.9 FL (ref 9.2–12.9)
POTASSIUM SERPL-SCNC: 3.5 MMOL/L (ref 3.5–5.1)
PROT SERPL-MCNC: 6.9 G/DL (ref 6–8.4)
RBC # BLD AUTO: 4.56 M/UL (ref 4–5.4)
SODIUM SERPL-SCNC: 142 MMOL/L (ref 136–145)
WBC # BLD AUTO: 6.48 K/UL (ref 3.9–12.7)

## 2021-08-16 PROCEDURE — 3074F PR MOST RECENT SYSTOLIC BLOOD PRESSURE < 130 MM HG: ICD-10-PCS | Mod: CPTII,S$GLB,, | Performed by: INTERNAL MEDICINE

## 2021-08-16 PROCEDURE — 3008F BODY MASS INDEX DOCD: CPT | Mod: CPTII,S$GLB,, | Performed by: INTERNAL MEDICINE

## 2021-08-16 PROCEDURE — 3044F HG A1C LEVEL LT 7.0%: CPT | Mod: CPTII,S$GLB,, | Performed by: INTERNAL MEDICINE

## 2021-08-16 PROCEDURE — 86225 DNA ANTIBODY NATIVE: CPT | Performed by: INTERNAL MEDICINE

## 2021-08-16 PROCEDURE — 99214 OFFICE O/P EST MOD 30 MIN: CPT | Mod: 25,S$GLB,, | Performed by: INTERNAL MEDICINE

## 2021-08-16 PROCEDURE — 1125F PR PAIN SEVERITY QUANTIFIED, PAIN PRESENT: ICD-10-PCS | Mod: CPTII,S$GLB,, | Performed by: INTERNAL MEDICINE

## 2021-08-16 PROCEDURE — 3078F PR MOST RECENT DIASTOLIC BLOOD PRESSURE < 80 MM HG: ICD-10-PCS | Mod: CPTII,S$GLB,, | Performed by: INTERNAL MEDICINE

## 2021-08-16 PROCEDURE — 3044F PR MOST RECENT HEMOGLOBIN A1C LEVEL <7.0%: ICD-10-PCS | Mod: CPTII,S$GLB,, | Performed by: INTERNAL MEDICINE

## 2021-08-16 PROCEDURE — 1125F AMNT PAIN NOTED PAIN PRSNT: CPT | Mod: CPTII,S$GLB,, | Performed by: INTERNAL MEDICINE

## 2021-08-16 PROCEDURE — 85652 RBC SED RATE AUTOMATED: CPT | Performed by: INTERNAL MEDICINE

## 2021-08-16 PROCEDURE — 20610 DRAIN/INJ JOINT/BURSA W/O US: CPT | Mod: 50,S$GLB,, | Performed by: INTERNAL MEDICINE

## 2021-08-16 PROCEDURE — 80053 COMPREHEN METABOLIC PANEL: CPT | Performed by: INTERNAL MEDICINE

## 2021-08-16 PROCEDURE — 85025 COMPLETE CBC W/AUTO DIFF WBC: CPT | Performed by: INTERNAL MEDICINE

## 2021-08-16 PROCEDURE — 36415 COLL VENOUS BLD VENIPUNCTURE: CPT | Performed by: INTERNAL MEDICINE

## 2021-08-16 PROCEDURE — 20610 PR DRAIN/INJECT LARGE JOINT/BURSA: ICD-10-PCS | Mod: 50,S$GLB,, | Performed by: INTERNAL MEDICINE

## 2021-08-16 PROCEDURE — 99214 PR OFFICE/OUTPT VISIT, EST, LEVL IV, 30-39 MIN: ICD-10-PCS | Mod: 25,S$GLB,, | Performed by: INTERNAL MEDICINE

## 2021-08-16 PROCEDURE — 99999 PR PBB SHADOW E&M-EST. PATIENT-LVL IV: CPT | Mod: PBBFAC,,, | Performed by: INTERNAL MEDICINE

## 2021-08-16 PROCEDURE — 1160F RVW MEDS BY RX/DR IN RCRD: CPT | Mod: CPTII,S$GLB,, | Performed by: INTERNAL MEDICINE

## 2021-08-16 PROCEDURE — 3074F SYST BP LT 130 MM HG: CPT | Mod: CPTII,S$GLB,, | Performed by: INTERNAL MEDICINE

## 2021-08-16 PROCEDURE — 3078F DIAST BP <80 MM HG: CPT | Mod: CPTII,S$GLB,, | Performed by: INTERNAL MEDICINE

## 2021-08-16 PROCEDURE — 86160 COMPLEMENT ANTIGEN: CPT | Mod: 59 | Performed by: INTERNAL MEDICINE

## 2021-08-16 PROCEDURE — 86160 COMPLEMENT ANTIGEN: CPT | Performed by: INTERNAL MEDICINE

## 2021-08-16 PROCEDURE — 1159F MED LIST DOCD IN RCRD: CPT | Mod: CPTII,S$GLB,, | Performed by: INTERNAL MEDICINE

## 2021-08-16 PROCEDURE — 86140 C-REACTIVE PROTEIN: CPT | Performed by: INTERNAL MEDICINE

## 2021-08-16 PROCEDURE — 3008F PR BODY MASS INDEX (BMI) DOCUMENTED: ICD-10-PCS | Mod: CPTII,S$GLB,, | Performed by: INTERNAL MEDICINE

## 2021-08-16 PROCEDURE — 99999 PR PBB SHADOW E&M-EST. PATIENT-LVL IV: ICD-10-PCS | Mod: PBBFAC,,, | Performed by: INTERNAL MEDICINE

## 2021-08-16 PROCEDURE — 1160F PR REVIEW ALL MEDS BY PRESCRIBER/CLIN PHARMACIST DOCUMENTED: ICD-10-PCS | Mod: CPTII,S$GLB,, | Performed by: INTERNAL MEDICINE

## 2021-08-16 PROCEDURE — 1159F PR MEDICATION LIST DOCUMENTED IN MEDICAL RECORD: ICD-10-PCS | Mod: CPTII,S$GLB,, | Performed by: INTERNAL MEDICINE

## 2021-08-16 RX ORDER — EVOLOCUMAB 140 MG/ML
INJECTION, SOLUTION SUBCUTANEOUS
COMMUNITY
Start: 2021-07-27

## 2021-08-16 RX ORDER — ROSUVASTATIN CALCIUM 20 MG/1
20 TABLET, COATED ORAL DAILY
COMMUNITY
Start: 2021-05-19 | End: 2022-01-07

## 2021-08-16 RX ORDER — NEBIVOLOL 10 MG/1
1 TABLET ORAL DAILY
COMMUNITY
Start: 2021-05-05 | End: 2022-05-05

## 2021-08-16 RX ORDER — TRIAMCINOLONE ACETONIDE 40 MG/ML
80 INJECTION, SUSPENSION INTRA-ARTICULAR; INTRAMUSCULAR ONCE
Status: COMPLETED | OUTPATIENT
Start: 2021-08-16 | End: 2021-08-16

## 2021-08-16 RX ADMIN — TRIAMCINOLONE ACETONIDE 80 MG: 40 INJECTION, SUSPENSION INTRA-ARTICULAR; INTRAMUSCULAR at 10:08

## 2021-08-16 ASSESSMENT — ROUTINE ASSESSMENT OF PATIENT INDEX DATA (RAPID3)
TOTAL RAPID3 SCORE: 4.83
AM STIFFNESS SCORE: 1, YES
PSYCHOLOGICAL DISTRESS SCORE: 1.1
FATIGUE SCORE: 4
PAIN SCORE: 8
PATIENT GLOBAL ASSESSMENT SCORE: 5.5
MDHAQ FUNCTION SCORE: 0.3
WHEN YOU AWAKENED IN THE MORNING OVER THE LAST WEEK, PLEASE INDICATE THE AMOUNT OF TIME IT TAKES UNTIL YOU ARE AS LIMBER AS YOU WILL BE FOR THE DAY: 1 HOUR

## 2021-08-17 ENCOUNTER — TELEPHONE (OUTPATIENT)
Dept: ENDOSCOPY | Facility: HOSPITAL | Age: 61
End: 2021-08-17

## 2021-08-17 ENCOUNTER — PATIENT MESSAGE (OUTPATIENT)
Dept: ENDOSCOPY | Facility: HOSPITAL | Age: 61
End: 2021-08-17

## 2021-08-17 LAB — DSDNA AB SER-ACNC: NORMAL [IU]/ML

## 2021-08-23 ENCOUNTER — OFFICE VISIT (OUTPATIENT)
Dept: INTERNAL MEDICINE | Facility: CLINIC | Age: 61
End: 2021-08-23
Payer: MEDICARE

## 2021-08-23 VITALS
HEART RATE: 75 BPM | HEIGHT: 64 IN | DIASTOLIC BLOOD PRESSURE: 68 MMHG | BODY MASS INDEX: 21.98 KG/M2 | WEIGHT: 128.75 LBS | SYSTOLIC BLOOD PRESSURE: 118 MMHG | OXYGEN SATURATION: 99 %

## 2021-08-23 DIAGNOSIS — E87.6 HYPOKALEMIA: ICD-10-CM

## 2021-08-23 DIAGNOSIS — D84.9 IMMUNOSUPPRESSION: ICD-10-CM

## 2021-08-23 DIAGNOSIS — R73.03 PREDIABETES: ICD-10-CM

## 2021-08-23 DIAGNOSIS — E78.2 MIXED HYPERLIPIDEMIA: ICD-10-CM

## 2021-08-23 DIAGNOSIS — K86.81 EXOCRINE PANCREATIC INSUFFICIENCY: ICD-10-CM

## 2021-08-23 DIAGNOSIS — Z79.899 LONG-TERM USE OF PLAQUENIL: ICD-10-CM

## 2021-08-23 DIAGNOSIS — D50.8 OTHER IRON DEFICIENCY ANEMIA: ICD-10-CM

## 2021-08-23 DIAGNOSIS — E05.90 SUBCLINICAL HYPERTHYROIDISM: ICD-10-CM

## 2021-08-23 DIAGNOSIS — M32.9 SYSTEMIC LUPUS ERYTHEMATOSUS, UNSPECIFIED SLE TYPE, UNSPECIFIED ORGAN INVOLVEMENT STATUS: ICD-10-CM

## 2021-08-23 DIAGNOSIS — E27.40 HYPOADRENALISM: ICD-10-CM

## 2021-08-23 DIAGNOSIS — I10 BENIGN ESSENTIAL HYPERTENSION: Primary | ICD-10-CM

## 2021-08-23 DIAGNOSIS — M54.50 CHRONIC BILATERAL LOW BACK PAIN WITHOUT SCIATICA: ICD-10-CM

## 2021-08-23 DIAGNOSIS — K21.9 GASTROESOPHAGEAL REFLUX DISEASE WITHOUT ESOPHAGITIS: ICD-10-CM

## 2021-08-23 DIAGNOSIS — M47.817 LUMBAR AND SACRAL SPONDYLARTHRITIS: ICD-10-CM

## 2021-08-23 DIAGNOSIS — Z79.52 LONG TERM CURRENT USE OF SYSTEMIC STEROIDS: ICD-10-CM

## 2021-08-23 DIAGNOSIS — G89.29 CHRONIC BILATERAL LOW BACK PAIN WITHOUT SCIATICA: ICD-10-CM

## 2021-08-23 DIAGNOSIS — F51.01 PRIMARY INSOMNIA: ICD-10-CM

## 2021-08-23 DIAGNOSIS — M79.7 FIBROMYALGIA: ICD-10-CM

## 2021-08-23 DIAGNOSIS — K51.918 ULCERATIVE COLITIS WITH OTHER COMPLICATION, UNSPECIFIED LOCATION: ICD-10-CM

## 2021-08-23 DIAGNOSIS — M80.00XG AGE-RELATED OSTEOPOROSIS WITH CURRENT PATHOLOGICAL FRACTURE WITH DELAYED HEALING: ICD-10-CM

## 2021-08-23 PROCEDURE — 3008F PR BODY MASS INDEX (BMI) DOCUMENTED: ICD-10-PCS | Mod: CPTII,S$GLB,, | Performed by: INTERNAL MEDICINE

## 2021-08-23 PROCEDURE — 3078F DIAST BP <80 MM HG: CPT | Mod: CPTII,S$GLB,, | Performed by: INTERNAL MEDICINE

## 2021-08-23 PROCEDURE — 3074F SYST BP LT 130 MM HG: CPT | Mod: CPTII,S$GLB,, | Performed by: INTERNAL MEDICINE

## 2021-08-23 PROCEDURE — 99499 RISK ADDL DX/OHS AUDIT: ICD-10-PCS | Mod: HCNC,S$GLB,, | Performed by: INTERNAL MEDICINE

## 2021-08-23 PROCEDURE — 99999 PR PBB SHADOW E&M-EST. PATIENT-LVL V: CPT | Mod: PBBFAC,,, | Performed by: INTERNAL MEDICINE

## 2021-08-23 PROCEDURE — 1160F RVW MEDS BY RX/DR IN RCRD: CPT | Mod: CPTII,S$GLB,, | Performed by: INTERNAL MEDICINE

## 2021-08-23 PROCEDURE — 3078F PR MOST RECENT DIASTOLIC BLOOD PRESSURE < 80 MM HG: ICD-10-PCS | Mod: CPTII,S$GLB,, | Performed by: INTERNAL MEDICINE

## 2021-08-23 PROCEDURE — 3044F HG A1C LEVEL LT 7.0%: CPT | Mod: CPTII,S$GLB,, | Performed by: INTERNAL MEDICINE

## 2021-08-23 PROCEDURE — 99499 UNLISTED E&M SERVICE: CPT | Mod: HCNC,S$GLB,, | Performed by: INTERNAL MEDICINE

## 2021-08-23 PROCEDURE — 99214 PR OFFICE/OUTPT VISIT, EST, LEVL IV, 30-39 MIN: ICD-10-PCS | Mod: S$GLB,,, | Performed by: INTERNAL MEDICINE

## 2021-08-23 PROCEDURE — 99999 PR PBB SHADOW E&M-EST. PATIENT-LVL V: ICD-10-PCS | Mod: PBBFAC,,, | Performed by: INTERNAL MEDICINE

## 2021-08-23 PROCEDURE — 1160F PR REVIEW ALL MEDS BY PRESCRIBER/CLIN PHARMACIST DOCUMENTED: ICD-10-PCS | Mod: CPTII,S$GLB,, | Performed by: INTERNAL MEDICINE

## 2021-08-23 PROCEDURE — 1125F PR PAIN SEVERITY QUANTIFIED, PAIN PRESENT: ICD-10-PCS | Mod: CPTII,S$GLB,, | Performed by: INTERNAL MEDICINE

## 2021-08-23 PROCEDURE — 1159F PR MEDICATION LIST DOCUMENTED IN MEDICAL RECORD: ICD-10-PCS | Mod: CPTII,S$GLB,, | Performed by: INTERNAL MEDICINE

## 2021-08-23 PROCEDURE — 3074F PR MOST RECENT SYSTOLIC BLOOD PRESSURE < 130 MM HG: ICD-10-PCS | Mod: CPTII,S$GLB,, | Performed by: INTERNAL MEDICINE

## 2021-08-23 PROCEDURE — 3008F BODY MASS INDEX DOCD: CPT | Mod: CPTII,S$GLB,, | Performed by: INTERNAL MEDICINE

## 2021-08-23 PROCEDURE — 1159F MED LIST DOCD IN RCRD: CPT | Mod: CPTII,S$GLB,, | Performed by: INTERNAL MEDICINE

## 2021-08-23 PROCEDURE — 1125F AMNT PAIN NOTED PAIN PRSNT: CPT | Mod: CPTII,S$GLB,, | Performed by: INTERNAL MEDICINE

## 2021-08-23 PROCEDURE — 3044F PR MOST RECENT HEMOGLOBIN A1C LEVEL <7.0%: ICD-10-PCS | Mod: CPTII,S$GLB,, | Performed by: INTERNAL MEDICINE

## 2021-08-23 PROCEDURE — 99214 OFFICE O/P EST MOD 30 MIN: CPT | Mod: S$GLB,,, | Performed by: INTERNAL MEDICINE

## 2021-08-24 ENCOUNTER — LAB VISIT (OUTPATIENT)
Dept: LAB | Facility: HOSPITAL | Age: 61
End: 2021-08-24
Attending: INTERNAL MEDICINE
Payer: MEDICARE

## 2021-08-24 DIAGNOSIS — E05.90 SUBCLINICAL HYPERTHYROIDISM: ICD-10-CM

## 2021-08-24 DIAGNOSIS — E78.2 MIXED HYPERLIPIDEMIA: ICD-10-CM

## 2021-08-24 DIAGNOSIS — R73.03 PREDIABETES: ICD-10-CM

## 2021-08-24 PROBLEM — F51.01 PRIMARY INSOMNIA: Status: ACTIVE | Noted: 2021-08-24

## 2021-08-24 LAB
CHOLEST SERPL-MCNC: 112 MG/DL (ref 120–199)
CHOLEST/HDLC SERPL: 1.2 {RATIO} (ref 2–5)
ESTIMATED AVG GLUCOSE: 120 MG/DL (ref 68–131)
HBA1C MFR BLD: 5.8 % (ref 4–5.6)
HDLC SERPL-MCNC: 90 MG/DL (ref 40–75)
HDLC SERPL: 80.4 % (ref 20–50)
LDLC SERPL CALC-MCNC: 7.2 MG/DL (ref 63–159)
NONHDLC SERPL-MCNC: 22 MG/DL
T4 FREE SERPL-MCNC: 0.72 NG/DL (ref 0.71–1.51)
TRIGL SERPL-MCNC: 74 MG/DL (ref 30–150)
TSH SERPL DL<=0.005 MIU/L-ACNC: 0.98 UIU/ML (ref 0.4–4)

## 2021-08-24 PROCEDURE — 83036 HEMOGLOBIN GLYCOSYLATED A1C: CPT | Performed by: INTERNAL MEDICINE

## 2021-08-24 PROCEDURE — 84443 ASSAY THYROID STIM HORMONE: CPT | Performed by: INTERNAL MEDICINE

## 2021-08-24 PROCEDURE — 80061 LIPID PANEL: CPT | Performed by: INTERNAL MEDICINE

## 2021-08-24 PROCEDURE — 36415 COLL VENOUS BLD VENIPUNCTURE: CPT | Mod: PO | Performed by: INTERNAL MEDICINE

## 2021-08-24 PROCEDURE — 84439 ASSAY OF FREE THYROXINE: CPT | Performed by: INTERNAL MEDICINE

## 2021-08-30 ENCOUNTER — PATIENT OUTREACH (OUTPATIENT)
Dept: ADMINISTRATIVE | Facility: OTHER | Age: 61
End: 2021-08-30

## 2021-09-13 ENCOUNTER — OFFICE VISIT (OUTPATIENT)
Dept: ENDOCRINOLOGY | Facility: CLINIC | Age: 61
End: 2021-09-13
Payer: MEDICARE

## 2021-09-13 VITALS
SYSTOLIC BLOOD PRESSURE: 127 MMHG | HEIGHT: 64 IN | HEART RATE: 65 BPM | BODY MASS INDEX: 21.81 KG/M2 | WEIGHT: 127.75 LBS | DIASTOLIC BLOOD PRESSURE: 81 MMHG

## 2021-09-13 DIAGNOSIS — M80.00XG AGE-RELATED OSTEOPOROSIS WITH CURRENT PATHOLOGICAL FRACTURE WITH DELAYED HEALING: ICD-10-CM

## 2021-09-13 DIAGNOSIS — Z79.52 LONG TERM CURRENT USE OF SYSTEMIC STEROIDS: ICD-10-CM

## 2021-09-13 DIAGNOSIS — E55.9 VITAMIN D DEFICIENCY DISEASE: ICD-10-CM

## 2021-09-13 DIAGNOSIS — E27.40 HYPOADRENALISM: ICD-10-CM

## 2021-09-13 DIAGNOSIS — E27.49 SECONDARY ADRENAL INSUFFICIENCY: ICD-10-CM

## 2021-09-13 DIAGNOSIS — Z78.0 ASYMPTOMATIC POSTMENOPAUSAL ESTROGEN DEFICIENCY: Primary | ICD-10-CM

## 2021-09-13 PROCEDURE — 99999 PR PBB SHADOW E&M-EST. PATIENT-LVL V: CPT | Mod: PBBFAC,HCNC,, | Performed by: INTERNAL MEDICINE

## 2021-09-13 PROCEDURE — 99214 OFFICE O/P EST MOD 30 MIN: CPT | Mod: HCNC,S$GLB,, | Performed by: INTERNAL MEDICINE

## 2021-09-13 PROCEDURE — 99214 PR OFFICE/OUTPT VISIT, EST, LEVL IV, 30-39 MIN: ICD-10-PCS | Mod: HCNC,S$GLB,, | Performed by: INTERNAL MEDICINE

## 2021-09-13 PROCEDURE — 99215 OFFICE O/P EST HI 40 MIN: CPT | Mod: PBBFAC,HCNC | Performed by: INTERNAL MEDICINE

## 2021-09-13 PROCEDURE — 99999 PR PBB SHADOW E&M-EST. PATIENT-LVL V: ICD-10-PCS | Mod: PBBFAC,HCNC,, | Performed by: INTERNAL MEDICINE

## 2021-09-13 RX ORDER — ERGOCALCIFEROL 1.25 MG/1
50000 CAPSULE ORAL
Qty: 24 CAPSULE | Refills: 3 | Status: SHIPPED | OUTPATIENT
Start: 2021-09-13 | End: 2022-06-13

## 2021-09-13 RX ORDER — TERIPARATIDE 250 UG/ML
20 INJECTION, SOLUTION SUBCUTANEOUS DAILY
Qty: 2.4 ML | Refills: 0 | OUTPATIENT
Start: 2021-09-13 | End: 2022-09-13

## 2021-09-13 RX ORDER — DEXAMETHASONE SODIUM PHOSPHATE 4 MG/ML
4 INJECTION, SOLUTION INTRA-ARTICULAR; INTRALESIONAL; INTRAMUSCULAR; INTRAVENOUS; SOFT TISSUE DAILY PRN
Qty: 1 ML | Refills: 1 | Status: SHIPPED | OUTPATIENT
Start: 2021-09-13 | End: 2021-10-06 | Stop reason: SDUPTHER

## 2021-09-13 RX ORDER — SODIUM CHLORIDE 0.9 % (FLUSH) 0.9 %
10 SYRINGE (ML) INJECTION
Status: CANCELLED | OUTPATIENT
Start: 2021-09-13

## 2021-09-13 RX ORDER — PREDNISONE 5 MG/1
5 TABLET ORAL DAILY
Qty: 100 TABLET | Refills: 3 | Status: SHIPPED | OUTPATIENT
Start: 2021-09-13 | End: 2021-09-24 | Stop reason: SDUPTHER

## 2021-09-13 RX ORDER — ZOLEDRONIC ACID 5 MG/100ML
5 INJECTION, SOLUTION INTRAVENOUS
Status: CANCELLED | OUTPATIENT
Start: 2021-09-13

## 2021-09-15 ENCOUNTER — TELEPHONE (OUTPATIENT)
Dept: INFECTIOUS DISEASES | Facility: HOSPITAL | Age: 61
End: 2021-09-15

## 2021-09-15 DIAGNOSIS — F51.01 PRIMARY INSOMNIA: Primary | ICD-10-CM

## 2021-09-15 DIAGNOSIS — G47.00 INSOMNIA, UNSPECIFIED TYPE: ICD-10-CM

## 2021-09-15 RX ORDER — ZOLPIDEM TARTRATE 5 MG/1
5 TABLET ORAL NIGHTLY PRN
Qty: 30 TABLET | Refills: 0 | Status: SHIPPED | OUTPATIENT
Start: 2021-09-15 | End: 2022-02-23 | Stop reason: SDUPTHER

## 2021-09-18 ENCOUNTER — SPECIALTY PHARMACY (OUTPATIENT)
Dept: PHARMACY | Facility: CLINIC | Age: 61
End: 2021-09-18

## 2021-09-21 ENCOUNTER — HOSPITAL ENCOUNTER (OUTPATIENT)
Dept: RADIOLOGY | Facility: CLINIC | Age: 61
Discharge: HOME | End: 2021-09-21
Attending: INTERNAL MEDICINE
Payer: MEDICARE

## 2021-09-21 DIAGNOSIS — Z78.0 ASYMPTOMATIC POSTMENOPAUSAL ESTROGEN DEFICIENCY: ICD-10-CM

## 2021-09-21 DIAGNOSIS — M80.00XG AGE-RELATED OSTEOPOROSIS WITH CURRENT PATHOLOGICAL FRACTURE WITH DELAYED HEALING: ICD-10-CM

## 2021-09-21 PROCEDURE — 77080 DXA BONE DENSITY AXIAL: CPT | Mod: TC

## 2021-09-21 PROCEDURE — 77080 DEXA BONE DENSITY SPINE HIP: ICD-10-PCS | Mod: 26,,, | Performed by: INTERNAL MEDICINE

## 2021-09-21 PROCEDURE — 77080 DXA BONE DENSITY AXIAL: CPT | Mod: 26,,, | Performed by: INTERNAL MEDICINE

## 2021-09-24 DIAGNOSIS — E27.40 HYPOADRENALISM: ICD-10-CM

## 2021-09-24 RX ORDER — PREDNISONE 5 MG/1
5 TABLET ORAL DAILY
Qty: 100 TABLET | Refills: 3 | Status: SHIPPED | OUTPATIENT
Start: 2021-09-24 | End: 2022-09-06

## 2021-09-28 ENCOUNTER — TELEPHONE (OUTPATIENT)
Dept: ENDOCRINOLOGY | Facility: CLINIC | Age: 61
End: 2021-09-28

## 2021-10-06 ENCOUNTER — TELEPHONE (OUTPATIENT)
Dept: ENDOCRINOLOGY | Facility: CLINIC | Age: 61
End: 2021-10-06

## 2021-10-06 DIAGNOSIS — Z79.52 LONG TERM CURRENT USE OF SYSTEMIC STEROIDS: ICD-10-CM

## 2021-10-06 DIAGNOSIS — E27.49 SECONDARY ADRENAL INSUFFICIENCY: ICD-10-CM

## 2021-10-06 RX ORDER — DEXAMETHASONE SODIUM PHOSPHATE 4 MG/ML
4 INJECTION, SOLUTION INTRA-ARTICULAR; INTRALESIONAL; INTRAMUSCULAR; INTRAVENOUS; SOFT TISSUE DAILY PRN
Qty: 1 ML | Refills: 1 | Status: SHIPPED | OUTPATIENT
Start: 2021-10-06

## 2021-10-07 ENCOUNTER — TELEPHONE (OUTPATIENT)
Dept: INFECTIOUS DISEASES | Facility: HOSPITAL | Age: 61
End: 2021-10-07

## 2021-10-11 ENCOUNTER — TELEPHONE (OUTPATIENT)
Dept: INTERNAL MEDICINE | Facility: CLINIC | Age: 61
End: 2021-10-11

## 2021-10-12 ENCOUNTER — OFFICE VISIT (OUTPATIENT)
Dept: INTERNAL MEDICINE | Facility: CLINIC | Age: 61
End: 2021-10-12
Payer: MEDICARE

## 2021-10-12 VITALS
HEIGHT: 63 IN | HEART RATE: 76 BPM | WEIGHT: 128.5 LBS | OXYGEN SATURATION: 98 % | SYSTOLIC BLOOD PRESSURE: 140 MMHG | DIASTOLIC BLOOD PRESSURE: 84 MMHG | BODY MASS INDEX: 22.77 KG/M2

## 2021-10-12 DIAGNOSIS — R10.13 POSTPRANDIAL EPIGASTRIC PAIN: ICD-10-CM

## 2021-10-12 DIAGNOSIS — K31.84 GASTROPARESIS: Primary | ICD-10-CM

## 2021-10-12 DIAGNOSIS — K21.9 GASTROESOPHAGEAL REFLUX DISEASE WITHOUT ESOPHAGITIS: ICD-10-CM

## 2021-10-12 DIAGNOSIS — R61 DIAPHORESIS: ICD-10-CM

## 2021-10-12 PROCEDURE — 3066F NEPHROPATHY DOC TX: CPT | Mod: HCNC,CPTII,S$GLB, | Performed by: NURSE PRACTITIONER

## 2021-10-12 PROCEDURE — 1159F PR MEDICATION LIST DOCUMENTED IN MEDICAL RECORD: ICD-10-PCS | Mod: HCNC,CPTII,S$GLB, | Performed by: NURSE PRACTITIONER

## 2021-10-12 PROCEDURE — 1160F PR REVIEW ALL MEDS BY PRESCRIBER/CLIN PHARMACIST DOCUMENTED: ICD-10-PCS | Mod: HCNC,CPTII,S$GLB, | Performed by: NURSE PRACTITIONER

## 2021-10-12 PROCEDURE — 99999 PR PBB SHADOW E&M-EST. PATIENT-LVL V: ICD-10-PCS | Mod: PBBFAC,HCNC,, | Performed by: NURSE PRACTITIONER

## 2021-10-12 PROCEDURE — 3077F PR MOST RECENT SYSTOLIC BLOOD PRESSURE >= 140 MM HG: ICD-10-PCS | Mod: HCNC,CPTII,S$GLB, | Performed by: NURSE PRACTITIONER

## 2021-10-12 PROCEDURE — 4010F ACE/ARB THERAPY RXD/TAKEN: CPT | Mod: HCNC,CPTII,S$GLB, | Performed by: NURSE PRACTITIONER

## 2021-10-12 PROCEDURE — 4010F PR ACE/ARB THEARPY RXD/TAKEN: ICD-10-PCS | Mod: HCNC,CPTII,S$GLB, | Performed by: NURSE PRACTITIONER

## 2021-10-12 PROCEDURE — 3079F DIAST BP 80-89 MM HG: CPT | Mod: HCNC,CPTII,S$GLB, | Performed by: NURSE PRACTITIONER

## 2021-10-12 PROCEDURE — 3066F PR DOCUMENTATION OF TREATMENT FOR NEPHROPATHY: ICD-10-PCS | Mod: HCNC,CPTII,S$GLB, | Performed by: NURSE PRACTITIONER

## 2021-10-12 PROCEDURE — 3044F PR MOST RECENT HEMOGLOBIN A1C LEVEL <7.0%: ICD-10-PCS | Mod: HCNC,CPTII,S$GLB, | Performed by: NURSE PRACTITIONER

## 2021-10-12 PROCEDURE — 3008F BODY MASS INDEX DOCD: CPT | Mod: HCNC,CPTII,S$GLB, | Performed by: NURSE PRACTITIONER

## 2021-10-12 PROCEDURE — 99214 PR OFFICE/OUTPT VISIT, EST, LEVL IV, 30-39 MIN: ICD-10-PCS | Mod: HCNC,S$GLB,, | Performed by: NURSE PRACTITIONER

## 2021-10-12 PROCEDURE — 3044F HG A1C LEVEL LT 7.0%: CPT | Mod: HCNC,CPTII,S$GLB, | Performed by: NURSE PRACTITIONER

## 2021-10-12 PROCEDURE — 1160F RVW MEDS BY RX/DR IN RCRD: CPT | Mod: HCNC,CPTII,S$GLB, | Performed by: NURSE PRACTITIONER

## 2021-10-12 PROCEDURE — 3079F PR MOST RECENT DIASTOLIC BLOOD PRESSURE 80-89 MM HG: ICD-10-PCS | Mod: HCNC,CPTII,S$GLB, | Performed by: NURSE PRACTITIONER

## 2021-10-12 PROCEDURE — 3008F PR BODY MASS INDEX (BMI) DOCUMENTED: ICD-10-PCS | Mod: HCNC,CPTII,S$GLB, | Performed by: NURSE PRACTITIONER

## 2021-10-12 PROCEDURE — 3077F SYST BP >= 140 MM HG: CPT | Mod: HCNC,CPTII,S$GLB, | Performed by: NURSE PRACTITIONER

## 2021-10-12 PROCEDURE — 1159F MED LIST DOCD IN RCRD: CPT | Mod: HCNC,CPTII,S$GLB, | Performed by: NURSE PRACTITIONER

## 2021-10-12 PROCEDURE — 99214 OFFICE O/P EST MOD 30 MIN: CPT | Mod: HCNC,S$GLB,, | Performed by: NURSE PRACTITIONER

## 2021-10-12 PROCEDURE — 99999 PR PBB SHADOW E&M-EST. PATIENT-LVL V: CPT | Mod: PBBFAC,HCNC,, | Performed by: NURSE PRACTITIONER

## 2021-10-12 RX ORDER — METOCLOPRAMIDE HYDROCHLORIDE 5 MG/5ML
5 SOLUTION ORAL
Qty: 120 BOTTLE | Refills: 0 | Status: CANCELLED | OUTPATIENT
Start: 2021-10-12

## 2021-10-14 ENCOUNTER — HOSPITAL ENCOUNTER (OUTPATIENT)
Dept: RADIOLOGY | Facility: HOSPITAL | Age: 61
Discharge: HOME | End: 2021-10-14
Attending: NURSE PRACTITIONER
Payer: MEDICARE

## 2021-10-14 DIAGNOSIS — R10.13 POSTPRANDIAL EPIGASTRIC PAIN: ICD-10-CM

## 2021-10-14 DIAGNOSIS — K31.84 GASTROPARESIS: ICD-10-CM

## 2021-10-14 PROCEDURE — 76700 US ABDOMEN COMPLETE: ICD-10-PCS | Mod: 26,HCNC,, | Performed by: RADIOLOGY

## 2021-10-14 PROCEDURE — 76700 US EXAM ABDOM COMPLETE: CPT | Mod: TC,HCNC

## 2021-10-14 PROCEDURE — 76700 US EXAM ABDOM COMPLETE: CPT | Mod: 26,HCNC,, | Performed by: RADIOLOGY

## 2021-10-25 ENCOUNTER — INFUSION (OUTPATIENT)
Dept: INFECTIOUS DISEASES | Facility: HOSPITAL | Age: 61
End: 2021-10-25
Attending: INTERNAL MEDICINE
Payer: MEDICARE

## 2021-10-25 VITALS
TEMPERATURE: 97 F | HEART RATE: 72 BPM | DIASTOLIC BLOOD PRESSURE: 71 MMHG | OXYGEN SATURATION: 99 % | BODY MASS INDEX: 22.87 KG/M2 | HEIGHT: 63 IN | WEIGHT: 129.06 LBS | RESPIRATION RATE: 16 BRPM | SYSTOLIC BLOOD PRESSURE: 135 MMHG

## 2021-10-25 DIAGNOSIS — M80.00XG AGE-RELATED OSTEOPOROSIS WITH CURRENT PATHOLOGICAL FRACTURE WITH DELAYED HEALING: Primary | ICD-10-CM

## 2021-10-25 DIAGNOSIS — Z79.52 LONG TERM CURRENT USE OF SYSTEMIC STEROIDS: ICD-10-CM

## 2021-10-25 PROCEDURE — 96365 THER/PROPH/DIAG IV INF INIT: CPT | Mod: HCNC

## 2021-10-25 PROCEDURE — 63600175 PHARM REV CODE 636 W HCPCS: Mod: HCNC | Performed by: INTERNAL MEDICINE

## 2021-10-25 RX ORDER — ZOLEDRONIC ACID 5 MG/100ML
5 INJECTION, SOLUTION INTRAVENOUS
OUTPATIENT
Start: 2021-10-25

## 2021-10-25 RX ORDER — ZOLEDRONIC ACID 5 MG/100ML
5 INJECTION, SOLUTION INTRAVENOUS
Status: COMPLETED | OUTPATIENT
Start: 2021-10-25 | End: 2021-10-25

## 2021-10-25 RX ORDER — SODIUM CHLORIDE 0.9 % (FLUSH) 0.9 %
10 SYRINGE (ML) INJECTION
OUTPATIENT
Start: 2021-10-25

## 2021-10-25 RX ADMIN — ZOLEDRONIC ACID 5 MG: 5 INJECTION, SOLUTION INTRAVENOUS at 09:10

## 2021-10-27 ENCOUNTER — HOSPITAL ENCOUNTER (EMERGENCY)
Facility: HOSPITAL | Age: 61
Discharge: HOME | End: 2021-10-27
Attending: EMERGENCY MEDICINE
Payer: MEDICARE

## 2021-10-27 ENCOUNTER — PATIENT OUTREACH (OUTPATIENT)
Dept: ADMINISTRATIVE | Facility: OTHER | Age: 61
End: 2021-10-27
Payer: MEDICARE

## 2021-10-27 VITALS
HEIGHT: 63 IN | OXYGEN SATURATION: 98 % | DIASTOLIC BLOOD PRESSURE: 68 MMHG | RESPIRATION RATE: 18 BRPM | BODY MASS INDEX: 22.32 KG/M2 | HEART RATE: 78 BPM | SYSTOLIC BLOOD PRESSURE: 112 MMHG | TEMPERATURE: 99 F | WEIGHT: 126 LBS

## 2021-10-27 DIAGNOSIS — M79.673 FOOT PAIN: ICD-10-CM

## 2021-10-27 DIAGNOSIS — S92.334A CLOSED NONDISPLACED FRACTURE OF THIRD METATARSAL BONE OF RIGHT FOOT, INITIAL ENCOUNTER: Primary | ICD-10-CM

## 2021-10-27 DIAGNOSIS — R10.2 SUPRAPUBIC PAIN: ICD-10-CM

## 2021-10-27 LAB
BILIRUBIN, POC UA: NEGATIVE
BLOOD, POC UA: ABNORMAL
CLARITY, POC UA: CLEAR
COLOR, POC UA: YELLOW
GLUCOSE, POC UA: NEGATIVE
KETONES, POC UA: ABNORMAL
LEUKOCYTE EST, POC UA: NEGATIVE
NITRITE, POC UA: NEGATIVE
PH UR STRIP: 6.5 [PH]
PROTEIN, POC UA: NEGATIVE
SPECIFIC GRAVITY, POC UA: 1.01
UROBILINOGEN, POC UA: 0.2 E.U./DL

## 2021-10-27 PROCEDURE — 99283 EMERGENCY DEPT VISIT LOW MDM: CPT | Mod: HCNC,25,ER

## 2021-10-27 PROCEDURE — 81003 URINALYSIS AUTO W/O SCOPE: CPT | Mod: HCNC,ER

## 2021-10-27 RX ORDER — TRAMADOL HYDROCHLORIDE 50 MG/1
50 TABLET ORAL EVERY 6 HOURS PRN
Qty: 12 TABLET | Refills: 0 | Status: SHIPPED | OUTPATIENT
Start: 2021-10-27 | End: 2021-11-06

## 2021-10-28 ENCOUNTER — HOSPITAL ENCOUNTER (OUTPATIENT)
Dept: RADIOLOGY | Facility: HOSPITAL | Age: 61
Discharge: HOME | End: 2021-10-28
Attending: NURSE PRACTITIONER
Payer: MEDICARE

## 2021-10-28 ENCOUNTER — OFFICE VISIT (OUTPATIENT)
Dept: ORTHOPEDICS | Facility: CLINIC | Age: 61
End: 2021-10-28
Payer: MEDICARE

## 2021-10-28 VITALS — HEIGHT: 63 IN | WEIGHT: 126.13 LBS | BODY MASS INDEX: 22.35 KG/M2

## 2021-10-28 DIAGNOSIS — M20.42 HAMMERTOE OF SECOND TOE OF LEFT FOOT: ICD-10-CM

## 2021-10-28 DIAGNOSIS — Z12.31 ENCOUNTER FOR SCREENING MAMMOGRAM FOR MALIGNANT NEOPLASM OF BREAST: ICD-10-CM

## 2021-10-28 DIAGNOSIS — C50.211 MALIGNANT NEOPLASM OF UPPER-INNER QUADRANT OF RIGHT BREAST IN FEMALE, ESTROGEN RECEPTOR NEGATIVE: ICD-10-CM

## 2021-10-28 DIAGNOSIS — Z17.1 MALIGNANT NEOPLASM OF UPPER-INNER QUADRANT OF RIGHT BREAST IN FEMALE, ESTROGEN RECEPTOR NEGATIVE: ICD-10-CM

## 2021-10-28 DIAGNOSIS — S92.351K CLOSED FRACTURE OF BASE OF FIFTH METATARSAL BONE WITH NONUNION, RIGHT: Primary | ICD-10-CM

## 2021-10-28 PROCEDURE — 77067 MAMMO DIGITAL SCREENING LEFT WITH TOMO: ICD-10-PCS | Mod: 26,52,HCNC, | Performed by: RADIOLOGY

## 2021-10-28 PROCEDURE — 3066F NEPHROPATHY DOC TX: CPT | Mod: HCNC,CPTII,S$GLB, | Performed by: PHYSICIAN ASSISTANT

## 2021-10-28 PROCEDURE — 99213 OFFICE O/P EST LOW 20 MIN: CPT | Mod: HCNC,S$GLB,, | Performed by: PHYSICIAN ASSISTANT

## 2021-10-28 PROCEDURE — 99999 PR PBB SHADOW E&M-EST. PATIENT-LVL III: ICD-10-PCS | Mod: PBBFAC,HCNC,, | Performed by: PHYSICIAN ASSISTANT

## 2021-10-28 PROCEDURE — 77067 SCR MAMMO BI INCL CAD: CPT | Mod: TC,HCNC

## 2021-10-28 PROCEDURE — 4010F ACE/ARB THERAPY RXD/TAKEN: CPT | Mod: HCNC,CPTII,S$GLB, | Performed by: PHYSICIAN ASSISTANT

## 2021-10-28 PROCEDURE — 77063 MAMMO DIGITAL SCREENING LEFT WITH TOMO: ICD-10-PCS | Mod: 26,52,HCNC, | Performed by: RADIOLOGY

## 2021-10-28 PROCEDURE — 77067 SCR MAMMO BI INCL CAD: CPT | Mod: 26,52,HCNC, | Performed by: RADIOLOGY

## 2021-10-28 PROCEDURE — 3008F PR BODY MASS INDEX (BMI) DOCUMENTED: ICD-10-PCS | Mod: HCNC,CPTII,S$GLB, | Performed by: PHYSICIAN ASSISTANT

## 2021-10-28 PROCEDURE — 4010F PR ACE/ARB THEARPY RXD/TAKEN: ICD-10-PCS | Mod: HCNC,CPTII,S$GLB, | Performed by: PHYSICIAN ASSISTANT

## 2021-10-28 PROCEDURE — 99213 PR OFFICE/OUTPT VISIT, EST, LEVL III, 20-29 MIN: ICD-10-PCS | Mod: HCNC,S$GLB,, | Performed by: PHYSICIAN ASSISTANT

## 2021-10-28 PROCEDURE — 3044F PR MOST RECENT HEMOGLOBIN A1C LEVEL <7.0%: ICD-10-PCS | Mod: HCNC,CPTII,S$GLB, | Performed by: PHYSICIAN ASSISTANT

## 2021-10-28 PROCEDURE — 77063 BREAST TOMOSYNTHESIS BI: CPT | Mod: 26,52,HCNC, | Performed by: RADIOLOGY

## 2021-10-28 PROCEDURE — 3008F BODY MASS INDEX DOCD: CPT | Mod: HCNC,CPTII,S$GLB, | Performed by: PHYSICIAN ASSISTANT

## 2021-10-28 PROCEDURE — 3044F HG A1C LEVEL LT 7.0%: CPT | Mod: HCNC,CPTII,S$GLB, | Performed by: PHYSICIAN ASSISTANT

## 2021-10-28 PROCEDURE — 3066F PR DOCUMENTATION OF TREATMENT FOR NEPHROPATHY: ICD-10-PCS | Mod: HCNC,CPTII,S$GLB, | Performed by: PHYSICIAN ASSISTANT

## 2021-10-28 PROCEDURE — 99999 PR PBB SHADOW E&M-EST. PATIENT-LVL III: CPT | Mod: PBBFAC,HCNC,, | Performed by: PHYSICIAN ASSISTANT

## 2021-10-28 PROCEDURE — 1159F PR MEDICATION LIST DOCUMENTED IN MEDICAL RECORD: ICD-10-PCS | Mod: HCNC,CPTII,S$GLB, | Performed by: PHYSICIAN ASSISTANT

## 2021-10-28 PROCEDURE — 1159F MED LIST DOCD IN RCRD: CPT | Mod: HCNC,CPTII,S$GLB, | Performed by: PHYSICIAN ASSISTANT

## 2021-11-09 ENCOUNTER — LAB VISIT (OUTPATIENT)
Dept: LAB | Facility: HOSPITAL | Age: 61
End: 2021-11-09
Attending: INTERNAL MEDICINE
Payer: MEDICARE

## 2021-11-09 DIAGNOSIS — I10 ESSENTIAL HYPERTENSION, BENIGN: ICD-10-CM

## 2021-11-09 DIAGNOSIS — E78.2 MIXED HYPERLIPIDEMIA: ICD-10-CM

## 2021-11-09 LAB
ALBUMIN SERPL BCP-MCNC: 3.7 G/DL (ref 3.5–5.2)
ALP SERPL-CCNC: 56 U/L (ref 55–135)
ALT SERPL W/O P-5'-P-CCNC: 22 U/L (ref 10–44)
ANION GAP SERPL CALC-SCNC: 10 MMOL/L (ref 8–16)
AST SERPL-CCNC: 20 U/L (ref 10–40)
BASOPHILS # BLD AUTO: 0.04 K/UL (ref 0–0.2)
BASOPHILS NFR BLD: 0.5 % (ref 0–1.9)
BILIRUB DIRECT SERPL-MCNC: 0.2 MG/DL (ref 0.1–0.3)
BILIRUB SERPL-MCNC: 0.5 MG/DL (ref 0.1–1)
BUN SERPL-MCNC: 15 MG/DL (ref 8–23)
CALCIUM SERPL-MCNC: 9.8 MG/DL (ref 8.7–10.5)
CHLORIDE SERPL-SCNC: 103 MMOL/L (ref 95–110)
CHOLEST SERPL-MCNC: 103 MG/DL (ref 120–199)
CHOLEST/HDLC SERPL: 1.4 {RATIO} (ref 2–5)
CO2 SERPL-SCNC: 29 MMOL/L (ref 23–29)
CREAT SERPL-MCNC: 0.9 MG/DL (ref 0.5–1.4)
DIFFERENTIAL METHOD: ABNORMAL
EOSINOPHIL # BLD AUTO: 0.1 K/UL (ref 0–0.5)
EOSINOPHIL NFR BLD: 1.1 % (ref 0–8)
ERYTHROCYTE [DISTWIDTH] IN BLOOD BY AUTOMATED COUNT: 15.3 % (ref 11.5–14.5)
EST. GFR  (AFRICAN AMERICAN): >60 ML/MIN/1.73 M^2
EST. GFR  (NON AFRICAN AMERICAN): >60 ML/MIN/1.73 M^2
GLUCOSE SERPL-MCNC: 87 MG/DL (ref 70–110)
HCT VFR BLD AUTO: 37.8 % (ref 37–48.5)
HDLC SERPL-MCNC: 76 MG/DL (ref 40–75)
HDLC SERPL: 73.8 % (ref 20–50)
HGB BLD-MCNC: 11.8 G/DL (ref 12–16)
IMM GRANULOCYTES # BLD AUTO: 0.06 K/UL (ref 0–0.04)
IMM GRANULOCYTES NFR BLD AUTO: 0.7 % (ref 0–0.5)
LDLC SERPL CALC-MCNC: 12.8 MG/DL (ref 63–159)
LYMPHOCYTES # BLD AUTO: 2.1 K/UL (ref 1–4.8)
LYMPHOCYTES NFR BLD: 24 % (ref 18–48)
MCH RBC QN AUTO: 27.6 PG (ref 27–31)
MCHC RBC AUTO-ENTMCNC: 31.2 G/DL (ref 32–36)
MCV RBC AUTO: 89 FL (ref 82–98)
MONOCYTES # BLD AUTO: 0.9 K/UL (ref 0.3–1)
MONOCYTES NFR BLD: 10.7 % (ref 4–15)
NEUTROPHILS # BLD AUTO: 5.4 K/UL (ref 1.8–7.7)
NEUTROPHILS NFR BLD: 63 % (ref 38–73)
NONHDLC SERPL-MCNC: 27 MG/DL
NRBC BLD-RTO: 0 /100 WBC
PLATELET # BLD AUTO: 304 K/UL (ref 150–450)
PMV BLD AUTO: 10.3 FL (ref 9.2–12.9)
POTASSIUM SERPL-SCNC: 3.8 MMOL/L (ref 3.5–5.1)
PROT SERPL-MCNC: 6.8 G/DL (ref 6–8.4)
RBC # BLD AUTO: 4.27 M/UL (ref 4–5.4)
SODIUM SERPL-SCNC: 142 MMOL/L (ref 136–145)
TRIGL SERPL-MCNC: 71 MG/DL (ref 30–150)
WBC # BLD AUTO: 8.54 K/UL (ref 3.9–12.7)

## 2021-11-09 PROCEDURE — 36415 COLL VENOUS BLD VENIPUNCTURE: CPT | Mod: HCNC,PO | Performed by: INTERNAL MEDICINE

## 2021-11-09 PROCEDURE — 80061 LIPID PANEL: CPT | Mod: HCNC | Performed by: INTERNAL MEDICINE

## 2021-11-09 PROCEDURE — 80048 BASIC METABOLIC PNL TOTAL CA: CPT | Mod: HCNC | Performed by: INTERNAL MEDICINE

## 2021-11-09 PROCEDURE — 85025 COMPLETE CBC W/AUTO DIFF WBC: CPT | Mod: HCNC | Performed by: INTERNAL MEDICINE

## 2021-11-09 PROCEDURE — 80076 HEPATIC FUNCTION PANEL: CPT | Mod: HCNC | Performed by: INTERNAL MEDICINE

## 2021-11-23 ENCOUNTER — OFFICE VISIT (OUTPATIENT)
Dept: GASTROENTEROLOGY | Facility: CLINIC | Age: 61
End: 2021-11-23
Payer: MEDICARE

## 2021-11-23 VITALS
HEART RATE: 73 BPM | BODY MASS INDEX: 22.77 KG/M2 | WEIGHT: 128.5 LBS | HEIGHT: 63 IN | SYSTOLIC BLOOD PRESSURE: 134 MMHG | DIASTOLIC BLOOD PRESSURE: 80 MMHG

## 2021-11-23 DIAGNOSIS — Z79.899 ENCOUNTER FOR MONITORING LONG-TERM PROTON PUMP INHIBITOR THERAPY: ICD-10-CM

## 2021-11-23 DIAGNOSIS — Z51.81 ENCOUNTER FOR MONITORING LONG-TERM PROTON PUMP INHIBITOR THERAPY: ICD-10-CM

## 2021-11-23 DIAGNOSIS — K52.9 IBD (INFLAMMATORY BOWEL DISEASE): Primary | ICD-10-CM

## 2021-11-23 DIAGNOSIS — R10.9 ABDOMINAL PAIN, UNSPECIFIED ABDOMINAL LOCATION: ICD-10-CM

## 2021-11-23 PROCEDURE — 99999 PR PBB SHADOW E&M-EST. PATIENT-LVL IV: ICD-10-PCS | Mod: PBBFAC,HCNC,, | Performed by: INTERNAL MEDICINE

## 2021-11-23 PROCEDURE — 99999 PR PBB SHADOW E&M-EST. PATIENT-LVL IV: CPT | Mod: PBBFAC,HCNC,, | Performed by: INTERNAL MEDICINE

## 2021-11-23 PROCEDURE — 3066F NEPHROPATHY DOC TX: CPT | Mod: HCNC,CPTII,S$GLB, | Performed by: INTERNAL MEDICINE

## 2021-11-23 PROCEDURE — 4010F PR ACE/ARB THEARPY RXD/TAKEN: ICD-10-PCS | Mod: HCNC,CPTII,S$GLB, | Performed by: INTERNAL MEDICINE

## 2021-11-23 PROCEDURE — 99213 OFFICE O/P EST LOW 20 MIN: CPT | Mod: HCNC,S$GLB,, | Performed by: INTERNAL MEDICINE

## 2021-11-23 PROCEDURE — 99213 PR OFFICE/OUTPT VISIT, EST, LEVL III, 20-29 MIN: ICD-10-PCS | Mod: HCNC,S$GLB,, | Performed by: INTERNAL MEDICINE

## 2021-11-23 PROCEDURE — 3066F PR DOCUMENTATION OF TREATMENT FOR NEPHROPATHY: ICD-10-PCS | Mod: HCNC,CPTII,S$GLB, | Performed by: INTERNAL MEDICINE

## 2021-11-23 PROCEDURE — 4010F ACE/ARB THERAPY RXD/TAKEN: CPT | Mod: HCNC,CPTII,S$GLB, | Performed by: INTERNAL MEDICINE

## 2021-11-26 ENCOUNTER — HOSPITAL ENCOUNTER (OUTPATIENT)
Dept: RADIOLOGY | Facility: HOSPITAL | Age: 61
Discharge: HOME | End: 2021-11-26
Attending: INTERNAL MEDICINE
Payer: MEDICARE

## 2021-11-26 DIAGNOSIS — K91.30 COLORECTAL ANASTOMOTIC STRICTURE: Primary | ICD-10-CM

## 2021-11-26 DIAGNOSIS — R93.5 ABNORMAL CT SCAN, PELVIS: ICD-10-CM

## 2021-11-26 DIAGNOSIS — R10.9 ABDOMINAL PAIN, UNSPECIFIED ABDOMINAL LOCATION: ICD-10-CM

## 2021-11-26 DIAGNOSIS — Z79.899 ENCOUNTER FOR MONITORING LONG-TERM PROTON PUMP INHIBITOR THERAPY: ICD-10-CM

## 2021-11-26 DIAGNOSIS — K52.9 IBD (INFLAMMATORY BOWEL DISEASE): ICD-10-CM

## 2021-11-26 DIAGNOSIS — Z51.81 ENCOUNTER FOR MONITORING LONG-TERM PROTON PUMP INHIBITOR THERAPY: ICD-10-CM

## 2021-11-26 PROCEDURE — 74177 CT ABD & PELVIS W/CONTRAST: CPT | Mod: TC,HCNC

## 2021-11-26 PROCEDURE — 74177 CT ENTEROGRAPHY ABD_PELVIS WITH CONTRAST: ICD-10-PCS | Mod: 26,HCNC,, | Performed by: RADIOLOGY

## 2021-11-26 PROCEDURE — A9698 NON-RAD CONTRAST MATERIALNOC: HCPCS | Mod: HCNC | Performed by: INTERNAL MEDICINE

## 2021-11-26 PROCEDURE — 25500020 PHARM REV CODE 255: Mod: HCNC | Performed by: INTERNAL MEDICINE

## 2021-11-26 PROCEDURE — 74177 CT ABD & PELVIS W/CONTRAST: CPT | Mod: 26,HCNC,, | Performed by: RADIOLOGY

## 2021-11-26 RX ADMIN — BARIUM SULFATE 225 ML: 1 SUSPENSION ORAL at 10:11

## 2021-11-26 RX ADMIN — BARIUM SULFATE 450 ML: 1 SUSPENSION ORAL at 10:11

## 2021-11-26 RX ADMIN — IOHEXOL 75 ML: 350 INJECTION, SOLUTION INTRAVENOUS at 10:11

## 2021-11-29 ENCOUNTER — TELEPHONE (OUTPATIENT)
Dept: GASTROENTEROLOGY | Facility: CLINIC | Age: 61
End: 2021-11-29
Payer: MEDICARE

## 2021-11-30 ENCOUNTER — TELEPHONE (OUTPATIENT)
Dept: GASTROENTEROLOGY | Facility: CLINIC | Age: 61
End: 2021-11-30
Payer: MEDICARE

## 2021-12-03 ENCOUNTER — TELEPHONE (OUTPATIENT)
Dept: INTERNAL MEDICINE | Facility: CLINIC | Age: 61
End: 2021-12-03
Payer: MEDICARE

## 2021-12-06 ENCOUNTER — PATIENT OUTREACH (OUTPATIENT)
Dept: ADMINISTRATIVE | Facility: OTHER | Age: 61
End: 2021-12-06
Payer: MEDICARE

## 2021-12-07 ENCOUNTER — OFFICE VISIT (OUTPATIENT)
Dept: GASTROENTEROLOGY | Facility: CLINIC | Age: 61
End: 2021-12-07
Payer: MEDICARE

## 2021-12-07 DIAGNOSIS — Z90.49 HISTORY OF CHOLECYSTECTOMY: ICD-10-CM

## 2021-12-07 DIAGNOSIS — R13.19 ESOPHAGEAL DYSPHAGIA: ICD-10-CM

## 2021-12-07 DIAGNOSIS — R68.81 EARLY SATIETY: Primary | ICD-10-CM

## 2021-12-07 DIAGNOSIS — D69.6 THROMBOCYTOPENIA: ICD-10-CM

## 2021-12-07 DIAGNOSIS — K52.9 INFLAMMATORY BOWEL DISEASE: ICD-10-CM

## 2021-12-07 DIAGNOSIS — Z90.49 S/P TOTAL COLECTOMY: ICD-10-CM

## 2021-12-07 PROCEDURE — 3066F NEPHROPATHY DOC TX: CPT | Mod: HCNC,CPTII,95, | Performed by: INTERNAL MEDICINE

## 2021-12-07 PROCEDURE — 99499 UNLISTED E&M SERVICE: CPT | Mod: HCNC,95,, | Performed by: INTERNAL MEDICINE

## 2021-12-07 PROCEDURE — 4010F PR ACE/ARB THEARPY RXD/TAKEN: ICD-10-PCS | Mod: HCNC,CPTII,95, | Performed by: INTERNAL MEDICINE

## 2021-12-07 PROCEDURE — 99213 PR OFFICE/OUTPT VISIT, EST, LEVL III, 20-29 MIN: ICD-10-PCS | Mod: HCNC,95,, | Performed by: INTERNAL MEDICINE

## 2021-12-07 PROCEDURE — 4010F ACE/ARB THERAPY RXD/TAKEN: CPT | Mod: HCNC,CPTII,95, | Performed by: INTERNAL MEDICINE

## 2021-12-07 PROCEDURE — 99499 RISK ADDL DX/OHS AUDIT: ICD-10-PCS | Mod: HCNC,95,, | Performed by: INTERNAL MEDICINE

## 2021-12-07 PROCEDURE — 3066F PR DOCUMENTATION OF TREATMENT FOR NEPHROPATHY: ICD-10-PCS | Mod: HCNC,CPTII,95, | Performed by: INTERNAL MEDICINE

## 2021-12-07 PROCEDURE — 99213 OFFICE O/P EST LOW 20 MIN: CPT | Mod: HCNC,95,, | Performed by: INTERNAL MEDICINE

## 2021-12-10 ENCOUNTER — TELEPHONE (OUTPATIENT)
Dept: ENDOSCOPY | Facility: HOSPITAL | Age: 61
End: 2021-12-10
Payer: MEDICARE

## 2021-12-10 ENCOUNTER — OFFICE VISIT (OUTPATIENT)
Dept: SURGERY | Facility: CLINIC | Age: 61
End: 2021-12-10
Payer: MEDICARE

## 2021-12-10 VITALS
WEIGHT: 128.31 LBS | HEART RATE: 81 BPM | SYSTOLIC BLOOD PRESSURE: 140 MMHG | BODY MASS INDEX: 22.73 KG/M2 | HEIGHT: 63 IN | DIASTOLIC BLOOD PRESSURE: 74 MMHG

## 2021-12-10 DIAGNOSIS — K91.30 COLORECTAL ANASTOMOTIC STRICTURE: ICD-10-CM

## 2021-12-10 DIAGNOSIS — R93.5 ABNORMAL CT SCAN, PELVIS: ICD-10-CM

## 2021-12-10 PROCEDURE — 99215 OFFICE O/P EST HI 40 MIN: CPT | Mod: 25,S$GLB,, | Performed by: COLON & RECTAL SURGERY

## 2021-12-10 PROCEDURE — 44385 PR ENDOSCOPY OF BOWEL POUCH: ICD-10-PCS | Mod: S$GLB,,, | Performed by: COLON & RECTAL SURGERY

## 2021-12-10 PROCEDURE — 3066F NEPHROPATHY DOC TX: CPT | Mod: CPTII,S$GLB,, | Performed by: COLON & RECTAL SURGERY

## 2021-12-10 PROCEDURE — 4010F PR ACE/ARB THEARPY RXD/TAKEN: ICD-10-PCS | Mod: CPTII,S$GLB,, | Performed by: COLON & RECTAL SURGERY

## 2021-12-10 PROCEDURE — 3066F PR DOCUMENTATION OF TREATMENT FOR NEPHROPATHY: ICD-10-PCS | Mod: CPTII,S$GLB,, | Performed by: COLON & RECTAL SURGERY

## 2021-12-10 PROCEDURE — 99999 PR PBB SHADOW E&M-EST. PATIENT-LVL III: ICD-10-PCS | Mod: PBBFAC,HCNC,, | Performed by: COLON & RECTAL SURGERY

## 2021-12-10 PROCEDURE — 44385 ENDOSCOPY OF BOWEL POUCH: CPT | Mod: S$GLB,,, | Performed by: COLON & RECTAL SURGERY

## 2021-12-10 PROCEDURE — 99999 PR PBB SHADOW E&M-EST. PATIENT-LVL III: CPT | Mod: PBBFAC,HCNC,, | Performed by: COLON & RECTAL SURGERY

## 2021-12-10 PROCEDURE — 99215 PR OFFICE/OUTPT VISIT, EST, LEVL V, 40-54 MIN: ICD-10-PCS | Mod: 25,S$GLB,, | Performed by: COLON & RECTAL SURGERY

## 2021-12-10 PROCEDURE — 4010F ACE/ARB THERAPY RXD/TAKEN: CPT | Mod: CPTII,S$GLB,, | Performed by: COLON & RECTAL SURGERY

## 2021-12-17 ENCOUNTER — TELEPHONE (OUTPATIENT)
Dept: NEPHROLOGY | Facility: CLINIC | Age: 61
End: 2021-12-17
Payer: MEDICARE

## 2021-12-17 DIAGNOSIS — E55.9 VITAMIN D DEFICIENCY, UNSPECIFIED: ICD-10-CM

## 2021-12-17 DIAGNOSIS — I10 BENIGN HYPERTENSION: Primary | ICD-10-CM

## 2021-12-19 ENCOUNTER — PATIENT MESSAGE (OUTPATIENT)
Dept: GASTROENTEROLOGY | Facility: CLINIC | Age: 61
End: 2021-12-19
Payer: MEDICARE

## 2021-12-19 ENCOUNTER — TELEPHONE (OUTPATIENT)
Dept: GASTROENTEROLOGY | Facility: CLINIC | Age: 61
End: 2021-12-19
Payer: MEDICARE

## 2021-12-19 DIAGNOSIS — K86.2 PANCREATIC CYST: Primary | ICD-10-CM

## 2021-12-19 DIAGNOSIS — K52.9 INFLAMMATORY BOWEL DISEASE: Primary | ICD-10-CM

## 2021-12-19 DIAGNOSIS — Z90.49 STATUS POST TOTAL COLECTOMY: ICD-10-CM

## 2021-12-19 DIAGNOSIS — R19.7 DIARRHEA IN ADULT PATIENT: ICD-10-CM

## 2021-12-19 DIAGNOSIS — R19.7 DIARRHEA IN ADULT PATIENT: Primary | ICD-10-CM

## 2021-12-19 DIAGNOSIS — Z87.19 HISTORY OF ULCERATIVE COLITIS: ICD-10-CM

## 2021-12-19 DIAGNOSIS — K83.8 COMMON BILE DUCT DILATION: ICD-10-CM

## 2021-12-19 DIAGNOSIS — K52.9 IBD (INFLAMMATORY BOWEL DISEASE): Primary | ICD-10-CM

## 2021-12-19 DIAGNOSIS — R14.0 POSTPRANDIAL ABDOMINAL BLOATING: ICD-10-CM

## 2021-12-19 DIAGNOSIS — Z83.79 FAMILY HISTORY OF ULCERATIVE COLITIS: ICD-10-CM

## 2021-12-20 ENCOUNTER — TELEPHONE (OUTPATIENT)
Dept: GASTROENTEROLOGY | Facility: CLINIC | Age: 61
End: 2021-12-20
Payer: MEDICARE

## 2021-12-20 DIAGNOSIS — Z12.11 SCREENING FOR COLON CANCER: Primary | ICD-10-CM

## 2021-12-20 RX ORDER — POLYETHYLENE GLYCOL 3350, SODIUM SULFATE ANHYDROUS, SODIUM BICARBONATE, SODIUM CHLORIDE, POTASSIUM CHLORIDE 236; 22.74; 6.74; 5.86; 2.97 G/4L; G/4L; G/4L; G/4L; G/4L
4 POWDER, FOR SOLUTION ORAL ONCE
Qty: 4000 ML | Refills: 0 | Status: SHIPPED | OUTPATIENT
Start: 2021-12-20 | End: 2021-12-20

## 2021-12-21 ENCOUNTER — LAB VISIT (OUTPATIENT)
Dept: LAB | Facility: HOSPITAL | Age: 61
End: 2021-12-21
Attending: INTERNAL MEDICINE
Payer: MEDICARE

## 2021-12-21 DIAGNOSIS — Z87.19 HISTORY OF ULCERATIVE COLITIS: ICD-10-CM

## 2021-12-21 DIAGNOSIS — K52.9 IBD (INFLAMMATORY BOWEL DISEASE): ICD-10-CM

## 2021-12-21 DIAGNOSIS — R19.7 DIARRHEA IN ADULT PATIENT: ICD-10-CM

## 2021-12-21 LAB
ALBUMIN SERPL BCP-MCNC: 3.7 G/DL (ref 3.5–5.2)
ALP SERPL-CCNC: 41 U/L (ref 55–135)
ALT SERPL W/O P-5'-P-CCNC: 22 U/L (ref 10–44)
ANION GAP SERPL CALC-SCNC: 7 MMOL/L (ref 8–16)
AST SERPL-CCNC: 27 U/L (ref 10–40)
BASOPHILS # BLD AUTO: 0.05 K/UL (ref 0–0.2)
BASOPHILS NFR BLD: 0.7 % (ref 0–1.9)
BILIRUB SERPL-MCNC: 0.4 MG/DL (ref 0.1–1)
BUN SERPL-MCNC: 10 MG/DL (ref 8–23)
CALCIUM SERPL-MCNC: 9.7 MG/DL (ref 8.7–10.5)
CHLORIDE SERPL-SCNC: 102 MMOL/L (ref 95–110)
CO2 SERPL-SCNC: 33 MMOL/L (ref 23–29)
CREAT SERPL-MCNC: 0.9 MG/DL (ref 0.5–1.4)
CRP SERPL-MCNC: 5.7 MG/L (ref 0–8.2)
DIFFERENTIAL METHOD: ABNORMAL
EOSINOPHIL # BLD AUTO: 0.1 K/UL (ref 0–0.5)
EOSINOPHIL NFR BLD: 1 % (ref 0–8)
ERYTHROCYTE [DISTWIDTH] IN BLOOD BY AUTOMATED COUNT: 13.9 % (ref 11.5–14.5)
EST. GFR  (AFRICAN AMERICAN): >60 ML/MIN/1.73 M^2
EST. GFR  (NON AFRICAN AMERICAN): >60 ML/MIN/1.73 M^2
GLUCOSE SERPL-MCNC: 87 MG/DL (ref 70–110)
HAV IGG SER QL IA: NEGATIVE
HBV CORE AB SERPL QL IA: NEGATIVE
HBV SURFACE AG SERPL QL IA: NEGATIVE
HCT VFR BLD AUTO: 39.1 % (ref 37–48.5)
HCV AB SERPL QL IA: NEGATIVE
HGB BLD-MCNC: 11.9 G/DL (ref 12–16)
IMM GRANULOCYTES # BLD AUTO: 0.05 K/UL (ref 0–0.04)
IMM GRANULOCYTES NFR BLD AUTO: 0.7 % (ref 0–0.5)
LIPASE SERPL-CCNC: 17 U/L (ref 4–60)
LYMPHOCYTES # BLD AUTO: 2.4 K/UL (ref 1–4.8)
LYMPHOCYTES NFR BLD: 34.5 % (ref 18–48)
MCH RBC QN AUTO: 27.5 PG (ref 27–31)
MCHC RBC AUTO-ENTMCNC: 30.4 G/DL (ref 32–36)
MCV RBC AUTO: 90 FL (ref 82–98)
MONOCYTES # BLD AUTO: 0.7 K/UL (ref 0.3–1)
MONOCYTES NFR BLD: 9.4 % (ref 4–15)
NEUTROPHILS # BLD AUTO: 3.8 K/UL (ref 1.8–7.7)
NEUTROPHILS NFR BLD: 53.7 % (ref 38–73)
NRBC BLD-RTO: 0 /100 WBC
PLATELET # BLD AUTO: 286 K/UL (ref 150–450)
PMV BLD AUTO: 9.6 FL (ref 9.2–12.9)
POTASSIUM SERPL-SCNC: 3.2 MMOL/L (ref 3.5–5.1)
PROT SERPL-MCNC: 6.8 G/DL (ref 6–8.4)
RBC # BLD AUTO: 4.33 M/UL (ref 4–5.4)
RUBV IGG SER-ACNC: 20.5 IU/ML
RUBV IGG SER-IMP: REACTIVE
SODIUM SERPL-SCNC: 142 MMOL/L (ref 136–145)
TSH SERPL DL<=0.005 MIU/L-ACNC: 1.46 UIU/ML (ref 0.4–4)
WBC # BLD AUTO: 7.05 K/UL (ref 3.9–12.7)

## 2021-12-21 PROCEDURE — 86735 MUMPS ANTIBODY: CPT | Mod: HCNC | Performed by: INTERNAL MEDICINE

## 2021-12-21 PROCEDURE — 86753 PROTOZOA ANTIBODY NOS: CPT | Mod: HCNC | Performed by: INTERNAL MEDICINE

## 2021-12-21 PROCEDURE — 86765 RUBEOLA ANTIBODY: CPT | Mod: HCNC | Performed by: INTERNAL MEDICINE

## 2021-12-21 PROCEDURE — 80053 COMPREHEN METABOLIC PANEL: CPT | Mod: HCNC | Performed by: INTERNAL MEDICINE

## 2021-12-21 PROCEDURE — 87340 HEPATITIS B SURFACE AG IA: CPT | Mod: HCNC | Performed by: INTERNAL MEDICINE

## 2021-12-21 PROCEDURE — 85025 COMPLETE CBC W/AUTO DIFF WBC: CPT | Mod: HCNC | Performed by: INTERNAL MEDICINE

## 2021-12-21 PROCEDURE — 86140 C-REACTIVE PROTEIN: CPT | Mod: HCNC | Performed by: INTERNAL MEDICINE

## 2021-12-21 PROCEDURE — 86762 RUBELLA ANTIBODY: CPT | Mod: HCNC | Performed by: INTERNAL MEDICINE

## 2021-12-21 PROCEDURE — 82657 ENZYME CELL ACTIVITY: CPT | Mod: HCNC | Performed by: INTERNAL MEDICINE

## 2021-12-21 PROCEDURE — 86803 HEPATITIS C AB TEST: CPT | Mod: HCNC | Performed by: INTERNAL MEDICINE

## 2021-12-21 PROCEDURE — 86706 HEP B SURFACE ANTIBODY: CPT | Mod: HCNC | Performed by: INTERNAL MEDICINE

## 2021-12-21 PROCEDURE — 86682 HELMINTH ANTIBODY: CPT | Mod: HCNC | Performed by: INTERNAL MEDICINE

## 2021-12-21 PROCEDURE — 86704 HEP B CORE ANTIBODY TOTAL: CPT | Mod: HCNC | Performed by: INTERNAL MEDICINE

## 2021-12-21 PROCEDURE — 86790 VIRUS ANTIBODY NOS: CPT | Mod: HCNC | Performed by: INTERNAL MEDICINE

## 2021-12-21 PROCEDURE — 86787 VARICELLA-ZOSTER ANTIBODY: CPT | Mod: HCNC | Performed by: INTERNAL MEDICINE

## 2021-12-21 PROCEDURE — 83690 ASSAY OF LIPASE: CPT | Mod: HCNC | Performed by: INTERNAL MEDICINE

## 2021-12-21 PROCEDURE — 84443 ASSAY THYROID STIM HORMONE: CPT | Mod: HCNC | Performed by: INTERNAL MEDICINE

## 2021-12-21 PROCEDURE — 36415 COLL VENOUS BLD VENIPUNCTURE: CPT | Mod: HCNC | Performed by: INTERNAL MEDICINE

## 2021-12-22 ENCOUNTER — TELEPHONE (OUTPATIENT)
Dept: GASTROENTEROLOGY | Facility: CLINIC | Age: 61
End: 2021-12-22
Payer: MEDICARE

## 2021-12-22 ENCOUNTER — LAB VISIT (OUTPATIENT)
Dept: LAB | Facility: HOSPITAL | Age: 61
End: 2021-12-22
Attending: INTERNAL MEDICINE
Payer: MEDICARE

## 2021-12-22 DIAGNOSIS — R19.7 DIARRHEA IN ADULT PATIENT: ICD-10-CM

## 2021-12-22 DIAGNOSIS — Z87.19 HISTORY OF ULCERATIVE COLITIS: ICD-10-CM

## 2021-12-22 LAB
MUMPS IGG INTERPRETATION: POSITIVE
MUMPS IGG SCREEN: 2 ISR (ref 0–0.9)
RUBEOLA IGG ANTIBODY: 2.24 ISR (ref 0–0.9)
RUBEOLA INTERPRETATION: POSITIVE
VARICELLA INTERPRETATION: POSITIVE
VARICELLA ZOSTER IGG: 2.8 ISR (ref 0–0.9)

## 2021-12-22 PROCEDURE — 87045 FECES CULTURE AEROBIC BACT: CPT | Mod: HCNC | Performed by: INTERNAL MEDICINE

## 2021-12-22 PROCEDURE — 87209 SMEAR COMPLEX STAIN: CPT | Mod: 91,HCNC | Performed by: INTERNAL MEDICINE

## 2021-12-22 PROCEDURE — 87798 DETECT AGENT NOS DNA AMP: CPT | Mod: HCNC | Performed by: INTERNAL MEDICINE

## 2021-12-22 PROCEDURE — 87015 SPECIMEN INFECT AGNT CONCNTJ: CPT | Mod: HCNC | Performed by: INTERNAL MEDICINE

## 2021-12-22 PROCEDURE — 83993 ASSAY FOR CALPROTECTIN FECAL: CPT | Mod: HCNC | Performed by: INTERNAL MEDICINE

## 2021-12-22 PROCEDURE — 87329 GIARDIA AG IA: CPT | Mod: HCNC | Performed by: INTERNAL MEDICINE

## 2021-12-22 PROCEDURE — 87427 SHIGA-LIKE TOXIN AG IA: CPT | Mod: HCNC | Performed by: INTERNAL MEDICINE

## 2021-12-22 PROCEDURE — 87046 STOOL CULTR AEROBIC BACT EA: CPT | Mod: 59,HCNC | Performed by: INTERNAL MEDICINE

## 2021-12-22 PROCEDURE — 82656 EL-1 FECAL QUAL/SEMIQ: CPT | Mod: HCNC | Performed by: INTERNAL MEDICINE

## 2021-12-22 PROCEDURE — 82705 FATS/LIPIDS FECES QUAL: CPT | Mod: HCNC | Performed by: INTERNAL MEDICINE

## 2021-12-22 PROCEDURE — 87177 OVA AND PARASITES SMEARS: CPT | Mod: 91,HCNC | Performed by: INTERNAL MEDICINE

## 2021-12-22 PROCEDURE — 87207 SMEAR SPECIAL STAIN: CPT | Mod: HCNC | Performed by: INTERNAL MEDICINE

## 2021-12-23 DIAGNOSIS — E87.6 LOW SERUM POTASSIUM: Primary | ICD-10-CM

## 2021-12-23 DIAGNOSIS — D50.9 IRON DEFICIENCY ANEMIA, UNSPECIFIED IRON DEFICIENCY ANEMIA TYPE: ICD-10-CM

## 2021-12-23 LAB
CRYPTOSP AG STL QL IA: NEGATIVE
E COLI SXT1 STL QL IA: NEGATIVE
E COLI SXT2 STL QL IA: NEGATIVE
E HISTOLYT AB SER IA-ACNC: NEGATIVE
G LAMBLIA AG STL QL IA: NEGATIVE
HBV SURFACE AB SER QL IA: NEGATIVE
HBV SURFACE AB SERPL IA-ACNC: 6 MIU/ML

## 2021-12-24 LAB — STRONGYLOIDES ANTIBODY IGG: NEGATIVE

## 2021-12-26 LAB
ENCEPHALITOZOON BIENEUSI: NEGATIVE
ENCEPHALITOZOON SPECIES: NEGATIVE
MICROSPORIDIA SPECIMEN SOURCE: NORMAL

## 2021-12-27 ENCOUNTER — TELEPHONE (OUTPATIENT)
Dept: GASTROENTEROLOGY | Facility: CLINIC | Age: 61
End: 2021-12-27
Payer: MEDICARE

## 2021-12-27 ENCOUNTER — PATIENT MESSAGE (OUTPATIENT)
Dept: GASTROENTEROLOGY | Facility: CLINIC | Age: 61
End: 2021-12-27
Payer: MEDICARE

## 2021-12-27 LAB
BACTERIA STL CULT: NORMAL
CYCLOSPORA STL SAFRANIN STN: NORMAL
FAT STL QL: NORMAL
NEUTRAL FAT STL QL: NORMAL
O+P STL MICRO: NORMAL

## 2021-12-28 LAB
CALPROTECTIN STL-MCNT: 56.8 MCG/G
ELASTASE 1, FECAL: 498 MCG/G

## 2021-12-30 ENCOUNTER — HOSPITAL ENCOUNTER (OUTPATIENT)
Facility: HOSPITAL | Age: 61
Discharge: HOME | End: 2021-12-30
Attending: INTERNAL MEDICINE | Admitting: INTERNAL MEDICINE
Payer: MEDICARE

## 2021-12-30 ENCOUNTER — ANESTHESIA EVENT (OUTPATIENT)
Dept: ENDOSCOPY | Facility: HOSPITAL | Age: 61
End: 2021-12-30
Payer: MEDICARE

## 2021-12-30 ENCOUNTER — ANESTHESIA (OUTPATIENT)
Dept: ENDOSCOPY | Facility: HOSPITAL | Age: 61
End: 2021-12-30
Payer: MEDICARE

## 2021-12-30 VITALS
SYSTOLIC BLOOD PRESSURE: 112 MMHG | BODY MASS INDEX: 21.79 KG/M2 | TEMPERATURE: 98 F | HEIGHT: 63 IN | OXYGEN SATURATION: 99 % | DIASTOLIC BLOOD PRESSURE: 68 MMHG | WEIGHT: 123 LBS | RESPIRATION RATE: 20 BRPM | HEART RATE: 75 BPM

## 2021-12-30 DIAGNOSIS — R68.81 EARLY SATIETY: ICD-10-CM

## 2021-12-30 PROCEDURE — 63600175 PHARM REV CODE 636 W HCPCS: Mod: HCNC | Performed by: NURSE ANESTHETIST, CERTIFIED REGISTERED

## 2021-12-30 PROCEDURE — 43239 PR EGD, FLEX, W/BIOPSY, SGL/MULTI: ICD-10-PCS | Mod: HCNC,,, | Performed by: INTERNAL MEDICINE

## 2021-12-30 PROCEDURE — 25000003 PHARM REV CODE 250: Mod: HCNC | Performed by: NURSE ANESTHETIST, CERTIFIED REGISTERED

## 2021-12-30 PROCEDURE — 43239 EGD BIOPSY SINGLE/MULTIPLE: CPT | Mod: HCNC | Performed by: INTERNAL MEDICINE

## 2021-12-30 PROCEDURE — 37000009 HC ANESTHESIA EA ADD 15 MINS: Mod: HCNC | Performed by: INTERNAL MEDICINE

## 2021-12-30 PROCEDURE — D9220A PRA ANESTHESIA: Mod: HCNC,CRNA,, | Performed by: NURSE ANESTHETIST, CERTIFIED REGISTERED

## 2021-12-30 PROCEDURE — 43239 EGD BIOPSY SINGLE/MULTIPLE: CPT | Mod: HCNC,,, | Performed by: INTERNAL MEDICINE

## 2021-12-30 PROCEDURE — D9220A PRA ANESTHESIA: Mod: HCNC,ANES,, | Performed by: ANESTHESIOLOGY

## 2021-12-30 PROCEDURE — D9220A PRA ANESTHESIA: ICD-10-PCS | Mod: HCNC,ANES,, | Performed by: ANESTHESIOLOGY

## 2021-12-30 PROCEDURE — 37000008 HC ANESTHESIA 1ST 15 MINUTES: Mod: HCNC | Performed by: INTERNAL MEDICINE

## 2021-12-30 PROCEDURE — 88305 TISSUE EXAM BY PATHOLOGIST: ICD-10-PCS | Mod: 26,HCNC,, | Performed by: PATHOLOGY

## 2021-12-30 PROCEDURE — 88305 TISSUE EXAM BY PATHOLOGIST: CPT | Mod: 26,HCNC,, | Performed by: PATHOLOGY

## 2021-12-30 PROCEDURE — 82657 ENZYME CELL ACTIVITY: CPT | Mod: HCNC | Performed by: PATHOLOGY

## 2021-12-30 PROCEDURE — 25000003 PHARM REV CODE 250: Mod: HCNC | Performed by: INTERNAL MEDICINE

## 2021-12-30 PROCEDURE — 27201012 HC FORCEPS, HOT/COLD, DISP: Mod: HCNC | Performed by: INTERNAL MEDICINE

## 2021-12-30 PROCEDURE — D9220A PRA ANESTHESIA: ICD-10-PCS | Mod: HCNC,CRNA,, | Performed by: NURSE ANESTHETIST, CERTIFIED REGISTERED

## 2021-12-30 PROCEDURE — 88305 TISSUE EXAM BY PATHOLOGIST: CPT | Mod: 59,HCNC | Performed by: PATHOLOGY

## 2021-12-30 RX ORDER — DEXAMETHASONE SODIUM PHOSPHATE 4 MG/ML
INJECTION, SOLUTION INTRA-ARTICULAR; INTRALESIONAL; INTRAMUSCULAR; INTRAVENOUS; SOFT TISSUE
Status: DISCONTINUED | OUTPATIENT
Start: 2021-12-30 | End: 2021-12-30

## 2021-12-30 RX ORDER — PROPOFOL 10 MG/ML
VIAL (ML) INTRAVENOUS
Status: DISCONTINUED | OUTPATIENT
Start: 2021-12-30 | End: 2021-12-30

## 2021-12-30 RX ORDER — SODIUM CHLORIDE 0.9 % (FLUSH) 0.9 %
3 SYRINGE (ML) INJECTION
Status: DISCONTINUED | OUTPATIENT
Start: 2021-12-30 | End: 2021-12-30 | Stop reason: HOSPADM

## 2021-12-30 RX ORDER — SUCCINYLCHOLINE CHLORIDE 20 MG/ML
INJECTION INTRAMUSCULAR; INTRAVENOUS
Status: DISCONTINUED | OUTPATIENT
Start: 2021-12-30 | End: 2021-12-30

## 2021-12-30 RX ORDER — LIDOCAINE HYDROCHLORIDE 20 MG/ML
INJECTION, SOLUTION EPIDURAL; INFILTRATION; INTRACAUDAL; PERINEURAL
Status: DISCONTINUED | OUTPATIENT
Start: 2021-12-30 | End: 2021-12-30

## 2021-12-30 RX ORDER — FENTANYL CITRATE 50 UG/ML
INJECTION, SOLUTION INTRAMUSCULAR; INTRAVENOUS
Status: DISCONTINUED | OUTPATIENT
Start: 2021-12-30 | End: 2021-12-30

## 2021-12-30 RX ORDER — SODIUM CHLORIDE 9 MG/ML
INJECTION, SOLUTION INTRAVENOUS CONTINUOUS
Status: DISCONTINUED | OUTPATIENT
Start: 2021-12-30 | End: 2021-12-30 | Stop reason: HOSPADM

## 2021-12-30 RX ADMIN — SUCCINYLCHOLINE CHLORIDE 100 MG: 20 INJECTION, SOLUTION INTRAMUSCULAR; INTRAVENOUS at 12:12

## 2021-12-30 RX ADMIN — FENTANYL CITRATE 50 MCG: 50 INJECTION INTRAMUSCULAR; INTRAVENOUS at 12:12

## 2021-12-30 RX ADMIN — SODIUM CHLORIDE: 0.9 INJECTION, SOLUTION INTRAVENOUS at 10:12

## 2021-12-30 RX ADMIN — DEXAMETHASONE SODIUM PHOSPHATE 8 MG: 4 INJECTION, SOLUTION INTRAMUSCULAR; INTRAVENOUS at 12:12

## 2021-12-30 RX ADMIN — PROPOFOL 200 MG: 10 INJECTION, EMULSION INTRAVENOUS at 12:12

## 2021-12-30 RX ADMIN — LIDOCAINE HYDROCHLORIDE 100 MG: 20 INJECTION, SOLUTION EPIDURAL; INFILTRATION; INTRACAUDAL at 12:12

## 2021-12-30 NOTE — DISCHARGE INSTRUCTIONS
Patient Education       Upper GI Endoscopy   Why is this procedure done?   This procedure is done to view your upper gastrointestinal (GI) tract. This includes your throat and food pipe (esophagus). It also includes your stomach and the first part of the small bowel. Some people have this test for problems like coughing or throwing up blood. Other people may be having bad belly pain or blood in their stool. You may be having trouble swallowing or problems with acid reflux.  Doctors often use this test to look for problems like:  · Ulcers  · Cancer or tumor growths  · Internal bleeding  · Swelling  · Inflammation  · Infection  · Miranda's esophagus  · Gastroesophageal reflux disease or GERD  · Swallowing problems     What will the results be?   Your doctor may find the problem inside your body that is causing your signs. The doctor can also treat some problems while doing this procedure. This may include things like stopping bleeding or removing a growth.  What happens before the procedure?   Your doctor will take your history and do an exam. Talk to the doctor about:  · All the drugs you are taking. Be sure to include all prescription, over the counter, vitamins, and herbal supplements. Bring a list of drugs you take with you.  · Tell the doctor if you have any drug allergy.  · Any bleeding problems. Be sure to tell your doctor if you are taking any drugs that may cause bleeding. Some of these are warfarin, rivaroxaban, apixaban, ticagrelor, clopidogrel, ketorolac, ibuprofen, naproxen, or aspirin. Certain vitamins and herbs, such as garlic and fish oil, may also add to the risk for bleeding. You may need to stop these drugs as well. Talk to your doctor about them.  · When you need to stop eating or drinking before your procedure.  You will not be allowed to drive right away after the procedure. Ask a family member or a friend to drive you home.  What happens during the procedure?   · Once you are in the operating  room, the staff will put an IV in your arm to give you fluids and drugs. You will be given a drug to make you sleepy. It will also help you stay pain free during the surgery.  · Your doctor may spray a drug in your throat to numb the area.  · You will be asked to lie on your left side. The staff may put a small tube in your nose to help you breathe. Your doctor may place a tool in your mouth to keep it open during the procedure. The staff may place a suction tool in your mouth to lessen saliva flow.  · The doctor will put a special scope in your mouth and down your food pipe. It is a long, thin tube with lights and a small camera. It sends images to a screen in the operating room where the camera is being used.  · To be able to view the site clearly, gas will be pumped into your belly.  · Your doctor will use the scope to see if there are problems in your upper GI tract. Small tools may be used with the scope to fix any problems that are found. Your doctor may stop an area of bleeding or take out a tumor. The doctor may also remove a growth or take tissue samples for biopsy.  · This procedure takes about 15 to 30 minutes.  What happens after the procedure?   · You will go to the Recovery Room and the staff will watch you closely.  · You will be allowed to go home when you are awake and able to eat and drink.  · You may feel bloated after the procedure. This is from any gas the doctor may have used to help see your GI tract better.  · You may have a sore throat after the procedure. You can drink fluid once the numbing drugs in your throat wear off.  · Ask your doctor when the results will be available. Set up a visit to talk about them.  What drugs may be needed?   The doctor may order drugs to:  · Help with pain  · Decrease the acid in your stomach  What problems could happen?   · Painful swallowing  · Upset stomach  · Injury to food pipe   · Throwing up  · Tear in the esophagus  Where can I learn more?   [employer]  [employer]  https://gi.org/topics/upper-gi-endoscopy-egd/   Last Reviewed Date   2021-10-05  Consumer Information Use and Disclaimer   This information is not specific medical advice and does not replace information you receive from your health care provider. This is only a brief summary of general information. It does NOT include all information about conditions, illnesses, injuries, tests, procedures, treatments, therapies, discharge instructions or life-style choices that may apply to you. You must talk with your health care provider for complete information about your health and treatment options. This information should not be used to decide whether or not to accept your health care providers advice, instructions or recommendations. Only your health care provider has the knowledge and training to provide advice that is right for you.  Copyright   Copyright © 2021 [employer] and its affiliates and/or licensors. All rights reserved.

## 2021-12-30 NOTE — H&P
"[facility] - Endoscopy  History & Physical    Subjective:      Chief Complaint/Reason for Admission:     EGD   Efe Horowitz is a 61 y.o. female.    Past Medical History:   Diagnosis Date    Acid reflux     Adrenal insufficiency, primary     Arthritis     Asthma     B12 deficiency anemia     Benign essential hypertension 2/7/2021    Blood transfusion 2010    Breast cancer 2/2016    Right breast infiltrating ductal CA    Cataract     Chronic pain     Deep vein thrombosis     Dry eyes     Esophagitis, unspecified     Fibromyalgia     General anesthetics causing adverse effect in therapeutic use     "slow to wake up bc I don't have an immune system    Heart murmur     History of colon polyps     Hyperlipidemia     Hypopituitary dwarfism     Iron deficiency anemia     Lupus     Migraines, neuralgic     Nonspecific ulcerative colitis     OP (osteoporosis)     history of reclast    Osteopenia     Pancreatic cyst     Schatzki's ring     Steroid sulfatase deficiency     Ulcerative colitis     Vitamin D deficiency disease      Past Surgical History:   Procedure Laterality Date    APPENDECTOMY      BREAST BIOPSY Right 2/2016    [facility]    BREAST RECONSTRUCTION      BREAST SURGERY      CHOLECYSTECTOMY      COLON SURGERY      ENDOSCOPIC ULTRASOUND OF UPPER GASTROINTESTINAL TRACT N/A 8/6/2018    Procedure: ULTRASOUND, ENDOSCOPIC, UPPER GI TRACT;  Surgeon: Hong Fnin MD;  [facility]  Service: Endoscopy;  Laterality: N/A;    ESOPHAGOGASTRODUODENOSCOPY N/A 12/10/2018    Procedure: EGD (ESOPHAGOGASTRODUODENOSCOPY);  Surgeon: Reese Villalta MD;  Location: [facility] (4TH FLR);  Service: Endoscopy;  Laterality: N/A;    FLEXIBLE SIGMOIDOSCOPY N/A 12/10/2018    Procedure: SIGMOIDOSCOPY, FLEXIBLE;  Surgeon: Reese Villalta MD;  Location: [facility] (4TH FLR);  Service: Endoscopy;  Laterality: N/A;  Recommend full split bowel prep for this study just like for a " colonoscopy    HYSTERECTOMY      MASTECTOMY      OOPHORECTOMY Bilateral     restorative proctectomy      total abdominal colectomy with ileostomy  2010    TOTAL COLECTOMY      TOTAL REDUCTION MAMMOPLASTY Left 2017    UPPER ENDOSCOPIC ULTRASOUND W/ FNA       Family History   Problem Relation Age of Onset    Diabetes Father     Hyperlipidemia Father     Hypertension Father     Hyperlipidemia Mother     Cancer Maternal Aunt         pancreatic cancer, late 50s at dx    Pancreatic cancer Maternal Aunt     Breast cancer Maternal Aunt 55    Breast cancer Cousin 28    Breast cancer Other 45    No Known Problems Sister     No Known Problems Brother     Cancer Maternal Uncle 65        pancreatic cancer    Pancreatic cancer Maternal Uncle     No Known Problems Paternal Aunt     No Known Problems Paternal Uncle     No Known Problems Maternal Grandmother     No Known Problems Maternal Grandfather     No Known Problems Paternal Grandmother     No Known Problems Paternal Grandfather     Breast cancer Maternal Cousin 50    Cancer Other 25        liver cancer    Amblyopia Neg Hx     Blindness Neg Hx     Cataracts Neg Hx     Macular degeneration Neg Hx     Retinal detachment Neg Hx     Strabismus Neg Hx     Stroke Neg Hx     Thyroid disease Neg Hx     Glaucoma Neg Hx     Ovarian cancer Neg Hx     Celiac disease Neg Hx     Colon cancer Neg Hx     Colon polyps Neg Hx     Esophageal cancer Neg Hx     Liver cancer Neg Hx     Rectal cancer Neg Hx     Stomach cancer Neg Hx     Ulcerative colitis Neg Hx     Inflammatory bowel disease Neg Hx      Social History     Tobacco Use    Smoking status: Never Smoker    Smokeless tobacco: Never Used   Substance Use Topics    Alcohol use: No     Alcohol/week: 0.0 standard drinks    Drug use: No       PTA Medications   Medication Sig    albuterol (ACCUNEB) 0.63 mg/3 mL Nebu Take 3 mLs (0.63 mg total) by nebulization every 6 (six) hours as needed.  (Patient not taking: No sig reported)    alirocumab (PRALUENT PEN) 75 mg/mL PnIj Inject 75 mg into the skin every 14 (fourteen) days.    amLODIPine (NORVASC) 2.5 MG tablet Take 2.5 mg by mouth once daily.    CREON 36,000-114,000- 180,000 unit CpDR TAKE TWO CAPSULES BY MOUTH THREE TIMES DAILY WITH MEALS AND ONE CAPSULE WITH EACH SNACK    dexamethasone (DECADRON) 4 mg/mL injection Inject 1 mL (4 mg total) into the muscle daily as needed (Signs and symtpoms of severe adrenal insufficiency).    diclofenac sodium (VOLTAREN) 1 % Gel APPLY 2 GRAMS TO AFFECTED AREA 4 TIMES A DAY    diltiazem HCl (DILTIAZEM 2% CREAM) APPLY TO ANAL AREA EVERY 8 HOURS FOR 7 DAYS    ergocalciferol (VITAMIN D2) 50,000 unit Cap Take 1 capsule (50,000 Units total) by mouth twice a week. Every Monday and Friday    gabapentin (NEURONTIN) 300 MG capsule TAKE 1 CAPSULE BY MOUTH EVERY EVENING    hydroCHLOROthiazide (HYDRODIURIL) 25 MG tablet Take 1 tablet (25 mg total) by mouth once daily.    hydrOXYchloroQUINE (PLAQUENIL) 200 mg tablet TAKE 1.5 TABLETS BY MOUTH EVERY DAY    lidocaine (LIDODERM) 5 % Place 1 patch onto the skin once daily. Remove & Discard patch within 12 hours or as directed by MD    loperamide (IMODIUM) 2 mg capsule TAKE 1 TO 2 CAPSULES BY MOUTH FOUR TIMES DAILY    multivitamin/iron/folic acid (CENTRUM WOMEN ORAL) Take 1 tablet by mouth once daily.    nebivoloL (BYSTOLIC) 10 MG Tab Take 1 tablet by mouth once daily.    niacin 100 MG Tab Take by mouth. 1 Tablet Oral At bedtime    omeprazole (PRILOSEC) 20 MG capsule TAKE 1 CAPSULE BY MOUTH TWICE A DAY    ondansetron (ZOFRAN) 4 MG tablet Take 1 tablet (4 mg total) by mouth every 12 (twelve) hours as needed for Nausea.    oxyCODONE-acetaminophen (PERCOCET) 5-325 mg per tablet Take 1 tablet by mouth every 12 (twelve) hours as needed for Pain.    polycarbophil (FIBERCON) 625 mg tablet Take by mouth. 1 Tablet Oral Every 12 hours.  Take with 12 ounces of water with each  "pill.    potassium chloride SA (K-DUR,KLOR-CON) 10 MEQ tablet TAKE TWO TABLETS BY MOUTH ONCE DAILY    predniSONE (DELTASONE) 5 MG tablet Take 1 tablet (5 mg total) by mouth once daily. Double the dose in times of illness    REPATHA SURECLICK 140 mg/mL PnIj INJECT 140 MG INTO THE SKIN EVERY 14 (FOURTEEN) DAYS    RESTASIS 0.05 % ophthalmic emulsion INSTILL 1 DROP INTO BOTH EYES TWICE A DAY    rizatriptan (MAXALT) 10 MG tablet Take 1 tablet (10 mg total) by mouth every 6 (six) hours as needed for Migraine.    rosuvastatin (CRESTOR) 20 MG tablet Take 20 mg by mouth once daily.    SAVELLA 100 mg Tab TAKE 1 TABLET BY MOUTH EVERY DAY    zolpidem (AMBIEN) 5 MG Tab Take 1 tablet (5 mg total) by mouth nightly as needed (insomnia). 30 pills to last 5-6 months.  No refills until follow up visit.     Review of patient's allergies indicates:   Allergen Reactions    Aspirin      Other reaction(s): "hemorrhage"  hemorrhage    Hydrocortisone Nausea Only     Other reaction(s): sick  sick    Lactose intolerance (lactase) [lactase] Nausea And Vomiting    Vicodin [hydrocodone-acetaminophen]      Other reaction(s): hyperactivity    Asacol [mesalamine] Hallucinations     Other reaction(s): Hallucinations  hallucinations        Review of Systems   Constitutional: Negative for chills and fever.   Respiratory: Negative for shortness of breath.    Cardiovascular: Negative for chest pain.   Gastrointestinal: Positive for abdominal pain.       Objective:      Vital Signs (Most Recent)       Vital Signs Range (Last 24H):       Physical Exam  Constitutional:       Appearance: Normal appearance.   Pulmonary:      Effort: Pulmonary effort is normal.   Neurological:      Mental Status: She is alert and oriented to person, place, and time.              Assessment:      There are no hospital problems to display for this patient.      Plan:    EGD for  Dysphagia early satiety abdominal pain and distension    "

## 2021-12-30 NOTE — PROVATION PATIENT INSTRUCTIONS
Discharge Summary/Instructions after an Endoscopic Procedure  Patient Name: Efe Horowitz  Patient MRN: [id number]  Patient YOB: 1960 Thursday, December 30, 2021  Reese Villalta MD  Dear patient,  As a result of recent federal legislation (The Federal Cures Act), you may   receive lab or pathology results from your procedure in your [facility]   account before your physician is able to contact you. Your physician or   their representative will relay the results to you with their   recommendations at their soonest availability.  Thank you,  RESTRICTIONS:  During your procedure today, you received medications for sedation.  These   medications may affect your judgment, balance and coordination.  Therefore,   for 24 hours, you have the following restrictions:   - DO NOT drive a car, operate machinery, make legal/financial decisions,   sign important papers or drink alcohol.    ACTIVITY:  Today: no heavy lifting, straining or running due to procedural   sedation/anesthesia.  The following day: return to full activity including work.  DIET:  Eat and drink normally unless instructed otherwise.     TREATMENT FOR COMMON SIDE EFFECTS:  - Mild abdominal pain, nausea, belching, bloating or excessive gas:  rest,   eat lightly and use a heating pad.  - Sore Throat: treat with throat lozenges and/or gargle with warm salt   water.  - Because air was used during the procedure, expelling large amounts of air   from your rectum or belching is normal.  - If a bowel prep was taken, you may not have a bowel movement for 1-3 days.    This is normal.  SYMPTOMS TO WATCH FOR AND REPORT TO YOUR PHYSICIAN:  1. Abdominal pain or bloating, other than gas cramps.  2. Chest pain.  3. Back pain.  4. Signs of infection such as: chills or fever occurring within 24 hours   after the procedure.  5. Rectal bleeding, which would show as bright red, maroon, or black stools.   (A tablespoon of blood from the rectum is not serious, especially  if   hemorrhoids are present.)  6. Vomiting.  7. Weakness or dizziness.  GO DIRECTLY TO THE NEAREST EMERGENCY ROOM IF YOU HAVE ANY OF THE FOLLOWING:      Difficulty breathing              Chills and/or fever over 101 F   Persistent vomiting and/or vomiting blood   Severe abdominal pain   Severe chest pain   Black, tarry stools   Bleeding- more than one tablespoon   Any other symptom or condition that you feel may need urgent attention  Your doctor recommends these additional instructions:  If any biopsies were taken, your doctors clinic will contact you in 1 to 2   weeks with any results.  - Discharge patient to home.   - Follow an antireflux regimen indefinitely.   - Await pathology results.   - Telephone endoscopist for pathology results in 2 weeks.   - Return to GI clinic at the next available appointment.   - The findings and recommendations were discussed with the patient.  For questions, problems or results please call your physician - Reese Villalta MD at Work:  [phone].  [facility] [place], EMERGENCY ROOM PHONE NUMBER: [phone]  IF A COMPLICATION OR EMERGENCY SITUATION ARISES AND YOU ARE UNABLE TO REACH   YOUR PHYSICIAN - GO DIRECTLY TO THE EMERGENCY ROOM.  Reese Villalta MD  12/30/2021 12:38:52 PM  This report has been verified and signed electronically.  Dear patient,  As a result of recent federal legislation (The Federal Cures Act), you may   receive lab or pathology results from your procedure in your [facility]   account before your physician is able to contact you. Your physician or   their representative will relay the results to you with their   recommendations at their soonest availability.  Thank you,  PROVATION

## 2022-01-04 LAB
FINAL PATHOLOGIC DIAGNOSIS: NORMAL
GROSS: NORMAL
Lab: NORMAL

## 2022-01-05 LAB
FINAL PATHOLOGIC DIAGNOSIS: NORMAL
GROSS: NORMAL
Lab: NORMAL

## 2022-01-05 NOTE — PROGRESS NOTES
Dareen your EGD pathology was benign no dysplasia no celiac sprue no H pylori no eosinophilic esophagitis    1. DUODENUM, BIOPSY:   Duodenal mucosa with no significant pathologic abnormality   Villous architecture is maintained   2. STOMACH, BIOPSY:   Gastric body and antral mucosa with mild chronic gastritis and reactive   changes   No evidence of intestinal metaplasia, dysplasia or malignancy   No evidence of Helicobacter pylori organisms on H&E stain   3. DISTAL AND PROXIMAL ESOPHAGUS, BIOPSY:   Squamous mucosa with no significant pathologic changes   No evidence of eosinophils    Comment: Interp By Marely Pollock M.D., Signed on 01/05/2022

## 2022-01-06 PROCEDURE — 99358 PR PROLONGED SERV,NO CONTACT,1ST HR: ICD-10-PCS | Mod: S$GLB,,, | Performed by: INTERNAL MEDICINE

## 2022-01-06 PROCEDURE — 99358 PROLONG SERVICE W/O CONTACT: CPT | Mod: S$GLB,,, | Performed by: INTERNAL MEDICINE

## 2022-01-07 ENCOUNTER — OFFICE VISIT (OUTPATIENT)
Dept: GASTROENTEROLOGY | Facility: CLINIC | Age: 62
End: 2022-01-07
Payer: MEDICARE

## 2022-01-07 VITALS
RESPIRATION RATE: 15 BRPM | WEIGHT: 125.25 LBS | OXYGEN SATURATION: 99 % | HEART RATE: 72 BPM | SYSTOLIC BLOOD PRESSURE: 103 MMHG | DIASTOLIC BLOOD PRESSURE: 70 MMHG | BODY MASS INDEX: 22.18 KG/M2 | TEMPERATURE: 98 F

## 2022-01-07 DIAGNOSIS — M62.89 PELVIC FLOOR DYSFUNCTION IN FEMALE: ICD-10-CM

## 2022-01-07 DIAGNOSIS — R19.7 DIARRHEA, UNSPECIFIED TYPE: Primary | ICD-10-CM

## 2022-01-07 DIAGNOSIS — R10.30 LOWER ABDOMINAL PAIN: ICD-10-CM

## 2022-01-07 LAB
6-METHYLMERCAPTOPURINE RIBOSIDE: 7.57 NMOL/ML/H (ref 5.04–9.57)
6-METHYLMERCAPTOPURINE: 2.93 NMOL/ML/H (ref 3–6.66)
6-METHYLTHIOGUANINE RIBOSIDE: 4.95 NMOL/ML/H (ref 2.7–5.84)
TPMT INTERPRETATION: ABNORMAL
TPMT REVIEWED BY: ABNORMAL

## 2022-01-07 PROCEDURE — 3074F PR MOST RECENT SYSTOLIC BLOOD PRESSURE < 130 MM HG: ICD-10-PCS | Mod: CPTII,S$GLB,, | Performed by: INTERNAL MEDICINE

## 2022-01-07 PROCEDURE — 1160F PR REVIEW ALL MEDS BY PRESCRIBER/CLIN PHARMACIST DOCUMENTED: ICD-10-PCS | Mod: CPTII,S$GLB,, | Performed by: INTERNAL MEDICINE

## 2022-01-07 PROCEDURE — 99215 OFFICE O/P EST HI 40 MIN: CPT | Mod: S$GLB,,, | Performed by: INTERNAL MEDICINE

## 2022-01-07 PROCEDURE — 3074F SYST BP LT 130 MM HG: CPT | Mod: CPTII,S$GLB,, | Performed by: INTERNAL MEDICINE

## 2022-01-07 PROCEDURE — 1160F RVW MEDS BY RX/DR IN RCRD: CPT | Mod: CPTII,S$GLB,, | Performed by: INTERNAL MEDICINE

## 2022-01-07 PROCEDURE — 3008F BODY MASS INDEX DOCD: CPT | Mod: CPTII,S$GLB,, | Performed by: INTERNAL MEDICINE

## 2022-01-07 PROCEDURE — 3078F DIAST BP <80 MM HG: CPT | Mod: CPTII,S$GLB,, | Performed by: INTERNAL MEDICINE

## 2022-01-07 PROCEDURE — 3078F PR MOST RECENT DIASTOLIC BLOOD PRESSURE < 80 MM HG: ICD-10-PCS | Mod: CPTII,S$GLB,, | Performed by: INTERNAL MEDICINE

## 2022-01-07 PROCEDURE — 1159F MED LIST DOCD IN RCRD: CPT | Mod: CPTII,S$GLB,, | Performed by: INTERNAL MEDICINE

## 2022-01-07 PROCEDURE — 3008F PR BODY MASS INDEX (BMI) DOCUMENTED: ICD-10-PCS | Mod: CPTII,S$GLB,, | Performed by: INTERNAL MEDICINE

## 2022-01-07 PROCEDURE — 99215 PR OFFICE/OUTPT VISIT, EST, LEVL V, 40-54 MIN: ICD-10-PCS | Mod: S$GLB,,, | Performed by: INTERNAL MEDICINE

## 2022-01-07 PROCEDURE — 1159F PR MEDICATION LIST DOCUMENTED IN MEDICAL RECORD: ICD-10-PCS | Mod: CPTII,S$GLB,, | Performed by: INTERNAL MEDICINE

## 2022-01-07 RX ORDER — ATORVASTATIN CALCIUM 10 MG/1
10 TABLET, FILM COATED ORAL DAILY
COMMUNITY
End: 2022-02-23

## 2022-01-07 NOTE — PROGRESS NOTES
I spent 40 minutes on 1/6/22 reviewing Efe Horowitz medical records prior to clinic visit on 1/7/22.

## 2022-01-07 NOTE — PATIENT INSTRUCTIONS
1. Drink a bottle of magnesium citrate to flush you out  2. If that doesn't clear things out use an enema  3. Schedule anorectal manometry  4. Pouchoscopy is already scheduled

## 2022-01-07 NOTE — PROGRESS NOTES
[facility]          Inflammatory Bowel Disease New Patient Consultation Note         TODAY'S VISIT DATE:  1/7/2022    Reason for Consult:    Chief Complaint   Patient presents with    Ulcerative Colitis       PCP: Shonda Anguiano      Referring MD:   Dr. Alejandro Maxwell    History of Present Illness:  Efe Horowitz who is a 62 y.o. female is being seen today at the [facility] on 01/07/2022 for inflammatory bowel disease- inflammatory bowel disease-unspecified.  First time seeing her.  She has been seeing Dr. Villalta for a long time for her chronic gastrointestinal issues.  She also sees Dr. Maxwell because of her history of total proctocolectomy with J pouch placement in 2009. This was done because of refractory disease.  She reports that she had done quite well up until around November.  Her baseline is about 3 bowel movements daily that are typically formed.  This is with taking Imodium 2 pills once daily.  A little bit before Thanksgiving she developed pain in her right abdomen.  This subsequently moved to the lower abdomen and to the left side and eventually progressed to causing pain that was radiating down her right leg.  A week after the pain began she started noticing looser stools and was having up to 6 loose bowel movements a day.  Currently she is having about 4 or 5 watery stools per day but has increased her Imodium intake to 2 tablets 4 times daily.  She has no fevers, chills.  She has not had any recent antibiotics or sick contacts.  She has not had any changes in her medications.  She does note that when she wipes after a bowel movement, pressure in the perianal area will result in discharge of a large amount of liquid from the rectum.  She also reports having to strain to have bowel movements regularly.  She has a history of chronic back problems as well as a history of reflux, pancreatic insufficiency, lactose  intolerance, lupus.  A CT enterography was done in November (ordered by Dr. Villalta) and revealed fluid-filled dilation of the J pouch and distal ileum but no strictures were noted.  A pouchoscopy done in the clinic by Dr. Maxwell did not demonstrate any significant strictures or other abnormalities.  A recent upper endoscopy showed no significant findings.  Stool studies done by Dr. Villalta were negative for infection.    IBD History:  She has a history of ulcerative colitis and underwent total proctocolectomy in 2009 due to refractory disease.  She has not had any significant issues following her surgery with regards to inflammatory bowel disease.      Pertinent Labs:  Lab Results   Component Value Date    SEDRATE 3 08/16/2021    CRP 5.7 12/21/2021     Lab Results   Component Value Date    TTGIGA 5 11/26/2021     11/26/2021     Lab Results   Component Value Date    TSH 1.455 12/21/2021    FREET4 0.72 08/24/2021     Lab Results   Component Value Date    ZKUUKQSG10ND 63 11/26/2021    NEGBAOTL95 812 11/26/2021     Lab Results   Component Value Date    HEPBSAG Negative 12/21/2021    HEPBCAB Negative 12/21/2021    HEPCAB Negative 12/21/2021     No results found for: ZRH84VCPK  No results found for: NIL, TBAG, TBAGNIL, MITOGENNIL, TBGOLD, TSPOTSCREN  No results found for: TPTMINTERP, TPMTRESULT  Lab Results   Component Value Date    STOOLCULTURE  12/22/2021     No Salmonella,Shigella,Vibrio,Campylobacter,Yersinia isolated.    EANKCOIQZQ5Q Negative 12/22/2021    RGZLRMSFWE9E Negative 12/22/2021    CDIFFICILEAN Negative 04/28/2021    CDIFFTOX Negative 04/28/2021     Lab Results   Component Value Date    CALPROTECTIN 56.8 (H) 12/22/2021       Therapeutic Drug Monitoring Labs:  No results found for: PROMETH  No results found for: ANSADAINIT, INFLIXIMAB, INFLIXINTERP    Vaccinations:  Lab Results   Component Value Date    HEPBSAB Positive (A) 01/29/2013     Lab Results   Component Value Date    HEPAIGG Negative  12/21/2021     Lab Results   Component Value Date    VARICELLAZOS 2.80 (H) 12/21/2021    VARICELLAINT Positive (A) 12/21/2021     Immunization History   Administered Date(s) Administered    COVID-19, MRNA, LN-S, PF (Pfizer) 02/20/2021, 03/12/2021, 08/16/2021    Hepatitis A / Hepatitis B 10/08/2008, 10/08/2008, 11/19/2008, 11/19/2008, 04/08/2009, 04/08/2009    Influenza 10/03/2019, 08/31/2020    Influenza - Quadrivalent 10/12/2017, 10/12/2017, 09/23/2019    Influenza - Quadrivalent - PF *Preferred* (6 months and older) 08/29/2020, 08/29/2020, 11/27/2021    Influenza - Trivalent (ADULT) 10/04/2011, 10/02/2013    Influenza - Trivalent - PF (ADULT) 09/23/2014, 09/23/2014    Influenza Split 10/02/2013    Pneumococcal Conjugate - 13 Valent 02/08/2021    Pneumococcal Polysaccharide - 23 Valent 06/23/2014    Tdap 08/23/2021         Review of Systems  Review of Systems   Constitutional: Negative for chills, fever and weight loss.   HENT: Negative for sore throat.    Eyes: Positive for pain and redness. Negative for discharge.   Respiratory: Negative for cough, shortness of breath and wheezing.    Cardiovascular: Negative for chest pain, orthopnea and leg swelling.   Gastrointestinal: Positive for abdominal pain, diarrhea and nausea. Negative for blood in stool, constipation, heartburn, melena and vomiting.   Genitourinary: Negative for dysuria, frequency and urgency.   Musculoskeletal: Positive for back pain. Negative for joint pain and myalgias.   Skin: Negative for itching and rash.   Neurological: Negative for focal weakness and seizures.   Endo/Heme/Allergies: Does not bruise/bleed easily.   Psychiatric/Behavioral: Negative for depression. The patient is not nervous/anxious.        Medical History:   Past Medical History:   Diagnosis Date    Acid reflux     Adrenal insufficiency, primary     Arthritis     Asthma     B12 deficiency anemia     Benign essential hypertension 2/7/2021    Blood transfusion  "2010    Breast cancer 2/2016    Right breast infiltrating ductal CA    Cataract     Chronic pain     Deep vein thrombosis     Dry eyes     Esophagitis, unspecified     Fibromyalgia     General anesthetics causing adverse effect in therapeutic use     "slow to wake up bc I don't have an immune system    Heart murmur     History of colon polyps     Hyperlipidemia     Hypopituitary dwarfism     Iron deficiency anemia     Lupus     Migraines, neuralgic     Nonspecific ulcerative colitis     OP (osteoporosis)     history of reclast    Osteopenia     Pancreatic cyst     Schatzki's ring     Steroid sulfatase deficiency     Ulcerative colitis     Vitamin D deficiency disease        Surgical History:  Past Surgical History:   Procedure Laterality Date    APPENDECTOMY      BREAST BIOPSY Right 2/2016    [facility]    BREAST RECONSTRUCTION      BREAST SURGERY      CHOLECYSTECTOMY      COLON SURGERY      ENDOSCOPIC ULTRASOUND OF UPPER GASTROINTESTINAL TRACT N/A 8/6/2018    Procedure: ULTRASOUND, ENDOSCOPIC, UPPER GI TRACT;  Surgeon: Hong Finn MD;  Location: [facility] (2ND FLR);  Service: Endoscopy;  Laterality: N/A;    ESOPHAGOGASTRODUODENOSCOPY N/A 12/10/2018    Procedure: EGD (ESOPHAGOGASTRODUODENOSCOPY);  Surgeon: Reese Villalta MD;  Location: [facility] (4TH FLR);  Service: Endoscopy;  Laterality: N/A;    ESOPHAGOGASTRODUODENOSCOPY N/A 12/30/2021    Procedure: EGD (ESOPHAGOGASTRODUODENOSCOPY);  Surgeon: Reese Villalta MD;  Location: [facility] (2ND FLR);  Service: Endoscopy;  Laterality: N/A;  EGD for esophageal dysphagia and early satiety please schedule 1st provider available. wants this date-Dr Villalta only     fully vaccinated-GT  hx of adrenal insuffiency   12/27 arrival time confirmed with pt-rb    FLEXIBLE SIGMOIDOSCOPY N/A 12/10/2018    Procedure: SIGMOIDOSCOPY, FLEXIBLE;  Surgeon: Reese Villalta MD;  Location: [facility] (4TH FLR);  Service: Endoscopy;  Laterality: N/A;  " Recommend full split bowel prep for this study just like for a colonoscopy    HYSTERECTOMY      MASTECTOMY      OOPHORECTOMY Bilateral     restorative proctectomy      total abdominal colectomy with ileostomy  2010    TOTAL COLECTOMY      TOTAL REDUCTION MAMMOPLASTY Left 2017    UPPER ENDOSCOPIC ULTRASOUND W/ FNA         Family History:   Family History   Problem Relation Age of Onset    Diabetes Father     Hyperlipidemia Father     Hypertension Father     Hyperlipidemia Mother     Cancer Maternal Aunt         pancreatic cancer, late 50s at dx    Pancreatic cancer Maternal Aunt     Breast cancer Maternal Aunt 55    Breast cancer Cousin 28    Breast cancer Other 45    No Known Problems Sister     No Known Problems Brother     Cancer Maternal Uncle 65        pancreatic cancer    Pancreatic cancer Maternal Uncle     No Known Problems Paternal Aunt     No Known Problems Paternal Uncle     No Known Problems Maternal Grandmother     No Known Problems Maternal Grandfather     No Known Problems Paternal Grandmother     No Known Problems Paternal Grandfather     Breast cancer Maternal Cousin 50    Cancer Other 25        liver cancer    Amblyopia Neg Hx     Blindness Neg Hx     Cataracts Neg Hx     Macular degeneration Neg Hx     Retinal detachment Neg Hx     Strabismus Neg Hx     Stroke Neg Hx     Thyroid disease Neg Hx     Glaucoma Neg Hx     Ovarian cancer Neg Hx     Celiac disease Neg Hx     Colon cancer Neg Hx     Colon polyps Neg Hx     Esophageal cancer Neg Hx     Liver cancer Neg Hx     Rectal cancer Neg Hx     Stomach cancer Neg Hx     Ulcerative colitis Neg Hx     Inflammatory bowel disease Neg Hx        Social History:   Social History     Tobacco Use    Smoking status: Never Smoker    Smokeless tobacco: Never Used   Substance Use Topics    Alcohol use: No     Alcohol/week: 0.0 standard drinks    Drug use: No       Allergies: Reviewed    Home Medications:    Medication List with Changes/Refills   Current Medications    ALBUTEROL (ACCUNEB) 0.63 MG/3 ML NEBU    Take 3 mLs (0.63 mg total) by nebulization every 6 (six) hours as needed.    ALIROCUMAB (PRALUENT PEN) 75 MG/ML PNIJ    Inject 75 mg into the skin every 14 (fourteen) days.    AMLODIPINE (NORVASC) 2.5 MG TABLET    Take 2.5 mg by mouth once daily.    ATORVASTATIN (LIPITOR) 10 MG TABLET    Take 10 mg by mouth once daily.    CREON 36,000-114,000- 180,000 UNIT CPDR    TAKE TWO CAPSULES BY MOUTH THREE TIMES DAILY WITH MEALS AND ONE CAPSULE WITH EACH SNACK    DEXAMETHASONE (DECADRON) 4 MG/ML INJECTION    Inject 1 mL (4 mg total) into the muscle daily as needed (Signs and symtpoms of severe adrenal insufficiency).    DICLOFENAC SODIUM (VOLTAREN) 1 % GEL    APPLY 2 GRAMS TO AFFECTED AREA 4 TIMES A DAY    ERGOCALCIFEROL (VITAMIN D2) 50,000 UNIT CAP    Take 1 capsule (50,000 Units total) by mouth twice a week. Every Monday and Friday    GABAPENTIN (NEURONTIN) 300 MG CAPSULE    TAKE 1 CAPSULE BY MOUTH EVERY EVENING    HYDROCHLOROTHIAZIDE (HYDRODIURIL) 25 MG TABLET    Take 1 tablet (25 mg total) by mouth once daily.    HYDROXYCHLOROQUINE (PLAQUENIL) 200 MG TABLET    TAKE 1 AND 1/2 TABLETS BY MOUTH EVERY DAY    LIDOCAINE (LIDODERM) 5 %    Place 1 patch onto the skin once daily. Remove & Discard patch within 12 hours or as directed by MD    LOPERAMIDE (IMODIUM) 2 MG CAPSULE    TAKE 1 TO 2 CAPSULES BY MOUTH FOUR TIMES DAILY    MULTIVITAMIN/IRON/FOLIC ACID (CENTRUM WOMEN ORAL)    Take 1 tablet by mouth once daily.    NEBIVOLOL (BYSTOLIC) 10 MG TAB    Take 1 tablet by mouth once daily.    NIACIN 100 MG TAB    Take by mouth. 1 Tablet Oral At bedtime    OMEPRAZOLE (PRILOSEC) 20 MG CAPSULE    TAKE 1 CAPSULE BY MOUTH TWICE A DAY    ONDANSETRON (ZOFRAN) 4 MG TABLET    Take 1 tablet (4 mg total) by mouth every 12 (twelve) hours as needed for Nausea.    OXYCODONE-ACETAMINOPHEN (PERCOCET) 5-325 MG PER TABLET    Take 1 tablet by mouth  every 12 (twelve) hours as needed for Pain.    POLYCARBOPHIL (FIBERCON) 625 MG TABLET    Take by mouth. 1 Tablet Oral Every 12 hours.  Take with 12 ounces of water with each pill.    POTASSIUM CHLORIDE SA (K-DUR,KLOR-CON) 10 MEQ TABLET    TAKE TWO TABLETS BY MOUTH ONCE DAILY    PREDNISONE (DELTASONE) 5 MG TABLET    Take 1 tablet (5 mg total) by mouth once daily. Double the dose in times of illness    REPATHA SURECLICK 140 MG/ML PNIJ    INJECT 140 MG INTO THE SKIN EVERY 14 (FOURTEEN) DAYS    RESTASIS 0.05 % OPHTHALMIC EMULSION    INSTILL 1 DROP INTO BOTH EYES TWICE A DAY    RIZATRIPTAN (MAXALT) 10 MG TABLET    Take 1 tablet (10 mg total) by mouth every 6 (six) hours as needed for Migraine.    SAVELLA 100 MG TAB    TAKE 1 TABLET BY MOUTH EVERY DAY    ZOLPIDEM (AMBIEN) 5 MG TAB    Take 1 tablet (5 mg total) by mouth nightly as needed (insomnia). 30 pills to last 5-6 months.  No refills until follow up visit.   Discontinued Medications    DILTIAZEM HCL (DILTIAZEM 2% CREAM)    APPLY TO ANAL AREA EVERY 8 HOURS FOR 7 DAYS    ROSUVASTATIN (CRESTOR) 20 MG TABLET    Take 20 mg by mouth once daily.       Physical Exam:  Vital Signs:  /70 (BP Location: Left arm, Patient Position: Sitting, BP Method: Medium (Automatic))   Pulse 72   Temp 98.2 °F (36.8 °C)   Resp 15   Wt 56.8 kg (125 lb 3.5 oz)   SpO2 99%   BMI 22.18 kg/m²   Body mass index is 22.18 kg/m².    Physical Exam  Vitals and nursing note reviewed.   Constitutional:       General: She is not in acute distress.     Appearance: Normal appearance. She is well-developed. She is not ill-appearing or toxic-appearing.   Eyes:      Conjunctiva/sclera: Conjunctivae normal.      Pupils: Pupils are equal, round, and reactive to light.   Neck:      Thyroid: No thyromegaly.   Cardiovascular:      Rate and Rhythm: Normal rate and regular rhythm.      Heart sounds: Normal heart sounds. No murmur heard.      Pulmonary:      Effort: Pulmonary effort is normal.      Breath  sounds: Normal breath sounds. No wheezing or rales.   Abdominal:      General: Bowel sounds are normal. There is no distension.      Palpations: Abdomen is soft. There is no mass.      Tenderness: There is no abdominal tenderness.   Genitourinary:     Comments: Perianal exam is normal.  Digital rectal exam reveals 2 mild stenotic areas in the anal canal but these were not extremely narrow.  There appeared to be normal squeeze of the anal canal and normal relaxation with bearing down.  Formed, solid stool was palpated in the pouch.  No blood in the stool.  Musculoskeletal:         General: No tenderness. Normal range of motion.   Lymphadenopathy:      Cervical: No cervical adenopathy.   Skin:     Findings: No erythema or rash.   Neurological:      General: No focal deficit present.      Mental Status: She is alert and oriented to person, place, and time.   Psychiatric:         Mood and Affect: Mood normal.         Behavior: Behavior normal.         Thought Content: Thought content normal.         Judgment: Judgment normal.         Labs: reviewed and pertinent noted above    Assessment/Plan:  Efe Horowitz is a 62 y.o. female with a history of ulcerative colitis status post total proctocolectomy and J pouch placement, history of pancreatic insufficiency, chronic degenerative changes in the spine, lupus, and lactose intolerance here for evaluation of lower abdominal pain, loose stools, and abnormalities on her CT scan. The following issues were addresssed:    1. Diarrhea, unspecified type    2. Lower abdominal pain    3. Pelvic floor dysfunction in female      My primary concern is that her symptoms are probably consistent with pelvic floor dysfunction.  While the sphincter muscles seemed to function properly on rectal exam there was formed stool in the rectum in spite of her reporting only passing liquid stool, the application of pressure in the anal area to help release liquid from the J pouch, and the  findings on her CT scan of dilation of the pouch and distal ileum down to the level of the anal canal suggest this is a possible etiology.  Today I recommend that she try a bottle of magnesium citrate to try to flush her bowels out.  If that does not work an enema may be necessary.  We will arrange for an anorectal manometry in the near future to assess the anal muscles and determine if pelvic floor therapy might be of some utility to help relieve some of her symptoms.  She is already scheduled for a pouchoscopy in February we will plan to keep that appointment.  If necessary we may need to consider MR defacography for further evaluation.  As far as why she may potentially have pelvic floor dysfunction, this is not an uncommon problem in people with ileal pouch anal anastomosis.  In addition to that she may have some nerve issues related to her degenerative disc disease in her back.  This could be playing a role in some of her leg and lower abdominal pain as well.  While I was able to palpate 2 mild stenoses of the anal canal I doubt that these are playing a significant role in her symptoms.    1. One bottle of magnesium citrate, enema if no improvement.  2. Anorectal manometry  3. Pouchoscopy as already scheduled      Follow up based on the above evaluation.      Thank you again for sending Efe Kristine Horowitz to see Dr. Jay Rangel today at the [facility]. Please don't hesitate to contact Dr. Rangel if there are any questions regarding this evaluation, or if you have any other patients with inflammatory bowel disease for whom you would like a consultation. You can reach Dr. Rangel at [phone] or by email at [email or web address]    Ceferino Rangel MD  Department of Gastroenterology  Inflammatory Bowel Disease

## 2022-01-08 ENCOUNTER — HOSPITAL ENCOUNTER (OUTPATIENT)
Dept: RADIOLOGY | Facility: HOSPITAL | Age: 62
Discharge: HOME | End: 2022-01-08
Attending: INTERNAL MEDICINE
Payer: MEDICARE

## 2022-01-08 DIAGNOSIS — R19.7 DIARRHEA IN ADULT PATIENT: ICD-10-CM

## 2022-01-08 DIAGNOSIS — Z87.19 HISTORY OF ULCERATIVE COLITIS: ICD-10-CM

## 2022-01-08 DIAGNOSIS — K83.8 COMMON BILE DUCT DILATION: ICD-10-CM

## 2022-01-08 DIAGNOSIS — K86.2 PANCREATIC CYST: ICD-10-CM

## 2022-01-08 DIAGNOSIS — R14.0 POSTPRANDIAL ABDOMINAL BLOATING: ICD-10-CM

## 2022-01-08 PROCEDURE — 76376 MRI ABDOMEN WITH AND WO_INC MRCP: ICD-10-PCS | Mod: 26,HCNC,GC, | Performed by: RADIOLOGY

## 2022-01-08 PROCEDURE — A9585 GADOBUTROL INJECTION: HCPCS | Mod: HCNC | Performed by: INTERNAL MEDICINE

## 2022-01-08 PROCEDURE — 74183 MRI ABDOMEN WITH AND WO_INC MRCP: ICD-10-PCS | Mod: 26,HCNC,GC, | Performed by: RADIOLOGY

## 2022-01-08 PROCEDURE — 74183 MRI ABD W/O CNTR FLWD CNTR: CPT | Mod: 26,HCNC,GC, | Performed by: RADIOLOGY

## 2022-01-08 PROCEDURE — 74183 MRI ABD W/O CNTR FLWD CNTR: CPT | Mod: TC,HCNC

## 2022-01-08 PROCEDURE — 25500020 PHARM REV CODE 255: Mod: HCNC | Performed by: INTERNAL MEDICINE

## 2022-01-08 PROCEDURE — 76376 3D RENDER W/INTRP POSTPROCES: CPT | Mod: 26,HCNC,GC, | Performed by: RADIOLOGY

## 2022-01-08 RX ORDER — GADOBUTROL 604.72 MG/ML
10 INJECTION INTRAVENOUS
Status: COMPLETED | OUTPATIENT
Start: 2022-01-08 | End: 2022-01-08

## 2022-01-08 RX ADMIN — GADOBUTROL 10 ML: 604.72 INJECTION INTRAVENOUS at 05:01

## 2022-01-10 ENCOUNTER — TELEPHONE (OUTPATIENT)
Dept: ENDOSCOPY | Facility: HOSPITAL | Age: 62
End: 2022-01-10
Payer: MEDICARE

## 2022-01-10 ENCOUNTER — CLINICAL SUPPORT (OUTPATIENT)
Dept: INFECTIOUS DISEASES | Facility: CLINIC | Age: 62
End: 2022-01-10
Payer: MEDICARE

## 2022-01-10 ENCOUNTER — OFFICE VISIT (OUTPATIENT)
Dept: GASTROENTEROLOGY | Facility: CLINIC | Age: 62
End: 2022-01-10
Payer: MEDICARE

## 2022-01-10 VITALS
BODY MASS INDEX: 22.3 KG/M2 | HEIGHT: 63 IN | DIASTOLIC BLOOD PRESSURE: 74 MMHG | HEART RATE: 59 BPM | WEIGHT: 125.88 LBS | SYSTOLIC BLOOD PRESSURE: 125 MMHG

## 2022-01-10 DIAGNOSIS — M79.7 FIBROMYALGIA: ICD-10-CM

## 2022-01-10 DIAGNOSIS — M62.89 PELVIC FLOOR DYSFUNCTION: ICD-10-CM

## 2022-01-10 DIAGNOSIS — K52.9 IBD (INFLAMMATORY BOWEL DISEASE): Primary | ICD-10-CM

## 2022-01-10 DIAGNOSIS — K58.2 IRRITABLE BOWEL SYNDROME WITH BOTH CONSTIPATION AND DIARRHEA: ICD-10-CM

## 2022-01-10 DIAGNOSIS — M32.9 SYSTEMIC LUPUS ERYTHEMATOSUS, UNSPECIFIED SLE TYPE, UNSPECIFIED ORGAN INVOLVEMENT STATUS: ICD-10-CM

## 2022-01-10 DIAGNOSIS — Z80.0 FAMILY HISTORY OF PANCREATIC CANCER: ICD-10-CM

## 2022-01-10 DIAGNOSIS — D18.03 LIVER HEMANGIOMA: ICD-10-CM

## 2022-01-10 DIAGNOSIS — D50.9 IRON DEFICIENCY ANEMIA, UNSPECIFIED IRON DEFICIENCY ANEMIA TYPE: ICD-10-CM

## 2022-01-10 DIAGNOSIS — E87.6 LOW SERUM POTASSIUM: ICD-10-CM

## 2022-01-10 DIAGNOSIS — K86.2 PANCREAS CYST: ICD-10-CM

## 2022-01-10 DIAGNOSIS — K51.918 ULCERATIVE COLITIS WITH OTHER COMPLICATION, UNSPECIFIED LOCATION: ICD-10-CM

## 2022-01-10 PROCEDURE — 3078F DIAST BP <80 MM HG: CPT | Mod: HCNC,CPTII,, | Performed by: INTERNAL MEDICINE

## 2022-01-10 PROCEDURE — 99499 RISK ADDL DX/OHS AUDIT: ICD-10-PCS | Mod: S$GLB,,, | Performed by: INTERNAL MEDICINE

## 2022-01-10 PROCEDURE — 99999 PR PBB SHADOW E&M-EST. PATIENT-LVL III: ICD-10-PCS | Mod: PBBFAC,HCNC,, | Performed by: INTERNAL MEDICINE

## 2022-01-10 PROCEDURE — 3008F PR BODY MASS INDEX (BMI) DOCUMENTED: ICD-10-PCS | Mod: HCNC,CPTII,, | Performed by: INTERNAL MEDICINE

## 2022-01-10 PROCEDURE — 1160F PR REVIEW ALL MEDS BY PRESCRIBER/CLIN PHARMACIST DOCUMENTED: ICD-10-PCS | Mod: HCNC,CPTII,, | Performed by: INTERNAL MEDICINE

## 2022-01-10 PROCEDURE — 90472 IMMUNIZATION ADMIN EACH ADD: CPT | Mod: PBBFAC,HCNC

## 2022-01-10 PROCEDURE — 3008F BODY MASS INDEX DOCD: CPT | Mod: HCNC,CPTII,, | Performed by: INTERNAL MEDICINE

## 2022-01-10 PROCEDURE — 1160F RVW MEDS BY RX/DR IN RCRD: CPT | Mod: HCNC,CPTII,, | Performed by: INTERNAL MEDICINE

## 2022-01-10 PROCEDURE — 3074F SYST BP LT 130 MM HG: CPT | Mod: HCNC,CPTII,, | Performed by: INTERNAL MEDICINE

## 2022-01-10 PROCEDURE — 99215 PR OFFICE/OUTPT VISIT, EST, LEVL V, 40-54 MIN: ICD-10-PCS | Mod: S$PBB,HCNC,, | Performed by: INTERNAL MEDICINE

## 2022-01-10 PROCEDURE — 90632 HEPA VACCINE ADULT IM: CPT | Mod: PBBFAC,HCNC

## 2022-01-10 PROCEDURE — 1159F PR MEDICATION LIST DOCUMENTED IN MEDICAL RECORD: ICD-10-PCS | Mod: HCNC,CPTII,, | Performed by: INTERNAL MEDICINE

## 2022-01-10 PROCEDURE — 99499 UNLISTED E&M SERVICE: CPT | Mod: S$GLB,,, | Performed by: INTERNAL MEDICINE

## 2022-01-10 PROCEDURE — G0010 ADMIN HEPATITIS B VACCINE: HCPCS | Mod: PBBFAC,HCNC

## 2022-01-10 PROCEDURE — 99215 OFFICE O/P EST HI 40 MIN: CPT | Mod: S$PBB,HCNC,, | Performed by: INTERNAL MEDICINE

## 2022-01-10 PROCEDURE — 99999 PR PBB SHADOW E&M-EST. PATIENT-LVL I: CPT | Mod: PBBFAC,HCNC,,

## 2022-01-10 PROCEDURE — 99999 PR PBB SHADOW E&M-EST. PATIENT-LVL III: CPT | Mod: PBBFAC,HCNC,, | Performed by: INTERNAL MEDICINE

## 2022-01-10 PROCEDURE — 3074F PR MOST RECENT SYSTOLIC BLOOD PRESSURE < 130 MM HG: ICD-10-PCS | Mod: HCNC,CPTII,, | Performed by: INTERNAL MEDICINE

## 2022-01-10 PROCEDURE — 99999 PR PBB SHADOW E&M-EST. PATIENT-LVL I: ICD-10-PCS | Mod: PBBFAC,HCNC,,

## 2022-01-10 PROCEDURE — 3078F PR MOST RECENT DIASTOLIC BLOOD PRESSURE < 80 MM HG: ICD-10-PCS | Mod: HCNC,CPTII,, | Performed by: INTERNAL MEDICINE

## 2022-01-10 PROCEDURE — 1159F MED LIST DOCD IN RCRD: CPT | Mod: HCNC,CPTII,, | Performed by: INTERNAL MEDICINE

## 2022-01-10 RX ORDER — ROSUVASTATIN CALCIUM 5 MG/1
5 TABLET, COATED ORAL DAILY
COMMUNITY
End: 2023-09-21

## 2022-01-10 NOTE — TELEPHONE ENCOUNTER
----- Message from Hong Finn MD sent at 1/10/2022 10:32 AM CST -----  Ok    ----- Message -----  From: Reese Villalta MD  Sent: 1/10/2022   8:28 AM CST  To: So Sibley MA, Hong Finn MD, #    Betty please schedule [facility] for pancreatic cyst clinic for follow-up of her pancreatic cyst.  1.  Stable 1.0 cm cystic lesion within the pancreatic tail, which appears to connect with the pancreatic duct when reviewed on prior MRCP 2019, favoring a side branch IPMN.  Additional punctate cystic lesion nearby, that may represent an additional side branch IPMN versus simple cyst.     2.  Stable, mild intrahepatic and extrahepatic biliary ductal dilatation, the likely sequela of prior cholecystectomy.    Thank you

## 2022-01-10 NOTE — Clinical Note
Betty please schedule Efe for pancreatic cyst clinic for follow-up of her pancreatic cyst. 1.  Stable 1.0 cm cystic lesion within the pancreatic tail, which appears to connect with the pancreatic duct when reviewed on prior MRCP 2019, favoring a side branch IPMN.  Additional punctate cystic lesion nearby, that may represent an additional side branch IPMN versus simple cyst.   2.  Stable, mild intrahepatic and extrahepatic biliary ductal dilatation, the likely sequela of prior cholecystectomy.  Thank you
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
## 2022-01-10 NOTE — PROGRESS NOTES
Patient received 2 vaccines IM to the left deltoid Heplisav B posterior, and Hep A anterior.  Tolerated well and left in NAD

## 2022-01-10 NOTE — Clinical Note
"Steven - please tell patient that their Hepatitis A, B and C labs are negative but they have "No" immunity to them either.    There is currently No vaccination yet for Hepatitis C.   There is vaccinations for Hepatitis A and B,  and Recommend the Hepatitis A and B vaccination series.      Hepatitis A vaccination series is a 2 part vaccine series - Day 1, and then again in 6-months from first one.   Hepatitis B vaccination series is a 2 part vaccine series - Day 1, and then again in 1-month from the first one.    Orders were  placed.   Return to GI clinic 3 months for follow-up"

## 2022-01-11 ENCOUNTER — TELEPHONE (OUTPATIENT)
Dept: RHEUMATOLOGY | Facility: CLINIC | Age: 62
End: 2022-01-11
Payer: MEDICARE

## 2022-01-11 ENCOUNTER — PATIENT MESSAGE (OUTPATIENT)
Dept: RHEUMATOLOGY | Facility: CLINIC | Age: 62
End: 2022-01-11
Payer: MEDICARE

## 2022-01-11 DIAGNOSIS — R53.83 FATIGUE, UNSPECIFIED TYPE: ICD-10-CM

## 2022-01-11 DIAGNOSIS — M32.9 SYSTEMIC LUPUS ERYTHEMATOSUS, UNSPECIFIED SLE TYPE, UNSPECIFIED ORGAN INVOLVEMENT STATUS: Primary | ICD-10-CM

## 2022-01-11 DIAGNOSIS — Z79.899 LONG-TERM USE OF PLAQUENIL: ICD-10-CM

## 2022-01-11 DIAGNOSIS — D84.9 IMMUNOSUPPRESSION: ICD-10-CM

## 2022-01-11 NOTE — TELEPHONE ENCOUNTER
Left a message letting her know that Dr Jiang had to switch her appointment the 1/25/2022 so I rescheduled the labs for the same day and placed the reminder in the mail

## 2022-01-13 ENCOUNTER — HOSPITAL ENCOUNTER (OUTPATIENT)
Facility: HOSPITAL | Age: 62
Discharge: HOME | End: 2022-01-13
Attending: INTERNAL MEDICINE | Admitting: INTERNAL MEDICINE
Payer: MEDICARE

## 2022-01-13 VITALS
HEART RATE: 82 BPM | TEMPERATURE: 98 F | HEIGHT: 63 IN | OXYGEN SATURATION: 98 % | SYSTOLIC BLOOD PRESSURE: 131 MMHG | RESPIRATION RATE: 18 BRPM | WEIGHT: 123 LBS | DIASTOLIC BLOOD PRESSURE: 68 MMHG | BODY MASS INDEX: 21.79 KG/M2

## 2022-01-13 PROCEDURE — 91122 PR ANAL PRESSURE RECORD: ICD-10-PCS | Mod: 26,,, | Performed by: STUDENT IN AN ORGANIZED HEALTH CARE EDUCATION/TRAINING PROGRAM

## 2022-01-13 PROCEDURE — 91122 HC ANORECTAL MANOMETRY: CPT

## 2022-01-13 PROCEDURE — 91122 PR ANAL PRESSURE RECORD: CPT | Mod: 26,,, | Performed by: STUDENT IN AN ORGANIZED HEALTH CARE EDUCATION/TRAINING PROGRAM

## 2022-01-13 PROCEDURE — 91120 PR RECTAL SENSATION TEST, BALLOON: ICD-10-PCS | Mod: 26,HCNC,, | Performed by: STUDENT IN AN ORGANIZED HEALTH CARE EDUCATION/TRAINING PROGRAM

## 2022-01-13 PROCEDURE — 91120 PR RECTAL SENSATION TEST, BALLOON: CPT | Mod: 26,HCNC,, | Performed by: STUDENT IN AN ORGANIZED HEALTH CARE EDUCATION/TRAINING PROGRAM

## 2022-01-24 ENCOUNTER — PATIENT OUTREACH (OUTPATIENT)
Dept: ADMINISTRATIVE | Facility: OTHER | Age: 62
End: 2022-01-24
Payer: MEDICARE

## 2022-01-24 ENCOUNTER — TELEPHONE (OUTPATIENT)
Dept: NEPHROLOGY | Facility: CLINIC | Age: 62
End: 2022-01-24
Payer: MEDICARE

## 2022-01-24 NOTE — PROGRESS NOTES
Health Maintenance Due   Topic Date Due    HIV Screening  Never done    Shingles Vaccine (1 of 2) Never done    Pneumococcal Vaccines (Age 0-64) (3 of 4 - PPSV23) 04/05/2021     Updates were requested from care everywhere.  Chart was reviewed for overdue Proactive [facility] Encounters ([facility]) topics (CRS, Breast Cancer Screening, Eye exam)  Health Maintenance has been updated.  LINKS immunization registry triggered.  Immunizations were reconciled.

## 2022-01-24 NOTE — TELEPHONE ENCOUNTER
----- Message from Lubna Comer sent at 1/24/2022 12:24 PM CST -----  Regarding: Pateint Advice  Contact: Pt [phone]  Patient is calling Dr. Rush's office in regards to clarify appt for 01/27/2022 at 11:00 am or 09:00 am . Please call. [phone]

## 2022-01-25 ENCOUNTER — OFFICE VISIT (OUTPATIENT)
Dept: RHEUMATOLOGY | Facility: CLINIC | Age: 62
End: 2022-01-25
Payer: MEDICARE

## 2022-01-25 ENCOUNTER — LAB VISIT (OUTPATIENT)
Dept: LAB | Facility: HOSPITAL | Age: 62
End: 2022-01-25
Payer: MEDICARE

## 2022-01-25 VITALS
WEIGHT: 133.38 LBS | HEART RATE: 76 BPM | BODY MASS INDEX: 23.63 KG/M2 | DIASTOLIC BLOOD PRESSURE: 81 MMHG | SYSTOLIC BLOOD PRESSURE: 141 MMHG

## 2022-01-25 DIAGNOSIS — M32.9 SYSTEMIC LUPUS ERYTHEMATOSUS, UNSPECIFIED SLE TYPE, UNSPECIFIED ORGAN INVOLVEMENT STATUS: ICD-10-CM

## 2022-01-25 DIAGNOSIS — D84.9 IMMUNOSUPPRESSION: ICD-10-CM

## 2022-01-25 DIAGNOSIS — D50.9 IRON DEFICIENCY ANEMIA, UNSPECIFIED IRON DEFICIENCY ANEMIA TYPE: ICD-10-CM

## 2022-01-25 DIAGNOSIS — E87.6 LOW SERUM POTASSIUM: ICD-10-CM

## 2022-01-25 DIAGNOSIS — Z79.899 LONG-TERM USE OF PLAQUENIL: ICD-10-CM

## 2022-01-25 DIAGNOSIS — M79.7 FIBROMYALGIA: ICD-10-CM

## 2022-01-25 DIAGNOSIS — Z79.52 LONG TERM CURRENT USE OF SYSTEMIC STEROIDS: ICD-10-CM

## 2022-01-25 DIAGNOSIS — M32.9 SYSTEMIC LUPUS ERYTHEMATOSUS, UNSPECIFIED SLE TYPE, UNSPECIFIED ORGAN INVOLVEMENT STATUS: Primary | ICD-10-CM

## 2022-01-25 DIAGNOSIS — I10 BENIGN HYPERTENSION: ICD-10-CM

## 2022-01-25 DIAGNOSIS — R53.83 FATIGUE, UNSPECIFIED TYPE: ICD-10-CM

## 2022-01-25 DIAGNOSIS — K51.918 ULCERATIVE COLITIS WITH OTHER COMPLICATION, UNSPECIFIED LOCATION: ICD-10-CM

## 2022-01-25 LAB
ALBUMIN SERPL BCP-MCNC: 3.9 G/DL (ref 3.5–5.2)
ALBUMIN SERPL BCP-MCNC: 3.9 G/DL (ref 3.5–5.2)
ALP SERPL-CCNC: 45 U/L (ref 55–135)
ALT SERPL W/O P-5'-P-CCNC: 18 U/L (ref 10–44)
ANION GAP SERPL CALC-SCNC: 10 MMOL/L (ref 8–16)
AST SERPL-CCNC: 22 U/L (ref 10–40)
BILIRUB DIRECT SERPL-MCNC: 0.2 MG/DL (ref 0.1–0.3)
BILIRUB SERPL-MCNC: 0.4 MG/DL (ref 0.1–1)
BUN SERPL-MCNC: 13 MG/DL (ref 8–23)
C3 SERPL-MCNC: 113 MG/DL (ref 50–180)
C4 SERPL-MCNC: 31 MG/DL (ref 11–44)
CALCIUM SERPL-MCNC: 10.2 MG/DL (ref 8.7–10.5)
CHLORIDE SERPL-SCNC: 101 MMOL/L (ref 95–110)
CK SERPL-CCNC: 144 U/L (ref 20–180)
CO2 SERPL-SCNC: 30 MMOL/L (ref 23–29)
CREAT SERPL-MCNC: 0.8 MG/DL (ref 0.5–1.4)
CRP SERPL-MCNC: 0.6 MG/L (ref 0–8.2)
ERYTHROCYTE [SEDIMENTATION RATE] IN BLOOD BY WESTERGREN METHOD: 4 MM/HR (ref 0–36)
EST. GFR  (AFRICAN AMERICAN): >60 ML/MIN/1.73 M^2
EST. GFR  (NON AFRICAN AMERICAN): >60 ML/MIN/1.73 M^2
FERRITIN SERPL-MCNC: 116 NG/ML (ref 20–300)
GLUCOSE SERPL-MCNC: 98 MG/DL (ref 70–110)
HCT VFR BLD AUTO: 39.8 % (ref 37–48.5)
HGB BLD-MCNC: 12.5 G/DL (ref 12–16)
IRON SERPL-MCNC: 105 UG/DL (ref 30–160)
PHOSPHATE SERPL-MCNC: 3.3 MG/DL (ref 2.7–4.5)
PLATELET # BLD AUTO: 333 K/UL (ref 150–450)
PMV BLD AUTO: 10.5 FL (ref 9.2–12.9)
POTASSIUM SERPL-SCNC: 3.9 MMOL/L (ref 3.5–5.1)
PROT SERPL-MCNC: 7.1 G/DL (ref 6–8.4)
PTH-INTACT SERPL-MCNC: 101.1 PG/ML (ref 9–77)
SATURATED IRON: 22 % (ref 20–50)
SODIUM SERPL-SCNC: 141 MMOL/L (ref 136–145)
TOTAL IRON BINDING CAPACITY: 481 UG/DL (ref 250–450)
TRANSFERRIN SERPL-MCNC: 325 MG/DL (ref 200–375)

## 2022-01-25 PROCEDURE — 85018 HEMOGLOBIN: CPT | Performed by: INTERNAL MEDICINE

## 2022-01-25 PROCEDURE — 99214 OFFICE O/P EST MOD 30 MIN: CPT | Mod: HCNC,S$GLB,, | Performed by: INTERNAL MEDICINE

## 2022-01-25 PROCEDURE — 85049 AUTOMATED PLATELET COUNT: CPT | Performed by: INTERNAL MEDICINE

## 2022-01-25 PROCEDURE — 3008F PR BODY MASS INDEX (BMI) DOCUMENTED: ICD-10-PCS | Mod: HCNC,CPTII,S$GLB, | Performed by: INTERNAL MEDICINE

## 2022-01-25 PROCEDURE — 80076 HEPATIC FUNCTION PANEL: CPT | Mod: 59 | Performed by: INTERNAL MEDICINE

## 2022-01-25 PROCEDURE — 1160F RVW MEDS BY RX/DR IN RCRD: CPT | Mod: HCNC,CPTII,S$GLB, | Performed by: INTERNAL MEDICINE

## 2022-01-25 PROCEDURE — 86160 COMPLEMENT ANTIGEN: CPT | Mod: 59 | Performed by: INTERNAL MEDICINE

## 2022-01-25 PROCEDURE — 3079F PR MOST RECENT DIASTOLIC BLOOD PRESSURE 80-89 MM HG: ICD-10-PCS | Mod: HCNC,CPTII,S$GLB, | Performed by: INTERNAL MEDICINE

## 2022-01-25 PROCEDURE — 1160F PR REVIEW ALL MEDS BY PRESCRIBER/CLIN PHARMACIST DOCUMENTED: ICD-10-PCS | Mod: HCNC,CPTII,S$GLB, | Performed by: INTERNAL MEDICINE

## 2022-01-25 PROCEDURE — 99999 PR PBB SHADOW E&M-EST. PATIENT-LVL II: ICD-10-PCS | Mod: PBBFAC,HCNC,, | Performed by: INTERNAL MEDICINE

## 2022-01-25 PROCEDURE — 86140 C-REACTIVE PROTEIN: CPT | Performed by: INTERNAL MEDICINE

## 2022-01-25 PROCEDURE — 36415 COLL VENOUS BLD VENIPUNCTURE: CPT | Performed by: INTERNAL MEDICINE

## 2022-01-25 PROCEDURE — 3077F SYST BP >= 140 MM HG: CPT | Mod: HCNC,CPTII,S$GLB, | Performed by: INTERNAL MEDICINE

## 2022-01-25 PROCEDURE — 99499 UNLISTED E&M SERVICE: CPT | Mod: S$GLB,,, | Performed by: INTERNAL MEDICINE

## 2022-01-25 PROCEDURE — 1159F PR MEDICATION LIST DOCUMENTED IN MEDICAL RECORD: ICD-10-PCS | Mod: HCNC,CPTII,S$GLB, | Performed by: INTERNAL MEDICINE

## 2022-01-25 PROCEDURE — 1159F MED LIST DOCD IN RCRD: CPT | Mod: HCNC,CPTII,S$GLB, | Performed by: INTERNAL MEDICINE

## 2022-01-25 PROCEDURE — 80069 RENAL FUNCTION PANEL: CPT | Performed by: INTERNAL MEDICINE

## 2022-01-25 PROCEDURE — 84466 ASSAY OF TRANSFERRIN: CPT | Performed by: INTERNAL MEDICINE

## 2022-01-25 PROCEDURE — 99999 PR PBB SHADOW E&M-EST. PATIENT-LVL II: CPT | Mod: PBBFAC,HCNC,, | Performed by: INTERNAL MEDICINE

## 2022-01-25 PROCEDURE — 99499 RISK ADDL DX/OHS AUDIT: ICD-10-PCS | Mod: S$GLB,,, | Performed by: INTERNAL MEDICINE

## 2022-01-25 PROCEDURE — 86160 COMPLEMENT ANTIGEN: CPT | Performed by: INTERNAL MEDICINE

## 2022-01-25 PROCEDURE — 99214 PR OFFICE/OUTPT VISIT, EST, LEVL IV, 30-39 MIN: ICD-10-PCS | Mod: HCNC,S$GLB,, | Performed by: INTERNAL MEDICINE

## 2022-01-25 PROCEDURE — 86225 DNA ANTIBODY NATIVE: CPT | Performed by: INTERNAL MEDICINE

## 2022-01-25 PROCEDURE — 85014 HEMATOCRIT: CPT | Performed by: INTERNAL MEDICINE

## 2022-01-25 PROCEDURE — 3077F PR MOST RECENT SYSTOLIC BLOOD PRESSURE >= 140 MM HG: ICD-10-PCS | Mod: HCNC,CPTII,S$GLB, | Performed by: INTERNAL MEDICINE

## 2022-01-25 PROCEDURE — 82728 ASSAY OF FERRITIN: CPT | Performed by: INTERNAL MEDICINE

## 2022-01-25 PROCEDURE — 3079F DIAST BP 80-89 MM HG: CPT | Mod: HCNC,CPTII,S$GLB, | Performed by: INTERNAL MEDICINE

## 2022-01-25 PROCEDURE — 83970 ASSAY OF PARATHORMONE: CPT | Performed by: INTERNAL MEDICINE

## 2022-01-25 PROCEDURE — 82550 ASSAY OF CK (CPK): CPT | Performed by: INTERNAL MEDICINE

## 2022-01-25 PROCEDURE — 85652 RBC SED RATE AUTOMATED: CPT | Performed by: INTERNAL MEDICINE

## 2022-01-25 PROCEDURE — 3008F BODY MASS INDEX DOCD: CPT | Mod: HCNC,CPTII,S$GLB, | Performed by: INTERNAL MEDICINE

## 2022-01-25 ASSESSMENT — ROUTINE ASSESSMENT OF PATIENT INDEX DATA (RAPID3)
PATIENT GLOBAL ASSESSMENT SCORE: 6.5
FATIGUE SCORE: 7.5
TOTAL RAPID3 SCORE: 5.22
AM STIFFNESS SCORE: 0, NO
PSYCHOLOGICAL DISTRESS SCORE: 1.1
MDHAQ FUNCTION SCORE: 0.2
PAIN SCORE: 8.5

## 2022-01-25 NOTE — PROGRESS NOTES
Subjective:       Patient ID: Efe Horowitz is a 62 y.o. female.    Chief Complaint: Lupus    HPI:  Efe Horowitz is a 62 y.o. female with history of lupus since age 39.   Her manifestations include joint pains, headache, positive DERRICK, and a   double-stranded DNA. She also has an equivocal anticardiolipin IgM.   She has been treated with Plaquenil 1-1/2 tablets daily. Eye exam 12/2017 was normal.      In addition to lupus, she has a   history of ulcerative colitis, osteopenia, fibromyalgia as well.   In 2009 she had a colectomy with ileostomy and in May 2011 the ileostomy  was closed. History of bilateral carpal tunnel.       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  She was treated with surgical resection.  Another lesion breast suspected to be lymphoma was later felt to be fibrous tissue.      May 1, 2017 had right breast fibrous material removed and reconstruction on both breasts at [facility].       Interval History:  Dealing with bowel issues.  Food is not going down.  Was told there may be a muscle issues.  Had anal manometry.   Last ulcer in nose was in November.  Scalp soreness chronic issue.   Hands have been red.  Bruising.          Still taking injection for cholesterol.  Still on Forteo (takes every nigh) with Dr. Srinivasan for osteoporosis with L3 vertebral fractures (top and bottom) on MRI.    Reports recurrent nasal ulcers (last month), oral ulcer (last month) and hair loss persists so cut it off.  No rashes.     Review of Systems   Constitutional: Positive for fatigue.   HENT: Negative for mouth sores.    Eyes: Negative for redness.        Dry eyes   Respiratory: Negative for cough and shortness of breath.    Gastrointestinal: Negative for diarrhea.   Endocrine: Negative.    Genitourinary: Negative.    Musculoskeletal: Positive for arthralgias.   Skin: Negative.    Allergic/Immunologic: Negative.    Neurological: Positive for headaches.   Hematological: Negative.    Psychiatric/Behavioral:  Negative.          Objective:   BP (!) 141/81   Pulse 76   Wt 60.5 kg (133 lb 6.1 oz)   BMI 23.63 kg/m²         Physical Exam   Constitutional: She is oriented to person, place, and time.   HENT:   Head: Normocephalic and atraumatic.   Eyes: Conjunctivae are normal.   Cardiovascular: Normal rate, regular rhythm and normal heart sounds.   Pulmonary/Chest: Effort normal and breath sounds normal.   Abdominal: Soft. Bowel sounds are normal.   Musculoskeletal:      Cervical back: Neck supple.      Comments: Pain on palpation bilateral trochanteric bursa.     Neurological: She is alert and oriented to person, place, and time. Gait normal.   Skin: Skin is warm and dry.   Psychiatric: Mood and affect normal.         LABS    Component      Latest Ref Rng & Units 8/24/2021 8/16/2021   WBC      3.90 - 12.70 K/uL  6.48   RBC      4.00 - 5.40 M/uL  4.56   Hemoglobin      12.0 - 16.0 g/dL  12.5   Hematocrit      37.0 - 48.5 %  39.2   MCV      82 - 98 fL  86   MCH      27.0 - 31.0 pg  27.4   MCHC      32.0 - 36.0 g/dL  31.9 (L)   RDW      11.5 - 14.5 %  14.4   Platelets      150 - 450 K/uL  259   MPV      9.2 - 12.9 fL  9.9   Immature Granulocytes      0.0 - 0.5 %  0.2   Gran # (ANC)      1.8 - 7.7 K/uL  4.7   Immature Grans (Abs)      0.00 - 0.04 K/uL  0.01   Lymph #      1.0 - 4.8 K/uL  1.1   Mono #      0.3 - 1.0 K/uL  0.6   Eos #      0.0 - 0.5 K/uL  0.1   Baso #      0.00 - 0.20 K/uL  0.02   nRBC      0 /100 WBC  0   Gran %      38.0 - 73.0 %  71.7   Lymph %      18.0 - 48.0 %  17.6 (L)   Mono %      4.0 - 15.0 %  8.5   Eosinophil %      0.0 - 8.0 %  1.7   Basophil %      0.0 - 1.9 %  0.3   Differential Method        Automated   Sodium      136 - 145 mmol/L  142   Potassium      3.5 - 5.1 mmol/L  3.5   Chloride      95 - 110 mmol/L  101   CO2      23 - 29 mmol/L  29   Glucose      70 - 110 mg/dL  94   BUN      8 - 23 mg/dL  14   Creatinine      0.5 - 1.4 mg/dL  0.8   Calcium      8.7 - 10.5 mg/dL  9.8   PROTEIN TOTAL       6.0 - 8.4 g/dL  6.9   Albumin      3.5 - 5.2 g/dL  3.9   BILIRUBIN TOTAL      0.1 - 1.0 mg/dL  0.4   Alkaline Phosphatase      55 - 135 U/L  59   AST      10 - 40 U/L  26   ALT      10 - 44 U/L  24   Anion Gap      8 - 16 mmol/L  12   eGFR if African American      >60 mL/min/1.73 m:2  >60.0   eGFR if non African American      >60 mL/min/1.73 m:2  >60.0   Specimen UA        Urine, Unspecified   Color, UA      Yellow, Straw, Amanda  Yellow   Appearance, UA      Clear  Clear   pH, UA      5.0 - 8.0  5.0   Specific Gravity, UA      1.005 - 1.030  1.025   Protein, UA      Negative  Negative   Glucose, UA      Negative  Negative   Ketones, UA      Negative  Negative   Bilirubin (UA)      Negative  Negative   Occult Blood UA      Negative  Negative   NITRITE UA      Negative  Negative   Leukocytes, UA      Negative  Negative   Cholesterol      120 - 199 mg/dL 112 (L)    Triglycerides      30 - 150 mg/dL 74    HDL      40 - 75 mg/dL 90 (H)    LDL Cholesterol External      63.0 - 159.0 mg/dL 7.2 (L)    HDL/Cholesterol Ratio      20.0 - 50.0 % 80.4 (H)    Total Cholesterol/HDL Ratio      2.0 - 5.0 1.2 (L)    Non-HDL Cholesterol      mg/dL 22    Protein, Urine Random      0 - 15 mg/dL  <7   Creatinine, Urine      15.0 - 325.0 mg/dL  151.0   Prot/Creat Ratio, Urine      0.00 - 0.20  Unable to calculate   Hemoglobin A1C External      4.0 - 5.6 % 5.8 (H)    Estimated Avg Glucose      68 - 131 mg/dL 120    ds DNA Ab      Negative 1:10  Negative 1:10   Complement (C-3)      50 - 180 mg/dL  106   Complement (C-4)      11 - 44 mg/dL  35   Sed Rate      0 - 36 mm/Hr  3   CRP      0.0 - 8.2 mg/L  1.3   TSH      0.400 - 4.000 uIU/mL 0.984    Free T4      0.71 - 1.51 ng/dL 0.72          Assessment:       1. Lupus. Lupus manifestations include joint pains, headache, positive DERRICK, and a   double-stranded DNA. Has fatigue as well as intermittent oral/nasal ulcer.  SLEDAI=0 without labs  2. Fatigue   3. Encounter for long-term (current) use  of other medications   4. Myalgia and myositis. Fibromyalgia on Savella.  Patient stopped gabapentin since it made her sleepy but no worsening in pain.    5. Trochanteric bursitis. Bilateral improved with injection.  6. Shoulder pain. Stable   7. Vitamin D deficiency disease. Now normal  8. Suspected Shingles.   9. Migraine.  Did acupuncture with Dr. Tinsley.  Treated with Maxalt by primary doctor which helps.     10.  Osteoporosis based vertebral fracture.  Reclast in the past.  Dr. Srinivasan plans to do Forteo.   11.  Chronic steroid use.  Endocrine gives for adrenal insufficiency.  12.  Ulcerative colitis  13.  Hematuria.  Followed by nephrology  14.  Stress with dealing with son with mental illness  15.  Loss of smell since Nov/Dec 2018.  May be relate to sinus issues.  Evaluated by ENT but no cause found.  Continues to have loss of smell  16. Bilateral hand pain. Suspect OA and DeQuervains of right hand.  X-ray with 1st [facility] osteoarthritis.    Plan:       1.  Labs ordered.   2.  Continue Plaquenil 300 mg daily.  In the future if lupus more active and other issues stable consider Benlysta.  Eye exam normal 2/2021.  Scheduled for Feb 2022.   3. Compliant with neurontin 300 mg tid.    4. Off Forteo from endocrine after 2 years and now on Reclast  5. Continue Voltaren gel hands.  Also alternate ice and heat.   6. Continue SPICA splint OTC  7. Patient had COVID booster           RTO in 4 months/prn.

## 2022-01-26 LAB — DSDNA AB SER-ACNC: NORMAL [IU]/ML

## 2022-01-27 ENCOUNTER — TELEPHONE (OUTPATIENT)
Dept: RHEUMATOLOGY | Facility: CLINIC | Age: 62
End: 2022-01-27
Payer: MEDICARE

## 2022-01-27 ENCOUNTER — TELEPHONE (OUTPATIENT)
Dept: NEPHROLOGY | Facility: CLINIC | Age: 62
End: 2022-01-27

## 2022-01-27 ENCOUNTER — OFFICE VISIT (OUTPATIENT)
Dept: NEPHROLOGY | Facility: CLINIC | Age: 62
End: 2022-01-27
Payer: MEDICARE

## 2022-01-27 VITALS
DIASTOLIC BLOOD PRESSURE: 82 MMHG | HEART RATE: 68 BPM | BODY MASS INDEX: 22.26 KG/M2 | SYSTOLIC BLOOD PRESSURE: 122 MMHG | WEIGHT: 125.69 LBS | OXYGEN SATURATION: 98 %

## 2022-01-27 DIAGNOSIS — M32.9 SYSTEMIC LUPUS ERYTHEMATOSUS, UNSPECIFIED SLE TYPE, UNSPECIFIED ORGAN INVOLVEMENT STATUS: Primary | ICD-10-CM

## 2022-01-27 DIAGNOSIS — E87.6 HYPOKALEMIA: Primary | ICD-10-CM

## 2022-01-27 DIAGNOSIS — F51.01 PRIMARY INSOMNIA: Primary | ICD-10-CM

## 2022-01-27 DIAGNOSIS — M32.9 SYSTEMIC LUPUS ERYTHEMATOSUS, UNSPECIFIED SLE TYPE, UNSPECIFIED ORGAN INVOLVEMENT STATUS: ICD-10-CM

## 2022-01-27 DIAGNOSIS — I10 HYPERTENSION, UNSPECIFIED TYPE: ICD-10-CM

## 2022-01-27 DIAGNOSIS — M79.7 FIBROMYALGIA: ICD-10-CM

## 2022-01-27 DIAGNOSIS — I10 HYPERTENSION, UNSPECIFIED TYPE: Primary | ICD-10-CM

## 2022-01-27 DIAGNOSIS — Z79.899 LONG-TERM USE OF PLAQUENIL: ICD-10-CM

## 2022-01-27 DIAGNOSIS — D84.9 IMMUNOSUPPRESSION: ICD-10-CM

## 2022-01-27 PROCEDURE — 99999 PR PBB SHADOW E&M-EST. PATIENT-LVL IV: ICD-10-PCS | Mod: PBBFAC,HCNC,, | Performed by: INTERNAL MEDICINE

## 2022-01-27 PROCEDURE — 1159F PR MEDICATION LIST DOCUMENTED IN MEDICAL RECORD: ICD-10-PCS | Mod: HCNC,CPTII,S$GLB, | Performed by: INTERNAL MEDICINE

## 2022-01-27 PROCEDURE — 99499 RISK ADDL DX/OHS AUDIT: ICD-10-PCS | Mod: S$GLB,,, | Performed by: INTERNAL MEDICINE

## 2022-01-27 PROCEDURE — 3074F SYST BP LT 130 MM HG: CPT | Mod: HCNC,CPTII,S$GLB, | Performed by: INTERNAL MEDICINE

## 2022-01-27 PROCEDURE — 3079F PR MOST RECENT DIASTOLIC BLOOD PRESSURE 80-89 MM HG: ICD-10-PCS | Mod: HCNC,CPTII,S$GLB, | Performed by: INTERNAL MEDICINE

## 2022-01-27 PROCEDURE — 3066F NEPHROPATHY DOC TX: CPT | Mod: HCNC,CPTII,S$GLB, | Performed by: INTERNAL MEDICINE

## 2022-01-27 PROCEDURE — 99499 UNLISTED E&M SERVICE: CPT | Mod: S$GLB,,, | Performed by: INTERNAL MEDICINE

## 2022-01-27 PROCEDURE — 3008F PR BODY MASS INDEX (BMI) DOCUMENTED: ICD-10-PCS | Mod: HCNC,CPTII,S$GLB, | Performed by: INTERNAL MEDICINE

## 2022-01-27 PROCEDURE — 99999 PR PBB SHADOW E&M-EST. PATIENT-LVL IV: CPT | Mod: PBBFAC,HCNC,, | Performed by: INTERNAL MEDICINE

## 2022-01-27 PROCEDURE — 1159F MED LIST DOCD IN RCRD: CPT | Mod: HCNC,CPTII,S$GLB, | Performed by: INTERNAL MEDICINE

## 2022-01-27 PROCEDURE — 3079F DIAST BP 80-89 MM HG: CPT | Mod: HCNC,CPTII,S$GLB, | Performed by: INTERNAL MEDICINE

## 2022-01-27 PROCEDURE — 3008F BODY MASS INDEX DOCD: CPT | Mod: HCNC,CPTII,S$GLB, | Performed by: INTERNAL MEDICINE

## 2022-01-27 PROCEDURE — 3074F PR MOST RECENT SYSTOLIC BLOOD PRESSURE < 130 MM HG: ICD-10-PCS | Mod: HCNC,CPTII,S$GLB, | Performed by: INTERNAL MEDICINE

## 2022-01-27 PROCEDURE — 99214 PR OFFICE/OUTPT VISIT, EST, LEVL IV, 30-39 MIN: ICD-10-PCS | Mod: HCNC,S$GLB,, | Performed by: INTERNAL MEDICINE

## 2022-01-27 PROCEDURE — 3066F PR DOCUMENTATION OF TREATMENT FOR NEPHROPATHY: ICD-10-PCS | Mod: HCNC,CPTII,S$GLB, | Performed by: INTERNAL MEDICINE

## 2022-01-27 PROCEDURE — 99214 OFFICE O/P EST MOD 30 MIN: CPT | Mod: HCNC,S$GLB,, | Performed by: INTERNAL MEDICINE

## 2022-01-27 RX ORDER — HYDROXYZINE HYDROCHLORIDE 25 MG/1
25 TABLET, FILM COATED ORAL NIGHTLY PRN
Qty: 90 TABLET | Refills: 3 | Status: SHIPPED | OUTPATIENT
Start: 2022-01-27 | End: 2022-02-23 | Stop reason: ALTCHOICE

## 2022-01-27 RX ORDER — LOSARTAN POTASSIUM 50 MG/1
50 TABLET ORAL DAILY
Qty: 90 TABLET | Refills: 3 | Status: SHIPPED | OUTPATIENT
Start: 2022-01-27 | End: 2023-03-06 | Stop reason: SDUPTHER

## 2022-01-27 NOTE — PROGRESS NOTES
Subjective:       Patient ID: Efe Horowitz is a 62 y.o. Black or  female who presents for follow-up evaluation of Chronic Kidney Disease    HPI This is a 62 -year-old -American female with   history of systemic lupus diagnosed 18 years ago, fibromyalgia, ulcerative   colitis and appendectomy, lupus who is coming in for f/u . The patient denies any macroscopic hematuria. She complains of fatigue.   Her creatinines have been stable. She has  no proteinuria on the urine protein creatinine ratios for the past two to three years and has had microscopic hematuria on UAs off and on for the past three   years or so but no sig RBC's. She states her blood pressures at home in the 1150-120's/70's-82's. Follows in oncology with breast cancer. Nose sores and head sores occasionally. Diahrrea with UC. EGD recently.    PAST MEDICAL HISTORY: Lupus 16 years ago, fibromyalgia, colitis, appendectomy,   and cholecystectomy, breast cancer.     SOCIAL HISTORY: Does not smoke, does not drink. She works part-time as a   .     FAMILY HISTORY: Her dad has CKD from hypertension and diabetes. Her nephew had   a kidney transplant after a motor vehicle accident.    Review of Systems   Constitutional: Positive for fatigue.   HENT: Negative for mouth sores.         Nose sores and head sores previously   Eyes: Negative for discharge.   Respiratory: Negative for cough, shortness of breath and wheezing.    Cardiovascular: Negative for chest pain and palpitations.   Gastrointestinal: Positive for abdominal pain and diarrhea. Negative for vomiting.   Genitourinary: Negative for dysuria, frequency, hematuria and urgency.   Musculoskeletal: Positive for arthralgias, joint swelling and myalgias. Negative for back pain.        R knee pain.   Skin: Negative for color change and rash.   Psychiatric/Behavioral: Negative for confusion.   All other systems reviewed and are negative.      Objective:      Physical  Exam  Vitals reviewed.   Constitutional:       Appearance: She is well-developed.   HENT:      Right Ear: External ear normal.      Left Ear: External ear normal.      Nose: Nose normal.      Mouth/Throat:      Dentition: Normal dentition.   Eyes:      General: Lids are normal.      Conjunctiva/sclera: Conjunctivae normal.      Pupils: Pupils are equal, round, and reactive to light.   Neck:      Thyroid: No thyroid mass.      Trachea: Trachea normal.   Cardiovascular:      Rate and Rhythm: Normal rate and regular rhythm.      Heart sounds: No murmur heard.  No friction rub.      Comments: No lower extremity edema  Pulmonary:      Effort: Pulmonary effort is normal. No respiratory distress.      Breath sounds: Normal breath sounds. No decreased breath sounds or rales.   Abdominal:      General: Bowel sounds are normal.      Palpations: Abdomen is soft. There is no mass.      Tenderness: There is no abdominal tenderness. There is no rebound.      Hernia: No hernia is present.   Skin:     General: Skin is warm and dry.      Findings: No erythema or rash.   Neurological:      Mental Status: She is alert and oriented to person, place, and time.   Psychiatric:         Mood and Affect: Mood is not anxious or depressed.         Judgment: Judgment normal.      Comments: Oriented to time, place and person.         Assessment:       No diagnosis found.    Plan:         This is a 62 year-old -American female with   longstanding systemic lupus, but no history of lupus nephritis, who is coming in   for f/u    1. Renal. Her creatinines have been stable at 0.8. She has 50 mg to no   proteinuria for the past two or three years with microscopic hematuria on   several UAs intermittently but no sig RBC's. No sig proteinuria currently or RBC's. She does not appear to have lupus nephritis, but will need to be monitored closely. I do not think renal biopsy is warranted at   this time, but she does need close followup and all of  this was explained in   detail to the patient. She denies any NSAIDs. previously d/leo losartan by PCP.   Hydrate well. Potassium low-on supplements.    yony and renin levels stable.    2. Blood pressures are stable -asked her to monitor bp's.  on  HCTZ 25 mg, bystolic, amldoipine.  With K being low req supplements, stop HCTZ 25 mg and start losartan 50 mg and check rfp in a week. Potassium low and started on kcl 20 meq daily by pcp. Recheck RFP in a few days and downtitrate potassium pill if possible.   3. Microscopic hematuria. renal ultrasound stable. She denies any   history of kidney stones. Saw urology-ct urogram negative. Recent UA negative for proteinuria. Her complements have been stable. UA looks good.   4.Anemia: hgb stable along with iron. On 1 tab of iron. Iron stable.  5: renal osteodystrophy: PTH slightly higher likely sec to vit D deficiency-stopped vit D; restart OTC vit D.   Return in 5 months. Rfp in a 10 days.

## 2022-01-27 NOTE — TELEPHONE ENCOUNTER
Fabian.  I saw Mrs. Horowitz today in renal clinic. Currently on  HCTZ 25 mg, bystolic, amldoipine.  With K being and  low requiring potassium  supplements, I stopped HCTZ 25 mg and started losartan 50 mg.  This is likely because of her IBD with chronic diahrrea. Thanks.

## 2022-01-31 NOTE — PROVATION PATIENT INSTRUCTIONS
Discharge Summary/Instructions after an Endoscopic Procedure  Patient Name: Efe Horowitz  Patient MRN: [id number]  Patient YOB: 1960 Thursday, January 13, 2022  Quinton Chandra MD  Dear patient,  As a result of recent federal legislation (The Federal Cures Act), you may   receive lab or pathology results from your procedure in your [facility]   account before your physician is able to contact you. Your physician or   their representative will relay the results to you with their   recommendations at their soonest availability.  Thank you,  RESTRICTIONS:  During your procedure today, you received medications for sedation.  These   medications may affect your judgment, balance and coordination.  Therefore,   for 24 hours, you have the following restrictions:   - DO NOT drive a car, operate machinery, make legal/financial decisions,   sign important papers or drink alcohol.    ACTIVITY:  Today: no heavy lifting, straining or running due to procedural   sedation/anesthesia.  The following day: return to full activity including work.  DIET:  Eat and drink normally unless instructed otherwise.     TREATMENT FOR COMMON SIDE EFFECTS:  - Mild abdominal pain, nausea, belching, bloating or excessive gas:  rest,   eat lightly and use a heating pad.  - Sore Throat: treat with throat lozenges and/or gargle with warm salt   water.  - Because air was used during the procedure, expelling large amounts of air   from your rectum or belching is normal.  - If a bowel prep was taken, you may not have a bowel movement for 1-3 days.    This is normal.  SYMPTOMS TO WATCH FOR AND REPORT TO YOUR PHYSICIAN:  1. Abdominal pain or bloating, other than gas cramps.  2. Chest pain.  3. Back pain.  4. Signs of infection such as: chills or fever occurring within 24 hours   after the procedure.  5. Rectal bleeding, which would show as bright red, maroon, or black stools.   (A tablespoon of blood from the rectum is not serious, especially  if   hemorrhoids are present.)  6. Vomiting.  7. Weakness or dizziness.  GO DIRECTLY TO THE NEAREST EMERGENCY ROOM IF YOU HAVE ANY OF THE FOLLOWING:      Difficulty breathing              Chills and/or fever over 101 F   Persistent vomiting and/or vomiting blood   Severe abdominal pain   Severe chest pain   Black, tarry stools   Bleeding- more than one tablespoon   Any other symptom or condition that you feel may need urgent attention  Your doctor recommends these additional instructions:  If any biopsies were taken, your doctors clinic will contact you in 1 to 2   weeks with any results.  -Referral to pelvic floor physical therapy - suspect diarrhea is due to   overflow diarrhea in setting of pelvic floor dysfunction  For questions, problems or results please call your physician - Quinton Chandra MD at Work:  [phone].  [facility] EMERGENCY RO[facility] PHONE NUMBER: [phone]  IF A COMPLICATION OR EMERGENCY SITUATION ARISES AND YOU ARE UNABLE TO REACH   YOUR PHYSICIAN - GO DIRECTLY TO THE EMERGENCY ROOM.  Quinton Chandra MD  1/31/2022 2:36:37 PM  This report has been verified and signed electronically.  Dear patient,  As a result of recent federal legislation (The Federal Cures Act), you may   receive lab or pathology results from your procedure in your [facility]   account before your physician is able to contact you. Your physician or   their representative will relay the results to you with their   recommendations at their soonest availability.  Thank you,  PROVATION

## 2022-02-01 ENCOUNTER — TELEPHONE (OUTPATIENT)
Dept: GASTROENTEROLOGY | Facility: HOSPITAL | Age: 62
End: 2022-02-01
Payer: MEDICARE

## 2022-02-01 DIAGNOSIS — M62.89 PELVIC FLOOR DYSFUNCTION: Primary | ICD-10-CM

## 2022-02-01 NOTE — TELEPHONE ENCOUNTER
Anorectal manometry consistent with pelvic floor dysfunction.  This is the likely cause of her symptoms.  Referral for physical therapy placed today.

## 2022-02-02 NOTE — TELEPHONE ENCOUNTER
Spoke to pt. Result given. Scheduled 3mths f/u. Pt verbalizes understanding. No other concerns at this time.

## 2022-02-06 NOTE — PROGRESS NOTES
Betty please schedule patient for pancreatic cyst Clinic for follow-up of her pancreatic cyst..    There is a stable 1.0 cm cystic lesion within the pancreatic tail that appears to connect to the pancreatic duct.There is an additional punctate cystic lesion closely approximating the pancreatic duct within the distal pancreatic body/tail, which could represent an additional side branch IPMN versus simple cyst.  No concerning pancreatic head mass. Stable, mild intrahepatic and extrahepatic biliary ductal dilatation, the likely sequela of prior cholecystectomy.

## 2022-02-07 ENCOUNTER — CLINICAL SUPPORT (OUTPATIENT)
Dept: INFECTIOUS DISEASES | Facility: CLINIC | Age: 62
End: 2022-02-07
Payer: MEDICARE

## 2022-02-07 ENCOUNTER — LAB VISIT (OUTPATIENT)
Dept: LAB | Facility: HOSPITAL | Age: 62
End: 2022-02-07
Payer: MEDICARE

## 2022-02-07 DIAGNOSIS — E87.6 HYPOKALEMIA: ICD-10-CM

## 2022-02-07 DIAGNOSIS — E87.6 LOW SERUM POTASSIUM: ICD-10-CM

## 2022-02-07 DIAGNOSIS — D50.9 IRON DEFICIENCY ANEMIA, UNSPECIFIED IRON DEFICIENCY ANEMIA TYPE: ICD-10-CM

## 2022-02-07 LAB
ALBUMIN SERPL BCP-MCNC: 3.9 G/DL (ref 3.5–5.2)
ANION GAP SERPL CALC-SCNC: 10 MMOL/L (ref 8–16)
BUN SERPL-MCNC: 14 MG/DL (ref 8–23)
CALCIUM SERPL-MCNC: 10 MG/DL (ref 8.7–10.5)
CHLORIDE SERPL-SCNC: 104 MMOL/L (ref 95–110)
CO2 SERPL-SCNC: 28 MMOL/L (ref 23–29)
CREAT SERPL-MCNC: 0.8 MG/DL (ref 0.5–1.4)
EST. GFR  (AFRICAN AMERICAN): >60 ML/MIN/1.73 M^2
EST. GFR  (NON AFRICAN AMERICAN): >60 ML/MIN/1.73 M^2
GLUCOSE SERPL-MCNC: 57 MG/DL (ref 70–110)
PHOSPHATE SERPL-MCNC: 3.1 MG/DL (ref 2.7–4.5)
POTASSIUM SERPL-SCNC: 3.8 MMOL/L (ref 3.5–5.1)
SODIUM SERPL-SCNC: 142 MMOL/L (ref 136–145)

## 2022-02-07 PROCEDURE — 90739 HEPB VACC 2/4 DOSE ADULT IM: CPT | Mod: S$GLB,,, | Performed by: INTERNAL MEDICINE

## 2022-02-07 PROCEDURE — G0010 HEPATITIS B (RECOMBINANT) ADJUVANTED, 2 DOSE: ICD-10-PCS | Mod: S$GLB,,, | Performed by: INTERNAL MEDICINE

## 2022-02-07 PROCEDURE — 36415 COLL VENOUS BLD VENIPUNCTURE: CPT | Mod: HCNC | Performed by: INTERNAL MEDICINE

## 2022-02-07 PROCEDURE — 90739 HEPATITIS B (RECOMBINANT) ADJUVANTED, 2 DOSE: ICD-10-PCS | Mod: S$GLB,,, | Performed by: INTERNAL MEDICINE

## 2022-02-07 PROCEDURE — G0010 ADMIN HEPATITIS B VACCINE: HCPCS | Mod: S$GLB,,, | Performed by: INTERNAL MEDICINE

## 2022-02-07 PROCEDURE — 80069 RENAL FUNCTION PANEL: CPT | Mod: HCNC | Performed by: INTERNAL MEDICINE

## 2022-02-09 DIAGNOSIS — Z01.818 PRE-OP TESTING: ICD-10-CM

## 2022-02-11 PROBLEM — R27.9 LACK OF COORDINATION: Status: ACTIVE | Noted: 2022-02-11

## 2022-02-13 ENCOUNTER — LAB VISIT (OUTPATIENT)
Dept: PRIMARY CARE CLINIC | Facility: CLINIC | Age: 62
End: 2022-02-13
Payer: MEDICARE

## 2022-02-13 DIAGNOSIS — Z01.818 PRE-OP TESTING: ICD-10-CM

## 2022-02-13 LAB
SARS-COV-2 RNA RESP QL NAA+PROBE: NOT DETECTED
SARS-COV-2- CYCLE NUMBER: NORMAL

## 2022-02-13 PROCEDURE — U0005 INFEC AGEN DETEC AMPLI PROBE: HCPCS | Performed by: FAMILY MEDICINE

## 2022-02-13 PROCEDURE — U0003 INFECTIOUS AGENT DETECTION BY NUCLEIC ACID (DNA OR RNA); SEVERE ACUTE RESPIRATORY SYNDROME CORONAVIRUS 2 (SARS-COV-2) (CORONAVIRUS DISEASE [COVID-19]), AMPLIFIED PROBE TECHNIQUE, MAKING USE OF HIGH THROUGHPUT TECHNOLOGIES AS DESCRIBED BY CMS-2020-01-R: HCPCS | Mod: HCNC | Performed by: FAMILY MEDICINE

## 2022-02-14 ENCOUNTER — CLINICAL SUPPORT (OUTPATIENT)
Dept: REHABILITATION | Facility: OTHER | Age: 62
End: 2022-02-14
Payer: MEDICARE

## 2022-02-14 DIAGNOSIS — M62.89 PELVIC FLOOR DYSFUNCTION: ICD-10-CM

## 2022-02-14 DIAGNOSIS — R27.9 LACK OF COORDINATION: ICD-10-CM

## 2022-02-14 PROCEDURE — 97163 PT EVAL HIGH COMPLEX 45 MIN: CPT | Mod: HCNC

## 2022-02-14 PROCEDURE — 97112 NEUROMUSCULAR REEDUCATION: CPT | Mod: HCNC

## 2022-02-14 PROCEDURE — 97140 MANUAL THERAPY 1/> REGIONS: CPT | Mod: HCNC

## 2022-02-14 NOTE — PATIENT INSTRUCTIONS
Home Exercise Program 2/14/2022    1) Pushing during bowel movements   - Inhale, Belly Big: Begin with taking a deep breath and expanding the belly out.    - Exhale, Belly Big: Exhale, but keep your belly expanded.    - Blow out the Birthday Candles as exhaling: Blow out excess air with rounded lips.     Practice above 10x in a row, 1x/day and then use functionally when on toilet having a BM    Consider using a foot stool for defecation.   [email or web address], discount code [email or web address] or purchase on amazon

## 2022-02-14 NOTE — PLAN OF CARE
[facility]  Pelvic Health Physical Therapy Initial Evaluation    Date: 2022   Name: Efe Horowitz  Clinic Number: [id number]  Therapy Diagnosis: No diagnosis found.  Physician: Ceferino Rangel MD    Physician Orders: PT Eval and Treat  Medical Diagnosis from Referral: pelvic floor dysfunction  Evaluation Date: 2022  Authorization Period Expiration: 1 visit  Plan of Care Expiration: 22  Visit # / Visits authorized:     Time In: 200  Time Out: 300  Total Appointment Time (timed & untimed codes): 60 minutes    Precautions: universal    Subjective     Pronouns: she/her    Date of onset: November    History of current condition - [facility] reports: Ulcerative colitis with colon removed  and JPouch. Was having about 3 BMs a  Day with taking two Immodium/day. In November began having lower abdominal in RLQ that progressed to across lower abdominals and then radiating down the leg. Was associated with increased frequency of loose BMs. Increased to 4 Immodium/day. Still dong 4/day. Only liquids coming out. Tried magnesium citrate and an enema, both times only thing that Manometry test completed revealing Type II dysnergic defecation and MD suspects pelvic floor dysfunction contributing to symptoms.     Poor quality of sleep, wakes up every 2 hours and takes awhile to fall asleep.     OB/GYN History:  and vaginal delivery  Surgical History: proctocolectomy and JPouch placement , cholecystectomy, hysterectomy for fibroids , oopherectomies due to cysts, breast CA with mastectomy and reconstruction  Birth Control: menopause  Sexually active? No  Pain with vaginal exams, intercourse? No    Bladder/Bowel History:    Frequency of urination:   Daytime: WNL           Nighttime: 0-1   Bladder leakage: No   Urinary Urgency: No   Frequency of bowel movements: everytime she urinates   Difficulty initiating BM: Yes - has to push on low back, sphincter or RLQ   Quality/Shape  of BM: [place] Stool Chart 7   Does Patient Feel Empty after BM? Yes   Fiber Supplements or Laxative Use? No - takes 4 Immodium   Colon leakage: Yes   Frequency of incidents: 1x/week - more than usual    Fecal Urgency: Yes   Form of protection: pad   Number of pads required in 24 hours: 1   History of coccyx injury: No    Prolapse Screening:  Feeling of vaginal bulge: No    Pain:  Location: RLQ, suprapubic, back, leg  Current 8/10, worst 10/10, best 0/10   Aggravating Factors/Activities that cause symptoms: Bowel movement  and waiting too long to have BM, lifting   Easing Factors: bowel movement and hot bath     Medical History: Efe  has a past medical history of Acid reflux, Adrenal insufficiency, primary, Arthritis, Asthma, B12 deficiency anemia, Benign essential hypertension (2/7/2021), Blood transfusion (2010), Breast cancer (2/2016), Cataract, Chronic pain, Deep vein thrombosis, Dry eyes, Esophagitis, unspecified, Fibromyalgia, General anesthetics causing adverse effect in therapeutic use, Heart murmur, History of colon polyps, Hyperlipidemia, Hypopituitary dwarfism, Iron deficiency anemia, Lupus, Migraines, neuralgic, Nonspecific ulcerative colitis, OP (osteoporosis), Osteopenia, Pancreatic cyst, Schatzki's ring, Steroid sulfatase deficiency, Ulcerative colitis, and Vitamin D deficiency disease.     Surgical History: Efe Horowitz  has a past surgical history that includes restorative proctectomy; total abdominal colectomy with ileostomy (2010); Cholecystectomy; Hysterectomy; Oophorectomy (Bilateral); Appendectomy; Breast biopsy (Right, 2/2016); Colon surgery; Breast surgery; Total Reduction Mammoplasty (Left, 2017); Upper endoscopic ultrasound w/ FNA; Endoscopic ultrasound of upper gastrointestinal tract (N/A, 8/6/2018); Esophagogastroduodenoscopy (N/A, 12/10/2018); Flexible sigmoidoscopy (N/A, 12/10/2018); Mastectomy; Breast reconstruction; Total colectomy; Esophagogastroduodenoscopy (N/A,  "12/30/2021); and Anorectal manometry (N/A, 1/13/2022).    Medications: Efe has a current medication list which includes the following prescription(s): albuterol, alirocumab, amlodipine, atorvastatin, creon, dexamethasone, diclofenac sodium, ergocalciferol, gabapentin, hydroxychloroquine, hydroxyzine hcl, lidocaine, loperamide, losartan, multivitamin/iron/folic acid, nebivolol, niacin, omeprazole, ondansetron, oxycodone-acetaminophen, polycarbophil, potassium chloride sa, prednisone, repatha sureclick, restasis, rizatriptan, rosuvastatin, savella, and zolpidem.    Allergies:   Review of patient's allergies indicates:   Allergen Reactions    Aspirin      Other reaction(s): "hemorrhage"  hemorrhage    Hydrocortisone Nausea Only     Other reaction(s): sick  sick    Lactose intolerance (lactase) [lactase] Nausea And Vomiting    Vicodin [hydrocodone-acetaminophen]      Other reaction(s): hyperactivity    Asacol [mesalamine] Hallucinations     Other reaction(s): Hallucinations  hallucinations      Prior Therapy/Previous treatment included: none  Social History:  lives with their family - mom and dad and her son  Current exercise: walking, stretching, stairs, likes to do pool but its still closed  Occupation: not employed  Prior Level of Function: 3 BMs/day, no pain  Current Level of Function: increased frequency of BMs impacting participation in ADLs, increased frequency of FI impacting participation in ADLs and social activities    Types of fluid intake: water ~120 ounces and coffee - 2 cups with powdered creamer  Diet: minimal dairy due to lactose intolerance, (B) yogurt, toast, boiled egg; (L) chicken breast with veg and tomato salad; (D) fish, meat, soup  Habitus: well developed, well nourished     Abuse/Neglect: No   History of disordered eating: No   History of anxiety/depression: No     Pts goals: to have decreased frequency of BMs, less FI, to be more physically comfortable and have complete BMs, to have " decreased pain    OBJECTIVE     See EMR under MEDIA for written consent provided 2/14/2022  Chaperone: Declined    ORTHO SCREEN  Posture in sitting: slouched   Posture in standing: WNL  Pelvic alignment: no sign of deviations noted in supine   SLS: NT  Lumbar ROM: NT  Pubic symphysis: NT    ABDOMINAL WALL ASSESSMENT  Palpation: tender  Abdominal strength: poor, dysfunctional activation pattern  Scarring: transverse incision, lap scars  Pelvic Floor Muscle and Transverse Abdominus Synergy: absent  Diastasis: absent      BREATHING MECHANICS ASSESSMENT   Thorax Assessment During Quiet Respiration: Decreased excursion of abdominal wall  and Decreased excursion bilaterally of lateral ribs   Thorax Assessment During Deep Respiration: Decreased excursion of abdominal wall  and Decreased excursion bilaterally of lateral ribs     RECTAL PELVIC FLOOR EXAM    EXTERNAL ASSESSMENT  Anus: WNL  Skin condition: WNL   Scarring: none  Anal [place]: NT  Sensation: WNL   Pain: none  Voluntary contraction: present  Voluntary relaxation: present  Involuntary contraction: present  Bearing down: present  Discharge: none   Perineal Descent: absent      INTERNAL ASSESSMENT  Palpation: TTP, scar tissue  EAS tone: hypertonic   Rectal Vault: WNL  Impaction: none   Sensation: able to localized pressure appropriately   Pain: none  Muscle Bulk: overactive  Muscle Power: 3+/5  Muscle Endurance: 10 sec  # Reps To Fatigue: 5    Fast Contractions: 4   Involuntary Contraction: contraction  Bearing Down: excursion    Quality of contraction: WNL   Specificity: WNL  Coordination: WNL     TREATMENT     Treatment Time In: 235  Treatment Time Out: 300  Total Treatment time (time-based codes) separate from Evaluation: 25 minutes    Neuromuscular Re-education to develop Coordination, Control, Down training and Proprioception for 15 minutes including:   bearing down with abdominal release, diaphragmatic breathing and pelvic floor mm contractions focus on  activation at 3 layers sequentially in isolation and then sequentially    Manual Therapy to develop flexibility and extensibility for 10 minutes including:   trigger point/myofascial release of EAS     Patient Education provided:   general anatomy/physiology of urinary/ bowel  system and benefits of treatment were discussed with the pt. Additionally, anatomy/physiology of pelvic floor, diaphragmatic breathing and proper bearing down techniques were reviewed.     Home Exercises provided:  Written Home Exercises provided: Yes  Exercises were reviewed and Efe was able to demonstrate them prior to the end of the session.    Efe demonstrated good  understanding of the education provided.     See EMR under Patient Instructions for exercises provided 2/14/2022.    Assessment     Efe is a 62 y.o. female referred to outpatient Physical Therapy with a medical diagnosis of pelvic floor dysfunction. Pt presents with poor trunk stability, increased tension of the pelvic muscles, dysfunctional defecation and unable to co-contract or co-relax abdominal wall and pelvic floor muscles. Pelvic floor mm function is good, strength, endurance coordination all fair to good and able to lengthen with strain maneuver. She does have some scar tissue in proximal anal canal which may be impacting ability of mm to relax/lengthen enough. Provided strategies for improved lengthening with strain maneuver which she was able to perform very well.     Pt prognosis is Excellent  Pt will benefit from skilled outpatient Physical Therapy to address the deficits stated above and in the chart below, provide pt/family education, and to maximize pt's level of independence.     Plan of care discussed with patient: Yes  Pt's spiritual, cultural and educational needs considered and patient is agreeable to the plan of care and goals as stated below:     Anticipated Barriers for therapy: None    Medical Necessity is demonstrated by the  following:    History  Co-morbidities and personal factors that may impact the plan of care Co-morbidities   prior abdominal surgery, prior pelvic surgery, ulcerative colitis and fibromyalgia    Personal Factors  no deficits     high   Examination  Body structures and functions, activity limitations and participation restrictions that may impact the plan of care Body Regions/Systems/Functions:  poor trunk stability, increased tension of the pelvic muscles, dysfunctional defecation and unable to co-contract or co-relax abdominal wall and pelvic floor muscles    Activity Limitations:  bearing down for BM, incontinence with ADLs and Pain with ADLs    Participation Restrictions:  all ADLs/iADLs uninterrupted by fecal incontinence/urgency/frequency and ADL participation affected by pain    Activity limitations:   Learning and applying knowledge  No deficits    General Tasks and Commands  No deficits    Communication  No deficits    Mobility  No deficits    Self care  No deficits    Domestic Life  No deficits    Interactions/Relationships  No deficits    Life Areas  No deficits    Community and Social Life  No deficits       high   Clinical Presentation unstable clinical presentation with unpredictable characteristics high   Decision Making/ Complexity Score: high       Goals:  Short Term Goals: 4 weeks   Pt indep in HEP  Pt able to lengthen pelvic floor mm appropriately for BM without strain    Long Term Goals: 8 weeks   Pt indep in progressive HEP  Pt reports 0 episodes of fecal leakage in at least 4 weeks for improved ADL participation  Pt reports improved stool consistency at least Type 5 or higher on [place] stool scale with 75% of BMs    Plan     Plan of care Certification: 2/14/2022 to 5/12/22.    Outpatient Physical Therapy 1 times weekly for 12 weeks to include the following interventions: therapeutic exercises, therapeutic activity, neuromuscular re-education, manual therapy, patient/family education and self  care/home management    Aaliyah Bentley, PT

## 2022-02-16 ENCOUNTER — HOSPITAL ENCOUNTER (OUTPATIENT)
Facility: HOSPITAL | Age: 62
Discharge: HOME | End: 2022-02-16
Attending: INTERNAL MEDICINE | Admitting: INTERNAL MEDICINE
Payer: MEDICARE

## 2022-02-16 ENCOUNTER — ANESTHESIA (OUTPATIENT)
Dept: ENDOSCOPY | Facility: HOSPITAL | Age: 62
End: 2022-02-16
Payer: MEDICARE

## 2022-02-16 ENCOUNTER — ANESTHESIA EVENT (OUTPATIENT)
Dept: ENDOSCOPY | Facility: HOSPITAL | Age: 62
End: 2022-02-16
Payer: MEDICARE

## 2022-02-16 VITALS
WEIGHT: 123 LBS | HEIGHT: 63 IN | SYSTOLIC BLOOD PRESSURE: 118 MMHG | BODY MASS INDEX: 21.79 KG/M2 | OXYGEN SATURATION: 99 % | RESPIRATION RATE: 16 BRPM | HEART RATE: 67 BPM | DIASTOLIC BLOOD PRESSURE: 75 MMHG | TEMPERATURE: 98 F

## 2022-02-16 DIAGNOSIS — R93.5 ABNORMAL CT OF THE ABDOMEN: ICD-10-CM

## 2022-02-16 PROCEDURE — 44386 ENDOSCOPY BOWEL POUCH/BIOP: CPT | Mod: HCNC,,, | Performed by: INTERNAL MEDICINE

## 2022-02-16 PROCEDURE — 37000009 HC ANESTHESIA EA ADD 15 MINS: Mod: HCNC | Performed by: INTERNAL MEDICINE

## 2022-02-16 PROCEDURE — 25000003 PHARM REV CODE 250: Mod: HCNC | Performed by: INTERNAL MEDICINE

## 2022-02-16 PROCEDURE — 88305 TISSUE EXAM BY PATHOLOGIST: ICD-10-PCS | Mod: 26,HCNC,, | Performed by: PATHOLOGY

## 2022-02-16 PROCEDURE — 44386 PR ENDOSCOPY, BOWEL POUCH, BIOPSY: ICD-10-PCS | Mod: HCNC,,, | Performed by: INTERNAL MEDICINE

## 2022-02-16 PROCEDURE — 88342 CHG IMMUNOCYTOCHEMISTRY: ICD-10-PCS | Mod: 26,HCNC,, | Performed by: PATHOLOGY

## 2022-02-16 PROCEDURE — 88305 TISSUE EXAM BY PATHOLOGIST: CPT | Mod: 26,HCNC,, | Performed by: PATHOLOGY

## 2022-02-16 PROCEDURE — 88342 IMHCHEM/IMCYTCHM 1ST ANTB: CPT | Mod: HCNC | Performed by: PATHOLOGY

## 2022-02-16 PROCEDURE — 25000003 PHARM REV CODE 250: Mod: HCNC | Performed by: NURSE ANESTHETIST, CERTIFIED REGISTERED

## 2022-02-16 PROCEDURE — 88305 TISSUE EXAM BY PATHOLOGIST: CPT | Mod: 59,HCNC | Performed by: PATHOLOGY

## 2022-02-16 PROCEDURE — 44386 ENDOSCOPY BOWEL POUCH/BIOP: CPT | Mod: HCNC | Performed by: INTERNAL MEDICINE

## 2022-02-16 PROCEDURE — 37000008 HC ANESTHESIA 1ST 15 MINUTES: Mod: HCNC | Performed by: INTERNAL MEDICINE

## 2022-02-16 PROCEDURE — 88342 IMHCHEM/IMCYTCHM 1ST ANTB: CPT | Mod: 26,HCNC,, | Performed by: PATHOLOGY

## 2022-02-16 PROCEDURE — 27201012 HC FORCEPS, HOT/COLD, DISP: Mod: HCNC | Performed by: INTERNAL MEDICINE

## 2022-02-16 PROCEDURE — E9220 PRA ENDO ANESTHESIA: ICD-10-PCS | Mod: HCNC,,, | Performed by: NURSE ANESTHETIST, CERTIFIED REGISTERED

## 2022-02-16 PROCEDURE — 63600175 PHARM REV CODE 636 W HCPCS: Mod: HCNC | Performed by: NURSE ANESTHETIST, CERTIFIED REGISTERED

## 2022-02-16 PROCEDURE — E9220 PRA ENDO ANESTHESIA: HCPCS | Mod: HCNC,,, | Performed by: NURSE ANESTHETIST, CERTIFIED REGISTERED

## 2022-02-16 RX ORDER — SODIUM CHLORIDE 9 MG/ML
INJECTION, SOLUTION INTRAVENOUS CONTINUOUS
Status: DISCONTINUED | OUTPATIENT
Start: 2022-02-16 | End: 2022-02-16 | Stop reason: HOSPADM

## 2022-02-16 RX ORDER — DEXAMETHASONE SODIUM PHOSPHATE 4 MG/ML
INJECTION, SOLUTION INTRA-ARTICULAR; INTRALESIONAL; INTRAMUSCULAR; INTRAVENOUS; SOFT TISSUE
Status: DISCONTINUED | OUTPATIENT
Start: 2022-02-16 | End: 2022-02-16

## 2022-02-16 RX ORDER — LIDOCAINE HYDROCHLORIDE 20 MG/ML
INJECTION INTRAVENOUS
Status: DISCONTINUED | OUTPATIENT
Start: 2022-02-16 | End: 2022-02-16

## 2022-02-16 RX ORDER — PROPOFOL 10 MG/ML
VIAL (ML) INTRAVENOUS
Status: DISCONTINUED | OUTPATIENT
Start: 2022-02-16 | End: 2022-02-16

## 2022-02-16 RX ORDER — ONDANSETRON 2 MG/ML
4 INJECTION INTRAMUSCULAR; INTRAVENOUS DAILY PRN
Status: CANCELLED | OUTPATIENT
Start: 2022-02-16

## 2022-02-16 RX ADMIN — LIDOCAINE HYDROCHLORIDE 80 MG: 20 INJECTION, SOLUTION INTRAVENOUS at 08:02

## 2022-02-16 RX ADMIN — DEXAMETHASONE SODIUM PHOSPHATE 8 MG: 4 INJECTION, SOLUTION INTRAMUSCULAR; INTRAVENOUS at 08:02

## 2022-02-16 RX ADMIN — Medication 200 MCG/KG/MIN: at 08:02

## 2022-02-16 RX ADMIN — PROPOFOL 80 MG: 10 INJECTION, EMULSION INTRAVENOUS at 08:02

## 2022-02-16 RX ADMIN — SODIUM CHLORIDE: 0.9 INJECTION, SOLUTION INTRAVENOUS at 07:02

## 2022-02-16 NOTE — ANESTHESIA PREPROCEDURE EVALUATION
"                                                                                                             02/16/2022  Efe Horowitz is a 62 y.o., female.  Pre-operative evaluation for Procedure(s) (LRB):  ILEOSCOPY (N/A)        Patient Active Problem List   Diagnosis    Iron deficiency anemia    Vitamin D deficiency disease    Vitamin B12 deficiency    Mixed hyperlipidemia    Lumbar and sacral spondylarthritis    Migraine syndrome    Secondary adrenal insufficiency    Ulcerative colitis    S/P total colectomy    GERD (gastroesophageal reflux disease)    Hypokalemia    Myalgia and myositis    Trochanteric bursitis    Shoulder pain    Long term current use of systemic steroids    Fibromyalgia    Lupus (systemic lupus erythematosus)    Microhematuria    Bilateral low back pain without sciatica    Proteinuria    Immunosuppression    Malignant neoplasm of upper-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    Exocrine pancreatic insufficiency    Family history of pancreatic cancer    Age-related osteoporosis with current pathological fracture with delayed healing    Abnormal gait    Pancreas cyst    Pain in both hands    Pain in both feet    Nuclear sclerosis of both eyes    Long-term use of Plaquenil    Subclinical hyperthyroidism    Benign essential hypertension    Refractive error    Primary insomnia    Lack of coordination       Review of patient's allergies indicates:   Allergen Reactions    Aspirin      Other reaction(s): "hemorrhage"  hemorrhage    Hydrocortisone Nausea Only     Other reaction(s): sick  sick    Lactose intolerance (lactase) [lactase] Nausea And Vomiting    Vicodin [hydrocodone-acetaminophen]      Other reaction(s): hyperactivity    Asacol [mesalamine] Hallucinations     Other reaction(s): Hallucinations  hallucinations        No current facility-administered medications on file prior to encounter.     Current Outpatient Medications on File Prior to " Encounter   Medication Sig Dispense Refill    alirocumab (PRALUENT PEN) 75 mg/mL PnIj Inject 75 mg into the skin every 14 (fourteen) days.      amLODIPine (NORVASC) 2.5 MG tablet Take 2.5 mg by mouth once daily.      CREON 36,000-114,000- 180,000 unit CpDR TAKE TWO CAPSULES BY MOUTH THREE TIMES DAILY WITH MEALS AND ONE CAPSULE WITH EACH SNACK 180 capsule 7    ergocalciferol (VITAMIN D2) 50,000 unit Cap Take 1 capsule (50,000 Units total) by mouth twice a week. Every Monday and Friday 24 capsule 3    gabapentin (NEURONTIN) 300 MG capsule TAKE 1 CAPSULE BY MOUTH EVERY EVENING 90 capsule 3    multivitamin/iron/folic acid (CENTRUM WOMEN ORAL) Take 1 tablet by mouth once daily.      nebivoloL (BYSTOLIC) 10 MG Tab Take 1 tablet by mouth once daily.      niacin 100 MG Tab Take by mouth. 1 Tablet Oral At bedtime      omeprazole (PRILOSEC) 20 MG capsule TAKE 1 CAPSULE BY MOUTH TWICE A  capsule 3    polycarbophil (FIBERCON) 625 mg tablet Take by mouth. 1 Tablet Oral Every 12 hours.  Take with 12 ounces of water with each pill.      potassium chloride SA (K-DUR,KLOR-CON) 10 MEQ tablet TAKE TWO TABLETS BY MOUTH ONCE DAILY 180 tablet 3    predniSONE (DELTASONE) 5 MG tablet Take 1 tablet (5 mg total) by mouth once daily. Double the dose in times of illness 100 tablet 3    rizatriptan (MAXALT) 10 MG tablet Take 1 tablet (10 mg total) by mouth every 6 (six) hours as needed for Migraine. 60 tablet 0    SAVELLA 100 mg Tab TAKE 1 TABLET BY MOUTH EVERY DAY 90 tablet 3    zolpidem (AMBIEN) 5 MG Tab Take 1 tablet (5 mg total) by mouth nightly as needed (insomnia). 30 pills to last 5-6 months.  No refills until follow up visit. 30 tablet 0    albuterol (ACCUNEB) 0.63 mg/3 mL Nebu Take 3 mLs (0.63 mg total) by nebulization every 6 (six) hours as needed. 3 mL 6    dexamethasone (DECADRON) 4 mg/mL injection Inject 1 mL (4 mg total) into the muscle daily as needed (Signs and symtpoms of severe adrenal insufficiency). 1  mL 1    diclofenac sodium (VOLTAREN) 1 % Gel APPLY 2 GRAMS TO AFFECTED AREA 4 TIMES A  g 2    lidocaine (LIDODERM) 5 % Place 1 patch onto the skin once daily. Remove & Discard patch within 12 hours or as directed by MD 30 patch 12    loperamide (IMODIUM) 2 mg capsule TAKE 1 TO 2 CAPSULES BY MOUTH FOUR TIMES DAILY 240 capsule 3    ondansetron (ZOFRAN) 4 MG tablet Take 1 tablet (4 mg total) by mouth every 12 (twelve) hours as needed for Nausea. 30 tablet 6    oxyCODONE-acetaminophen (PERCOCET) 5-325 mg per tablet Take 1 tablet by mouth every 12 (twelve) hours as needed for Pain. 60 each 0    REPATHA SURECLICK 140 mg/mL PnIj INJECT 140 MG INTO THE SKIN EVERY 14 (FOURTEEN) DAYS      RESTASIS 0.05 % ophthalmic emulsion INSTILL 1 DROP INTO BOTH EYES TWICE A  vial 3       Past Surgical History:   Procedure Laterality Date    ANORECTAL MANOMETRY N/A 1/13/2022    Procedure: MANOMETRY, ANORECTAL;  Surgeon: First Daniella Gastelum;  Location: [facility] (4TH FLR);  Service: Endoscopy;  Laterality: N/A;  new order placed; original order placed incorrectly  1/7 instructions handed to patient-st    APPENDECTOMY      BREAST BIOPSY Right 2/2016    [facility]    BREAST RECONSTRUCTION      BREAST SURGERY      CHOLECYSTECTOMY      COLON SURGERY      ENDOSCOPIC ULTRASOUND OF UPPER GASTROINTESTINAL TRACT N/A 8/6/2018    Procedure: ULTRASOUND, ENDOSCOPIC, UPPER GI TRACT;  Surgeon: Hong Finn MD;  Location: [facility] (2ND FLR);  Service: Endoscopy;  Laterality: N/A;    ESOPHAGOGASTRODUODENOSCOPY N/A 12/10/2018    Procedure: EGD (ESOPHAGOGASTRODUODENOSCOPY);  Surgeon: Reese Villalta MD;  Location: [facility] (4TH FLR);  Service: Endoscopy;  Laterality: N/A;    ESOPHAGOGASTRODUODENOSCOPY N/A 12/30/2021    Procedure: EGD (ESOPHAGOGASTRODUODENOSCOPY);  Surgeon: Reese Villalta MD;  Location: [facility] (2ND FLR);  Service: Endoscopy;  Laterality: N/A;  EGD for esophageal dysphagia and early satiety please  schedule 1st provider available. wants this date-Dr Villalta only     fully vaccinated-GT  hx of adrenal insuffiency   12/27 arrival time confirmed with pt-rb    FLEXIBLE SIGMOIDOSCOPY N/A 12/10/2018    Procedure: SIGMOIDOSCOPY, FLEXIBLE;  Surgeon: Reese Villalta MD;  Location: [facility] ([address] [place] [address]);  Service: Endoscopy;  Laterality: N/A;  Recommend full split bowel prep for this study just like for a colonoscopy    HYSTERECTOMY      MASTECTOMY      OOPHORECTOMY Bilateral     restorative proctectomy      total abdominal colectomy with ileostomy  2010    TOTAL COLECTOMY      TOTAL REDUCTION MAMMOPLASTY Left 2017    UPPER ENDOSCOPIC ULTRASOUND W/ FNA         Social History     Socioeconomic History    Marital status: Single    Number of children: 1    Years of education: 12 + 2   Occupational History    Occupation: disabled due to UC   Tobacco Use    Smoking status: Never Smoker    Smokeless tobacco: Never Used   Substance and Sexual Activity    Alcohol use: No     Alcohol/week: 0.0 standard drinks    Drug use: No    Sexual activity: Not Currently     Partners: Male   Social History Narrative    Adult Screenings updated and reviewed  6/23/14    Mammogram( for females) last done  10/4/2013    Pap ( for females)s/p hysterectomy    Colonoscopy - Dr Carreno- done for  2013    Flu shot 10/2/2013     Td 2005 due again  2015    Pneumovax done 2008, updated  Today  6/23/14    Zostavax recommended one time at  age  58    Eye exam done 2014    Bone density  10/19/2013 History of reclast due in one year.         Vital Signs Range (Last 24H):  Temp:  [36.4 °C (97.5 °F)]   Pulse:  [73]   Resp:  [18]   BP: (130)/(74)   SpO2:  [98 %]       CBC: No results for input(s): WBC, RBC, HGB, HCT, PLT, MCV, MCH, MCHC in the last 72 hours.    CMP: No results for input(s): NA, K, CL, CO2, BUN, CREATININE, GLU, MG, PHOS, CALCIUM, ALBUMIN, PROT, ALKPHOS, ALT, AST, BILITOT in the last 72 hours.    INR  No results for input(s):  PT, INR, PROTIME, APTT in the last 72 hours.            Anesthesia Evaluation    I have reviewed the Patient Summary Reports.    I have reviewed the Nursing Notes. I have reviewed the NPO Status.   I have reviewed the Medications.   Prednisone, Decadron    Review of Systems  Anesthesia Hx:  Hx of Anesthetic complications Pt states if not given steroids she will have delayed emergence from anesthesia History of prior surgery of interest to airway management or planning: Denies Family Hx of Anesthesia complications.  Personal Hx of Anesthesia complications Slow To Awaken/Delayed Emergence (pt states if not given steroids, even for minor procedures, she will take a long time to recover) and mild, somewhat sensitive to sedation / narcotics   Social:  No Alcohol Use, Non-Smoker    Hematology/Oncology:  Hematology Normal   Oncology Normal     EENT/Dental:EENT/Dental Normal   Cardiovascular:   Hypertension    Pulmonary:   Asthma    Renal/:  Renal/ Normal     Hepatic/GI:   PUD, GERD    Musculoskeletal:   Lupus and fibromyalgia   Neurological:   Headaches    Endocrine:   Hyperthyroidism Adrenal insufficiency   Dermatological:  Skin Normal    Psych:  Psychiatric Normal           Physical Exam  General:  Well nourished    Airway/Jaw/Neck:  Airway Findings: Mouth Opening: Normal Tongue: Normal  General Airway Assessment: Adult  Mallampati: III  Improves to II with phonation.  TM Distance: Normal, at least 6 cm      Dental:  Dental Findings: Periodontal disease, Severe    Chest/Lungs:  Chest/Lungs Findings: Clear to auscultation, Normal Respiratory Rate     Heart/Vascular:  Heart Findings: Rate: Normal  Rhythm: Regular Rhythm  Sounds: Normal        Mental Status:  Mental Status Findings:  Cooperative, Alert and Oriented         Anesthesia Plan  Type of Anesthesia, risks & benefits discussed:  Anesthesia Type:  general    Patient's Preference:   Plan Factors:          Intra-op Monitoring Plan: standard ASA monitors  Intra-op  Monitoring Plan Comments:   Post Op Pain Control Plan: multimodal analgesia and per primary service following discharge from PACU  Post Op Pain Control Plan Comments:     Induction:   IV  Beta Blocker:  Patient is not currently on a Beta-Blocker (No further documentation required).       Informed Consent: Patient understands risks and agrees with Anesthesia plan.  Questions answered. Anesthesia consent signed with patient.  ASA Score: 3     Day of Surgery Review of History & Physical:    H&P update referred to the provider.         Ready For Surgery From Anesthesia Perspective.

## 2022-02-16 NOTE — ANESTHESIA POSTPROCEDURE EVALUATION
Anesthesia Post Evaluation    Patient: Efe Horowitz    Procedure(s) Performed: Procedure(s) (LRB):  ILEOSCOPY (N/A)    Final Anesthesia Type: general      Patient location during evaluation: PACU  Patient participation: Yes- Able to Participate  Level of consciousness: awake and alert  Post-procedure vital signs: reviewed and stable  Pain management: adequate  Airway patency: patent    PONV status at discharge: No PONV  Anesthetic complications: no      Cardiovascular status: blood pressure returned to baseline  Respiratory status: unassisted, spontaneous ventilation and room air  Hydration status: euvolemic  Follow-up not needed.          Vitals Value Taken Time   /75 02/16/22 0850   Temp 36.5 °C (97.7 °F) 02/16/22 0823   Pulse 67 02/16/22 0850   Resp 16 02/16/22 0850   SpO2 99 % 02/16/22 0850         Event Time   Out of Recovery 08:55:08         Pain/Natalie Score: Natalie Score: 10 (2/16/2022  8:38 AM)

## 2022-02-16 NOTE — H&P
"    Short Stay Endoscopy History and Physical    PCP - Shonda Anguiano MD    Procedure - Colonoscopy  ASA - 2  Mallampati - per anesthesia  History of Anesthesia problems - no  Family history Anesthesia problems -  no     HPI:  This is a 62 y.o. female here for evaluation of :     Average Risk Screening: No  High risk screening: No  History of polyps: No  Anemia: No  Blood in stools: No  Diarrhea: No  Abdominal Pain: No  Other: Abnormal CT, bowel issues    Review of Systems:  CONSTITUTIONAL: Denies weight change,  fatigue, fevers, chills, night sweats.  CARDIOVASCULAR: Denies chest pain, shortness of breath, orthopnea and edema.  RESPIRATORY: Denies cough, hemoptysis, dyspnea, and wheezing.  GI: See HPI.    Medical History:  Past Medical History:   Diagnosis Date    Acid reflux     Adrenal insufficiency, primary     Arthritis     Asthma     B12 deficiency anemia     Benign essential hypertension 2/7/2021    Blood transfusion 2010    Breast cancer 2/2016    Right breast infiltrating ductal CA    Cataract     Chronic pain     Deep vein thrombosis     Dry eyes     Esophagitis, unspecified     Fibromyalgia     General anesthetics causing adverse effect in therapeutic use     "slow to wake up bc I don't have an immune system    Heart murmur     History of colon polyps     Hyperlipidemia     Hypopituitary dwarfism     Iron deficiency anemia     Lupus     Migraines, neuralgic     Nonspecific ulcerative colitis     OP (osteoporosis)     history of reclast    Osteopenia     Pancreatic cyst     Schatzki's ring     Steroid sulfatase deficiency     Ulcerative colitis     Vitamin D deficiency disease        Surgical History:   Past Surgical History:   Procedure Laterality Date    ANORECTAL MANOMETRY N/A 1/13/2022    Procedure: MANOMETRY, ANORECTAL;  Surgeon: First Available Stu Gastelum;  Location: [address]);  Service: Endoscopy;  Laterality: N/A;  new order placed; original order placed " incorrectly  1/7 instructions handed to patient-st    APPENDECTOMY      BREAST BIOPSY Right 2/2016    [facility]    BREAST RECONSTRUCTION      BREAST SURGERY      CHOLECYSTECTOMY      COLON SURGERY      ENDOSCOPIC ULTRASOUND OF UPPER GASTROINTESTINAL TRACT N/A 8/6/2018    Procedure: ULTRASOUND, ENDOSCOPIC, UPPER GI TRACT;  Surgeon: Hong Finn MD;  Location: [facility] (2ND FLR);  Service: Endoscopy;  Laterality: N/A;    ESOPHAGOGASTRODUODENOSCOPY N/A 12/10/2018    Procedure: EGD (ESOPHAGOGASTRODUODENOSCOPY);  Surgeon: Reese Villalat MD;  Location: [facility] (4TH FLR);  Service: Endoscopy;  Laterality: N/A;    ESOPHAGOGASTRODUODENOSCOPY N/A 12/30/2021    Procedure: EGD (ESOPHAGOGASTRODUODENOSCOPY);  Surgeon: Reese Villalta MD;  Location: [facility] (2ND FLR);  Service: Endoscopy;  Laterality: N/A;  EGD for esophageal dysphagia and early satiety please schedule 1st provider available. wants this date-Dr Villalta only     fully vaccinated-GT  hx of adrenal insuffiency   12/27 arrival time confirmed with pt-rb    FLEXIBLE SIGMOIDOSCOPY N/A 12/10/2018    Procedure: SIGMOIDOSCOPY, FLEXIBLE;  Surgeon: Reese Villalta MD;  Location: [facility] (4TH FLR);  Service: Endoscopy;  Laterality: N/A;  Recommend full split bowel prep for this study just like for a colonoscopy    HYSTERECTOMY      MASTECTOMY      OOPHORECTOMY Bilateral     restorative proctectomy      total abdominal colectomy with ileostomy  2010    TOTAL COLECTOMY      TOTAL REDUCTION MAMMOPLASTY Left 2017    UPPER ENDOSCOPIC ULTRASOUND W/ FNA         Family History:   Family History   Problem Relation Age of Onset    Diabetes Father     Hyperlipidemia Father     Hypertension Father     Hyperlipidemia Mother     Cancer Maternal Aunt         pancreatic cancer, late 50s at dx    Pancreatic cancer Maternal Aunt     Breast cancer Maternal Aunt 55    Breast cancer Cousin 28    Breast cancer Other 45    No Known Problems Sister     No  Known Problems Brother     Cancer Maternal Uncle 65        pancreatic cancer    Pancreatic cancer Maternal Uncle     No Known Problems Paternal Aunt     No Known Problems Paternal Uncle     No Known Problems Maternal Grandmother     No Known Problems Maternal Grandfather     No Known Problems Paternal Grandmother     No Known Problems Paternal Grandfather     Breast cancer Maternal Cousin 50    Cancer Other 25        liver cancer    Amblyopia Neg Hx     Blindness Neg Hx     Cataracts Neg Hx     Macular degeneration Neg Hx     Retinal detachment Neg Hx     Strabismus Neg Hx     Stroke Neg Hx     Thyroid disease Neg Hx     Glaucoma Neg Hx     Ovarian cancer Neg Hx     Celiac disease Neg Hx     Colon cancer Neg Hx     Colon polyps Neg Hx     Esophageal cancer Neg Hx     Liver cancer Neg Hx     Rectal cancer Neg Hx     Stomach cancer Neg Hx     Ulcerative colitis Neg Hx     Inflammatory bowel disease Neg Hx        Social History:   Social History     Tobacco Use    Smoking status: Never Smoker    Smokeless tobacco: Never Used   Substance Use Topics    Alcohol use: No     Alcohol/week: 0.0 standard drinks    Drug use: No       Allergies: Reviewed.    Medications:  No current facility-administered medications on file prior to encounter.     Current Outpatient Medications on File Prior to Encounter   Medication Sig Dispense Refill    alirocumab (PRALUENT PEN) 75 mg/mL PnIj Inject 75 mg into the skin every 14 (fourteen) days.      amLODIPine (NORVASC) 2.5 MG tablet Take 2.5 mg by mouth once daily.      CREON 36,000-114,000- 180,000 unit CpDR TAKE TWO CAPSULES BY MOUTH THREE TIMES DAILY WITH MEALS AND ONE CAPSULE WITH EACH SNACK 180 capsule 7    ergocalciferol (VITAMIN D2) 50,000 unit Cap Take 1 capsule (50,000 Units total) by mouth twice a week. Every Monday and Friday 24 capsule 3    gabapentin (NEURONTIN) 300 MG capsule TAKE 1 CAPSULE BY MOUTH EVERY EVENING 90 capsule 3     multivitamin/iron/folic acid (CENTRUM WOMEN ORAL) Take 1 tablet by mouth once daily.      nebivoloL (BYSTOLIC) 10 MG Tab Take 1 tablet by mouth once daily.      niacin 100 MG Tab Take by mouth. 1 Tablet Oral At bedtime      omeprazole (PRILOSEC) 20 MG capsule TAKE 1 CAPSULE BY MOUTH TWICE A  capsule 3    polycarbophil (FIBERCON) 625 mg tablet Take by mouth. 1 Tablet Oral Every 12 hours.  Take with 12 ounces of water with each pill.      potassium chloride SA (K-DUR,KLOR-CON) 10 MEQ tablet TAKE TWO TABLETS BY MOUTH ONCE DAILY 180 tablet 3    predniSONE (DELTASONE) 5 MG tablet Take 1 tablet (5 mg total) by mouth once daily. Double the dose in times of illness 100 tablet 3    rizatriptan (MAXALT) 10 MG tablet Take 1 tablet (10 mg total) by mouth every 6 (six) hours as needed for Migraine. 60 tablet 0    SAVELLA 100 mg Tab TAKE 1 TABLET BY MOUTH EVERY DAY 90 tablet 3    zolpidem (AMBIEN) 5 MG Tab Take 1 tablet (5 mg total) by mouth nightly as needed (insomnia). 30 pills to last 5-6 months.  No refills until follow up visit. 30 tablet 0    albuterol (ACCUNEB) 0.63 mg/3 mL Nebu Take 3 mLs (0.63 mg total) by nebulization every 6 (six) hours as needed. 3 mL 6    dexamethasone (DECADRON) 4 mg/mL injection Inject 1 mL (4 mg total) into the muscle daily as needed (Signs and symtpoms of severe adrenal insufficiency). 1 mL 1    diclofenac sodium (VOLTAREN) 1 % Gel APPLY 2 GRAMS TO AFFECTED AREA 4 TIMES A  g 2    lidocaine (LIDODERM) 5 % Place 1 patch onto the skin once daily. Remove & Discard patch within 12 hours or as directed by MD 30 patch 12    loperamide (IMODIUM) 2 mg capsule TAKE 1 TO 2 CAPSULES BY MOUTH FOUR TIMES DAILY 240 capsule 3    ondansetron (ZOFRAN) 4 MG tablet Take 1 tablet (4 mg total) by mouth every 12 (twelve) hours as needed for Nausea. 30 tablet 6    oxyCODONE-acetaminophen (PERCOCET) 5-325 mg per tablet Take 1 tablet by mouth every 12 (twelve) hours as needed for Pain. 60  each 0    REPATHA SURECLICK 140 mg/mL PnIj INJECT 140 MG INTO THE SKIN EVERY 14 (FOURTEEN) DAYS      RESTASIS 0.05 % ophthalmic emulsion INSTILL 1 DROP INTO BOTH EYES TWICE A  vial 3       Physical Exam:  Vital Signs:   Vitals:    02/16/22 0724   BP: 130/74   Pulse: 73   Resp: 18   Temp: 97.5 °F (36.4 °C)     General Appearance: Well appearing in no acute distress  ENT: OP clear  Chest: CTA B  CV: RRR, no m/r/g  Abd: s/nt/nd/nabs  Ext: no edema    Labs:  Reviewed    Imp: Abnormal CT, Pouch issues    Plan:  I have explained the risks and benefits of colonoscopy to the patient including but not limited to bleeding, perforation, infection, and death. The patient wishes to proceed with colonoscopy.

## 2022-02-16 NOTE — PLAN OF CARE
Written and verbal discharge instructions given to patient. Patient verbalizes understanding and has no questions at this time.

## 2022-02-16 NOTE — TRANSFER OF CARE
"Anesthesia Transfer of Care Note    Patient: Efe Horowitz    Procedure(s) Performed: Procedure(s) (LRB):  ILEOSCOPY (N/A)    Patient location: GI    Anesthesia Type: general    Transport from OR: Transported from OR on room air with adequate spontaneous ventilation    Post pain: adequate analgesia    Post assessment: no apparent anesthetic complications    Post vital signs: stable    Level of consciousness: sedated and responds to stimulation    Nausea/Vomiting: no nausea/vomiting    Complications: none    Transfer of care protocol was followed      Last vitals:   Visit Vitals  /74 (BP Location: Left arm, Patient Position: Lying)   Pulse 73   Temp 36.4 °C (97.5 °F) (Oral)   Resp 18   Ht 5' 3" (1.6 m)   Wt 55.8 kg (123 lb)   SpO2 98%   Breastfeeding No   BMI 21.79 kg/m²     "

## 2022-02-16 NOTE — PROVATION PATIENT INSTRUCTIONS
Discharge Summary/Instructions after an Endoscopic Procedure  Patient Name: Efe Horowitz  Patient MRN: [id number]  Patient YOB: 1960 Wednesday, February 16, 2022  Ceferino Rangel MD  Dear patient,  As a result of recent federal legislation (The Federal Cures Act), you may   receive lab or pathology results from your procedure in your [facility]   account before your physician is able to contact you. Your physician or   their representative will relay the results to you with their   recommendations at their soonest availability.  Thank you,  RESTRICTIONS:  During your procedure today, you received medications for sedation.  These   medications may affect your judgment, balance and coordination.  Therefore,   for 24 hours, you have the following restrictions:   - DO NOT drive a car, operate machinery, make legal/financial decisions,   sign important papers or drink alcohol.    ACTIVITY:  Today: no heavy lifting, straining or running due to procedural   sedation/anesthesia.  The following day: return to full activity including work.  DIET:  Eat and drink normally unless instructed otherwise.     TREATMENT FOR COMMON SIDE EFFECTS:  - Mild abdominal pain, nausea, belching, bloating or excessive gas:  rest,   eat lightly and use a heating pad.  - Sore Throat: treat with throat lozenges and/or gargle with warm salt   water.  - Because air was used during the procedure, expelling large amounts of air   from your rectum or belching is normal.  - If a bowel prep was taken, you may not have a bowel movement for 1-3 days.    This is normal.  SYMPTOMS TO WATCH FOR AND REPORT TO YOUR PHYSICIAN:  1. Abdominal pain or bloating, other than gas cramps.  2. Chest pain.  3. Back pain.  4. Signs of infection such as: chills or fever occurring within 24 hours   after the procedure.  5. Rectal bleeding, which would show as bright red, maroon, or black stools.   (A tablespoon of blood from the rectum is not serious, especially  if   hemorrhoids are present.)  6. Vomiting.  7. Weakness or dizziness.  GO DIRECTLY TO THE NEAREST EMERGENCY ROOM IF YOU HAVE ANY OF THE FOLLOWING:      Difficulty breathing              Chills and/or fever over 101 F   Persistent vomiting and/or vomiting blood   Severe abdominal pain   Severe chest pain   Black, tarry stools   Bleeding- more than one tablespoon   Any other symptom or condition that you feel may need urgent attention  Your doctor recommends these additional instructions:  If any biopsies were taken, your doctors clinic will contact you in 1 to 2   weeks with any results.  - Discharge patient to home.   - Resume previous diet today.   - Continue present medications.   - Await pathology results.   - Repeat post-surgical lower GI endoscopy in 2 years for surveillance.   - Return to my office as previously scheduled.   - There were no significant obstructive issues noted in the pouch. Suspect   her symptoms are due to pelvic floor dysfunction. She has started pelvic   floor therapy and we will follow up to assess response.  For questions, problems or results please call your physician - Ceferino Rangel MD at Work:  [phone].  [facility] [place], EMERGENCY ROOM PHONE NUMBER: [phone]  IF A COMPLICATION OR EMERGENCY SITUATION ARISES AND YOU ARE UNABLE TO REACH   YOUR PHYSICIAN - GO DIRECTLY TO THE EMERGENCY ROOM.  Ceferino Rangel MD  2/16/2022 8:25:03 AM  This report has been verified and signed electronically.  Dear patient,  As a result of recent federal legislation (The Federal Cures Act), you may   receive lab or pathology results from your procedure in your [facility]   account before your physician is able to contact you. Your physician or   their representative will relay the results to you with their   recommendations at their soonest availability.  Thank you,  [employer]ATION

## 2022-02-23 ENCOUNTER — OFFICE VISIT (OUTPATIENT)
Dept: INTERNAL MEDICINE | Facility: CLINIC | Age: 62
End: 2022-02-23
Payer: MEDICARE

## 2022-02-23 VITALS
BODY MASS INDEX: 23.17 KG/M2 | OXYGEN SATURATION: 99 % | HEART RATE: 84 BPM | WEIGHT: 130.75 LBS | HEIGHT: 63 IN | DIASTOLIC BLOOD PRESSURE: 68 MMHG | SYSTOLIC BLOOD PRESSURE: 108 MMHG

## 2022-02-23 DIAGNOSIS — G89.4 CHRONIC PAIN SYNDROME: ICD-10-CM

## 2022-02-23 DIAGNOSIS — M47.817 LUMBAR AND SACRAL SPONDYLARTHRITIS: ICD-10-CM

## 2022-02-23 DIAGNOSIS — R73.03 PREDIABETES: ICD-10-CM

## 2022-02-23 DIAGNOSIS — Z79.899 LONG-TERM USE OF PLAQUENIL: ICD-10-CM

## 2022-02-23 DIAGNOSIS — M32.9 SYSTEMIC LUPUS ERYTHEMATOSUS, UNSPECIFIED SLE TYPE, UNSPECIFIED ORGAN INVOLVEMENT STATUS: ICD-10-CM

## 2022-02-23 DIAGNOSIS — K86.81 EXOCRINE PANCREATIC INSUFFICIENCY: ICD-10-CM

## 2022-02-23 DIAGNOSIS — F51.01 PRIMARY INSOMNIA: ICD-10-CM

## 2022-02-23 DIAGNOSIS — I10 BENIGN ESSENTIAL HYPERTENSION: Primary | ICD-10-CM

## 2022-02-23 DIAGNOSIS — K51.918 ULCERATIVE COLITIS WITH OTHER COMPLICATION, UNSPECIFIED LOCATION: ICD-10-CM

## 2022-02-23 DIAGNOSIS — M80.00XG AGE-RELATED OSTEOPOROSIS WITH CURRENT PATHOLOGICAL FRACTURE WITH DELAYED HEALING: ICD-10-CM

## 2022-02-23 DIAGNOSIS — M79.7 FIBROMYALGIA: ICD-10-CM

## 2022-02-23 DIAGNOSIS — Z79.52 LONG TERM CURRENT USE OF SYSTEMIC STEROIDS: ICD-10-CM

## 2022-02-23 DIAGNOSIS — D84.9 IMMUNOSUPPRESSION: ICD-10-CM

## 2022-02-23 DIAGNOSIS — Z17.1 MALIGNANT NEOPLASM OF UPPER-INNER QUADRANT OF RIGHT BREAST IN FEMALE, ESTROGEN RECEPTOR NEGATIVE: ICD-10-CM

## 2022-02-23 DIAGNOSIS — K21.9 GASTROESOPHAGEAL REFLUX DISEASE WITHOUT ESOPHAGITIS: ICD-10-CM

## 2022-02-23 DIAGNOSIS — E87.6 HYPOKALEMIA: ICD-10-CM

## 2022-02-23 DIAGNOSIS — C50.211 MALIGNANT NEOPLASM OF UPPER-INNER QUADRANT OF RIGHT BREAST IN FEMALE, ESTROGEN RECEPTOR NEGATIVE: ICD-10-CM

## 2022-02-23 DIAGNOSIS — D84.9 IMMUNOSUPPRESSED STATUS: ICD-10-CM

## 2022-02-23 DIAGNOSIS — E78.2 MIXED HYPERLIPIDEMIA: ICD-10-CM

## 2022-02-23 DIAGNOSIS — E05.90 SUBCLINICAL HYPERTHYROIDISM: ICD-10-CM

## 2022-02-23 DIAGNOSIS — E27.49 SECONDARY ADRENAL INSUFFICIENCY: ICD-10-CM

## 2022-02-23 LAB
FINAL PATHOLOGIC DIAGNOSIS: NORMAL
GROSS: NORMAL
Lab: NORMAL

## 2022-02-23 PROCEDURE — 99214 PR OFFICE/OUTPT VISIT, EST, LEVL IV, 30-39 MIN: ICD-10-PCS | Mod: S$GLB,,, | Performed by: INTERNAL MEDICINE

## 2022-02-23 PROCEDURE — 3044F HG A1C LEVEL LT 7.0%: CPT | Mod: CPTII,S$GLB,, | Performed by: INTERNAL MEDICINE

## 2022-02-23 PROCEDURE — 3008F BODY MASS INDEX DOCD: CPT | Mod: CPTII,S$GLB,, | Performed by: INTERNAL MEDICINE

## 2022-02-23 PROCEDURE — 99214 OFFICE O/P EST MOD 30 MIN: CPT | Mod: S$GLB,,, | Performed by: INTERNAL MEDICINE

## 2022-02-23 PROCEDURE — 3078F DIAST BP <80 MM HG: CPT | Mod: CPTII,S$GLB,, | Performed by: INTERNAL MEDICINE

## 2022-02-23 PROCEDURE — 4010F ACE/ARB THERAPY RXD/TAKEN: CPT | Mod: CPTII,S$GLB,, | Performed by: INTERNAL MEDICINE

## 2022-02-23 PROCEDURE — 3066F PR DOCUMENTATION OF TREATMENT FOR NEPHROPATHY: ICD-10-PCS | Mod: CPTII,S$GLB,, | Performed by: INTERNAL MEDICINE

## 2022-02-23 PROCEDURE — 4010F PR ACE/ARB THEARPY RXD/TAKEN: ICD-10-PCS | Mod: CPTII,S$GLB,, | Performed by: INTERNAL MEDICINE

## 2022-02-23 PROCEDURE — 3044F PR MOST RECENT HEMOGLOBIN A1C LEVEL <7.0%: ICD-10-PCS | Mod: CPTII,S$GLB,, | Performed by: INTERNAL MEDICINE

## 2022-02-23 PROCEDURE — 3074F PR MOST RECENT SYSTOLIC BLOOD PRESSURE < 130 MM HG: ICD-10-PCS | Mod: CPTII,S$GLB,, | Performed by: INTERNAL MEDICINE

## 2022-02-23 PROCEDURE — 99999 PR PBB SHADOW E&M-EST. PATIENT-LVL V: ICD-10-PCS | Mod: PBBFAC,,, | Performed by: INTERNAL MEDICINE

## 2022-02-23 PROCEDURE — 3074F SYST BP LT 130 MM HG: CPT | Mod: CPTII,S$GLB,, | Performed by: INTERNAL MEDICINE

## 2022-02-23 PROCEDURE — 3066F NEPHROPATHY DOC TX: CPT | Mod: CPTII,S$GLB,, | Performed by: INTERNAL MEDICINE

## 2022-02-23 PROCEDURE — 99499 UNLISTED E&M SERVICE: CPT | Mod: S$GLB,,, | Performed by: INTERNAL MEDICINE

## 2022-02-23 PROCEDURE — 3078F PR MOST RECENT DIASTOLIC BLOOD PRESSURE < 80 MM HG: ICD-10-PCS | Mod: CPTII,S$GLB,, | Performed by: INTERNAL MEDICINE

## 2022-02-23 PROCEDURE — 99999 PR PBB SHADOW E&M-EST. PATIENT-LVL V: CPT | Mod: PBBFAC,,, | Performed by: INTERNAL MEDICINE

## 2022-02-23 PROCEDURE — 99499 RISK ADDL DX/OHS AUDIT: ICD-10-PCS | Mod: S$GLB,,, | Performed by: INTERNAL MEDICINE

## 2022-02-23 PROCEDURE — 3008F PR BODY MASS INDEX (BMI) DOCUMENTED: ICD-10-PCS | Mod: CPTII,S$GLB,, | Performed by: INTERNAL MEDICINE

## 2022-02-23 RX ORDER — OXYCODONE AND ACETAMINOPHEN 5; 325 MG/1; MG/1
1 TABLET ORAL EVERY 12 HOURS PRN
Qty: 60 EACH | Refills: 0 | Status: SHIPPED | OUTPATIENT
Start: 2022-02-23 | End: 2022-02-23

## 2022-02-23 RX ORDER — ZOLPIDEM TARTRATE 5 MG/1
5 TABLET ORAL NIGHTLY PRN
Qty: 30 TABLET | Refills: 5 | Status: SHIPPED | OUTPATIENT
Start: 2022-02-23 | End: 2022-09-06 | Stop reason: SDUPTHER

## 2022-02-23 RX ORDER — OXYCODONE AND ACETAMINOPHEN 5; 325 MG/1; MG/1
1 TABLET ORAL EVERY 12 HOURS PRN
Qty: 60 EACH | Refills: 0 | Status: SHIPPED | OUTPATIENT
Start: 2022-02-23 | End: 2022-12-12 | Stop reason: SDUPTHER

## 2022-02-23 NOTE — PATIENT INSTRUCTIONS
Ask Dr. Jordan if you still need Amlodipine 2.5 mg daily since you are now on Losartan from Dr. Rush.

## 2022-03-10 ENCOUNTER — PATIENT MESSAGE (OUTPATIENT)
Dept: REHABILITATION | Facility: OTHER | Age: 62
End: 2022-03-10
Payer: MEDICARE

## 2022-03-14 ENCOUNTER — LAB VISIT (OUTPATIENT)
Dept: LAB | Facility: HOSPITAL | Age: 62
End: 2022-03-14
Payer: MEDICARE

## 2022-03-14 DIAGNOSIS — E78.2 MIXED HYPERLIPIDEMIA: ICD-10-CM

## 2022-03-14 LAB
CHOLEST SERPL-MCNC: 98 MG/DL (ref 120–199)
CHOLEST/HDLC SERPL: 1.4 {RATIO} (ref 2–5)
HDLC SERPL-MCNC: 69 MG/DL (ref 40–75)
HDLC SERPL: 70.4 % (ref 20–50)
LDLC SERPL CALC-MCNC: 16.4 MG/DL (ref 63–159)
NONHDLC SERPL-MCNC: 29 MG/DL
TRIGL SERPL-MCNC: 63 MG/DL (ref 30–150)

## 2022-03-14 PROCEDURE — 36415 COLL VENOUS BLD VENIPUNCTURE: CPT | Mod: HCNC,PO | Performed by: INTERNAL MEDICINE

## 2022-03-14 PROCEDURE — 80061 LIPID PANEL: CPT | Mod: HCNC | Performed by: INTERNAL MEDICINE

## 2022-03-28 ENCOUNTER — PATIENT MESSAGE (OUTPATIENT)
Dept: RHEUMATOLOGY | Facility: CLINIC | Age: 62
End: 2022-03-28
Payer: MEDICARE

## 2022-03-28 ENCOUNTER — TELEPHONE (OUTPATIENT)
Dept: RHEUMATOLOGY | Facility: CLINIC | Age: 62
End: 2022-03-28
Payer: MEDICARE

## 2022-03-28 NOTE — TELEPHONE ENCOUNTER
I called the patient after the patient was not understanding my replies to let her know what was going on. The patient was offered a 10:30 am appointment as a work in with Dr Jiang for 03/29/2022. The patient responded stating she could not make that appointment that she was coming for 1pm. I advised the patient that I had to wait for a reply from Dr Jiang to not come until I respond to her. I spoke with Dr Jiang and that she would have to come on Thursday at 1030 am. The patient declined stating when I spoke with her that she was in a lot of pain and that Dr Jiang told her that she should message over the portal to be worked an appointment. I simple said that her question were valid questions but that I did not have the answers she was asking. She asked for Dr Jiang to call her today and hung up. I went to my supervisor Ligia to speak with the patient.

## 2022-03-28 NOTE — TELEPHONE ENCOUNTER
----- Message from Brenda Caldwell MA sent at 3/28/2022 10:24 AM CDT -----  Regarding: FW: speak with nurse  Contact: patient    ----- Message -----  From: Irene Delarosa  Sent: 3/28/2022  10:18 AM CDT  To: Chela COWART Staff  Subject: speak with nurse                                 [phone]    please call patient said she is in a lot of pain would like to get in today for a injection or something said she has lupus waiting on a call from the nurse thanks.

## 2022-03-28 NOTE — TELEPHONE ENCOUNTER
-spoke with patient. Patient is in pain and requesting an appointment. 's MA offered patient an appt. Patient declined. Offered patient another appt tomorrow at 1pm. Patient accepted the appt.     Thank you,   Ligia

## 2022-03-29 ENCOUNTER — LAB VISIT (OUTPATIENT)
Dept: LAB | Facility: HOSPITAL | Age: 62
End: 2022-03-29
Attending: INTERNAL MEDICINE
Payer: MEDICARE

## 2022-03-29 ENCOUNTER — OFFICE VISIT (OUTPATIENT)
Dept: RHEUMATOLOGY | Facility: CLINIC | Age: 62
End: 2022-03-29
Payer: MEDICARE

## 2022-03-29 VITALS
SYSTOLIC BLOOD PRESSURE: 133 MMHG | HEIGHT: 64 IN | BODY MASS INDEX: 22.58 KG/M2 | HEART RATE: 79 BPM | DIASTOLIC BLOOD PRESSURE: 73 MMHG | WEIGHT: 132.25 LBS

## 2022-03-29 DIAGNOSIS — M70.62 TROCHANTERIC BURSITIS OF BOTH HIPS: Primary | ICD-10-CM

## 2022-03-29 DIAGNOSIS — D84.9 IMMUNOSUPPRESSION: ICD-10-CM

## 2022-03-29 DIAGNOSIS — R73.03 PREDIABETES: ICD-10-CM

## 2022-03-29 DIAGNOSIS — M32.9 SYSTEMIC LUPUS ERYTHEMATOSUS, UNSPECIFIED SLE TYPE, UNSPECIFIED ORGAN INVOLVEMENT STATUS: ICD-10-CM

## 2022-03-29 DIAGNOSIS — M79.7 FIBROMYALGIA: ICD-10-CM

## 2022-03-29 DIAGNOSIS — M70.61 TROCHANTERIC BURSITIS OF BOTH HIPS: Primary | ICD-10-CM

## 2022-03-29 DIAGNOSIS — Z79.899 LONG-TERM USE OF PLAQUENIL: ICD-10-CM

## 2022-03-29 LAB
ALBUMIN SERPL BCP-MCNC: 3.6 G/DL (ref 3.5–5.2)
ALP SERPL-CCNC: 46 U/L (ref 55–135)
ALT SERPL W/O P-5'-P-CCNC: 13 U/L (ref 10–44)
ANION GAP SERPL CALC-SCNC: 10 MMOL/L (ref 8–16)
AST SERPL-CCNC: 17 U/L (ref 10–40)
BASOPHILS # BLD AUTO: 0.04 K/UL (ref 0–0.2)
BASOPHILS NFR BLD: 0.6 % (ref 0–1.9)
BILIRUB SERPL-MCNC: 0.4 MG/DL (ref 0.1–1)
BUN SERPL-MCNC: 11 MG/DL (ref 8–23)
C3 SERPL-MCNC: 112 MG/DL (ref 50–180)
C4 SERPL-MCNC: 39 MG/DL (ref 11–44)
CALCIUM SERPL-MCNC: 9.6 MG/DL (ref 8.7–10.5)
CHLORIDE SERPL-SCNC: 102 MMOL/L (ref 95–110)
CK SERPL-CCNC: 102 U/L (ref 20–180)
CO2 SERPL-SCNC: 30 MMOL/L (ref 23–29)
CREAT SERPL-MCNC: 0.8 MG/DL (ref 0.5–1.4)
CRP SERPL-MCNC: 4.2 MG/L (ref 0–8.2)
DIFFERENTIAL METHOD: ABNORMAL
EOSINOPHIL # BLD AUTO: 0.2 K/UL (ref 0–0.5)
EOSINOPHIL NFR BLD: 2.1 % (ref 0–8)
ERYTHROCYTE [DISTWIDTH] IN BLOOD BY AUTOMATED COUNT: 14.6 % (ref 11.5–14.5)
ERYTHROCYTE [SEDIMENTATION RATE] IN BLOOD BY WESTERGREN METHOD: 13 MM/HR (ref 0–36)
EST. GFR  (AFRICAN AMERICAN): >60 ML/MIN/1.73 M^2
EST. GFR  (NON AFRICAN AMERICAN): >60 ML/MIN/1.73 M^2
ESTIMATED AVG GLUCOSE: 123 MG/DL (ref 68–131)
GLUCOSE SERPL-MCNC: 85 MG/DL (ref 70–110)
HBA1C MFR BLD: 5.9 % (ref 4–5.6)
HCT VFR BLD AUTO: 38.5 % (ref 37–48.5)
HGB BLD-MCNC: 12 G/DL (ref 12–16)
IMM GRANULOCYTES # BLD AUTO: 0.02 K/UL (ref 0–0.04)
IMM GRANULOCYTES NFR BLD AUTO: 0.3 % (ref 0–0.5)
LYMPHOCYTES # BLD AUTO: 2 K/UL (ref 1–4.8)
LYMPHOCYTES NFR BLD: 28.5 % (ref 18–48)
MCH RBC QN AUTO: 27.2 PG (ref 27–31)
MCHC RBC AUTO-ENTMCNC: 31.2 G/DL (ref 32–36)
MCV RBC AUTO: 87 FL (ref 82–98)
MONOCYTES # BLD AUTO: 0.8 K/UL (ref 0.3–1)
MONOCYTES NFR BLD: 11.2 % (ref 4–15)
NEUTROPHILS # BLD AUTO: 4.1 K/UL (ref 1.8–7.7)
NEUTROPHILS NFR BLD: 57.3 % (ref 38–73)
NRBC BLD-RTO: 0 /100 WBC
PLATELET # BLD AUTO: 315 K/UL (ref 150–450)
PMV BLD AUTO: 10.2 FL (ref 9.2–12.9)
POTASSIUM SERPL-SCNC: 3.3 MMOL/L (ref 3.5–5.1)
PROT SERPL-MCNC: 6.5 G/DL (ref 6–8.4)
RBC # BLD AUTO: 4.41 M/UL (ref 4–5.4)
SODIUM SERPL-SCNC: 142 MMOL/L (ref 136–145)
WBC # BLD AUTO: 7.06 K/UL (ref 3.9–12.7)

## 2022-03-29 PROCEDURE — 3078F PR MOST RECENT DIASTOLIC BLOOD PRESSURE < 80 MM HG: ICD-10-PCS | Mod: CPTII,S$GLB,, | Performed by: INTERNAL MEDICINE

## 2022-03-29 PROCEDURE — 82550 ASSAY OF CK (CPK): CPT | Performed by: INTERNAL MEDICINE

## 2022-03-29 PROCEDURE — 85025 COMPLETE CBC W/AUTO DIFF WBC: CPT | Performed by: INTERNAL MEDICINE

## 2022-03-29 PROCEDURE — 86160 COMPLEMENT ANTIGEN: CPT | Mod: 59 | Performed by: INTERNAL MEDICINE

## 2022-03-29 PROCEDURE — 80053 COMPREHEN METABOLIC PANEL: CPT | Performed by: INTERNAL MEDICINE

## 2022-03-29 PROCEDURE — 3075F SYST BP GE 130 - 139MM HG: CPT | Mod: CPTII,S$GLB,, | Performed by: INTERNAL MEDICINE

## 2022-03-29 PROCEDURE — 1159F MED LIST DOCD IN RCRD: CPT | Mod: CPTII,S$GLB,, | Performed by: INTERNAL MEDICINE

## 2022-03-29 PROCEDURE — 1159F PR MEDICATION LIST DOCUMENTED IN MEDICAL RECORD: ICD-10-PCS | Mod: CPTII,S$GLB,, | Performed by: INTERNAL MEDICINE

## 2022-03-29 PROCEDURE — 86140 C-REACTIVE PROTEIN: CPT | Performed by: INTERNAL MEDICINE

## 2022-03-29 PROCEDURE — 85652 RBC SED RATE AUTOMATED: CPT | Performed by: INTERNAL MEDICINE

## 2022-03-29 PROCEDURE — 36415 COLL VENOUS BLD VENIPUNCTURE: CPT | Mod: PO | Performed by: INTERNAL MEDICINE

## 2022-03-29 PROCEDURE — 86160 COMPLEMENT ANTIGEN: CPT | Performed by: INTERNAL MEDICINE

## 2022-03-29 PROCEDURE — 3066F PR DOCUMENTATION OF TREATMENT FOR NEPHROPATHY: ICD-10-PCS | Mod: CPTII,S$GLB,, | Performed by: INTERNAL MEDICINE

## 2022-03-29 PROCEDURE — 4010F ACE/ARB THERAPY RXD/TAKEN: CPT | Mod: CPTII,S$GLB,, | Performed by: INTERNAL MEDICINE

## 2022-03-29 PROCEDURE — 86225 DNA ANTIBODY NATIVE: CPT | Performed by: INTERNAL MEDICINE

## 2022-03-29 PROCEDURE — 20610 DRAIN/INJ JOINT/BURSA W/O US: CPT | Mod: 50,S$GLB,, | Performed by: INTERNAL MEDICINE

## 2022-03-29 PROCEDURE — 3078F DIAST BP <80 MM HG: CPT | Mod: CPTII,S$GLB,, | Performed by: INTERNAL MEDICINE

## 2022-03-29 PROCEDURE — 3008F PR BODY MASS INDEX (BMI) DOCUMENTED: ICD-10-PCS | Mod: CPTII,S$GLB,, | Performed by: INTERNAL MEDICINE

## 2022-03-29 PROCEDURE — 3008F BODY MASS INDEX DOCD: CPT | Mod: CPTII,S$GLB,, | Performed by: INTERNAL MEDICINE

## 2022-03-29 PROCEDURE — 4010F PR ACE/ARB THEARPY RXD/TAKEN: ICD-10-PCS | Mod: CPTII,S$GLB,, | Performed by: INTERNAL MEDICINE

## 2022-03-29 PROCEDURE — 83036 HEMOGLOBIN GLYCOSYLATED A1C: CPT | Performed by: INTERNAL MEDICINE

## 2022-03-29 PROCEDURE — 1160F PR REVIEW ALL MEDS BY PRESCRIBER/CLIN PHARMACIST DOCUMENTED: ICD-10-PCS | Mod: CPTII,S$GLB,, | Performed by: INTERNAL MEDICINE

## 2022-03-29 PROCEDURE — 99999 PR PBB SHADOW E&M-EST. PATIENT-LVL III: CPT | Mod: PBBFAC,,, | Performed by: INTERNAL MEDICINE

## 2022-03-29 PROCEDURE — 3066F NEPHROPATHY DOC TX: CPT | Mod: CPTII,S$GLB,, | Performed by: INTERNAL MEDICINE

## 2022-03-29 PROCEDURE — 99214 OFFICE O/P EST MOD 30 MIN: CPT | Mod: 25,S$GLB,, | Performed by: INTERNAL MEDICINE

## 2022-03-29 PROCEDURE — 99999 PR PBB SHADOW E&M-EST. PATIENT-LVL III: ICD-10-PCS | Mod: PBBFAC,,, | Performed by: INTERNAL MEDICINE

## 2022-03-29 PROCEDURE — 3075F PR MOST RECENT SYSTOLIC BLOOD PRESS GE 130-139MM HG: ICD-10-PCS | Mod: CPTII,S$GLB,, | Performed by: INTERNAL MEDICINE

## 2022-03-29 PROCEDURE — 1160F RVW MEDS BY RX/DR IN RCRD: CPT | Mod: CPTII,S$GLB,, | Performed by: INTERNAL MEDICINE

## 2022-03-29 PROCEDURE — 99214 PR OFFICE/OUTPT VISIT, EST, LEVL IV, 30-39 MIN: ICD-10-PCS | Mod: 25,S$GLB,, | Performed by: INTERNAL MEDICINE

## 2022-03-29 PROCEDURE — 20610 PR DRAIN/INJECT LARGE JOINT/BURSA: ICD-10-PCS | Mod: 50,S$GLB,, | Performed by: INTERNAL MEDICINE

## 2022-03-29 RX ORDER — TRIAMCINOLONE ACETONIDE 40 MG/ML
80 INJECTION, SUSPENSION INTRA-ARTICULAR; INTRAMUSCULAR ONCE
Status: COMPLETED | OUTPATIENT
Start: 2022-03-29 | End: 2022-03-29

## 2022-03-29 RX ORDER — ZOLEDRONIC ACID 5 MG/100ML
INJECTION, SOLUTION INTRAVENOUS
COMMUNITY
Start: 2021-10-25

## 2022-03-29 RX ADMIN — TRIAMCINOLONE ACETONIDE 80 MG: 40 INJECTION, SUSPENSION INTRA-ARTICULAR; INTRAMUSCULAR at 01:03

## 2022-03-29 ASSESSMENT — ROUTINE ASSESSMENT OF PATIENT INDEX DATA (RAPID3)
PSYCHOLOGICAL DISTRESS SCORE: 3
TOTAL RAPID3 SCORE: 7.28
FATIGUE SCORE: 5
PAIN SCORE: 9.5
MDHAQ FUNCTION SCORE: 1
AM STIFFNESS SCORE: 1, YES
PATIENT GLOBAL ASSESSMENT SCORE: 9

## 2022-03-29 NOTE — PROGRESS NOTES
Subjective:       Patient ID: Efe Horowitz is a 62 y.o. female.    Chief Complaint: Lupus    HPI:  Efe Horowitz is a 62 y.o. female with history of lupus since age 39.   Her manifestations include joint pains, headache, positive DERRICK, and a   double-stranded DNA. She also has an equivocal anticardiolipin IgM.   She has been treated with Plaquenil 1-1/2 tablets daily. Eye exam 12/2017 was normal.      In addition to lupus, she has a   history of ulcerative colitis, osteopenia, fibromyalgia as well.   In 2009 she had a colectomy with ileostomy and in May 2011 the ileostomy  was closed. History of bilateral carpal tunnel.       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  She was treated with surgical resection.  Another lesion breast suspected to be lymphoma was later felt to be fibrous tissue.      May 1, 2017 had right breast fibrous material removed and [facility] on [facility] breasts at [facility].       Interval History:    Notes tripping all over her feet.  Then developed pain going down her legs.   Hands have been painful.   Dealing with bowel issues and there is infection around anus and cuff where bag used to be.    Will start therapy to help control bowels.     Scalp soreness chronic issue.   Hands have been red.  Bruising.          Continues taking injection for cholesterol.  Still on Forteo (takes every nigh) with Dr. Srinivasan for osteoporosis with L3 vertebral fractures (top and bottom) on MRI.    Reports recurrent nasal ulcers (last week) and hair loss persists.  No rashes.       Review of Systems   Constitutional: Positive for fatigue.   HENT: Negative for mouth sores.    Eyes: Negative for redness.        Dry eyes   Respiratory: Negative for cough and shortness of breath.    Gastrointestinal: Negative for diarrhea.   Endocrine: Negative.    Genitourinary: Negative.    Musculoskeletal: Positive for arthralgias.   Skin: Negative.    Allergic/Immunologic: Negative.    Neurological: Positive for  "headaches.   Hematological: Negative.    Psychiatric/Behavioral: Negative.          Objective:   /73   Pulse 79   Ht 5' 4" (1.626 m)   Wt 60 kg (132 lb 4.4 oz)   BMI 22.71 kg/m²         Physical Exam   Constitutional: She is oriented to person, place, and time.   HENT:   Head: Normocephalic and atraumatic.   Eyes: Conjunctivae are normal.   Cardiovascular: Normal rate, regular rhythm and normal heart sounds.   Pulmonary/Chest: Effort normal and breath sounds normal.   Abdominal: Soft. Bowel sounds are normal.   Musculoskeletal:      Cervical back: Neck supple.      Comments: Pain on palpation bilateral trochanteric bursa.     Neurological: She is alert and oriented to person, place, and time. Gait normal.   Skin: Skin is warm and dry.   Psychiatric: Mood and affect normal.         LABS    Pending        Assessment:       1. Lupus. Lupus manifestations include joint pains, headache, positive DERRICK, and a   double-stranded DNA. Has fatigue as well as intermittent oral/nasal ulcer.  SLEDAI=4 without labs  Not sure if current issue is lupus or adrenal related.  2. Fatigue   3. Encounter for long-term (current) use of other medications   4. Myalgia and myositis. Fibromyalgia on Savella.  Patient stopped gabapentin since it made her sleepy but no worsening in pain.    5. Trochanteric bursitis. Bilateral improved with injection.  Requesting injections  6. Shoulder pain. Stable   7. Vitamin D deficiency disease. Now normal  8. Suspected Shingles.   9. Migraine.  Did acupuncture with Dr. Tinsley.  Treated with Maxalt by primary doctor which helps.     10.  Osteoporosis based vertebral fracture.  Reclast in the past.  Dr. Srinivasan gave Forteo for 2 years now back on Reclast.   11.  Chronic steroid use.  Endocrine gives for adrenal insufficiency.  12.  Ulcerative colitis  13.  Hematuria.  Followed by nephrology  14.  Stress with dealing with son with mental illness  15.  Loss of smell since Nov/Dec 2018.  May be relate to " sinus issues.  Evaluated by ENT but no cause found.  Continues to have loss of smell  16. Bilateral hand pain. Suspect OA and DeQuervains of right hand.  X-ray with 1st [facility] osteoarthritis.    Plan:       1.  Labs ordered.   2.  Continue Plaquenil 300 mg daily.  In the future if lupus more active and other issues stable consider Benlysta.  Eye exam normal 2/2021.  Scheduled for April 2022.   3. Compliant with neurontin 300 mg tid.    4. Off Forteo from endocrine after 2 years and now on Reclast  5. Continue Voltaren gel hands.  Also alternate ice and heat.   6. Continue SPICA splint OTC  7. Patient had COVID booster    Procedure Note   I  have explained the risks, benefits, and alternatives of aspiration of the joint.  The patient voices understanding and questions have been answered.  The patient agrees to proceed.    The bilateral trochanteric bursa area  was prepped with 2% chlorhexidine gluconate and 70% isopropyl alcohol (ChloraPrep).  The area was numbed with topical refrigerant.  A 22 g needle was introduced into the space and no fluid was aspirated.  40 mg Kenalog and 2 cc lidocaine.  Hemostasis obtained.  The patient tolerated the procedure well.             RTO in 4 months/prn.

## 2022-03-30 LAB — DSDNA AB SER-ACNC: NORMAL [IU]/ML

## 2022-04-04 ENCOUNTER — TELEPHONE (OUTPATIENT)
Dept: INTERNAL MEDICINE | Facility: CLINIC | Age: 62
End: 2022-04-04
Payer: MEDICARE

## 2022-04-04 DIAGNOSIS — E87.6 LOW BLOOD POTASSIUM: ICD-10-CM

## 2022-04-04 RX ORDER — POTASSIUM CHLORIDE 750 MG/1
20 TABLET, EXTENDED RELEASE ORAL DAILY
Qty: 180 TABLET | Refills: 3 | Status: SHIPPED | OUTPATIENT
Start: 2022-04-04 | End: 2022-09-08

## 2022-04-04 NOTE — TELEPHONE ENCOUNTER
----- Message from Eloy Delarosa sent at 4/4/2022  4:21 PM CDT -----  Contact: pt  Pt stated she received a call from the office an is returning it.Please advise

## 2022-04-11 DIAGNOSIS — I10 BENIGN ESSENTIAL HYPERTENSION: ICD-10-CM

## 2022-04-11 NOTE — TELEPHONE ENCOUNTER
No new care gaps identified.  Powered by [employer]. Reference number: [id number].   4/11/2022 8:57:18 AM CDT

## 2022-04-12 RX ORDER — HYDROCHLOROTHIAZIDE 25 MG/1
TABLET ORAL
Qty: 90 TABLET | Refills: 3 | OUTPATIENT
Start: 2022-04-12

## 2022-04-12 NOTE — TELEPHONE ENCOUNTER
Quick DC. Inappropriate Request    Refill Authorization Note   Efe Horowitz  is requesting a refill authorization.  Brief Assessment and Rationale for Refill:  Quick Discontinue  Medication Therapy Plan: Discontinued by: Billy Rush DO on 1/27/2022    Medication Reconciliation Completed:  No      Comments:   Pended Medication(s)       Requested Prescriptions   Pending Prescriptions Disp Refills    hydroCHLOROthiazide (HYDRODIURIL) 25 MG tablet [Pharmacy Med Name: HYDROCHLOROTHIAZIDE 25 MG TAB] 90 tablet 3     Sig: TAKE 1 TABLET BY MOUTH EVERY DAY          Note composed:10:58 AM 04/12/2022

## 2022-04-20 ENCOUNTER — TELEPHONE (OUTPATIENT)
Dept: RHEUMATOLOGY | Facility: CLINIC | Age: 62
End: 2022-04-20
Payer: MEDICARE

## 2022-04-20 ENCOUNTER — OFFICE VISIT (OUTPATIENT)
Dept: URGENT CARE | Facility: CLINIC | Age: 62
End: 2022-04-20
Payer: MEDICARE

## 2022-04-20 VITALS
RESPIRATION RATE: 16 BRPM | DIASTOLIC BLOOD PRESSURE: 80 MMHG | BODY MASS INDEX: 22.53 KG/M2 | HEIGHT: 64 IN | SYSTOLIC BLOOD PRESSURE: 127 MMHG | OXYGEN SATURATION: 98 % | HEART RATE: 87 BPM | WEIGHT: 132 LBS | TEMPERATURE: 98 F

## 2022-04-20 DIAGNOSIS — K86.81 EXOCRINE PANCREATIC INSUFFICIENCY: ICD-10-CM

## 2022-04-20 DIAGNOSIS — K52.9 IBD (INFLAMMATORY BOWEL DISEASE): ICD-10-CM

## 2022-04-20 DIAGNOSIS — R53.81 MALAISE AND FATIGUE: ICD-10-CM

## 2022-04-20 DIAGNOSIS — Z11.52 ENCOUNTER FOR SCREENING LABORATORY TESTING FOR COVID-19 VIRUS: Primary | ICD-10-CM

## 2022-04-20 DIAGNOSIS — R53.83 MALAISE AND FATIGUE: ICD-10-CM

## 2022-04-20 DIAGNOSIS — M54.31 SCIATICA OF RIGHT SIDE: ICD-10-CM

## 2022-04-20 DIAGNOSIS — K86.81 EXOCRINE PANCREATIC INSUFFICIENCY: Primary | ICD-10-CM

## 2022-04-20 DIAGNOSIS — Z13.0 SCREENING FOR IRON DEFICIENCY ANEMIA: ICD-10-CM

## 2022-04-20 DIAGNOSIS — E27.40 ADRENAL INSUFFICIENCY: ICD-10-CM

## 2022-04-20 DIAGNOSIS — Z79.899 ENCOUNTER FOR LONG-TERM (CURRENT) USE OF HIGH-RISK MEDICATION: ICD-10-CM

## 2022-04-20 DIAGNOSIS — Z13.21 ENCOUNTER FOR VITAMIN DEFICIENCY SCREENING: ICD-10-CM

## 2022-04-20 DIAGNOSIS — M32.9 SYSTEMIC LUPUS ERYTHEMATOSUS, UNSPECIFIED SLE TYPE, UNSPECIFIED ORGAN INVOLVEMENT STATUS: ICD-10-CM

## 2022-04-20 LAB
CTP QC/QA: YES
SARS-COV-2 RDRP RESP QL NAA+PROBE: NEGATIVE

## 2022-04-20 PROCEDURE — 3079F DIAST BP 80-89 MM HG: CPT | Mod: CPTII,S$GLB,, | Performed by: PHYSICIAN ASSISTANT

## 2022-04-20 PROCEDURE — 1160F PR REVIEW ALL MEDS BY PRESCRIBER/CLIN PHARMACIST DOCUMENTED: ICD-10-PCS | Mod: CPTII,S$GLB,, | Performed by: PHYSICIAN ASSISTANT

## 2022-04-20 PROCEDURE — 96372 PR INJECTION,THERAP/PROPH/DIAG2ST, IM OR SUBCUT: ICD-10-PCS | Mod: S$GLB,,, | Performed by: FAMILY MEDICINE

## 2022-04-20 PROCEDURE — 96372 THER/PROPH/DIAG INJ SC/IM: CPT | Mod: S$GLB,,, | Performed by: FAMILY MEDICINE

## 2022-04-20 PROCEDURE — 99214 PR OFFICE/OUTPT VISIT, EST, LEVL IV, 30-39 MIN: ICD-10-PCS | Mod: 25,S$GLB,, | Performed by: PHYSICIAN ASSISTANT

## 2022-04-20 PROCEDURE — 3079F PR MOST RECENT DIASTOLIC BLOOD PRESSURE 80-89 MM HG: ICD-10-PCS | Mod: CPTII,S$GLB,, | Performed by: PHYSICIAN ASSISTANT

## 2022-04-20 PROCEDURE — 3066F PR DOCUMENTATION OF TREATMENT FOR NEPHROPATHY: ICD-10-PCS | Mod: CPTII,S$GLB,, | Performed by: PHYSICIAN ASSISTANT

## 2022-04-20 PROCEDURE — 99214 OFFICE O/P EST MOD 30 MIN: CPT | Mod: 25,S$GLB,, | Performed by: PHYSICIAN ASSISTANT

## 2022-04-20 PROCEDURE — 1159F MED LIST DOCD IN RCRD: CPT | Mod: CPTII,S$GLB,, | Performed by: PHYSICIAN ASSISTANT

## 2022-04-20 PROCEDURE — 1160F RVW MEDS BY RX/DR IN RCRD: CPT | Mod: CPTII,S$GLB,, | Performed by: PHYSICIAN ASSISTANT

## 2022-04-20 PROCEDURE — 3066F NEPHROPATHY DOC TX: CPT | Mod: CPTII,S$GLB,, | Performed by: PHYSICIAN ASSISTANT

## 2022-04-20 PROCEDURE — 1159F PR MEDICATION LIST DOCUMENTED IN MEDICAL RECORD: ICD-10-PCS | Mod: CPTII,S$GLB,, | Performed by: PHYSICIAN ASSISTANT

## 2022-04-20 PROCEDURE — 4010F ACE/ARB THERAPY RXD/TAKEN: CPT | Mod: CPTII,S$GLB,, | Performed by: PHYSICIAN ASSISTANT

## 2022-04-20 PROCEDURE — U0002 COVID-19 LAB TEST NON-CDC: HCPCS | Mod: QW,S$GLB,, | Performed by: PHYSICIAN ASSISTANT

## 2022-04-20 PROCEDURE — 3044F PR MOST RECENT HEMOGLOBIN A1C LEVEL <7.0%: ICD-10-PCS | Mod: CPTII,S$GLB,, | Performed by: PHYSICIAN ASSISTANT

## 2022-04-20 PROCEDURE — 4010F PR ACE/ARB THEARPY RXD/TAKEN: ICD-10-PCS | Mod: CPTII,S$GLB,, | Performed by: PHYSICIAN ASSISTANT

## 2022-04-20 PROCEDURE — 3008F BODY MASS INDEX DOCD: CPT | Mod: CPTII,S$GLB,, | Performed by: PHYSICIAN ASSISTANT

## 2022-04-20 PROCEDURE — U0002: ICD-10-PCS | Mod: QW,S$GLB,, | Performed by: PHYSICIAN ASSISTANT

## 2022-04-20 PROCEDURE — 3074F SYST BP LT 130 MM HG: CPT | Mod: CPTII,S$GLB,, | Performed by: PHYSICIAN ASSISTANT

## 2022-04-20 PROCEDURE — 3044F HG A1C LEVEL LT 7.0%: CPT | Mod: CPTII,S$GLB,, | Performed by: PHYSICIAN ASSISTANT

## 2022-04-20 PROCEDURE — 3008F PR BODY MASS INDEX (BMI) DOCUMENTED: ICD-10-PCS | Mod: CPTII,S$GLB,, | Performed by: PHYSICIAN ASSISTANT

## 2022-04-20 PROCEDURE — 3074F PR MOST RECENT SYSTOLIC BLOOD PRESSURE < 130 MM HG: ICD-10-PCS | Mod: CPTII,S$GLB,, | Performed by: PHYSICIAN ASSISTANT

## 2022-04-20 RX ORDER — HEPATITIS B VACCINE (RECOMBINANT) ADJUVANTED 20 UG/.5ML
INJECTION, SOLUTION INTRAMUSCULAR
COMMUNITY
Start: 2022-02-07 | End: 2022-12-12

## 2022-04-20 RX ORDER — PANCRELIPASE 36000; 180000; 114000 [USP'U]/1; [USP'U]/1; [USP'U]/1
CAPSULE, DELAYED RELEASE PELLETS ORAL
Qty: 180 CAPSULE | Refills: 7 | Status: SHIPPED | OUTPATIENT
Start: 2022-04-20

## 2022-04-20 RX ORDER — BETAMETHASONE SODIUM PHOSPHATE AND BETAMETHASONE ACETATE 3; 3 MG/ML; MG/ML
6 INJECTION, SUSPENSION INTRA-ARTICULAR; INTRALESIONAL; INTRAMUSCULAR; SOFT TISSUE
Status: COMPLETED | OUTPATIENT
Start: 2022-04-20 | End: 2022-04-20

## 2022-04-20 RX ADMIN — BETAMETHASONE SODIUM PHOSPHATE AND BETAMETHASONE ACETATE 6 MG: 3; 3 INJECTION, SUSPENSION INTRA-ARTICULAR; INTRALESIONAL; INTRAMUSCULAR; SOFT TISSUE at 03:04

## 2022-04-20 NOTE — PROGRESS NOTES
"Subjective:       Patient ID: Efe Horowitz is a 62 y.o. female.    Vitals:  height is 5' 4" (1.626 m) and weight is 59.9 kg (132 lb). Her oral temperature is 98.4 °F (36.9 °C). Her blood pressure is 127/80 and her pulse is 87. Her respiration is 16 and oxygen saturation is 98%.     Chief Complaint: Fatigue    62-year-old female with a history of rheumatoid arthritis, lupus who has adrenal insufficiency secondary to prolonged steroid use for the to past medical history stated above.  She states that she has been fatigued and tired and she is on prednisone 5 mg daily and her primary care our rheumatologists had her increase to 150mg daily which has not helped her.  She is here today with complaint of fatigue but with no other complaints.    Fatigue  This is a new problem. Episode onset: 2 days ago. The problem occurs constantly. The problem has been unchanged. Associated symptoms include fatigue. Associated symptoms comments: Sweats, lightheaded. Nothing aggravates the symptoms. Treatments tried: steroids. The treatment provided mild relief.       Constitution: Positive for fatigue.   Skin: Negative for erythema.       Objective:      Physical Exam   Constitutional: She is oriented to person, place, and time. She appears well-developed. She is cooperative.  Non-toxic appearance. She does not appear ill. No distress.   HENT:   Head: Normocephalic and atraumatic.   Ears:   Right Ear: Hearing, tympanic membrane, external ear and ear canal normal.   Left Ear: Hearing, tympanic membrane, external ear and ear canal normal.   Nose: Nose normal. No mucosal edema, rhinorrhea, nasal deformity or congestion. No epistaxis. Right sinus exhibits no maxillary sinus tenderness and no frontal sinus tenderness. Left sinus exhibits no maxillary sinus tenderness and no frontal sinus tenderness.   Mouth/Throat: Uvula is midline, oropharynx is clear and moist and mucous membranes are normal. No trismus in the jaw. Normal dentition. " No uvula swelling. No oropharyngeal exudate, posterior oropharyngeal edema or posterior oropharyngeal erythema.   Eyes: Conjunctivae and lids are normal. Right eye exhibits no discharge. Left eye exhibits no discharge. No scleral icterus.   Neck: Trachea normal and phonation normal. Neck supple. No edema present. No erythema present. No neck rigidity present.   Cardiovascular: Normal rate, regular rhythm, normal heart sounds and normal pulses.   Pulmonary/Chest: Effort normal and breath sounds normal. No stridor. No respiratory distress. She has no decreased breath sounds. She has no wheezes. She has no rhonchi.   Abdominal: Normal appearance and bowel sounds are normal. She exhibits no distension, no pulsatile midline mass and no mass. Soft. There is no abdominal tenderness.   Musculoskeletal: Normal range of motion.         General: No deformity. Normal range of motion.   Neurological: She is alert and oriented to person, place, and time. She exhibits normal muscle tone. Coordination normal.   Skin: Skin is warm, dry, intact, not diaphoretic and not pale. Capillary refill takes less than 2 seconds. No bruising, No erythema and No lesion   Psychiatric: Her speech is normal and behavior is normal. Judgment and thought content normal.   Nursing note and vitals reviewed.        Assessment:       1. Encounter for screening laboratory testing for COVID-19 virus    2. Malaise and fatigue    3. Adrenal insufficiency          Plan:         Encounter for screening laboratory testing for COVID-19 virus  -     POCT COVID-19 Rapid Screening    Malaise and fatigue  -     betamethasone acetate-betamethasone sodium phosphate injection 6 mg    Adrenal insufficiency    Follow up if symptoms worsen or fail to improve, for F/U with PCP or ED. There are no Patient Instructions on file for this visit.

## 2022-04-20 NOTE — TELEPHONE ENCOUNTER
Pt requesting hydroxychloroquine, overdue to see Dr. Magallon for annual Optometry check. Please ask her to schedule appt. Thank you ALLA

## 2022-04-21 ENCOUNTER — OFFICE VISIT (OUTPATIENT)
Dept: GASTROENTEROLOGY | Facility: CLINIC | Age: 62
End: 2022-04-21
Payer: MEDICARE

## 2022-04-21 ENCOUNTER — PATIENT MESSAGE (OUTPATIENT)
Dept: RHEUMATOLOGY | Facility: CLINIC | Age: 62
End: 2022-04-21
Payer: MEDICARE

## 2022-04-21 VITALS
BODY MASS INDEX: 22.86 KG/M2 | SYSTOLIC BLOOD PRESSURE: 140 MMHG | DIASTOLIC BLOOD PRESSURE: 85 MMHG | HEIGHT: 63 IN | HEART RATE: 78 BPM | WEIGHT: 129 LBS

## 2022-04-21 DIAGNOSIS — K86.1 CHRONIC PANCREATITIS, UNSPECIFIED PANCREATITIS TYPE: Primary | ICD-10-CM

## 2022-04-21 PROCEDURE — 3044F PR MOST RECENT HEMOGLOBIN A1C LEVEL <7.0%: ICD-10-PCS | Mod: CPTII,S$GLB,, | Performed by: INTERNAL MEDICINE

## 2022-04-21 PROCEDURE — 3079F DIAST BP 80-89 MM HG: CPT | Mod: CPTII,S$GLB,, | Performed by: INTERNAL MEDICINE

## 2022-04-21 PROCEDURE — 99499 RISK ADDL DX/OHS AUDIT: ICD-10-PCS | Mod: S$GLB,,, | Performed by: INTERNAL MEDICINE

## 2022-04-21 PROCEDURE — 3066F PR DOCUMENTATION OF TREATMENT FOR NEPHROPATHY: ICD-10-PCS | Mod: CPTII,S$GLB,, | Performed by: INTERNAL MEDICINE

## 2022-04-21 PROCEDURE — 4010F ACE/ARB THERAPY RXD/TAKEN: CPT | Mod: CPTII,S$GLB,, | Performed by: INTERNAL MEDICINE

## 2022-04-21 PROCEDURE — 4010F PR ACE/ARB THEARPY RXD/TAKEN: ICD-10-PCS | Mod: CPTII,S$GLB,, | Performed by: INTERNAL MEDICINE

## 2022-04-21 PROCEDURE — 3079F PR MOST RECENT DIASTOLIC BLOOD PRESSURE 80-89 MM HG: ICD-10-PCS | Mod: CPTII,S$GLB,, | Performed by: INTERNAL MEDICINE

## 2022-04-21 PROCEDURE — 3077F SYST BP >= 140 MM HG: CPT | Mod: CPTII,S$GLB,, | Performed by: INTERNAL MEDICINE

## 2022-04-21 PROCEDURE — 3044F HG A1C LEVEL LT 7.0%: CPT | Mod: CPTII,S$GLB,, | Performed by: INTERNAL MEDICINE

## 2022-04-21 PROCEDURE — 99213 OFFICE O/P EST LOW 20 MIN: CPT | Mod: S$GLB,,, | Performed by: INTERNAL MEDICINE

## 2022-04-21 PROCEDURE — 3077F PR MOST RECENT SYSTOLIC BLOOD PRESSURE >= 140 MM HG: ICD-10-PCS | Mod: CPTII,S$GLB,, | Performed by: INTERNAL MEDICINE

## 2022-04-21 PROCEDURE — 99499 UNLISTED E&M SERVICE: CPT | Mod: S$GLB,,, | Performed by: INTERNAL MEDICINE

## 2022-04-21 PROCEDURE — 99213 PR OFFICE/OUTPT VISIT, EST, LEVL III, 20-29 MIN: ICD-10-PCS | Mod: S$GLB,,, | Performed by: INTERNAL MEDICINE

## 2022-04-21 PROCEDURE — 99999 PR PBB SHADOW E&M-EST. PATIENT-LVL V: ICD-10-PCS | Mod: PBBFAC,,, | Performed by: INTERNAL MEDICINE

## 2022-04-21 PROCEDURE — 99999 PR PBB SHADOW E&M-EST. PATIENT-LVL V: CPT | Mod: PBBFAC,,, | Performed by: INTERNAL MEDICINE

## 2022-04-21 PROCEDURE — 3066F NEPHROPATHY DOC TX: CPT | Mod: CPTII,S$GLB,, | Performed by: INTERNAL MEDICINE

## 2022-04-21 NOTE — PROGRESS NOTES
"Subjective:      Patient ID: Efe Horowitz is a 62 y.o. female.    Chief Complaint: GI Problem      HPI:  Efe Horowitz is a 62 y.o. female with hx of IBD and breast Ca, FHx of panc ca in aunt and personal hx of panc cyst coming today for follow up. Patient is on chronic steroid use. Patient complaining today about feeling extremely weak. She was told to follow with endocrinology by PCP.     Review of patient's allergies indicates:   Allergen Reactions    Aspirin      Other reaction(s): "hemorrhage"  hemorrhage    Hydrocortisone Nausea Only     Other reaction(s): sick  sick    Lactose intolerance (lactase) [lactase] Nausea And Vomiting    Vicodin [hydrocodone-acetaminophen]      Other reaction(s): hyperactivity    Asacol [mesalamine] Hallucinations     Other reaction(s): Hallucinations  hallucinations       Current Outpatient Medications   Medication Sig Dispense Refill    albuterol (ACCUNEB) 0.63 mg/3 mL Nebu Take 3 mLs (0.63 mg total) by nebulization every 6 (six) hours as needed. 3 mL 6    alirocumab (PRALUENT PEN) 75 mg/mL PnIj Inject 75 mg into the skin every 14 (fourteen) days.      amLODIPine (NORVASC) 2.5 MG tablet Take 2.5 mg by mouth once daily.      CREON 36,000-114,000- 180,000 unit CpDR TAKE TWO CAPSULES BY MOUTH THREE TIMES DAILY WITH MEALS AND ONE CAPSULE WITH EACH SNACK 180 capsule 7    dexamethasone (DECADRON) 4 mg/mL injection Inject 1 mL (4 mg total) into the muscle daily as needed (Signs and symtpoms of severe adrenal insufficiency). 1 mL 1    diclofenac sodium (VOLTAREN) 1 % Gel APPLY 2 GRAMS TO AFFECTED AREA 4 TIMES A  g 2    ergocalciferol (VITAMIN D2) 50,000 unit Cap Take 1 capsule (50,000 Units total) by mouth twice a week. Every Monday and Friday 24 capsule 3    gabapentin (NEURONTIN) 300 MG capsule TAKE 1 CAPSULE BY MOUTH EVERY EVENING 90 capsule 3    HEPLISAV-B, PF, 20 mcg/0.5 mL Syrg       hydrOXYchloroQUINE (PLAQUENIL) 200 mg tablet TAKE 1.5 TABLETS " BY MOUTH EVERY DAY 45 tablet 0    lidocaine (LIDODERM) 5 % Place 1 patch onto the skin once daily. Remove & Discard patch within 12 hours or as directed by MD 30 patch 12    loperamide (IMODIUM) 2 mg capsule TAKE 1 TO 2 CAPSULES BY MOUTH FOUR TIMES DAILY 240 capsule 3    losartan (COZAAR) 50 MG tablet Take 1 tablet (50 mg total) by mouth once daily. 90 tablet 3    multivitamin/iron/folic acid (CENTRUM WOMEN ORAL) Take 1 tablet by mouth once daily.      nebivoloL (BYSTOLIC) 10 MG Tab Take 1 tablet by mouth once daily.      niacin 100 MG Tab Take by mouth. 1 Tablet Oral At bedtime      omeprazole (PRILOSEC) 20 MG capsule TAKE 1 CAPSULE BY MOUTH TWICE A  capsule 3    ondansetron (ZOFRAN) 4 MG tablet Take 1 tablet (4 mg total) by mouth every 12 (twelve) hours as needed for Nausea. 30 tablet 6    oxyCODONE-acetaminophen (PERCOCET) 5-325 mg per tablet Take 1 tablet by mouth every 12 (twelve) hours as needed for Pain. 60 each 0    polycarbophil (FIBERCON) 625 mg tablet Take by mouth. 1 Tablet Oral Every 12 hours.  Take with 12 ounces of water with each pill.      potassium chloride SA (K-DUR,KLOR-CON) 10 MEQ tablet Take 2 tablets (20 mEq total) by mouth once daily. 180 tablet 3    predniSONE (DELTASONE) 5 MG tablet Take 1 tablet (5 mg total) by mouth once daily. Double the dose in times of illness 100 tablet 3    REPATHA SURECLICK 140 mg/mL PnIj INJECT 140 MG INTO THE SKIN EVERY 14 (FOURTEEN) DAYS      RESTASIS 0.05 % ophthalmic emulsion INSTILL 1 DROP INTO BOTH EYES TWICE A  vial 3    rizatriptan (MAXALT) 10 MG tablet Take 1 tablet (10 mg total) by mouth every 6 (six) hours as needed for Migraine. 60 tablet 0    rosuvastatin (CRESTOR) 5 MG tablet Take 5 mg by mouth once daily.      SAVELLA 100 mg Tab TAKE 1 TABLET BY MOUTH EVERY DAY 90 tablet 3    zoledronic acid-mannitol & water (RECLAST) 5 mg/100 mL PgBk       zolpidem (AMBIEN) 5 MG Tab Take 1 tablet (5 mg total) by mouth nightly as  needed (insomnia). 30 tablet 5     No current facility-administered medications for this visit.          Assessment/Plan:     - Panc cyst  Stable since 2018. No worrisome features.   She has personal and Fhx of breast ca without a known genetic defect. Her Aunt had panc ca.   MRCP in 1 year. If stable, we can go to every 2 years.     Patient seeing endocrinology for her weakness.     I spent a total of 20 minutes on the day of the visit.  This includes face to face time and non-face to face time preparing to see the patient (eg, review of tests), obtaining and/or reviewing separately obtained history, documenting clinical information in the electronic or other health record, independently interpreting results and communicating results to the patient/family/caregiver, or care coordinator.

## 2022-04-22 ENCOUNTER — PES CALL (OUTPATIENT)
Dept: ADMINISTRATIVE | Facility: CLINIC | Age: 62
End: 2022-04-22
Payer: MEDICARE

## 2022-04-25 ENCOUNTER — CLINICAL SUPPORT (OUTPATIENT)
Dept: REHABILITATION | Facility: OTHER | Age: 62
End: 2022-04-25
Payer: MEDICARE

## 2022-04-25 ENCOUNTER — LAB VISIT (OUTPATIENT)
Dept: LAB | Facility: HOSPITAL | Age: 62
End: 2022-04-25
Attending: INTERNAL MEDICINE
Payer: MEDICARE

## 2022-04-25 DIAGNOSIS — Z13.0 SCREENING FOR IRON DEFICIENCY ANEMIA: ICD-10-CM

## 2022-04-25 DIAGNOSIS — M32.9 SYSTEMIC LUPUS ERYTHEMATOSUS, UNSPECIFIED SLE TYPE, UNSPECIFIED ORGAN INVOLVEMENT STATUS: ICD-10-CM

## 2022-04-25 DIAGNOSIS — K52.9 IBD (INFLAMMATORY BOWEL DISEASE): ICD-10-CM

## 2022-04-25 DIAGNOSIS — Z13.21 ENCOUNTER FOR VITAMIN DEFICIENCY SCREENING: ICD-10-CM

## 2022-04-25 DIAGNOSIS — Z79.899 ENCOUNTER FOR LONG-TERM (CURRENT) USE OF HIGH-RISK MEDICATION: ICD-10-CM

## 2022-04-25 DIAGNOSIS — R27.9 LACK OF COORDINATION: Primary | ICD-10-CM

## 2022-04-25 DIAGNOSIS — K86.81 EXOCRINE PANCREATIC INSUFFICIENCY: ICD-10-CM

## 2022-04-25 LAB
25(OH)D3+25(OH)D2 SERPL-MCNC: 42 NG/ML (ref 30–96)
ALBUMIN SERPL BCP-MCNC: 3.9 G/DL (ref 3.5–5.2)
ALP SERPL-CCNC: 56 U/L (ref 55–135)
ALT SERPL W/O P-5'-P-CCNC: 40 U/L (ref 10–44)
ANION GAP SERPL CALC-SCNC: 13 MMOL/L (ref 8–16)
ANISOCYTOSIS BLD QL SMEAR: SLIGHT
AST SERPL-CCNC: 24 U/L (ref 10–40)
BASOPHILS # BLD AUTO: 0.03 K/UL (ref 0–0.2)
BASOPHILS NFR BLD: 0.3 % (ref 0–1.9)
BILIRUB SERPL-MCNC: 0.5 MG/DL (ref 0.1–1)
BUN SERPL-MCNC: 18 MG/DL (ref 8–23)
BURR CELLS BLD QL SMEAR: ABNORMAL
CALCIUM SERPL-MCNC: 9.9 MG/DL (ref 8.7–10.5)
CHLORIDE SERPL-SCNC: 101 MMOL/L (ref 95–110)
CO2 SERPL-SCNC: 23 MMOL/L (ref 23–29)
CREAT SERPL-MCNC: 0.8 MG/DL (ref 0.5–1.4)
DIFFERENTIAL METHOD: ABNORMAL
EOSINOPHIL # BLD AUTO: 0.1 K/UL (ref 0–0.5)
EOSINOPHIL NFR BLD: 0.7 % (ref 0–8)
ERYTHROCYTE [DISTWIDTH] IN BLOOD BY AUTOMATED COUNT: 15.3 % (ref 11.5–14.5)
EST. GFR  (AFRICAN AMERICAN): >60 ML/MIN/1.73 M^2
EST. GFR  (NON AFRICAN AMERICAN): >60 ML/MIN/1.73 M^2
GLUCOSE SERPL-MCNC: 77 MG/DL (ref 70–110)
HCT VFR BLD AUTO: 40.8 % (ref 37–48.5)
HGB BLD-MCNC: 13 G/DL (ref 12–16)
IMM GRANULOCYTES # BLD AUTO: 0.14 K/UL (ref 0–0.04)
IMM GRANULOCYTES NFR BLD AUTO: 1.5 % (ref 0–0.5)
LYMPHOCYTES # BLD AUTO: 1.2 K/UL (ref 1–4.8)
LYMPHOCYTES NFR BLD: 13.5 % (ref 18–48)
MCH RBC QN AUTO: 27.8 PG (ref 27–31)
MCHC RBC AUTO-ENTMCNC: 31.9 G/DL (ref 32–36)
MCV RBC AUTO: 87 FL (ref 82–98)
MONOCYTES # BLD AUTO: 0.8 K/UL (ref 0.3–1)
MONOCYTES NFR BLD: 8.6 % (ref 4–15)
NEUTROPHILS # BLD AUTO: 6.9 K/UL (ref 1.8–7.7)
NEUTROPHILS NFR BLD: 75.4 % (ref 38–73)
NRBC BLD-RTO: 0 /100 WBC
OVALOCYTES BLD QL SMEAR: ABNORMAL
PLATELET # BLD AUTO: 132 K/UL (ref 150–450)
PLATELET BLD QL SMEAR: ABNORMAL
PMV BLD AUTO: 10.5 FL (ref 9.2–12.9)
POIKILOCYTOSIS BLD QL SMEAR: SLIGHT
POLYCHROMASIA BLD QL SMEAR: ABNORMAL
POTASSIUM SERPL-SCNC: 3.6 MMOL/L (ref 3.5–5.1)
PROT SERPL-MCNC: 7.2 G/DL (ref 6–8.4)
RBC # BLD AUTO: 4.67 M/UL (ref 4–5.4)
SODIUM SERPL-SCNC: 137 MMOL/L (ref 136–145)
WBC # BLD AUTO: 9.19 K/UL (ref 3.9–12.7)

## 2022-04-25 PROCEDURE — 82306 VITAMIN D 25 HYDROXY: CPT | Performed by: INTERNAL MEDICINE

## 2022-04-25 PROCEDURE — 82656 EL-1 FECAL QUAL/SEMIQ: CPT | Performed by: INTERNAL MEDICINE

## 2022-04-25 PROCEDURE — 97140 MANUAL THERAPY 1/> REGIONS: CPT

## 2022-04-25 PROCEDURE — 85025 COMPLETE CBC W/AUTO DIFF WBC: CPT | Performed by: INTERNAL MEDICINE

## 2022-04-25 PROCEDURE — 97530 THERAPEUTIC ACTIVITIES: CPT

## 2022-04-25 PROCEDURE — 97110 THERAPEUTIC EXERCISES: CPT

## 2022-04-25 PROCEDURE — 84446 ASSAY OF VITAMIN E: CPT | Performed by: INTERNAL MEDICINE

## 2022-04-25 PROCEDURE — 36415 COLL VENOUS BLD VENIPUNCTURE: CPT | Mod: PO | Performed by: INTERNAL MEDICINE

## 2022-04-25 PROCEDURE — 80053 COMPREHEN METABOLIC PANEL: CPT | Performed by: INTERNAL MEDICINE

## 2022-04-25 PROCEDURE — 84590 ASSAY OF VITAMIN A: CPT | Performed by: INTERNAL MEDICINE

## 2022-04-25 NOTE — PROGRESS NOTES
Pelvic Health Physical Therapy   Treatment Note     Name: Efe Horowitz  Clinic Number: [id number]    Therapy Diagnosis: No diagnosis found.  Physician: Cefernio Rangel MD    Visit Date: 4/25/2022    Physician Orders: PT Eval and Treat  Medical Diagnosis from Referral: pelvic floor dysfunction  Evaluation Date: 2/14/2022  Authorization Period Expiration: 1 visit  Plan of Care Expiration: 5/12/22  Visit # / Visits authorized: 1/ 1     Cancelled Visits: 0  No Show Visits: 0  FOTO: 1 completed 4/25/22 - score Bowel 8%    Time In: 425 (pt arrived late)  Time Out: 520  Total Billable Time: 55 minutes    Precautions: Standard    Subjective     Pt reports: Having less LLQ pain and diarrhea. Noticing weakness that comes and goes. This impacts ability to drive and perform ADLs. Cant talk at these times. Doctors know that this happens, and say its about her adrenal glands and immune system deficiency. Seeing endocrinologist on May 26th. Has been doing exercises but not sure she is doing them correctly. Got squatty potty. Has been having a BM every time she urinates, maybe 3-4x/day. Stool type 6-7, pasty. Still taking Immodium every day. No longer has to push to get bowels to come out, so thinks maybe program is helping. Still having FI. Eats fiber in diet and also eats food on BRAT diet. Has tried powder Metamucil, creon and fibercon in past. Does not change stool consistency and can see remnants of grit of supplements in stool.   She was compliant with home exercise program.  Response to previous treatment: felt fine  Functional change:  Decreased LLQ, decreased diarrhea    Pain: 8/10  Location: back and suprapubic    Objective     Efe received therapeutic exercises to develop  strength and endurance for 15 minutes including: Bridging  Clamshells  PFME - breathing, contractions    Efe received the following manual therapy techniques: to develop flexibility and extensibility for 25 minutes including: trigger  point/myofascial release of pelvic floor mm and scar mobilization of intrarectal scar tissue    Efe participated in neuromuscular re-education activities to develop Coordination and Control for 00 minutes including: not performed today    Efe participated in dynamic functional therapeutic activities to improve functional performance for 15 minutes, including:  Discussed stool consistency - reviewed everything she has tried and decided for her to try Metamucil in capsule form next    Home Exercises Provided and Patient Education Provided     Education provided:   - anatomy/physiology of pelvic floor, diaphragmatic breathing, kegels and fluid intake/dietary modifications  Discussed progression of plan of care with patient; educated pt in activity modification; reviewed HEP with pt. Pt demonstrated and verbalized understanding of all instruction and was provided with a handout of HEP (see Patient Instructions).    Written Home Exercises Provided: yes.  Exercises were reviewed and Efe was able to demonstrate them prior to the end of the session.  Efe demonstrated good  understanding of the education provided.     See EMR under Patient Instructions for exercises provided 4/25/2022.    Assessment     Excellent demo of PFME. Progressed HEP for bridging for core and hip strengthening for improved lumbopelvic stability and increased closure force at EAS. Worked on scar tissue mobilization and will monitor effects. She has tried many different things for stool bulking in past and reports often do not digesta nd come out in stool She will try Metamucil in capsule form to see if effective at bulking.   Efe Is progressing well towards her goals.   Pt prognosis is Excellent.     Pt will continue to benefit from skilled outpatient physical therapy to address the deficits listed in the problem list box on initial evaluation, provide pt/family education and to maximize pt's level of independence in the home and  community environment.     Pt's spiritual, cultural and educational needs considered and pt agreeable to plan of care and goals.     Anticipated barriers to physical therapy: none    Goals: Short Term Goals: 4 weeks   Pt indep in HEP  Pt able to lengthen pelvic floor mm appropriately for BM without strain     Long Term Goals: 8 weeks   Pt indep in progressive HEP  Pt reports 0 episodes of fecal leakage in at least 4 weeks for improved ADL participation  Pt reports improved stool consistency at least Type 5 or higher on [place] stool scale with 75% of BMs     Plan      Plan of care Certification: 2/14/2022 to 5/12/22.     Outpatient Physical Therapy 1 times weekly for 12 weeks to include the following interventions: therapeutic exercises, therapeutic activity, neuromuscular re-education, manual therapy, patient/family education and self care/home management    Next visit: review and progress hip exercises, scar tissue mob, review stool consistency    Aaliyah Bentley, PT

## 2022-04-25 NOTE — PATIENT INSTRUCTIONS
Home Program 4/25/22:    1)     360 Breath - Inhale long, slow and deep. You should feel as if your lower ribs are expanding in all directions like the way an umbrella opens. You should feel the belly, back and sides gently expand and you may notice a relaxation in the pelvic floor.     Continue to breath like this for 10 breaths Repeat 3 times/day.     2) Pelvic floor exercise  Quick Flicks    Contract pelvic floor muscles by closing around your openings and lifting up and in. Try not to use abdominal or buttocks muscles, and do not hold your breath. Relax completely after each contraction.     Repeat 10 times. Perform 3 sets/day.      3) Bridging   Lay on mat with knees bent. Tuck pelvis and lift hips up off table.  Do 10 times and perform 3 sets.    4) Clamshells Clamshells - lay on side with knees bent and top of pelvic rolled slightly forward. Keeping ankles together lift top knee without pelvis rolling backwards. Do 2 sets of 15.

## 2022-04-28 ENCOUNTER — LAB VISIT (OUTPATIENT)
Dept: LAB | Facility: HOSPITAL | Age: 62
End: 2022-04-28
Attending: INTERNAL MEDICINE
Payer: MEDICARE

## 2022-04-28 ENCOUNTER — TELEPHONE (OUTPATIENT)
Dept: GASTROENTEROLOGY | Facility: CLINIC | Age: 62
End: 2022-04-28
Payer: MEDICARE

## 2022-04-28 ENCOUNTER — OFFICE VISIT (OUTPATIENT)
Dept: GASTROENTEROLOGY | Facility: CLINIC | Age: 62
End: 2022-04-28
Payer: MEDICARE

## 2022-04-28 VITALS
DIASTOLIC BLOOD PRESSURE: 76 MMHG | HEIGHT: 63 IN | SYSTOLIC BLOOD PRESSURE: 121 MMHG | BODY MASS INDEX: 22.81 KG/M2 | HEART RATE: 79 BPM | WEIGHT: 128.75 LBS

## 2022-04-28 DIAGNOSIS — K86.81 EXOCRINE PANCREATIC INSUFFICIENCY: Primary | ICD-10-CM

## 2022-04-28 DIAGNOSIS — K86.2 PANCREATIC CYST: ICD-10-CM

## 2022-04-28 DIAGNOSIS — D69.6 THROMBOCYTOPENIA: Primary | ICD-10-CM

## 2022-04-28 DIAGNOSIS — M79.7 FIBROMYALGIA: ICD-10-CM

## 2022-04-28 DIAGNOSIS — Z87.19 HISTORY OF ULCERATIVE COLITIS: ICD-10-CM

## 2022-04-28 DIAGNOSIS — M32.9 SYSTEMIC LUPUS ERYTHEMATOSUS, UNSPECIFIED SLE TYPE, UNSPECIFIED ORGAN INVOLVEMENT STATUS: ICD-10-CM

## 2022-04-28 DIAGNOSIS — Z80.0 FAMILY HISTORY OF PANCREATIC CANCER: ICD-10-CM

## 2022-04-28 DIAGNOSIS — M62.89 PELVIC FLOOR DYSFUNCTION: ICD-10-CM

## 2022-04-28 DIAGNOSIS — D69.6 THROMBOCYTOPENIA: ICD-10-CM

## 2022-04-28 DIAGNOSIS — D18.03 LIVER HEMANGIOMA: ICD-10-CM

## 2022-04-28 LAB
BASOPHILS # BLD AUTO: 0.05 K/UL (ref 0–0.2)
BASOPHILS NFR BLD: 0.5 % (ref 0–1.9)
DIFFERENTIAL METHOD: ABNORMAL
EOSINOPHIL # BLD AUTO: 0.1 K/UL (ref 0–0.5)
EOSINOPHIL NFR BLD: 1.2 % (ref 0–8)
ERYTHROCYTE [DISTWIDTH] IN BLOOD BY AUTOMATED COUNT: 15.2 % (ref 11.5–14.5)
HCT VFR BLD AUTO: 38.5 % (ref 37–48.5)
HGB BLD-MCNC: 12.5 G/DL (ref 12–16)
IMM GRANULOCYTES # BLD AUTO: 0.22 K/UL (ref 0–0.04)
IMM GRANULOCYTES NFR BLD AUTO: 2.3 % (ref 0–0.5)
LYMPHOCYTES # BLD AUTO: 1.9 K/UL (ref 1–4.8)
LYMPHOCYTES NFR BLD: 20.1 % (ref 18–48)
MCH RBC QN AUTO: 28.6 PG (ref 27–31)
MCHC RBC AUTO-ENTMCNC: 32.5 G/DL (ref 32–36)
MCV RBC AUTO: 88 FL (ref 82–98)
MONOCYTES # BLD AUTO: 0.8 K/UL (ref 0.3–1)
MONOCYTES NFR BLD: 8.8 % (ref 4–15)
NEUTROPHILS # BLD AUTO: 6.3 K/UL (ref 1.8–7.7)
NEUTROPHILS NFR BLD: 67.1 % (ref 38–73)
NRBC BLD-RTO: 0 /100 WBC
PLATELET # BLD AUTO: 213 K/UL (ref 150–450)
PMV BLD AUTO: 10 FL (ref 9.2–12.9)
RBC # BLD AUTO: 4.37 M/UL (ref 4–5.4)
WBC # BLD AUTO: 9.41 K/UL (ref 3.9–12.7)

## 2022-04-28 PROCEDURE — 99214 OFFICE O/P EST MOD 30 MIN: CPT | Mod: S$GLB,,, | Performed by: INTERNAL MEDICINE

## 2022-04-28 PROCEDURE — 3044F HG A1C LEVEL LT 7.0%: CPT | Mod: CPTII,S$GLB,, | Performed by: INTERNAL MEDICINE

## 2022-04-28 PROCEDURE — 3044F PR MOST RECENT HEMOGLOBIN A1C LEVEL <7.0%: ICD-10-PCS | Mod: CPTII,S$GLB,, | Performed by: INTERNAL MEDICINE

## 2022-04-28 PROCEDURE — 99214 PR OFFICE/OUTPT VISIT, EST, LEVL IV, 30-39 MIN: ICD-10-PCS | Mod: S$GLB,,, | Performed by: INTERNAL MEDICINE

## 2022-04-28 PROCEDURE — 36415 COLL VENOUS BLD VENIPUNCTURE: CPT | Performed by: INTERNAL MEDICINE

## 2022-04-28 PROCEDURE — 1159F MED LIST DOCD IN RCRD: CPT | Mod: CPTII,S$GLB,, | Performed by: INTERNAL MEDICINE

## 2022-04-28 PROCEDURE — 4010F ACE/ARB THERAPY RXD/TAKEN: CPT | Mod: CPTII,S$GLB,, | Performed by: INTERNAL MEDICINE

## 2022-04-28 PROCEDURE — 85025 COMPLETE CBC W/AUTO DIFF WBC: CPT | Performed by: INTERNAL MEDICINE

## 2022-04-28 PROCEDURE — 3008F BODY MASS INDEX DOCD: CPT | Mod: CPTII,S$GLB,, | Performed by: INTERNAL MEDICINE

## 2022-04-28 PROCEDURE — 99999 PR PBB SHADOW E&M-EST. PATIENT-LVL III: CPT | Mod: PBBFAC,,, | Performed by: INTERNAL MEDICINE

## 2022-04-28 PROCEDURE — 3074F PR MOST RECENT SYSTOLIC BLOOD PRESSURE < 130 MM HG: ICD-10-PCS | Mod: CPTII,S$GLB,, | Performed by: INTERNAL MEDICINE

## 2022-04-28 PROCEDURE — 3066F NEPHROPATHY DOC TX: CPT | Mod: CPTII,S$GLB,, | Performed by: INTERNAL MEDICINE

## 2022-04-28 PROCEDURE — 4010F PR ACE/ARB THEARPY RXD/TAKEN: ICD-10-PCS | Mod: CPTII,S$GLB,, | Performed by: INTERNAL MEDICINE

## 2022-04-28 PROCEDURE — 3008F PR BODY MASS INDEX (BMI) DOCUMENTED: ICD-10-PCS | Mod: CPTII,S$GLB,, | Performed by: INTERNAL MEDICINE

## 2022-04-28 PROCEDURE — 99999 PR PBB SHADOW E&M-EST. PATIENT-LVL III: ICD-10-PCS | Mod: PBBFAC,,, | Performed by: INTERNAL MEDICINE

## 2022-04-28 PROCEDURE — 3066F PR DOCUMENTATION OF TREATMENT FOR NEPHROPATHY: ICD-10-PCS | Mod: CPTII,S$GLB,, | Performed by: INTERNAL MEDICINE

## 2022-04-28 PROCEDURE — 3078F PR MOST RECENT DIASTOLIC BLOOD PRESSURE < 80 MM HG: ICD-10-PCS | Mod: CPTII,S$GLB,, | Performed by: INTERNAL MEDICINE

## 2022-04-28 PROCEDURE — 3074F SYST BP LT 130 MM HG: CPT | Mod: CPTII,S$GLB,, | Performed by: INTERNAL MEDICINE

## 2022-04-28 PROCEDURE — 99499 RISK ADDL DX/OHS AUDIT: ICD-10-PCS | Mod: S$GLB,,, | Performed by: INTERNAL MEDICINE

## 2022-04-28 PROCEDURE — 99499 UNLISTED E&M SERVICE: CPT | Mod: S$GLB,,, | Performed by: INTERNAL MEDICINE

## 2022-04-28 PROCEDURE — 1159F PR MEDICATION LIST DOCUMENTED IN MEDICAL RECORD: ICD-10-PCS | Mod: CPTII,S$GLB,, | Performed by: INTERNAL MEDICINE

## 2022-04-28 PROCEDURE — 3078F DIAST BP <80 MM HG: CPT | Mod: CPTII,S$GLB,, | Performed by: INTERNAL MEDICINE

## 2022-04-28 NOTE — TELEPHONE ENCOUNTER
Contact patient per Dr. Villalta, please tell patient that her platelets are slightly low I would like her to have a repeat CBC today if she could come in a little early in get those lab work 1st that would be great.       No answer was not able to leave message voicemail was full.

## 2022-04-28 NOTE — PROGRESS NOTES
Steven please tell patient that her platelets are slightly low I would like her to have a repeat CBC today if she could come in a little early in get those lab work 1st that would be great.

## 2022-04-28 NOTE — Clinical Note
Steven please schedule patient return GI clinic 6 months for exocrine pancreatic insufficiency follow-up

## 2022-04-28 NOTE — PROGRESS NOTES
[facility] Consultation Note    Reason for Consult:  The primary encounter diagnosis was Exocrine pancreatic insufficiency. Diagnoses of Lactase deficiency, Systemic lupus erythematosus, unspecified SLE type, unspecified organ involvement status, History of ulcerative colitis, Pelvic floor dysfunction, Fibromyalgia, Liver hemangioma, Pancreatic cyst, and Family history of pancreatic cancer were also pertinent to this visit.    PCP:   Shonda Anguiano       Referring MD:  No referring provider defined for this encounter.    Initial History of Present Illness (HPI):  This is a 62 y.o.  female here for follow-up evaluation of her history of lower abdominal pain abdominal bloating and diarrhea.  Patient's recent EGD looks good biopsies look okay.  She has a past medical history of ulcerative colitis in exocrine pancreas insufficiency.  Patient has has a past surgical history that includes restorative proctectomy; total abdominal colectomy with ileostomy (2010); Cholecystectomy; Hysterectomy; Oophorectomy (Bilateral); Appendectomy; Breast biopsy (Right, 2/2016); Colon surgery; Breast surgery; Total Reduction Mammoplasty (Left, 2017); Upper endoscopic ultrasound w/ FNA; Endoscopic ultrasound of upper gastrointestinal tract (N/A, 8/6/2018); Esophagogastroduodenoscopy (N/A, 12/10/2018); Flexible sigmoidoscopy (N/A, 12/10/2018); Mastectomy; and Breast reconstruction.  Patient is followed by [facility] for pancreas.  Patient recently underwent an EGD which look good no celiac sprue no H pylori no eosinophilic esophagitis no Miranda's esophagus she does have lactase deficiency.  She had a CT enterography of her small bowel which showed Patient history of ulcerative colitis status post total colectomy with ileorectal anastomosis.Moderate distension of the sole rectum noted to the level of the ileorectal anastomosis.  No small bowel stricture identified.  No wall hyperenhancement to suggest active enteritis.Mild  biliary ductal dilation, commonly seen post cholecystectomy, stable.  She was referred to colon rectal surgery for further evaluation Dr. Maxwell on December 10, 2021 she underwent a exam and flex sig No stenosis, the mucosa that I was able to visualize was normal.  No inflammation, no blood.  Dr. Maxwell referred place in to IBD GI clinic for further evaluation and she saw Dr. Rangel on January 7, 2022 and he felt her problems were mainly probably consistent with pelvic floor dysfunction.  She was then schedule her for anal rectal manometry.  She underwent a pouch os copy by Dr. Rangel in IBD. She underwent an MRI MRCP of the abdomen on January 8, 2022 for further evaluation of her history of pancreatic cyst.  MRI reading was Stable 1.0 cm cystic lesion within the pancreatic tail, which appears to connect with the pancreatic duct when reviewed on prior MRCP 2019, favoring a side branch IPMN.  Additional punctate cystic lesion nearby, that may represent an additional side branch IPMN versus simple cyst.Stable, mild intrahepatic and extrahepatic biliary ductal dilatation, the likely sequela of prior cholecystectomy. Two stable hepatic hemangiomas and several subcentimeter hepatic lesions that are technically too small to characterize but are favored to represent simple cysts.Small hiatal hernia.  Patient has been followed by [facility] provider Dr. Jagdeep Finn and has been referred back to his pancreas cyst clinic for follow-up in management and surveillance of her pancreatic cyst and dilated biliary tree in light of her IBD history of ulcerative colitis.  Patient has been referred to pelvic floor physical therapy she went for the 1st time this past Monday said it was very helpful she is also taking Metamucil once daily it is helping out a lot she also has a squatty potty which is helped out a lot as well.  She has follow-up appointment with Dr. Rangel in IBD on May 13, 2022. Patient is feeling much better.           Abdominal pain -as above  Reflux - as above  Dysphagia -as above  Bowel habits -as above  GI bleeding - none  NSAID usage - none        ROS:  Constitutional: No fevers, chills, No weight loss  ENT:  As above heartburn as above dysphagia no odynophagia no hoarseness  CV: No chest pain, no palpitation  Pulm: No cough, No shortness of breath, no wheezing  Ophtho: No vision changes  GI: see HPI  Derm: No rash, no itching  Heme: No lymphadenopathy, No easy bruising  MSK:  Some arthritis  : No dysuria, No hematuria  Endo: No hot or cold intolerance  Neuro: No syncope, No seizure, no strokes  Psych: No uncontrolled anxiety, No uncontrolled depression     Interval HPI 04/28/2022:  The patient's last visit with me was on 1/10/2022.    Medical History:  has a past medical history of Acid reflux, Adrenal insufficiency, primary, Arthritis, Asthma, B12 deficiency anemia, Benign essential hypertension (2/7/2021), Blood transfusion (2010), Breast cancer (2/2016), Cataract, Chronic pain, Deep vein thrombosis, Dry eyes, Esophagitis, unspecified, Fibromyalgia, General anesthetics causing adverse effect in therapeutic use, Heart murmur, History of colon polyps, Hyperlipidemia, Hypopituitary dwarfism, Iron deficiency anemia, Lupus, Migraines, neuralgic, Nonspecific ulcerative colitis, OP (osteoporosis), Osteopenia, Pancreatic cyst, Schatzki's ring, Steroid sulfatase deficiency, Ulcerative colitis, and Vitamin D deficiency disease.    Surgical History:  has a past surgical history that includes restorative proctectomy; total abdominal colectomy with ileostomy (2010); Cholecystectomy; Hysterectomy; Oophorectomy (Bilateral); Appendectomy; Breast biopsy (Right, 2/2016); Colon surgery; Breast surgery; Total Reduction Mammoplasty (Left, 2017); Upper endoscopic ultrasound w/ FNA; Endoscopic ultrasound of upper gastrointestinal tract (N/A, 8/6/2018); Esophagogastroduodenoscopy (N/A, 12/10/2018); Flexible sigmoidoscopy (N/A,  "12/10/2018); Mastectomy; Breast reconstruction; Total colectomy; Esophagogastroduodenoscopy (N/A, 12/30/2021); Anorectal manometry (N/A, 1/13/2022); and Ileoscopy (N/A, 2/16/2022).    Family History: family history includes Breast cancer (age of onset: 28) in her cousin; Breast cancer (age of onset: 45) in her other; Breast cancer (age of onset: 50) in her maternal cousin; Breast cancer (age of onset: 55) in her maternal aunt; Cancer in her maternal aunt; Cancer (age of onset: 25) in her other; Cancer (age of onset: 65) in her maternal uncle; Diabetes in her father; Hyperlipidemia in her father and mother; Hypertension in her father; No Known Problems in her brother, maternal grandfather, maternal grandmother, paternal aunt, paternal grandfather, paternal grandmother, paternal uncle, and sister; Pancreatic cancer in her maternal aunt and maternal uncle..     Social History:  reports that she has never smoked. She has never used smokeless tobacco. She reports that she does not drink alcohol and does not use drugs.    Review of patient's allergies indicates:   Allergen Reactions    Aspirin      Other reaction(s): "hemorrhage"  hemorrhage    Hydrocortisone Nausea Only     Other reaction(s): sick  sick    Lactose intolerance (lactase) [lactase] Nausea And Vomiting    Vicodin [hydrocodone-acetaminophen]      Other reaction(s): hyperactivity    Asacol [mesalamine] Hallucinations     Other reaction(s): Hallucinations  hallucinations       Medication List with Changes/Refills   Current Medications    ALBUTEROL (ACCUNEB) 0.63 MG/3 ML NEBU    Take 3 mLs (0.63 mg total) by nebulization every 6 (six) hours as needed.    ALIROCUMAB (PRALUENT PEN) 75 MG/ML PNIJ    Inject 75 mg into the skin every 14 (fourteen) days.    AMLODIPINE (NORVASC) 2.5 MG TABLET    Take 2.5 mg by mouth once daily.    CREON 36,000-114,000- 180,000 UNIT CPDR    TAKE TWO CAPSULES BY MOUTH THREE TIMES DAILY WITH MEALS AND ONE CAPSULE WITH EACH SNACK    " DEXAMETHASONE (DECADRON) 4 MG/ML INJECTION    Inject 1 mL (4 mg total) into the muscle daily as needed (Signs and symtpoms of severe adrenal insufficiency).    DICLOFENAC SODIUM (VOLTAREN) 1 % GEL    APPLY 2 GRAMS TO AFFECTED AREA 4 TIMES A DAY    ERGOCALCIFEROL (VITAMIN D2) 50,000 UNIT CAP    Take 1 capsule (50,000 Units total) by mouth twice a week. Every Monday and Friday    GABAPENTIN (NEURONTIN) 300 MG CAPSULE    TAKE 1 CAPSULE BY MOUTH EVERY EVENING    HEPLISAV-B, PF, 20 MCG/0.5 ML SYRG        HYDROXYCHLOROQUINE (PLAQUENIL) 200 MG TABLET    TAKE 1.5 TABLETS BY MOUTH EVERY DAY    LIDOCAINE (LIDODERM) 5 %    Place 1 patch onto the skin once daily. Remove & Discard patch within 12 hours or as directed by MD    LOPERAMIDE (IMODIUM) 2 MG CAPSULE    TAKE 1 TO 2 CAPSULES BY MOUTH FOUR TIMES DAILY    LOSARTAN (COZAAR) 50 MG TABLET    Take 1 tablet (50 mg total) by mouth once daily.    MULTIVITAMIN/IRON/FOLIC ACID (CENTRUM WOMEN ORAL)    Take 1 tablet by mouth once daily.    NEBIVOLOL (BYSTOLIC) 10 MG TAB    Take 1 tablet by mouth once daily.    NIACIN 100 MG TAB    Take by mouth. 1 Tablet Oral At bedtime    OMEPRAZOLE (PRILOSEC) 20 MG CAPSULE    TAKE 1 CAPSULE BY MOUTH TWICE A DAY    ONDANSETRON (ZOFRAN) 4 MG TABLET    Take 1 tablet (4 mg total) by mouth every 12 (twelve) hours as needed for Nausea.    OXYCODONE-ACETAMINOPHEN (PERCOCET) 5-325 MG PER TABLET    Take 1 tablet by mouth every 12 (twelve) hours as needed for Pain.    POLYCARBOPHIL (FIBERCON) 625 MG TABLET    Take by mouth. 1 Tablet Oral Every 12 hours.  Take with 12 ounces of water with each pill.    POTASSIUM CHLORIDE SA (K-DUR,KLOR-CON) 10 MEQ TABLET    Take 2 tablets (20 mEq total) by mouth once daily.    PREDNISONE (DELTASONE) 5 MG TABLET    Take 1 tablet (5 mg total) by mouth once daily. Double the dose in times of illness    REPATHA SURECLICK 140 MG/ML PNIJ    INJECT 140 MG INTO THE SKIN EVERY 14 (FOURTEEN) DAYS    RESTASIS 0.05 % OPHTHALMIC EMULSION   "  INSTILL 1 DROP INTO BOTH EYES TWICE A DAY    RIZATRIPTAN (MAXALT) 10 MG TABLET    Take 1 tablet (10 mg total) by mouth every 6 (six) hours as needed for Migraine.    ROSUVASTATIN (CRESTOR) 5 MG TABLET    Take 5 mg by mouth once daily.    SAVELLA 100 MG TAB    TAKE 1 TABLET BY MOUTH EVERY DAY    ZOLEDRONIC ACID-MANNITOL & WATER (RECLAST) 5 MG/100 ML PGBK        ZOLPIDEM (AMBIEN) 5 MG TAB    Take 1 tablet (5 mg total) by mouth nightly as needed (insomnia).         Objective Findings:    Vital Signs:  /76 (BP Location: Left arm, Patient Position: Sitting, BP Method: Medium (Automatic))   Pulse 79   Ht 5' 3" (1.6 m)   Wt 58.4 kg (128 lb 12 oz)   BMI 22.81 kg/m²   Body mass index is 22.81 kg/m².    Physical Exam:  General Appearance: Well appearing in no acute distress  Eyes:    No scleral icterus  ENT:  No lesions or masses   Lungs: CTA bilaterally, no wheezes, no rhonchi, no rales  Heart:  S1, S2 normal, no murmurs heard  Abdomen:  Non distended, soft, no guarding, no rebound, no tenderness, no appreciated ascites, no bruits, no hepatosplenomegaly,  No CVA tenderness, no appreciated hernias, no Sales sign no McBurney point tenderness  Musculoskeletal:  No major joint deformities  Skin: No petechiae or rash on exposed skin areas  Neurologic:  Alert and oriented x4  Psychiatric:  Normal speech mentation and affect    Labs:  Lab Results   Component Value Date    WBC 9.19 04/25/2022    HGB 13.0 04/25/2022    HCT 40.8 04/25/2022     (L) 04/25/2022    CHOL 98 (L) 03/14/2022    TRIG 63 03/14/2022    HDL 69 03/14/2022    ALT 40 04/25/2022    AST 24 04/25/2022     04/25/2022    K 3.6 04/25/2022     04/25/2022    CREATININE 0.8 04/25/2022    BUN 18 04/25/2022    CO2 23 04/25/2022    TSH 1.455 12/21/2021    INR 0.9 04/22/2021    HGBA1C 5.9 (H) 03/29/2022           Imaging:    Endoscopy:      Medical Decision Making:  Total time reviewing prior studies EGDs  Pathology MRI CT prior CRS  And IBD " clinic notes discussing it with patient has been 40 minutes  With greater than 50% face-to-face in room with patient answering questions  And discussing follow-up plan  Lab work reviewed  MRI reviewed CT enterography images personally reviewed by myself prior EGD images and path  Reviewed by me case discussed with IBD staff  Lab reviewed  Discuss upcoming studies  Discussed importance of follow-up with pancreas cyst clinic  EXAMINATION:  MRI ABDOMEN WITH AND WO_INC MRCP  1.  Stable 1.0 cm cystic lesion within the pancreatic tail, which appears to connect with the pancreatic duct when reviewed on prior MRCP 2019, favoring a side branch IPMN.  Additional punctate cystic lesion nearby, that may represent an additional side branch IPMN versus simple cyst.     2.  Stable, mild intrahepatic and extrahepatic biliary ductal dilatation, the likely sequela of prior cholecystectomy.     3.  Two stable hepatic hemangiomas and several subcentimeter hepatic lesions that are technically too small to characterize but are favored to represent simple cysts.     4.  Small hiatal hernia.     Electronically signed by: Francisco Chavez MD  Date:                                            01/09/2022     EXAMINATION:  CT ENTEROGRAPHY ABD_PELVIS WITH CONTRAST  Impression:     Patient history of ulcerative colitis status post total colectomy with ileorectal anastomosis.     Moderate distension of the sole rectum noted to the level of the ileorectal anastomosis.  No small bowel stricture identified.  No wall hyperenhancement to suggest active enteritis.     Mild biliary ductal dilation, commonly seen post cholecystectomy, stable.     Stable hepatic hemangiomas.     This report was flagged in Epic as abnormal..     Electronically signed by: Gagandeep Hernadez MD  Date:                                            11/26/2021     The Olympus scope GIF-                          ([id number]) was introduced through the mouth, and                           advanced to the third part of duodenum.   Findings:        The examined duodenum was normal. Biopsies for histology were taken        with a cold forceps for evaluation of celiac disease. Biopsies were        taken with a cold forceps for histology. Estimated blood loss was        minimal.        Patchy mildly erythematous mucosa without bleeding was found in the        gastric antrum and in the prepyloric region of the stomach. Biopsies        were taken with a cold forceps for histology. Biopsies were taken        with a cold forceps for histology. Estimated blood loss was minimal.        The cardia and gastric fundus were normal on retroflexion.        The Z-line was regular and was found 36 cm from the incisors.        A 1 cm hiatal hernia was found. The proximal extent of the gastric        folds (end of tubular esophagus) was 39 cm from the incisors. The        hiatal narrowing was 40 cm from the incisors. The Z-line was 39 cm        from the incisors. Z-line was sharp. No esophagitis and no columnar        esophagus and no lesions seen with NBI or white light.   Impression:            - Normal examined duodenum. Biopsied.                          - Erythematous mucosa in the antrum and prepyloric                          region of the stomach. Biopsied.                          - Z-line regular, 36 cm from the incisors.                          - 1 cm hiatal hernia.   Recommendation:        - Discharge patient to home.                          - Follow an antireflux regimen indefinitely.                          - Await pathology results.                          - Telephone endoscopist for pathology results in 2                          weeks.                          - Return to GI clinic at the next available                          appointment.                          - The findings and recommendations were discussed                          with the patient.   Attending Participation:        I  personally performed the entire procedure.   Reese Villalta MD   12/30/2021 12:38:52 PM      1. DUODENUM, BIOPSY:   Duodenal mucosa with no significant pathologic abnormality   Villous architecture is maintained   2. STOMACH, BIOPSY:   Gastric body and antral mucosa with mild chronic gastritis and reactive   changes   No evidence of intestinal metaplasia, dysplasia or malignancy   No evidence of Helicobacter pylori organisms on H&E stain   3. DISTAL AND PROXIMAL ESOPHAGUS, BIOPSY:   Squamous mucosa with no significant pathologic changes   No evidence of eosinophils      Comment: Interp By Marely Pollock M.D., Signed on 01/05/2022       DISACCHARIDASE ACTIVITY PANEL:   Interpretation   *POSITIVE*   In this sample, the activity of lactase was reduced and consistent with   lactase deficiency. Please contact the Biochemical [employer]etics consultant or   genetic counselor on call ([phone]) if you have any questions.   ADDITIONAL INFORMATION   Colorimetric Enzyme Assay   Reviewed By   Rodney Bob, Ph.D   Report attached.   Performing Site:   [facility]   [facility].  [address] [place] [address]      Comment: Interp By Rick Donnelly M.D., Signed on 01/04/2022      The Olympus scope GIF- ([id number]) was                          introduced through the ileoanal anastomosis via                          the anus and advanced to the distal ileum.   Findings:        The digital rectal exam findings include anal stricture.        The anal canal has an anterior angulation but is patent.        There was a rectal cuff beginning at at 1 cm from the anal verge,        characterized by healthy appearing mucosa. The cuff extended 1 cm in        length. Biopsies were taken with a cold forceps for histology.        There was an ileal pouch at 1 cm from the anal verge. This was        characterized by healthy appearing mucosa. The  pouch size appeared        normal but there is some distortion of the pouch shape with the tip        of the J pouch being proximal to an angulation in the pouch body.        Biopsies were taken with a cold forceps for histology.        The pouch inlet appeared normal.        The pre-pouch ileum appeared normal.        The site of a prior ileostomy was visualized about 30 cm proximal to        the pouch inlet and was normal appearing.   Impression:            - Anal stricture found on digital rectal exam.                          - The anal canal has an anterior angulation but is                          patent.                          - Rectal cuff with healthy appearing mucosa seen.                          Biopsied.                          - Ileal pouch with healthy appearing mucosa seen.                          Biopsied.                          - The pouch inlet is normal.                          - The pre-pouch ileum is normal.                          - The site of a prior ileostomy was visualized                          about 30 cm proximal to the pouch inlet and was                          normal appearing.   Recommendation:        - Discharge patient to home.                          - Resume previous diet today.                          - Continue present medications.                          - Await pathology results.                          - Repeat post-surgical lower GI endoscopy in 2                          years for surveillance.                          - Return to my office as previously scheduled.                          - There were no significant obstructive issues                          noted in the pouch. Suspect her symptoms are due                          to pelvic floor dysfunction. She has started                          pelvic floor therapy and we will follow up to                          assess response.   Attending Participation:        I personally performed the entire  procedure.   Ceferino Rangel MD   2/16/2022      1. Ileal pouch, biopsy:   - Focal active enteritis.   - Negative for dysplasia.   2. Rectal cuff, biopsy:   - Moderate active chronic proctitis.   - Squamous mucosa with reactive change.   - Negative for dysplasia.   - Immunostain for cytomegalovirus is negative, see comment.   COMMENT:  Appropriate positive controls are examined.    Comment: Interp By Tammi Martinez MD, Signed on 02/23/2022       Your biopsies did show some inflammation in the pouch and in the rectal cuff.  I would like to give the pelvic floor therapy more time before we decide if we need to treat this or not.  We will discuss it at your follow-up visit in May.   Written by Ceferino Rangel MD on 2/25/2022    Total time with patient reviewing prior studies imaging path lab work taking history physical exam  Has been 30 minutes with greater than 50 percent of the time face-to-face with patient and room today answering question  And coming up with follow-up management      Dr. Jagdeep Finn's clinic note.  Assessment/Plan:      - Panc cyst  Stable since 2018. No worrisome features.   She has personal and Fhx of breast ca without a known genetic defect. Her Aunt had panc ca.   MRCP in 1 year. If stable, we can go to every 2 years.      Patient seeing endocrinology for her weakness.      I spent a total of 20 minutes on the day of the visit.  This includes face to face time and non-face to face time preparing to see the patient (eg, review of tests), obtaining and/or reviewing separately obtained history, documenting clinical information in the electronic or other health record, independently interpreting results and communicating results to the patient/family/caregiver, or care coordinator.      Assessment:  1. Exocrine pancreatic insufficiency    2. Lactase deficiency    3. Systemic lupus erythematosus, unspecified SLE type, unspecified organ involvement status    4. History of ulcerative  colitis    5. Pelvic floor dysfunction    6. Fibromyalgia    7. Liver hemangioma    8. Pancreatic cyst    9. Family history of pancreatic cancer         Recommendations:  1. History of ulcerative colitis status post total proctocolectomy with J pouch with history of diarrhea abdominal distension abnormal CT enterography  Seen by colon rectal surgery no strictures seen by IBD GI clinic she is doing well with pelvic floor physical therapy it has been her 1st week she scheduled once a week Mondays at 4 p.m. at [facility] through December.  2. Dilated common bile duct and dilated intrahepatic ducts and pancreatic cyst follow back up with pancreatic cyst clinic with Dr. Jagdeep Finn for management and evaluation   3. Benign liver hemangioma  4. Vaccinate for hepatitis A and B  orders have been placed  5. Exocrine pancreatic insufficiency doing well on her Creon 36,000 lipase units per pill taking 2 pills with each meal she has 3 meals a day and 1 pill with each snack she has 1 snack a day.  She she knows not to take this medicine if she skipping meals fasting or not eating.  6. Return to general GI clinic 6 months for follow-up.          Order summary:         Thank you so much for allowing me to participate in the care of Efe Carmona Carlos Manuel Villalta MD

## 2022-04-28 NOTE — TELEPHONE ENCOUNTER
Contact and spoke with patient per Dr. Villalta, please tell patient that her platelets are slightly low I would like her to have a repeat CBC today if she could come in a little early in get those lab work 1st that would be great.     Patient verbalized understanding.

## 2022-04-28 NOTE — TELEPHONE ENCOUNTER
----- Message from Reese Villalta MD sent at 4/28/2022  8:10 AM CDT -----  Steven please tell patient that her platelets are slightly low I would like her to have a repeat CBC today if she could come in a little early in get those lab work 1st that would be great.

## 2022-04-29 LAB
A-TOCOPHEROL VIT E SERPL-MCNC: 1784 UG/DL (ref 500–1800)
ELASTASE 1, FECAL: 388 MCG/G

## 2022-05-02 ENCOUNTER — CLINICAL SUPPORT (OUTPATIENT)
Dept: REHABILITATION | Facility: OTHER | Age: 62
End: 2022-05-02
Attending: INTERNAL MEDICINE
Payer: MEDICARE

## 2022-05-02 DIAGNOSIS — R27.9 LACK OF COORDINATION: Primary | ICD-10-CM

## 2022-05-02 LAB — VIT A SERPL-MCNC: 98 UG/DL (ref 38–106)

## 2022-05-02 PROCEDURE — 97110 THERAPEUTIC EXERCISES: CPT

## 2022-05-02 PROCEDURE — 97140 MANUAL THERAPY 1/> REGIONS: CPT

## 2022-05-02 NOTE — PROGRESS NOTES
Pelvic Health Physical Therapy   Treatment Note     Name: Efe Horowitz  Clinic Number: [id number]    Therapy Diagnosis: No diagnosis found.  Physician: Ceferino Rangel MD    Visit Date: 5/2/2022    Physician Orders: PT Eval and Treat  Medical Diagnosis from Referral: pelvic floor dysfunction  Evaluation Date: 2/14/2022  Authorization Period Expiration: 20 visits through 12/31/22  Plan of Care Expiration: 5/12/22  Visit # / Visits authorized: 2/12     Cancelled Visits: 0  No Show Visits: 0  FOTO: 1 completed 4/25/22 - score Bowel 8%    Time In: 405  Time Out: 500  Total Billable Time: 55 minutes    Precautions: Standard    Subjective     Pt reports: Noticing a difference. Got Metamucil in capsule and its working and not causing grit in the toilet. Stool is getting firmer.   She was compliant with home exercise program.  Response to previous treatment: felt fine  Functional change:  Decreased LLQ, decreased diarrhea    Objective     Efe received therapeutic exercises to develop  strength and endurance for 25 minutes including: Bridging x 20  Hip adduction, 5 sec x 20  Clamshells x 20  Hip abd/er with GTB x 30  PFME - breathing, contractions    Efe received the following manual therapy techniques: to develop flexibility and extensibility for 30 minutes including: trigger point/myofascial release of pelvic floor mm and scar mobilization of intrarectal scar tissue    Efe participated in neuromuscular re-education activities to develop Coordination and Control for 00 minutes including: not performed today    Efe participated in dynamic functional therapeutic activities to improve functional performance for 00 minutes, including:  Discussed stool consistency - reviewed everything she has tried and decided for her to try Metamucil in capsule form next    Home Exercises Provided and Patient Education Provided     Education provided:   - anatomy/physiology of pelvic floor, diaphragmatic breathing,  kegels and fluid intake/dietary modifications  Discussed progression of plan of care with patient; educated pt in activity modification; reviewed HEP with pt. Pt demonstrated and verbalized understanding of all instruction and was provided with a handout of HEP (see Patient Instructions).    Written Home Exercises Provided: yes.  Exercises were reviewed and Efe was able to demonstrate them prior to the end of the session.  Efe demonstrated good  understanding of the education provided.     See EMR under Patient Instructions for exercises provided 4/25/2022.    Assessment     Pt is responding well to therapy with improving stool consistency and decreased pain. Excellent demo of PFME and improving mobility of scar tissue. Progressed exercises with good tolerance.   Efe Is progressing well towards her goals.   Pt prognosis is Excellent.     Pt will continue to benefit from skilled outpatient physical therapy to address the deficits listed in the problem list box on initial evaluation, provide pt/family education and to maximize pt's level of independence in the home and community environment.     Pt's spiritual, cultural and educational needs considered and pt agreeable to plan of care and goals.     Anticipated barriers to physical therapy: none    Goals: Short Term Goals: 4 weeks   Pt indep in HEP  Pt able to lengthen pelvic floor mm appropriately for BM without strain     Long Term Goals: 8 weeks   Pt indep in progressive HEP  Pt reports 0 episodes of fecal leakage in at least 4 weeks for improved ADL participation  Pt reports improved stool consistency at least Type 5 or higher on [place] stool scale with 75% of BMs     Plan      Plan of care Certification: 2/14/2022 to 5/12/22.     Outpatient Physical Therapy 1 times weekly for 12 weeks to include the following interventions: therapeutic exercises, therapeutic activity, neuromuscular re-education, manual therapy, patient/family education and self  care/home management    Next visit: review and progress hip exercises, scar tissue mob, review stool consistency    Aaliyah Bentley, PT

## 2022-05-09 ENCOUNTER — CLINICAL SUPPORT (OUTPATIENT)
Dept: REHABILITATION | Facility: OTHER | Age: 62
End: 2022-05-09
Payer: MEDICARE

## 2022-05-09 DIAGNOSIS — R27.9 LACK OF COORDINATION: Primary | ICD-10-CM

## 2022-05-09 PROCEDURE — 97530 THERAPEUTIC ACTIVITIES: CPT

## 2022-05-09 PROCEDURE — 97140 MANUAL THERAPY 1/> REGIONS: CPT

## 2022-05-09 PROCEDURE — 97110 THERAPEUTIC EXERCISES: CPT

## 2022-05-09 NOTE — PROGRESS NOTES
Pelvic Health Physical Therapy   Recertification Note     Name: Efe Horowitz  Clinic Number: [id number]    Therapy Diagnosis:   Encounter Diagnosis   Name Primary?    Lack of coordination Yes     Physician: Ceferino Rangel MD    Visit Date: 5/9/2022    Physician Orders: PT Eval and Treat  Medical Diagnosis from Referral: pelvic floor dysfunction  Evaluation Date: 2/14/2022  Authorization Period Expiration: 20 visits through 12/31/22  Plan of Care Expiration:  5/9/2022 to 8/7/22.  Visit # / Visits authorized: 3/12     Cancelled Visits: 0  No Show Visits: 0  FOTO: 1 completed 4/25/22 - score Bowel 8%    Time In: 415 (pt arrived late)  Time Out: 508  Total Billable Time: 53 minutes    Precautions: Standard    Subjective     Pt reports: Seeing endocrinologist on 5/23, concern over platelets issue. Still taking Metamucil capsules and that is working well. Reduced frequency of BMs. Has been taking one capsule of Metamucil, considering to increase to two capsules. Hasnt had any leakage but has been watching fruit intake. May try to add fruits now that she is taking Metamucil.   She was compliant with home exercise program.  Response to previous treatment: felt fine  Functional change:  Decreased LLQ, decreased diarrhea    Objective     INTERNAL ASSESSMENT - pelvic floor mm  Muscle Power: 4/5  Muscle Endurance: 30 sec    Efe received therapeutic exercises to develop  strength and endurance for 23 minutes including:   Bridging + hip adduction, 5 sec x 20  Clamshells x 20  Reverse clamshells x 20  SL hip circles x 20  PFME - breathing, contractions    Efe received the following manual therapy techniques: to develop flexibility and extensibility for 15 minutes including: trigger point/myofascial release of pelvic floor mm and scar mobilization of intrarectal scar tissue    Efe participated in neuromuscular re-education activities to develop Coordination and Control for 00 minutes including: not performed  today    Efe participated in dynamic functional therapeutic activities to improve functional performance for 15 minutes, including:  Discussed stool consistency - increasing metamucil to 2 capsules    Home Exercises Provided and Patient Education Provided     Education provided:   - anatomy/physiology of pelvic floor, diaphragmatic breathing, kegels and fluid intake/dietary modifications  Discussed progression of plan of care with patient; educated pt in activity modification; reviewed HEP with pt. Pt demonstrated and verbalized understanding of all instruction and was provided with a handout of HEP (see Patient Instructions).    Written Home Exercises Provided: yes.  Exercises were reviewed and Efe was able to demonstrate them prior to the end of the session.  Efe demonstrated good  understanding of the education provided.     See EMR under Patient Instructions for exercises provided 4/25/2022.    Assessment     Pt is progressing well so far with improving pelvic floor mm function and improving symptoms of decreased fecal urgency, frequency and incontinence. Due to scheduling difficulties have only had a few therapy appts in initial POC time frame. She will benefit from ongoing care to continue progressing towards LTGs, thus POC period being extended another 3 month duration.  Efe Is progressing well towards her goals.   Pt prognosis is Excellent.     Pt will continue to benefit from skilled outpatient physical therapy to address the deficits listed in the problem list box on initial evaluation, provide pt/family education and to maximize pt's level of independence in the home and community environment.     Pt's spiritual, cultural and educational needs considered and pt agreeable to plan of care and goals.     Anticipated barriers to physical therapy: none    Goals: Short Term Goals: 4 weeks   Pt indep in HEP  Pt able to lengthen pelvic floor mm appropriately for BM without strain     Long Term Goals:  8 weeks   Pt indep in progressive HEP  Pt reports 0 episodes of fecal leakage in at least 4 weeks for improved ADL participation  Pt reports improved stool consistency at least Type 5 or higher on [place] stool scale with 75% of BMs     Plan      Plan of care Certification: 5/9/2022 to 8/7/22.     Outpatient Physical Therapy 1 times weekly for 12 weeks to include the following interventions: therapeutic exercises, therapeutic activity, neuromuscular re-education, manual therapy, patient/family education and self care/home management    Next visit: progress core and hip strengthening, scar mob, review and progress PFME as appropriate, monitor stool consistency and adjust program accordingly    Aaliyah Bentley, PT

## 2022-05-11 ENCOUNTER — TELEPHONE (OUTPATIENT)
Dept: GASTROENTEROLOGY | Facility: CLINIC | Age: 62
End: 2022-05-11
Payer: MEDICARE

## 2022-05-13 ENCOUNTER — TELEPHONE (OUTPATIENT)
Dept: GASTROENTEROLOGY | Facility: CLINIC | Age: 62
End: 2022-05-13
Payer: MEDICARE

## 2022-05-13 NOTE — TELEPHONE ENCOUNTER
Spoke with patient and advised Dr. Rangel out of clinic today and someone would be in contact soon to reschedule.  Pt verbalized understanding to all and has no further questions at this time.

## 2022-05-13 NOTE — TELEPHONE ENCOUNTER
Spoke with patient and rescheduled appointment for 6/13/2022 at 9:30 am.  Pt verbalized understanding to all and has no further questions at this time.

## 2022-05-23 ENCOUNTER — CLINICAL SUPPORT (OUTPATIENT)
Dept: REHABILITATION | Facility: OTHER | Age: 62
End: 2022-05-23
Attending: INTERNAL MEDICINE
Payer: MEDICARE

## 2022-05-23 DIAGNOSIS — R27.9 LACK OF COORDINATION: Primary | ICD-10-CM

## 2022-05-23 PROCEDURE — 97110 THERAPEUTIC EXERCISES: CPT

## 2022-05-23 PROCEDURE — 97140 MANUAL THERAPY 1/> REGIONS: CPT

## 2022-05-23 PROCEDURE — 97530 THERAPEUTIC ACTIVITIES: CPT

## 2022-05-23 NOTE — PROGRESS NOTES
Pelvic Health Physical Therapy   Treatment Note     Name: Efe Horowitz  Clinic Number: [id number]    Therapy Diagnosis:   Encounter Diagnosis   Name Primary?    Lack of coordination Yes     Physician: Ceferino Rangel MD    Visit Date: 5/23/2022    Physician Orders: PT Eval and Treat  Medical Diagnosis from Referral: pelvic floor dysfunction  Evaluation Date: 2/14/2022  Authorization Period Expiration: 20 visits through 12/31/22  Plan of Care Expiration:  5/9/2022 to 8/7/22.  Visit # / Visits authorized: 4/12     Cancelled Visits: 0  No Show Visits: 0  FOTO: 1 completed 4/25/22 - score Bowel 8%  2 completed 5/23/22 - score 0%  3 due on discharge    Time In: 4:15 (pt arrived late)  Time Out: 510  Total Billable Time: 55 minutes    Precautions: Standard    Subjective     Pt reports: Still having pain in stomach, only in evenings. Taking two Metamucil pills. It is more formed than it used to be, but still not consistent, can still be loose. Did have bright blood in stool but just one time. Having BM every time she urinates, stool gets looser later in the day. FI occurring 1-2x/week if drinks a lot of water.   She was compliant with home exercise program.  Response to previous treatment: felt fine  Functional change:  Decreased LLQ, decreased diarrhea    Objective     INTERNAL ASSESSMENT - pelvic floor mm  Muscle Power: 4/5  Muscle Endurance: 30 sec    Efe received therapeutic exercises to develop  strength and endurance for 30 minutes including:   Hip adduction, 5 sec x 10  Bridging + hip adduction, 5 sec x 20  Bridging + hip abduction, RTB x 20  Clamshells x 20  Reverse clamshells x 20  PFME - breathing, contractions    Efe received the following manual therapy techniques: to develop flexibility and extensibility for 15 minutes including:   Mobilization abd wall    Efe participated in neuromuscular re-education activities to develop Coordination and Control for 00 minutes including: not performed  today    Efe participated in dynamic functional therapeutic activities to improve functional performance for 10 minutes, including:  LH pelvic floor mm contractions to suppress fecal urgency    Home Exercises Provided and Patient Education Provided     Education provided:   - anatomy/physiology of pelvic floor, diaphragmatic breathing, kegels and fluid intake/dietary modifications  Discussed progression of plan of care with patient; educated pt in activity modification; reviewed HEP with pt. Pt demonstrated and verbalized understanding of all instruction and was provided with a handout of HEP (see Patient Instructions).    Written Home Exercises Provided: yes.  Exercises were reviewed and Efe was able to demonstrate them prior to the end of the session.  Efe demonstrated good  understanding of the education provided.     See EMR under Patient Instructions for exercises provided 4/25/2022.    Assessment     Some improvements with stool consistency, however, not yet resulting in decreased FI. Continued to progress strengthening today and believe that may help. Instructed in long hold pelvic floor mm contractions to suppress fecal urgency.   Efe Is progressing well towards her goals.   Pt prognosis is Excellent.     Pt will continue to benefit from skilled outpatient physical therapy to address the deficits listed in the problem list box on initial evaluation, provide pt/family education and to maximize pt's level of independence in the home and community environment.     Pt's spiritual, cultural and educational needs considered and pt agreeable to plan of care and goals.     Anticipated barriers to physical therapy: none    Goals: Short Term Goals: 4 weeks   Pt indep in HEP  Pt able to lengthen pelvic floor mm appropriately for BM without strain     Long Term Goals: 8 weeks   Pt indep in progressive HEP  Pt reports 0 episodes of fecal leakage in at least 4 weeks for improved ADL participation  Pt reports  improved stool consistency at least Type 5 or higher on [place] stool scale with 75% of BMs     Plan      Plan of care Certification: 5/9/2022 to 8/7/22.     Outpatient Physical Therapy 1 times weekly for 12 weeks to include the following interventions: therapeutic exercises, therapeutic activity, neuromuscular re-education, manual therapy, patient/family education and self care/home management    Next visit: progressive strengthening    Aaliyah Bentley, PT

## 2022-05-24 ENCOUNTER — OFFICE VISIT (OUTPATIENT)
Dept: ENDOCRINOLOGY | Facility: CLINIC | Age: 62
End: 2022-05-24
Payer: MEDICARE

## 2022-05-24 VITALS
DIASTOLIC BLOOD PRESSURE: 98 MMHG | RESPIRATION RATE: 16 BRPM | BODY MASS INDEX: 22.18 KG/M2 | HEIGHT: 64 IN | SYSTOLIC BLOOD PRESSURE: 148 MMHG | WEIGHT: 129.94 LBS | HEART RATE: 72 BPM | OXYGEN SATURATION: 99 %

## 2022-05-24 DIAGNOSIS — Z79.52 LONG TERM CURRENT USE OF SYSTEMIC STEROIDS: Primary | ICD-10-CM

## 2022-05-24 DIAGNOSIS — E27.49 SECONDARY ADRENAL INSUFFICIENCY: ICD-10-CM

## 2022-05-24 DIAGNOSIS — M80.00XG AGE-RELATED OSTEOPOROSIS WITH CURRENT PATHOLOGICAL FRACTURE WITH DELAYED HEALING: ICD-10-CM

## 2022-05-24 PROCEDURE — 99214 PR OFFICE/OUTPT VISIT, EST, LEVL IV, 30-39 MIN: ICD-10-PCS | Mod: S$GLB,,, | Performed by: INTERNAL MEDICINE

## 2022-05-24 PROCEDURE — 3080F PR MOST RECENT DIASTOLIC BLOOD PRESSURE >= 90 MM HG: ICD-10-PCS | Mod: CPTII,S$GLB,, | Performed by: INTERNAL MEDICINE

## 2022-05-24 PROCEDURE — 99999 PR PBB SHADOW E&M-EST. PATIENT-LVL III: CPT | Mod: PBBFAC,,, | Performed by: INTERNAL MEDICINE

## 2022-05-24 PROCEDURE — 99999 PR PBB SHADOW E&M-EST. PATIENT-LVL III: ICD-10-PCS | Mod: PBBFAC,,, | Performed by: INTERNAL MEDICINE

## 2022-05-24 PROCEDURE — 4010F PR ACE/ARB THEARPY RXD/TAKEN: ICD-10-PCS | Mod: CPTII,S$GLB,, | Performed by: INTERNAL MEDICINE

## 2022-05-24 PROCEDURE — 99214 OFFICE O/P EST MOD 30 MIN: CPT | Mod: S$GLB,,, | Performed by: INTERNAL MEDICINE

## 2022-05-24 PROCEDURE — 3077F PR MOST RECENT SYSTOLIC BLOOD PRESSURE >= 140 MM HG: ICD-10-PCS | Mod: CPTII,S$GLB,, | Performed by: INTERNAL MEDICINE

## 2022-05-24 PROCEDURE — 1160F RVW MEDS BY RX/DR IN RCRD: CPT | Mod: CPTII,S$GLB,, | Performed by: INTERNAL MEDICINE

## 2022-05-24 PROCEDURE — 3077F SYST BP >= 140 MM HG: CPT | Mod: CPTII,S$GLB,, | Performed by: INTERNAL MEDICINE

## 2022-05-24 PROCEDURE — 3044F HG A1C LEVEL LT 7.0%: CPT | Mod: CPTII,S$GLB,, | Performed by: INTERNAL MEDICINE

## 2022-05-24 PROCEDURE — 3066F PR DOCUMENTATION OF TREATMENT FOR NEPHROPATHY: ICD-10-PCS | Mod: CPTII,S$GLB,, | Performed by: INTERNAL MEDICINE

## 2022-05-24 PROCEDURE — 1160F PR REVIEW ALL MEDS BY PRESCRIBER/CLIN PHARMACIST DOCUMENTED: ICD-10-PCS | Mod: CPTII,S$GLB,, | Performed by: INTERNAL MEDICINE

## 2022-05-24 PROCEDURE — 3008F BODY MASS INDEX DOCD: CPT | Mod: CPTII,S$GLB,, | Performed by: INTERNAL MEDICINE

## 2022-05-24 PROCEDURE — 3044F PR MOST RECENT HEMOGLOBIN A1C LEVEL <7.0%: ICD-10-PCS | Mod: CPTII,S$GLB,, | Performed by: INTERNAL MEDICINE

## 2022-05-24 PROCEDURE — 3080F DIAST BP >= 90 MM HG: CPT | Mod: CPTII,S$GLB,, | Performed by: INTERNAL MEDICINE

## 2022-05-24 PROCEDURE — 4010F ACE/ARB THERAPY RXD/TAKEN: CPT | Mod: CPTII,S$GLB,, | Performed by: INTERNAL MEDICINE

## 2022-05-24 PROCEDURE — 1159F PR MEDICATION LIST DOCUMENTED IN MEDICAL RECORD: ICD-10-PCS | Mod: CPTII,S$GLB,, | Performed by: INTERNAL MEDICINE

## 2022-05-24 PROCEDURE — 1159F MED LIST DOCD IN RCRD: CPT | Mod: CPTII,S$GLB,, | Performed by: INTERNAL MEDICINE

## 2022-05-24 PROCEDURE — 3066F NEPHROPATHY DOC TX: CPT | Mod: CPTII,S$GLB,, | Performed by: INTERNAL MEDICINE

## 2022-05-24 PROCEDURE — 3008F PR BODY MASS INDEX (BMI) DOCUMENTED: ICD-10-PCS | Mod: CPTII,S$GLB,, | Performed by: INTERNAL MEDICINE

## 2022-05-24 NOTE — ASSESSMENT & PLAN NOTE
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

## 2022-05-24 NOTE — PROGRESS NOTES
"Subjective:      Patient ID: Efe Horowitz is a 62 y.o. female.    Chief Complaint:  Follow-up      History of Present Illness  Ms. Horowitz is a 62 year old woman who is here for weakness that is relatively new in onset, with a chronic history of secondary AI (steroid use for UC) and osteoporosis that is complicated by vertebral fracture.     Weakness: Gets up in the morning and feels weak. Does not have energy.   Sleeps well at night. Seen in ER/urgent care and given high dose of steroids and discharged on prednisone to 10 mg daily three weeks ago. Her symptoms did not improve on higher doses of prednisone.      Denies vomiting diarrhea, fever or flu like illness   Reports some stomach issues.   No significant change to weight (up to 2lbs from 9/21 visit).    Osteoporosis diagnosed twenty years ago  Completed  forteo ( 9/2019 -  2021)   10/2021 --> reclast infusion -- no side effects.      Previously used:  reclast 2013, 2014, 2017     kyphoplasty 9/25/2019     Supplememts:  Calcium: one tablet daily   Diet: broccoli, dark leafy greens almost daily   Vitamin D stopped   MVI - has vitamin D      Falls: no   Fractures: vertebral  Balance: pretty good.      Has medic alert tag that reads adrenal gland deficiency  Steroid injection      Review of Systems  No recent illness     Objective:   Physical Exam  Vitals:    05/24/22 1056   BP: (!) 148/98   Pulse: 72   Resp: 16   SpO2: 99%   Weight: 59 kg (129 lb 15.4 oz)   Height: 5' 4" (1.626 m)       BP Readings from Last 3 Encounters:   05/24/22 (!) 148/98   04/28/22 121/76   04/21/22 (!) 140/85     Wt Readings from Last 1 Encounters:   05/24/22 1056 59 kg (129 lb 15.4 oz)         Body mass index is 22.31 kg/m².    Lab Review:   Lab Results   Component Value Date    HGBA1C 5.9 (H) 03/29/2022     Lab Results   Component Value Date    CHOL 98 (L) 03/14/2022    HDL 69 03/14/2022    LDLCALC 16.4 (L) 03/14/2022    TRIG 63 03/14/2022    CHOLHDL 70.4 (H) 03/14/2022     Lab " Results   Component Value Date     04/25/2022    K 3.6 04/25/2022     04/25/2022    CO2 23 04/25/2022    GLU 77 04/25/2022    BUN 18 04/25/2022    CREATININE 0.8 04/25/2022    CALCIUM 9.9 04/25/2022    PROT 7.2 04/25/2022    ALBUMIN 3.9 04/25/2022    BILITOT 0.5 04/25/2022    ALKPHOS 56 04/25/2022    AST 24 04/25/2022    ALT 40 04/25/2022    ANIONGAP 13 04/25/2022    ESTGFRAFRICA >60.0 04/25/2022    EGFRNONAA >60.0 04/25/2022    TSH 1.455 12/21/2021      Latest Reference Range & Units 04/25/22 09:37   Vit D, 25-Hydroxy 30 - 96 ng/mL 42       Assessment and Plan     Age-related osteoporosis with current pathological fracture with delayed healing  No falls or fractures   Risk factors: previous fracture, daily prednisone  Continue ca/vitamin D    Received reclast X 1 last year  Does not need a second dose at this time   Repeat DXA scan in 2023    Long term current use of systemic steroids  Can switch to HC but not sure if UC/lupus requires steroid with antiinflammatory activity   If that is the case, would continue prednisone 5 mg as this is more than enough to provide physiologic steroid activity     Have discussed plan to reduce pred to 5 mg slowly.     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

## 2022-05-24 NOTE — ASSESSMENT & PLAN NOTE
No falls or fractures   Risk factors: previous fracture, daily prednisone  Continue ca/vitamin D    Received reclast X 1 last year  Does not need a second dose at this time   Repeat DXA scan in 2023

## 2022-05-24 NOTE — ASSESSMENT & PLAN NOTE
Can switch to HC but not sure if UC/lupus requires steroid with antiinflammatory activity   If that is the case, would continue prednisone 5 mg as this is more than enough to provide physiologic steroid activity     Have discussed plan to reduce pred to 5 mg slowly.     Have reached out to GI and rheum

## 2022-05-25 ENCOUNTER — TELEPHONE (OUTPATIENT)
Dept: ENDOCRINOLOGY | Facility: HOSPITAL | Age: 62
End: 2022-05-25
Payer: MEDICARE

## 2022-05-25 ENCOUNTER — TELEPHONE (OUTPATIENT)
Dept: GASTROENTEROLOGY | Facility: CLINIC | Age: 62
End: 2022-05-25
Payer: MEDICARE

## 2022-05-25 NOTE — TELEPHONE ENCOUNTER
Dr. Villalta please advise. Return call to patient. Patient stated that she need for Dr. Villalta to look back into her chart to see what steroid medication she is allergic to. Patient stated that she recently seen Dr. Srinivasan and Dr. Srinivasan inform patient to contact Dr. Villalta and Dr. Starkey to find out what steroid she can use.

## 2022-05-25 NOTE — TELEPHONE ENCOUNTER
Reviewed patient's chart  Single note from dr. Starkey reports that hydrocortisone use may have provoked a UC flare and was unable to switch     Unclear if this is still the case  However, patient does not want to switch to HC  Ok from my standpoint to continue pred but would be careful with dose increases

## 2022-05-25 NOTE — TELEPHONE ENCOUNTER
----- Message from Irene Delarosa sent at 5/25/2022  3:43 PM CDT -----  Regarding: zackk with nurse  Contact: patient  [phone]   please call patient have question for the doctor concerning a medication waiting on a call back soon thanks.

## 2022-05-26 ENCOUNTER — LAB VISIT (OUTPATIENT)
Dept: LAB | Facility: HOSPITAL | Age: 62
End: 2022-05-26
Attending: INTERNAL MEDICINE
Payer: MEDICARE

## 2022-05-26 ENCOUNTER — OFFICE VISIT (OUTPATIENT)
Dept: RHEUMATOLOGY | Facility: CLINIC | Age: 62
End: 2022-05-26
Payer: MEDICARE

## 2022-05-26 VITALS
SYSTOLIC BLOOD PRESSURE: 137 MMHG | HEIGHT: 64 IN | DIASTOLIC BLOOD PRESSURE: 91 MMHG | BODY MASS INDEX: 20.7 KG/M2 | WEIGHT: 121.25 LBS | HEART RATE: 95 BPM

## 2022-05-26 DIAGNOSIS — D84.9 IMMUNOSUPPRESSION: ICD-10-CM

## 2022-05-26 DIAGNOSIS — Z79.899 LONG-TERM USE OF PLAQUENIL: ICD-10-CM

## 2022-05-26 DIAGNOSIS — M32.9 SYSTEMIC LUPUS ERYTHEMATOSUS, UNSPECIFIED SLE TYPE, UNSPECIFIED ORGAN INVOLVEMENT STATUS: ICD-10-CM

## 2022-05-26 DIAGNOSIS — M79.7 FIBROMYALGIA: ICD-10-CM

## 2022-05-26 DIAGNOSIS — Z79.52 LONG TERM CURRENT USE OF SYSTEMIC STEROIDS: Primary | ICD-10-CM

## 2022-05-26 LAB
ALBUMIN SERPL BCP-MCNC: 3.8 G/DL (ref 3.5–5.2)
ALP SERPL-CCNC: 56 U/L (ref 55–135)
ALT SERPL W/O P-5'-P-CCNC: 37 U/L (ref 10–44)
ANION GAP SERPL CALC-SCNC: 13 MMOL/L (ref 8–16)
AST SERPL-CCNC: 29 U/L (ref 10–40)
BASOPHILS # BLD AUTO: 0.05 K/UL (ref 0–0.2)
BASOPHILS NFR BLD: 0.5 % (ref 0–1.9)
BILIRUB SERPL-MCNC: 0.4 MG/DL (ref 0.1–1)
BUN SERPL-MCNC: 14 MG/DL (ref 8–23)
C3 SERPL-MCNC: 112 MG/DL (ref 50–180)
C4 SERPL-MCNC: 35 MG/DL (ref 11–44)
CALCIUM SERPL-MCNC: 9.6 MG/DL (ref 8.7–10.5)
CHLORIDE SERPL-SCNC: 103 MMOL/L (ref 95–110)
CK SERPL-CCNC: 254 U/L (ref 20–180)
CO2 SERPL-SCNC: 26 MMOL/L (ref 23–29)
CREAT SERPL-MCNC: 0.9 MG/DL (ref 0.5–1.4)
CRP SERPL-MCNC: 1.5 MG/L (ref 0–8.2)
DIFFERENTIAL METHOD: ABNORMAL
EOSINOPHIL # BLD AUTO: 0.1 K/UL (ref 0–0.5)
EOSINOPHIL NFR BLD: 0.6 % (ref 0–8)
ERYTHROCYTE [DISTWIDTH] IN BLOOD BY AUTOMATED COUNT: 16.3 % (ref 11.5–14.5)
ERYTHROCYTE [SEDIMENTATION RATE] IN BLOOD BY WESTERGREN METHOD: 10 MM/HR (ref 0–36)
EST. GFR  (AFRICAN AMERICAN): >60 ML/MIN/1.73 M^2
EST. GFR  (NON AFRICAN AMERICAN): >60 ML/MIN/1.73 M^2
GLUCOSE SERPL-MCNC: 88 MG/DL (ref 70–110)
HCT VFR BLD AUTO: 39.3 % (ref 37–48.5)
HGB BLD-MCNC: 12.3 G/DL (ref 12–16)
IMM GRANULOCYTES # BLD AUTO: 0.22 K/UL (ref 0–0.04)
IMM GRANULOCYTES NFR BLD AUTO: 2.2 % (ref 0–0.5)
LYMPHOCYTES # BLD AUTO: 1.4 K/UL (ref 1–4.8)
LYMPHOCYTES NFR BLD: 13.7 % (ref 18–48)
MCH RBC QN AUTO: 27.8 PG (ref 27–31)
MCHC RBC AUTO-ENTMCNC: 31.3 G/DL (ref 32–36)
MCV RBC AUTO: 89 FL (ref 82–98)
MONOCYTES # BLD AUTO: 0.7 K/UL (ref 0.3–1)
MONOCYTES NFR BLD: 7.1 % (ref 4–15)
NEUTROPHILS # BLD AUTO: 7.5 K/UL (ref 1.8–7.7)
NEUTROPHILS NFR BLD: 75.9 % (ref 38–73)
NRBC BLD-RTO: 0 /100 WBC
PLATELET # BLD AUTO: 282 K/UL (ref 150–450)
PLATELET BLD QL SMEAR: ABNORMAL
PMV BLD AUTO: 9.9 FL (ref 9.2–12.9)
POTASSIUM SERPL-SCNC: 3.8 MMOL/L (ref 3.5–5.1)
PROT SERPL-MCNC: 6.9 G/DL (ref 6–8.4)
RBC # BLD AUTO: 4.42 M/UL (ref 4–5.4)
SODIUM SERPL-SCNC: 142 MMOL/L (ref 136–145)
WBC # BLD AUTO: 9.84 K/UL (ref 3.9–12.7)

## 2022-05-26 PROCEDURE — 86160 COMPLEMENT ANTIGEN: CPT | Mod: 59 | Performed by: INTERNAL MEDICINE

## 2022-05-26 PROCEDURE — 1160F RVW MEDS BY RX/DR IN RCRD: CPT | Mod: CPTII,S$GLB,, | Performed by: INTERNAL MEDICINE

## 2022-05-26 PROCEDURE — 80053 COMPREHEN METABOLIC PANEL: CPT | Performed by: INTERNAL MEDICINE

## 2022-05-26 PROCEDURE — 1159F PR MEDICATION LIST DOCUMENTED IN MEDICAL RECORD: ICD-10-PCS | Mod: CPTII,S$GLB,, | Performed by: INTERNAL MEDICINE

## 2022-05-26 PROCEDURE — 3044F PR MOST RECENT HEMOGLOBIN A1C LEVEL <7.0%: ICD-10-PCS | Mod: CPTII,S$GLB,, | Performed by: INTERNAL MEDICINE

## 2022-05-26 PROCEDURE — 99214 OFFICE O/P EST MOD 30 MIN: CPT | Mod: S$GLB,,, | Performed by: INTERNAL MEDICINE

## 2022-05-26 PROCEDURE — 99999 PR PBB SHADOW E&M-EST. PATIENT-LVL III: CPT | Mod: PBBFAC,,, | Performed by: INTERNAL MEDICINE

## 2022-05-26 PROCEDURE — 3080F DIAST BP >= 90 MM HG: CPT | Mod: CPTII,S$GLB,, | Performed by: INTERNAL MEDICINE

## 2022-05-26 PROCEDURE — 1159F MED LIST DOCD IN RCRD: CPT | Mod: CPTII,S$GLB,, | Performed by: INTERNAL MEDICINE

## 2022-05-26 PROCEDURE — 99999 PR PBB SHADOW E&M-EST. PATIENT-LVL III: ICD-10-PCS | Mod: PBBFAC,,, | Performed by: INTERNAL MEDICINE

## 2022-05-26 PROCEDURE — 36415 COLL VENOUS BLD VENIPUNCTURE: CPT | Performed by: INTERNAL MEDICINE

## 2022-05-26 PROCEDURE — 99499 RISK ADDL DX/OHS AUDIT: ICD-10-PCS | Mod: S$GLB,,, | Performed by: INTERNAL MEDICINE

## 2022-05-26 PROCEDURE — 86160 COMPLEMENT ANTIGEN: CPT | Performed by: INTERNAL MEDICINE

## 2022-05-26 PROCEDURE — 86225 DNA ANTIBODY NATIVE: CPT | Performed by: INTERNAL MEDICINE

## 2022-05-26 PROCEDURE — 3075F SYST BP GE 130 - 139MM HG: CPT | Mod: CPTII,S$GLB,, | Performed by: INTERNAL MEDICINE

## 2022-05-26 PROCEDURE — 85652 RBC SED RATE AUTOMATED: CPT | Performed by: INTERNAL MEDICINE

## 2022-05-26 PROCEDURE — 3066F NEPHROPATHY DOC TX: CPT | Mod: CPTII,S$GLB,, | Performed by: INTERNAL MEDICINE

## 2022-05-26 PROCEDURE — 1160F PR REVIEW ALL MEDS BY PRESCRIBER/CLIN PHARMACIST DOCUMENTED: ICD-10-PCS | Mod: CPTII,S$GLB,, | Performed by: INTERNAL MEDICINE

## 2022-05-26 PROCEDURE — 4010F PR ACE/ARB THEARPY RXD/TAKEN: ICD-10-PCS | Mod: CPTII,S$GLB,, | Performed by: INTERNAL MEDICINE

## 2022-05-26 PROCEDURE — 3066F PR DOCUMENTATION OF TREATMENT FOR NEPHROPATHY: ICD-10-PCS | Mod: CPTII,S$GLB,, | Performed by: INTERNAL MEDICINE

## 2022-05-26 PROCEDURE — 3008F PR BODY MASS INDEX (BMI) DOCUMENTED: ICD-10-PCS | Mod: CPTII,S$GLB,, | Performed by: INTERNAL MEDICINE

## 2022-05-26 PROCEDURE — 3008F BODY MASS INDEX DOCD: CPT | Mod: CPTII,S$GLB,, | Performed by: INTERNAL MEDICINE

## 2022-05-26 PROCEDURE — 86140 C-REACTIVE PROTEIN: CPT | Performed by: INTERNAL MEDICINE

## 2022-05-26 PROCEDURE — 3044F HG A1C LEVEL LT 7.0%: CPT | Mod: CPTII,S$GLB,, | Performed by: INTERNAL MEDICINE

## 2022-05-26 PROCEDURE — 85025 COMPLETE CBC W/AUTO DIFF WBC: CPT | Performed by: INTERNAL MEDICINE

## 2022-05-26 PROCEDURE — 4010F ACE/ARB THERAPY RXD/TAKEN: CPT | Mod: CPTII,S$GLB,, | Performed by: INTERNAL MEDICINE

## 2022-05-26 PROCEDURE — 3075F PR MOST RECENT SYSTOLIC BLOOD PRESS GE 130-139MM HG: ICD-10-PCS | Mod: CPTII,S$GLB,, | Performed by: INTERNAL MEDICINE

## 2022-05-26 PROCEDURE — 3080F PR MOST RECENT DIASTOLIC BLOOD PRESSURE >= 90 MM HG: ICD-10-PCS | Mod: CPTII,S$GLB,, | Performed by: INTERNAL MEDICINE

## 2022-05-26 PROCEDURE — 99214 PR OFFICE/OUTPT VISIT, EST, LEVL IV, 30-39 MIN: ICD-10-PCS | Mod: S$GLB,,, | Performed by: INTERNAL MEDICINE

## 2022-05-26 PROCEDURE — 99499 UNLISTED E&M SERVICE: CPT | Mod: S$GLB,,, | Performed by: INTERNAL MEDICINE

## 2022-05-26 PROCEDURE — 82550 ASSAY OF CK (CPK): CPT | Performed by: INTERNAL MEDICINE

## 2022-05-26 NOTE — PROGRESS NOTES
Subjective:       Patient ID: Efe Horowitz is a 62 y.o. female.    Chief Complaint: Lupus    HPI:  Efe Horowitz is a 62 y.o. female with history of lupus since age 39.   Her manifestations include joint pains, headache, positive DERRICK, and a   double-stranded DNA. She also has an equivocal anticardiolipin IgM.   She has been treated with Plaquenil 1-1/2 tablets daily. Eye exam 12/2017 was normal.      In addition to lupus, she has a   history of ulcerative colitis, osteopenia, fibromyalgia as well.   In 2009 she had a colectomy with ileostomy and in May 2011 the ileostomy  was closed. History of bilateral carpal tunnel.       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  She was treated with surgical resection.  Another lesion breast suspected to be lymphoma was later felt to be fibrous tissue.      May 1, 2017 had right breast fibrous material removed and reconstruction on both breasts at [facility].       Interval History:    Notes difficulty with fatigue.  Initially improved with steroid increase from urgent care.   Nasal ulcers.    No rashes.   Had bruising with decrease in platelets.    Scalp soreness and has knots chronic issue.   Hands, ears, stomach, feet and nipples have been red.  Bruising.    Morning stiffness for 1 hours.    Continues taking injection for cholesterol.  Completed Forteo and now on Reclast with Dr. Srinivasan for osteoporosis with L3 vertebral fractures (top and bottom) on MRI.    Reports recurrent nasal ulcers (now) and hair loss persists.  No rashes.       Review of Systems   Constitutional: Positive for fatigue.   HENT: Negative for mouth sores.    Eyes: Negative for redness.        Dry eyes   Respiratory: Negative for cough and shortness of breath.    Gastrointestinal: Negative for diarrhea.   Endocrine: Negative.    Genitourinary: Negative.    Musculoskeletal: Positive for arthralgias.   Skin: Negative.    Allergic/Immunologic: Negative.    Neurological: Positive for headaches.  "  Hematological: Negative.    Psychiatric/Behavioral: Negative.          Objective:   BP (!) 137/91   Pulse 95   Ht 5' 4" (1.626 m)   Wt 55 kg (121 lb 4.1 oz)   BMI 20.81 kg/m²         Physical Exam   Constitutional: She is oriented to person, place, and time.   HENT:   Head: Normocephalic and atraumatic.   Eyes: Conjunctivae are normal.   Cardiovascular: Normal rate, regular rhythm and normal heart sounds.   Pulmonary/Chest: Effort normal and breath sounds normal.   Abdominal: Soft. Bowel sounds are normal.   Musculoskeletal:         General: No swelling or tenderness.      Cervical back: Neck supple.      Comments:      Neurological: She is alert and oriented to person, place, and time. Gait normal.   Skin: Skin is warm and dry.   Psychiatric: Mood and affect normal.         LABS    Component      Latest Ref Rng & Units 4/28/2022 4/25/2022 3/29/2022   WBC      3.90 - 12.70 K/uL 9.41 9.19 7.06   RBC      4.00 - 5.40 M/uL 4.37 4.67 4.41   Hemoglobin      12.0 - 16.0 g/dL 12.5 13.0 12.0   Hematocrit      37.0 - 48.5 % 38.5 40.8 38.5   MCV      82 - 98 fL 88 87 87   MCH      27.0 - 31.0 pg 28.6 27.8 27.2   MCHC      32.0 - 36.0 g/dL 32.5 31.9 (L) 31.2 (L)   RDW      11.5 - 14.5 % 15.2 (H) 15.3 (H) 14.6 (H)   Platelets      150 - 450 K/uL 213 132 (L) 315   MPV      9.2 - 12.9 fL 10.0 10.5 10.2   Immature Granulocytes      0.0 - 0.5 % 2.3 (H) 1.5 (H) 0.3   Gran # (ANC)      1.8 - 7.7 K/uL 6.3 6.9 4.1   Immature Grans (Abs)      0.00 - 0.04 K/uL 0.22 (H) 0.14 (H) 0.02   Lymph #      1.0 - 4.8 K/uL 1.9 1.2 2.0   Mono #      0.3 - 1.0 K/uL 0.8 0.8 0.8   Eos #      0.0 - 0.5 K/uL 0.1 0.1 0.2   Baso #      0.00 - 0.20 K/uL 0.05 0.03 0.04   nRBC      0 /100 WBC 0 0 0   Gran %      38.0 - 73.0 % 67.1 75.4 (H) 57.3   Lymph %      18.0 - 48.0 % 20.1 13.5 (L) 28.5   Mono %      4.0 - 15.0 % 8.8 8.6 11.2   Eosinophil %      0.0 - 8.0 % 1.2 0.7 2.1   Basophil %      0.0 - 1.9 % 0.5 0.3 0.6   Platelet Estimate        Decreased " (A)    Aniso        Slight    Poikilocytosis        Slight    Poly        Occasional    Ovalocytes        Occasional    Roby/Echinocytes        Occasional    Differential Method       Automated Automated Automated   Sodium      136 - 145 mmol/L  137 142   Potassium      3.5 - 5.1 mmol/L  3.6 3.3 (L)   Chloride      95 - 110 mmol/L  101 102   CO2      23 - 29 mmol/L  23 30 (H)   Glucose      70 - 110 mg/dL  77 85   BUN      8 - 23 mg/dL  18 11   Creatinine      0.5 - 1.4 mg/dL  0.8 0.8   Calcium      8.7 - 10.5 mg/dL  9.9 9.6   PROTEIN TOTAL      6.0 - 8.4 g/dL  7.2 6.5   Albumin      3.5 - 5.2 g/dL  3.9 3.6   BILIRUBIN TOTAL      0.1 - 1.0 mg/dL  0.5 0.4   Alkaline Phosphatase      55 - 135 U/L  56 46 (L)   AST      10 - 40 U/L  24 17   ALT      10 - 44 U/L  40 13   Anion Gap      8 - 16 mmol/L  13 10   eGFR if African American      >60 mL/min/1.73 m:2  >60.0 >60.0   eGFR if non African American      >60 mL/min/1.73 m:2  >60.0 >60.0   Specimen UA         Urine, Clean Catch   Color, UA      Yellow, Straw, Amanda   Yellow   Appearance, UA      Clear   Clear   pH, UA      5.0 - 8.0   5.0   Specific Gravity, UA      1.005 - 1.030   1.030   Protein, UA      Negative   Negative   Glucose, UA      Negative   Negative   Ketones, UA      Negative   Trace (A)   Bilirubin (UA)      Negative   Negative   Occult Blood UA      Negative   Negative   NITRITE UA      Negative   Negative   Leukocytes, UA      Negative   Negative   Protein, Urine Random      0 - 15 mg/dL   21 (H)   Creatinine, Urine      15.0 - 325.0 mg/dL   435.0 (H)   Prot/Creat Ratio, Urine      0.00 - 0.20   0.05   Hemoglobin A1C External      4.0 - 5.6 %   5.9 (H)   Estimated Avg Glucose      68 - 131 mg/dL   123   ds DNA Ab      Negative 1:10   Negative 1:10   Complement (C-3)      50 - 180 mg/dL   112   Complement (C-4)      11 - 44 mg/dL   39   CPK      20 - 180 U/L   102   Sed Rate      0 - 36 mm/Hr   13   CRP      0.0 - 8.2 mg/L   4.2   Vit D, 25-Hydroxy       30 - 96 ng/mL  42    Vitamin E      500 - 1800 ug/dL  1784    Retinol, serum      38 - 106 ug/dL  98    Elastase 1, Fecal      >200 (Normal) mcg/g  388            Assessment:       1. Lupus. Lupus manifestations include joint pains, headache, positive DERRICK, and a   double-stranded DNA. Has fatigue as well as intermittent oral/nasal ulcer.  SLEDAI=4 without labs  Not sure if current issue is lupus or adrenal related.  2. Fatigue.  Worse than usual  3. Encounter for long-term (current) use of other medications   4. Myalgia and myositis. Fibromyalgia on Savella.  Patient stopped gabapentin since it made her sleepy but no worsening in pain.    5. Trochanteric bursitis. Bilateral improved with injection.  Requesting injections  6. Shoulder pain. Stable   7. Vitamin D deficiency disease. Now normal  8. Suspected Shingles.   9. Migraine.  Did acupuncture with Dr. Tinsley.  Treated with Maxalt by primary doctor which helps.     10.  Osteoporosis based vertebral fracture.  Reclast in the past.  Dr. Srinivasan gave Forteo for 2 years now back on Reclast.   11.  Chronic steroid use.  Endocrine gives for adrenal insufficiency.  12.  Ulcerative colitis  13.  Hematuria.  Followed by nephrology  14.  Stress with dealing with son with mental illness  15.  Loss of smell since Nov/Dec 2018.  May be relate to sinus issues.  Evaluated by ENT but no cause found.  Continues to have loss of smell  16. Bilateral hand pain. Suspect OA and DeQuervains of right hand.  X-ray with 1st [facility] osteoarthritis.    Plan:       1.  Labs ordered.     2.  Continue Plaquenil 300 mg daily.  Patient to review handout and consider Benlysta.  Eye exam normal 2/2021.  Scheduled for July 2022.   3. Compliant with neurontin 300 mg tid.    4. Off Forteo from endocrine after 2 years and now on Reclast  5. Continue Voltaren gel hands.  Also alternate ice and heat.   6. Continue SPICA splint OTC  7. Patient had COVID booster         RTO in 4 months/prn.

## 2022-05-27 ENCOUNTER — PATIENT MESSAGE (OUTPATIENT)
Dept: RHEUMATOLOGY | Facility: CLINIC | Age: 62
End: 2022-05-27
Payer: MEDICARE

## 2022-05-27 LAB — DSDNA AB SER-ACNC: NORMAL [IU]/ML

## 2022-05-30 ENCOUNTER — CLINICAL SUPPORT (OUTPATIENT)
Dept: REHABILITATION | Facility: OTHER | Age: 62
End: 2022-05-30
Attending: INTERNAL MEDICINE
Payer: MEDICARE

## 2022-05-30 DIAGNOSIS — R27.9 LACK OF COORDINATION: Primary | ICD-10-CM

## 2022-05-30 PROCEDURE — 97140 MANUAL THERAPY 1/> REGIONS: CPT

## 2022-05-30 PROCEDURE — 97110 THERAPEUTIC EXERCISES: CPT

## 2022-05-30 PROCEDURE — 97530 THERAPEUTIC ACTIVITIES: CPT

## 2022-05-30 NOTE — PROGRESS NOTES
Pelvic Health Physical Therapy   Treatment Note     Name: Efe Horowitz  Clinic Number: [id number]    Therapy Diagnosis:   Encounter Diagnosis   Name Primary?    Lack of coordination Yes     Physician: Ceferino Rangel MD    Visit Date: 5/30/2022    Physician Orders: PT Eval and Treat  Medical Diagnosis from Referral: pelvic floor dysfunction  Evaluation Date: 2/14/2022  Authorization Period Expiration: 20 visits through 12/31/22  Plan of Care Expiration:  5/9/2022 to 8/7/22.  Visit # / Visits authorized: 5/12     Cancelled Visits: 0  No Show Visits: 0  FOTO: 1 completed 4/25/22 - score Bowel 8%  2 completed 5/23/22 - score 0%  3 due on discharge    Time In: 4:00  Time Out: 510  Total Billable Time: 55 minutes    Precautions: Standard    Subjective     Pt reports: Cut back to one Metamucil pill but still having a lot of gas. Stool type 2. Still having leakage if eating fruits or drinking lots of water. Saw her other doctor who wants to change her Lupus medications and get her off the steroids. She needs to get it approved by her other doctors and then will make the switch. Has not had any abd pain.   She was compliant with home exercise program.  Response to previous treatment: felt fine  Functional change:  Decreased LLQ, decreased diarrhea    Objective     INTERNAL ASSESSMENT - pelvic floor mm  Muscle Power: 4/5  Muscle Endurance: 30 sec    Efe received therapeutic exercises to develop  strength and endurance for 30 minutes including:   Hip adduction, 5 sec x 10  Bridging + hip adduction, 5 sec x 20  Bridging + hip abduction, RTB x 20  Clamshells x 20  Reverse clamshells x 20  PFME - breathing, contractions    Efe received the following manual therapy techniques: to develop flexibility and extensibility for 15 minutes including:   Mobilization abd wall    Efe participated in neuromuscular re-education activities to develop Coordination and Control for 00 minutes including: not performed  today    Efe participated in dynamic functional therapeutic activities to improve functional performance for 10 minutes, including:  Discussion of ongoing POC    Home Exercises Provided and Patient Education Provided     Education provided:   - anatomy/physiology of pelvic floor, diaphragmatic breathing, kegels and fluid intake/dietary modifications  Discussed progression of plan of care with patient; educated pt in activity modification; reviewed HEP with pt. Pt demonstrated and verbalized understanding of all instruction and was provided with a handout of HEP (see Patient Instructions).    Written Home Exercises Provided: yes.  Exercises were reviewed and Efe was able to demonstrate them prior to the end of the session.  Efe demonstrated good  understanding of the education provided.     See EMR under Patient Instructions for exercises provided 4/25/2022.    Assessment     Pt has progressed well with therapy. Decreased pain, increased ease of defecation, overall improved stool consistency. She does continue to have episodes of diarrhea and at these times can have FI, at same frequency as at start of care. Her pelvic floor mm function is improving, with greater strength and endurance and she may see some modest gains in incontinence control with ongoing strengthening, but do believe this may be more of a medical issue. She will continue indep with her program for a few weeks and return in one month after has made some medication changes  Efe Is progressing well towards her goals.   Pt prognosis is Excellent.     Pt will continue to benefit from skilled outpatient physical therapy to address the deficits listed in the problem list box on initial evaluation, provide pt/family education and to maximize pt's level of independence in the home and community environment.     Pt's spiritual, cultural and educational needs considered and pt agreeable to plan of care and goals.     Anticipated barriers to  physical therapy: none    Goals: Short Term Goals: 4 weeks   Pt indep in HEP  Pt able to lengthen pelvic floor mm appropriately for BM without strain     Long Term Goals: 8 weeks   Pt indep in progressive HEP  Pt reports 0 episodes of fecal leakage in at least 4 weeks for improved ADL participation  Pt reports improved stool consistency at least Type 5 or higher on Roxton stool scale with 75% of BMs     Plan      Plan of care Certification: 5/9/2022 to 8/7/22.     Outpatient Physical Therapy 1 times weekly for 12 weeks to include the following interventions: therapeutic exercises, therapeutic activity, neuromuscular re-education, manual therapy, patient/family education and self care/home management    Next visit: progressive strengthening, next visit 4-5 weeks.    Aaliyah Bentley, PT

## 2022-06-02 ENCOUNTER — TELEPHONE (OUTPATIENT)
Dept: INTERNAL MEDICINE | Facility: CLINIC | Age: 62
End: 2022-06-02
Payer: MEDICARE

## 2022-06-02 VITALS — DIASTOLIC BLOOD PRESSURE: 88 MMHG | SYSTOLIC BLOOD PRESSURE: 143 MMHG

## 2022-06-02 NOTE — TELEPHONE ENCOUNTER
patient updated b/p is 143/88. Patient stated that he have been in a little pain and Dr. Anguiano does know about it when pt was last seen. Please adivse

## 2022-06-03 RX ORDER — AMLODIPINE BESYLATE 5 MG/1
5 TABLET ORAL DAILY
Qty: 90 TABLET | Refills: 3 | Status: SHIPPED | OUTPATIENT
Start: 2022-06-03 | End: 2022-09-08 | Stop reason: DRUGHIGH

## 2022-06-06 NOTE — TELEPHONE ENCOUNTER
Spoke to patient and advised of medication change.   Patient verbalized understanding.    bp check scheduled.

## 2022-06-10 ENCOUNTER — TELEPHONE (OUTPATIENT)
Dept: GASTROENTEROLOGY | Facility: CLINIC | Age: 62
End: 2022-06-10
Payer: MEDICARE

## 2022-06-13 ENCOUNTER — OFFICE VISIT (OUTPATIENT)
Dept: GASTROENTEROLOGY | Facility: CLINIC | Age: 62
End: 2022-06-13
Payer: MEDICARE

## 2022-06-13 VITALS
TEMPERATURE: 98 F | SYSTOLIC BLOOD PRESSURE: 119 MMHG | HEART RATE: 73 BPM | WEIGHT: 131.19 LBS | DIASTOLIC BLOOD PRESSURE: 78 MMHG | BODY MASS INDEX: 22.52 KG/M2 | OXYGEN SATURATION: 100 %

## 2022-06-13 DIAGNOSIS — M62.89 PELVIC FLOOR DYSFUNCTION: ICD-10-CM

## 2022-06-13 DIAGNOSIS — R10.30 LOWER ABDOMINAL PAIN: ICD-10-CM

## 2022-06-13 DIAGNOSIS — Z87.19 HISTORY OF ULCERATIVE COLITIS: Primary | ICD-10-CM

## 2022-06-13 PROCEDURE — 1159F MED LIST DOCD IN RCRD: CPT | Mod: CPTII,S$GLB,, | Performed by: INTERNAL MEDICINE

## 2022-06-13 PROCEDURE — 1160F PR REVIEW ALL MEDS BY PRESCRIBER/CLIN PHARMACIST DOCUMENTED: ICD-10-PCS | Mod: CPTII,S$GLB,, | Performed by: INTERNAL MEDICINE

## 2022-06-13 PROCEDURE — 99214 OFFICE O/P EST MOD 30 MIN: CPT | Mod: S$GLB,,, | Performed by: INTERNAL MEDICINE

## 2022-06-13 PROCEDURE — 3044F PR MOST RECENT HEMOGLOBIN A1C LEVEL <7.0%: ICD-10-PCS | Mod: CPTII,S$GLB,, | Performed by: INTERNAL MEDICINE

## 2022-06-13 PROCEDURE — 4010F PR ACE/ARB THEARPY RXD/TAKEN: ICD-10-PCS | Mod: CPTII,S$GLB,, | Performed by: INTERNAL MEDICINE

## 2022-06-13 PROCEDURE — 3044F HG A1C LEVEL LT 7.0%: CPT | Mod: CPTII,S$GLB,, | Performed by: INTERNAL MEDICINE

## 2022-06-13 PROCEDURE — 3078F PR MOST RECENT DIASTOLIC BLOOD PRESSURE < 80 MM HG: ICD-10-PCS | Mod: CPTII,S$GLB,, | Performed by: INTERNAL MEDICINE

## 2022-06-13 PROCEDURE — 3074F SYST BP LT 130 MM HG: CPT | Mod: CPTII,S$GLB,, | Performed by: INTERNAL MEDICINE

## 2022-06-13 PROCEDURE — 3066F NEPHROPATHY DOC TX: CPT | Mod: CPTII,S$GLB,, | Performed by: INTERNAL MEDICINE

## 2022-06-13 PROCEDURE — 3008F PR BODY MASS INDEX (BMI) DOCUMENTED: ICD-10-PCS | Mod: CPTII,S$GLB,, | Performed by: INTERNAL MEDICINE

## 2022-06-13 PROCEDURE — 1160F RVW MEDS BY RX/DR IN RCRD: CPT | Mod: CPTII,S$GLB,, | Performed by: INTERNAL MEDICINE

## 2022-06-13 PROCEDURE — 4010F ACE/ARB THERAPY RXD/TAKEN: CPT | Mod: CPTII,S$GLB,, | Performed by: INTERNAL MEDICINE

## 2022-06-13 PROCEDURE — 1159F PR MEDICATION LIST DOCUMENTED IN MEDICAL RECORD: ICD-10-PCS | Mod: CPTII,S$GLB,, | Performed by: INTERNAL MEDICINE

## 2022-06-13 PROCEDURE — 3078F DIAST BP <80 MM HG: CPT | Mod: CPTII,S$GLB,, | Performed by: INTERNAL MEDICINE

## 2022-06-13 PROCEDURE — 3008F BODY MASS INDEX DOCD: CPT | Mod: CPTII,S$GLB,, | Performed by: INTERNAL MEDICINE

## 2022-06-13 PROCEDURE — 99214 PR OFFICE/OUTPT VISIT, EST, LEVL IV, 30-39 MIN: ICD-10-PCS | Mod: S$GLB,,, | Performed by: INTERNAL MEDICINE

## 2022-06-13 PROCEDURE — 3066F PR DOCUMENTATION OF TREATMENT FOR NEPHROPATHY: ICD-10-PCS | Mod: CPTII,S$GLB,, | Performed by: INTERNAL MEDICINE

## 2022-06-13 PROCEDURE — 3074F PR MOST RECENT SYSTOLIC BLOOD PRESSURE < 130 MM HG: ICD-10-PCS | Mod: CPTII,S$GLB,, | Performed by: INTERNAL MEDICINE

## 2022-06-13 RX ORDER — DICYCLOMINE HYDROCHLORIDE 20 MG/1
20 TABLET ORAL EVERY 6 HOURS
Qty: 120 TABLET | Refills: 4 | Status: SHIPPED | OUTPATIENT
Start: 2022-06-13 | End: 2022-07-05

## 2022-06-13 NOTE — PATIENT INSTRUCTIONS
Trial of dicyclomine once a day for abdominal pain  Continue pelvic floor therapy  Follow up in 6 months

## 2022-06-13 NOTE — PROGRESS NOTES
[facility]          Inflammatory Bowel Disease Follow Up Note         TODAY'S VISIT DATE:  6/13/2022    Reason for Consult:    Chief Complaint   Patient presents with    Inflammatory Bowel Disease       PCP: Shonda Anguiano      Referring MD:   No ref. provider found    History of Present Illness:  Efe Horowitz who is a 62 y.o. female is being seen today at the [facility] on 06/13/2022 for inflammatory bowel disease- inflammatory bowel disease-unspecified.  Since our initial visit she underwent anorectal manometry that was consistent with pelvic floor dysfunction.  She also underwent a pouchoscopy that showed no evidence of active pouchitis and no evidence of cuffitis or any other inflammatory issues related to her pouch.  She started pelvic floor therapy and has noticed significant improvement in her bowel movements.  She reports that she is having anywhere between 3 and 4 bowel movements a day.  The consistency is improved with adding fiber supplements to her regimen.  She still has some loose stool if she has too much fruit or drinks too much water.  She definitely feels like the pelvic floor therapy as help with some of her abdominal pain but she continues to have episodes every day around 3:00 a.m. in the afternoon of right-sided abdominal pain that starts around the right flank area and radiates to the anterior abdomen.  It often times happens with movement and certain stretches will improve the pain.  The pain will eventually resolve after she has a bowel movement usually.    IBD History:  She has a history of ulcerative colitis and underwent total proctocolectomy in 2009 due to refractory disease.  She has not had any significant issues following her surgery with regards to inflammatory bowel disease.      Pertinent Labs:  Lab Results   Component Value Date    SEDRATE 10 05/26/2022    CRP 1.5 05/26/2022     Lab Results   Component  Value Date    TTGIGA 5 11/26/2021     11/26/2021     Lab Results   Component Value Date    TSH 1.455 12/21/2021    FREET4 0.72 08/24/2021     Lab Results   Component Value Date    CPOJGLXN86IW 42 04/25/2022    SGFEFCTT51 812 11/26/2021     Lab Results   Component Value Date    HEPBSAG Negative 12/21/2021    HEPBCAB Negative 12/21/2021    HEPCAB Negative 12/21/2021     No results found for: XPX68EMIE  No results found for: NIL, TBAG, TBAGNIL, MITOGENNIL, TBGOLD, TSPOTSCREN  Lab Results   Component Value Date    TPTMINTERP SEE BELOW 12/21/2021     Lab Results   Component Value Date    STOOLCULTURE  12/22/2021     No Salmonella,Shigella,Vibrio,Campylobacter,Yersinia isolated.    PZJAQWBBFH5V Negative 12/22/2021    YRIBEOVZGQ4P Negative 12/22/2021    CDIFFICILEAN Negative 04/28/2021    CDIFFTOX Negative 04/28/2021     Lab Results   Component Value Date    CALPROTECTIN 56.8 (H) 12/22/2021       Therapeutic Drug Monitoring Labs:  No results found for: PROMETH  No results found for: ANSADAINIT, INFLIXIMAB, INFLIXINTERP    Vaccinations:  Lab Results   Component Value Date    HEPBSAB Positive (A) 01/29/2013     Lab Results   Component Value Date    HEPAIGG Negative 12/21/2021     Lab Results   Component Value Date    VARICELLAZOS 2.80 (H) 12/21/2021    VARICELLAINT Positive (A) 12/21/2021     Immunization History   Administered Date(s) Administered    COVID-19, MRNA, LN-S, PF (Pfizer) (Purple Cap) 02/20/2021, 03/12/2021, 08/16/2021    Hepatitis A / Hepatitis B 10/08/2008, 10/08/2008, 11/19/2008, 11/19/2008, 04/08/2009, 04/08/2009    Hepatitis A, Adult 01/10/2022    Hepatitis B (recombinant) Adjuvanted, 2 dose 01/10/2022, 02/07/2022    Influenza 10/03/2019, 08/31/2020    Influenza - Quadrivalent 10/12/2017, 10/12/2017, 09/23/2019    Influenza - Quadrivalent - PF *Preferred* (6 months and older) 08/29/2020, 08/29/2020, 11/27/2021    Influenza - Trivalent (ADULT) 10/04/2011, 10/02/2013    Influenza - Trivalent -  "PF (ADULT) 09/23/2014, 09/23/2014    Influenza Split 10/02/2013    Pneumococcal Conjugate - 13 Valent 02/08/2021    Pneumococcal Polysaccharide - 23 Valent 06/23/2014    Tdap 08/23/2021    Zoster Recombinant 02/23/2022         Review of Systems  Review of Systems   Constitutional: Negative for chills, fever and weight loss.   HENT: Negative for sore throat.    Eyes: Positive for redness. Negative for pain and discharge.   Respiratory: Negative for cough, shortness of breath and wheezing.    Cardiovascular: Negative for chest pain, orthopnea and leg swelling.   Gastrointestinal: Positive for abdominal pain and nausea. Negative for blood in stool, constipation, diarrhea, heartburn, melena and vomiting.   Genitourinary: Negative for dysuria, frequency and urgency.   Musculoskeletal: Positive for back pain. Negative for joint pain and myalgias.   Skin: Negative for itching and rash.   Neurological: Negative for focal weakness and seizures.   Endo/Heme/Allergies: Does not bruise/bleed easily.   Psychiatric/Behavioral: Negative for depression. The patient is not nervous/anxious.        Medical History:   Past Medical History:   Diagnosis Date    Acid reflux     Adrenal insufficiency, primary     Arthritis     Asthma     B12 deficiency anemia     Benign essential hypertension 2/7/2021    Blood transfusion 2010    Breast cancer 2/2016    Right breast infiltrating ductal CA    Cataract     Chronic pain     Deep vein thrombosis     Dry eyes     Esophagitis, unspecified     Fibromyalgia     General anesthetics causing adverse effect in therapeutic use     "slow to wake up bc I don't have an immune system    Heart murmur     History of colon polyps     Hyperlipidemia     Hypopituitary dwarfism     Iron deficiency anemia     Lupus     Migraines, neuralgic     Nonspecific ulcerative colitis     OP (osteoporosis)     history of reclast    Osteopenia     Pancreatic cyst     Schatzki's ring     " Steroid sulfatase deficiency     Ulcerative colitis     Vitamin D deficiency disease        Surgical History:  Past Surgical History:   Procedure Laterality Date    ANORECTAL MANOMETRY N/A 1/13/2022    Procedure: MANOMETRY, ANORECTAL;  Surgeon: First Available Stu Gastelum;  Location: [facility] (4TH FLR);  Service: Endoscopy;  Laterality: N/A;  new order placed; original order placed incorrectly  1/7 instructions handed to patient-st    APPENDECTOMY      BREAST BIOPSY Right 2/2016    [facility]    BREAST RECONSTRUCTION      BREAST SURGERY      CHOLECYSTECTOMY      COLON SURGERY      ENDOSCOPIC ULTRASOUND OF UPPER GASTROINTESTINAL TRACT N/A 8/6/2018    Procedure: ULTRASOUND, ENDOSCOPIC, UPPER GI TRACT;  Surgeon: Hong Finn MD;  Location: [facility] (2ND FLR);  Service: Endoscopy;  Laterality: N/A;    ESOPHAGOGASTRODUODENOSCOPY N/A 12/10/2018    Procedure: EGD (ESOPHAGOGASTRODUODENOSCOPY);  Surgeon: Reese Villalta MD;  Location: [facility] (4TH FLR);  Service: Endoscopy;  Laterality: N/A;    ESOPHAGOGASTRODUODENOSCOPY N/A 12/30/2021    Procedure: EGD (ESOPHAGOGASTRODUODENOSCOPY);  Surgeon: Reese Villalta MD;  Location: [facility] (2ND FLR);  Service: Endoscopy;  Laterality: N/A;  EGD for esophageal dysphagia and early satiety please schedule 1st provider available. wants this date-Dr Villalta only     fully vaccinated-GT  hx of adrenal insuffiency   12/27 arrival time confirmed with pt-rb    FLEXIBLE SIGMOIDOSCOPY N/A 12/10/2018    Procedure: SIGMOIDOSCOPY, FLEXIBLE;  Surgeon: Reese Villalta MD;  Location: [facility] (4TH FLR);  Service: Endoscopy;  Laterality: N/A;  Recommend full split bowel prep for this study just like for a colonoscopy    HYSTERECTOMY      ILEOSCOPY N/A 2/16/2022    Procedure: ILEOSCOPY;  Surgeon: Ceferino Rangel MD;  Location: [facility] (4TH FLR);  Service: Endoscopy;  Laterality: N/A;  medical history significant for Ulcerative colitis s/p total colectomy with ileoanal  anastomosis ([id number]), SLE on plaquenil, adrenal insufficiency on prednisone, breast cancer, and HTN who presents with 3 month history of bilateral lower abdominal pain. Patient with abd    MASTECTOMY      OOPHORECTOMY Bilateral     restorative proctectomy      total abdominal colectomy with ileostomy  2010    TOTAL COLECTOMY      TOTAL REDUCTION MAMMOPLASTY Left 2017    UPPER ENDOSCOPIC ULTRASOUND W/ FNA         Family History:   Family History   Problem Relation Age of Onset    Diabetes Father     Hyperlipidemia Father     Hypertension Father     Hyperlipidemia Mother     Cancer Maternal Aunt         pancreatic cancer, late 50s at dx    Pancreatic cancer Maternal Aunt     Breast cancer Maternal Aunt 55    Breast cancer Cousin 28    Breast cancer Other 45    No Known Problems Sister     No Known Problems Brother     Cancer Maternal Uncle 65        pancreatic cancer    Pancreatic cancer Maternal Uncle     No Known Problems Paternal Aunt     No Known Problems Paternal Uncle     No Known Problems Maternal Grandmother     No Known Problems Maternal Grandfather     No Known Problems Paternal Grandmother     No Known Problems Paternal Grandfather     Breast cancer Maternal Cousin 50    Cancer Other 25        liver cancer    Amblyopia Neg Hx     Blindness Neg Hx     Cataracts Neg Hx     Macular degeneration Neg Hx     Retinal detachment Neg Hx     Strabismus Neg Hx     Stroke Neg Hx     Thyroid disease Neg Hx     Glaucoma Neg Hx     Ovarian cancer Neg Hx     Celiac disease Neg Hx     Colon cancer Neg Hx     Colon polyps Neg Hx     Esophageal cancer Neg Hx     Liver cancer Neg Hx     Rectal cancer Neg Hx     Stomach cancer Neg Hx     Ulcerative colitis Neg Hx     Inflammatory bowel disease Neg Hx        Social History:   Social History     Tobacco Use    Smoking status: Never Smoker    Smokeless tobacco: Never Used   Substance Use Topics    Alcohol use: No      Alcohol/week: 0.0 standard drinks    Drug use: No       Allergies: Reviewed    Home Medications:   Medication List with Changes/Refills   New Medications    DICYCLOMINE (BENTYL) 20 MG TABLET    Take 1 tablet (20 mg total) by mouth every 6 (six) hours.   Current Medications    ALBUTEROL (ACCUNEB) 0.63 MG/3 ML NEBU    Take 3 mLs (0.63 mg total) by nebulization every 6 (six) hours as needed.    ALIROCUMAB (PRALUENT PEN) 75 MG/ML PNIJ    Inject 75 mg into the skin every 14 (fourteen) days.    AMLODIPINE (NORVASC) 5 MG TABLET    Take 1 tablet (5 mg total) by mouth once daily.    CREON 36,000-114,000- 180,000 UNIT CPDR    TAKE TWO CAPSULES BY MOUTH THREE TIMES DAILY WITH MEALS AND ONE CAPSULE WITH EACH SNACK    DEXAMETHASONE (DECADRON) 4 MG/ML INJECTION    Inject 1 mL (4 mg total) into the muscle daily as needed (Signs and symtpoms of severe adrenal insufficiency).    DICLOFENAC SODIUM (VOLTAREN) 1 % GEL    APPLY 2 GRAMS TO AFFECTED AREA 4 TIMES A DAY    GABAPENTIN (NEURONTIN) 300 MG CAPSULE    TAKE 1 CAPSULE BY MOUTH EVERY EVENING    HEPLISAV-B, PF, 20 MCG/0.5 ML SYRG        HYDROXYCHLOROQUINE (PLAQUENIL) 200 MG TABLET    TAKE 1 AND 1/2 TABLETS BY MOUTH EVERY DAY    LIDOCAINE (LIDODERM) 5 %    Place 1 patch onto the skin once daily. Remove & Discard patch within 12 hours or as directed by MD    LOPERAMIDE (IMODIUM) 2 MG CAPSULE    TAKE 1 TO 2 CAPSULES BY MOUTH FOUR TIMES DAILY    LOSARTAN (COZAAR) 50 MG TABLET    Take 1 tablet (50 mg total) by mouth once daily.    MULTIVITAMIN/IRON/FOLIC ACID (CENTRUM WOMEN ORAL)    Take 1 tablet by mouth once daily.    NIACIN 100 MG TAB    Take by mouth. 1 Tablet Oral At bedtime    OMEPRAZOLE (PRILOSEC) 20 MG CAPSULE    TAKE 1 CAPSULE BY MOUTH TWICE A DAY    ONDANSETRON (ZOFRAN) 4 MG TABLET    Take 1 tablet (4 mg total) by mouth every 12 (twelve) hours as needed for Nausea.    OXYCODONE-ACETAMINOPHEN (PERCOCET) 5-325 MG PER TABLET    Take 1 tablet by mouth every 12 (twelve) hours as  needed for Pain.    POTASSIUM CHLORIDE SA (K-DUR,KLOR-CON) 10 MEQ TABLET    Take 2 tablets (20 mEq total) by mouth once daily.    PREDNISONE (DELTASONE) 5 MG TABLET    Take 1 tablet (5 mg total) by mouth once daily. Double the dose in times of illness    PSYLLIUM HUSK/ASPARTAME (METAMUCIL FIBER SINGLES ORAL)    Take by mouth.    REPATHA SURECLICK 140 MG/ML PNIJ    INJECT 140 MG INTO THE SKIN EVERY 14 (FOURTEEN) DAYS    RESTASIS 0.05 % OPHTHALMIC EMULSION    INSTILL 1 DROP INTO BOTH EYES TWICE A DAY    RIZATRIPTAN (MAXALT) 10 MG TABLET    Take 1 tablet (10 mg total) by mouth every 6 (six) hours as needed for Migraine.    ROSUVASTATIN (CRESTOR) 5 MG TABLET    Take 5 mg by mouth once daily.    SAVELLA 100 MG TAB    TAKE 1 TABLET BY MOUTH EVERY DAY    ZOLEDRONIC ACID-MANNITOL & WATER (RECLAST) 5 MG/100 ML PGBK        ZOLPIDEM (AMBIEN) 5 MG TAB    Take 1 tablet (5 mg total) by mouth nightly as needed (insomnia).   Discontinued Medications    ERGOCALCIFEROL (VITAMIN D2) 50,000 UNIT CAP    Take 1 capsule (50,000 Units total) by mouth twice a week. Every Monday and Friday    POLYCARBOPHIL (FIBERCON) 625 MG TABLET    Take by mouth. 1 Tablet Oral Every 12 hours.  Take with 12 ounces of water with each pill.       Physical Exam:  Vital Signs:  /78   Pulse 73   Temp 98.2 °F (36.8 °C)   Wt 59.5 kg (131 lb 3.2 oz)   SpO2 100%   BMI 22.52 kg/m²   Body mass index is 22.52 kg/m².    Physical Exam  Vitals and nursing note reviewed.   Constitutional:       General: She is not in acute distress.     Appearance: Normal appearance. She is well-developed. She is not ill-appearing or toxic-appearing.   Eyes:      Conjunctiva/sclera: Conjunctivae normal.      Pupils: Pupils are equal, round, and reactive to light.   Neck:      Thyroid: No thyromegaly.   Cardiovascular:      Rate and Rhythm: Normal rate and regular rhythm.      Heart sounds: Normal heart sounds. No murmur heard.  Pulmonary:      Effort: Pulmonary effort is normal.       Breath sounds: Normal breath sounds. No wheezing or rales.   Abdominal:      General: Bowel sounds are normal. There is no distension.      Palpations: Abdomen is soft. There is no mass.      Tenderness: There is no abdominal tenderness.   Genitourinary:     Comments: Perianal exam is normal.  Digital rectal exam reveals 2 mild stenotic areas in the anal canal but these were not extremely narrow.  There appeared to be normal squeeze of the anal canal and normal relaxation with bearing down.  Formed, solid stool was palpated in the pouch.  No blood in the stool.  Musculoskeletal:         General: No tenderness. Normal range of motion.   Lymphadenopathy:      Cervical: No cervical adenopathy.   Skin:     Findings: No erythema or rash.   Neurological:      General: No focal deficit present.      Mental Status: She is alert and oriented to person, place, and time.   Psychiatric:         Mood and Affect: Mood normal.         Behavior: Behavior normal.         Thought Content: Thought content normal.         Judgment: Judgment normal.         Labs: reviewed and pertinent noted above    Assessment/Plan:  Efe Horowitz is a 62 y.o. female with a history of ulcerative colitis status post total proctocolectomy and J pouch placement, history of pancreatic insufficiency, chronic degenerative changes in the spine, lupus, and lactose intolerance here for evaluation of lower abdominal pain, loose stools, and abnormalities on her CT scan. The following issues were addresssed:    1. History of ulcerative colitis    2. Pelvic floor dysfunction    3. Lower abdominal pain      1. History of ulcerative colitis:  She has a J pouch with no signs of inflammation.  No treatment necessary.  Continue to monitor.    2. Pelvic floor dysfunction:  She continues to improve with pelvic floor dysfunction.  I recommend that she continue working with the physical therapy team.    3. Lower abdominal pain:  If not fully entirely clear what  the pain is coming from.  It does not seem to be related to her bowel issues necessarily but does sometimes improve after bowel movements.  It seems like it may be musculoskeletal or possibly related to some of her back issues.  Today I recommend that we try dicyclomine once daily about an hour before her symptoms typically start and see if that helps.  If it does not help then we may stop this and treat musculoskeletal issues in the future.       Follow up: Follow up in about 6 months (around 12/13/2022).      Thank you again for sending Efe Horowitz to see Dr. Jay Rangel today at the [facility]. Please don't hesitate to contact Dr. Rangel if there are any questions regarding this evaluation, or if you have any other patients with inflammatory bowel disease for whom you would like a consultation. You can reach Dr. Rangel at [phone] or by email at [email or web address]    Ceferino Rangel MD  Department of Gastroenterology  Inflammatory Bowel Disease

## 2022-06-20 ENCOUNTER — CLINICAL SUPPORT (OUTPATIENT)
Dept: INTERNAL MEDICINE | Facility: CLINIC | Age: 62
End: 2022-06-20
Payer: MEDICARE

## 2022-06-20 VITALS — OXYGEN SATURATION: 98 % | SYSTOLIC BLOOD PRESSURE: 118 MMHG | DIASTOLIC BLOOD PRESSURE: 72 MMHG | HEART RATE: 76 BPM

## 2022-06-20 PROCEDURE — 99999 PR PBB SHADOW E&M-EST. PATIENT-LVL II: CPT | Mod: PBBFAC,,,

## 2022-06-20 PROCEDURE — 99999 PR PBB SHADOW E&M-EST. PATIENT-LVL II: ICD-10-PCS | Mod: PBBFAC,,,

## 2022-06-20 NOTE — PROGRESS NOTES
Patient here for nurse visit bp check. Bp  118/72.  Patient took AMLODIPINE 5MG and LOSARTAN 50MG yesterday morning.  . Patient declines any chest pains, dizziness or lightheadedness. pcp notified

## 2022-06-26 ENCOUNTER — HOSPITAL ENCOUNTER (EMERGENCY)
Facility: HOSPITAL | Age: 62
Discharge: HOME | End: 2022-06-26
Attending: EMERGENCY MEDICINE
Payer: MEDICARE

## 2022-06-26 VITALS
TEMPERATURE: 99 F | RESPIRATION RATE: 18 BRPM | HEIGHT: 64 IN | OXYGEN SATURATION: 99 % | HEART RATE: 95 BPM | WEIGHT: 125 LBS | BODY MASS INDEX: 21.34 KG/M2 | DIASTOLIC BLOOD PRESSURE: 81 MMHG | SYSTOLIC BLOOD PRESSURE: 125 MMHG

## 2022-06-26 DIAGNOSIS — M54.2 NECK PAIN: ICD-10-CM

## 2022-06-26 DIAGNOSIS — M62.838 NECK MUSCLE SPASM: Primary | ICD-10-CM

## 2022-06-26 PROCEDURE — 63600175 PHARM REV CODE 636 W HCPCS: Mod: ER | Performed by: EMERGENCY MEDICINE

## 2022-06-26 PROCEDURE — 99284 EMERGENCY DEPT VISIT MOD MDM: CPT | Mod: 25,ER

## 2022-06-26 PROCEDURE — 25000003 PHARM REV CODE 250: Mod: ER | Performed by: EMERGENCY MEDICINE

## 2022-06-26 PROCEDURE — 96372 THER/PROPH/DIAG INJ SC/IM: CPT | Performed by: EMERGENCY MEDICINE

## 2022-06-26 RX ORDER — LIDOCAINE 50 MG/G
1 PATCH TOPICAL DAILY
Qty: 15 PATCH | Refills: 0 | Status: SHIPPED | OUTPATIENT
Start: 2022-06-26

## 2022-06-26 RX ORDER — DEXAMETHASONE SODIUM PHOSPHATE 4 MG/ML
8 INJECTION, SOLUTION INTRA-ARTICULAR; INTRALESIONAL; INTRAMUSCULAR; INTRAVENOUS; SOFT TISSUE
Status: COMPLETED | OUTPATIENT
Start: 2022-06-26 | End: 2022-06-26

## 2022-06-26 RX ORDER — LIDOCAINE 50 MG/G
1 PATCH TOPICAL ONCE
Status: DISCONTINUED | OUTPATIENT
Start: 2022-06-26 | End: 2022-06-26 | Stop reason: HOSPADM

## 2022-06-26 RX ORDER — CYCLOBENZAPRINE HCL 10 MG
10 TABLET ORAL 3 TIMES DAILY PRN
Qty: 15 TABLET | Refills: 0 | Status: SHIPPED | OUTPATIENT
Start: 2022-06-26 | End: 2022-07-01

## 2022-06-26 RX ORDER — OXYCODONE AND ACETAMINOPHEN 5; 325 MG/1; MG/1
1 TABLET ORAL EVERY 4 HOURS PRN
Qty: 10 TABLET | Refills: 0 | Status: SHIPPED | OUTPATIENT
Start: 2022-06-26

## 2022-06-26 RX ADMIN — DEXAMETHASONE SODIUM PHOSPHATE 8 MG: 4 INJECTION INTRA-ARTICULAR; INTRALESIONAL; INTRAMUSCULAR; INTRAVENOUS; SOFT TISSUE at 04:06

## 2022-06-26 RX ADMIN — LIDOCAINE 1 PATCH: 50 PATCH TOPICAL at 04:06

## 2022-06-26 NOTE — ED NOTES
Pt verbalized d/c instructions given by nurse D, left ambulatory to discharge desk, no reaction noted from meds

## 2022-06-26 NOTE — ED PROVIDER NOTES
"Encounter Date: 6/26/2022    SCRIBE #1 NOTE: I, Mandy Kennedy, am scribing for, and in the presence of,  Ambrocio Salas MD. I have scribed the following portions of the note - Other sections scribed: HPI; ROS.       History     Chief Complaint   Patient presents with    Neck Pain     Pt c/o left neck pain that radiates to left  leg, started on Friday 6/24/22 she woke up with pain, she tried muscle cream and no relief.      Efe Horowitz is a 62 y.o. female with Hx of Arthritis, DVT, HLD, Lupus, and UC who presents to the ED for chief complaint of left sided neck pain radiating to the left upper arm onset 2 days ago. Patient reports the pain also radiates down the left side of the trunk and into to the left upper leg. She states pain is worse in the neck when turning her head to the left. Patient notes she's had similar symptoms in the past, but on the right side and was diagnosed with bursitis. She denies any numbness or tingling.       The history is provided by the patient. No  was used.     Review of patient's allergies indicates:   Allergen Reactions    Aspirin      Other reaction(s): "hemorrhage"  hemorrhage    Hydrocortisone Nausea Only     Other reaction(s): sick  sick    Lactose intolerance (lactase) [lactase] Nausea And Vomiting    Vicodin [hydrocodone-acetaminophen]      Other reaction(s): hyperactivity    Asacol [mesalamine] Hallucinations     Other reaction(s): Hallucinations  hallucinations     Past Medical History:   Diagnosis Date    Acid reflux     Adrenal insufficiency, primary     Arthritis     Asthma     B12 deficiency anemia     Benign essential hypertension 2/7/2021    Blood transfusion 2010    Breast cancer 2/2016    Right breast infiltrating ductal CA    Cataract     Chronic pain     Deep vein thrombosis     Dry eyes     Esophagitis, unspecified     Fibromyalgia     General anesthetics causing adverse effect in therapeutic use     "slow to " wake up bc I don't have an immune system    Heart murmur     History of colon polyps     Hyperlipidemia     Hypopituitary dwarfism     Iron deficiency anemia     Lupus     Migraines, neuralgic     Nonspecific ulcerative colitis     OP (osteoporosis)     history of reclast    Osteopenia     Pancreatic cyst     Schatzki's ring     Steroid sulfatase deficiency     Ulcerative colitis     Vitamin D deficiency disease      Past Surgical History:   Procedure Laterality Date    ANORECTAL MANOMETRY N/A 1/13/2022    Procedure: MANOMETRY, ANORECTAL;  Surgeon: First Available Stu Gastelum;  Location: [facility] (4TH FLR);  Service: Endoscopy;  Laterality: N/A;  new order placed; original order placed incorrectly  1/7 instructions handed to patient-st    APPENDECTOMY      BREAST BIOPSY Right 2/2016    [facility]    BREAST RECONSTRUCTION      BREAST SURGERY      CHOLECYSTECTOMY      COLON SURGERY      ENDOSCOPIC ULTRASOUND OF UPPER GASTROINTESTINAL TRACT N/A 8/6/2018    Procedure: ULTRASOUND, ENDOSCOPIC, UPPER GI TRACT;  Surgeon: Hong Finn MD;  Location: [facility] (2ND FLR);  Service: Endoscopy;  Laterality: N/A;    ESOPHAGOGASTRODUODENOSCOPY N/A 12/10/2018    Procedure: EGD (ESOPHAGOGASTRODUODENOSCOPY);  Surgeon: Reese Villalta MD;  Location: [facility] (4TH FLR);  Service: Endoscopy;  Laterality: N/A;    ESOPHAGOGASTRODUODENOSCOPY N/A 12/30/2021    Procedure: EGD (ESOPHAGOGASTRODUODENOSCOPY);  Surgeon: Reese Villalta MD;  Location: [facility] (2ND FLR);  Service: Endoscopy;  Laterality: N/A;  EGD for esophageal dysphagia and early satiety please schedule 1st provider available. wants this date-Dr Villalta only     fully vaccinated-GT  hx of adrenal insuffiency   12/27 arrival time confirmed with pt-rb    FLEXIBLE SIGMOIDOSCOPY N/A 12/10/2018    Procedure: SIGMOIDOSCOPY, FLEXIBLE;  Surgeon: Reese Villalta MD;  Location: [facility] (4TH FLR);  Service: Endoscopy;  Laterality: N/A;  Recommend full  split bowel prep for this study just like for a colonoscopy    HYSTERECTOMY      ILEOSCOPY N/A 2/16/2022    Procedure: ILEOSCOPY;  Surgeon: Ceferino Rangel MD;  Location: [facility] ([address] [place] [address]);  Service: Endoscopy;  Laterality: N/A;  medical history significant for Ulcerative colitis s/p total colectomy with ileoanal anastomosis ([id number]), SLE on plaquenil, adrenal insufficiency on prednisone, breast cancer, and HTN who presents with 3 month history of bilateral lower abdominal pain. Patient with abd    MASTECTOMY      OOPHORECTOMY Bilateral     restorative proctectomy      total abdominal colectomy with ileostomy  2010    TOTAL COLECTOMY      TOTAL REDUCTION MAMMOPLASTY Left 2017    UPPER ENDOSCOPIC ULTRASOUND W/ FNA       Family History   Problem Relation Age of Onset    Diabetes Father     Hyperlipidemia Father     Hypertension Father     Hyperlipidemia Mother     Cancer Maternal Aunt         pancreatic cancer, late 50s at dx    Pancreatic cancer Maternal Aunt     Breast cancer Maternal Aunt 55    Breast cancer Cousin 28    Breast cancer Other 45    No Known Problems Sister     No Known Problems Brother     Cancer Maternal Uncle 65        pancreatic cancer    Pancreatic cancer Maternal Uncle     No Known Problems Paternal Aunt     No Known Problems Paternal Uncle     No Known Problems Maternal Grandmother     No Known Problems Maternal Grandfather     No Known Problems Paternal Grandmother     No Known Problems Paternal Grandfather     Breast cancer Maternal Cousin 50    Cancer Other 25        liver cancer    Amblyopia Neg Hx     Blindness Neg Hx     Cataracts Neg Hx     Macular degeneration Neg Hx     Retinal detachment Neg Hx     Strabismus Neg Hx     Stroke Neg Hx     Thyroid disease Neg Hx     Glaucoma Neg Hx     Ovarian cancer Neg Hx     Celiac disease Neg Hx     Colon cancer Neg Hx     Colon polyps Neg Hx     Esophageal cancer Neg Hx     Liver cancer Neg Hx      Rectal cancer Neg Hx     Stomach cancer Neg Hx     Ulcerative colitis Neg Hx     Inflammatory bowel disease Neg Hx      Social History     Tobacco Use    Smoking status: Never Smoker    Smokeless tobacco: Never Used   Substance Use Topics    Alcohol use: No     Alcohol/week: 0.0 standard drinks    Drug use: No     Review of Systems   Constitutional: Negative.    HENT: Negative.    Eyes: Negative.    Respiratory: Negative.    Cardiovascular: Negative.    Gastrointestinal: Negative.    Genitourinary: Negative.    Musculoskeletal: Positive for myalgias and neck pain.   Skin: Negative.    Neurological: Negative.        Physical Exam     Initial Vitals [06/26/22 1430]   BP Pulse Resp Temp SpO2   126/71 100 18 99 °F (37.2 °C) 100 %      MAP       --         Physical Exam    Nursing note and vitals reviewed.  Constitutional: She appears well-developed and well-nourished. She is not diaphoretic. No distress.   HENT:   Head: Normocephalic and atraumatic.   Nose: Nose normal.   Eyes: EOM are normal. Pupils are equal, round, and reactive to light.   Neck: Neck supple. No JVD present.   Normal range of motion.  Cardiovascular: Normal rate, regular rhythm, normal heart sounds and intact distal pulses.   Pulmonary/Chest: Breath sounds normal. No stridor. No respiratory distress. She has no wheezes. She has no rales.   Abdominal: Abdomen is soft. Bowel sounds are normal. She exhibits no distension. There is no abdominal tenderness.   Musculoskeletal:         General: Tenderness (Tender to palpation over left upper trapezius border.) present. No edema. Normal range of motion.      Cervical back: Normal range of motion and neck supple.     Neurological: She is alert and oriented to person, place, and time. She has normal strength.   Skin: Skin is warm and dry. Capillary refill takes less than 2 seconds. No rash noted. No erythema.         ED Course   Procedures  Labs Reviewed - No data to display       Imaging Results           X-Ray Cervical Spine AP And Lateral (Final result)  Result time 06/26/22 15:08:16    Final result by Bala Valles MD (06/26/22 15:08:16)                 Impression:      No evidence of acute fracture or listhesis of the cervical spine.    Stable multilevel degenerative changes of cervical spine.      Electronically signed by: Bala Valles MD  Date:    06/26/2022  Time:    15:08             Narrative:    EXAMINATION:  XR CERVICAL SPINE AP LATERAL    CLINICAL HISTORY:  Cervicalgia    TECHNIQUE:  AP, lateral and open mouth views of the cervical spine were performed.    COMPARISON:  08/11/2015.    FINDINGS:  The craniocervical junction is intact.  The predental space is maintained.  No prevertebral soft tissue swelling is identified.    The cervical alignment is maintained.  The vertebral body heights are maintained.  There is hypertrophy of the posterior elements the lateral masses of C1 nondisplaced.  There is intervertebral disc space narrowing throughout the cervical spine.  There is no evidence of acute fracture or listhesis of the cervical spine.    There are calcifications of the neck vessels.  The paraspinal soft tissues are unremarkable.  The visualized lung apices are unremarkable.                                 Medications   LIDOcaine 5 % patch 1 patch (1 patch Transdermal Patch Applied 6/26/22 1624)   dexamethasone injection 8 mg (8 mg Intramuscular Given 6/26/22 1624)     Medical Decision Making:   History:   Old Medical Records: I decided to obtain old medical records.  Clinical Tests:   Radiological Study: Reviewed and Ordered    MDM:    62-year-old female with past medical history as noted above presenting with left-sided neck pain.  Physical exam as noted above, x-rays showing stable degenerative changes, no acute findings noted.  Patient presentation consistent with musculoskeletal strain with pain exacerbated by twisting of her head to the left side, no muscular weakness noted, no sensory  deficits noted, no gross motor neuro deficit noted.  At this point time based on physical exam evaluation not suspect intraspinal lesion, MS, transverse myelitis, cauda equina, osteomyelitis, or diskitis, spinal epidural abscess, or any further surgical medical emergency.  Will treat symptomatically and have patient follow-up in clinic for reassessment and re-evaluation. Discussed diagnosis and further treatment with patient, including f/u.  Return precautions given and all questions answered.  Patient in understanding of plan.  Pt discharged to home improved and stable.            Scribe Attestation:   Scribe #1: I performed the above scribed service and the documentation accurately describes the services I performed. I attest to the accuracy of the note.                 Clinical Impression:   Final diagnoses:  [M54.2] Neck pain  [M62.838] Neck muscle spasm (Primary)       Scribe attestation: I, Ambrocio Salas M.D., personally performed the services described in this documentation.  All medical record entries made by the scribe were at my direction and in my presence.  I have reviewed the chart and agree that the record reflects my personal performance and is accurate and complete.     ED Disposition Condition    Discharge Stable        ED Prescriptions     Medication Sig Dispense Start Date End Date Auth. Provider    cyclobenzaprine (FLEXERIL) 10 MG tablet Take 1 tablet (10 mg total) by mouth 3 (three) times daily as needed for Muscle spasms. 15 tablet 6/26/2022 7/1/2022 Ambrocio Salas MD    oxyCODONE-acetaminophen (PERCOCET) 5-325 mg per tablet Take 1 tablet by mouth every 4 (four) hours as needed for Pain. 10 tablet 6/26/2022  Ambrocio Salas MD    LIDOcaine (LIDODERM) 5 % Place 1 patch onto the skin once daily. Remove & Discard patch within 12 hours or as directed by MD 15 patch 6/26/2022  Ambrocio Salas MD        Follow-up Information     Follow up With Specialties Details Why Contact  Info    [facility] ED Emergency Medicine Go to  If symptoms worsen [address]  [facility] [id number] [phone]    Shonda Anguiano MD Internal Medicine Go in 1 week As needed [address]  [facility] [address]  [phone]             Ambrocio Salas MD  06/26/22 8412

## 2022-06-26 NOTE — FIRST PROVIDER EVALUATION
"Medical screening exam completed.  I have conducted a focused provider triage encounter, findings are as follows:    Brief history of present illness:  Neck pain that radiates to left leg for 3 days    Vitals:    06/26/22 1430   BP: 126/71   BP Location: Left arm   Patient Position: Sitting   Pulse: 100   Resp: 18   Temp: 99 °F (37.2 °C)   TempSrc: Oral   SpO2: 100%   Weight: 56.7 kg (125 lb)   Height: 5' 4" (1.626 m)       Pertinent physical exam:  NAD    Brief workup plan:  Xray    Preliminary workup initiated; this workup will be continued and followed by the physician or advanced practice provider that is assigned to the patient when roomed.  "

## 2022-07-05 ENCOUNTER — LAB VISIT (OUTPATIENT)
Dept: LAB | Facility: HOSPITAL | Age: 62
End: 2022-07-05
Attending: INTERNAL MEDICINE
Payer: MEDICARE

## 2022-07-05 DIAGNOSIS — Z79.52 LONG TERM CURRENT USE OF SYSTEMIC STEROIDS: ICD-10-CM

## 2022-07-05 LAB
ANION GAP SERPL CALC-SCNC: 12 MMOL/L (ref 8–16)
BUN SERPL-MCNC: 16 MG/DL (ref 8–23)
CALCIUM SERPL-MCNC: 9.5 MG/DL (ref 8.7–10.5)
CHLORIDE SERPL-SCNC: 104 MMOL/L (ref 95–110)
CO2 SERPL-SCNC: 25 MMOL/L (ref 23–29)
CREAT SERPL-MCNC: 0.8 MG/DL (ref 0.5–1.4)
EST. GFR  (AFRICAN AMERICAN): >60 ML/MIN/1.73 M^2
EST. GFR  (NON AFRICAN AMERICAN): >60 ML/MIN/1.73 M^2
GLUCOSE SERPL-MCNC: 90 MG/DL (ref 70–110)
POTASSIUM SERPL-SCNC: 3.6 MMOL/L (ref 3.5–5.1)
SODIUM SERPL-SCNC: 141 MMOL/L (ref 136–145)

## 2022-07-05 PROCEDURE — 80048 BASIC METABOLIC PNL TOTAL CA: CPT | Performed by: INTERNAL MEDICINE

## 2022-07-05 PROCEDURE — 36415 COLL VENOUS BLD VENIPUNCTURE: CPT | Mod: PO | Performed by: INTERNAL MEDICINE

## 2022-07-11 ENCOUNTER — CLINICAL SUPPORT (OUTPATIENT)
Dept: INFECTIOUS DISEASES | Facility: CLINIC | Age: 62
End: 2022-07-11
Payer: MEDICAID

## 2022-07-11 DIAGNOSIS — E87.6 LOW SERUM POTASSIUM: ICD-10-CM

## 2022-07-11 DIAGNOSIS — D50.9 IRON DEFICIENCY ANEMIA, UNSPECIFIED IRON DEFICIENCY ANEMIA TYPE: ICD-10-CM

## 2022-07-11 PROCEDURE — 90632 HEPA VACCINE ADULT IM: CPT | Mod: S$GLB,,, | Performed by: INTERNAL MEDICINE

## 2022-07-11 PROCEDURE — 90632 HEPATITIS A VACCINE ADULT IM: ICD-10-PCS | Mod: S$GLB,,, | Performed by: INTERNAL MEDICINE

## 2022-07-11 PROCEDURE — 90471 HEPATITIS A VACCINE ADULT IM: ICD-10-PCS | Mod: S$GLB,,, | Performed by: INTERNAL MEDICINE

## 2022-07-11 PROCEDURE — 90471 IMMUNIZATION ADMIN: CPT | Mod: S$GLB,,, | Performed by: INTERNAL MEDICINE

## 2022-07-15 ENCOUNTER — CLINICAL SUPPORT (OUTPATIENT)
Dept: REHABILITATION | Facility: OTHER | Age: 62
End: 2022-07-15
Attending: INTERNAL MEDICINE
Payer: MEDICARE

## 2022-07-15 DIAGNOSIS — R27.9 LACK OF COORDINATION: Primary | ICD-10-CM

## 2022-07-15 PROCEDURE — 97140 MANUAL THERAPY 1/> REGIONS: CPT

## 2022-07-15 PROCEDURE — 97110 THERAPEUTIC EXERCISES: CPT

## 2022-07-15 NOTE — PROGRESS NOTES
Pelvic Health Physical Therapy   Treatment Note     Name: Efe Horowitz  Clinic Number: [id number]    Therapy Diagnosis:   Encounter Diagnosis   Name Primary?    Lack of coordination Yes     Physician: Ceferino Rangel MD    Visit Date: 7/15/2022    Physician Orders: PT Eval and Treat  Medical Diagnosis from Referral: pelvic floor dysfunction  Evaluation Date: 2/14/2022  Authorization Period Expiration: 20 visits through 12/31/22  Plan of Care Expiration:  5/9/2022 to 8/7/22.  Visit # / Visits authorized: 6/12     Cancelled Visits: 0  No Show Visits: 0  FOTO: 1 completed 4/25/22 - score Bowel 8%  2 completed 5/23/22 - score 0%  3 due on discharge    Time In: 4:00  Time Out: 500  Total Billable Time: 60 minutes    Precautions: Standard    Subjective     Pt reports: Dr. Rangel prescribed new medication for abd spasm and its helping a lot. She takes it every day right before bed. Has been having more problem with LLE, from hip to knee. Caused her to go to urgent care 3 times. Still doing one tablet of Metamucil and gas has been better. Hasnt been having much leakage, still occurs if she eats too much fruit.   She was compliant with home exercise program.  Response to previous treatment: felt fine  Functional change:  Decreased LLQ, decreased diarrhea    Objective     INTERNAL ASSESSMENT - pelvic floor mm  Muscle Power: 4/5  Muscle Endurance: 30 sec    Efe received therapeutic exercises to develop  strength and endurance for 40 minutes including:   Hip adduction, 5 sec x 10  Bridging + hip adduction, 5 sec x 20  Bridging + hip abduction, RTB x 20  Clamshells x 20  Reverse clamshells x 20  SL hip abd and hip circles  PFME - breathing, contractions    Efe received the following manual therapy techniques: to develop flexibility and extensibility for 15 minutes including:   Mobilization abd wall    Efe participated in neuromuscular re-education activities to develop Coordination and Control for 00  minutes including: not performed today    Efe participated in dynamic functional therapeutic activities to improve functional performance for 00 minutes, including:  Discussion of ongoing POC    Home Exercises Provided and Patient Education Provided     Education provided:   - anatomy/physiology of pelvic floor, diaphragmatic breathing, kegels and fluid intake/dietary modifications  Discussed progression of plan of care with patient; educated pt in activity modification; reviewed HEP with pt. Pt demonstrated and verbalized understanding of all instruction and was provided with a handout of HEP (see Patient Instructions).    Written Home Exercises Provided: yes.  Exercises were reviewed and Efe was able to demonstrate them prior to the end of the session.  Efe demonstrated good  understanding of the education provided.     See EMR under Patient Instructions for exercises provided 4/25/2022.    Assessment     Excellent demo of PFME and increased mobility of scar tissue. Reviewed and progressed hip exercises. Continue per POC to continue progressing towards LTGs.  Efe Is progressing well towards her goals.   Pt prognosis is Excellent.     Pt will continue to benefit from skilled outpatient physical therapy to address the deficits listed in the problem list box on initial evaluation, provide pt/family education and to maximize pt's level of independence in the home and community environment.     Pt's spiritual, cultural and educational needs considered and pt agreeable to plan of care and goals.     Anticipated barriers to physical therapy: none    Goals: Short Term Goals: 4 weeks   Pt indep in HEP  Pt able to lengthen pelvic floor mm appropriately for BM without strain     Long Term Goals: 8 weeks   Pt indep in progressive HEP  Pt reports 0 episodes of fecal leakage in at least 4 weeks for improved ADL participation  Pt reports improved stool consistency at least Type 5 or higher on Pingree stool scale with  75% of BMs     Plan      Plan of care Certification: 5/9/2022 to 8/7/22.     Outpatient Physical Therapy 1 times weekly for 12 weeks to include the following interventions: therapeutic exercises, therapeutic activity, neuromuscular re-education, manual therapy, patient/family education and self care/home management    Next visit: progressive strengthening    Aaliyah Bentley, PT

## 2022-07-22 DIAGNOSIS — E87.6 HYPOKALEMIA: Primary | ICD-10-CM

## 2022-08-19 ENCOUNTER — TELEPHONE (OUTPATIENT)
Dept: GASTROENTEROLOGY | Facility: CLINIC | Age: 62
End: 2022-08-19
Payer: MEDICARE

## 2022-08-19 NOTE — TELEPHONE ENCOUNTER
raisa advisjeff. Return call and spoke with patient. Per patient she is having food and her medication stuck in throat and she is now throwing up. Patient wanted to know if is time for her EGD.

## 2022-08-19 NOTE — TELEPHONE ENCOUNTER
----- Message from Irina Huston MA sent at 8/19/2022 10:45 AM CDT -----  Contact: Efe Anderson is requesting a call back from the staff regarding patient still having problems with things going down.      Confirmed Contact below:  Contact Name:Efe Horowitz  Phone Number: [phone]

## 2022-08-19 NOTE — TELEPHONE ENCOUNTER
----- Message from Irina Huston MA sent at 8/19/2022 10:45 AM CDT -----  Contact: Efe Anderson is requesting a call back from the staff regarding patient still having problems with things going down.      Confirmed Contact below:  Contact Name:Efe Horowitz  Phone Number: [phone]

## 2022-08-19 NOTE — TELEPHONE ENCOUNTER
Please advise. Return call and spoke with patient. Per patient she is having food and her medication stuck in throat and she is now throwing up. Patient wanted to know if is time for her EGD.

## 2022-08-21 DIAGNOSIS — R13.19 ESOPHAGEAL DYSPHAGIA: Primary | ICD-10-CM

## 2022-08-24 DIAGNOSIS — M32.9 SYSTEMIC LUPUS ERYTHEMATOSUS, UNSPECIFIED SLE TYPE, UNSPECIFIED ORGAN INVOLVEMENT STATUS: Primary | ICD-10-CM

## 2022-08-24 DIAGNOSIS — Z79.899 LONG-TERM USE OF PLAQUENIL: ICD-10-CM

## 2022-08-29 ENCOUNTER — HOSPITAL ENCOUNTER (OUTPATIENT)
Dept: RADIOLOGY | Facility: HOSPITAL | Age: 62
Discharge: HOME | End: 2022-08-29
Attending: INTERNAL MEDICINE
Payer: MEDICARE

## 2022-08-29 DIAGNOSIS — R13.19 ESOPHAGEAL DYSPHAGIA: ICD-10-CM

## 2022-08-29 DIAGNOSIS — R13.10 DYSPHAGIA, UNSPECIFIED TYPE: Primary | ICD-10-CM

## 2022-08-29 PROCEDURE — 74220 FL ESOPHAGRAM WITH BARIUM TABLET: ICD-10-PCS | Mod: 26,,, | Performed by: RADIOLOGY

## 2022-08-29 PROCEDURE — A9698 NON-RAD CONTRAST MATERIALNOC: HCPCS | Performed by: INTERNAL MEDICINE

## 2022-08-29 PROCEDURE — 25500020 PHARM REV CODE 255: Performed by: INTERNAL MEDICINE

## 2022-08-29 PROCEDURE — 74220 X-RAY XM ESOPHAGUS 1CNTRST: CPT | Mod: TC

## 2022-08-29 PROCEDURE — 74220 X-RAY XM ESOPHAGUS 1CNTRST: CPT | Mod: 26,,, | Performed by: RADIOLOGY

## 2022-08-29 RX ADMIN — BARIUM SULFATE 175 ML: 0.6 SUSPENSION ORAL at 08:08

## 2022-08-29 RX ADMIN — BARIUM SULFATE 200 ML: 0.81 POWDER, FOR SUSPENSION ORAL at 08:08

## 2022-09-02 ENCOUNTER — LAB VISIT (OUTPATIENT)
Dept: LAB | Facility: HOSPITAL | Age: 62
End: 2022-09-02
Attending: INTERNAL MEDICINE
Payer: MEDICARE

## 2022-09-02 DIAGNOSIS — M32.9 SYSTEMIC LUPUS ERYTHEMATOSUS, UNSPECIFIED SLE TYPE, UNSPECIFIED ORGAN INVOLVEMENT STATUS: ICD-10-CM

## 2022-09-02 DIAGNOSIS — E87.6 HYPOKALEMIA: ICD-10-CM

## 2022-09-02 DIAGNOSIS — I10 HYPERTENSION, UNSPECIFIED TYPE: ICD-10-CM

## 2022-09-02 LAB
ALBUMIN SERPL BCP-MCNC: 3.9 G/DL (ref 3.5–5.2)
ANION GAP SERPL CALC-SCNC: 12 MMOL/L (ref 8–16)
BASOPHILS # BLD AUTO: 0.03 K/UL (ref 0–0.2)
BASOPHILS NFR BLD: 0.5 % (ref 0–1.9)
BUN SERPL-MCNC: 14 MG/DL (ref 8–23)
CALCIUM SERPL-MCNC: 9.9 MG/DL (ref 8.7–10.5)
CHLORIDE SERPL-SCNC: 104 MMOL/L (ref 95–110)
CO2 SERPL-SCNC: 28 MMOL/L (ref 23–29)
CREAT SERPL-MCNC: 0.9 MG/DL (ref 0.5–1.4)
DIFFERENTIAL METHOD: ABNORMAL
EOSINOPHIL # BLD AUTO: 0.2 K/UL (ref 0–0.5)
EOSINOPHIL NFR BLD: 2.6 % (ref 0–8)
ERYTHROCYTE [DISTWIDTH] IN BLOOD BY AUTOMATED COUNT: 13.6 % (ref 11.5–14.5)
EST. GFR  (NO RACE VARIABLE): >60 ML/MIN/1.73 M^2
GLUCOSE SERPL-MCNC: 85 MG/DL (ref 70–110)
HCT VFR BLD AUTO: 37.9 % (ref 37–48.5)
HGB BLD-MCNC: 12.1 G/DL (ref 12–16)
IMM GRANULOCYTES # BLD AUTO: 0.02 K/UL (ref 0–0.04)
IMM GRANULOCYTES NFR BLD AUTO: 0.3 % (ref 0–0.5)
LYMPHOCYTES # BLD AUTO: 2.2 K/UL (ref 1–4.8)
LYMPHOCYTES NFR BLD: 36.2 % (ref 18–48)
MCH RBC QN AUTO: 28.5 PG (ref 27–31)
MCHC RBC AUTO-ENTMCNC: 31.9 G/DL (ref 32–36)
MCV RBC AUTO: 89 FL (ref 82–98)
MONOCYTES # BLD AUTO: 0.7 K/UL (ref 0.3–1)
MONOCYTES NFR BLD: 11.6 % (ref 4–15)
NEUTROPHILS # BLD AUTO: 3 K/UL (ref 1.8–7.7)
NEUTROPHILS NFR BLD: 48.8 % (ref 38–73)
NRBC BLD-RTO: 0 /100 WBC
PHOSPHATE SERPL-MCNC: 3.6 MG/DL (ref 2.7–4.5)
PLATELET # BLD AUTO: 260 K/UL (ref 150–450)
PMV BLD AUTO: 11.2 FL (ref 9.2–12.9)
POTASSIUM SERPL-SCNC: 3.3 MMOL/L (ref 3.5–5.1)
PTH-INTACT SERPL-MCNC: 64.1 PG/ML (ref 9–77)
RBC # BLD AUTO: 4.25 M/UL (ref 4–5.4)
SODIUM SERPL-SCNC: 144 MMOL/L (ref 136–145)
WBC # BLD AUTO: 6.05 K/UL (ref 3.9–12.7)

## 2022-09-02 PROCEDURE — 80069 RENAL FUNCTION PANEL: CPT | Performed by: INTERNAL MEDICINE

## 2022-09-02 PROCEDURE — 36415 COLL VENOUS BLD VENIPUNCTURE: CPT | Mod: PO | Performed by: INTERNAL MEDICINE

## 2022-09-02 PROCEDURE — 85025 COMPLETE CBC W/AUTO DIFF WBC: CPT | Performed by: INTERNAL MEDICINE

## 2022-09-02 PROCEDURE — 83970 ASSAY OF PARATHORMONE: CPT | Performed by: INTERNAL MEDICINE

## 2022-09-05 NOTE — PROGRESS NOTES
Domenicdenise looks like your EGD is scheduled for this Friday - see you this Friday.    your esophagram was read as follows no stricture.    Impression:     1. Spontaneous gastroesophageal reflux.  2. Small hiatal hernia.  3. Mild esophageal dysmotility.     Electronically signed by: Thad Keen MD  Date:                                            08/29/2022

## 2022-09-06 ENCOUNTER — LAB VISIT (OUTPATIENT)
Dept: LAB | Facility: HOSPITAL | Age: 62
End: 2022-09-06
Attending: INTERNAL MEDICINE
Payer: MEDICARE

## 2022-09-06 ENCOUNTER — OFFICE VISIT (OUTPATIENT)
Dept: INTERNAL MEDICINE | Facility: CLINIC | Age: 62
End: 2022-09-06
Payer: MEDICARE

## 2022-09-06 VITALS
WEIGHT: 130.94 LBS | DIASTOLIC BLOOD PRESSURE: 76 MMHG | HEART RATE: 66 BPM | HEIGHT: 64 IN | BODY MASS INDEX: 22.35 KG/M2 | OXYGEN SATURATION: 99 % | SYSTOLIC BLOOD PRESSURE: 116 MMHG

## 2022-09-06 DIAGNOSIS — E27.49 SECONDARY ADRENAL INSUFFICIENCY: ICD-10-CM

## 2022-09-06 DIAGNOSIS — Z12.31 SCREENING MAMMOGRAM, ENCOUNTER FOR: ICD-10-CM

## 2022-09-06 DIAGNOSIS — K51.918 ULCERATIVE COLITIS WITH OTHER COMPLICATION, UNSPECIFIED LOCATION: ICD-10-CM

## 2022-09-06 DIAGNOSIS — E05.90 SUBCLINICAL HYPERTHYROIDISM: ICD-10-CM

## 2022-09-06 DIAGNOSIS — R73.03 PREDIABETES: ICD-10-CM

## 2022-09-06 DIAGNOSIS — I77.9 BILATERAL CAROTID ARTERY DISEASE, UNSPECIFIED TYPE: ICD-10-CM

## 2022-09-06 DIAGNOSIS — Z79.52 LONG TERM CURRENT USE OF SYSTEMIC STEROIDS: ICD-10-CM

## 2022-09-06 DIAGNOSIS — F51.01 PRIMARY INSOMNIA: ICD-10-CM

## 2022-09-06 DIAGNOSIS — M32.9 SYSTEMIC LUPUS ERYTHEMATOSUS, UNSPECIFIED SLE TYPE, UNSPECIFIED ORGAN INVOLVEMENT STATUS: ICD-10-CM

## 2022-09-06 DIAGNOSIS — I10 BENIGN ESSENTIAL HYPERTENSION: Primary | ICD-10-CM

## 2022-09-06 DIAGNOSIS — K86.2 PANCREAS CYST: ICD-10-CM

## 2022-09-06 DIAGNOSIS — Z79.899 LONG-TERM USE OF PLAQUENIL: ICD-10-CM

## 2022-09-06 DIAGNOSIS — D84.9 IMMUNOSUPPRESSION: ICD-10-CM

## 2022-09-06 DIAGNOSIS — E78.2 MIXED HYPERLIPIDEMIA: ICD-10-CM

## 2022-09-06 DIAGNOSIS — K86.81 EXOCRINE PANCREATIC INSUFFICIENCY: ICD-10-CM

## 2022-09-06 DIAGNOSIS — M80.00XG AGE-RELATED OSTEOPOROSIS WITH CURRENT PATHOLOGICAL FRACTURE WITH DELAYED HEALING: ICD-10-CM

## 2022-09-06 LAB
CHOLEST SERPL-MCNC: 112 MG/DL (ref 120–199)
CHOLEST/HDLC SERPL: 1.5 {RATIO} (ref 2–5)
ESTIMATED AVG GLUCOSE: 117 MG/DL (ref 68–131)
HBA1C MFR BLD: 5.7 % (ref 4–5.6)
HDLC SERPL-MCNC: 76 MG/DL (ref 40–75)
HDLC SERPL: 67.9 % (ref 20–50)
LDLC SERPL CALC-MCNC: 19.6 MG/DL (ref 63–159)
NONHDLC SERPL-MCNC: 36 MG/DL
TRIGL SERPL-MCNC: 82 MG/DL (ref 30–150)
TSH SERPL DL<=0.005 MIU/L-ACNC: 0.97 UIU/ML (ref 0.4–4)

## 2022-09-06 PROCEDURE — 3078F DIAST BP <80 MM HG: CPT | Mod: CPTII,S$GLB,, | Performed by: INTERNAL MEDICINE

## 2022-09-06 PROCEDURE — 99999 PR PBB SHADOW E&M-EST. PATIENT-LVL III: CPT | Mod: PBBFAC,,, | Performed by: INTERNAL MEDICINE

## 2022-09-06 PROCEDURE — 84443 ASSAY THYROID STIM HORMONE: CPT | Performed by: INTERNAL MEDICINE

## 2022-09-06 PROCEDURE — 4010F ACE/ARB THERAPY RXD/TAKEN: CPT | Mod: CPTII,S$GLB,, | Performed by: INTERNAL MEDICINE

## 2022-09-06 PROCEDURE — 80061 LIPID PANEL: CPT | Performed by: INTERNAL MEDICINE

## 2022-09-06 PROCEDURE — 36415 COLL VENOUS BLD VENIPUNCTURE: CPT | Performed by: INTERNAL MEDICINE

## 2022-09-06 PROCEDURE — 1160F RVW MEDS BY RX/DR IN RCRD: CPT | Mod: CPTII,S$GLB,, | Performed by: INTERNAL MEDICINE

## 2022-09-06 PROCEDURE — 1159F MED LIST DOCD IN RCRD: CPT | Mod: CPTII,S$GLB,, | Performed by: INTERNAL MEDICINE

## 2022-09-06 PROCEDURE — 1160F PR REVIEW ALL MEDS BY PRESCRIBER/CLIN PHARMACIST DOCUMENTED: ICD-10-PCS | Mod: CPTII,S$GLB,, | Performed by: INTERNAL MEDICINE

## 2022-09-06 PROCEDURE — 3044F HG A1C LEVEL LT 7.0%: CPT | Mod: CPTII,S$GLB,, | Performed by: INTERNAL MEDICINE

## 2022-09-06 PROCEDURE — 99214 OFFICE O/P EST MOD 30 MIN: CPT | Mod: S$GLB,,, | Performed by: INTERNAL MEDICINE

## 2022-09-06 PROCEDURE — 3066F NEPHROPATHY DOC TX: CPT | Mod: CPTII,S$GLB,, | Performed by: INTERNAL MEDICINE

## 2022-09-06 PROCEDURE — 3008F BODY MASS INDEX DOCD: CPT | Mod: CPTII,S$GLB,, | Performed by: INTERNAL MEDICINE

## 2022-09-06 PROCEDURE — 99999 PR PBB SHADOW E&M-EST. PATIENT-LVL III: ICD-10-PCS | Mod: PBBFAC,,, | Performed by: INTERNAL MEDICINE

## 2022-09-06 PROCEDURE — 4010F PR ACE/ARB THEARPY RXD/TAKEN: ICD-10-PCS | Mod: CPTII,S$GLB,, | Performed by: INTERNAL MEDICINE

## 2022-09-06 PROCEDURE — 3044F PR MOST RECENT HEMOGLOBIN A1C LEVEL <7.0%: ICD-10-PCS | Mod: CPTII,S$GLB,, | Performed by: INTERNAL MEDICINE

## 2022-09-06 PROCEDURE — 83036 HEMOGLOBIN GLYCOSYLATED A1C: CPT | Performed by: INTERNAL MEDICINE

## 2022-09-06 PROCEDURE — 3074F SYST BP LT 130 MM HG: CPT | Mod: CPTII,S$GLB,, | Performed by: INTERNAL MEDICINE

## 2022-09-06 PROCEDURE — 3066F PR DOCUMENTATION OF TREATMENT FOR NEPHROPATHY: ICD-10-PCS | Mod: CPTII,S$GLB,, | Performed by: INTERNAL MEDICINE

## 2022-09-06 PROCEDURE — 3074F PR MOST RECENT SYSTOLIC BLOOD PRESSURE < 130 MM HG: ICD-10-PCS | Mod: CPTII,S$GLB,, | Performed by: INTERNAL MEDICINE

## 2022-09-06 PROCEDURE — 3078F PR MOST RECENT DIASTOLIC BLOOD PRESSURE < 80 MM HG: ICD-10-PCS | Mod: CPTII,S$GLB,, | Performed by: INTERNAL MEDICINE

## 2022-09-06 PROCEDURE — 99214 PR OFFICE/OUTPT VISIT, EST, LEVL IV, 30-39 MIN: ICD-10-PCS | Mod: S$GLB,,, | Performed by: INTERNAL MEDICINE

## 2022-09-06 PROCEDURE — 99499 UNLISTED E&M SERVICE: CPT | Mod: HCNC,S$GLB,, | Performed by: INTERNAL MEDICINE

## 2022-09-06 PROCEDURE — 1159F PR MEDICATION LIST DOCUMENTED IN MEDICAL RECORD: ICD-10-PCS | Mod: CPTII,S$GLB,, | Performed by: INTERNAL MEDICINE

## 2022-09-06 PROCEDURE — 3008F PR BODY MASS INDEX (BMI) DOCUMENTED: ICD-10-PCS | Mod: CPTII,S$GLB,, | Performed by: INTERNAL MEDICINE

## 2022-09-06 PROCEDURE — 99499 RISK ADDL DX/OHS AUDIT: ICD-10-PCS | Mod: HCNC,S$GLB,, | Performed by: INTERNAL MEDICINE

## 2022-09-06 RX ORDER — ZOLPIDEM TARTRATE 5 MG/1
5 TABLET ORAL NIGHTLY PRN
Qty: 30 TABLET | Refills: 5 | Status: SHIPPED | OUTPATIENT
Start: 2022-09-06 | End: 2023-03-06 | Stop reason: SDUPTHER

## 2022-09-06 NOTE — PROGRESS NOTES
INTERNAL MEDICINE ESTABLISHED PATIENT VISIT NOTE    Subjective:     Chief Complaint: Follow-up  HTN, preDM, chronic fatigue     Patient ID: Efe Horowitz is a 62 y.o. female with HTN c proteinuria, HLD, preDM, UC s/p total colectomy and at baseline c some chronic abd discomfort, GERD, iron def anemia, SLE, FM, adrenal insufficency, exocrine pancreatic insufficency, B12 def, Vit D def, hx lymphoma in breast dx'ed 1/2016 s/p resection and s/p breast reconstruction at [facility] in 2017, osteoporosis c hx of L3 vertebral fractures on MRI in the past, subclinical hyperthyroidism, pancreatic cyst, hepatic hemangiomas stable on imaging, hx migraines, lumbar spondylarthritis, insomnia on Ambien, last seen by me in Feb, here today for 6 mo f/u HTN, HLD, preDM.    Still seeing Dr. Villalta and Dr. Rangel for chronic GI issues.  Had scope done in Feb c focal active enteritis in ileal pouch but neg for dysplasia, rectal cuff biopsy c moderate active chronic proctitis, neg for dysplasia.    Had esophagram done in June that showed GERD and mild esophageal dysmotility.  EGD scheduled for later this week.    Pancreatic cyst that Dr. Jagdeep Finn has been following has been stable on imaging c plan foro MRCP in one year.    Has had progressive fatigue over the past few mos.    Seen by Dr. Srinivasan in May for osteoporosis c Reclast in the past and plan for DEXA in 2023.  Also discussed adrenal insufficiency; however, per hx, when changing to HC in the past had UC flare so planned to cont Prednisone for now.    Also had ED visit in June for neck pain.  Cervical spine Xray at that time showed intervertebral disc space narrowing throughout.  Also c calcifications of the neck vessels.  Sees Dr. Jordan/Meg who manages her carotid a disease and had carotid u/s done and was told everything looked good and has f/u pending.      Past Medical History:  Past Medical History:   Diagnosis Date    Acid reflux     Adrenal insufficiency, primary   "   Arthritis     Asthma     B12 deficiency anemia     Benign essential hypertension 2/7/2021    Blood transfusion 2010    Breast cancer 2/2016    Right breast infiltrating ductal CA    Cataract     Chronic pain     Deep vein thrombosis     Dry eyes     Esophagitis, unspecified     Fibromyalgia     General anesthetics causing adverse effect in therapeutic use     "slow to wake up bc I don't have an immune system    Heart murmur     History of colon polyps     Hyperlipidemia     Hypopituitary dwarfism     Iron deficiency anemia     Lupus     Migraines, neuralgic     Nonspecific ulcerative colitis     OP (osteoporosis)     history of reclast    Osteopenia     Pancreatic cyst     Schatzki's ring     Steroid sulfatase deficiency     Ulcerative colitis     Vitamin D deficiency disease        Home Medications:  Prior to Admission medications    Medication Sig Start Date End Date Taking? Authorizing Provider   albuterol (ACCUNEB) 0.63 mg/3 mL Nebu Take 3 mLs (0.63 mg total) by nebulization every 6 (six) hours as needed. 2/26/14 6/13/22  Manjula Mixon MD   alirocumab (PRALUENT PEN) 75 mg/mL PnIj Inject 75 mg into the skin every 14 (fourteen) days.    Historical Provider   amLODIPine (NORVASC) 5 MG tablet Take 1 tablet (5 mg total) by mouth once daily. 6/3/22   Shonda Anguiano MD   CREON 36,000-114,000- 180,000 unit CpDR TAKE TWO CAPSULES BY MOUTH THREE TIMES DAILY WITH MEALS AND ONE CAPSULE WITH EACH SNACK 4/20/22   Reese Villalta MD   dexamethasone (DECADRON) 4 mg/mL injection Inject 1 mL (4 mg total) into the muscle daily as needed (Signs and symtpoms of severe adrenal insufficiency). 10/6/21   Ena Srinivasan MD   diclofenac sodium (VOLTAREN) 1 % Gel APPLY 2 GRAMS TO AFFECTED AREA 4 TIMES A DAY 3/24/21   Idania Allison MD   dicyclomine (BENTYL) 20 mg tablet TAKE 1 TABLET BY MOUTH EVERY 6 HOURS. 7/5/22   Ceferino Rangel MD   gabapentin (NEURONTIN) 300 MG capsule TAKE 1 CAPSULE BY MOUTH EVERY " EVENING 11/29/21   Idania Allison MD   HEPLISAV-B, PF, 20 mcg/0.5 mL Syrg  2/7/22   Historical Provider   hydrOXYchloroQUINE (PLAQUENIL) 200 mg tablet TAKE 1 AND 1/2 TABLETS BY MOUTH EVERY DAY 5/30/22   Idania Allison MD   lidocaine (LIDODERM) 5 % Place 1 patch onto the skin once daily. Remove & Discard patch within 12 hours or as directed by MD 11/27/17   Darin Starkey II, MD   LIDOcaine (LIDODERM) 5 % Place 1 patch onto the skin once daily. Remove & Discard patch within 12 hours or as directed by MD 6/26/22   Ambrocio Salas MD   loperamide (IMODIUM) 2 mg capsule TAKE 1 TO 2 CAPSULES BY MOUTH FOUR TIMES DAILY 4/20/22   Alejandro Maxwell MD   losartan (COZAAR) 50 MG tablet Take 1 tablet (50 mg total) by mouth once daily. 1/27/22 1/27/23  Billy Rush DO   multivitamin/iron/folic acid (CENTRUM WOMEN ORAL) Take 1 tablet by mouth once daily.    Historical Provider   niacin 100 MG Tab Take by mouth. 1 Tablet Oral At bedtime    Historical Provider   omeprazole (PRILOSEC) 20 MG capsule TAKE 1 CAPSULE BY MOUTH TWICE A DAY 11/27/21   Shonda Anguiano MD   ondansetron (ZOFRAN) 4 MG tablet Take 1 tablet (4 mg total) by mouth every 12 (twelve) hours as needed for Nausea. 10/13/20   Tierney Mcintyre NP   oxyCODONE-acetaminophen (PERCOCET) 5-325 mg per tablet Take 1 tablet by mouth every 12 (twelve) hours as needed for Pain. 2/23/22   Shonda Anguiano MD   oxyCODONE-acetaminophen (PERCOCET) 5-325 mg per tablet Take 1 tablet by mouth every 4 (four) hours as needed for Pain. 6/26/22   Ambrocio Salas MD   potassium chloride SA (K-DUR,KLOR-CON) 10 MEQ tablet Take 2 tablets (20 mEq total) by mouth once daily. 4/4/22   Shonda Anguiano MD   predniSONE (DELTASONE) 5 MG tablet Take 1 tablet (5 mg total) by mouth once daily. Double the dose in times of illness 9/24/21   Ena Srinivasan MD   psyllium husk/aspartame (METAMUCIL FIBER SINGLES ORAL) Take by mouth.    Historical Provider   MYLA Gandhi  "mg/mL PnIj INJECT 140 MG INTO THE SKIN EVERY 14 (FOURTEEN) DAYS 7/27/21   Historical Provider   RESTASIS 0.05 % ophthalmic emulsion INSTILL 1 DROP INTO BOTH EYES TWICE A DAY 5/20/21   Joana Vo, OD   rizatriptan (MAXALT) 10 MG tablet Take 1 tablet (10 mg total) by mouth every 6 (six) hours as needed for Migraine. 10/13/20   Tierney Mcintyre NP   rosuvastatin (CRESTOR) 5 MG tablet Take 5 mg by mouth once daily.    Historical Provider   SAVELLA 100 mg Tab TAKE 1 TABLET BY MOUTH EVERY DAY 7/21/21   Shonda Anguiano MD   zoledronic acid-mannitol & water (RECLAST) 5 mg/100 mL PgBk  10/25/21   Historical Provider   zolpidem (AMBIEN) 5 MG Tab Take 1 tablet (5 mg total) by mouth nightly as needed (insomnia). 2/23/22   Shonda Anguiano MD       Allergies:  Review of patient's allergies indicates:   Allergen Reactions    Aspirin      Other reaction(s): "hemorrhage"  hemorrhage    Hydrocortisone Nausea Only     Other reaction(s): sick  sick    Lactose intolerance (lactase) [lactase] Nausea And Vomiting    Vicodin [hydrocodone-acetaminophen]      Other reaction(s): hyperactivity    Asacol [mesalamine] Hallucinations     Other reaction(s): Hallucinations  hallucinations       Social History:  Social History     Tobacco Use    Smoking status: Never    Smokeless tobacco: Never   Substance Use Topics    Alcohol use: No     Alcohol/week: 0.0 standard drinks    Drug use: No        Review of Systems   Constitutional:  Negative for chills, fatigue and fever.   Respiratory:  Negative for cough, chest tightness and shortness of breath.    Cardiovascular:  Negative for chest pain.   Gastrointestinal:  Negative for abdominal pain and blood in stool.   Genitourinary:  Negative for dysuria and frequency.       Health Maintenance:     Immunizations:   Influenza 11/2021  TDap 8/2021  Pneumovax 6/2014, rec Prevnar 13 2/2021  Shingrix 2/2022, rec 2nd vacc today  COVID 2/2021, 3/2021, 8/2021 (Pfizer), rec booster once new booster available later this " "month.     Cancer Screening:  PAP: total hyst 2/2 fibroids, benign, ovaries removed as well.  Mammogram:  10/2021 on L benign c rec f/u one year, will order for Oct.  Colonoscopy: hx total proctocolectomy for UC, had pouchoscopy 12/2018 via dr. Villalta wnl, biopsies taken which showed mild chronic inflammation on path.  EGD 12/2018 c hiatal hernia, otherwise unremarkable, bx c chronic inflammation in duodenum on path, normal gastric bx and neg H pylori  DEXA:  9/2021 c osteoporosis, followed by Dr. Srinivasan, now on Reclast    Objective:   /76 (BP Location: Left arm, Patient Position: Sitting, BP Method: Medium (Manual))   Pulse 66   Ht 5' 4" (1.626 m)   Wt 59.4 kg (130 lb 15.3 oz)   SpO2 99%   BMI 22.48 kg/m²        General: AAO x3, no apparent distress  HEENT: PERRL, OP clear  CV: RRR, no m/r/g  Pulm: Lungs CTAB, no crackles, no wheezes  Abd: s/NT/ND +BS  Extremities: no c/c/e    Labs:     Lab Results   Component Value Date    WBC 6.05 09/02/2022    HGB 12.1 09/02/2022    HCT 37.9 09/02/2022    MCV 89 09/02/2022     09/02/2022     Lab Results   Component Value Date    [id number] 812 11/26/2021       Sodium   Date Value Ref Range Status   09/02/2022 144 136 - 145 mmol/L Final     Potassium   Date Value Ref Range Status   09/02/2022 3.3 (L) 3.5 - 5.1 mmol/L Final     Chloride   Date Value Ref Range Status   09/02/2022 104 95 - 110 mmol/L Final     CO2   Date Value Ref Range Status   09/02/2022 28 23 - 29 mmol/L Final     Glucose   Date Value Ref Range Status   09/02/2022 85 70 - 110 mg/dL Final     BUN   Date Value Ref Range Status   09/02/2022 14 8 - 23 mg/dL Final     Creatinine   Date Value Ref Range Status   09/02/2022 0.9 0.5 - 1.4 mg/dL Final     Calcium   Date Value Ref Range Status   09/02/2022 9.9 8.7 - 10.5 mg/dL Final     Total Protein   Date Value Ref Range Status   05/26/2022 6.9 6.0 - 8.4 g/dL Final     Albumin   Date Value Ref Range Status   09/02/2022 3.9 3.5 - 5.2 g/dL Final     Total " Bilirubin   Date Value Ref Range Status   05/26/2022 0.4 0.1 - 1.0 mg/dL Final     Comment:     For infants and newborns, interpretation of results should be based  on gestational age, weight and in agreement with clinical  observations.    Premature Infant recommended reference ranges:  Up to 24 hours.............<8.0 mg/dL  Up to 48 hours............<12.0 mg/dL  3-5 days..................<15.0 mg/dL  6-29 days.................<15.0 mg/dL       Alkaline Phosphatase   Date Value Ref Range Status   05/26/2022 56 55 - 135 U/L Final     AST   Date Value Ref Range Status   05/26/2022 29 10 - 40 U/L Final     ALT   Date Value Ref Range Status   05/26/2022 37 10 - 44 U/L Final     Anion Gap   Date Value Ref Range Status   09/02/2022 12 8 - 16 mmol/L Final     eGFR if    Date Value Ref Range Status   07/05/2022 >60.0 >60 mL/min/1.73 m^2 Final     eGFR if non    Date Value Ref Range Status   07/05/2022 >60.0 >60 mL/min/1.73 m^2 Final     Comment:     Calculation used to obtain the estimated glomerular filtration  rate (eGFR) is the CKD-EPI equation.        Lab Results   Component Value Date    HGBA1C 5.9 (H) 03/29/2022     Lab Results   Component Value Date    LDLCALC 16.4 (L) 03/14/2022     Lab Results   Component Value Date    TSH 1.455 12/21/2021         Assessment/Plan     Efe was seen today for follow-up.    Diagnoses and all orders for this visit:    Benign essential hypertension  At goal  Cont meds    Mixed hyperlipidemia  Lab Results   Component Value Date    LDLCALC 16.4 (L) 03/14/2022     At goal on last check  On Praluent in the past but had also been on Crestor, managed by Dr. Jordan and has f/u pending c him soon.  -     Lipid Panel; Future    Prediabetes  Lab Results   Component Value Date    HGBA1C 5.9 (H) 03/29/2022     Likely 2/2 chronic prednisone use.  Cont dietary modifications.  Repeat labs now.  -     Hemoglobin A1C; Future    Subclinical hyperthyroidism  TSH wnl  on last check  Will repeat now  -     TSH; Future    Age-related osteoporosis with current pathological fracture with delayed healing  As per HPI  Plan for repeat DEXA next year per Dr. Srinivasan    Systemic lupus erythematosus, unspecified SLE type, unspecified organ involvement status  Long term current use of systemic steroids  Immunosuppression  Long-term use of Plaquenil  Has had issues c chronic pain  On Savella for FM  Cont f/u c Dr. Jiang    Exocrine pancreatic insufficiency  Stable on Creon as per GI    Pancreas cyst  Stable on last imaging per GI c plan for MRCP next year    Ulcerative colitis with other complication, unspecified location  Followed by GI, cont f/u    Secondary adrenal insufficiency  Seen by Endo c plan to cont prednisone for now.    Bilateral carotid artery disease, unspecified type  Followed by Cards    Primary insomnia  Cont Ambien prn  Hydroxyzine not effective  Trazodone c potential med interactions.  -     zolpidem (AMBIEN) 5 MG Tab; Take 1 tablet (5 mg total) by mouth nightly as needed (insomnia).    Screening mammogram, encounter for  -     Mammo Digital Screening Bilat w/ Marcin; Future      HM as above  RTC in 6 mos, sooner if needed.  Labs today.    Shonda Anguiano MD  Department of Internal Medicine - [facility]  09/06/2022

## 2022-09-08 ENCOUNTER — OFFICE VISIT (OUTPATIENT)
Dept: NEPHROLOGY | Facility: CLINIC | Age: 62
End: 2022-09-08
Payer: MEDICARE

## 2022-09-08 VITALS
BODY MASS INDEX: 21.57 KG/M2 | WEIGHT: 125.69 LBS | OXYGEN SATURATION: 99 % | DIASTOLIC BLOOD PRESSURE: 62 MMHG | SYSTOLIC BLOOD PRESSURE: 104 MMHG | HEART RATE: 78 BPM

## 2022-09-08 DIAGNOSIS — M32.9 SYSTEMIC LUPUS ERYTHEMATOSUS, UNSPECIFIED SLE TYPE, UNSPECIFIED ORGAN INVOLVEMENT STATUS: ICD-10-CM

## 2022-09-08 DIAGNOSIS — I10 HYPERTENSION, UNSPECIFIED TYPE: Primary | ICD-10-CM

## 2022-09-08 DIAGNOSIS — E87.6 LOW BLOOD POTASSIUM: ICD-10-CM

## 2022-09-08 PROCEDURE — 4010F ACE/ARB THERAPY RXD/TAKEN: CPT | Mod: CPTII,S$GLB,, | Performed by: INTERNAL MEDICINE

## 2022-09-08 PROCEDURE — 3008F BODY MASS INDEX DOCD: CPT | Mod: CPTII,S$GLB,, | Performed by: INTERNAL MEDICINE

## 2022-09-08 PROCEDURE — 3066F PR DOCUMENTATION OF TREATMENT FOR NEPHROPATHY: ICD-10-PCS | Mod: CPTII,S$GLB,, | Performed by: INTERNAL MEDICINE

## 2022-09-08 PROCEDURE — 3078F PR MOST RECENT DIASTOLIC BLOOD PRESSURE < 80 MM HG: ICD-10-PCS | Mod: CPTII,S$GLB,, | Performed by: INTERNAL MEDICINE

## 2022-09-08 PROCEDURE — 99499 UNLISTED E&M SERVICE: CPT | Mod: HCNC,S$GLB,, | Performed by: INTERNAL MEDICINE

## 2022-09-08 PROCEDURE — 99999 PR PBB SHADOW E&M-EST. PATIENT-LVL IV: ICD-10-PCS | Mod: PBBFAC,,, | Performed by: INTERNAL MEDICINE

## 2022-09-08 PROCEDURE — 3074F PR MOST RECENT SYSTOLIC BLOOD PRESSURE < 130 MM HG: ICD-10-PCS | Mod: CPTII,S$GLB,, | Performed by: INTERNAL MEDICINE

## 2022-09-08 PROCEDURE — 1159F PR MEDICATION LIST DOCUMENTED IN MEDICAL RECORD: ICD-10-PCS | Mod: CPTII,S$GLB,, | Performed by: INTERNAL MEDICINE

## 2022-09-08 PROCEDURE — 99214 PR OFFICE/OUTPT VISIT, EST, LEVL IV, 30-39 MIN: ICD-10-PCS | Mod: S$GLB,,, | Performed by: INTERNAL MEDICINE

## 2022-09-08 PROCEDURE — 3074F SYST BP LT 130 MM HG: CPT | Mod: CPTII,S$GLB,, | Performed by: INTERNAL MEDICINE

## 2022-09-08 PROCEDURE — 1159F MED LIST DOCD IN RCRD: CPT | Mod: CPTII,S$GLB,, | Performed by: INTERNAL MEDICINE

## 2022-09-08 PROCEDURE — 3078F DIAST BP <80 MM HG: CPT | Mod: CPTII,S$GLB,, | Performed by: INTERNAL MEDICINE

## 2022-09-08 PROCEDURE — 99499 RISK ADDL DX/OHS AUDIT: ICD-10-PCS | Mod: HCNC,S$GLB,, | Performed by: INTERNAL MEDICINE

## 2022-09-08 PROCEDURE — 3066F NEPHROPATHY DOC TX: CPT | Mod: CPTII,S$GLB,, | Performed by: INTERNAL MEDICINE

## 2022-09-08 PROCEDURE — 99214 OFFICE O/P EST MOD 30 MIN: CPT | Mod: S$GLB,,, | Performed by: INTERNAL MEDICINE

## 2022-09-08 PROCEDURE — 99999 PR PBB SHADOW E&M-EST. PATIENT-LVL IV: CPT | Mod: PBBFAC,,, | Performed by: INTERNAL MEDICINE

## 2022-09-08 PROCEDURE — 3008F PR BODY MASS INDEX (BMI) DOCUMENTED: ICD-10-PCS | Mod: CPTII,S$GLB,, | Performed by: INTERNAL MEDICINE

## 2022-09-08 PROCEDURE — 3044F PR MOST RECENT HEMOGLOBIN A1C LEVEL <7.0%: ICD-10-PCS | Mod: CPTII,S$GLB,, | Performed by: INTERNAL MEDICINE

## 2022-09-08 PROCEDURE — 3044F HG A1C LEVEL LT 7.0%: CPT | Mod: CPTII,S$GLB,, | Performed by: INTERNAL MEDICINE

## 2022-09-08 PROCEDURE — 4010F PR ACE/ARB THEARPY RXD/TAKEN: ICD-10-PCS | Mod: CPTII,S$GLB,, | Performed by: INTERNAL MEDICINE

## 2022-09-08 RX ORDER — NIFEDIPINE 30 MG/1
30 TABLET, FILM COATED, EXTENDED RELEASE ORAL DAILY
Qty: 30 TABLET | Refills: 3 | Status: SHIPPED | OUTPATIENT
Start: 2022-09-08 | End: 2022-11-07

## 2022-09-08 RX ORDER — POTASSIUM CHLORIDE 750 MG/1
TABLET, EXTENDED RELEASE ORAL
Qty: 180 TABLET | Refills: 4 | Status: SHIPPED | OUTPATIENT
Start: 2022-09-08 | End: 2023-04-20 | Stop reason: SDUPTHER

## 2022-09-09 ENCOUNTER — ANESTHESIA EVENT (OUTPATIENT)
Dept: ENDOSCOPY | Facility: HOSPITAL | Age: 62
End: 2022-09-09
Payer: MEDICARE

## 2022-09-09 ENCOUNTER — ANESTHESIA (OUTPATIENT)
Dept: ENDOSCOPY | Facility: HOSPITAL | Age: 62
End: 2022-09-09
Payer: MEDICARE

## 2022-09-09 ENCOUNTER — HOSPITAL ENCOUNTER (OUTPATIENT)
Facility: HOSPITAL | Age: 62
Discharge: HOME | End: 2022-09-09
Attending: INTERNAL MEDICINE | Admitting: INTERNAL MEDICINE
Payer: MEDICARE

## 2022-09-09 VITALS
HEIGHT: 64 IN | WEIGHT: 125 LBS | SYSTOLIC BLOOD PRESSURE: 108 MMHG | DIASTOLIC BLOOD PRESSURE: 62 MMHG | HEART RATE: 64 BPM | OXYGEN SATURATION: 97 % | BODY MASS INDEX: 21.34 KG/M2 | TEMPERATURE: 98 F | RESPIRATION RATE: 16 BRPM

## 2022-09-09 DIAGNOSIS — R13.10 DYSPHAGIA: ICD-10-CM

## 2022-09-09 PROCEDURE — 37000008 HC ANESTHESIA 1ST 15 MINUTES: Performed by: INTERNAL MEDICINE

## 2022-09-09 PROCEDURE — 43248 EGD GUIDE WIRE INSERTION: CPT | Performed by: INTERNAL MEDICINE

## 2022-09-09 PROCEDURE — 00731 ANES UPR GI NDSC PX NOS: CPT | Performed by: INTERNAL MEDICINE

## 2022-09-09 PROCEDURE — 43248 PR EGD, FLEX, W/DILATION OVER GUIDEWIRE: ICD-10-PCS | Mod: ,,, | Performed by: INTERNAL MEDICINE

## 2022-09-09 PROCEDURE — 37000009 HC ANESTHESIA EA ADD 15 MINS: Performed by: INTERNAL MEDICINE

## 2022-09-09 PROCEDURE — E9220 PRA ENDO ANESTHESIA: ICD-10-PCS | Mod: ,,, | Performed by: NURSE ANESTHETIST, CERTIFIED REGISTERED

## 2022-09-09 PROCEDURE — 25000003 PHARM REV CODE 250: Performed by: NURSE ANESTHETIST, CERTIFIED REGISTERED

## 2022-09-09 PROCEDURE — 43248 EGD GUIDE WIRE INSERTION: CPT | Mod: ,,, | Performed by: INTERNAL MEDICINE

## 2022-09-09 PROCEDURE — 63600175 PHARM REV CODE 636 W HCPCS: Performed by: NURSE ANESTHETIST, CERTIFIED REGISTERED

## 2022-09-09 PROCEDURE — E9220 PRA ENDO ANESTHESIA: HCPCS | Mod: ,,, | Performed by: NURSE ANESTHETIST, CERTIFIED REGISTERED

## 2022-09-09 RX ORDER — LIDOCAINE HCL/PF 100 MG/5ML
SYRINGE (ML) INTRAVENOUS
Status: DISCONTINUED | OUTPATIENT
Start: 2022-09-09 | End: 2022-09-09

## 2022-09-09 RX ORDER — SODIUM CHLORIDE 9 MG/ML
INJECTION, SOLUTION INTRAVENOUS CONTINUOUS PRN
Status: DISCONTINUED | OUTPATIENT
Start: 2022-09-09 | End: 2022-09-09

## 2022-09-09 RX ORDER — PROPOFOL 10 MG/ML
INJECTION, EMULSION INTRAVENOUS
Status: DISCONTINUED | OUTPATIENT
Start: 2022-09-09 | End: 2022-09-09

## 2022-09-09 RX ORDER — PROPOFOL 10 MG/ML
VIAL (ML) INTRAVENOUS CONTINUOUS PRN
Status: DISCONTINUED | OUTPATIENT
Start: 2022-09-09 | End: 2022-09-09

## 2022-09-09 RX ADMIN — GLYCOPYRROLATE 0.2 MG: 0.2 INJECTION, SOLUTION INTRAMUSCULAR; INTRAVITREAL at 09:09

## 2022-09-09 RX ADMIN — SODIUM CHLORIDE: 9 INJECTION, SOLUTION INTRAVENOUS at 09:09

## 2022-09-09 RX ADMIN — PROPOFOL 200 MCG/KG/MIN: 10 INJECTION, EMULSION INTRAVENOUS at 09:09

## 2022-09-09 RX ADMIN — Medication 100 MG: at 09:09

## 2022-09-09 RX ADMIN — PROPOFOL 60 MG: 10 INJECTION, EMULSION INTRAVENOUS at 09:09

## 2022-09-09 NOTE — H&P
"[facility]  History & Physical    Subjective:      Chief Complaint/Reason for Admission:    EGD for dysphagia.    Efe Horowitz is a 62 y.o. female.    Past Medical History:   Diagnosis Date    Acid reflux     Adrenal insufficiency, primary     Arthritis     Asthma     B12 deficiency anemia     Benign essential hypertension 2/7/2021    Blood transfusion 2010    Breast cancer 2/2016    Right breast infiltrating ductal CA    Cataract     Chronic pain     Deep vein thrombosis     Dry eyes     Esophagitis, unspecified     Fibromyalgia     General anesthetics causing adverse effect in therapeutic use     "slow to wake up bc I don't have an immune system    Heart murmur     History of colon polyps     Hyperlipidemia     Hypopituitary dwarfism     Iron deficiency anemia     Lupus     Migraines, neuralgic     Nonspecific ulcerative colitis     OP (osteoporosis)     history of reclast    Osteopenia     Pancreatic cyst     Schatzki's ring     Steroid sulfatase deficiency     Ulcerative colitis     Vitamin D deficiency disease      Past Surgical History:   Procedure Laterality Date    ANORECTAL MANOMETRY N/A 1/13/2022    Procedure: MANOMETRY, ANORECTAL;  Surgeon: First Available [facility];  Location: [facility] (4TH FLR);  Service: Endoscopy;  Laterality: N/A;  new order placed; original order placed incorrectly  1/7 instructions handed to patient-st    APPENDECTOMY      BREAST BIOPSY Right 2/2016    IDC    BREAST RECONSTRUCTION      BREAST SURGERY      CHOLECYSTECTOMY      COLON SURGERY      ENDOSCOPIC ULTRASOUND OF UPPER GASTROINTESTINAL TRACT N/A 8/6/2018    Procedure: ULTRASOUND, ENDOSCOPIC, UPPER GI TRACT;  Surgeon: Hong Finn MD;  Location: [facility] (2ND FLR);  Service: Endoscopy;  Laterality: N/A;    ESOPHAGOGASTRODUODENOSCOPY N/A 12/10/2018    Procedure: EGD (ESOPHAGOGASTRODUODENOSCOPY);  Surgeon: Reese Villalta MD;  Location: [facility] (4TH FLR);  Service: Endoscopy;  " Laterality: N/A;    ESOPHAGOGASTRODUODENOSCOPY N/A 12/30/2021    Procedure: EGD (ESOPHAGOGASTRODUODENOSCOPY);  Surgeon: Reese Villalta MD;  Location: [facility] ENDO (2ND FLR);  Service: Endoscopy;  Laterality: N/A;  EGD for esophageal dysphagia and early satiety please schedule 1st provider available. wants this date-Dr Villalta only     fully vaccinated-GT  hx of adrenal insuffiency   12/27 arrival time confirmed with pt-rb    FLEXIBLE SIGMOIDOSCOPY N/A 12/10/2018    Procedure: SIGMOIDOSCOPY, FLEXIBLE;  Surgeon: Reese Villalta MD;  Location: [facility] ENDO (4TH FLR);  Service: Endoscopy;  Laterality: N/A;  Recommend full split bowel prep for this study just like for a colonoscopy    HYSTERECTOMY      ILEOSCOPY N/A 2/16/2022    Procedure: ILEOSCOPY;  Surgeon: Ceferino Rangel MD;  Location: [facility] ENDO (4TH FLR);  Service: Endoscopy;  Laterality: N/A;  medical history significant for Ulcerative colitis s/p total colectomy with ileoanal anastomosis ([id number]), SLE on plaquenil, adrenal insufficiency on prednisone, breast cancer, and HTN who presents with 3 month history of bilateral lower abdominal pain. Patient with abd    MASTECTOMY      OOPHORECTOMY Bilateral     restorative proctectomy      total abdominal colectomy with ileostomy  2010    TOTAL COLECTOMY      TOTAL REDUCTION MAMMOPLASTY Left 2017    UPPER ENDOSCOPIC ULTRASOUND W/ FNA       Family History   Problem Relation Age of Onset    Diabetes Father     Hyperlipidemia Father     Hypertension Father     Hyperlipidemia Mother     Cancer Maternal Aunt         pancreatic cancer, late 50s at dx    Pancreatic cancer Maternal Aunt     Breast cancer Maternal Aunt 55    Breast cancer Cousin 28    Breast cancer Other 45    No Known Problems Sister     No Known Problems Brother     Cancer Maternal Uncle 65        pancreatic cancer    Pancreatic cancer Maternal Uncle     No Known Problems Paternal Aunt     No Known Problems Paternal Uncle     No Known Problems Maternal  Grandmother     No Known Problems Maternal Grandfather     No Known Problems Paternal Grandmother     No Known Problems Paternal Grandfather     Breast cancer Maternal Cousin 50    Cancer Other 25        liver cancer    Amblyopia Neg Hx     Blindness Neg Hx     Cataracts Neg Hx     Macular degeneration Neg Hx     Retinal detachment Neg Hx     Strabismus Neg Hx     Stroke Neg Hx     Thyroid disease Neg Hx     Glaucoma Neg Hx     Ovarian cancer Neg Hx     Celiac disease Neg Hx     Colon cancer Neg Hx     Colon polyps Neg Hx     Esophageal cancer Neg Hx     Liver cancer Neg Hx     Rectal cancer Neg Hx     Stomach cancer Neg Hx     Ulcerative colitis Neg Hx     Inflammatory bowel disease Neg Hx      Social History     Tobacco Use    Smoking status: Never    Smokeless tobacco: Never   Substance Use Topics    Alcohol use: No     Alcohol/week: 0.0 standard drinks    Drug use: No       PTA Medications   Medication Sig    alirocumab (PRALUENT PEN) 75 mg/mL PnIj Inject 75 mg into the skin every 14 (fourteen) days.    CREON 36,000-114,000- 180,000 unit CpDR TAKE TWO CAPSULES BY MOUTH THREE TIMES DAILY WITH MEALS AND ONE CAPSULE WITH EACH SNACK    dexamethasone (DECADRON) 4 mg/mL injection Inject 1 mL (4 mg total) into the muscle daily as needed (Signs and symtpoms of severe adrenal insufficiency).    diclofenac sodium (VOLTAREN) 1 % Gel APPLY 2 GRAMS TO AFFECTED AREA 4 TIMES A DAY    dicyclomine (BENTYL) 20 mg tablet TAKE 1 TABLET BY MOUTH EVERY 6 HOURS.    gabapentin (NEURONTIN) 300 MG capsule TAKE 1 CAPSULE BY MOUTH EVERY EVENING    HEPLISAV-B, PF, 20 mcg/0.5 mL Syrg     hydrOXYchloroQUINE (PLAQUENIL) 200 mg tablet TAKE 1 AND 1/2 TABLETS BY MOUTH EVERY DAY    lidocaine (LIDODERM) 5 % Place 1 patch onto the skin once daily. Remove & Discard patch within 12 hours or as directed by MD    LIDOcaine (LIDODERM) 5 % Place 1 patch onto the skin once daily. Remove & Discard patch within 12 hours or as directed by MD    loperamide  "(IMODIUM) 2 mg capsule TAKE 1 TO 2 CAPSULES BY MOUTH FOUR TIMES DAILY    losartan (COZAAR) 50 MG tablet Take 1 tablet (50 mg total) by mouth once daily.    multivitamin/iron/folic acid (CENTRUM WOMEN ORAL) Take 1 tablet by mouth once daily.    niacin 100 MG Tab Take by mouth. 1 Tablet Oral At bedtime    NIFEdipine (ADALAT CC) 30 MG TbSR Take 1 tablet (30 mg total) by mouth once daily.    omeprazole (PRILOSEC) 20 MG capsule TAKE 1 CAPSULE BY MOUTH TWICE A DAY    ondansetron (ZOFRAN) 4 MG tablet Take 1 tablet (4 mg total) by mouth every 12 (twelve) hours as needed for Nausea.    oxyCODONE-acetaminophen (PERCOCET) 5-325 mg per tablet Take 1 tablet by mouth every 12 (twelve) hours as needed for Pain.    oxyCODONE-acetaminophen (PERCOCET) 5-325 mg per tablet Take 1 tablet by mouth every 4 (four) hours as needed for Pain.    potassium chloride SA (K-DUR,KLOR-CON M) 10 MEQ tablet Take 3 tabs a day.    predniSONE (DELTASONE) 5 MG tablet TAKE 1 TABLET (5 MG TOTAL) BY MOUTH ONCE DAILY. DOUBLE THE DOSE IN TIMES OF ILLNESS    psyllium husk/aspartame (METAMUCIL FIBER SINGLES ORAL) Take by mouth.    REPATHA SURECLICK 140 mg/mL PnIj INJECT 140 MG INTO THE SKIN EVERY 14 (FOURTEEN) DAYS    RESTASIS 0.05 % ophthalmic emulsion INSTILL 1 DROP INTO BOTH EYES TWICE A DAY    rizatriptan (MAXALT) 10 MG tablet Take 1 tablet (10 mg total) by mouth every 6 (six) hours as needed for Migraine.    rosuvastatin (CRESTOR) 5 MG tablet Take 5 mg by mouth once daily.    SAVELLA 100 mg Tab TAKE 1 TABLET BY MOUTH EVERY DAY    zoledronic acid-mannitol & water (RECLAST) 5 mg/100 mL PgBk     zolpidem (AMBIEN) 5 MG Tab Take 1 tablet (5 mg total) by mouth nightly as needed (insomnia).    albuterol (ACCUNEB) 0.63 mg/3 mL Nebu Take 3 mLs (0.63 mg total) by nebulization every 6 (six) hours as needed.     Review of patient's allergies indicates:   Allergen Reactions    Aspirin      Other reaction(s): "hemorrhage"  hemorrhage    Hydrocortisone Nausea Only     " Other reaction(s): sick  sick    Lactose intolerance (lactase) [lactase] Nausea And Vomiting    Vicodin [hydrocodone-acetaminophen]      Other reaction(s): hyperactivity    Asacol [mesalamine] Hallucinations     Other reaction(s): Hallucinations  hallucinations        Review of Systems   Constitutional:  Negative for fever.   Respiratory:  Negative for shortness of breath.    Cardiovascular:  Negative for chest pain.     Objective:      Vital Signs (Most Recent)  Temp: 97.7 °F (36.5 °C) (09/09/22 0757)  Pulse: (!) 59 (09/09/22 0757)  Resp: 16 (09/09/22 0757)  BP: (!) 115/59 (09/09/22 0757)  SpO2: 100 % (09/09/22 0757)    Vital Signs Range (Last 24H):  Temp:  [97.7 °F (36.5 °C)]   Pulse:  [59]   Resp:  [16]   BP: (115)/(59)   SpO2:  [100 %]     Physical Exam  Cardiovascular:      Rate and Rhythm: Bradycardia present.   Pulmonary:      Effort: Pulmonary effort is normal.   Neurological:      Mental Status: She is alert and oriented to person, place, and time.       Assessment:      EGD for dysphagia.      Plan:    EGD for dysphagia.

## 2022-09-09 NOTE — PROVATION PATIENT INSTRUCTIONS
Discharge Summary/Instructions after an Endoscopic Procedure  Patient Name: Efe Horowitz  Patient MRN: [id number]  Patient YOB: 1960 Friday, September 9, 2022  Reese Villalta MD  Dear patient,  As a result of recent federal legislation (The Federal Cures Act), you may   receive lab or pathology results from your procedure in your [facility]   account before your physician is able to contact you. Your physician or   their representative will relay the results to you with their   recommendations at their soonest availability.  Thank you,  RESTRICTIONS:  During your procedure today, you received medications for sedation.  These   medications may affect your judgment, balance and coordination.  Therefore,   for 24 hours, you have the following restrictions:   - DO NOT drive a car, operate machinery, make legal/financial decisions,   sign important papers or drink alcohol.    ACTIVITY:  Today: no heavy lifting, straining or running due to procedural   sedation/anesthesia.  The following day: return to full activity including work.  DIET:  Eat and drink normally unless instructed otherwise.     TREATMENT FOR COMMON SIDE EFFECTS:  - Mild abdominal pain, nausea, belching, bloating or excessive gas:  rest,   eat lightly and use a heating pad.  - Sore Throat: treat with throat lozenges and/or gargle with warm salt   water.  - Because air was used during the procedure, expelling large amounts of air   from your rectum or belching is normal.  - If a bowel prep was taken, you may not have a bowel movement for 1-3 days.    This is normal.  SYMPTOMS TO WATCH FOR AND REPORT TO YOUR PHYSICIAN:  1. Abdominal pain or bloating, other than gas cramps.  2. Chest pain.  3. Back pain.  4. Signs of infection such as: chills or fever occurring within 24 hours   after the procedure.  5. Rectal bleeding, which would show as bright red, maroon, or black stools.   (A tablespoon of blood from the rectum is not serious, especially if    hemorrhoids are present.)  6. Vomiting.  7. Weakness or dizziness.  GO DIRECTLY TO THE NEAREST EMERGENCY ROOM IF YOU HAVE ANY OF THE FOLLOWING:      Difficulty breathing              Chills and/or fever over 101 F   Persistent vomiting and/or vomiting blood   Severe abdominal pain   Severe chest pain   Black, tarry stools   Bleeding- more than one tablespoon   Any other symptom or condition that you feel may need urgent attention  Your doctor recommends these additional instructions:  If any biopsies were taken, your doctors clinic will contact you in 1 to 2   weeks with any results.  - Discharge patient to home.   - Follow an antireflux regimen indefinitely.   - Continue present medications.   - Return to GI clinic.   - The findings and recommendations were discussed with the patient.  For questions, problems or results please call your physician - Reese Villalta MD at Work:  [phone].  [facility], EMERGENCY ROOM PHONE NUMBER: [phone]  IF A COMPLICATION OR EMERGENCY SITUATION ARISES AND YOU ARE UNABLE TO REACH   YOUR PHYSICIAN - GO DIRECTLY TO THE EMERGENCY ROOM.  Reese Villalta MD  9/9/2022 9:27:53 AM  This report has been verified and signed electronically.  Dear patient,  As a result of recent federal legislation (The Federal Cures Act), you may   receive lab or pathology results from your procedure in your [facility]   account before your physician is able to contact you. Your physician or   their representative will relay the results to you with their   recommendations at their soonest availability.  Thank you,  PROVATION

## 2022-09-09 NOTE — TRANSFER OF CARE
"Anesthesia Transfer of Care Note    Patient: Efe Horowitz    Procedure(s) Performed: Procedure(s) (LRB):  EGD (ESOPHAGOGASTRODUODENOSCOPY) (N/A)    Patient location: PACU    Anesthesia Type: general    Transport from OR: Transported from OR on room air with adequate spontaneous ventilation    Post pain: adequate analgesia    Post assessment: no apparent anesthetic complications    Post vital signs: stable    Level of consciousness: sedated    Nausea/Vomiting: no nausea/vomiting    Complications: none    Transfer of care protocol was followed      Last vitals:   Visit Vitals  BP (!) 94/53 (BP Location: Left arm, Patient Position: Lying)   Pulse 67   Temp 36.6 °C (97.9 °F) (Temporal)   Resp 15   Ht 5' 4" (1.626 m)   Wt 56.7 kg (125 lb)   SpO2 97%   Breastfeeding No   BMI 21.46 kg/m²     "

## 2022-09-09 NOTE — ANESTHESIA POSTPROCEDURE EVALUATION
Anesthesia Post Evaluation    Patient: Efe Horowitz    Procedure(s) Performed: Procedure(s) (LRB):  EGD (ESOPHAGOGASTRODUODENOSCOPY) (N/A)    Final Anesthesia Type: general      Patient location during evaluation: PACU  Patient participation: Yes- Able to Participate  Level of consciousness: awake and alert  Post-procedure vital signs: reviewed and stable  Pain management: adequate  Airway patency: patent    PONV status at discharge: No PONV  Anesthetic complications: no      Cardiovascular status: blood pressure returned to baseline  Respiratory status: spontaneous ventilation and room air  Hydration status: euvolemic  Follow-up not needed.          Vitals Value Taken Time   /64 09/09/22 0942   Temp 36.6 °C (97.9 °F) 09/09/22 0927   Pulse 65 09/09/22 0942   Resp 16 09/09/22 0942   SpO2 97 % 09/09/22 0942         No case tracking events are documented in the log.      Pain/Natalie Score: Natalie Score: 10 (9/9/2022  9:42 AM)

## 2022-09-09 NOTE — ANESTHESIA PREPROCEDURE EVALUATION
"                                                                                                             09/09/2022  Efe Horowitz is a 62 y.o., female.    Past Medical History:   Diagnosis Date    Acid reflux     Adrenal insufficiency, primary     Arthritis     Asthma     B12 deficiency anemia     Benign essential hypertension 2/7/2021    Blood transfusion 2010    Breast cancer 2/2016    Right breast infiltrating ductal CA    Cataract     Chronic pain     Deep vein thrombosis     Dry eyes     Esophagitis, unspecified     Fibromyalgia     General anesthetics causing adverse effect in therapeutic use     "slow to wake up bc I don't have an immune system    Heart murmur     History of colon polyps     Hyperlipidemia     Hypopituitary dwarfism     Iron deficiency anemia     Lupus     Migraines, neuralgic     Nonspecific ulcerative colitis     OP (osteoporosis)     history of reclast    Osteopenia     Pancreatic cyst     Schatzki's ring     Steroid sulfatase deficiency     Ulcerative colitis     Vitamin D deficiency disease      Past Surgical History:   Procedure Laterality Date    ANORECTAL MANOMETRY N/A 1/13/2022    Procedure: MANOMETRY, ANORECTAL;  Surgeon: First Daniella Gastelum;  Location: [facility] (4TH FLR);  Service: Endoscopy;  Laterality: N/A;  new order placed; original order placed incorrectly  1/7 instructions handed to patient-st    APPENDECTOMY      BREAST BIOPSY Right 2/2016    [facility]    BREAST RECONSTRUCTION      BREAST SURGERY      CHOLECYSTECTOMY      COLON SURGERY      ENDOSCOPIC ULTRASOUND OF UPPER GASTROINTESTINAL TRACT N/A 8/6/2018    Procedure: ULTRASOUND, ENDOSCOPIC, UPPER GI TRACT;  Surgeon: Hong Finn MD;  Location: [facility] (2ND FLR);  Service: Endoscopy;  Laterality: N/A;    ESOPHAGOGASTRODUODENOSCOPY N/A 12/10/2018    Procedure: EGD (ESOPHAGOGASTRODUODENOSCOPY);  Surgeon: Reese Villalta MD;  Location: [facility] (4TH FLR);  " Service: Endoscopy;  Laterality: N/A;    ESOPHAGOGASTRODUODENOSCOPY N/A 12/30/2021    Procedure: EGD (ESOPHAGOGASTRODUODENOSCOPY);  Surgeon: Reese Villalta MD;  Location: [facility] (2ND FLR);  Service: Endoscopy;  Laterality: N/A;  EGD for esophageal dysphagia and early satiety please schedule 1st provider available. wants this date-Dr Villalta only     fully vaccinated-GT  hx of adrenal insuffiency   12/27 arrival time confirmed with pt-rb    FLEXIBLE SIGMOIDOSCOPY N/A 12/10/2018    Procedure: SIGMOIDOSCOPY, FLEXIBLE;  Surgeon: Reese Villalta MD;  Location: [facility] (4TH FLR);  Service: Endoscopy;  Laterality: N/A;  Recommend full split bowel prep for this study just like for a colonoscopy    HYSTERECTOMY      ILEOSCOPY N/A 2/16/2022    Procedure: ILEOSCOPY;  Surgeon: Ceferino Rangel MD;  Location: [facility] ENDO (4TH FLR);  Service: Endoscopy;  Laterality: N/A;  medical history significant for Ulcerative colitis s/p total colectomy with ileoanal anastomosis ([id number]), SLE on plaquenil, adrenal insufficiency on prednisone, breast cancer, and HTN who presents with 3 month history of bilateral lower abdominal pain. Patient with abd    MASTECTOMY      OOPHORECTOMY Bilateral     restorative proctectomy      total abdominal colectomy with ileostomy  2010    TOTAL COLECTOMY      TOTAL REDUCTION MAMMOPLASTY Left 2017    UPPER ENDOSCOPIC ULTRASOUND W/ FNA             Pre-op Assessment    I have reviewed the Patient Summary Reports.     I have reviewed the Nursing Notes.    I have reviewed the Medications.     Review of Systems  Anesthesia Hx:  No problems with previous Anesthesia  Denies Family Hx of Anesthesia complications.   Denies Personal Hx of Anesthesia complications.   Hematology/Oncology:  Hematology Normal   Oncology Normal     EENT/Dental:EENT/Dental Normal   Cardiovascular:   Hypertension    Pulmonary:   Asthma    Renal/:  Renal/ Normal     Hepatic/GI:   PUD, GERD     Musculoskeletal:  Musculoskeletal Normal    Neurological:   Neuromuscular Disease, Headaches    Endocrine:   Hyperthyroidism    Dermatological:  Skin Normal    Psych:  Psychiatric Normal           Physical Exam  General: Well nourished    Airway:  Mallampati: II / I  Mouth Opening: Normal  TM Distance: Normal  Tongue: Normal  Neck ROM: Normal ROM    Dental:  Intact        Anesthesia Plan  Type of Anesthesia, risks & benefits discussed:    Anesthesia Type: Gen Natural Airway  Intra-op Monitoring Plan: Standard ASA Monitors  Post Op Pain Control Plan: multimodal analgesia  Induction:  IV  Informed Consent: Informed consent signed with the Patient and all parties understand the risks and agree with anesthesia plan.  All questions answered.   ASA Score: 3  Day of Surgery Review of History & Physical: H&P Update referred to the surgeon/provider.    Ready For Surgery From Anesthesia Perspective.     .

## 2022-09-26 RX ORDER — MILNACIPRAN HYDROCHLORIDE 100 MG/1
1 TABLET, FILM COATED ORAL DAILY
Qty: 90 TABLET | Refills: 3 | Status: SHIPPED | OUTPATIENT
Start: 2022-09-26 | End: 2023-09-19

## 2022-09-26 NOTE — TELEPHONE ENCOUNTER
No new care gaps identified.  [facility] Embedded Care Gaps. Reference number: [id number]. 9/26/2022   2:02:24 PM CDT

## 2022-09-26 NOTE — TELEPHONE ENCOUNTER
----- Message from Enedina Silveira sent at 9/26/2022 12:04 PM CDT -----  Contact: Self/[phone]  Requesting an RX refill or new RX.  Is this a refill or new RX: New  RX name and strength :  SAVELLA 100 mg Tab  Is this a 30 day or 90 day RX: 30  [facility]/pharmacy #[id number] - [place] - [id number]  [address] [place] [address]  Phone: [phone] Fax: [phone]        The doctors have asked that we provide their patients with the following 2 reminders -- prescription refills can take up to 72 hours, and a friendly reminder that in the future you can use your [facility] account to request refills: pt stated that she has been out of her medication for a month

## 2022-10-08 ENCOUNTER — OFFICE VISIT (OUTPATIENT)
Dept: INTERNAL MEDICINE | Facility: CLINIC | Age: 62
End: 2022-10-08
Payer: MEDICARE

## 2022-10-08 VITALS
BODY MASS INDEX: 22.25 KG/M2 | DIASTOLIC BLOOD PRESSURE: 78 MMHG | SYSTOLIC BLOOD PRESSURE: 124 MMHG | OXYGEN SATURATION: 99 % | HEIGHT: 64 IN | HEART RATE: 76 BPM | WEIGHT: 130.31 LBS

## 2022-10-08 DIAGNOSIS — R05.1 ACUTE COUGH: Primary | ICD-10-CM

## 2022-10-08 DIAGNOSIS — I10 BENIGN ESSENTIAL HYPERTENSION: ICD-10-CM

## 2022-10-08 DIAGNOSIS — J30.89 SEASONAL ALLERGIC RHINITIS DUE TO OTHER ALLERGIC TRIGGER: ICD-10-CM

## 2022-10-08 PROCEDURE — 99215 OFFICE O/P EST HI 40 MIN: CPT | Mod: PBBFAC | Performed by: INTERNAL MEDICINE

## 2022-10-08 PROCEDURE — 99999 PR PBB SHADOW E&M-EST. PATIENT-LVL V: CPT | Mod: PBBFAC,,, | Performed by: INTERNAL MEDICINE

## 2022-10-08 PROCEDURE — 99213 PR OFFICE/OUTPT VISIT, EST, LEVL III, 20-29 MIN: ICD-10-PCS | Mod: S$GLB,,, | Performed by: INTERNAL MEDICINE

## 2022-10-08 PROCEDURE — 99213 OFFICE O/P EST LOW 20 MIN: CPT | Mod: S$GLB,,, | Performed by: INTERNAL MEDICINE

## 2022-10-08 PROCEDURE — 99999 PR PBB SHADOW E&M-EST. PATIENT-LVL V: ICD-10-PCS | Mod: PBBFAC,,, | Performed by: INTERNAL MEDICINE

## 2022-10-08 RX ORDER — PROMETHAZINE HYDROCHLORIDE AND DEXTROMETHORPHAN HYDROBROMIDE 6.25; 15 MG/5ML; MG/5ML
5 SYRUP ORAL EVERY 4 HOURS PRN
Qty: 240 ML | Refills: 1 | Status: SHIPPED | OUTPATIENT
Start: 2022-10-08 | End: 2022-10-24

## 2022-10-08 RX ORDER — HYDROCHLOROTHIAZIDE 12.5 MG/1
12.5 TABLET ORAL
COMMUNITY
Start: 2022-09-26

## 2022-10-08 RX ORDER — AMLODIPINE BESYLATE 2.5 MG/1
2.5 TABLET ORAL
COMMUNITY
Start: 2022-09-26 | End: 2024-01-22 | Stop reason: SDUPTHER

## 2022-10-08 NOTE — PROGRESS NOTES
"INTERNAL MEDICINE ESTABLISHED PATIENT VISIT NOTE    Subjective:     Chief Complaint: Medication Refill       Patient ID: Efe Horowitz is a 62 y.o. female with HTN c proteinuria, HLD, preDM, UC s/p total colectomy and at baseline c some chronic abd discomfort, GERD, iron def anemia, SLE, FM, adrenal insufficency, exocrine pancreatic insufficency, B12 def, Vit D def, hx lymphoma in breast dx'ed 1/2016 s/p resection and s/p breast reconstruction at [facility] in 2017, osteoporosis c hx of L3 vertebral fractures on MRI in the past, subclinical hyperthyroidism, pancreatic cyst, hepatic hemangiomas stable on imaging, hx migraines, lumbar spondylarthritis, insomnia on Ambien, last seen by me a month ago, here today for to discuss meds for cough.    Cough x3 weeks.  Mostly dry, occasional mucus.  Also c rhinorrhea, congestion.   Denies post nasal drip.  No fever.  No body aches.    Taking flonase daily s relief.    Past Medical History:  Past Medical History:   Diagnosis Date    Acid reflux     Adrenal insufficiency, primary     Arthritis     Asthma     B12 deficiency anemia     Benign essential hypertension 2/7/2021    Blood transfusion 2010    Breast cancer 2/2016    Right breast infiltrating ductal CA    Cataract     Chronic pain     Deep vein thrombosis     Dry eyes     Esophagitis, unspecified     Fibromyalgia     General anesthetics causing adverse effect in therapeutic use     "slow to wake up bc I don't have an immune system    Heart murmur     History of colon polyps     Hyperlipidemia     Hypopituitary dwarfism     Iron deficiency anemia     Lupus     Migraines, neuralgic     Nonspecific ulcerative colitis     OP (osteoporosis)     history of reclast    Osteopenia     Pancreatic cyst     Schatzki's ring     Steroid sulfatase deficiency     Ulcerative colitis     Vitamin D deficiency disease        Home Medications:  Prior to Admission medications    Medication Sig Start Date End Date Taking? Authorizing " Provider   albuterol (ACCUNEB) 0.63 mg/3 mL Nebu Take 3 mLs (0.63 mg total) by nebulization every 6 (six) hours as needed. 2/26/14 9/8/22  Manjula Mixon MD   alirocumab (PRALUENT PEN) 75 mg/mL PnIj Inject 75 mg into the skin every 14 (fourteen) days.    Historical Provider   CREON 36,000-114,000- 180,000 unit CpDR TAKE TWO CAPSULES BY MOUTH THREE TIMES DAILY WITH MEALS AND ONE CAPSULE WITH EACH SNACK 4/20/22   Reese Villalta MD   dexamethasone (DECADRON) 4 mg/mL injection Inject 1 mL (4 mg total) into the muscle daily as needed (Signs and symtpoms of severe adrenal insufficiency). 10/6/21   Ena Srinivasan MD   diclofenac sodium (VOLTAREN) 1 % Gel APPLY 2 GRAMS TO AFFECTED AREA 4 TIMES A DAY 3/24/21   Idania Allison MD   dicyclomine (BENTYL) 20 mg tablet TAKE 1 TABLET BY MOUTH EVERY 6 HOURS. 7/5/22   Ceferino Rangel MD   gabapentin (NEURONTIN) 300 MG capsule TAKE 1 CAPSULE BY MOUTH EVERY EVENING 11/29/21   Idania Allison MD   HEPLISAV-B, PF, 20 mcg/0.5 mL Syrg  2/7/22   Historical Provider   hydrOXYchloroQUINE (PLAQUENIL) 200 mg tablet TAKE 1 AND 1/2 TABLETS BY MOUTH EVERY DAY 5/30/22   Idania Allison MD   lidocaine (LIDODERM) 5 % Place 1 patch onto the skin once daily. Remove & Discard patch within 12 hours or as directed by MD 11/27/17   Darin Starkey II, MD   LIDOcaine (LIDODERM) 5 % Place 1 patch onto the skin once daily. Remove & Discard patch within 12 hours or as directed by MD 6/26/22   Ambrocio Salas MD   loperamide (IMODIUM) 2 mg capsule TAKE 1 TO 2 CAPSULES BY MOUTH FOUR TIMES DAILY 10/3/22   Alejandro Maxwell MD   losartan (COZAAR) 50 MG tablet Take 1 tablet (50 mg total) by mouth once daily. 1/27/22 1/27/23  Billy Rush DO   milnacipran (SAVELLA) 100 mg Tab Take 1 tablet (100 mg total) by mouth once daily. 9/26/22   Shonda Anguiano MD   multivitamin/iron/folic acid (CENTRUM WOMEN ORAL) Take 1 tablet by mouth once daily.    Historical Provider  "  niacin 100 MG Tab Take by mouth. 1 Tablet Oral At bedtime    Historical Provider   NIFEdipine (ADALAT CC) 30 MG TbSR Take 1 tablet (30 mg total) by mouth once daily. 9/8/22 9/8/23  Billy Rush DO   omeprazole (PRILOSEC) 20 MG capsule TAKE 1 CAPSULE BY MOUTH TWICE A DAY 11/27/21   Shonda Anguiano MD   ondansetron (ZOFRAN) 4 MG tablet Take 1 tablet (4 mg total) by mouth every 12 (twelve) hours as needed for Nausea. 10/13/20   Tierney Mcintyre NP   oxyCODONE-acetaminophen (PERCOCET) 5-325 mg per tablet Take 1 tablet by mouth every 12 (twelve) hours as needed for Pain. 2/23/22   Shonda Anguiano MD   oxyCODONE-acetaminophen (PERCOCET) 5-325 mg per tablet Take 1 tablet by mouth every 4 (four) hours as needed for Pain. 6/26/22   Ambrocio Salas MD   potassium chloride SA (K-DUR,KLOR-CON M) 10 MEQ tablet Take 3 tabs a day. 9/8/22   Billy Rush DO   predniSONE (DELTASONE) 5 MG tablet TAKE 1 TABLET (5 MG TOTAL) BY MOUTH ONCE DAILY. DOUBLE THE DOSE IN TIMES OF ILLNESS 9/6/22   Ena Srinivasan MD   psyllium husk/aspartame (METAMUCIL FIBER SINGLES ORAL) Take by mouth.    Historical Provider   REPATHA SURECLICK 140 mg/mL PnIj INJECT 140 MG INTO THE SKIN EVERY 14 (FOURTEEN) DAYS 7/27/21   Historical Provider   RESTASIS 0.05 % ophthalmic emulsion INSTILL 1 DROP INTO BOTH EYES TWICE A DAY 5/20/21   Joana Vo, OD   rizatriptan (MAXALT) 10 MG tablet Take 1 tablet (10 mg total) by mouth every 6 (six) hours as needed for Migraine. 10/13/20   Tierney Mcintyre NP   rosuvastatin (CRESTOR) 5 MG tablet Take 5 mg by mouth once daily.    Historical Provider   zoledronic acid-mannitol & water (RECLAST) 5 mg/100 mL PgBk  10/25/21   Historical Provider   zolpidem (AMBIEN) 5 MG Tab Take 1 tablet (5 mg total) by mouth nightly as needed (insomnia). 9/6/22   Shonda Anguiano MD       Allergies:  Review of patient's allergies indicates:   Allergen Reactions    Aspirin      Other reaction(s): "hemorrhage"  hemorrhage    Hydrocortisone " "Nausea Only     Other reaction(s): sick  sick    Lactose intolerance (lactase) [lactase] Nausea And Vomiting    Vicodin [hydrocodone-acetaminophen]      Other reaction(s): hyperactivity    Asacol [mesalamine] Hallucinations     Other reaction(s): Hallucinations  hallucinations       Social History:  Social History     Tobacco Use    Smoking status: Never    Smokeless tobacco: Never   Substance Use Topics    Alcohol use: No     Alcohol/week: 0.0 standard drinks    Drug use: No        Review of Systems   Constitutional:  Negative for chills, fatigue and fever.   HENT:  Positive for congestion, postnasal drip, rhinorrhea, sneezing and sore throat. Negative for sinus pressure and sinus pain.    Respiratory:  Positive for cough. Negative for chest tightness and shortness of breath.    Cardiovascular:  Negative for chest pain.   Gastrointestinal:  Negative for abdominal pain and blood in stool.   Genitourinary:  Negative for dysuria and frequency.       Health Maintenance:     Immunizations:   Influenza 11/2021, scheduled at [facility]  TDap 8/2021  Pneumovax 6/2014, Prevnar 13 2/2021  Shingrix 2/2022, 9/2022  COVID 2/2021, 3/2021, 8/2021 (Pfizer), scheduled Omicron booster at [facility], pending.     Cancer Screening:  PAP: total hyst 2/2 fibroids, benign, ovaries removed as well.  Mammogram:  10/2021 on L benign c rec f/u one year, repeat scheduled for later this month.  Colonoscopy: hx total proctocolectomy for UC, had pouchoscopy 12/2018 via dr. Villalta wnl, biopsies taken which showed mild chronic inflammation on path.  EGD 12/2018 c hiatal hernia, otherwise unremarkable, bx c chronic inflammation in duodenum on path, normal gastric bx and neg H pylori  DEXA:  9/2021 c osteoporosis, followed by Dr. Srinivasan, now on Reclast    Objective:   /78 (BP Location: Right arm, Patient Position: Sitting, BP Method: Medium (Manual))   Pulse 76   Ht 5' 4" (1.626 m)   Wt 59.1 kg (130 lb 4.7 oz)   SpO2 99%   BMI 22.36 kg/m²    "     General: AAO x3, no apparent distress  HEENT: PERRL, OP clear, fluid noted behind TM B, no erythema or TM bulging.  CV: RRR, no m/r/g  Pulm: Lungs CTAB, no crackles, no wheezes  Abd: s/NT/ND +BS  Extremities: no c/c/e    Labs:     Lab Results   Component Value Date    WBC 6.05 09/02/2022    HGB 12.1 09/02/2022    HCT 37.9 09/02/2022    MCV 89 09/02/2022     09/02/2022     Sodium   Date Value Ref Range Status   09/02/2022 144 136 - 145 mmol/L Final     Potassium   Date Value Ref Range Status   09/02/2022 3.3 (L) 3.5 - 5.1 mmol/L Final     Chloride   Date Value Ref Range Status   09/02/2022 104 95 - 110 mmol/L Final     CO2   Date Value Ref Range Status   09/02/2022 28 23 - 29 mmol/L Final     Glucose   Date Value Ref Range Status   09/02/2022 85 70 - 110 mg/dL Final     BUN   Date Value Ref Range Status   09/02/2022 14 8 - 23 mg/dL Final     Creatinine   Date Value Ref Range Status   09/02/2022 0.9 0.5 - 1.4 mg/dL Final     Calcium   Date Value Ref Range Status   09/02/2022 9.9 8.7 - 10.5 mg/dL Final     Total Protein   Date Value Ref Range Status   05/26/2022 6.9 6.0 - 8.4 g/dL Final     Albumin   Date Value Ref Range Status   09/02/2022 3.9 3.5 - 5.2 g/dL Final     Total Bilirubin   Date Value Ref Range Status   05/26/2022 0.4 0.1 - 1.0 mg/dL Final     Comment:     For infants and newborns, interpretation of results should be based  on gestational age, weight and in agreement with clinical  observations.    Premature Infant recommended reference ranges:  Up to 24 hours.............<8.0 mg/dL  Up to 48 hours............<12.0 mg/dL  3-5 days..................<15.0 mg/dL  6-29 days.................<15.0 mg/dL       Alkaline Phosphatase   Date Value Ref Range Status   05/26/2022 56 55 - 135 U/L Final     AST   Date Value Ref Range Status   05/26/2022 29 10 - 40 U/L Final     ALT   Date Value Ref Range Status   05/26/2022 37 10 - 44 U/L Final     Anion Gap   Date Value Ref Range Status   09/02/2022 12 8 - 16  mmol/L Final     eGFR if    Date Value Ref Range Status   07/05/2022 >60.0 >60 mL/min/1.73 m^2 Final     eGFR if non    Date Value Ref Range Status   07/05/2022 >60.0 >60 mL/min/1.73 m^2 Final     Comment:     Calculation used to obtain the estimated glomerular filtration  rate (eGFR) is the CKD-EPI equation.        Lab Results   Component Value Date    HGBA1C 5.7 (H) 09/06/2022     Lab Results   Component Value Date    LDLCALC 19.6 (L) 09/06/2022     Lab Results   Component Value Date    TSH 0.974 09/06/2022         Assessment/Plan     Efe was seen today for medication refill.    Diagnoses and all orders for this visit:    Acute cough  Seasonal allergic rhinitis due to other allergic trigger  Based on chronicity appears allergic  Will cont flonase  Add otc zyrtec  Okay to refill Promethazine prn cough, should improve c tx of underlying allergies.  -     promethazine-dextromethorphan (PROMETHAZINE-DM) 6.25-15 mg/5 mL Syrp; Take 5 mLs by mouth every 4 (four) hours as needed (cough).    Benign essential hypertension  at goal.  Cont current medications.      HM as above  RTC in March as previously scheduled, sooner if needed.    Shonda Anguiano MD  Department of Internal Medicine - [facility]  10/08/2022

## 2022-10-27 ENCOUNTER — LAB VISIT (OUTPATIENT)
Dept: LAB | Facility: HOSPITAL | Age: 62
End: 2022-10-27
Attending: INTERNAL MEDICINE
Payer: MEDICARE

## 2022-10-27 ENCOUNTER — OFFICE VISIT (OUTPATIENT)
Dept: RHEUMATOLOGY | Facility: CLINIC | Age: 62
End: 2022-10-27
Payer: MEDICARE

## 2022-10-27 VITALS
DIASTOLIC BLOOD PRESSURE: 84 MMHG | BODY MASS INDEX: 23.37 KG/M2 | WEIGHT: 136.88 LBS | HEART RATE: 72 BPM | HEIGHT: 64 IN | SYSTOLIC BLOOD PRESSURE: 130 MMHG

## 2022-10-27 DIAGNOSIS — Z79.899 LONG-TERM USE OF PLAQUENIL: ICD-10-CM

## 2022-10-27 DIAGNOSIS — Z79.52 LONG TERM CURRENT USE OF SYSTEMIC STEROIDS: ICD-10-CM

## 2022-10-27 DIAGNOSIS — M79.7 FIBROMYALGIA: ICD-10-CM

## 2022-10-27 DIAGNOSIS — D84.9 IMMUNOSUPPRESSION: ICD-10-CM

## 2022-10-27 DIAGNOSIS — M32.9 SYSTEMIC LUPUS ERYTHEMATOSUS, UNSPECIFIED SLE TYPE, UNSPECIFIED ORGAN INVOLVEMENT STATUS: Primary | ICD-10-CM

## 2022-10-27 DIAGNOSIS — R53.83 FATIGUE, UNSPECIFIED TYPE: ICD-10-CM

## 2022-10-27 DIAGNOSIS — M32.9 SYSTEMIC LUPUS ERYTHEMATOSUS, UNSPECIFIED SLE TYPE, UNSPECIFIED ORGAN INVOLVEMENT STATUS: ICD-10-CM

## 2022-10-27 LAB
HAV IGG SER QL IA: REACTIVE
HBV CORE AB SERPL QL IA: NORMAL
HBV SURFACE AB SER-ACNC: >1000 MIU/ML
HBV SURFACE AB SER-ACNC: REACTIVE M[IU]/ML
HBV SURFACE AG SERPL QL IA: NORMAL
HCV AB SERPL QL IA: NORMAL
HIV 1+2 AB+HIV1 P24 AG SERPL QL IA: NORMAL

## 2022-10-27 PROCEDURE — 1159F MED LIST DOCD IN RCRD: CPT | Mod: CPTII,S$GLB,, | Performed by: INTERNAL MEDICINE

## 2022-10-27 PROCEDURE — 86787 VARICELLA-ZOSTER ANTIBODY: CPT | Performed by: INTERNAL MEDICINE

## 2022-10-27 PROCEDURE — 86706 HEP B SURFACE ANTIBODY: CPT | Mod: 91 | Performed by: INTERNAL MEDICINE

## 2022-10-27 PROCEDURE — 3044F PR MOST RECENT HEMOGLOBIN A1C LEVEL <7.0%: ICD-10-PCS | Mod: CPTII,S$GLB,, | Performed by: INTERNAL MEDICINE

## 2022-10-27 PROCEDURE — 3044F HG A1C LEVEL LT 7.0%: CPT | Mod: CPTII,S$GLB,, | Performed by: INTERNAL MEDICINE

## 2022-10-27 PROCEDURE — 86704 HEP B CORE ANTIBODY TOTAL: CPT | Performed by: INTERNAL MEDICINE

## 2022-10-27 PROCEDURE — 99499 RISK ADDL DX/OHS AUDIT: ICD-10-PCS | Mod: HCNC,S$GLB,, | Performed by: INTERNAL MEDICINE

## 2022-10-27 PROCEDURE — 3079F DIAST BP 80-89 MM HG: CPT | Mod: CPTII,S$GLB,, | Performed by: INTERNAL MEDICINE

## 2022-10-27 PROCEDURE — 4010F ACE/ARB THERAPY RXD/TAKEN: CPT | Mod: CPTII,S$GLB,, | Performed by: INTERNAL MEDICINE

## 2022-10-27 PROCEDURE — 99499 UNLISTED E&M SERVICE: CPT | Mod: HCNC,S$GLB,, | Performed by: INTERNAL MEDICINE

## 2022-10-27 PROCEDURE — 1160F RVW MEDS BY RX/DR IN RCRD: CPT | Mod: CPTII,S$GLB,, | Performed by: INTERNAL MEDICINE

## 2022-10-27 PROCEDURE — 4010F PR ACE/ARB THEARPY RXD/TAKEN: ICD-10-PCS | Mod: CPTII,S$GLB,, | Performed by: INTERNAL MEDICINE

## 2022-10-27 PROCEDURE — 86682 HELMINTH ANTIBODY: CPT | Performed by: INTERNAL MEDICINE

## 2022-10-27 PROCEDURE — 87389 HIV-1 AG W/HIV-1&-2 AB AG IA: CPT | Performed by: INTERNAL MEDICINE

## 2022-10-27 PROCEDURE — 1160F PR REVIEW ALL MEDS BY PRESCRIBER/CLIN PHARMACIST DOCUMENTED: ICD-10-PCS | Mod: CPTII,S$GLB,, | Performed by: INTERNAL MEDICINE

## 2022-10-27 PROCEDURE — 3075F PR MOST RECENT SYSTOLIC BLOOD PRESS GE 130-139MM HG: ICD-10-PCS | Mod: CPTII,S$GLB,, | Performed by: INTERNAL MEDICINE

## 2022-10-27 PROCEDURE — 99999 PR PBB SHADOW E&M-EST. PATIENT-LVL V: CPT | Mod: PBBFAC,,, | Performed by: INTERNAL MEDICINE

## 2022-10-27 PROCEDURE — 86790 VIRUS ANTIBODY NOS: CPT | Performed by: INTERNAL MEDICINE

## 2022-10-27 PROCEDURE — 86803 HEPATITIS C AB TEST: CPT | Performed by: INTERNAL MEDICINE

## 2022-10-27 PROCEDURE — 99999 PR PBB SHADOW E&M-EST. PATIENT-LVL V: ICD-10-PCS | Mod: PBBFAC,,, | Performed by: INTERNAL MEDICINE

## 2022-10-27 PROCEDURE — 3066F PR DOCUMENTATION OF TREATMENT FOR NEPHROPATHY: ICD-10-PCS | Mod: CPTII,S$GLB,, | Performed by: INTERNAL MEDICINE

## 2022-10-27 PROCEDURE — 99214 OFFICE O/P EST MOD 30 MIN: CPT | Mod: S$GLB,,, | Performed by: INTERNAL MEDICINE

## 2022-10-27 PROCEDURE — 1159F PR MEDICATION LIST DOCUMENTED IN MEDICAL RECORD: ICD-10-PCS | Mod: CPTII,S$GLB,, | Performed by: INTERNAL MEDICINE

## 2022-10-27 PROCEDURE — 87340 HEPATITIS B SURFACE AG IA: CPT | Performed by: INTERNAL MEDICINE

## 2022-10-27 PROCEDURE — 99214 PR OFFICE/OUTPT VISIT, EST, LEVL IV, 30-39 MIN: ICD-10-PCS | Mod: S$GLB,,, | Performed by: INTERNAL MEDICINE

## 2022-10-27 PROCEDURE — 86592 SYPHILIS TEST NON-TREP QUAL: CPT | Performed by: INTERNAL MEDICINE

## 2022-10-27 PROCEDURE — 3075F SYST BP GE 130 - 139MM HG: CPT | Mod: CPTII,S$GLB,, | Performed by: INTERNAL MEDICINE

## 2022-10-27 PROCEDURE — 3066F NEPHROPATHY DOC TX: CPT | Mod: CPTII,S$GLB,, | Performed by: INTERNAL MEDICINE

## 2022-10-27 PROCEDURE — 3079F PR MOST RECENT DIASTOLIC BLOOD PRESSURE 80-89 MM HG: ICD-10-PCS | Mod: CPTII,S$GLB,, | Performed by: INTERNAL MEDICINE

## 2022-10-27 RX ORDER — BELIMUMAB 200 MG/ML
200 SOLUTION SUBCUTANEOUS
Qty: 4 EACH | Refills: 2 | Status: SHIPPED | OUTPATIENT
Start: 2022-10-27 | End: 2022-10-27

## 2022-10-27 RX ORDER — BELIMUMAB 200 MG/ML
200 SOLUTION SUBCUTANEOUS
Qty: 4 EACH | Refills: 2 | Status: SHIPPED | OUTPATIENT
Start: 2022-10-27 | End: 2023-02-06 | Stop reason: SDUPTHER

## 2022-10-27 ASSESSMENT — ROUTINE ASSESSMENT OF PATIENT INDEX DATA (RAPID3)
AM STIFFNESS SCORE: 1, YES
FATIGUE SCORE: 6.5
WHEN YOU AWAKENED IN THE MORNING OVER THE LAST WEEK, PLEASE INDICATE THE AMOUNT OF TIME IT TAKES UNTIL YOU ARE AS LIMBER AS YOU WILL BE FOR THE DAY: 2 HOURS
MDHAQ FUNCTION SCORE: 0.6
PATIENT GLOBAL ASSESSMENT SCORE: 9
PSYCHOLOGICAL DISTRESS SCORE: 1.1
PAIN SCORE: 9
TOTAL RAPID3 SCORE: 6.67

## 2022-10-27 NOTE — PROGRESS NOTES
"Subjective:       Patient ID: Efe Horowitz is a 62 y.o. female.    Chief Complaint: Lupus    HPI:  Efe Horowitz is a 62 y.o. female with history of lupus since age 39.   Her manifestations include joint pains, headache, positive DERRICK, and a   double-stranded DNA. She also has an equivocal anticardiolipin IgM.   She has been treated with Plaquenil 1-1/2 tablets daily. Eye exam 12/2017 was normal.      In addition to lupus, she has a   history of ulcerative colitis, osteopenia, fibromyalgia as well.   In 2009 she had a colectomy with ileostomy and in May 2011 the ileostomy  was closed. History of bilateral carpal tunnel.       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  She was treated with surgical resection.  Another lesion breast suspected to be lymphoma was later felt to be fibrous tissue.      May 1, 2017 had right breast fibrous material removed and reconstruction on both breasts at [facility].       Interval History:      Notes still being fatigued.    Nasal ulcers recently resolved.  Trouble opening bottle of water.    No rashes.   Occasional bruising.    Scalp soreness and has knots chronic issue.   Morning stiffness for 2 hours.    Continues taking injection for cholesterol.  Completed Forteo and now on Reclast with Dr. Srinivasan for osteoporosis with L3 vertebral fractures (top and bottom) on MRI.        Review of Systems   Constitutional:  Positive for fatigue.   HENT:  Negative for mouth sores.    Eyes:  Negative for redness.        Dry eyes   Respiratory:  Negative for cough and shortness of breath.    Gastrointestinal:  Negative for diarrhea.   Endocrine: Negative.    Genitourinary: Negative.    Musculoskeletal:  Positive for arthralgias.   Skin: Negative.    Allergic/Immunologic: Negative.    Neurological:  Positive for headaches.   Hematological: Negative.    Psychiatric/Behavioral: Negative.         Objective:   /84   Pulse 72   Ht 5' 4" (1.626 m)   Wt 62.1 kg (136 lb 14.5 oz)   " BMI 23.50 kg/m²         Physical Exam   Constitutional: She is oriented to person, place, and time.   HENT:   Head: Normocephalic and atraumatic.   Eyes: Conjunctivae are normal.   Cardiovascular: Normal rate, regular rhythm and normal heart sounds.   Pulmonary/Chest: Effort normal and breath sounds normal.   Abdominal: Soft. Bowel sounds are normal.   Musculoskeletal:         General: No swelling or tenderness.      Cervical back: Neck supple.      Comments:      Neurological: She is alert and oriented to person, place, and time. Gait normal.   Skin: Skin is warm and dry.   Psychiatric: Mood and affect normal.       LABS    Component      Latest Ref Rng & Units 9/6/2022 9/2/2022 5/26/2022 4/28/2022   WBC      3.90 - 12.70 K/uL  6.05 9.84 9.41   RBC      4.00 - 5.40 M/uL  4.25 4.42 4.37   Hemoglobin      12.0 - 16.0 g/dL  12.1 12.3 12.5   Hematocrit      37.0 - 48.5 %  37.9 39.3 38.5   MCV      82 - 98 fL  89 89 88   MCH      27.0 - 31.0 pg  28.5 27.8 28.6   MCHC      32.0 - 36.0 g/dL  31.9 (L) 31.3 (L) 32.5   RDW      11.5 - 14.5 %  13.6 16.3 (H) 15.2 (H)   Platelets      150 - 450 K/uL  260 282 213   MPV      9.2 - 12.9 fL  11.2 9.9 10.0   Immature Granulocytes      0.0 - 0.5 %  0.3 2.2 (H) 2.3 (H)   Gran # (ANC)      1.8 - 7.7 K/uL  3.0 7.5 6.3   Immature Grans (Abs)      0.00 - 0.04 K/uL  0.02 0.22 (H) 0.22 (H)   Lymph #      1.0 - 4.8 K/uL  2.2 1.4 1.9   Mono #      0.3 - 1.0 K/uL  0.7 0.7 0.8   Eos #      0.0 - 0.5 K/uL  0.2 0.1 0.1   Baso #      0.00 - 0.20 K/uL  0.03 0.05 0.05   nRBC      0 /100 WBC  0 0 0   Gran %      38.0 - 73.0 %  48.8 75.9 (H) 67.1   Lymph %      18.0 - 48.0 %  36.2 13.7 (L) 20.1   Mono %      4.0 - 15.0 %  11.6 7.1 8.8   Eosinophil %      0.0 - 8.0 %  2.6 0.6 1.2   Basophil %      0.0 - 1.9 %  0.5 0.5 0.5   Platelet Estimate         Clumped (A)    Differential Method        Automated Automated Automated   Sodium      136 - 145 mmol/L  144 142    Potassium      3.5 - 5.1 mmol/L  3.3 (L)  3.8    Chloride      95 - 110 mmol/L  104 103    CO2      23 - 29 mmol/L  28 26    Glucose      70 - 110 mg/dL  85 88    BUN      8 - 23 mg/dL  14 14    Creatinine      0.5 - 1.4 mg/dL  0.9 0.9    Calcium      8.7 - 10.5 mg/dL  9.9 9.6    PROTEIN TOTAL      6.0 - 8.4 g/dL   6.9    Albumin      3.5 - 5.2 g/dL  3.9 3.8    BILIRUBIN TOTAL      0.1 - 1.0 mg/dL   0.4    Alkaline Phosphatase      55 - 135 U/L   56    AST      10 - 40 U/L   29    ALT      10 - 44 U/L   37    Anion Gap      8 - 16 mmol/L  12 13    eGFR if African American      >60 mL/min/1.73 m:2   >60.0    eGFR if non African American      >60 mL/min/1.73 m:2   >60.0    Phosphorus      2.7 - 4.5 mg/dL  3.6     eGFR      >60 mL/min/1.73 m:2  >60.0     Cholesterol      120 - 199 mg/dL 112 (L)      Triglycerides      30 - 150 mg/dL 82      HDL      40 - 75 mg/dL 76 (H)      LDL Cholesterol External      63.0 - 159.0 mg/dL 19.6 (L)      HDL/Cholesterol Ratio      20.0 - 50.0 % 67.9 (H)      Total Cholesterol/HDL Ratio      2.0 - 5.0 1.5 (L)      Non-HDL Cholesterol      mg/dL 36      Protein, Urine Random      0 - 15 mg/dL  9     Creatinine, Urine      15.0 - 325.0 mg/dL  170.0     Prot/Creat Ratio, Urine      0.00 - 0.20  0.05     Hemoglobin A1C External      4.0 - 5.6 % 5.7 (H)      Estimated Avg Glucose      68 - 131 mg/dL 117      ds DNA Ab      Negative 1:10   Negative 1:10    Complement (C-3)      50 - 180 mg/dL   112    Complement (C-4)      11 - 44 mg/dL   35    CPK      20 - 180 U/L   254 (H)    Sed Rate      0 - 36 mm/Hr   10    CRP      0.0 - 8.2 mg/L   1.5    PTH      9.0 - 77.0 pg/mL  64.1     TSH      0.400 - 4.000 uIU/mL 0.974              Assessment:       1. Lupus. Lupus manifestations include joint pains, headache, positive DERRICK (positive 5/26/2005 in labs), and a   double-stranded DNA. Has fatigue as well as intermittent oral/nasal ulcer.  SLEDAI=4 without labs  Not sure if current issue is lupus or adrenal related.  2. Fatigue.  Worse than  usual  3. Encounter for long-term (current) use of other medications   4. Myalgia and myositis. Fibromyalgia on Savella.  Patient stopped gabapentin since it made her sleepy but no worsening in pain.    5. Trochanteric bursitis. Bilateral improved with injection.  6. Shoulder pain. Stable   7. Vitamin D deficiency disease. Now normal  8. Suspected Shingles.   9. Migraine.  Did acupuncture with Dr. Tinsley.  Treated with Maxalt by primary doctor which helps.     10.  Osteoporosis based vertebral fracture.  Reclast in the past.  Dr. Srinivasan gave Forteo for 2 years now back on Reclast.   11.  Chronic steroid use.  Endocrine gives for adrenal insufficiency.  12.  Ulcerative colitis  13.  Hematuria.  Followed by nephrology  14.  Stress with dealing with son with mental illness  15.  Loss of smell since Nov/Dec 2018.  May be relate to sinus issues.  Evaluated by ENT but no cause found.  Continues to have loss of smell  16. Bilateral hand pain. Suspect OA and DeQuervains of right hand.  X-ray with 1st [facility] osteoarthritis.    Plan:       1.  Labs ordered.     2.  Continue Plaquenil 300 mg daily.  She reviewed Benlysta information and would like to take it.   Schedule ID evaluation before taking biologic.   Eye exam normal 2/2021.  Scheduled for Oct 28, 2022.   3. Compliant with neurontin 300 mg tid.    4. Off Forteo from endocrine after 2 years and now on Reclast  5. Continue Voltaren gel hands.  Also alternate ice and heat.   6. Continue SPICA splint OTC  7. Patient to get flu shot and COVID booster         RTO in 4 months/prn.

## 2022-10-28 ENCOUNTER — CLINICAL SUPPORT (OUTPATIENT)
Dept: OPHTHALMOLOGY | Facility: CLINIC | Age: 62
End: 2022-10-28
Payer: MEDICARE

## 2022-10-28 ENCOUNTER — OFFICE VISIT (OUTPATIENT)
Dept: OPTOMETRY | Facility: CLINIC | Age: 62
End: 2022-10-28
Payer: MEDICARE

## 2022-10-28 DIAGNOSIS — Z79.899 LONG-TERM USE OF PLAQUENIL: ICD-10-CM

## 2022-10-28 DIAGNOSIS — M32.9 SYSTEMIC LUPUS ERYTHEMATOSUS, UNSPECIFIED SLE TYPE, UNSPECIFIED ORGAN INVOLVEMENT STATUS: Primary | ICD-10-CM

## 2022-10-28 DIAGNOSIS — H25.13 NUCLEAR SCLEROSIS OF BOTH EYES: ICD-10-CM

## 2022-10-28 DIAGNOSIS — H16.223 KERATOCONJUNCTIVITIS SICCA OF BOTH EYES NOT SPECIFIED AS SJOGREN'S: ICD-10-CM

## 2022-10-28 DIAGNOSIS — M32.9 SYSTEMIC LUPUS ERYTHEMATOSUS, UNSPECIFIED SLE TYPE, UNSPECIFIED ORGAN INVOLVEMENT STATUS: ICD-10-CM

## 2022-10-28 DIAGNOSIS — H52.7 REFRACTIVE ERROR: ICD-10-CM

## 2022-10-28 LAB
RPR SER QL: NORMAL
STRONGYLOIDES ANTIBODY IGG: NEGATIVE

## 2022-10-28 PROCEDURE — 1160F PR REVIEW ALL MEDS BY PRESCRIBER/CLIN PHARMACIST DOCUMENTED: ICD-10-PCS | Mod: CPTII,S$GLB,, | Performed by: OPTOMETRIST

## 2022-10-28 PROCEDURE — 92014 COMPRE OPH EXAM EST PT 1/>: CPT | Mod: S$GLB,,, | Performed by: OPTOMETRIST

## 2022-10-28 PROCEDURE — 3044F PR MOST RECENT HEMOGLOBIN A1C LEVEL <7.0%: ICD-10-PCS | Mod: CPTII,S$GLB,, | Performed by: OPTOMETRIST

## 2022-10-28 PROCEDURE — 1159F PR MEDICATION LIST DOCUMENTED IN MEDICAL RECORD: ICD-10-PCS | Mod: CPTII,S$GLB,, | Performed by: OPTOMETRIST

## 2022-10-28 PROCEDURE — 3044F HG A1C LEVEL LT 7.0%: CPT | Mod: CPTII,S$GLB,, | Performed by: OPTOMETRIST

## 2022-10-28 PROCEDURE — 3066F NEPHROPATHY DOC TX: CPT | Mod: CPTII,S$GLB,, | Performed by: OPTOMETRIST

## 2022-10-28 PROCEDURE — 99999 PR PBB SHADOW E&M-EST. PATIENT-LVL III: ICD-10-PCS | Mod: PBBFAC,,, | Performed by: OPTOMETRIST

## 2022-10-28 PROCEDURE — 1160F RVW MEDS BY RX/DR IN RCRD: CPT | Mod: CPTII,S$GLB,, | Performed by: OPTOMETRIST

## 2022-10-28 PROCEDURE — 3066F PR DOCUMENTATION OF TREATMENT FOR NEPHROPATHY: ICD-10-PCS | Mod: CPTII,S$GLB,, | Performed by: OPTOMETRIST

## 2022-10-28 PROCEDURE — 92014 PR EYE EXAM, EST PATIENT,COMPREHESV: ICD-10-PCS | Mod: S$GLB,,, | Performed by: OPTOMETRIST

## 2022-10-28 PROCEDURE — 99999 PR PBB SHADOW E&M-EST. PATIENT-LVL III: CPT | Mod: PBBFAC,,, | Performed by: OPTOMETRIST

## 2022-10-28 PROCEDURE — 1159F MED LIST DOCD IN RCRD: CPT | Mod: CPTII,S$GLB,, | Performed by: OPTOMETRIST

## 2022-10-28 PROCEDURE — 4010F ACE/ARB THERAPY RXD/TAKEN: CPT | Mod: CPTII,S$GLB,, | Performed by: OPTOMETRIST

## 2022-10-28 PROCEDURE — 4010F PR ACE/ARB THEARPY RXD/TAKEN: ICD-10-PCS | Mod: CPTII,S$GLB,, | Performed by: OPTOMETRIST

## 2022-10-28 PROCEDURE — 92015 PR REFRACTION: ICD-10-PCS | Mod: S$GLB,,, | Performed by: OPTOMETRIST

## 2022-10-28 PROCEDURE — 92015 DETERMINE REFRACTIVE STATE: CPT | Mod: S$GLB,,, | Performed by: OPTOMETRIST

## 2022-10-28 NOTE — PROGRESS NOTES
Visual field test done.  Patient stated no latex allergies used coverlet        Glasses Prescription     Sphere Cylinder Axis Add   Right +1.25 +0.50 180 +2.50   Left +1.00 Sphere  +2.50   Expiration Date: 10/28/2023        Ordered, Authorized, and Signed By: Joana Magallon OD

## 2022-10-28 NOTE — PROGRESS NOTES
Subjective:       Patient ID: Efe Horowitz is a 62 y.o. female      Chief Complaint   Patient presents with    Concerns About Ocular Health    Plaquenil Eye Exam     History of Present Illness  Dls: 2/19/21 Dr. Magallon     61 y/o female presents today for plaquenil ck.   Pt c/o blurry vision at near ou. Pt wears otc readers.     No tearing  No itching  No burning  No pain  + ha's  No floaters  No flashes    Eye meds  Restatsis OU BID          Assessment/Plan:     1. Systemic lupus erythematosus, unspecified SLE type, unspecified organ involvement status  2. Long-term use of Plaquenil  No evidence of plaquenil maculopathy on exam, can continue with plaquenil therapy. OCT mac and HVF WNL OU today. Color vision normal. Return in 1 years for DFE, HVF 10-2, and OCT macula.     3. Nuclear sclerosis of both eyes  Educated pt on presence of cataracts and effects on vision. No surgery at this time. Recheck in one year, sooner PRN.    4. Keratoconjunctivitis sicca of both eyes not specified as Sjogren's  Doing well with restasis, continue drops    5. Refractive error  Educated patient on refractive error and discussed lens options. Dispensed updated spectacle Rx. Educated about adaptation period to new specs.    Eyeglass Final Rx       Eyeglass Final Rx         Sphere Cylinder Axis Add    Right +1.25 +0.50 180 +2.50    Left +1.00 Sphere  +2.50      Expiration Date: 10/28/2023                      Follow up in about 1 year (around 10/28/2023) for Plaquenil check, HVF 10-2, OCT macula.

## 2022-10-31 ENCOUNTER — HOSPITAL ENCOUNTER (OUTPATIENT)
Dept: RADIOLOGY | Facility: HOSPITAL | Age: 62
Discharge: HOME | End: 2022-10-31
Attending: INTERNAL MEDICINE
Payer: MEDICARE

## 2022-10-31 ENCOUNTER — SPECIALTY PHARMACY (OUTPATIENT)
Dept: PHARMACY | Facility: CLINIC | Age: 62
End: 2022-10-31
Payer: MEDICARE

## 2022-10-31 DIAGNOSIS — M32.9 SYSTEMIC LUPUS ERYTHEMATOSUS, UNSPECIFIED SLE TYPE, UNSPECIFIED ORGAN INVOLVEMENT STATUS: Primary | ICD-10-CM

## 2022-10-31 DIAGNOSIS — Z12.31 SCREENING MAMMOGRAM, ENCOUNTER FOR: ICD-10-CM

## 2022-10-31 LAB
VARICELLA INTERPRETATION: POSITIVE
VARICELLA ZOSTER IGG: 3.59 ISR (ref 0–0.9)

## 2022-10-31 PROCEDURE — 77067 SCR MAMMO BI INCL CAD: CPT | Mod: TC

## 2022-10-31 PROCEDURE — 77063 BREAST TOMOSYNTHESIS BI: CPT | Mod: TC

## 2022-10-31 PROCEDURE — 77063 MAMMO DIGITAL SCREENING LEFT WITH TOMO: ICD-10-PCS | Mod: 26,,, | Performed by: RADIOLOGY

## 2022-10-31 PROCEDURE — 77063 BREAST TOMOSYNTHESIS BI: CPT | Mod: 26,,, | Performed by: RADIOLOGY

## 2022-10-31 PROCEDURE — 77067 MAMMO DIGITAL SCREENING LEFT WITH TOMO: ICD-10-PCS | Mod: 26,,, | Performed by: RADIOLOGY

## 2022-10-31 PROCEDURE — 77067 SCR MAMMO BI INCL CAD: CPT | Mod: 26,,, | Performed by: RADIOLOGY

## 2022-11-01 NOTE — TELEPHONE ENCOUNTER
Fabian, this is Yasmin Scott with [facility].  We are working on your prescription that your doctor has sent us. We will be working with your insurance to get this approved for you. We will be calling you along the way with updates on your medication.  If you have any questions, you can reach us at [phone].    Welcome call outcome: Patient/caregiver reached

## 2022-11-03 ENCOUNTER — OFFICE VISIT (OUTPATIENT)
Dept: HEMATOLOGY/ONCOLOGY | Facility: CLINIC | Age: 62
End: 2022-11-03
Payer: MEDICARE

## 2022-11-03 ENCOUNTER — LAB VISIT (OUTPATIENT)
Dept: LAB | Facility: HOSPITAL | Age: 62
End: 2022-11-03
Attending: INTERNAL MEDICINE
Payer: MEDICARE

## 2022-11-03 VITALS
HEART RATE: 76 BPM | OXYGEN SATURATION: 99 % | TEMPERATURE: 97 F | BODY MASS INDEX: 22.47 KG/M2 | DIASTOLIC BLOOD PRESSURE: 69 MMHG | SYSTOLIC BLOOD PRESSURE: 117 MMHG | RESPIRATION RATE: 18 BRPM | HEIGHT: 64 IN | WEIGHT: 131.63 LBS

## 2022-11-03 DIAGNOSIS — C50.211 MALIGNANT NEOPLASM OF UPPER-INNER QUADRANT OF RIGHT FEMALE BREAST, UNSPECIFIED ESTROGEN RECEPTOR STATUS: Primary | ICD-10-CM

## 2022-11-03 DIAGNOSIS — M32.9 SYSTEMIC LUPUS ERYTHEMATOSUS, UNSPECIFIED SLE TYPE, UNSPECIFIED ORGAN INVOLVEMENT STATUS: ICD-10-CM

## 2022-11-03 DIAGNOSIS — M32.0 DRUG-INDUCED SYSTEMIC LUPUS ERYTHEMATOSUS, UNSPECIFIED ORGAN INVOLVEMENT STATUS: ICD-10-CM

## 2022-11-03 DIAGNOSIS — Z79.899 LONG-TERM USE OF PLAQUENIL: ICD-10-CM

## 2022-11-03 DIAGNOSIS — M79.7 FIBROMYALGIA: ICD-10-CM

## 2022-11-03 DIAGNOSIS — D84.9 IMMUNOSUPPRESSION: ICD-10-CM

## 2022-11-03 LAB
ALBUMIN SERPL BCP-MCNC: 3.9 G/DL (ref 3.5–5.2)
ALP SERPL-CCNC: 44 U/L (ref 55–135)
ALT SERPL W/O P-5'-P-CCNC: 17 U/L (ref 10–44)
ANION GAP SERPL CALC-SCNC: 10 MMOL/L (ref 8–16)
AST SERPL-CCNC: 20 U/L (ref 10–40)
BASOPHILS # BLD AUTO: 0.03 K/UL (ref 0–0.2)
BASOPHILS NFR BLD: 0.4 % (ref 0–1.9)
BILIRUB SERPL-MCNC: 0.4 MG/DL (ref 0.1–1)
BUN SERPL-MCNC: 17 MG/DL (ref 8–23)
CALCIUM SERPL-MCNC: 9.5 MG/DL (ref 8.7–10.5)
CHLORIDE SERPL-SCNC: 105 MMOL/L (ref 95–110)
CO2 SERPL-SCNC: 24 MMOL/L (ref 23–29)
CREAT SERPL-MCNC: 0.9 MG/DL (ref 0.5–1.4)
DIFFERENTIAL METHOD: ABNORMAL
EOSINOPHIL # BLD AUTO: 0 K/UL (ref 0–0.5)
EOSINOPHIL NFR BLD: 0.6 % (ref 0–8)
ERYTHROCYTE [DISTWIDTH] IN BLOOD BY AUTOMATED COUNT: 14.5 % (ref 11.5–14.5)
EST. GFR  (NO RACE VARIABLE): >60 ML/MIN/1.73 M^2
GLUCOSE SERPL-MCNC: 96 MG/DL (ref 70–110)
HCT VFR BLD AUTO: 38.7 % (ref 37–48.5)
HGB BLD-MCNC: 12.4 G/DL (ref 12–16)
IMM GRANULOCYTES # BLD AUTO: 0.03 K/UL (ref 0–0.04)
IMM GRANULOCYTES NFR BLD AUTO: 0.4 % (ref 0–0.5)
LYMPHOCYTES # BLD AUTO: 1.2 K/UL (ref 1–4.8)
LYMPHOCYTES NFR BLD: 16.8 % (ref 18–48)
MCH RBC QN AUTO: 27.7 PG (ref 27–31)
MCHC RBC AUTO-ENTMCNC: 32 G/DL (ref 32–36)
MCV RBC AUTO: 87 FL (ref 82–98)
MONOCYTES # BLD AUTO: 0.5 K/UL (ref 0.3–1)
MONOCYTES NFR BLD: 6.8 % (ref 4–15)
NEUTROPHILS # BLD AUTO: 5.4 K/UL (ref 1.8–7.7)
NEUTROPHILS NFR BLD: 75 % (ref 38–73)
NRBC BLD-RTO: 0 /100 WBC
PLATELET # BLD AUTO: 271 K/UL (ref 150–450)
PMV BLD AUTO: 10.3 FL (ref 9.2–12.9)
POTASSIUM SERPL-SCNC: 4.2 MMOL/L (ref 3.5–5.1)
PROT SERPL-MCNC: 6.7 G/DL (ref 6–8.4)
RBC # BLD AUTO: 4.47 M/UL (ref 4–5.4)
SODIUM SERPL-SCNC: 139 MMOL/L (ref 136–145)
WBC # BLD AUTO: 7.22 K/UL (ref 3.9–12.7)

## 2022-11-03 PROCEDURE — 3078F PR MOST RECENT DIASTOLIC BLOOD PRESSURE < 80 MM HG: ICD-10-PCS | Mod: CPTII,S$GLB,, | Performed by: INTERNAL MEDICINE

## 2022-11-03 PROCEDURE — 3044F HG A1C LEVEL LT 7.0%: CPT | Mod: CPTII,S$GLB,, | Performed by: INTERNAL MEDICINE

## 2022-11-03 PROCEDURE — 1160F RVW MEDS BY RX/DR IN RCRD: CPT | Mod: CPTII,S$GLB,, | Performed by: INTERNAL MEDICINE

## 2022-11-03 PROCEDURE — 4010F ACE/ARB THERAPY RXD/TAKEN: CPT | Mod: CPTII,S$GLB,, | Performed by: INTERNAL MEDICINE

## 2022-11-03 PROCEDURE — 1159F PR MEDICATION LIST DOCUMENTED IN MEDICAL RECORD: ICD-10-PCS | Mod: CPTII,S$GLB,, | Performed by: INTERNAL MEDICINE

## 2022-11-03 PROCEDURE — 85025 COMPLETE CBC W/AUTO DIFF WBC: CPT | Performed by: INTERNAL MEDICINE

## 2022-11-03 PROCEDURE — 99214 PR OFFICE/OUTPT VISIT, EST, LEVL IV, 30-39 MIN: ICD-10-PCS | Mod: S$GLB,,, | Performed by: INTERNAL MEDICINE

## 2022-11-03 PROCEDURE — 99499 UNLISTED E&M SERVICE: CPT | Mod: HCNC,S$GLB,, | Performed by: INTERNAL MEDICINE

## 2022-11-03 PROCEDURE — 3066F PR DOCUMENTATION OF TREATMENT FOR NEPHROPATHY: ICD-10-PCS | Mod: CPTII,S$GLB,, | Performed by: INTERNAL MEDICINE

## 2022-11-03 PROCEDURE — 3066F NEPHROPATHY DOC TX: CPT | Mod: CPTII,S$GLB,, | Performed by: INTERNAL MEDICINE

## 2022-11-03 PROCEDURE — 80053 COMPREHEN METABOLIC PANEL: CPT | Performed by: INTERNAL MEDICINE

## 2022-11-03 PROCEDURE — 3008F BODY MASS INDEX DOCD: CPT | Mod: CPTII,S$GLB,, | Performed by: INTERNAL MEDICINE

## 2022-11-03 PROCEDURE — 99999 PR PBB SHADOW E&M-EST. PATIENT-LVL V: CPT | Mod: PBBFAC,,, | Performed by: INTERNAL MEDICINE

## 2022-11-03 PROCEDURE — 99999 PR PBB SHADOW E&M-EST. PATIENT-LVL V: ICD-10-PCS | Mod: PBBFAC,,, | Performed by: INTERNAL MEDICINE

## 2022-11-03 PROCEDURE — 3074F PR MOST RECENT SYSTOLIC BLOOD PRESSURE < 130 MM HG: ICD-10-PCS | Mod: CPTII,S$GLB,, | Performed by: INTERNAL MEDICINE

## 2022-11-03 PROCEDURE — 3078F DIAST BP <80 MM HG: CPT | Mod: CPTII,S$GLB,, | Performed by: INTERNAL MEDICINE

## 2022-11-03 PROCEDURE — 3044F PR MOST RECENT HEMOGLOBIN A1C LEVEL <7.0%: ICD-10-PCS | Mod: CPTII,S$GLB,, | Performed by: INTERNAL MEDICINE

## 2022-11-03 PROCEDURE — 99499 RISK ADDL DX/OHS AUDIT: ICD-10-PCS | Mod: HCNC,S$GLB,, | Performed by: INTERNAL MEDICINE

## 2022-11-03 PROCEDURE — 3074F SYST BP LT 130 MM HG: CPT | Mod: CPTII,S$GLB,, | Performed by: INTERNAL MEDICINE

## 2022-11-03 PROCEDURE — 1159F MED LIST DOCD IN RCRD: CPT | Mod: CPTII,S$GLB,, | Performed by: INTERNAL MEDICINE

## 2022-11-03 PROCEDURE — 36415 COLL VENOUS BLD VENIPUNCTURE: CPT | Performed by: INTERNAL MEDICINE

## 2022-11-03 PROCEDURE — 99214 OFFICE O/P EST MOD 30 MIN: CPT | Mod: S$GLB,,, | Performed by: INTERNAL MEDICINE

## 2022-11-03 PROCEDURE — 1160F PR REVIEW ALL MEDS BY PRESCRIBER/CLIN PHARMACIST DOCUMENTED: ICD-10-PCS | Mod: CPTII,S$GLB,, | Performed by: INTERNAL MEDICINE

## 2022-11-03 PROCEDURE — 4010F PR ACE/ARB THEARPY RXD/TAKEN: ICD-10-PCS | Mod: CPTII,S$GLB,, | Performed by: INTERNAL MEDICINE

## 2022-11-03 PROCEDURE — 3008F PR BODY MASS INDEX (BMI) DOCUMENTED: ICD-10-PCS | Mod: CPTII,S$GLB,, | Performed by: INTERNAL MEDICINE

## 2022-11-03 NOTE — PROGRESS NOTES
Subjective:       Patient ID: Efe Horowitz is a 62 y.o. female.    Chief Complaint: Malignant neoplasm of upper-inner quadrant of right breast     HPI  Major issue in the interval is regarding her lupus  Feels like she did when first diagnosed with SLE in 1999  Notes muscle weakness and joint swelling and pain and fatigue  Noting redness in hands, easy bruising, headaches  Mouth and nasal sores    Weight slightly up    Presents for follow-up of triple negative breast cancer.      Diagnosis: Triple negative Stage 1 ([id number]) right breast carcinoma  Oncology History:  - 1/18/16 screening mammogram:  There is a focal asymmetry seen in the posterior upper-inner region of the right breast.  - 2/1/16 diagnostic mammogram and ultrasound: irregular solid mass with poorly defined margins  measuring 5 millimeters  - 2/19/16 core biopsy  FINAL PATHOLOGIC DIAGNOSIS  RIGHT BREAST, 1:00, BIOPSY:  Invasive ductal carcinoma, high-grade (duct formation - 3; nuclear pleomorphism - 3, mitosis - 2)  ER - Negative (0)  VA - Negative (0)  HER2 - Negative (0)  - 4/27/16 mastectomy and SLN biopsy  FINAL PATHOLOGIC DIAGNOSIS  RIGHT MASTECTOMY SPECIMEN SHOWING A 7 MM AREA OF INVASIVE DUCT CARCINOMA, GRADE 3  WITH NEGATIVE MARGINS.    TUMOR SIZE: 0.7 CM  HISTOLOGIC TYPE: INVASIVE DUCT CARCINOMA  HISTOLOGIC GRADE: 3, 3, 2; GRADE 3  DCIS: NOT IDENTIFIED  MARGINS: ALL MARGINS OF RESECTION ARE NEGATIVE FOR TUMOR. CLOSEST MARGIN IS THE  POSTERIOR MARGIN AT 2.5 CM  IMMUNE RECEPTOR STUDIES: MS 16-5/1/04: ER - Negative (0)  VA - Negative (0)  HER2 - Negative (0)  LYMPH NODES: Total number 4- negative  - Genetic testing performed  - With tumor > 0.6 cm she just made guideline cut off to consider adjuvant chemotherapy with TC   However, with her active wound healing issues at that time and other comorbidities adjuvant chemotherapy was not be pursued   - She completed all of her reconstruction with Dr. Mccloud   Does have multiple comorbidities  including history of lupus and fibromyalgia- she is managed by rheumatology and reports recent flare   - Reports history of easy bruising and states she was borderline on testing for von willebrands in [facility]  - additional UC diagnosis    Review of Systems   Constitutional:  Positive for activity change, appetite change and fatigue. Negative for chills, fever and unexpected weight change.   HENT:  Positive for mouth sores. Negative for nasal congestion, sore throat and trouble swallowing.    Eyes:  Negative for visual disturbance.   Respiratory:  Negative for cough, shortness of breath and wheezing.    Cardiovascular:  Negative for chest pain, palpitations, leg swelling and claudication.   Gastrointestinal:  Positive for diarrhea (chronic). Negative for abdominal distention, abdominal pain, change in bowel habit, constipation, nausea, reflux and change in bowel habit.   Genitourinary:  Negative for bladder incontinence, decreased urine volume, difficulty urinating, dysuria, frequency and hematuria.   Integumentary:  Positive for color change and rash. Negative for breast mass, breast discharge and breast tenderness.   Neurological:  Positive for weakness and headaches. Negative for dizziness, light-headedness and numbness.   Hematological:  Negative for adenopathy. Bruises/bleeds easily.   Psychiatric/Behavioral:  Negative for dysphoric mood.    Breast: Negative for mass and tenderness      Objective:      Physical Exam  Vitals and nursing note reviewed.   Constitutional:       General: She is not in acute distress.     Appearance: Normal appearance. She is well-developed and normal weight.   HENT:      Head: Normocephalic and atraumatic.   Eyes:      General: Lids are normal. No scleral icterus.     Conjunctiva/sclera: Conjunctivae normal.      Pupils: Pupils are equal, round, and reactive to light.   Neck:      Thyroid: No thyromegaly.      Vascular: No JVD.      Trachea: Trachea normal.   Cardiovascular:       Rate and Rhythm: Normal rate and regular rhythm.      Heart sounds: Murmur (soft) heard.   Pulmonary:      Effort: Pulmonary effort is normal.      Breath sounds: Normal breath sounds. No wheezing.      Comments: Breasts- right breast no concerning masses (area of fat necrosis) or LAD. Area of hyperpigmentation to upper outer quad that has been there for >1 year according to patient.   Left breast no masses or LAD   No tenderness.   Abdominal:      General: Bowel sounds are normal. There is no distension.      Palpations: Abdomen is soft. There is no mass.      Tenderness: There is no abdominal tenderness.      Comments: No organomegaly.    Musculoskeletal:         General: Normal range of motion.      Cervical back: Normal range of motion and neck supple.      Comments: No spinal or paraspinal tenderness.    Lymphadenopathy:      Head:      Right side of head: No submental or submandibular adenopathy.      Left side of head: No submental or submandibular adenopathy.      Cervical: No cervical adenopathy.      Upper Body:      Right upper body: No supraclavicular or axillary adenopathy.      Left upper body: No supraclavicular or axillary adenopathy.   Skin:     General: Skin is warm and dry.      Capillary Refill: Capillary refill takes less than 2 seconds.      Coloration: Skin is not jaundiced.      Findings: Erythema and rash present. No bruising.      Nails: There is no clubbing.   Neurological:      Mental Status: She is alert and oriented to person, place, and time.   Psychiatric:         Mood and Affect: Mood normal.         Speech: Speech normal.         Behavior: Behavior normal.       Assessment:       Problem List Items Addressed This Visit       Malignant neoplasm of upper-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 - Primary    Lupus (systemic lupus erythematosus)       Plan:     Clinically [facility]  Benlysta information received and we would support her initiating    Route Chart for Scheduling    Med Onc Chart  Routing      Follow up with physician    Follow up with BREANNE 6 months.   Infusion scheduling note    Injection scheduling note    Labs    Imaging    Pharmacy appointment    Other referrals          Therapy Plan Information  Medications  zoledronic acid-mannitol & water 5 mg/100 mL infusion 5 mg  5 mg, Intravenous, Every visit  Flushes  sodium chloride 0.9% flush 10 mL  10 mL, Intravenous, PRN

## 2022-11-04 ENCOUNTER — PATIENT MESSAGE (OUTPATIENT)
Dept: RHEUMATOLOGY | Facility: CLINIC | Age: 62
End: 2022-11-04
Payer: MEDICARE

## 2022-11-04 NOTE — TELEPHONE ENCOUNTER
PA approved from 11/3/22-12/31/23.     Test claim pays $0.00. Pt is in the catastrophic phase of Medicare Part D. LIS 01.    FA not required. Forwarding to initial

## 2022-11-07 ENCOUNTER — SPECIALTY PHARMACY (OUTPATIENT)
Dept: PHARMACY | Facility: CLINIC | Age: 62
End: 2022-11-07
Payer: MEDICARE

## 2022-11-07 DIAGNOSIS — M32.9 SYSTEMIC LUPUS ERYTHEMATOSUS, UNSPECIFIED SLE TYPE, UNSPECIFIED ORGAN INVOLVEMENT STATUS: Primary | ICD-10-CM

## 2022-11-07 NOTE — TELEPHONE ENCOUNTER
Specialty Pharmacy - Initial Clinical Assessment    Specialty Medication Orders Linked to Encounter      Flowsheet Row Most Recent Value   Medication #1 belimumab (BENLYSTA) 200 mg/mL AtIn (Order#[id number], Rx#[id number])          Patient Diagnosis   M32.9 - Systemic lupus erythematosus, unspecified SLE type, unspecified organ involvement status    Subjective    Efe Horowitz is a 62 y.o. female, who is followed by the specialty pharmacy service for management and education.    Recent Encounters       Date Type Provider Description    11/07/2022 Specialty Pharmacy Ashwin Bucio, PharmD Initial Clinical Assessment    10/31/2022 Specialty Pharmacy Yasmin Scott, Angus Referral Authorization    09/18/2021 Specialty Pharmacy Rafael Caceres, Angus     08/09/2021 Specialty Pharmacy Taylor [facility] Resor Refill Coordination    07/07/2021 Specialty Pharmacy Alona Alcantar Refill Coordination          Clinical call attempts since last clinical assessment   No call attempts found.     Current Outpatient Medications   Medication Sig    albuterol (ACCUNEB) 0.63 mg/3 mL Nebu Take 3 mLs (0.63 mg total) by nebulization every 6 (six) hours as needed.    amLODIPine (NORVASC) 2.5 MG tablet Take 2.5 mg by mouth.    belimumab (BENLYSTA) 200 mg/mL AtIn Inject 1 mL (200 mg total) into the skin every 7 days.    CREON 36,000-114,000- 180,000 unit CpDR TAKE TWO CAPSULES BY MOUTH THREE TIMES DAILY WITH MEALS AND ONE CAPSULE WITH EACH SNACK    dexamethasone (DECADRON) 4 mg/mL injection Inject 1 mL (4 mg total) into the muscle daily as needed (Signs and symtpoms of severe adrenal insufficiency).    diclofenac sodium (VOLTAREN) 1 % Gel APPLY 2 GRAMS TO AFFECTED AREA 4 TIMES A DAY    dicyclomine (BENTYL) 20 mg tablet TAKE 1 TABLET BY MOUTH EVERY 6 HOURS.    gabapentin (NEURONTIN) 300 MG capsule TAKE 1 CAPSULE BY MOUTH EVERY EVENING    HEPLISAV-B, PF, 20 mcg/0.5 mL Syrg     hydroCHLOROthiazide (HYDRODIURIL) 12.5 MG Tab Take 12.5 mg by  mouth.    hydrOXYchloroQUINE (PLAQUENIL) 200 mg tablet TAKE 1 AND 1/2 TABLETS BY MOUTH EVERY DAY    LIDOcaine (LIDODERM) 5 % Place 1 patch onto the skin once daily. Remove & Discard patch within 12 hours or as directed by MD    loperamide (IMODIUM) 2 mg capsule TAKE 1 TO 2 CAPSULES BY MOUTH FOUR TIMES DAILY    losartan (COZAAR) 50 MG tablet Take 1 tablet (50 mg total) by mouth once daily.    milnacipran (SAVELLA) 100 mg Tab Take 1 tablet (100 mg total) by mouth once daily.    multivitamin/iron/folic acid (CENTRUM WOMEN ORAL) Take 1 tablet by mouth once daily.    niacin 100 MG Tab Take by mouth. 1 Tablet Oral At bedtime    omeprazole (PRILOSEC) 20 MG capsule TAKE 1 CAPSULE BY MOUTH TWICE A DAY    ondansetron (ZOFRAN) 4 MG tablet Take 1 tablet (4 mg total) by mouth every 12 (twelve) hours as needed for Nausea.    oxyCODONE-acetaminophen (PERCOCET) 5-325 mg per tablet Take 1 tablet by mouth every 12 (twelve) hours as needed for Pain.    oxyCODONE-acetaminophen (PERCOCET) 5-325 mg per tablet Take 1 tablet by mouth every 4 (four) hours as needed for Pain.    potassium chloride SA (K-DUR,KLOR-CON M) 10 MEQ tablet Take 3 tabs a day.    predniSONE (DELTASONE) 5 MG tablet TAKE 1 TABLET (5 MG TOTAL) BY MOUTH ONCE DAILY. DOUBLE THE DOSE IN TIMES OF ILLNESS    psyllium husk/aspartame (METAMUCIL FIBER SINGLES ORAL) Take by mouth.    REPATHA SURECLICK 140 mg/mL PnIj INJECT 140 MG INTO THE SKIN EVERY 14 (FOURTEEN) DAYS    RESTASIS 0.05 % ophthalmic emulsion INSTILL 1 DROP INTO BOTH EYES TWICE A DAY    rizatriptan (MAXALT) 10 MG tablet Take 1 tablet (10 mg total) by mouth every 6 (six) hours as needed for Migraine.    rosuvastatin (CRESTOR) 5 MG tablet Take 5 mg by mouth once daily.    zoledronic acid-mannitol & water (RECLAST) 5 mg/100 mL PgBk     zolpidem (AMBIEN) 5 MG Tab Take 1 tablet (5 mg total) by mouth nightly as needed (insomnia).   Last reviewed on 11/7/2022  1:40 PM by Ashwin Bucio PharmD    Review of patient's  "allergies indicates:   Allergen Reactions    Aspirin      Other reaction(s): "hemorrhage"  hemorrhage    Hydrocortisone Nausea Only     Other reaction(s): sick  sick    Lactose intolerance (lactase) [lactase] Nausea And Vomiting    Vicodin [hydrocodone-acetaminophen]      Other reaction(s): hyperactivity    Asacol [mesalamine] Hallucinations     Other reaction(s): Hallucinations  hallucinations   Last reviewed on  11/7/2022 1:30 PM by Ashwin Bucio    Drug Interactions    Drug interactions evaluated: yes  Clinically relevant drug interactions identified: yes   Interactions list: Potassium + Bentyl - Anticholinergic Agents may enhance the ulcerogenic effect of Potassium Chloride. Solid oral dosage forms of potassium chloride are contraindicated in patients with impaired gastric emptying.     Drug management plan: Patient reported being actively managed by her Dr. Rangel, and that he is aware of the DDI. I will reach out to 1) confirm awareness of the DDI and 2) if potassium supplementation is necessary, recommend switching to a different formulation (liquid or effervescent preparation) that are not contraindicated.   Provided the patient with educational material regarding drug interactions: not applicable           Assessment Questions - Documented Responses      Flowsheet Row Most Recent Value   Assessment    Medication Reconciliation completed for patient Yes   During the past 4 weeks, has patient missed any activities due to condition or medication? No   During the past 4 weeks, did patient have any of the following urgent care visits? None   Goals of Therapy Status Discussed (new start)   Status of the patients ability to self-administer: Is Able   All education points have been covered with patient? Yes, supplemental printed education provided   Welcome packet contents reviewed and discussed with patient? Yes   Assesment completed? Yes   Plan Therapy being initiated   Do you need to open a clinical " "intervention (i-vent)? Yes   Do you want to schedule first shipment? Yes   Medication #1 Assessment Info    Patient status New medication, Exisiting to OSP   Is this medication appropriate for the patient? Yes   Is this medication effective? Not yet started          Refill Questions - Documented Responses      Flowsheet Row Most Recent Value   Refill Screening Questions    When does the patient need to receive the medication? 11/10/22   Refill Delivery Questions    How will the patient receive the medication? MEDRx   When does the patient need to receive the medication? 11/10/22   Shipping Address Home   Address in [facility] confirmed and updated if neccessary? Yes   Expected Copay ($) 0   Is the patient able to afford the medication copay? Yes   Payment Method zero copay   Days supply of Refill 28   Supplies needed? No supplies needed   Refill activity completed? Yes   Refill activity plan Refill scheduled   Shipment/Pickup Date: 11/08/22            Objective    She has a past medical history of Acid reflux, Adrenal insufficiency, primary, Arthritis, Asthma, B12 deficiency anemia, Benign essential hypertension (2/7/2021), Blood transfusion (2010), Breast cancer (2/2016), Cataract, Chronic pain, Deep vein thrombosis, Dry eyes, Esophagitis, unspecified, Fibromyalgia, General anesthetics causing adverse effect in therapeutic use, Heart murmur, History of colon polyps, Hyperlipidemia, Hypopituitary dwarfism, Iron deficiency anemia, Lupus, Migraines, neuralgic, Nonspecific ulcerative colitis, OP (osteoporosis), Osteopenia, Pancreatic cyst, Schatzki's ring, Steroid sulfatase deficiency, Ulcerative colitis, and Vitamin D deficiency disease.    Tried/failed medications: HCQ    BP Readings from Last 4 Encounters:   11/03/22 117/69   10/27/22 130/84   10/08/22 124/78   09/09/22 108/62     Ht Readings from Last 4 Encounters:   11/03/22 5' 4" (1.626 m)   10/27/22 5' 4" (1.626 m)   10/08/22 5' 4" (1.626 m)   09/09/22 5' " "4" (1.626 m)     Wt Readings from Last 4 Encounters:   11/03/22 59.7 kg (131 lb 9.8 oz)   10/27/22 62.1 kg (136 lb 14.5 oz)   10/08/22 59.1 kg (130 lb 4.7 oz)   09/09/22 56.7 kg (125 lb)     Recent Labs   Lab Result Units 11/03/22  1432 10/27/22  1234 09/02/22  0712   Creatinine mg/dL 0.9  --  0.9   ALT U/L 17  --   --    AST U/L 20  --   --    Hepatitis B Surface Ag   --  Non-reactive  --    Hep B S Ab   --  >1000.00  Reactive  --    Hep B Core Total Ab   --  Non-reactive  --      The goals of prescribed drug therapy management include:  Supporting patient to meet the prescriber's medical treatment objectives  Improving or maintaining quality of life  Maintaining optimal therapy adherence  Minimizing and managing side effects      Goals of Therapy Status: Discussed (new start)    Assessment/Plan  Patient plans to start therapy on 11/10/22      Indication, dosage, appropriateness, effectiveness, safety and convenience of her specialty medication(s) were reviewed today.     Patient Education   Patient received education on the following:   Expectations and possible outcomes of therapy  Proper use, timely administration, and missed dose management  Duration of therapy  Side effects, including prevention, minimization, and management  Contraindications and safety precautions  New or changed medications, including prescribe and over the counter medications and supplements  Reviews recommended vaccinations, as appropriate  Storage, safe handling, and disposal    Patient reported that she has an appointment this Thursday to get her covid booster. Per ACR guidelines, benylsta should be held 1-2 weeks (as disease activity allows) after each COVID vaccine dose. Educated patient on the date when to start therapy (11/17 or 11/24) based on her MD's recommendation. She acknowledged this plan. Messaged Dr. Allison to ensure she is aware.    Additionally,upon review, a category X DDI between oral Potassium and Bentyl was " discovered. Patient was educated on this interaction and reports that Dr. Rangel is aware and actively managing her care. She reports no abdominal pain or s/sx of GI ulcers. Messaged Dr. Rangel to ensure he is aware.    Last, discussed with the patient her past history of breast cancer. PI for Benlysta states malignancy as a warning/precaution. Per onc MD note on 11/3 - provider is aware and supports the use of Benlysta for SLE as she currently has no evidence of disease with her previous breast cancer diagnosis. Close monitoring with the use of benlysta is warranted.     Tasks added this encounter   12/1/2022 - Refill Call (Auto Added)  8/7/2023 - Clinical - Follow Up Assesement (Annual)   Tasks due within next 3 months   No tasks due.     Ashwin Bucio, PharmD  [facility]  [address]  [facility] [address]  Phone: [phone]  Fax: [phone]

## 2022-11-07 NOTE — TELEPHONE ENCOUNTER
Specialty Pharmacy - Initial Clinical Assessment    Specialty Medication Orders Linked to Encounter      Flowsheet Row Most Recent Value   Medication #1 belimumab (BENLYSTA) 200 mg/mL AtIn (Order#[id number], Rx#[id number])          Patient Diagnosis   M32.9 - Systemic lupus erythematosus, unspecified SLE type, unspecified organ involvement status    Subjective    Efe Horowitz is a 62 y.o. female, who is followed by the specialty pharmacy service for management and education.    Recent Encounters       Date Type Provider Description    11/07/2022 Specialty Pharmacy Ashwin Bucio, PharmD Initial Clinical Assessment    10/31/2022 Specialty Pharmacy Yasmin Scott, Angus Referral Authorization    09/18/2021 Specialty Pharmacy Rafael Caceres, Angus     08/09/2021 Specialty Pharmacy Taylor [facility] Resor Refill Coordination    07/07/2021 Specialty Pharmacy Alona Alcantar Refill Coordination          Clinical call attempts since last clinical assessment   No call attempts found.     Current Outpatient Medications   Medication Sig    albuterol (ACCUNEB) 0.63 mg/3 mL Nebu Take 3 mLs (0.63 mg total) by nebulization every 6 (six) hours as needed.    amLODIPine (NORVASC) 2.5 MG tablet Take 2.5 mg by mouth.    belimumab (BENLYSTA) 200 mg/mL AtIn Inject 1 mL (200 mg total) into the skin every 7 days.    CREON 36,000-114,000- 180,000 unit CpDR TAKE TWO CAPSULES BY MOUTH THREE TIMES DAILY WITH MEALS AND ONE CAPSULE WITH EACH SNACK    dexamethasone (DECADRON) 4 mg/mL injection Inject 1 mL (4 mg total) into the muscle daily as needed (Signs and symtpoms of severe adrenal insufficiency).    diclofenac sodium (VOLTAREN) 1 % Gel APPLY 2 GRAMS TO AFFECTED AREA 4 TIMES A DAY    dicyclomine (BENTYL) 20 mg tablet TAKE 1 TABLET BY MOUTH EVERY 6 HOURS.    gabapentin (NEURONTIN) 300 MG capsule TAKE 1 CAPSULE BY MOUTH EVERY EVENING    HEPLISAV-B, PF, 20 mcg/0.5 mL Syrg     hydroCHLOROthiazide (HYDRODIURIL) 12.5 MG Tab Take 12.5 mg by  mouth.    hydrOXYchloroQUINE (PLAQUENIL) 200 mg tablet TAKE 1 AND 1/2 TABLETS BY MOUTH EVERY DAY    LIDOcaine (LIDODERM) 5 % Place 1 patch onto the skin once daily. Remove & Discard patch within 12 hours or as directed by MD    loperamide (IMODIUM) 2 mg capsule TAKE 1 TO 2 CAPSULES BY MOUTH FOUR TIMES DAILY    losartan (COZAAR) 50 MG tablet Take 1 tablet (50 mg total) by mouth once daily.    milnacipran (SAVELLA) 100 mg Tab Take 1 tablet (100 mg total) by mouth once daily.    multivitamin/iron/folic acid (CENTRUM WOMEN ORAL) Take 1 tablet by mouth once daily.    niacin 100 MG Tab Take by mouth. 1 Tablet Oral At bedtime    omeprazole (PRILOSEC) 20 MG capsule TAKE 1 CAPSULE BY MOUTH TWICE A DAY    ondansetron (ZOFRAN) 4 MG tablet Take 1 tablet (4 mg total) by mouth every 12 (twelve) hours as needed for Nausea.    oxyCODONE-acetaminophen (PERCOCET) 5-325 mg per tablet Take 1 tablet by mouth every 12 (twelve) hours as needed for Pain.    oxyCODONE-acetaminophen (PERCOCET) 5-325 mg per tablet Take 1 tablet by mouth every 4 (four) hours as needed for Pain.    potassium chloride SA (K-DUR,KLOR-CON M) 10 MEQ tablet Take 3 tabs a day.    predniSONE (DELTASONE) 5 MG tablet TAKE 1 TABLET (5 MG TOTAL) BY MOUTH ONCE DAILY. DOUBLE THE DOSE IN TIMES OF ILLNESS    psyllium husk/aspartame (METAMUCIL FIBER SINGLES ORAL) Take by mouth.    REPATHA SURECLICK 140 mg/mL PnIj INJECT 140 MG INTO THE SKIN EVERY 14 (FOURTEEN) DAYS    RESTASIS 0.05 % ophthalmic emulsion INSTILL 1 DROP INTO BOTH EYES TWICE A DAY    rizatriptan (MAXALT) 10 MG tablet Take 1 tablet (10 mg total) by mouth every 6 (six) hours as needed for Migraine.    rosuvastatin (CRESTOR) 5 MG tablet Take 5 mg by mouth once daily.    zoledronic acid-mannitol & water (RECLAST) 5 mg/100 mL PgBk     zolpidem (AMBIEN) 5 MG Tab Take 1 tablet (5 mg total) by mouth nightly as needed (insomnia).   Last reviewed on 11/7/2022  1:40 PM by Ashwin Bucio PharmD    Review of patient's  "allergies indicates:   Allergen Reactions    Aspirin      Other reaction(s): "hemorrhage"  hemorrhage    Hydrocortisone Nausea Only     Other reaction(s): sick  sick    Lactose intolerance (lactase) [lactase] Nausea And Vomiting    Vicodin [hydrocodone-acetaminophen]      Other reaction(s): hyperactivity    Asacol [mesalamine] Hallucinations     Other reaction(s): Hallucinations  hallucinations   Last reviewed on  11/7/2022 1:30 PM by Ashwin Bucio    Drug Interactions    Drug interactions evaluated: yes  Clinically relevant drug interactions identified: yes   Interactions list: Potassium + Bentyl - Anticholinergic Agents may enhance the ulcerogenic effect of Potassium Chloride. Solid oral dosage forms of potassium chloride are contraindicated in patients with impaired gastric emptying.     Drug management plan: Patient reported being actively managed by her Dr. Rangel, and that he is aware of the DDI. I will reach out to 1) confirm awareness of the DDI and 2) if potassium supplementation is necessary, recommend switching to a different formulation (liquid or effervescent preparation) that are not contraindicated.   Provided the patient with educational material regarding drug interactions: not applicable           Assessment Questions - Documented Responses      Flowsheet Row Most Recent Value   Assessment    Medication Reconciliation completed for patient Yes   During the past 4 weeks, has patient missed any activities due to condition or medication? No   During the past 4 weeks, did patient have any of the following urgent care visits? None   Goals of Therapy Status Discussed (new start)   Status of the patients ability to self-administer: Is Able   All education points have been covered with patient? Yes, supplemental printed education provided   Welcome packet contents reviewed and discussed with patient? Yes   Assesment completed? Yes   Plan Therapy being initiated   Do you need to open a clinical " "intervention (i-vent)? Yes   Do you want to schedule first shipment? Yes   Medication #1 Assessment Info    Patient status New medication, Exisiting to OSP   Is this medication appropriate for the patient? Yes   Is this medication effective? Not yet started          Refill Questions - Documented Responses      Flowsheet Row Most Recent Value   Refill Screening Questions    When does the patient need to receive the medication? 11/10/22   Refill Delivery Questions    How will the patient receive the medication? MEDRx   When does the patient need to receive the medication? 11/10/22   Shipping Address Home   Address in [facility] confirmed and updated if neccessary? Yes   Expected Copay ($) 0   Is the patient able to afford the medication copay? Yes   Payment Method zero copay   Days supply of Refill 28   Supplies needed? No supplies needed   Refill activity completed? Yes   Refill activity plan Refill scheduled   Shipment/Pickup Date: 11/08/22            Objective    She has a past medical history of Acid reflux, Adrenal insufficiency, primary, Arthritis, Asthma, B12 deficiency anemia, Benign essential hypertension (2/7/2021), Blood transfusion (2010), Breast cancer (2/2016), Cataract, Chronic pain, Deep vein thrombosis, Dry eyes, Esophagitis, unspecified, Fibromyalgia, General anesthetics causing adverse effect in therapeutic use, Heart murmur, History of colon polyps, Hyperlipidemia, Hypopituitary dwarfism, Iron deficiency anemia, Lupus, Migraines, neuralgic, Nonspecific ulcerative colitis, OP (osteoporosis), Osteopenia, Pancreatic cyst, Schatzki's ring, Steroid sulfatase deficiency, Ulcerative colitis, and Vitamin D deficiency disease.    Tried/failed medications: HCQ    BP Readings from Last 4 Encounters:   11/03/22 117/69   10/27/22 130/84   10/08/22 124/78   09/09/22 108/62     Ht Readings from Last 4 Encounters:   11/03/22 5' 4" (1.626 m)   10/27/22 5' 4" (1.626 m)   10/08/22 5' 4" (1.626 m)   09/09/22 5' " "4" (1.626 m)     Wt Readings from Last 4 Encounters:   11/03/22 59.7 kg (131 lb 9.8 oz)   10/27/22 62.1 kg (136 lb 14.5 oz)   10/08/22 59.1 kg (130 lb 4.7 oz)   09/09/22 56.7 kg (125 lb)     Recent Labs   Lab Result Units 11/03/22  1432 10/27/22  1234 09/02/22  0712   Creatinine mg/dL 0.9  --  0.9   ALT U/L 17  --   --    AST U/L 20  --   --    Hepatitis B Surface Ag   --  Non-reactive  --    Hep B S Ab   --  >1000.00  Reactive  --    Hep B Core Total Ab   --  Non-reactive  --      The goals of prescribed drug therapy management include:  Supporting patient to meet the prescriber's medical treatment objectives  Improving or maintaining quality of life  Maintaining optimal therapy adherence  Minimizing and managing side effects      Goals of Therapy Status: Discussed (new start)    Assessment/Plan  Patient plans to start therapy on 11/10/22      Indication, dosage, appropriateness, effectiveness, safety and convenience of her specialty medication(s) were reviewed today.     Patient Education   Patient received education on the following:   Expectations and possible outcomes of therapy  Proper use, timely administration, and missed dose management  Duration of therapy  Side effects, including prevention, minimization, and management  Contraindications and safety precautions  New or changed medications, including prescribe and over the counter medications and supplements  Reviews recommended vaccinations, as appropriate  Storage, safe handling, and disposal    Patient reported that she has an appointment this Thursday to get her covid booster. Per ACR guidelines, benylsta should be held 1-2 weeks (as disease activity allows) after each COVID vaccine dose. Educated patient on the date when to start therapy (11/17 or 11/24) based on her MD's recommendation. She acknowledged this plan. Messaged Dr. Allison to ensure she is aware.    Additionally,upon review, a category X DDI between oral Potassium and Bentyl was " discovered. Patient was educated on this interaction and reports that Dr. Rangel is aware and actively managing her care. She reports no abdominal pain or s/sx of GI ulcers. Messaged Dr. Rangel to ensure he is aware.    Last, discussed with the patient her past history of breast cancer. PI for Benlysta states malignancy as a warning/precaution. Per onc MD note on 11/3 - provider is aware and supports the use of Benlysta for SLE as she currently has no evidence of disease with her previous breast cancer diagnosis. Close monitoring with the use of benlysta is warranted.     Tasks added this encounter   12/1/2022 - Refill Call (Auto Added)  8/7/2023 - Clinical - Follow Up Assesement (Annual)   Tasks due within next 3 months   No tasks due.     Ashwin Bucio, PharmD  [facility]  [address]  [facility] [address]  Phone: [phone]  Fax: [phone]

## 2022-11-08 ENCOUNTER — TELEPHONE (OUTPATIENT)
Dept: RHEUMATOLOGY | Facility: CLINIC | Age: 62
End: 2022-11-08
Payer: MEDICARE

## 2022-11-08 NOTE — TELEPHONE ENCOUNTER
----- Message from Ashwin Bucio PharmD sent at 11/7/2022  3:14 PM CST -----  Regarding: COVID booster shot and Benlysta  Hello,    I wanted to inform you that Ms. Berry benlysta has been set up for delivery this week. However, she reported that she plans on getting the COVID booster shot this Thursday (11/10). Per ACR guidelines it is recommended to hold Benlysta for 1-2 weeks (as disease activity allows) after each COVID vaccine dose. I discussed this with the patient and advised them to contact you about when would be the most appropriate start date for the therapy (11/17 or 11/24). I wanted to ensure that you were aware of this plan going forward. Please reach out if you have any questions or concerns.    Thanks,    Ashwin Bucio, PharmD  PGY1 Community Pharmacy Resident  [facility]  [phone]

## 2022-11-08 NOTE — TELEPHONE ENCOUNTER
Message to Angus Urias  Thanks for the update.  Please let the patient know I agree with your recommendations to follow the ACR guidelines and not start Benlysta until 2 weeks after her COVID vaccine.  This will allow her to get the best benefit from the vaccine.  Thanks for your help with this patient.

## 2022-11-10 NOTE — TELEPHONE ENCOUNTER
Messaged received back from MD agreeing to hold Benlysta for 2 weeks from the date the patient will get the covid booster.    Called the patient to inform her the MD is aware and to start the Benlysta 2 weeks after getting her covid shot. Patient informed me that she actually got her covid booster on Monday (11/7). She plans on starting Benlysta treatment on 11/21. I will adjust the next refill call appropriately.

## 2022-11-17 ENCOUNTER — CLINICAL SUPPORT (OUTPATIENT)
Dept: INFECTIOUS DISEASES | Facility: CLINIC | Age: 62
End: 2022-11-17
Payer: MEDICARE

## 2022-11-17 ENCOUNTER — OFFICE VISIT (OUTPATIENT)
Dept: INFECTIOUS DISEASES | Facility: CLINIC | Age: 62
End: 2022-11-17
Payer: MEDICARE

## 2022-11-17 VITALS
HEIGHT: 64 IN | DIASTOLIC BLOOD PRESSURE: 81 MMHG | WEIGHT: 134.94 LBS | SYSTOLIC BLOOD PRESSURE: 141 MMHG | HEART RATE: 83 BPM | TEMPERATURE: 98 F | BODY MASS INDEX: 23.04 KG/M2

## 2022-11-17 DIAGNOSIS — Z79.899 LONG-TERM USE OF PLAQUENIL: ICD-10-CM

## 2022-11-17 DIAGNOSIS — M32.9 SYSTEMIC LUPUS ERYTHEMATOSUS, UNSPECIFIED SLE TYPE, UNSPECIFIED ORGAN INVOLVEMENT STATUS: ICD-10-CM

## 2022-11-17 DIAGNOSIS — D84.9 IMMUNOSUPPRESSION: ICD-10-CM

## 2022-11-17 DIAGNOSIS — Z79.52 LONG TERM CURRENT USE OF SYSTEMIC STEROIDS: ICD-10-CM

## 2022-11-17 DIAGNOSIS — R53.83 FATIGUE, UNSPECIFIED TYPE: ICD-10-CM

## 2022-11-17 DIAGNOSIS — M79.7 FIBROMYALGIA: ICD-10-CM

## 2022-11-17 PROCEDURE — 3077F SYST BP >= 140 MM HG: CPT | Mod: CPTII,S$GLB,, | Performed by: INTERNAL MEDICINE

## 2022-11-17 PROCEDURE — 90677 PNEUMOCOCCAL CONJUGATE VACCINE 20-VALENT: ICD-10-PCS | Mod: S$GLB,,, | Performed by: INTERNAL MEDICINE

## 2022-11-17 PROCEDURE — 4010F ACE/ARB THERAPY RXD/TAKEN: CPT | Mod: CPTII,S$GLB,, | Performed by: INTERNAL MEDICINE

## 2022-11-17 PROCEDURE — G0009 PNEUMOCOCCAL CONJUGATE VACCINE 20-VALENT: ICD-10-PCS | Mod: S$GLB,,, | Performed by: INTERNAL MEDICINE

## 2022-11-17 PROCEDURE — 3066F NEPHROPATHY DOC TX: CPT | Mod: CPTII,S$GLB,, | Performed by: INTERNAL MEDICINE

## 2022-11-17 PROCEDURE — G0009 ADMIN PNEUMOCOCCAL VACCINE: HCPCS | Mod: S$GLB,,, | Performed by: INTERNAL MEDICINE

## 2022-11-17 PROCEDURE — 3079F PR MOST RECENT DIASTOLIC BLOOD PRESSURE 80-89 MM HG: ICD-10-PCS | Mod: CPTII,S$GLB,, | Performed by: INTERNAL MEDICINE

## 2022-11-17 PROCEDURE — 90677 PCV20 VACCINE IM: CPT | Mod: S$GLB,,, | Performed by: INTERNAL MEDICINE

## 2022-11-17 PROCEDURE — 4010F PR ACE/ARB THEARPY RXD/TAKEN: ICD-10-PCS | Mod: CPTII,S$GLB,, | Performed by: INTERNAL MEDICINE

## 2022-11-17 PROCEDURE — 99499 RISK ADDL DX/OHS AUDIT: ICD-10-PCS | Mod: HCNC,S$GLB,, | Performed by: INTERNAL MEDICINE

## 2022-11-17 PROCEDURE — 3066F PR DOCUMENTATION OF TREATMENT FOR NEPHROPATHY: ICD-10-PCS | Mod: CPTII,S$GLB,, | Performed by: INTERNAL MEDICINE

## 2022-11-17 PROCEDURE — 99999 PR PBB SHADOW E&M-EST. PATIENT-LVL III: ICD-10-PCS | Mod: PBBFAC,,,

## 2022-11-17 PROCEDURE — 3079F DIAST BP 80-89 MM HG: CPT | Mod: CPTII,S$GLB,, | Performed by: INTERNAL MEDICINE

## 2022-11-17 PROCEDURE — 3044F PR MOST RECENT HEMOGLOBIN A1C LEVEL <7.0%: ICD-10-PCS | Mod: CPTII,S$GLB,, | Performed by: INTERNAL MEDICINE

## 2022-11-17 PROCEDURE — 3077F PR MOST RECENT SYSTOLIC BLOOD PRESSURE >= 140 MM HG: ICD-10-PCS | Mod: CPTII,S$GLB,, | Performed by: INTERNAL MEDICINE

## 2022-11-17 PROCEDURE — 99999 PR PBB SHADOW E&M-EST. PATIENT-LVL III: CPT | Mod: PBBFAC,,,

## 2022-11-17 PROCEDURE — 1159F MED LIST DOCD IN RCRD: CPT | Mod: CPTII,S$GLB,, | Performed by: INTERNAL MEDICINE

## 2022-11-17 PROCEDURE — 99499 UNLISTED E&M SERVICE: CPT | Mod: HCNC,S$GLB,, | Performed by: INTERNAL MEDICINE

## 2022-11-17 PROCEDURE — 3008F PR BODY MASS INDEX (BMI) DOCUMENTED: ICD-10-PCS | Mod: CPTII,S$GLB,, | Performed by: INTERNAL MEDICINE

## 2022-11-17 PROCEDURE — 3008F BODY MASS INDEX DOCD: CPT | Mod: CPTII,S$GLB,, | Performed by: INTERNAL MEDICINE

## 2022-11-17 PROCEDURE — 99203 OFFICE O/P NEW LOW 30 MIN: CPT | Mod: 25,S$GLB,, | Performed by: INTERNAL MEDICINE

## 2022-11-17 PROCEDURE — 99203 PR OFFICE/OUTPT VISIT, NEW, LEVL III, 30-44 MIN: ICD-10-PCS | Mod: 25,S$GLB,, | Performed by: INTERNAL MEDICINE

## 2022-11-17 PROCEDURE — 1159F PR MEDICATION LIST DOCUMENTED IN MEDICAL RECORD: ICD-10-PCS | Mod: CPTII,S$GLB,, | Performed by: INTERNAL MEDICINE

## 2022-11-17 PROCEDURE — 3044F HG A1C LEVEL LT 7.0%: CPT | Mod: CPTII,S$GLB,, | Performed by: INTERNAL MEDICINE

## 2022-11-17 RX ORDER — VALACYCLOVIR HYDROCHLORIDE 1 G/1
1000 TABLET, FILM COATED ORAL EVERY 12 HOURS
Qty: 20 TABLET | Refills: 1 | Status: SHIPPED | OUTPATIENT
Start: 2022-11-17 | End: 2024-03-26

## 2022-11-17 RX ORDER — VALACYCLOVIR HYDROCHLORIDE 1 G/1
1000 TABLET, FILM COATED ORAL EVERY 12 HOURS
Qty: 20 TABLET | Refills: 0 | Status: SHIPPED | OUTPATIENT
Start: 2022-11-17 | End: 2022-11-17

## 2022-11-17 NOTE — PROGRESS NOTES
Pre Biologic Response Modifier Therapy Consult  BMR Recipient Evaluation    Requesting Physician: Dr. Allison    Reason for Visit:  No chief complaint on file.    History of Present Illness  Efe Horowitz is a 62 y.o. year old Black or  female with R triple neg breast cancer s/p mastectomy 2016, fibromyalagia, ulcerative colitis s/p colectomy, SLE dx 1999 currently being evaluated prior to starting biologic response modifer (BRM) therapy. On prednisone 5mg po daily. Planning on starting benlysta. Patient denies any recent fever, chills, or infective infective illnesses. Experiencing worsening arthralgias.       1) Do you have a history of:    YES NO   Diabetes                 []       [x]   Diabetic Foot Infection/Bone Infection  []  [x]   Surgical Removal of Spleen    []  [x]       2) Have you had recurrent infections involving:             YES NO  Sinus infections  [x]  []  Sore Throat   []  [x]                             Prostate Infections  []  [x]  .                         Bladder Infections  []  [x]                                 Kidney Infections  []  [x]        Intestinal Infections  []  [x]   Skin Infections   []  [x]                   Reproductive Infections               Periodontal Disease   x          (Planning on having tooth removal)      3)Have you ever had: YES     NO UNKNOWN      Chicken Pox   []  [x]  []                 Shingles   []  [x]  []                Orolabial Herpes             [x]  []  []       Genital Herpes  []  [x]  []           Cytomegalovirus  []  [x]  []               Nicholas-Barr Virus  []  [x]  []              Genital Warts   []  [x]  []                Hepatitis A   []  [x]  []             Hepatitis B   []  [x]  []                Hepatitis C   []  [x]  []                 Syphilis   []  [x]  []                Gonorrhea   []  [x]  []                 Pelvic Inflammatory  []  [x]  []   Disease   []  [x]  []                    Chlamydia Infection  []  [x]  []                 Intestinal parasites        or worms   []  [x]  []                 Fungal Infections  []  [x]  []                Blood Infections  []  [x]  []     Comment:         4) Have you ever been exposed   YES NO  to someone with tuberculosis?  []  [x]   No TB risk factors      5) What states have you lived in?     6) What countries have you visited for more than 2 weeks?           [place] cruise     [place]                 YES NO  7) Did you have any associated infections?     []  [x]     8) Are you planning to travel outside the           [place] after starting BRMs?    [x]   []      Yes, may go on a cruise     Household                  YES NO  9 Do you have pets living in your house?   []   [x]             If yes, describe:     Do you spend time or live on a farm or                 have livestock or other farm animals?   []  [x]   If yes, which ones:    Do you have a fish tank?     []  [x]                       Do you have a litter box?     []  [x]                        Do you fish or hunt?      []  [x]                       Do you clean or skin fish or animals?   []  [x]                      Do you consume raw or undercooked                meat, fish, or shellfish?     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         []  [x]   13)Do you hike, camp, or spend   time in wooded areas?   []  [x]                           14) The patient's immunization history was reviewed.   Have you ever received:  YES NO UNKNOWN DATES  Routine Childhood vaccines  [x]  []  []                Influenza vaccine   [x]  []  []     11/7/22    Pneumonia    [x]  []  []      Tetanus-diptheria   [x]  []  []   Hepatitis A vaccine series       [x]  []  []       Hepatitis B vaccine series       [x]  []  []        Meningitis vaccine   []  []   []     Varicella vaccine   []  []  []                 COVID- bivalent booster 11/7/22   Based on the patients immunization history and serologies, immunizations were ordered:         Ordered  Not Ordered  Influenza Vaccine     []    [x]   Hepatitis A series at 0, 6 months   []    [x]   Hepatitis B sries at 0, 1, and 6 months  []    [x]   Hepatitis B High Dose series 0,1, and 6 months []    [x]   Prevnar 20      [x]    []          TDap       []    [x]   Zoster       []    [x]   Menactra      []    [x]   HiB       []    [x]                     Allergies: Aspirin, Hydrocortisone, Lactose intolerance (lactase) [lactase], Vicodin [hydrocodone-acetaminophen], and Asacol [mesalamine]  Immunization History   Administered Date(s) Administered    COVID-19, MRNA, LN-S, PF (Pfizer) (Purple Cap) 02/20/2021, 03/12/2021, 08/16/2021    Hepatitis A / Hepatitis B 10/08/2008, 10/08/2008, 11/19/2008, 11/19/2008, 04/08/2009, 04/08/2009    Hepatitis A, Adult 01/10/2022, 07/11/2022    Hepatitis B (recombinant) Adjuvanted, 2 dose 01/10/2022, 02/07/2022    Influenza 10/03/2019, 08/31/2020    Influenza - Quadrivalent 10/12/2017, 10/12/2017, 09/23/2019    Influenza - Quadrivalent - PF *Preferred* (6 months and older) 08/29/2020, 08/29/2020, 11/27/2021    Influenza - Trivalent (ADULT) 10/04/2011, 10/02/2013    Influenza - Trivalent - PF (ADULT) 09/23/2014, 09/23/2014    Influenza Split 10/02/2013    Pneumococcal Conjugate - 13 Valent 02/08/2021    Pneumococcal Polysaccharide - 23 Valent 06/23/2014    Tdap 08/23/2021    Zoster Recombinant 02/23/2022, 09/06/2022     Past Medical History:   Diagnosis Date    Acid reflux     Adrenal insufficiency, primary     Arthritis     Asthma     B12 deficiency anemia     Benign essential hypertension 2/7/2021    Blood transfusion 2010    Breast cancer 2/2016    Right breast infiltrating ductal CA    Cataract     Chronic pain     Deep vein thrombosis     Dry eyes     Esophagitis, unspecified     Fibromyalgia   "   General anesthetics causing adverse effect in therapeutic use     "slow to wake up bc I don't have an immune system    Heart murmur     History of colon polyps     Hyperlipidemia     Hypopituitary dwarfism     Iron deficiency anemia     Lupus     Migraines, neuralgic     Nonspecific ulcerative colitis     OP (osteoporosis)     history of reclast    Osteopenia     Pancreatic cyst     Schatzki's ring     Steroid sulfatase deficiency     Ulcerative colitis     Vitamin D deficiency disease      Past Surgical History:   Procedure Laterality Date    ANORECTAL MANOMETRY N/A 1/13/2022    Procedure: MANOMETRY, ANORECTAL;  Surgeon: First Available Stu Gastelum;  Location: [facility] (4TH FLR);  Service: Endoscopy;  Laterality: N/A;  new order placed; original order placed incorrectly  1/7 instructions handed to patient-st    APPENDECTOMY      BREAST BIOPSY Right 2/2016    IDC    BREAST RECONSTRUCTION      BREAST SURGERY      CHOLECYSTECTOMY      COLON SURGERY      ENDOSCOPIC ULTRASOUND OF UPPER GASTROINTESTINAL TRACT N/A 8/6/2018    Procedure: ULTRASOUND, ENDOSCOPIC, UPPER GI TRACT;  Surgeon: Hong Finn MD;  Location: [facility] (2ND FLR);  Service: Endoscopy;  Laterality: N/A;    ESOPHAGOGASTRODUODENOSCOPY N/A 12/10/2018    Procedure: EGD (ESOPHAGOGASTRODUODENOSCOPY);  Surgeon: Reese Villalta MD;  Location: [facility] (4TH FLR);  Service: Endoscopy;  Laterality: N/A;    ESOPHAGOGASTRODUODENOSCOPY N/A 12/30/2021    Procedure: EGD (ESOPHAGOGASTRODUODENOSCOPY);  Surgeon: Reese Villalta MD;  Location: [facility] (2ND FLR);  Service: Endoscopy;  Laterality: N/A;  EGD for esophageal dysphagia and early satiety please schedule 1st provider available. wants this date-Dr Villalta only     fully vaccinated-GT  hx of adrenal insuffiency   12/27 arrival time confirmed with pt-rb    ESOPHAGOGASTRODUODENOSCOPY N/A 9/9/2022    Procedure: EGD (ESOPHAGOGASTRODUODENOSCOPY);  Surgeon: Reese Villalta MD;  Location: [facility] (4TH " FLR);  Service: Endoscopy;  Laterality: N/A;  vacc  inst handed to pt-LW    FLEXIBLE SIGMOIDOSCOPY N/A 12/10/2018    Procedure: SIGMOIDOSCOPY, FLEXIBLE;  Surgeon: Reese Villalta MD;  Location: [facility] (4TH FLR);  Service: Endoscopy;  Laterality: N/A;  Recommend full split bowel prep for this study just like for a colonoscopy    HYSTERECTOMY      ILEOSCOPY N/A 2/16/2022    Procedure: ILEOSCOPY;  Surgeon: Ceferino Rangel MD;  Location: [facility] ENDO (4TH FLR);  Service: Endoscopy;  Laterality: N/A;  medical history significant for Ulcerative colitis s/p total colectomy with ileoanal anastomosis ([id number]), SLE on plaquenil, adrenal insufficiency on prednisone, breast cancer, and HTN who presents with 3 month history of bilateral lower abdominal pain. Patient with abd    MASTECTOMY      OOPHORECTOMY Bilateral     restorative proctectomy      total abdominal colectomy with ileostomy  2010    TOTAL COLECTOMY      TOTAL REDUCTION MAMMOPLASTY Left 2017    UPPER ENDOSCOPIC ULTRASOUND W/ FNA        Social History     Socioeconomic History    Marital status: Single    Number of children: 1    Years of education: 12 + 2   Occupational History    Occupation: disabled due to UC   Tobacco Use    Smoking status: Never    Smokeless tobacco: Never   Substance and Sexual Activity    Alcohol use: No     Alcohol/week: 0.0 standard drinks    Drug use: No    Sexual activity: Not Currently     Partners: Male   Social History Narrative    Adult Screenings updated and reviewed  6/23/14    Mammogram( for females) last done  10/4/2013    Pap ( for females)s/p hysterectomy    Colonoscopy - Dr Carreno- done for  2013    Flu shot 10/2/2013     Td 2005 due again  2015    Pneumovax done 2008, updated  Today  6/23/14    Zostavax recommended one time at  age  58    Eye exam done 2014    Bone density  10/19/2013 History of reclast due in one year.       Review of Systems   Constitutional: Negative for chills, decreased appetite, fever and  malaise/fatigue.   Eyes:  Negative for photophobia, vision loss in left eye and vision loss in right eye.   Respiratory:  Negative for cough and shortness of breath.    Skin:  Positive for poor wound healing. Negative for rash.   Musculoskeletal:  Positive for arthritis and joint pain.   Gastrointestinal:  Negative for abdominal pain and diarrhea.   Genitourinary:  Negative for dysuria and frequency.   Neurological:  Negative for headaches and light-headedness.   Psychiatric/Behavioral:  Negative for altered mental status and depression.      Physical Exam  Constitutional:       General: She is not in acute distress.     Appearance: She is well-developed.   HENT:      Head: Normocephalic and atraumatic.      Mouth/Throat:      Comments: Teeth: incisors ae decayed  Labial lesion present  Eyes:      Conjunctiva/sclera: Conjunctivae normal.      Pupils: Pupils are equal, round, and reactive to light.   Cardiovascular:      Rate and Rhythm: Normal rate and regular rhythm.      Heart sounds: Normal heart sounds.   Pulmonary:      Effort: Pulmonary effort is normal.      Breath sounds: Normal breath sounds.   Abdominal:      General: Bowel sounds are normal. There is no distension.      Palpations: Abdomen is soft.   Musculoskeletal:         General: Normal range of motion.      Cervical back: Normal range of motion and neck supple.   Skin:     General: Skin is warm and dry.   Neurological:      Mental Status: She is alert and oriented to person, place, and time.      Cranial Nerves: No cranial nerve deficit.   Psychiatric:         Behavior: Behavior normal.   Diagnostics:   RPR   Date Value Ref Range Status   10/27/2022 Non-reactive Non-reactive Final     No results found for: CMVANTIBODIE  No results found for: HIV1X2  No results found for: HTLVIIIANTIB  Hepatitis B Surface Ag   Date Value Ref Range Status   10/27/2022 Non-reactive Non-reactive Final     Hep B Core Total Ab   Date Value Ref Range Status   10/27/2022  Non-reactive Non-reactive Final     Hepatitis C Ab   Date Value Ref Range Status   10/27/2022 Non-reactive Non-reactive Final     No results found for: TOXOIGG  No components found for: TOXOIGGINTER  No results found for: TDE1DLU  No results found for: WEE8BTP  Varicella Zoster IgG   Date Value Ref Range Status   10/27/2022 3.59 (H) 0.00 - 0.90 ISR Final     Varicella Interpretation   Date Value Ref Range Status   10/27/2022 Positive (A) Negative Final     Comment:     <or=0.8    Negative        No detectable IgG antibody to Varicella zoster  by the [place] test. Such individuals are presumed to be   uninfected with Varicella zoster and to be susceptible to   primary infection.    0.9-1.0    Equivocal    >or=1.1    Positive        Indicates presence of detectable IgG antibody to Varicella   zoster by the [place] test. Indicative of previous or current   infection.       Strongyloides Ab IgG   Date Value Ref Range Status   10/27/2022 Negative Negative Final     Comment:     No detectable levels of IgG antibodies to Strongyloides.  Repeat testing in 1-2 weeks if clinically indicated.    Test Performed by:  [facility]  [address] [place] [address]  : Murphy Shore M.D. Ph.D.; CLIA# [id number]       No results found for: EPSTEINBARRV  Hep B S Ab   Date Value Ref Range Status   10/27/2022 >1000.00 mIU/mL Final   10/27/2022 Reactive  Final     Comment:     Individual is considered immune to HBV infection.     Quantiferon Gold TB   Date Value Ref Range Status   12/02/2009 Not Detected Not Detected Final     No results found for: HEPAIGM  No results found for: PPD  No results found for this or any previous visit.    #SLE  #long term use of immunosuppression  --labs and immunizations reviewed  --agree with plan for extraction of decayed teeth; f/u with dentist  --Prevnar 20 ordered  The patient was encouraged to contact us about any problems that may develop  after immunization and possible side effects were reviewed.     #Orolabial HSV  --reports hx of orolabial herpes  -     valACYclovir (VALTREX) 1000 MG tablet; Take 1 tablet (1,000 mg total) by mouth every 12 (twelve) hours. for 10 days  Dispense: 20 tablet; Refill: 1      BRM - Candidacy    Biologic Response Modifier Candidacy: Based on available information, there are no identified significant barriers to BRMs from an infectious disease standpoint pending acceptable serologies.  Final determination of BRM candidacy will be made once evaluation is complete and reviewed.    Emily Street MD        Counseling:I discussed with Efe the risk for increased susceptibility to infections following BRM therapy including increased risk for infection.  The patients has been counseled on the importance of vaccinations including but not limited to a yearly flu vaccine.Specific guidance has been provided to the patient regarding the patients occupation, hobbies and activities to avoid future infectious complications including but not limited to avoiding undercooked meats and seafood, proper hygiene, and contact with animals.    Total professional time spent for the encounter: 30 minutes  Time was spent preparing to see the patient, reviewing results of prior testing, obtaining and/or reviewing separately obtained history, performing a medically appropriate examination and interview, counseling and educating the patient/family, ordering medications/tests/procedures, referring and communicating with other health care professionals, documenting clinical information in the electronic health record, and independently interpreting results.

## 2022-11-18 ENCOUNTER — TELEPHONE (OUTPATIENT)
Dept: RHEUMATOLOGY | Facility: CLINIC | Age: 62
End: 2022-11-18
Payer: MEDICARE

## 2022-11-18 ENCOUNTER — PATIENT MESSAGE (OUTPATIENT)
Dept: RHEUMATOLOGY | Facility: CLINIC | Age: 62
End: 2022-11-18
Payer: MEDICARE

## 2022-11-18 NOTE — TELEPHONE ENCOUNTER
----- Message from Brenda Caldwell MA sent at 11/17/2022  1:59 PM CST -----  Regarding: FW: Status Update  Contact: [phone]  Do you want me to let her know you are out of the office?  ----- Message -----  From: Stella Martin  Sent: 11/17/2022   1:40 PM CST  To: [facility] Staff  Subject: Status Update                                    Calling to inform that appointment with ID has been completed. Please call to discuss when medication can be started.

## 2022-11-18 NOTE — TELEPHONE ENCOUNTER
Infectious disease note reviewed.  Staff to inform the patient not to start Benlysta until 2 weeks after she received the pneumonia vaccination.

## 2022-12-12 ENCOUNTER — OFFICE VISIT (OUTPATIENT)
Dept: GASTROENTEROLOGY | Facility: CLINIC | Age: 62
End: 2022-12-12
Payer: MEDICARE

## 2022-12-12 VITALS
DIASTOLIC BLOOD PRESSURE: 73 MMHG | SYSTOLIC BLOOD PRESSURE: 118 MMHG | WEIGHT: 134.5 LBS | HEART RATE: 79 BPM | HEIGHT: 64 IN | TEMPERATURE: 98 F | BODY MASS INDEX: 22.96 KG/M2

## 2022-12-12 DIAGNOSIS — Z90.49 STATUS POST TOTAL COLECTOMY: ICD-10-CM

## 2022-12-12 DIAGNOSIS — Z87.19 HISTORY OF ULCERATIVE COLITIS: ICD-10-CM

## 2022-12-12 DIAGNOSIS — K86.2 PANCREATIC CYST: ICD-10-CM

## 2022-12-12 DIAGNOSIS — R93.3 ABNORMAL FINDINGS ON DIAGNOSTIC IMAGING OF OTHER PARTS OF DIGESTIVE TRACT: ICD-10-CM

## 2022-12-12 DIAGNOSIS — K86.81 EXOCRINE PANCREATIC INSUFFICIENCY: ICD-10-CM

## 2022-12-12 DIAGNOSIS — M62.89 PELVIC FLOOR DYSFUNCTION: Primary | ICD-10-CM

## 2022-12-12 PROCEDURE — 3044F PR MOST RECENT HEMOGLOBIN A1C LEVEL <7.0%: ICD-10-PCS | Mod: CPTII,S$GLB,, | Performed by: INTERNAL MEDICINE

## 2022-12-12 PROCEDURE — 99214 OFFICE O/P EST MOD 30 MIN: CPT | Mod: S$GLB,,, | Performed by: INTERNAL MEDICINE

## 2022-12-12 PROCEDURE — 4010F ACE/ARB THERAPY RXD/TAKEN: CPT | Mod: CPTII,S$GLB,, | Performed by: INTERNAL MEDICINE

## 2022-12-12 PROCEDURE — 99214 PR OFFICE/OUTPT VISIT, EST, LEVL IV, 30-39 MIN: ICD-10-PCS | Mod: S$GLB,,, | Performed by: INTERNAL MEDICINE

## 2022-12-12 PROCEDURE — 3074F PR MOST RECENT SYSTOLIC BLOOD PRESSURE < 130 MM HG: ICD-10-PCS | Mod: CPTII,S$GLB,, | Performed by: INTERNAL MEDICINE

## 2022-12-12 PROCEDURE — 3044F HG A1C LEVEL LT 7.0%: CPT | Mod: CPTII,S$GLB,, | Performed by: INTERNAL MEDICINE

## 2022-12-12 PROCEDURE — 3008F PR BODY MASS INDEX (BMI) DOCUMENTED: ICD-10-PCS | Mod: CPTII,S$GLB,, | Performed by: INTERNAL MEDICINE

## 2022-12-12 PROCEDURE — 3008F BODY MASS INDEX DOCD: CPT | Mod: CPTII,S$GLB,, | Performed by: INTERNAL MEDICINE

## 2022-12-12 PROCEDURE — 3066F NEPHROPATHY DOC TX: CPT | Mod: CPTII,S$GLB,, | Performed by: INTERNAL MEDICINE

## 2022-12-12 PROCEDURE — 1160F PR REVIEW ALL MEDS BY PRESCRIBER/CLIN PHARMACIST DOCUMENTED: ICD-10-PCS | Mod: CPTII,S$GLB,, | Performed by: INTERNAL MEDICINE

## 2022-12-12 PROCEDURE — 4010F PR ACE/ARB THEARPY RXD/TAKEN: ICD-10-PCS | Mod: CPTII,S$GLB,, | Performed by: INTERNAL MEDICINE

## 2022-12-12 PROCEDURE — 3066F PR DOCUMENTATION OF TREATMENT FOR NEPHROPATHY: ICD-10-PCS | Mod: CPTII,S$GLB,, | Performed by: INTERNAL MEDICINE

## 2022-12-12 PROCEDURE — 1160F RVW MEDS BY RX/DR IN RCRD: CPT | Mod: CPTII,S$GLB,, | Performed by: INTERNAL MEDICINE

## 2022-12-12 PROCEDURE — 3078F PR MOST RECENT DIASTOLIC BLOOD PRESSURE < 80 MM HG: ICD-10-PCS | Mod: CPTII,S$GLB,, | Performed by: INTERNAL MEDICINE

## 2022-12-12 PROCEDURE — 3074F SYST BP LT 130 MM HG: CPT | Mod: CPTII,S$GLB,, | Performed by: INTERNAL MEDICINE

## 2022-12-12 PROCEDURE — 1159F PR MEDICATION LIST DOCUMENTED IN MEDICAL RECORD: ICD-10-PCS | Mod: CPTII,S$GLB,, | Performed by: INTERNAL MEDICINE

## 2022-12-12 PROCEDURE — 1159F MED LIST DOCD IN RCRD: CPT | Mod: CPTII,S$GLB,, | Performed by: INTERNAL MEDICINE

## 2022-12-12 PROCEDURE — 3078F DIAST BP <80 MM HG: CPT | Mod: CPTII,S$GLB,, | Performed by: INTERNAL MEDICINE

## 2022-12-12 RX ORDER — FOLIC ACID 1 MG/1
1 TABLET ORAL DAILY
COMMUNITY

## 2022-12-12 RX ORDER — ACETAMINOPHEN 500 MG
5000 TABLET ORAL DAILY
COMMUNITY

## 2022-12-12 RX ORDER — CALCIUM CARBONATE 500(1250)
1 TABLET ORAL DAILY
COMMUNITY

## 2022-12-12 NOTE — PROGRESS NOTES
"IBD PATIENT INTAKE:    COVID symptoms in the last 7 days (runny nose, sore throat, congestion, cough, fever): No  PCP: Shonda Anguiano  If not PCP-  number given to establish [phone]: N/A    ALLERGIES REVIEWED:  Yes    CHIEF COMPLAINT:    Chief Complaint   Patient presents with    Crohn's Disease       VITAL SIGNS:  /73 (BP Location: Left arm, Patient Position: Sitting)   Pulse 79   Temp 97.9 °F (36.6 °C)   Ht 5' 4" (1.626 m)   Wt 61 kg (134 lb 7.7 oz)   PF 99 L/min   BMI 23.08 kg/m²      Change in medical, surgical, family or social history: No    IBD THERAPY (name, dose/frequency):  prednisone 5 mg daily   Last dose:      Next dose:    Infusion/Pharmacy: NA    NSAIDs (aspirin, ibuprofen-advil or motrin, naproxen-aleve, diclofenac-voltaren, BC powder, excedrin, goodies): No    Alternative/Complementary Medications (i.e. probiotics, turmeric, fish oil, aloe vera):      no  Name/dose:  none    Vitamins:   Vit D:  5000 IU daily      Vit B-12:  no   Folic Acid: 1 mg daily        Calcium: 500 mg daily      Iron:  yes not sure dose      MVI: yes    Antibiotics (past 30 Days):  no  If yes   Indication:  Name of antibiotic:  Completion date:     REVIEWED MEDICATION LIST RECONCILED INCLUDING ABOVE MEDS:  yes                  Answers submitted by the patient for this visit:  Established Patient Questionnaire  (Submitted on 12/11/2022)  eye redness: Yes  mouth sores: Yes  Joint pain? : Yes  back pain: Yes    "

## 2022-12-12 NOTE — PROGRESS NOTES
[facility]          Inflammatory Bowel Disease Follow Up Note         TODAY'S VISIT DATE:  12/12/2022    Reason for Consult:    Chief Complaint   Patient presents with    Crohn's Disease       PCP: Shonda Anguiano      Referring MD:   No ref. provider found    History of Present Illness:  Efe Horowitz who is a 62 y.o. female is being seen today at the [facility] on 12/12/2022 for inflammatory bowel disease- inflammatory bowel disease-unspecified.  She continues to do very well.  She has been doing the exercises that were recommended by the physical therapist during her pelvic floor therapy.  She is using fiber and adjusting the dose as needed to help regulate her bowels.  She is going regularly and reports that her abdominal pain and bloating have completely resolved.  She has used dicyclomine a few times for some abdominal cramping and reports that this has helped a lot as well.  She denies any issues with her medications are with her GI symptoms.  She recently started Benylsta for her lupus.    IBD History:  She has a history of ulcerative colitis and underwent total proctocolectomy in 2009 due to refractory disease.  She has not had any significant issues following her surgery with regards to inflammatory bowel disease.  She also has a history of pelvic floor disorder, a pancreatic cystic lesion that has been followed through imaging, pancreatic insufficiency, and dysphagia without any clear etiology on upper endoscopy.      Pertinent Labs:  Lab Results   Component Value Date    SEDRATE 10 05/26/2022    CRP 1.5 05/26/2022     Lab Results   Component Value Date    TTGIGA 5 11/26/2021     11/26/2021     Lab Results   Component Value Date    TSH 0.974 09/06/2022    FREET4 0.72 08/24/2021     Lab Results   Component Value Date    UGSWBGZS49BL 42 04/25/2022    GOSDLIBI63 812 11/26/2021     Lab Results   Component Value Date    HEPBSAG  Non-reactive 10/27/2022    HEPBCAB Non-reactive 10/27/2022    HEPCAB Non-reactive 10/27/2022     Lab Results   Component Value Date    UJT18WQYK Non-reactive 10/27/2022     Lab Results   Component Value Date    NIL 0.93456 10/27/2022    MITOGENNIL 9.991 10/27/2022     Lab Results   Component Value Date    TPTMINTERP SEE BELOW 12/21/2021     Lab Results   Component Value Date    STOOLCULTURE  12/22/2021     No Salmonella,Shigella,Vibrio,Campylobacter,Yersinia isolated.    HIJYSSESCN6A Negative 12/22/2021    PNBABHLSFD7I Negative 12/22/2021    CDIFFICILEAN Negative 04/28/2021    CDIFFTOX Negative 04/28/2021     Lab Results   Component Value Date    CALPROTECTIN 56.8 (H) 12/22/2021       Therapeutic Drug Monitoring Labs:  No results found for: PROMETH  No results found for: ANSADAINIT, INFLIXIMAB, INFLIXINTERP    Vaccinations:  Lab Results   Component Value Date    HEPBSAB >1000.00 10/27/2022    HEPBSAB Reactive 10/27/2022     Lab Results   Component Value Date    HEPAIGG Reactive 10/27/2022     Lab Results   Component Value Date    VARICELLAZOS 3.59 (H) 10/27/2022    VARICELLAINT Positive (A) 10/27/2022     Immunization History   Administered Date(s) Administered    COVID-19, MRNA, LN-S, PF (Pfizer) (Purple Cap) 02/20/2021, 03/12/2021, 08/16/2021    COVID-19, mRNA, LNP-S, bivalent booster, PF (PFIZER OMICRON) 11/06/2022    Hepatitis A / Hepatitis B 10/08/2008, 10/08/2008, 11/19/2008, 11/19/2008, 04/08/2009, 04/08/2009    Hepatitis A, Adult 01/10/2022, 07/11/2022    Hepatitis B (recombinant) Adjuvanted, 2 dose 01/10/2022, 02/07/2022    Influenza 10/03/2019, 08/31/2020    Influenza - Quadrivalent 10/12/2017, 10/12/2017, 09/23/2019    Influenza - Quadrivalent - PF *Preferred* (6 months and older) 08/29/2020, 08/29/2020, 11/27/2021, 11/06/2022    Influenza - Trivalent (ADULT) 10/04/2011, 10/02/2013    Influenza - Trivalent - PF (ADULT) 09/23/2014, 09/23/2014    Influenza Split 10/02/2013    Pneumococcal Conjugate - 13  "Valent 02/08/2021    Pneumococcal Conjugate - 20 Valent 11/17/2022    Pneumococcal Polysaccharide - 23 Valent 06/23/2014    Tdap 08/23/2021    Zoster Recombinant 02/23/2022, 09/06/2022         Review of Systems  Review of Systems   Constitutional:  Negative for chills, fever and weight loss.   HENT:  Negative for sore throat.    Eyes:  Positive for redness. Negative for pain and discharge.   Respiratory:  Negative for cough, shortness of breath and wheezing.    Cardiovascular:  Negative for chest pain, orthopnea and leg swelling.   Gastrointestinal:  Negative for abdominal pain, blood in stool, constipation, diarrhea, heartburn, melena, nausea and vomiting.   Genitourinary:  Negative for dysuria, frequency and urgency.   Musculoskeletal:  Positive for back pain. Negative for joint pain and myalgias.   Skin:  Negative for itching and rash.   Neurological:  Negative for focal weakness and seizures.   Endo/Heme/Allergies:  Does not bruise/bleed easily.   Psychiatric/Behavioral:  Negative for depression. The patient is not nervous/anxious.      Medical History:   Past Medical History:   Diagnosis Date    Acid reflux     Adrenal insufficiency, primary     Arthritis     Asthma     B12 deficiency anemia     Benign essential hypertension 2/7/2021    Blood transfusion 2010    Breast cancer 2/2016    Right breast infiltrating ductal CA    Cataract     Chronic pain     Deep vein thrombosis     Dry eyes     Esophagitis, unspecified     Fibromyalgia     General anesthetics causing adverse effect in therapeutic use     "slow to wake up bc I don't have an immune system    Heart murmur     History of colon polyps     Hyperlipidemia     Hypopituitary dwarfism     Iron deficiency anemia     Lupus     Migraines, neuralgic     Nonspecific ulcerative colitis     OP (osteoporosis)     history of reclast    Osteopenia     Pancreatic cyst     Schatzki's ring     Steroid sulfatase deficiency     Ulcerative colitis     Vitamin D deficiency " disease        Surgical History:  Past Surgical History:   Procedure Laterality Date    ANORECTAL MANOMETRY N/A 1/13/2022    Procedure: MANOMETRY, ANORECTAL;  Surgeon: First Available Stu Gastelum;  Location: [facility] ENDO (4TH FLR);  Service: Endoscopy;  Laterality: N/A;  new order placed; original order placed incorrectly  1/7 instructions handed to patient-st    APPENDECTOMY      BREAST BIOPSY Right 2/2016    IDC    BREAST RECONSTRUCTION      BREAST SURGERY      CHOLECYSTECTOMY      COLON SURGERY      ENDOSCOPIC ULTRASOUND OF UPPER GASTROINTESTINAL TRACT N/A 8/6/2018    Procedure: ULTRASOUND, ENDOSCOPIC, UPPER GI TRACT;  Surgeon: Hong Finn MD;  Location: [facility] ENDO (2ND FLR);  Service: Endoscopy;  Laterality: N/A;    ESOPHAGOGASTRODUODENOSCOPY N/A 12/10/2018    Procedure: EGD (ESOPHAGOGASTRODUODENOSCOPY);  Surgeon: Reese Villalta MD;  Location: [facility] ENDO (4TH FLR);  Service: Endoscopy;  Laterality: N/A;    ESOPHAGOGASTRODUODENOSCOPY N/A 12/30/2021    Procedure: EGD (ESOPHAGOGASTRODUODENOSCOPY);  Surgeon: Reese Villalta MD;  Location: [facility] (2ND FLR);  Service: Endoscopy;  Laterality: N/A;  EGD for esophageal dysphagia and early satiety please schedule 1st provider available. wants this date-Dr Villalta only     fully vaccinated-GT  hx of adrenal insuffiency   12/27 arrival time confirmed with pt-rb    ESOPHAGOGASTRODUODENOSCOPY N/A 9/9/2022    Procedure: EGD (ESOPHAGOGASTRODUODENOSCOPY);  Surgeon: Reese Villalta MD;  Location: [facility] ENDO (4TH FLR);  Service: Endoscopy;  Laterality: N/A;  vacc  inst handed to pt-LW    FLEXIBLE SIGMOIDOSCOPY N/A 12/10/2018    Procedure: SIGMOIDOSCOPY, FLEXIBLE;  Surgeon: Reese Villalta MD;  Location: [facility] ENDO (4TH FLR);  Service: Endoscopy;  Laterality: N/A;  Recommend full split bowel prep for this study just like for a colonoscopy    HYSTERECTOMY      ILEOSCOPY N/A 2/16/2022    Procedure: ILEOSCOPY;  Surgeon: Ceferino Rangel MD;  Location: [facility] (4TH  [place]);  Service: Endoscopy;  Laterality: N/A;  medical history significant for Ulcerative colitis s/p total colectomy with ileoanal anastomosis ([id number]), SLE on plaquenil, adrenal insufficiency on prednisone, breast cancer, and HTN who presents with 3 month history of bilateral lower abdominal pain. Patient with abd    MASTECTOMY      OOPHORECTOMY Bilateral     restorative proctectomy      total abdominal colectomy with ileostomy  2010    TOTAL COLECTOMY      TOTAL REDUCTION MAMMOPLASTY Left 2017    UPPER ENDOSCOPIC ULTRASOUND W/ FNA         Family History:   Family History   Problem Relation Age of Onset    Diabetes Father     Hyperlipidemia Father     Hypertension Father     Hyperlipidemia Mother     Cancer Maternal Aunt         pancreatic cancer, late 50s at dx    Pancreatic cancer Maternal Aunt     Breast cancer Maternal Aunt 55    Breast cancer Cousin 28    Breast cancer Other 45    No Known Problems Sister     No Known Problems Brother     Cancer Maternal Uncle 65        pancreatic cancer    Pancreatic cancer Maternal Uncle     No Known Problems Paternal Aunt     No Known Problems Paternal Uncle     No Known Problems Maternal Grandmother     No Known Problems Maternal Grandfather     No Known Problems Paternal Grandmother     No Known Problems Paternal Grandfather     Breast cancer Maternal Cousin 50    Cancer Other 25        liver cancer    Amblyopia Neg Hx     Blindness Neg Hx     Cataracts Neg Hx     Macular degeneration Neg Hx     Retinal detachment Neg Hx     Strabismus Neg Hx     Stroke Neg Hx     Thyroid disease Neg Hx     Glaucoma Neg Hx     Ovarian cancer Neg Hx     Celiac disease Neg Hx     Colon cancer Neg Hx     Colon polyps Neg Hx     Esophageal cancer Neg Hx     Liver cancer Neg Hx     Rectal cancer Neg Hx     Stomach cancer Neg Hx     Ulcerative colitis Neg Hx     Inflammatory bowel disease Neg Hx        Social History:   Social History     Tobacco Use    Smoking status: Never    Smokeless  tobacco: Never   Substance Use Topics    Alcohol use: No     Alcohol/week: 0.0 standard drinks    Drug use: No       Allergies: Reviewed    Home Medications:   Medication List with Changes/Refills   Current Medications    ALBUTEROL (ACCUNEB) 0.63 MG/3 ML NEBU    Take 3 mLs (0.63 mg total) by nebulization every 6 (six) hours as needed.    AMLODIPINE (NORVASC) 2.5 MG TABLET    Take 2.5 mg by mouth.    BELIMUMAB (BENLYSTA) 200 MG/ML ATIN    Inject 1 mL (200 mg total) into the skin every 7 days.    CALCIUM CARBONATE (OS-RISSA) 500 MG CALCIUM (1,250 MG) TABLET    Take 1 tablet by mouth once daily.    CHOLECALCIFEROL, VITAMIN D3, (VITAMIN D3) 125 MCG (5,000 UNIT) TAB    Take 5,000 Units by mouth once daily.    CREON 36,000-114,000- 180,000 UNIT CPDR    TAKE TWO CAPSULES BY MOUTH THREE TIMES DAILY WITH MEALS AND ONE CAPSULE WITH EACH SNACK    DEXAMETHASONE (DECADRON) 4 MG/ML INJECTION    Inject 1 mL (4 mg total) into the muscle daily as needed (Signs and symtpoms of severe adrenal insufficiency).    DICLOFENAC SODIUM (VOLTAREN) 1 % GEL    APPLY 2 GRAMS TO AFFECTED AREA 4 TIMES A DAY    DICYCLOMINE (BENTYL) 20 MG TABLET    TAKE 1 TABLET BY MOUTH EVERY 6 HOURS.    FERROUS SULFATE (IRON ORAL)    Take by mouth. No sure dose    FOLIC ACID (FOLVITE) 1 MG TABLET    Take 1 mg by mouth once daily.    GABAPENTIN (NEURONTIN) 300 MG CAPSULE    TAKE 1 CAPSULE BY MOUTH EVERY EVENING    HYDROCHLOROTHIAZIDE (HYDRODIURIL) 12.5 MG TAB    Take 12.5 mg by mouth.    HYDROXYCHLOROQUINE (PLAQUENIL) 200 MG TABLET    TAKE 1.5 TABLETS BY MOUTH EVERY DAY    LIDOCAINE (LIDODERM) 5 %    Place 1 patch onto the skin once daily. Remove & Discard patch within 12 hours or as directed by MD    LOPERAMIDE (IMODIUM) 2 MG CAPSULE    TAKE 1 TO 2 CAPSULES BY MOUTH FOUR TIMES DAILY    LOSARTAN (COZAAR) 50 MG TABLET    Take 1 tablet (50 mg total) by mouth once daily.    MILNACIPRAN (SAVELLA) 100 MG TAB    Take 1 tablet (100 mg total) by mouth once daily.     "MULTIVITAMIN/IRON/FOLIC ACID (CENTRUM WOMEN ORAL)    Take 1 tablet by mouth once daily.    NIACIN 100 MG TAB    Take by mouth. 1 Tablet Oral At bedtime    OMEPRAZOLE (PRILOSEC) 20 MG CAPSULE    TAKE 1 CAPSULE BY MOUTH TWICE A DAY    ONDANSETRON (ZOFRAN) 4 MG TABLET    Take 1 tablet (4 mg total) by mouth every 12 (twelve) hours as needed for Nausea.    OXYCODONE-ACETAMINOPHEN (PERCOCET) 5-325 MG PER TABLET    Take 1 tablet by mouth every 4 (four) hours as needed for Pain.    POTASSIUM CHLORIDE SA (K-DUR,KLOR-CON M) 10 MEQ TABLET    Take 3 tabs a day.    PREDNISONE (DELTASONE) 5 MG TABLET    TAKE 1 TABLET (5 MG TOTAL) BY MOUTH ONCE DAILY. DOUBLE THE DOSE IN TIMES OF ILLNESS    PSYLLIUM HUSK/ASPARTAME (METAMUCIL FIBER SINGLES ORAL)    Take by mouth.    REPATHA SURECLICK 140 MG/ML PNIJ    INJECT 140 MG INTO THE SKIN EVERY 14 (FOURTEEN) DAYS    RESTASIS 0.05 % OPHTHALMIC EMULSION    INSTILL 1 DROP INTO BOTH EYES TWICE A DAY    RIZATRIPTAN (MAXALT) 10 MG TABLET    Take 1 tablet (10 mg total) by mouth every 6 (six) hours as needed for Migraine.    ROSUVASTATIN (CRESTOR) 5 MG TABLET    Take 5 mg by mouth once daily.    VALACYCLOVIR (VALTREX) 1000 MG TABLET    Take 1 tablet (1,000 mg total) by mouth every 12 (twelve) hours. for 10 days    ZOLEDRONIC ACID-MANNITOL & WATER (RECLAST) 5 MG/100 ML PGBK        ZOLPIDEM (AMBIEN) 5 MG TAB    Take 1 tablet (5 mg total) by mouth nightly as needed (insomnia).   Discontinued Medications    HEPLISAV-B, PF, 20 MCG/0.5 ML SYRG        OXYCODONE-ACETAMINOPHEN (PERCOCET) 5-325 MG PER TABLET    Take 1 tablet by mouth every 12 (twelve) hours as needed for Pain.       Physical Exam:  Vital Signs:  /73 (BP Location: Left arm, Patient Position: Sitting)   Pulse 79   Temp 97.9 °F (36.6 °C)   Ht 5' 4" (1.626 m)   Wt 61 kg (134 lb 7.7 oz)   PF 99 L/min   BMI 23.08 kg/m²   Body mass index is 23.08 kg/m².    Physical Exam  Vitals and nursing note reviewed.   Constitutional:       General: " She is not in acute distress.     Appearance: Normal appearance. She is well-developed. She is not ill-appearing or toxic-appearing.   Eyes:      Conjunctiva/sclera: Conjunctivae normal.      Pupils: Pupils are equal, round, and reactive to light.   Neck:      Thyroid: No thyromegaly.   Cardiovascular:      Rate and Rhythm: Normal rate and regular rhythm.      Heart sounds: Normal heart sounds. No murmur heard.  Pulmonary:      Effort: Pulmonary effort is normal.      Breath sounds: Normal breath sounds. No wheezing or rales.   Abdominal:      General: Bowel sounds are normal. There is no distension.      Palpations: Abdomen is soft. There is no mass.      Tenderness: There is no abdominal tenderness.   Genitourinary:     Comments: Perianal exam is normal.  Digital rectal exam reveals 2 mild stenotic areas in the anal canal but these were not extremely narrow.  There appeared to be normal squeeze of the anal canal and normal relaxation with bearing down.  Formed, solid stool was palpated in the pouch.  No blood in the stool.  Musculoskeletal:         General: No tenderness. Normal range of motion.   Lymphadenopathy:      Cervical: No cervical adenopathy.   Skin:     Findings: No erythema or rash.   Neurological:      General: No focal deficit present.      Mental Status: She is alert and oriented to person, place, and time.   Psychiatric:         Mood and Affect: Mood normal.         Behavior: Behavior normal.         Thought Content: Thought content normal.         Judgment: Judgment normal.       Labs: reviewed and pertinent noted above    Assessment/Plan:  Efe Horowitz is a 62 y.o. female with a history of ulcerative colitis status post total proctocolectomy and J pouch placement, history of pancreatic insufficiency, chronic degenerative changes in the spine, lupus, and lactose intolerance here for evaluation of lower abdominal pain, loose stools, and abnormalities on her CT scan. The following issues  were addresssed:    1. Pelvic floor dysfunction    2. Exocrine pancreatic insufficiency    3. History of ulcerative colitis    4. Status post total colectomy    5. Pancreatic cyst    6. Abnormal findings on diagnostic imaging of other parts of digestive tract      1. History of ulcerative colitis:  She has a J pouch with no signs of inflammation.  No treatment necessary.  Continue to monitor.    2. Pelvic floor dysfunction:  Continue current management.  She is doing quite well..    3. Lower abdominal pain:  Essentially resolved with improvement of pelvic floor dysfunction.  Continue to use dicyclomine as needed.    4. Pancreatic insufficiency: Continue Creon.      5. Pancreatic cyst: MRI ordered for follow-up.     Follow up: Follow up in about 6 months (around 6/12/2023).      Thank you again for sending Efe Horowitz to see Dr. Jay Rangel today at the [facility]. Please don't hesitate to contact Dr. Rangel if there are any questions regarding this evaluation, or if you have any other patients with inflammatory bowel disease for whom you would like a consultation. You can reach Dr. Rangel at [phone] or by email at [email or web address]    Ceferino Rangel MD  Department of Gastroenterology  Inflammatory Bowel Disease

## 2022-12-13 ENCOUNTER — SPECIALTY PHARMACY (OUTPATIENT)
Dept: PHARMACY | Facility: CLINIC | Age: 62
End: 2022-12-13
Payer: MEDICARE

## 2022-12-13 NOTE — TELEPHONE ENCOUNTER
Specialty Pharmacy - Refill Coordination    Specialty Medication Orders Linked to Encounter      Flowsheet Row Most Recent Value   Medication #1 belimumab (BENLYSTA) 200 mg/mL AtIn (Order#[id number], Rx#[id number])            Refill Questions - Documented Responses      Flowsheet Row Most Recent Value   Patient Availability and HIPAA Verification    Does patient want to proceed with activity? Yes   HIPAA/medical authority confirmed? Yes   Relationship to patient of person spoken to? Self   Refill Screening Questions    Changes to allergies? No   Changes to medications? No   New conditions since last clinic visit? No   Unplanned office visit, urgent care, ED, or hospital admission in the last 4 weeks? No   How does patient/caregiver feel medication is working? Good   Financial problems or insurance changes? No   How many doses of your specialty medications were missed in the last 4 weeks? 0   Would patient like to speak to a pharmacist? No   When does the patient need to receive the medication? 12/18/22   Refill Delivery Questions    How will the patient receive the medication? MEDRx   When does the patient need to receive the medication? 12/18/22   Shipping Address Home   Address in [facility] confirmed and updated if neccessary? Yes   Expected Copay ($) 0   Is the patient able to afford the medication copay? Yes   Payment Method zero copay   Days supply of Refill 28   Supplies needed? No supplies needed   Refill activity completed? Yes   Refill activity plan Refill scheduled   Shipment/Pickup Date: 12/15/22            Current Outpatient Medications   Medication Sig    albuterol (ACCUNEB) 0.63 mg/3 mL Nebu Take 3 mLs (0.63 mg total) by nebulization every 6 (six) hours as needed.    amLODIPine (NORVASC) 2.5 MG tablet Take 2.5 mg by mouth.    belimumab (BENLYSTA) 200 mg/mL AtIn Inject 1 mL (200 mg total) into the skin every 7 days.    calcium carbonate (OS-RISSA) 500 mg calcium (1,250 mg) tablet Take 1 tablet by  mouth once daily.    cholecalciferol, vitamin D3, (VITAMIN D3) 125 mcg (5,000 unit) Tab Take 5,000 Units by mouth once daily.    CREON 36,000-114,000- 180,000 unit CpDR TAKE TWO CAPSULES BY MOUTH THREE TIMES DAILY WITH MEALS AND ONE CAPSULE WITH EACH SNACK    dexamethasone (DECADRON) 4 mg/mL injection Inject 1 mL (4 mg total) into the muscle daily as needed (Signs and symtpoms of severe adrenal insufficiency).    diclofenac sodium (VOLTAREN) 1 % Gel APPLY 2 GRAMS TO AFFECTED AREA 4 TIMES A DAY    dicyclomine (BENTYL) 20 mg tablet TAKE 1 TABLET BY MOUTH EVERY 6 HOURS.    ferrous sulfate (IRON ORAL) Take by mouth. No sure dose    folic acid (FOLVITE) 1 MG tablet Take 1 mg by mouth once daily.    gabapentin (NEURONTIN) 300 MG capsule TAKE 1 CAPSULE BY MOUTH EVERY EVENING    hydroCHLOROthiazide (HYDRODIURIL) 12.5 MG Tab Take 12.5 mg by mouth.    hydrOXYchloroQUINE (PLAQUENIL) 200 mg tablet TAKE 1.5 TABLETS BY MOUTH EVERY DAY    LIDOcaine (LIDODERM) 5 % Place 1 patch onto the skin once daily. Remove & Discard patch within 12 hours or as directed by MD    loperamide (IMODIUM) 2 mg capsule TAKE 1 TO 2 CAPSULES BY MOUTH FOUR TIMES DAILY    losartan (COZAAR) 50 MG tablet Take 1 tablet (50 mg total) by mouth once daily.    milnacipran (SAVELLA) 100 mg Tab Take 1 tablet (100 mg total) by mouth once daily.    multivitamin/iron/folic acid (CENTRUM WOMEN ORAL) Take 1 tablet by mouth once daily.    niacin 100 MG Tab Take by mouth. 1 Tablet Oral At bedtime    omeprazole (PRILOSEC) 20 MG capsule TAKE 1 CAPSULE BY MOUTH TWICE A DAY    ondansetron (ZOFRAN) 4 MG tablet Take 1 tablet (4 mg total) by mouth every 12 (twelve) hours as needed for Nausea.    oxyCODONE-acetaminophen (PERCOCET) 5-325 mg per tablet Take 1 tablet by mouth every 4 (four) hours as needed for Pain.    potassium chloride SA (K-DUR,KLOR-CON M) 10 MEQ tablet Take 3 tabs a day.    predniSONE (DELTASONE) 5 MG tablet TAKE 1 TABLET (5 MG TOTAL) BY MOUTH ONCE DAILY. DOUBLE  "THE DOSE IN TIMES OF ILLNESS    psyllium husk/aspartame (METAMUCIL FIBER SINGLES ORAL) Take by mouth.    REPATHA SURECLICK 140 mg/mL PnIj INJECT 140 MG INTO THE SKIN EVERY 14 (FOURTEEN) DAYS    RESTASIS 0.05 % ophthalmic emulsion INSTILL 1 DROP INTO BOTH EYES TWICE A DAY    rizatriptan (MAXALT) 10 MG tablet Take 1 tablet (10 mg total) by mouth every 6 (six) hours as needed for Migraine.    rosuvastatin (CRESTOR) 5 MG tablet Take 5 mg by mouth once daily.    valACYclovir (VALTREX) 1000 MG tablet Take 1 tablet (1,000 mg total) by mouth every 12 (twelve) hours. for 10 days    zoledronic acid-mannitol & water (RECLAST) 5 mg/100 mL PgBk     zolpidem (AMBIEN) 5 MG Tab Take 1 tablet (5 mg total) by mouth nightly as needed (insomnia).   Last reviewed on 12/12/2022  9:27 AM by Ceferino Rangel MD    Review of patient's allergies indicates:   Allergen Reactions    Aspirin      Other reaction(s): "hemorrhage"  hemorrhage    Hydrocortisone Nausea Only     Other reaction(s): sick  sick    Lactose intolerance (lactase) [lactase] Nausea And Vomiting    Vicodin [hydrocodone-acetaminophen]      Other reaction(s): hyperactivity    Asacol [mesalamine] Hallucinations     Other reaction(s): Hallucinations  hallucinations    Last reviewed on  12/12/2022 9:26 AM by Ceferino Rangel      Tasks added this encounter   1/8/2023 - Refill Call (Auto Added)   Tasks due within next 3 months   No tasks due.     Sherrie Mitchell  [facility]  [address] [place] [id number]  Phone: [phone]  Fax: [phone]        "

## 2023-01-04 ENCOUNTER — PES CALL (OUTPATIENT)
Dept: ADMINISTRATIVE | Facility: CLINIC | Age: 63
End: 2023-01-04
Payer: MEDICARE

## 2023-01-09 ENCOUNTER — SPECIALTY PHARMACY (OUTPATIENT)
Dept: PHARMACY | Facility: CLINIC | Age: 63
End: 2023-01-09
Payer: MEDICARE

## 2023-01-09 ENCOUNTER — HOSPITAL ENCOUNTER (OUTPATIENT)
Dept: RADIOLOGY | Facility: HOSPITAL | Age: 63
Discharge: HOME | End: 2023-01-09
Attending: INTERNAL MEDICINE
Payer: MEDICARE

## 2023-01-09 DIAGNOSIS — R93.3 ABNORMAL FINDINGS ON DIAGNOSTIC IMAGING OF OTHER PARTS OF DIGESTIVE TRACT: ICD-10-CM

## 2023-01-09 DIAGNOSIS — K86.2 PANCREATIC CYST: ICD-10-CM

## 2023-01-09 PROCEDURE — 25500020 PHARM REV CODE 255: Mod: HCNC | Performed by: INTERNAL MEDICINE

## 2023-01-09 PROCEDURE — 74183 MRI ABD W/O CNTR FLWD CNTR: CPT | Mod: 26,HCNC,GC, | Performed by: RADIOLOGY

## 2023-01-09 PROCEDURE — 76376 3D RENDER W/INTRP POSTPROCES: CPT | Mod: TC,HCNC

## 2023-01-09 PROCEDURE — 74183 MRI ABD W/O CNTR FLWD CNTR: CPT | Mod: TC,HCNC

## 2023-01-09 PROCEDURE — 76376 MRI ABDOMEN WITH AND WO_INC MRCP (XPD): ICD-10-PCS | Mod: 26,HCNC,GC, | Performed by: RADIOLOGY

## 2023-01-09 PROCEDURE — 74183 MRI ABDOMEN WITH AND WO_INC MRCP (XPD): ICD-10-PCS | Mod: 26,HCNC,GC, | Performed by: RADIOLOGY

## 2023-01-09 PROCEDURE — A9585 GADOBUTROL INJECTION: HCPCS | Mod: HCNC | Performed by: INTERNAL MEDICINE

## 2023-01-09 PROCEDURE — 76376 3D RENDER W/INTRP POSTPROCES: CPT | Mod: 26,HCNC,GC, | Performed by: RADIOLOGY

## 2023-01-09 RX ORDER — GADOBUTROL 604.72 MG/ML
10 INJECTION INTRAVENOUS
Status: COMPLETED | OUTPATIENT
Start: 2023-01-09 | End: 2023-01-09

## 2023-01-09 RX ADMIN — GADOBUTROL 10 ML: 604.72 INJECTION INTRAVENOUS at 10:01

## 2023-01-09 NOTE — TELEPHONE ENCOUNTER
Specialty Pharmacy - Refill Coordination    Specialty Medication Orders Linked to Encounter      Flowsheet Row Most Recent Value   Medication #1 belimumab (BENLYSTA) 200 mg/mL AtIn (Order#[id number], Rx#[id number])            Refill Questions - Documented Responses      Flowsheet Row Most Recent Value   Patient Availability and HIPAA Verification    Does patient want to proceed with activity? Yes   HIPAA/medical authority confirmed? Yes   Relationship to patient of person spoken to? Self   Refill Screening Questions    Changes to allergies? No   Changes to medications? No   New conditions since last clinic visit? No   Unplanned office visit, urgent care, ED, or hospital admission in the last 4 weeks? No   How does patient/caregiver feel medication is working? Good   Financial problems or insurance changes? No   How many doses of your specialty medications were missed in the last 4 weeks? 0   Would patient like to speak to a pharmacist? No   When does the patient need to receive the medication? 01/15/23   Refill Delivery Questions    How will the patient receive the medication? MEDRx   When does the patient need to receive the medication? 01/15/23   Shipping Address Home   Address in [facility] confirmed and updated if neccessary? Yes   Expected Copay ($) 0   Is the patient able to afford the medication copay? Yes   Payment Method zero copay   Days supply of Refill 28   Supplies needed? No supplies needed   Refill activity completed? Yes   Refill activity plan Refill scheduled   Shipment/Pickup Date: 01/11/23            Current Outpatient Medications   Medication Sig    albuterol (ACCUNEB) 0.63 mg/3 mL Nebu Take 3 mLs (0.63 mg total) by nebulization every 6 (six) hours as needed.    amLODIPine (NORVASC) 2.5 MG tablet Take 2.5 mg by mouth.    belimumab (BENLYSTA) 200 mg/mL AtIn Inject 1 mL (200 mg total) into the skin every 7 days.    calcium carbonate (OS-RISSA) 500 mg calcium (1,250 mg) tablet Take 1 tablet by  mouth once daily.    cholecalciferol, vitamin D3, (VITAMIN D3) 125 mcg (5,000 unit) Tab Take 5,000 Units by mouth once daily.    CREON 36,000-114,000- 180,000 unit CpDR TAKE TWO CAPSULES BY MOUTH THREE TIMES DAILY WITH MEALS AND ONE CAPSULE WITH EACH SNACK    dexamethasone (DECADRON) 4 mg/mL injection Inject 1 mL (4 mg total) into the muscle daily as needed (Signs and symtpoms of severe adrenal insufficiency).    diclofenac sodium (VOLTAREN) 1 % Gel APPLY 2 GRAMS TO AFFECTED AREA 4 TIMES A DAY    dicyclomine (BENTYL) 20 mg tablet TAKE 1 TABLET BY MOUTH EVERY 6 HOURS.    ferrous sulfate (IRON ORAL) Take by mouth. No sure dose    folic acid (FOLVITE) 1 MG tablet Take 1 mg by mouth once daily.    gabapentin (NEURONTIN) 300 MG capsule TAKE 1 CAPSULE BY MOUTH EVERY EVENING    hydroCHLOROthiazide (HYDRODIURIL) 12.5 MG Tab Take 12.5 mg by mouth.    hydrOXYchloroQUINE (PLAQUENIL) 200 mg tablet TAKE 1.5 TABLETS BY MOUTH EVERY DAY    LIDOcaine (LIDODERM) 5 % Place 1 patch onto the skin once daily. Remove & Discard patch within 12 hours or as directed by MD    loperamide (IMODIUM) 2 mg capsule TAKE 1 TO 2 CAPSULES BY MOUTH FOUR TIMES DAILY    losartan (COZAAR) 50 MG tablet Take 1 tablet (50 mg total) by mouth once daily.    milnacipran (SAVELLA) 100 mg Tab Take 1 tablet (100 mg total) by mouth once daily.    multivitamin/iron/folic acid (CENTRUM WOMEN ORAL) Take 1 tablet by mouth once daily.    niacin 100 MG Tab Take by mouth. 1 Tablet Oral At bedtime    omeprazole (PRILOSEC) 20 MG capsule TAKE 1 CAPSULE BY MOUTH TWICE A DAY    ondansetron (ZOFRAN) 4 MG tablet Take 1 tablet (4 mg total) by mouth every 12 (twelve) hours as needed for Nausea.    oxyCODONE-acetaminophen (PERCOCET) 5-325 mg per tablet Take 1 tablet by mouth every 4 (four) hours as needed for Pain.    potassium chloride SA (K-DUR,KLOR-CON M) 10 MEQ tablet Take 3 tabs a day.    predniSONE (DELTASONE) 5 MG tablet TAKE 1 TABLET (5 MG TOTAL) BY MOUTH ONCE DAILY. DOUBLE  "THE DOSE IN TIMES OF ILLNESS    psyllium husk/aspartame (METAMUCIL FIBER SINGLES ORAL) Take by mouth.    REPATHA SURECLICK 140 mg/mL PnIj INJECT 140 MG INTO THE SKIN EVERY 14 (FOURTEEN) DAYS    RESTASIS 0.05 % ophthalmic emulsion INSTILL 1 DROP INTO BOTH EYES TWICE A DAY    rizatriptan (MAXALT) 10 MG tablet Take 1 tablet (10 mg total) by mouth every 6 (six) hours as needed for Migraine.    rosuvastatin (CRESTOR) 5 MG tablet Take 5 mg by mouth once daily.    valACYclovir (VALTREX) 1000 MG tablet Take 1 tablet (1,000 mg total) by mouth every 12 (twelve) hours. for 10 days    zoledronic acid-mannitol & water (RECLAST) 5 mg/100 mL PgBk     zolpidem (AMBIEN) 5 MG Tab Take 1 tablet (5 mg total) by mouth nightly as needed (insomnia).   Last reviewed on 12/12/2022  9:27 AM by Ceferino Rangel MD    Review of patient's allergies indicates:   Allergen Reactions    Aspirin      Other reaction(s): "hemorrhage"  hemorrhage    Hydrocortisone Nausea Only     Other reaction(s): sick  sick    Lactose intolerance (lactase) [lactase] Nausea And Vomiting    Vicodin [hydrocodone-acetaminophen]      Other reaction(s): hyperactivity    Asacol [mesalamine] Hallucinations     Other reaction(s): Hallucinations  hallucinations    Last reviewed on  12/12/2022 9:26 AM by Ceferino Rangel      Tasks added this encounter   2/5/2023 - Refill Call (Auto Added)   Tasks due within next 3 months   No tasks due.     Olena oRss  [facility]  [address] [place] [id number]  Phone: [phone]  Fax: [phone]        "

## 2023-01-11 ENCOUNTER — PATIENT MESSAGE (OUTPATIENT)
Dept: GASTROENTEROLOGY | Facility: CLINIC | Age: 63
End: 2023-01-11
Payer: MEDICARE

## 2023-02-06 ENCOUNTER — SPECIALTY PHARMACY (OUTPATIENT)
Dept: PHARMACY | Facility: CLINIC | Age: 63
End: 2023-02-06
Payer: MEDICARE

## 2023-02-06 DIAGNOSIS — M32.9 SYSTEMIC LUPUS ERYTHEMATOSUS, UNSPECIFIED SLE TYPE, UNSPECIFIED ORGAN INVOLVEMENT STATUS: ICD-10-CM

## 2023-02-06 NOTE — TELEPHONE ENCOUNTER
Outgoing call regarding Benlysta refill, pt stated she has one injection on hand for 2/12 injection. Informed pt contacting provider for refill approval and  will follow up once provider respond

## 2023-02-07 ENCOUNTER — PATIENT MESSAGE (OUTPATIENT)
Dept: RHEUMATOLOGY | Facility: CLINIC | Age: 63
End: 2023-02-07
Payer: MEDICARE

## 2023-02-07 DIAGNOSIS — Z00.00 ENCOUNTER FOR MEDICARE ANNUAL WELLNESS EXAM: ICD-10-CM

## 2023-02-07 RX ORDER — BELIMUMAB 200 MG/ML
200 SOLUTION SUBCUTANEOUS
Qty: 4 EACH | Refills: 2 | Status: SHIPPED | OUTPATIENT
Start: 2023-02-07 | End: 2023-05-08 | Stop reason: SDUPTHER

## 2023-02-09 ENCOUNTER — LAB VISIT (OUTPATIENT)
Dept: LAB | Facility: HOSPITAL | Age: 63
End: 2023-02-09
Attending: INTERNAL MEDICINE
Payer: MEDICARE

## 2023-02-09 DIAGNOSIS — M79.7 FIBROMYALGIA: ICD-10-CM

## 2023-02-09 DIAGNOSIS — D84.9 IMMUNOSUPPRESSION: ICD-10-CM

## 2023-02-09 DIAGNOSIS — M32.9 SYSTEMIC LUPUS ERYTHEMATOSUS, UNSPECIFIED SLE TYPE, UNSPECIFIED ORGAN INVOLVEMENT STATUS: ICD-10-CM

## 2023-02-09 DIAGNOSIS — Z79.899 LONG-TERM USE OF PLAQUENIL: ICD-10-CM

## 2023-02-09 DIAGNOSIS — Z00.00 ENCOUNTER FOR MEDICARE ANNUAL WELLNESS EXAM: ICD-10-CM

## 2023-02-09 LAB
ALBUMIN SERPL BCP-MCNC: 3.9 G/DL (ref 3.5–5.2)
ALP SERPL-CCNC: 54 U/L (ref 55–135)
ALT SERPL W/O P-5'-P-CCNC: 15 U/L (ref 10–44)
ANION GAP SERPL CALC-SCNC: 12 MMOL/L (ref 8–16)
AST SERPL-CCNC: 22 U/L (ref 10–40)
BASOPHILS # BLD AUTO: 0.05 K/UL (ref 0–0.2)
BASOPHILS NFR BLD: 0.6 % (ref 0–1.9)
BILIRUB SERPL-MCNC: 0.5 MG/DL (ref 0.1–1)
BUN SERPL-MCNC: 13 MG/DL (ref 8–23)
C3 SERPL-MCNC: 117 MG/DL (ref 50–180)
C4 SERPL-MCNC: 37 MG/DL (ref 11–44)
CALCIUM SERPL-MCNC: 9.7 MG/DL (ref 8.7–10.5)
CHLORIDE SERPL-SCNC: 103 MMOL/L (ref 95–110)
CK SERPL-CCNC: 109 U/L (ref 20–180)
CO2 SERPL-SCNC: 26 MMOL/L (ref 23–29)
CREAT SERPL-MCNC: 0.8 MG/DL (ref 0.5–1.4)
CRP SERPL-MCNC: 1.1 MG/L (ref 0–8.2)
DIFFERENTIAL METHOD: ABNORMAL
EOSINOPHIL # BLD AUTO: 0.2 K/UL (ref 0–0.5)
EOSINOPHIL NFR BLD: 1.9 % (ref 0–8)
ERYTHROCYTE [DISTWIDTH] IN BLOOD BY AUTOMATED COUNT: 14.6 % (ref 11.5–14.5)
ERYTHROCYTE [SEDIMENTATION RATE] IN BLOOD BY PHOTOMETRIC METHOD: 5 MM/HR (ref 0–36)
EST. GFR  (NO RACE VARIABLE): >60 ML/MIN/1.73 M^2
GLUCOSE SERPL-MCNC: 79 MG/DL (ref 70–110)
HCT VFR BLD AUTO: 42.1 % (ref 37–48.5)
HGB BLD-MCNC: 13 G/DL (ref 12–16)
IMM GRANULOCYTES # BLD AUTO: 0.05 K/UL (ref 0–0.04)
IMM GRANULOCYTES NFR BLD AUTO: 0.6 % (ref 0–0.5)
LYMPHOCYTES # BLD AUTO: 2.6 K/UL (ref 1–4.8)
LYMPHOCYTES NFR BLD: 30.4 % (ref 18–48)
MCH RBC QN AUTO: 27.5 PG (ref 27–31)
MCHC RBC AUTO-ENTMCNC: 30.9 G/DL (ref 32–36)
MCV RBC AUTO: 89 FL (ref 82–98)
MONOCYTES # BLD AUTO: 0.9 K/UL (ref 0.3–1)
MONOCYTES NFR BLD: 10.9 % (ref 4–15)
NEUTROPHILS # BLD AUTO: 4.7 K/UL (ref 1.8–7.7)
NEUTROPHILS NFR BLD: 55.6 % (ref 38–73)
NRBC BLD-RTO: 0 /100 WBC
PLATELET # BLD AUTO: 288 K/UL (ref 150–450)
PMV BLD AUTO: 11.2 FL (ref 9.2–12.9)
POTASSIUM SERPL-SCNC: 3.5 MMOL/L (ref 3.5–5.1)
PROT SERPL-MCNC: 7.1 G/DL (ref 6–8.4)
RBC # BLD AUTO: 4.72 M/UL (ref 4–5.4)
SODIUM SERPL-SCNC: 141 MMOL/L (ref 136–145)
WBC # BLD AUTO: 8.45 K/UL (ref 3.9–12.7)

## 2023-02-09 PROCEDURE — 86140 C-REACTIVE PROTEIN: CPT | Mod: HCNC | Performed by: INTERNAL MEDICINE

## 2023-02-09 PROCEDURE — 82550 ASSAY OF CK (CPK): CPT | Mod: HCNC | Performed by: INTERNAL MEDICINE

## 2023-02-09 PROCEDURE — 86160 COMPLEMENT ANTIGEN: CPT | Mod: 59,HCNC | Performed by: INTERNAL MEDICINE

## 2023-02-09 PROCEDURE — 36415 COLL VENOUS BLD VENIPUNCTURE: CPT | Mod: HCNC,PO | Performed by: INTERNAL MEDICINE

## 2023-02-09 PROCEDURE — 86160 COMPLEMENT ANTIGEN: CPT | Mod: HCNC | Performed by: INTERNAL MEDICINE

## 2023-02-09 PROCEDURE — 85025 COMPLETE CBC W/AUTO DIFF WBC: CPT | Mod: HCNC | Performed by: INTERNAL MEDICINE

## 2023-02-09 PROCEDURE — 80053 COMPREHEN METABOLIC PANEL: CPT | Mod: HCNC | Performed by: INTERNAL MEDICINE

## 2023-02-09 PROCEDURE — 86225 DNA ANTIBODY NATIVE: CPT | Mod: HCNC | Performed by: INTERNAL MEDICINE

## 2023-02-09 PROCEDURE — 85652 RBC SED RATE AUTOMATED: CPT | Mod: HCNC | Performed by: INTERNAL MEDICINE

## 2023-02-10 ENCOUNTER — PATIENT MESSAGE (OUTPATIENT)
Dept: RHEUMATOLOGY | Facility: CLINIC | Age: 63
End: 2023-02-10
Payer: MEDICARE

## 2023-02-10 DIAGNOSIS — M32.9 SYSTEMIC LUPUS ERYTHEMATOSUS, UNSPECIFIED SLE TYPE, UNSPECIFIED ORGAN INVOLVEMENT STATUS: ICD-10-CM

## 2023-02-10 DIAGNOSIS — R31.9 HEMATURIA, UNSPECIFIED TYPE: ICD-10-CM

## 2023-02-10 DIAGNOSIS — R82.998 URINE WBC INCREASED: Primary | ICD-10-CM

## 2023-02-10 LAB — DSDNA AB SER-ACNC: NORMAL [IU]/ML

## 2023-02-10 NOTE — TELEPHONE ENCOUNTER
Patient informed that her labs show some abnormalities.  Urine with the large amount of red blood cells but negative hemoglobin test. There is increased WBC.  Patient to repeat UA, urine protein/creatine and urine culture.  Repeat Monday 7:30 am at [facility]

## 2023-02-13 ENCOUNTER — PATIENT MESSAGE (OUTPATIENT)
Dept: RHEUMATOLOGY | Facility: CLINIC | Age: 63
End: 2023-02-13
Payer: MEDICARE

## 2023-02-13 ENCOUNTER — LAB VISIT (OUTPATIENT)
Dept: LAB | Facility: HOSPITAL | Age: 63
End: 2023-02-13
Attending: INTERNAL MEDICINE
Payer: MEDICARE

## 2023-02-13 DIAGNOSIS — R82.998 URINE WBC INCREASED: ICD-10-CM

## 2023-02-13 DIAGNOSIS — Z79.899 LONG-TERM USE OF PLAQUENIL: ICD-10-CM

## 2023-02-13 DIAGNOSIS — M32.9 SYSTEMIC LUPUS ERYTHEMATOSUS, UNSPECIFIED SLE TYPE, UNSPECIFIED ORGAN INVOLVEMENT STATUS: ICD-10-CM

## 2023-02-13 DIAGNOSIS — R31.9 HEMATURIA, UNSPECIFIED TYPE: ICD-10-CM

## 2023-02-13 DIAGNOSIS — M79.7 FIBROMYALGIA: ICD-10-CM

## 2023-02-13 DIAGNOSIS — D84.9 IMMUNOSUPPRESSION: ICD-10-CM

## 2023-02-13 LAB
BILIRUB UR QL STRIP: NEGATIVE
CLARITY UR REFRACT.AUTO: CLEAR
COLOR UR AUTO: YELLOW
CREAT UR-MCNC: 90 MG/DL (ref 15–325)
CREAT UR-MCNC: 90 MG/DL (ref 15–325)
GLUCOSE UR QL STRIP: NEGATIVE
HGB UR QL STRIP: NEGATIVE
KETONES UR QL STRIP: NEGATIVE
LEUKOCYTE ESTERASE UR QL STRIP: NEGATIVE
MICROSCOPIC COMMENT: NORMAL
NITRITE UR QL STRIP: NEGATIVE
PH UR STRIP: 5 [PH] (ref 5–8)
PROT UR QL STRIP: NEGATIVE
PROT UR-MCNC: <7 MG/DL (ref 0–15)
PROT UR-MCNC: <7 MG/DL (ref 0–15)
PROT/CREAT UR: NORMAL MG/G{CREAT} (ref 0–0.2)
PROT/CREAT UR: NORMAL MG/G{CREAT} (ref 0–0.2)
RBC #/AREA URNS AUTO: 1 /HPF (ref 0–4)
SP GR UR STRIP: 1.01 (ref 1–1.03)
URN SPEC COLLECT METH UR: NORMAL
WBC #/AREA URNS AUTO: 1 /HPF (ref 0–5)

## 2023-02-13 PROCEDURE — 81001 URINALYSIS AUTO W/SCOPE: CPT | Mod: HCNC | Performed by: INTERNAL MEDICINE

## 2023-02-13 PROCEDURE — 84156 ASSAY OF PROTEIN URINE: CPT | Mod: HCNC | Performed by: INTERNAL MEDICINE

## 2023-02-13 PROCEDURE — 87086 URINE CULTURE/COLONY COUNT: CPT | Mod: HCNC | Performed by: INTERNAL MEDICINE

## 2023-02-13 NOTE — TELEPHONE ENCOUNTER
Specialty Pharmacy - Refill Coordination    Specialty Medication Orders Linked to Encounter      Flowsheet Row Most Recent Value   Medication #1 belimumab (BENLYSTA) 200 mg/mL AtIn (Order#[id number], Rx#[id number])            Refill Questions - Documented Responses      Flowsheet Row Most Recent Value   Patient Availability and HIPAA Verification    Does patient want to proceed with activity? Yes   HIPAA/medical authority confirmed? Yes   Relationship to patient of person spoken to? Self   Refill Screening Questions    Changes to allergies? No   Changes to medications? No   New conditions since last clinic visit? No   Unplanned office visit, urgent care, ED, or hospital admission in the last 4 weeks? No   How does patient/caregiver feel medication is working? Good   Financial problems or insurance changes? No   How many doses of your specialty medications were missed in the last 4 weeks? 0   Would patient like to speak to a pharmacist? No   When does the patient need to receive the medication? 02/19/23   Refill Delivery Questions    How will the patient receive the medication? MEDRx   When does the patient need to receive the medication? 02/19/23   Shipping Address Home   Address in [facility] confirmed and updated if neccessary? Yes   Expected Copay ($) 0   Is the patient able to afford the medication copay? Yes   Payment Method zero copay   Days supply of Refill 28   Supplies needed? No supplies needed   Refill activity completed? Yes   Refill activity plan Refill scheduled   Shipment/Pickup Date: 02/15/23            Current Outpatient Medications   Medication Sig    albuterol (ACCUNEB) 0.63 mg/3 mL Nebu Take 3 mLs (0.63 mg total) by nebulization every 6 (six) hours as needed.    amLODIPine (NORVASC) 2.5 MG tablet Take 2.5 mg by mouth.    belimumab (BENLYSTA) 200 mg/mL AtIn Inject 1 mL (200 mg total) into the skin every 7 days.    calcium carbonate (OS-RISSA) 500 mg calcium (1,250 mg) tablet Take 1 tablet by  mouth once daily.    cholecalciferol, vitamin D3, (VITAMIN D3) 125 mcg (5,000 unit) Tab Take 5,000 Units by mouth once daily.    CREON 36,000-114,000- 180,000 unit CpDR TAKE TWO CAPSULES BY MOUTH THREE TIMES DAILY WITH MEALS AND ONE CAPSULE WITH EACH SNACK    dexamethasone (DECADRON) 4 mg/mL injection Inject 1 mL (4 mg total) into the muscle daily as needed (Signs and symtpoms of severe adrenal insufficiency).    diclofenac sodium (VOLTAREN) 1 % Gel APPLY 2 GRAMS TO AFFECTED AREA 4 TIMES A DAY    dicyclomine (BENTYL) 20 mg tablet TAKE 1 TABLET BY MOUTH EVERY 6 HOURS.    ferrous sulfate (IRON ORAL) Take by mouth. No sure dose    folic acid (FOLVITE) 1 MG tablet Take 1 mg by mouth once daily.    gabapentin (NEURONTIN) 300 MG capsule TAKE 1 CAPSULE BY MOUTH EVERY EVENING    hydroCHLOROthiazide (HYDRODIURIL) 12.5 MG Tab Take 12.5 mg by mouth.    hydrOXYchloroQUINE (PLAQUENIL) 200 mg tablet TAKE 1.5 TABLETS BY MOUTH EVERY DAY    LIDOcaine (LIDODERM) 5 % Place 1 patch onto the skin once daily. Remove & Discard patch within 12 hours or as directed by MD    loperamide (IMODIUM) 2 mg capsule TAKE 1 TO 2 CAPSULES BY MOUTH FOUR TIMES DAILY    losartan (COZAAR) 50 MG tablet Take 1 tablet (50 mg total) by mouth once daily.    milnacipran (SAVELLA) 100 mg Tab Take 1 tablet (100 mg total) by mouth once daily.    multivitamin/iron/folic acid (CENTRUM WOMEN ORAL) Take 1 tablet by mouth once daily.    niacin 100 MG Tab Take by mouth. 1 Tablet Oral At bedtime    omeprazole (PRILOSEC) 20 MG capsule TAKE 1 CAPSULE BY MOUTH TWICE A DAY    ondansetron (ZOFRAN) 4 MG tablet Take 1 tablet (4 mg total) by mouth every 12 (twelve) hours as needed for Nausea.    oxyCODONE-acetaminophen (PERCOCET) 5-325 mg per tablet Take 1 tablet by mouth every 4 (four) hours as needed for Pain.    potassium chloride SA (K-DUR,KLOR-CON M) 10 MEQ tablet Take 3 tabs a day.    predniSONE (DELTASONE) 5 MG tablet TAKE 1 TABLET (5 MG TOTAL) BY MOUTH ONCE DAILY. DOUBLE  "THE DOSE IN TIMES OF ILLNESS    psyllium husk/aspartame (METAMUCIL FIBER SINGLES ORAL) Take by mouth.    REPATHA SURECLICK 140 mg/mL PnIj INJECT 140 MG INTO THE SKIN EVERY 14 (FOURTEEN) DAYS    RESTASIS 0.05 % ophthalmic emulsion INSTILL 1 DROP INTO BOTH EYES TWICE A DAY    rizatriptan (MAXALT) 10 MG tablet Take 1 tablet (10 mg total) by mouth every 6 (six) hours as needed for Migraine.    rosuvastatin (CRESTOR) 5 MG tablet Take 5 mg by mouth once daily.    valACYclovir (VALTREX) 1000 MG tablet Take 1 tablet (1,000 mg total) by mouth every 12 (twelve) hours. for 10 days    zoledronic acid-mannitol & water (RECLAST) 5 mg/100 mL PgBk     zolpidem (AMBIEN) 5 MG Tab Take 1 tablet (5 mg total) by mouth nightly as needed (insomnia).   Last reviewed on 12/12/2022  9:27 AM by Ceferino Rangel MD    Review of patient's allergies indicates:   Allergen Reactions    Aspirin      Other reaction(s): "hemorrhage"  hemorrhage    Hydrocortisone Nausea Only     Other reaction(s): sick  sick    Lactose intolerance (lactase) [lactase] Nausea And Vomiting    Vicodin [hydrocodone-acetaminophen]      Other reaction(s): hyperactivity    Asacol [mesalamine] Hallucinations     Other reaction(s): Hallucinations  hallucinations    Last reviewed on  2/1/2023 11:10 AM by Catalina Fenton      Tasks added this encounter   3/12/2023 - Refill Call (Auto Added)   Tasks due within next 3 months   No tasks due.     Lisa Peraza [facility]  [address] [address]  Phone: [phone]  Fax: [phone]        "

## 2023-02-14 LAB
BACTERIA UR CULT: NORMAL
BACTERIA UR CULT: NORMAL

## 2023-02-15 ENCOUNTER — PATIENT MESSAGE (OUTPATIENT)
Dept: RHEUMATOLOGY | Facility: CLINIC | Age: 63
End: 2023-02-15
Payer: MEDICARE

## 2023-02-23 DIAGNOSIS — I10 HYPERTENSION, UNSPECIFIED TYPE: Primary | ICD-10-CM

## 2023-03-06 ENCOUNTER — OFFICE VISIT (OUTPATIENT)
Dept: INTERNAL MEDICINE | Facility: CLINIC | Age: 63
End: 2023-03-06
Payer: MEDICARE

## 2023-03-06 VITALS
HEART RATE: 68 BPM | SYSTOLIC BLOOD PRESSURE: 128 MMHG | DIASTOLIC BLOOD PRESSURE: 82 MMHG | OXYGEN SATURATION: 98 % | WEIGHT: 133.38 LBS | BODY MASS INDEX: 22.77 KG/M2 | HEIGHT: 64 IN

## 2023-03-06 DIAGNOSIS — M80.00XG AGE-RELATED OSTEOPOROSIS WITH CURRENT PATHOLOGICAL FRACTURE WITH DELAYED HEALING: ICD-10-CM

## 2023-03-06 DIAGNOSIS — K86.81 EXOCRINE PANCREATIC INSUFFICIENCY: ICD-10-CM

## 2023-03-06 DIAGNOSIS — M32.9 SYSTEMIC LUPUS ERYTHEMATOSUS, UNSPECIFIED SLE TYPE, UNSPECIFIED ORGAN INVOLVEMENT STATUS: ICD-10-CM

## 2023-03-06 DIAGNOSIS — Z79.899 LONG-TERM USE OF PLAQUENIL: ICD-10-CM

## 2023-03-06 DIAGNOSIS — Z79.52 LONG TERM CURRENT USE OF SYSTEMIC STEROIDS: ICD-10-CM

## 2023-03-06 DIAGNOSIS — I77.9 BILATERAL CAROTID ARTERY DISEASE, UNSPECIFIED TYPE: ICD-10-CM

## 2023-03-06 DIAGNOSIS — E27.40 UNSPECIFIED ADRENOCORTICAL INSUFFICIENCY: ICD-10-CM

## 2023-03-06 DIAGNOSIS — K51.918 ULCERATIVE COLITIS WITH OTHER COMPLICATION, UNSPECIFIED LOCATION: ICD-10-CM

## 2023-03-06 DIAGNOSIS — E05.90 SUBCLINICAL HYPERTHYROIDISM: ICD-10-CM

## 2023-03-06 DIAGNOSIS — E87.6 HYPOKALEMIA: ICD-10-CM

## 2023-03-06 DIAGNOSIS — G43.909 MIGRAINE SYNDROME: ICD-10-CM

## 2023-03-06 DIAGNOSIS — F51.01 PRIMARY INSOMNIA: ICD-10-CM

## 2023-03-06 DIAGNOSIS — E78.2 MIXED HYPERLIPIDEMIA: ICD-10-CM

## 2023-03-06 DIAGNOSIS — D84.9 IMMUNOSUPPRESSION: ICD-10-CM

## 2023-03-06 DIAGNOSIS — J30.89 SEASONAL ALLERGIC RHINITIS DUE TO OTHER ALLERGIC TRIGGER: ICD-10-CM

## 2023-03-06 DIAGNOSIS — C50.211 MALIGNANT NEOPLASM OF UPPER-INNER QUADRANT OF RIGHT FEMALE BREAST, UNSPECIFIED ESTROGEN RECEPTOR STATUS: ICD-10-CM

## 2023-03-06 DIAGNOSIS — K13.79 MOUTH SORE: ICD-10-CM

## 2023-03-06 DIAGNOSIS — I10 BENIGN ESSENTIAL HYPERTENSION: Primary | ICD-10-CM

## 2023-03-06 DIAGNOSIS — R73.03 PREDIABETES: ICD-10-CM

## 2023-03-06 DIAGNOSIS — K86.1 CHRONIC PANCREATITIS, UNSPECIFIED PANCREATITIS TYPE: ICD-10-CM

## 2023-03-06 DIAGNOSIS — D69.6 THROMBOCYTOPENIA: ICD-10-CM

## 2023-03-06 DIAGNOSIS — I10 HYPERTENSION, UNSPECIFIED TYPE: ICD-10-CM

## 2023-03-06 DIAGNOSIS — M79.7 FIBROMYALGIA: ICD-10-CM

## 2023-03-06 PROCEDURE — 3008F PR BODY MASS INDEX (BMI) DOCUMENTED: ICD-10-PCS | Mod: HCNC,CPTII,S$GLB, | Performed by: INTERNAL MEDICINE

## 2023-03-06 PROCEDURE — 99214 PR OFFICE/OUTPT VISIT, EST, LEVL IV, 30-39 MIN: ICD-10-PCS | Mod: HCNC,S$GLB,, | Performed by: INTERNAL MEDICINE

## 2023-03-06 PROCEDURE — 3288F FALL RISK ASSESSMENT DOCD: CPT | Mod: HCNC,CPTII,S$GLB, | Performed by: INTERNAL MEDICINE

## 2023-03-06 PROCEDURE — 99999 PR PBB SHADOW E&M-EST. PATIENT-LVL V: CPT | Mod: PBBFAC,HCNC,, | Performed by: INTERNAL MEDICINE

## 2023-03-06 PROCEDURE — 1159F MED LIST DOCD IN RCRD: CPT | Mod: HCNC,CPTII,S$GLB, | Performed by: INTERNAL MEDICINE

## 2023-03-06 PROCEDURE — 99214 OFFICE O/P EST MOD 30 MIN: CPT | Mod: HCNC,S$GLB,, | Performed by: INTERNAL MEDICINE

## 2023-03-06 PROCEDURE — 3288F PR FALLS RISK ASSESSMENT DOCUMENTED: ICD-10-PCS | Mod: HCNC,CPTII,S$GLB, | Performed by: INTERNAL MEDICINE

## 2023-03-06 PROCEDURE — 1160F PR REVIEW ALL MEDS BY PRESCRIBER/CLIN PHARMACIST DOCUMENTED: ICD-10-PCS | Mod: HCNC,CPTII,S$GLB, | Performed by: INTERNAL MEDICINE

## 2023-03-06 PROCEDURE — 99999 PR PBB SHADOW E&M-EST. PATIENT-LVL V: ICD-10-PCS | Mod: PBBFAC,HCNC,, | Performed by: INTERNAL MEDICINE

## 2023-03-06 PROCEDURE — 3074F PR MOST RECENT SYSTOLIC BLOOD PRESSURE < 130 MM HG: ICD-10-PCS | Mod: HCNC,CPTII,S$GLB, | Performed by: INTERNAL MEDICINE

## 2023-03-06 PROCEDURE — 1159F PR MEDICATION LIST DOCUMENTED IN MEDICAL RECORD: ICD-10-PCS | Mod: HCNC,CPTII,S$GLB, | Performed by: INTERNAL MEDICINE

## 2023-03-06 PROCEDURE — 3079F PR MOST RECENT DIASTOLIC BLOOD PRESSURE 80-89 MM HG: ICD-10-PCS | Mod: HCNC,CPTII,S$GLB, | Performed by: INTERNAL MEDICINE

## 2023-03-06 PROCEDURE — 1101F PT FALLS ASSESS-DOCD LE1/YR: CPT | Mod: HCNC,CPTII,S$GLB, | Performed by: INTERNAL MEDICINE

## 2023-03-06 PROCEDURE — 1160F RVW MEDS BY RX/DR IN RCRD: CPT | Mod: HCNC,CPTII,S$GLB, | Performed by: INTERNAL MEDICINE

## 2023-03-06 PROCEDURE — 3074F SYST BP LT 130 MM HG: CPT | Mod: HCNC,CPTII,S$GLB, | Performed by: INTERNAL MEDICINE

## 2023-03-06 PROCEDURE — 3008F BODY MASS INDEX DOCD: CPT | Mod: HCNC,CPTII,S$GLB, | Performed by: INTERNAL MEDICINE

## 2023-03-06 PROCEDURE — 1101F PR PT FALLS ASSESS DOC 0-1 FALLS W/OUT INJ PAST YR: ICD-10-PCS | Mod: HCNC,CPTII,S$GLB, | Performed by: INTERNAL MEDICINE

## 2023-03-06 PROCEDURE — 3079F DIAST BP 80-89 MM HG: CPT | Mod: HCNC,CPTII,S$GLB, | Performed by: INTERNAL MEDICINE

## 2023-03-06 RX ORDER — LEVOCETIRIZINE DIHYDROCHLORIDE 5 MG/1
5 TABLET, FILM COATED ORAL NIGHTLY
Qty: 90 TABLET | Refills: 3 | Status: SHIPPED | OUTPATIENT
Start: 2023-03-06 | End: 2024-03-26

## 2023-03-06 RX ORDER — LOSARTAN POTASSIUM 50 MG/1
50 TABLET ORAL DAILY
Qty: 90 TABLET | Refills: 3 | Status: SHIPPED | OUTPATIENT
Start: 2023-03-06 | End: 2024-02-25

## 2023-03-06 RX ORDER — ZOLPIDEM TARTRATE 5 MG/1
5 TABLET ORAL NIGHTLY PRN
Qty: 30 TABLET | Refills: 5 | Status: SHIPPED | OUTPATIENT
Start: 2023-03-06 | End: 2023-09-21

## 2023-03-06 RX ORDER — RIZATRIPTAN BENZOATE 10 MG/1
10 TABLET ORAL 2 TIMES DAILY PRN
Qty: 30 TABLET | Refills: 3 | Status: SHIPPED | OUTPATIENT
Start: 2023-03-06

## 2023-03-06 NOTE — PROGRESS NOTES
"INTERNAL MEDICINE ESTABLISHED PATIENT VISIT NOTE    Subjective:     Chief Complaint: Follow-up  HTN, preDM, HLD     Patient ID: Efe Horowitz is a 63 y.o. 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 in 2017, osteoporosis c hx of L3 vertebral fractures on MRI in the past, subclinical hyperthyroidism, pancreatic cyst, hepatic hemangiomas stable on imaging, hx migraines, lumbar spondylarthritis, insomnia on Ambien, last seen by me in Oct and here today for f/u above issues.    Still followed by Dr. Wright for hx breast CA.  Was seen by ID in Nov in preparation for starting Benlysta.  Was given Prevnar 20 and started on Benlysta two weeks after that.  Gets cold after the injections so states they adjusted the timing.  Still c baseline arthralgias.    Had labs done in Feb which showed low K.  U/a at that time c trace prot but Prot/cr wnl.  Also noted to have significant rbc, wbc, and jersey on u/a; however, repeat u/a wnl    Now seeing Dr. Rangel q6 mos in the IBD clinic.    Past Medical History:  Past Medical History:   Diagnosis Date    Acid reflux     Adrenal insufficiency, primary     Arthritis     Asthma     B12 deficiency anemia     Benign essential hypertension 2/7/2021    Blood transfusion 2010    Breast cancer 2/2016    Right breast infiltrating ductal CA    Cataract     Chronic pain     Deep vein thrombosis     Dry eyes     Esophagitis, unspecified     Fibromyalgia     General anesthetics causing adverse effect in therapeutic use     "slow to wake up bc I don't have an immune system    Heart murmur     History of colon polyps     Hyperlipidemia     Hypopituitary dwarfism     Iron deficiency anemia     Lupus     Migraines, neuralgic     " Nonspecific ulcerative colitis     OP (osteoporosis)     history of reclast    Osteopenia     Pancreatic cyst     Schatzki's ring     Steroid sulfatase deficiency     Ulcerative colitis     Vitamin D deficiency disease        Home Medications:  Prior to Admission medications    Medication Sig Start Date End Date Taking? Authorizing Provider   albuterol (ACCUNEB) 0.63 mg/3 mL Nebu Take 3 mLs (0.63 mg total) by nebulization every 6 (six) hours as needed. 2/26/14 12/12/22  Manjula Mixon MD   amLODIPine (NORVASC) 2.5 MG tablet Take 2.5 mg by mouth. 9/26/22   Historical Provider   belimumab (BENLYSTA) 200 mg/mL AtIn Inject 1 mL (200 mg total) into the skin every 7 days. 2/7/23   Idania Allison MD   calcium carbonate (OS-RISSA) 500 mg calcium (1,250 mg) tablet Take 1 tablet by mouth once daily.    Historical Provider   cholecalciferol, vitamin D3, (VITAMIN D3) 125 mcg (5,000 unit) Tab Take 5,000 Units by mouth once daily.    Historical Provider   CREON 36,000-114,000- 180,000 unit CpDR TAKE TWO CAPSULES BY MOUTH THREE TIMES DAILY WITH MEALS AND ONE CAPSULE WITH EACH SNACK 4/20/22   Reese Villalta MD   dexamethasone (DECADRON) 4 mg/mL injection Inject 1 mL (4 mg total) into the muscle daily as needed (Signs and symtpoms of severe adrenal insufficiency). 10/6/21   Ena Srinivasan MD   diclofenac sodium (VOLTAREN) 1 % Gel APPLY 2 GRAMS TO AFFECTED AREA 4 TIMES A DAY 3/24/21   Idania Allison MD   dicyclomine (BENTYL) 20 mg tablet TAKE 1 TABLET BY MOUTH EVERY 6 HOURS 3/2/23   Ceferino Rangel MD   ferrous sulfate (IRON ORAL) Take by mouth. No sure dose    Historical Provider   folic acid (FOLVITE) 1 MG tablet Take 1 mg by mouth once daily.    Historical Provider   gabapentin (NEURONTIN) 300 MG capsule TAKE 1 CAPSULE BY MOUTH EVERY EVENING 11/29/21   Idania Allison MD   hydroCHLOROthiazide (HYDRODIURIL) 12.5 MG Tab Take 12.5 mg by mouth. 9/26/22   Historical Provider    hydrOXYchloroQUINE (PLAQUENIL) 200 mg tablet TAKE 1.5 TABLETS BY MOUTH EVERY DAY 12/6/22   Idania Allison MD   LIDOcaine (LIDODERM) 5 % Place 1 patch onto the skin once daily. Remove & Discard patch within 12 hours or as directed by MD 6/26/22   Ambrocio Salas MD   loperamide (IMODIUM) 2 mg capsule TAKE 1 TO 2 CAPSULES BY MOUTH FOUR TIMES DAILY 2/2/23   Shandra Stapleton, NP   losartan (COZAAR) 50 MG tablet Take 1 tablet (50 mg total) by mouth once daily. 1/27/22 1/27/23  Billy Rush DO   milnacipran (SAVELLA) 100 mg Tab Take 1 tablet (100 mg total) by mouth once daily. 9/26/22   Shonda Anguiano MD   multivitamin/iron/folic acid (CENTRUM WOMEN ORAL) Take 1 tablet by mouth once daily.    Historical Provider   niacin 100 MG Tab Take by mouth. 1 Tablet Oral At bedtime    Historical Provider   omeprazole (PRILOSEC) 20 MG capsule TAKE 1 CAPSULE BY MOUTH TWICE A DAY 1/18/23   Shonda Anguiano MD   ondansetron (ZOFRAN) 4 MG tablet Take 1 tablet (4 mg total) by mouth every 12 (twelve) hours as needed for Nausea. 10/13/20   Tierney Mcintyre NP   oxyCODONE-acetaminophen (PERCOCET) 5-325 mg per tablet Take 1 tablet by mouth every 4 (four) hours as needed for Pain. 6/26/22   Ambrocio Salas MD   potassium chloride SA (K-DUR,KLOR-CON M) 10 MEQ tablet Take 3 tabs a day. 9/8/22   Billy Rush DO   predniSONE (DELTASONE) 5 MG tablet TAKE 1 TABLET (5 MG TOTAL) BY MOUTH ONCE DAILY. DOUBLE THE DOSE IN TIMES OF ILLNESS 9/6/22   Ena Srinivasan MD   psyllium husk/aspartame (METAMUCIL FIBER SINGLES ORAL) Take by mouth.    Historical Provider   REPATHA SURECLICK 140 mg/mL PnIj INJECT 140 MG INTO THE SKIN EVERY 14 (FOURTEEN) DAYS 7/27/21   Historical Provider   RESTASIS 0.05 % ophthalmic emulsion INSTILL 1 DROP INTO BOTH EYES TWICE A DAY 5/20/21   Joana Vo, OD   rizatriptan (MAXALT) 10 MG tablet Take 1 tablet (10 mg total) by mouth every 6 (six) hours as needed for Migraine. 10/13/20   Tierney Mcintyre NP  "  rosuvastatin (CRESTOR) 5 MG tablet Take 5 mg by mouth once daily.    Historical Provider   valACYclovir (VALTREX) 1000 MG tablet Take 1 tablet (1,000 mg total) by mouth every 12 (twelve) hours. for 10 days 11/17/22 11/27/22  Emily Street MD   zoledronic acid-mannitol & water (RECLAST) 5 mg/100 mL PgBk  10/25/21   Historical Provider   zolpidem (AMBIEN) 5 MG Tab Take 1 tablet (5 mg total) by mouth nightly as needed (insomnia). 9/6/22   Shonda Anguiano MD       Allergies:  Review of patient's allergies indicates:   Allergen Reactions    Aspirin      Other reaction(s): "hemorrhage"  hemorrhage    Hydrocortisone Nausea Only     Other reaction(s): sick  sick    Lactose intolerance (lactase) [lactase] Nausea And Vomiting    Vicodin [hydrocodone-acetaminophen]      Other reaction(s): hyperactivity    Asacol [mesalamine] Hallucinations     Other reaction(s): Hallucinations  hallucinations       Social History:  Social History     Tobacco Use    Smoking status: Never    Smokeless tobacco: Never   Substance Use Topics    Alcohol use: No     Alcohol/week: 0.0 standard drinks    Drug use: No        Review of Systems   Constitutional:  Positive for fatigue. Negative for appetite change, chills, fever and unexpected weight change.   HENT:  Negative for congestion, hearing loss and rhinorrhea.    Eyes:  Negative for pain and visual disturbance.   Respiratory:  Negative for cough, chest tightness, shortness of breath and wheezing.    Cardiovascular:  Negative for chest pain, palpitations and leg swelling.   Gastrointestinal:  Negative for abdominal distention and abdominal pain.   Endocrine: Negative for polydipsia and polyuria.   Genitourinary:  Negative for decreased urine volume, difficulty urinating, dysuria, hematuria and vaginal discharge.   Musculoskeletal:  Positive for arthralgias.   Neurological:  Negative for weakness, numbness and headaches.   Psychiatric/Behavioral:  Negative for behavioral problems and " "confusion.        Health Maintenance:     Immunizations:   Influenza 11/2022  TDap 8/2021  Pneumovax 6/2014, Prevnar 13 2/2021, Prevnar 20 11/2022  Shingrix 2/2022, 9/2022  COVID 2/2021, 3/2021, 8/2021 ([employer]), 11/2022     Cancer Screening:  PAP: total hyst 2/2 fibroids, benign, ovaries removed as well.  Mammogram:  10/2022 benign  Colonoscopy: hx total proctocolectomy for UC, had pouchoscopy 12/2018 via dr. Villalta wnl, biopsies taken which showed mild chronic inflammation on path.  EGD 12/2018 c hiatal hernia, otherwise unremarkable, bx c chronic inflammation in duodenum on path, normal gastric bx and neg H pylori  DEXA:  9/2021 c osteoporosis, followed by Dr. Srinivasan, now on Reclast    Objective:   /82 (BP Location: Left arm, Patient Position: Sitting, BP Method: Medium (Manual))   Pulse 68   Ht 5' 4" (1.626 m)   Wt 60.5 kg (133 lb 6.1 oz)   SpO2 98%   BMI 22.89 kg/m²        General: AAO x3, no apparent distress  HEENT: PERRL, patchy areas of mild erythema on upper palate and around gumline towards the front.  Fluid noted behind TM B, L>R, no TM bulging or erythema.  CV: RRR, no m/r/g  Pulm: Lungs CTAB, no crackles, no wheezes  Abd: s/NT/ND +BS  Extremities: no c/c/e    Labs:     Lab Results   Component Value Date    WBC 7.95 02/13/2023    HGB 12.1 02/13/2023    HCT 39.0 02/13/2023    MCV 89 02/13/2023     02/13/2023     Sodium   Date Value Ref Range Status   02/13/2023 142 136 - 145 mmol/L Final     Potassium   Date Value Ref Range Status   02/13/2023 3.4 (L) 3.5 - 5.1 mmol/L Final     Chloride   Date Value Ref Range Status   02/13/2023 105 95 - 110 mmol/L Final     CO2   Date Value Ref Range Status   02/13/2023 26 23 - 29 mmol/L Final     Glucose   Date Value Ref Range Status   02/13/2023 70 70 - 110 mg/dL Final     BUN   Date Value Ref Range Status   02/13/2023 10 8 - 23 mg/dL Final     Creatinine   Date Value Ref Range Status   02/13/2023 0.7 0.5 - 1.4 mg/dL Final     Calcium   Date Value Ref " Range Status   02/13/2023 9.4 8.7 - 10.5 mg/dL Final     Total Protein   Date Value Ref Range Status   02/13/2023 6.8 6.0 - 8.4 g/dL Final     Albumin   Date Value Ref Range Status   02/13/2023 3.9 3.5 - 5.2 g/dL Final     Total Bilirubin   Date Value Ref Range Status   02/13/2023 0.4 0.1 - 1.0 mg/dL Final     Comment:     For infants and newborns, interpretation of results should be based  on gestational age, weight and in agreement with clinical  observations.    Premature Infant recommended reference ranges:  Up to 24 hours.............<8.0 mg/dL  Up to 48 hours............<12.0 mg/dL  3-5 days..................<15.0 mg/dL  6-29 days.................<15.0 mg/dL       Alkaline Phosphatase   Date Value Ref Range Status   02/13/2023 49 (L) 55 - 135 U/L Final     AST   Date Value Ref Range Status   02/13/2023 21 10 - 40 U/L Final     ALT   Date Value Ref Range Status   02/13/2023 17 10 - 44 U/L Final     Anion Gap   Date Value Ref Range Status   02/13/2023 11 8 - 16 mmol/L Final     eGFR if    Date Value Ref Range Status   07/05/2022 >60.0 >60 mL/min/1.73 m^2 Final     eGFR if non    Date Value Ref Range Status   07/05/2022 >60.0 >60 mL/min/1.73 m^2 Final     Comment:     Calculation used to obtain the estimated glomerular filtration  rate (eGFR) is the CKD-EPI equation.        Lab Results   Component Value Date    HGBA1C 5.7 (H) 09/06/2022     Lab Results   Component Value Date    LDLCALC 19.6 (L) 09/06/2022     Lab Results   Component Value Date    TSH 0.974 09/06/2022         Assessment/Plan     Efe was seen today for follow-up.    Diagnoses and all orders for this visit:    Benign essential hypertension  at goal.  Cont current medications.    Bilateral carotid artery disease, unspecified type  Mixed hyperlipidemia  Followed by Dr. Jordan at   Has f/u in May at which time pt states he may decrease/stop the Crestor since pt doing well on Praluent.  LDL well controlled on last  check    Prediabetes  Lab Results   Component Value Date    HGBA1C 5.7 (H) 09/06/2022     Mild, improved on last check, will repeat c upcoming labs next week.  -     Hemoglobin A1C; Future    Systemic lupus erythematosus, unspecified SLE type, unspecified organ involvement status  Long-term use of Plaquenil  Immunosuppression  Long term current use of systemic steroids  Fibromyalgia  Management as per Dr. Jiang  Now on Benlysta for lupus in addition to Plaquenil and prednisone.  Eye exam utd  Cont routine f/u  -     losartan (COZAAR) 50 MG tablet; Take 1 tablet (50 mg total) by mouth once daily.    Ulcerative colitis with other complication, unspecified location  Exocrine pancreatic insufficiency  Chronic pancreatitis, unspecified pancreatitis type  Now seeing Dr. Rangel q6 mos, cont meds as per IBD clinic, no acute issues    Thrombocytopenia  Normal on last check, no issues    Malignant neoplasm of upper-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, unspecified estrogen receptor status  Followed by Dr. Wright, mmg utd, no issues    Unspecified adrenocortical insufficiency  Followed by Dr. Srinivasan, no acute issues    Primary insomnia  Stable on Ambien, okay to fill q6 mos, failed tx c Hydroxyzine and Trazodone contraindicated due to potential med interactions.  -     zolpidem (AMBIEN) 5 MG Tab; Take 1 tablet (5 mg total) by mouth nightly as needed (insomnia).    Subclinical hyperthyroidism  TFTs wnl in Sept, can check annually    Age-related osteoporosis with current pathological fracture with delayed healing  On meds as per Dr. Srinivasan, DEXA utd    Migraine syndrome  Requesting refills, sent today  -     rizatriptan (MAXALT) 10 MG tablet; Take 1 tablet (10 mg total) by mouth 2 (two) times daily as needed for Migraine.    Hypokalemia  -     losartan (COZAAR) 50 MG tablet; Take 1 tablet (50 mg total) by mouth once daily.    Hypertension, unspecified type  -     losartan (COZAAR) 50 MG tablet; Take 1 tablet (50 mg total) by mouth  once daily.    Seasonal allergic rhinitis due to other allergic trigger  Poorly controlled, intermittent chronic cough  Cont Flonase which has helped but will also add Xyzal  -     levocetirizine (XYZAL) 5 MG tablet; Take 1 tablet (5 mg total) by mouth every evening.    Mouth sore  As per ID, pt states she was rx'ed Valtrex, can message them since sx have not improved c tx, will try mouthwash for sx  -     (Magic mouthwash) 1:1:1 diphenhydrAMINE(Benadryl) 12.5mg/5ml liq, aluminum & magnesium hydroxide-simethicone (Maalox), LIDOcaine viscous 2%; Swish and spit 5 mLs every 4 (four) hours as needed (mouth sores). for mouth sores        HM as above  RTC in 6 mos, sooner if needed.    Shonda Anguiano MD  Department of Internal Medicine - [facility]  03/06/2023

## 2023-03-13 ENCOUNTER — LAB VISIT (OUTPATIENT)
Dept: LAB | Facility: HOSPITAL | Age: 63
End: 2023-03-13
Attending: INTERNAL MEDICINE
Payer: MEDICARE

## 2023-03-13 ENCOUNTER — SPECIALTY PHARMACY (OUTPATIENT)
Dept: PHARMACY | Facility: CLINIC | Age: 63
End: 2023-03-13
Payer: MEDICARE

## 2023-03-13 DIAGNOSIS — R73.03 PREDIABETES: ICD-10-CM

## 2023-03-13 DIAGNOSIS — I10 HYPERTENSION, UNSPECIFIED TYPE: ICD-10-CM

## 2023-03-13 LAB
ALBUMIN SERPL BCP-MCNC: 4 G/DL (ref 3.5–5.2)
ANION GAP SERPL CALC-SCNC: 13 MMOL/L (ref 8–16)
BUN SERPL-MCNC: 13 MG/DL (ref 8–23)
CALCIUM SERPL-MCNC: 9.7 MG/DL (ref 8.7–10.5)
CHLORIDE SERPL-SCNC: 105 MMOL/L (ref 95–110)
CO2 SERPL-SCNC: 22 MMOL/L (ref 23–29)
CREAT SERPL-MCNC: 0.8 MG/DL (ref 0.5–1.4)
EST. GFR  (NO RACE VARIABLE): >60 ML/MIN/1.73 M^2
ESTIMATED AVG GLUCOSE: 117 MG/DL (ref 68–131)
GLUCOSE SERPL-MCNC: 70 MG/DL (ref 70–110)
HBA1C MFR BLD: 5.7 % (ref 4–5.6)
HCT VFR BLD AUTO: 39.3 % (ref 37–48.5)
HGB BLD-MCNC: 12.4 G/DL (ref 12–16)
PHOSPHATE SERPL-MCNC: 3.9 MG/DL (ref 2.7–4.5)
POTASSIUM SERPL-SCNC: 3.5 MMOL/L (ref 3.5–5.1)
PTH-INTACT SERPL-MCNC: 83.3 PG/ML (ref 9–77)
SODIUM SERPL-SCNC: 140 MMOL/L (ref 136–145)

## 2023-03-13 PROCEDURE — 85014 HEMATOCRIT: CPT | Mod: HCNC | Performed by: INTERNAL MEDICINE

## 2023-03-13 PROCEDURE — 83036 HEMOGLOBIN GLYCOSYLATED A1C: CPT | Mod: HCNC | Performed by: INTERNAL MEDICINE

## 2023-03-13 PROCEDURE — 36415 COLL VENOUS BLD VENIPUNCTURE: CPT | Mod: HCNC,PO | Performed by: INTERNAL MEDICINE

## 2023-03-13 PROCEDURE — 83970 ASSAY OF PARATHORMONE: CPT | Mod: HCNC | Performed by: INTERNAL MEDICINE

## 2023-03-13 PROCEDURE — 80069 RENAL FUNCTION PANEL: CPT | Mod: HCNC | Performed by: INTERNAL MEDICINE

## 2023-03-13 PROCEDURE — 85018 HEMOGLOBIN: CPT | Mod: HCNC | Performed by: INTERNAL MEDICINE

## 2023-03-13 NOTE — TELEPHONE ENCOUNTER
Specialty Pharmacy - Refill Coordination    Specialty Medication Orders Linked to Encounter      Flowsheet Row Most Recent Value   Medication #1 belimumab (BENLYSTA) 200 mg/mL AtIn (Order#[id number], Rx#[id number])            Refill Questions - Documented Responses      Flowsheet Row Most Recent Value   Patient Availability and HIPAA Verification    Does patient want to proceed with activity? Yes   HIPAA/medical authority confirmed? Yes   Relationship to patient of person spoken to? Self   Refill Screening Questions    Changes to allergies? No   Changes to medications? No   New conditions since last clinic visit? No   Unplanned office visit, urgent care, ED, or hospital admission in the last 4 weeks? No   How does patient/caregiver feel medication is working? Good   How many doses of your specialty medications were missed in the last 4 weeks? 0   Would patient like to speak to a pharmacist? No   When does the patient need to receive the medication? 03/19/23   Refill Delivery Questions    How will the patient receive the medication? [facility]   When does the patient need to receive the medication? 03/19/23   Shipping Address Home   Address in [facility] confirmed and updated if neccessary? Yes   Expected Copay ($) 0   Is the patient able to afford the medication copay? Yes   Payment Method zero copay   Days supply of Refill 28   Supplies needed? No supplies needed   Refill activity completed? Yes   Refill activity plan Refill scheduled   Shipment/Pickup Date: 03/15/23            Current Outpatient Medications   Medication Sig    (Magic mouthwash) 1:1:1 diphenhydrAMINE(Benadryl) 12.5mg/5ml liq, aluminum & magnesium hydroxide-simethicone (Maalox), LIDOcaine viscous 2% Swish and spit 5 mLs every 4 (four) hours as needed (mouth sores). for mouth sores    albuterol (ACCUNEB) 0.63 mg/3 mL Nebu Take 3 mLs (0.63 mg total) by nebulization every 6 (six) hours as needed.    amLODIPine (NORVASC) 2.5 MG tablet Take 2.5 mg by  mouth.    belimumab (BENLYSTA) 200 mg/mL AtIn Inject 1 mL (200 mg total) into the skin every 7 days.    calcium carbonate (OS-RISSA) 500 mg calcium (1,250 mg) tablet Take 1 tablet by mouth once daily.    cholecalciferol, vitamin D3, 125 mcg (5,000 unit) Tab Take 5,000 Units by mouth once daily.    CREON 36,000-114,000- 180,000 unit CpDR TAKE TWO CAPSULES BY MOUTH THREE TIMES DAILY WITH MEALS AND ONE CAPSULE WITH EACH SNACK    dexamethasone (DECADRON) 4 mg/mL injection Inject 1 mL (4 mg total) into the muscle daily as needed (Signs and symtpoms of severe adrenal insufficiency).    diclofenac sodium (VOLTAREN) 1 % Gel APPLY 2 GRAMS TO AFFECTED AREA 4 TIMES A DAY    dicyclomine (BENTYL) 20 mg tablet TAKE 1 TABLET BY MOUTH EVERY 6 HOURS    ferrous sulfate (IRON ORAL) Take by mouth. No sure dose    folic acid (FOLVITE) 1 MG tablet Take 1 mg by mouth once daily.    gabapentin (NEURONTIN) 300 MG capsule TAKE 1 CAPSULE BY MOUTH EVERY EVENING    hydroCHLOROthiazide (HYDRODIURIL) 12.5 MG Tab Take 12.5 mg by mouth.    hydrOXYchloroQUINE (PLAQUENIL) 200 mg tablet TAKE 1.5 TABLETS BY MOUTH EVERY DAY    levocetirizine (XYZAL) 5 MG tablet Take 1 tablet (5 mg total) by mouth every evening.    LIDOcaine (LIDODERM) 5 % Place 1 patch onto the skin once daily. Remove & Discard patch within 12 hours or as directed by MD    loperamide (IMODIUM) 2 mg capsule TAKE 1 TO 2 CAPSULES BY MOUTH FOUR TIMES DAILY    losartan (COZAAR) 50 MG tablet Take 1 tablet (50 mg total) by mouth once daily.    milnacipran (SAVELLA) 100 mg Tab Take 1 tablet (100 mg total) by mouth once daily.    multivitamin/iron/folic acid (CENTRUM WOMEN ORAL) Take 1 tablet by mouth once daily.    niacin 100 MG Tab Take by mouth. 1 Tablet Oral At bedtime    omeprazole (PRILOSEC) 20 MG capsule TAKE 1 CAPSULE BY MOUTH TWICE A DAY    ondansetron (ZOFRAN) 4 MG tablet Take 1 tablet (4 mg total) by mouth every 12 (twelve) hours as needed for Nausea.    oxyCODONE-acetaminophen  "(PERCOCET) 5-325 mg per tablet Take 1 tablet by mouth every 4 (four) hours as needed for Pain.    potassium chloride SA (K-DUR,KLOR-CON M) 10 MEQ tablet Take 3 tabs a day.    predniSONE (DELTASONE) 5 MG tablet TAKE 1 TABLET (5 MG TOTAL) BY MOUTH ONCE DAILY. DOUBLE THE DOSE IN TIMES OF ILLNESS    psyllium husk/aspartame (METAMUCIL FIBER SINGLES ORAL) Take by mouth.    REPATHA SURECLICK 140 mg/mL PnIj INJECT 140 MG INTO THE SKIN EVERY 14 (FOURTEEN) DAYS    RESTASIS 0.05 % ophthalmic emulsion INSTILL 1 DROP INTO BOTH EYES TWICE A DAY    rizatriptan (MAXALT) 10 MG tablet Take 1 tablet (10 mg total) by mouth 2 (two) times daily as needed for Migraine.    rosuvastatin (CRESTOR) 5 MG tablet Take 5 mg by mouth once daily.    valACYclovir (VALTREX) 1000 MG tablet Take 1 tablet (1,000 mg total) by mouth every 12 (twelve) hours. for 10 days    zoledronic acid-mannitol & water (RECLAST) 5 mg/100 mL PgBk     zolpidem (AMBIEN) 5 MG Tab Take 1 tablet (5 mg total) by mouth nightly as needed (insomnia).   Last reviewed on 3/6/2023  8:32 AM by Shonda Anguiano MD    Review of patient's allergies indicates:   Allergen Reactions    Aspirin      Other reaction(s): "hemorrhage"  hemorrhage    Hydrocortisone Nausea Only     Other reaction(s): sick  sick    Lactose intolerance (lactase) [lactase] Nausea And Vomiting    Vicodin [hydrocodone-acetaminophen]      Other reaction(s): hyperactivity    Asacol [mesalamine] Hallucinations     Other reaction(s): Hallucinations  hallucinations    Last reviewed on  3/6/2023 8:32 AM by Shonda Anguiano      Tasks added this encounter   4/9/2023 - Refill Call (Auto Added)   Tasks due within next 3 months   No tasks due.     Yasmin Scott, PharmD  [facility]  [address] [place] [address]  Phone: [phone]  Fax: [phone]        "

## 2023-03-14 ENCOUNTER — OFFICE VISIT (OUTPATIENT)
Dept: NEPHROLOGY | Facility: CLINIC | Age: 63
End: 2023-03-14
Payer: MEDICARE

## 2023-03-14 ENCOUNTER — LAB VISIT (OUTPATIENT)
Dept: LAB | Facility: HOSPITAL | Age: 63
End: 2023-03-14
Payer: MEDICARE

## 2023-03-14 VITALS
BODY MASS INDEX: 22.74 KG/M2 | DIASTOLIC BLOOD PRESSURE: 76 MMHG | WEIGHT: 132.5 LBS | HEART RATE: 82 BPM | SYSTOLIC BLOOD PRESSURE: 147 MMHG

## 2023-03-14 DIAGNOSIS — I10 HYPERTENSION, UNSPECIFIED TYPE: Primary | ICD-10-CM

## 2023-03-14 DIAGNOSIS — M32.9 SYSTEMIC LUPUS ERYTHEMATOSUS, UNSPECIFIED SLE TYPE, UNSPECIFIED ORGAN INVOLVEMENT STATUS: ICD-10-CM

## 2023-03-14 DIAGNOSIS — D64.9 ANEMIA, UNSPECIFIED TYPE: ICD-10-CM

## 2023-03-14 DIAGNOSIS — I10 HYPERTENSION, UNSPECIFIED TYPE: ICD-10-CM

## 2023-03-14 DIAGNOSIS — E87.6 HYPOKALEMIA: ICD-10-CM

## 2023-03-14 LAB
BILIRUB UR QL STRIP: NEGATIVE
CLARITY UR REFRACT.AUTO: ABNORMAL
COLOR UR AUTO: YELLOW
GLUCOSE UR QL STRIP: NEGATIVE
HGB UR QL STRIP: NEGATIVE
KETONES UR QL STRIP: ABNORMAL
LEUKOCYTE ESTERASE UR QL STRIP: ABNORMAL
MICROSCOPIC COMMENT: ABNORMAL
NITRITE UR QL STRIP: NEGATIVE
PH UR STRIP: 5 [PH] (ref 5–8)
PROT UR QL STRIP: NEGATIVE
RBC #/AREA URNS AUTO: 13 /HPF (ref 0–4)
SP GR UR STRIP: 1.02 (ref 1–1.03)
SQUAMOUS #/AREA URNS AUTO: 1 /HPF
URN SPEC COLLECT METH UR: ABNORMAL
WBC #/AREA URNS AUTO: 16 /HPF (ref 0–5)

## 2023-03-14 PROCEDURE — 1159F MED LIST DOCD IN RCRD: CPT | Mod: HCNC,CPTII,S$GLB, | Performed by: INTERNAL MEDICINE

## 2023-03-14 PROCEDURE — 3288F FALL RISK ASSESSMENT DOCD: CPT | Mod: HCNC,CPTII,S$GLB, | Performed by: INTERNAL MEDICINE

## 2023-03-14 PROCEDURE — 4010F ACE/ARB THERAPY RXD/TAKEN: CPT | Mod: HCNC,CPTII,S$GLB, | Performed by: INTERNAL MEDICINE

## 2023-03-14 PROCEDURE — 3078F PR MOST RECENT DIASTOLIC BLOOD PRESSURE < 80 MM HG: ICD-10-PCS | Mod: HCNC,CPTII,S$GLB, | Performed by: INTERNAL MEDICINE

## 2023-03-14 PROCEDURE — 3008F BODY MASS INDEX DOCD: CPT | Mod: HCNC,CPTII,S$GLB, | Performed by: INTERNAL MEDICINE

## 2023-03-14 PROCEDURE — 99499 UNLISTED E&M SERVICE: CPT | Mod: S$GLB,,, | Performed by: INTERNAL MEDICINE

## 2023-03-14 PROCEDURE — 99999 PR PBB SHADOW E&M-EST. PATIENT-LVL IV: CPT | Mod: PBBFAC,HCNC,, | Performed by: INTERNAL MEDICINE

## 2023-03-14 PROCEDURE — 99214 OFFICE O/P EST MOD 30 MIN: CPT | Mod: HCNC,S$GLB,, | Performed by: INTERNAL MEDICINE

## 2023-03-14 PROCEDURE — 4010F PR ACE/ARB THEARPY RXD/TAKEN: ICD-10-PCS | Mod: HCNC,CPTII,S$GLB, | Performed by: INTERNAL MEDICINE

## 2023-03-14 PROCEDURE — 1101F PR PT FALLS ASSESS DOC 0-1 FALLS W/OUT INJ PAST YR: ICD-10-PCS | Mod: HCNC,CPTII,S$GLB, | Performed by: INTERNAL MEDICINE

## 2023-03-14 PROCEDURE — 3077F SYST BP >= 140 MM HG: CPT | Mod: HCNC,CPTII,S$GLB, | Performed by: INTERNAL MEDICINE

## 2023-03-14 PROCEDURE — 1159F PR MEDICATION LIST DOCUMENTED IN MEDICAL RECORD: ICD-10-PCS | Mod: HCNC,CPTII,S$GLB, | Performed by: INTERNAL MEDICINE

## 2023-03-14 PROCEDURE — 3078F DIAST BP <80 MM HG: CPT | Mod: HCNC,CPTII,S$GLB, | Performed by: INTERNAL MEDICINE

## 2023-03-14 PROCEDURE — 99999 PR PBB SHADOW E&M-EST. PATIENT-LVL IV: ICD-10-PCS | Mod: PBBFAC,HCNC,, | Performed by: INTERNAL MEDICINE

## 2023-03-14 PROCEDURE — 3077F PR MOST RECENT SYSTOLIC BLOOD PRESSURE >= 140 MM HG: ICD-10-PCS | Mod: HCNC,CPTII,S$GLB, | Performed by: INTERNAL MEDICINE

## 2023-03-14 PROCEDURE — 3288F PR FALLS RISK ASSESSMENT DOCUMENTED: ICD-10-PCS | Mod: HCNC,CPTII,S$GLB, | Performed by: INTERNAL MEDICINE

## 2023-03-14 PROCEDURE — 3044F PR MOST RECENT HEMOGLOBIN A1C LEVEL <7.0%: ICD-10-PCS | Mod: HCNC,CPTII,S$GLB, | Performed by: INTERNAL MEDICINE

## 2023-03-14 PROCEDURE — 3066F NEPHROPATHY DOC TX: CPT | Mod: HCNC,CPTII,S$GLB, | Performed by: INTERNAL MEDICINE

## 2023-03-14 PROCEDURE — 99214 PR OFFICE/OUTPT VISIT, EST, LEVL IV, 30-39 MIN: ICD-10-PCS | Mod: HCNC,S$GLB,, | Performed by: INTERNAL MEDICINE

## 2023-03-14 PROCEDURE — 3044F HG A1C LEVEL LT 7.0%: CPT | Mod: HCNC,CPTII,S$GLB, | Performed by: INTERNAL MEDICINE

## 2023-03-14 PROCEDURE — 1101F PT FALLS ASSESS-DOCD LE1/YR: CPT | Mod: HCNC,CPTII,S$GLB, | Performed by: INTERNAL MEDICINE

## 2023-03-14 PROCEDURE — 3008F PR BODY MASS INDEX (BMI) DOCUMENTED: ICD-10-PCS | Mod: HCNC,CPTII,S$GLB, | Performed by: INTERNAL MEDICINE

## 2023-03-14 PROCEDURE — 81001 URINALYSIS AUTO W/SCOPE: CPT | Mod: HCNC | Performed by: INTERNAL MEDICINE

## 2023-03-14 PROCEDURE — 3066F PR DOCUMENTATION OF TREATMENT FOR NEPHROPATHY: ICD-10-PCS | Mod: HCNC,CPTII,S$GLB, | Performed by: INTERNAL MEDICINE

## 2023-03-14 NOTE — PROGRESS NOTES
Subjective:       Patient ID: Efe Horowitz is a 63 y.o. Black or  female who presents for follow-up evaluation of Hematuria      HPI This is a 63 -year-old -American female with   history of systemic lupus diagnosed 18 years ago, fibromyalgia, ulcerative   colitis and appendectomy, lupus who is coming in for f/u . The patient denies any hematuria. She complains of fatigue more so after starting benlysta and more headaches.  Her creatinines have been stable. She has trace proteinuria on the urine protein creatinine ratios for the past two to three years and has had microscopic hematuria on UAs off and on for the past three   years or so but no sig RBC's. She states her blood pressures at home in the 120's-130's/60's-70's and occ 140's only twice so far recently. Follows in oncology with breast cancer. Nose sores.  Diahrrea resolved with UC. Had EGD last year for swallowing.    PAST MEDICAL HISTORY: Lupus 16 years ago, fibromyalgia, colitis, appendectomy,   and cholecystectomy, breast cancer.     SOCIAL HISTORY: Does not smoke, does not drink. She works part-time as a   .     FAMILY HISTORY: Her dad has CKD from hypertension and diabetes. Her nephew had   a kidney transplant after a motor vehicle accident.    Review of Systems   Constitutional:  Positive for fatigue.   HENT:  Negative for mouth sores.         Nose sores and head sores previously   Eyes:  Negative for discharge.   Respiratory:  Negative for cough, shortness of breath and wheezing.    Cardiovascular:  Negative for chest pain and palpitations.   Gastrointestinal:  Positive for abdominal pain. Negative for vomiting.   Genitourinary:  Negative for dysuria, frequency, hematuria and urgency.   Musculoskeletal:  Positive for arthralgias and myalgias. Negative for back pain.   Skin:  Negative for color change and rash.   Psychiatric/Behavioral:  Negative for confusion.    All other systems reviewed and are negative.     Objective:      Physical Exam  Vitals reviewed.   Constitutional:       Appearance: She is well-developed.   HENT:      Right Ear: External ear normal.      Left Ear: External ear normal.      Nose: Nose normal.      Mouth/Throat:      Dentition: Normal dentition.   Eyes:      General: Lids are normal.      Conjunctiva/sclera: Conjunctivae normal.      Pupils: Pupils are equal, round, and reactive to light.   Neck:      Thyroid: No thyroid mass.      Trachea: Trachea normal.   Cardiovascular:      Rate and Rhythm: Normal rate and regular rhythm.      Heart sounds: No murmur heard.    No friction rub.      Comments: No lower extremity edema  Pulmonary:      Effort: Pulmonary effort is normal. No respiratory distress.      Breath sounds: Normal breath sounds. No decreased breath sounds or rales.   Abdominal:      General: Bowel sounds are normal.      Palpations: Abdomen is soft. There is no mass.      Tenderness: There is no abdominal tenderness. There is no rebound.      Hernia: No hernia is present.   Musculoskeletal:      Comments: Trace Le edema   Skin:     General: Skin is warm and dry.      Findings: No erythema or rash.   Neurological:      Mental Status: She is alert and oriented to person, place, and time.   Psychiatric:         Mood and Affect: Mood normal. Mood is not anxious or depressed.         Behavior: Behavior normal.         Judgment: Judgment normal.      Comments: Oriented to time, place and person.       Assessment:       No diagnosis found.    Plan:         This is a 63 year-old -American female with   longstanding systemic lupus, but no history of lupus nephritis, who is coming in   for f/u    1. Renal. Her creatinines have been stable at 0.9. She has 50 mg to no   proteinuria for the past two or three years with microscopic hematuria on   several UAs intermittently but no sig RBC's. No sig proteinuria currently. She does not appear to have lupus nephritis, but will need to be  monitored closely. On Benlysta, prednisone. I do not think renal biopsy is warranted at   this time, but she does need close followup and all of this was explained in   detail to the patient. She denies any NSAIDs.   Hydrate well. Potassium low-on supplements.   Increase potassium intake. yony and renin levels stable.    2. Blood pressures are stable -asked her to monitor bp's.    With K being low req supplements, stopped  HCTZ 25 mg and started losartan 50 mg . Potassium low and started on kcl 20 meq daily by pcp. Increase intake of pot rich foods. Increase to 3 pot tabs a dqay as it likely is an absorption problem with her GI issues.    3. Microscopic hematuria. renal ultrasound stable. She denies any   history of kidney stones. Saw urology-ct urogram negative.  Her complements have been stable.   4.Anemia: hgb stable along with iron.  5: renal osteodystrophy: PTH stable, ca, phos stable. On Vit d daily.   Return in 5-6 months.

## 2023-03-15 ENCOUNTER — TELEPHONE (OUTPATIENT)
Dept: NEPHROLOGY | Facility: CLINIC | Age: 63
End: 2023-03-15
Payer: MEDICARE

## 2023-03-15 DIAGNOSIS — M32.9 SYSTEMIC LUPUS ERYTHEMATOSUS, UNSPECIFIED SLE TYPE, UNSPECIFIED ORGAN INVOLVEMENT STATUS: ICD-10-CM

## 2023-03-15 DIAGNOSIS — I10 HYPERTENSION, UNSPECIFIED TYPE: Primary | ICD-10-CM

## 2023-03-15 NOTE — TELEPHONE ENCOUNTER
Urine shows some white and red cells and want to make sure she is not having any UTI symptoms.  I would like to repeat ua and urine protein in about 2 weeks or so. Thanks.         Note

## 2023-03-15 NOTE — TELEPHONE ENCOUNTER
Urine shows some white and red cells and want to make sure she is not having any UTI symptoms.  I would like to repeat ua and urine protein in about 2 weeks or so. Thanks.

## 2023-03-16 ENCOUNTER — TELEPHONE (OUTPATIENT)
Dept: HEMATOLOGY/ONCOLOGY | Facility: CLINIC | Age: 63
End: 2023-03-16
Payer: MEDICARE

## 2023-03-16 ENCOUNTER — OFFICE VISIT (OUTPATIENT)
Dept: RHEUMATOLOGY | Facility: CLINIC | Age: 63
End: 2023-03-16
Payer: MEDICARE

## 2023-03-16 VITALS
HEART RATE: 64 BPM | WEIGHT: 134.25 LBS | BODY MASS INDEX: 23.79 KG/M2 | DIASTOLIC BLOOD PRESSURE: 75 MMHG | SYSTOLIC BLOOD PRESSURE: 123 MMHG | HEIGHT: 63 IN

## 2023-03-16 DIAGNOSIS — R82.998 URINE WBC INCREASED: ICD-10-CM

## 2023-03-16 DIAGNOSIS — R31.9 HEMATURIA, UNSPECIFIED TYPE: Primary | ICD-10-CM

## 2023-03-16 PROCEDURE — 3078F DIAST BP <80 MM HG: CPT | Mod: HCNC,CPTII,S$GLB, | Performed by: INTERNAL MEDICINE

## 2023-03-16 PROCEDURE — 4010F PR ACE/ARB THEARPY RXD/TAKEN: ICD-10-PCS | Mod: HCNC,CPTII,S$GLB, | Performed by: INTERNAL MEDICINE

## 2023-03-16 PROCEDURE — 99499 UNLISTED E&M SERVICE: CPT | Mod: HCNC,S$GLB,, | Performed by: INTERNAL MEDICINE

## 2023-03-16 PROCEDURE — 3074F SYST BP LT 130 MM HG: CPT | Mod: HCNC,CPTII,S$GLB, | Performed by: INTERNAL MEDICINE

## 2023-03-16 PROCEDURE — 3066F PR DOCUMENTATION OF TREATMENT FOR NEPHROPATHY: ICD-10-PCS | Mod: HCNC,CPTII,S$GLB, | Performed by: INTERNAL MEDICINE

## 2023-03-16 PROCEDURE — 1160F PR REVIEW ALL MEDS BY PRESCRIBER/CLIN PHARMACIST DOCUMENTED: ICD-10-PCS | Mod: HCNC,CPTII,S$GLB, | Performed by: INTERNAL MEDICINE

## 2023-03-16 PROCEDURE — 3066F NEPHROPATHY DOC TX: CPT | Mod: HCNC,CPTII,S$GLB, | Performed by: INTERNAL MEDICINE

## 2023-03-16 PROCEDURE — 99999 PR PBB SHADOW E&M-EST. PATIENT-LVL V: ICD-10-PCS | Mod: PBBFAC,HCNC,, | Performed by: INTERNAL MEDICINE

## 2023-03-16 PROCEDURE — 99214 PR OFFICE/OUTPT VISIT, EST, LEVL IV, 30-39 MIN: ICD-10-PCS | Mod: HCNC,S$GLB,, | Performed by: INTERNAL MEDICINE

## 2023-03-16 PROCEDURE — 4010F ACE/ARB THERAPY RXD/TAKEN: CPT | Mod: HCNC,CPTII,S$GLB, | Performed by: INTERNAL MEDICINE

## 2023-03-16 PROCEDURE — 1159F MED LIST DOCD IN RCRD: CPT | Mod: HCNC,CPTII,S$GLB, | Performed by: INTERNAL MEDICINE

## 2023-03-16 PROCEDURE — 1159F PR MEDICATION LIST DOCUMENTED IN MEDICAL RECORD: ICD-10-PCS | Mod: HCNC,CPTII,S$GLB, | Performed by: INTERNAL MEDICINE

## 2023-03-16 PROCEDURE — 3044F PR MOST RECENT HEMOGLOBIN A1C LEVEL <7.0%: ICD-10-PCS | Mod: HCNC,CPTII,S$GLB, | Performed by: INTERNAL MEDICINE

## 2023-03-16 PROCEDURE — 99999 PR PBB SHADOW E&M-EST. PATIENT-LVL V: CPT | Mod: PBBFAC,HCNC,, | Performed by: INTERNAL MEDICINE

## 2023-03-16 PROCEDURE — 99214 OFFICE O/P EST MOD 30 MIN: CPT | Mod: HCNC,S$GLB,, | Performed by: INTERNAL MEDICINE

## 2023-03-16 PROCEDURE — 3008F BODY MASS INDEX DOCD: CPT | Mod: HCNC,CPTII,S$GLB, | Performed by: INTERNAL MEDICINE

## 2023-03-16 PROCEDURE — 1160F RVW MEDS BY RX/DR IN RCRD: CPT | Mod: HCNC,CPTII,S$GLB, | Performed by: INTERNAL MEDICINE

## 2023-03-16 PROCEDURE — 3074F PR MOST RECENT SYSTOLIC BLOOD PRESSURE < 130 MM HG: ICD-10-PCS | Mod: HCNC,CPTII,S$GLB, | Performed by: INTERNAL MEDICINE

## 2023-03-16 PROCEDURE — 3008F PR BODY MASS INDEX (BMI) DOCUMENTED: ICD-10-PCS | Mod: HCNC,CPTII,S$GLB, | Performed by: INTERNAL MEDICINE

## 2023-03-16 PROCEDURE — 99499 RISK ADDL DX/OHS AUDIT: ICD-10-PCS | Mod: HCNC,S$GLB,, | Performed by: INTERNAL MEDICINE

## 2023-03-16 PROCEDURE — 3078F PR MOST RECENT DIASTOLIC BLOOD PRESSURE < 80 MM HG: ICD-10-PCS | Mod: HCNC,CPTII,S$GLB, | Performed by: INTERNAL MEDICINE

## 2023-03-16 PROCEDURE — 3044F HG A1C LEVEL LT 7.0%: CPT | Mod: HCNC,CPTII,S$GLB, | Performed by: INTERNAL MEDICINE

## 2023-03-16 RX ORDER — NEBIVOLOL 10 MG/1
TABLET ORAL
COMMUNITY
Start: 2023-03-09

## 2023-03-16 RX ORDER — NIFEDIPINE 30 MG/1
30 TABLET, FILM COATED, EXTENDED RELEASE ORAL
COMMUNITY
Start: 2022-09-08 | End: 2024-01-22

## 2023-03-16 RX ORDER — HYDROXYZINE HYDROCHLORIDE 25 MG/1
TABLET, FILM COATED ORAL
COMMUNITY
Start: 2022-11-05

## 2023-03-16 ASSESSMENT — SYSTEMIC LUPUS ERYTHEMATOSUS DISEASE ACTIVITY INDEX (SLEDAI): TOTAL_SCORE: 0

## 2023-03-16 NOTE — TELEPHONE ENCOUNTER
Spoke to pt. She will call back when she is available    ----- Message from Tremontana Chevalier sent at 3/16/2023  9:25 AM CDT -----  Regarding: appt  Pt clling in response to recll letter from LEVON Carranza - [facility] call pt @ [phone]

## 2023-03-16 NOTE — PROGRESS NOTES
Rapid3 Question Responses and Scores 3/16/2023   MDHAQ Score 0.3   Psychologic Score 1.1   Pain Score 8   When you awakened in the morning OVER THE LAST WEEK, did you feel stiff? Yes   If Yes, please indicate the number of hours until you are as limber as you will be for the day 1   Fatigue Score 9   Global Health Score 8   RAPID3 Score 5.67     Answers submitted by the patient for this visit:  Rheumatology Questionnaire (Submitted on 3/16/2023)  fever: No  eye redness: No  mouth sores: Yes  headaches: Yes  shortness of breath: No  chest pain: No  trouble swallowing: No  diarrhea: Yes  constipation: No  unexpected weight change: No  genital sore: No  dysuria: No  During the last 3 days, have you had a skin rash?: No  Bruises or bleeds easily: Yes  cough: No

## 2023-03-16 NOTE — PROGRESS NOTES
Subjective:       Patient ID: Efe Horowitz is a 63 y.o. female.    Chief Complaint: Lupus    HPI:  Efe Horowitz is a 63 y.o. female with history of lupus since age 39.   Her manifestations include joint pains, headache, positive DERRICK, and a   double-stranded DNA. She also has an equivocal anticardiolipin IgM.   She has been treated with Plaquenil 1-1/2 tablets daily. Eye exam 12/2017 was normal.      In addition to lupus, she has a   history of ulcerative colitis, osteopenia, fibromyalgia as well.   In 2009 she had a colectomy with ileostomy and in May 2011 the ileostomy  was closed. History of bilateral carpal tunnel.       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  She was treated with surgical resection.  Another lesion breast suspected to be lymphoma was later felt to be fibrous tissue.      May 1, 2017 had right breast fibrous material removed and reconstruction on [facility] breasts at [facility].       Interval History:      Still fatigued.    Nasal ulcers one week ago then developed ulcer in mouth.   Primary gave a swish to help with ulcer.   Infectious disease gave Valtrex in past but it makes it worse before it gets better.     She feels since on Benlysta migraine headaches more severe and ulcers are more often mouth/nose.  Benlysta has helped itching in scalp, swelling in hands and pain improved (no longer uses patches or creams for pain).   No rashes.   Occasional bruising.    Morning stiffness for 1 hour.    Continues taking injection for cholesterol.  Completed Forteo and now on Reclast with Dr. Srinivasan for osteoporosis with L3 vertebral fractures (top and bottom) on MRI.        Review of Systems   Constitutional:  Positive for fatigue. Negative for fever and unexpected weight change.   HENT:  Negative for mouth sores and trouble swallowing.    Eyes:  Negative for redness.        Dry eyes   Respiratory:  Negative for cough and shortness of breath.    Cardiovascular:  Negative for chest pain.  "  Gastrointestinal:  Negative for constipation and diarrhea.   Endocrine: Negative.    Genitourinary: Negative.  Negative for dysuria and genital sores.   Musculoskeletal:  Positive for arthralgias.   Skin: Negative.  Negative for rash.   Allergic/Immunologic: Negative.    Neurological:  Positive for headaches.   Hematological: Negative.    Psychiatric/Behavioral: Negative.         Objective:   /75   Pulse 64   Ht 5' 3" (1.6 m)   Wt 60.9 kg (134 lb 4.2 oz)   BMI 23.78 kg/m²         Physical Exam   Constitutional: She is oriented to person, place, and time.   HENT:   Head: Normocephalic and atraumatic.   Eyes: Conjunctivae are normal.   Cardiovascular: Normal rate, regular rhythm and normal heart sounds.   Pulmonary/Chest: Effort normal and breath sounds normal.   Abdominal: Soft. Bowel sounds are normal.   Musculoskeletal:         General: No swelling or tenderness.      Cervical back: Neck supple.      Comments:      Neurological: She is alert and oriented to person, place, and time. Gait normal.   Skin: Skin is warm and dry.   Psychiatric: Mood and affect normal.       LABS    Component      Latest Ref Rng & Units 3/13/2023 2/13/2023 2/13/2023            7:10 AM  7:02 AM   WBC      3.90 - 12.70 K/uL  7.95    RBC      4.00 - 5.40 M/uL  4.39    Hemoglobin      12.0 - 16.0 g/dL 12.4 12.1    Hematocrit      37.0 - 48.5 % 39.3 39.0    MCV      82 - 98 fL  89    MCH      27.0 - 31.0 pg  27.6    MCHC      32.0 - 36.0 g/dL  31.0 (L)    RDW      11.5 - 14.5 %  14.6 (H)    Platelets      150 - 450 K/uL  249    MPV      9.2 - 12.9 fL  11.8    Immature Granulocytes      0.0 - 0.5 %  0.4    Gran # (ANC)      1.8 - 7.7 K/uL  4.4    Immature Grans (Abs)      0.00 - 0.04 K/uL  0.03    Lymph #      1.0 - 4.8 K/uL  2.5    Mono #      0.3 - 1.0 K/uL  0.8    Eos #      0.0 - 0.5 K/uL  0.2    Baso #      0.00 - 0.20 K/uL  0.05    nRBC      0 /100 WBC  0    Gran %      38.0 - 73.0 %  55.6    Lymph %      18.0 - 48.0 %  31.1  "   Mono %      4.0 - 15.0 %  10.4    Eosinophil %      0.0 - 8.0 %  1.9    Basophil %      0.0 - 1.9 %  0.6    Differential Method        Automated    Sodium      136 - 145 mmol/L 140 142    Potassium      3.5 - 5.1 mmol/L 3.5 3.4 (L)    Chloride      95 - 110 mmol/L 105 105    CO2      23 - 29 mmol/L 22 (L) 26    Glucose      70 - 110 mg/dL 70 70    BUN      8 - 23 mg/dL 13 10    Creatinine      0.5 - 1.4 mg/dL 0.8 0.7    Calcium      8.7 - 10.5 mg/dL 9.7 9.4    PROTEIN TOTAL      6.0 - 8.4 g/dL  6.8    Albumin      3.5 - 5.2 g/dL 4.0 3.9    BILIRUBIN TOTAL      0.1 - 1.0 mg/dL  0.4    Alkaline Phosphatase      55 - 135 U/L  49 (L)    AST      10 - 40 U/L  21    ALT      10 - 44 U/L  17    Anion Gap      8 - 16 mmol/L 13 11    eGFR      >60 mL/min/1.73 m:2 >60.0 >60.0    Phosphorus      2.7 - 4.5 mg/dL 3.9     Protein, Urine Random      0 - 15 mg/dL 10  <7   Creatinine, Urine      15.0 - 325.0 mg/dL 263.0  90.0   Prot/Creat Ratio, Urine      0.00 - 0.20 0.04  Unable to calculate   RBC, UA      0 - 4 /hpf      WBC, UA      0 - 5 /hpf      Microscopic Comment            Hemoglobin A1C External      4.0 - 5.6 % 5.7 (H)     Estimated Avg Glucose      68 - 131 mg/dL 117     ds DNA Ab      Negative 1:10  Negative 1:10    Complement (C-3)      50 - 180 mg/dL  105    Complement (C-4)      11 - 44 mg/dL  34    CPK      20 - 180 U/L  163    Sed Rate      0 - 36 mm/Hr  3    CRP      0.0 - 8.2 mg/L  1.4    PTH      9.0 - 77.0 pg/mL 83.3 (H)       Component      Latest Ref Rng & Units 2/13/2023           7:02 AM   Specimen UA       Urine, Clean Catch   Color, UA      Yellow, Straw, Amanda Yellow   Appearance, UA      Clear Clear   pH, UA      5.0 - 8.0 5.0   Specific Gravity, UA      1.005 - 1.030 1.015   Protein, UA      Negative Negative   Glucose, UA      Negative Negative   Ketones, UA      Negative Negative   Bilirubin (UA)      Negative Negative   Occult Blood UA      Negative Negative   NITRITE UA      Negative Negative    Leukocytes, UA      Negative Negative   Phosphorus      2.7 - 4.5 mg/dL    Protein, Urine Random      0 - 15 mg/dL <7   Creatinine, Urine      15.0 - 325.0 mg/dL 90.0   Prot/Creat Ratio, Urine      0.00 - 0.20 Unable to calculate   RBC, UA      0 - 4 /hpf 1   WBC, UA      0 - 5 /hpf 1   Microscopic Comment       SEE COMMENT           Assessment:       1. Lupus. Lupus manifestations include joint pains, headache, positive DERRICK (positive 5/26/2005 in labs), and a   double-stranded DNA. Has fatigue as well as intermittent oral/nasal ulcer. SLEDAI=  Some improvement on Benlysta.   2. Fatigue.  Worse than usual  3. Encounter for long-term (current) use of other medications   4. Myalgia and myositis. Fibromyalgia on Savella.  Patient stopped gabapentin since it made her sleepy but no worsening in pain.    5. Trochanteric bursitis. Bilateral improved with injection.  6. Shoulder pain. Stable   7. Vitamin D deficiency disease. Now normal  8. Suspected Shingles.   9. Migraine.  Did acupuncture with Dr. Tinsley.  Treated with Maxalt by primary doctor which helps.     10.  Osteoporosis based vertebral fracture.  Reclast in the past.  Dr. Srinivasan gave Forteo for 2 years now back on Reclast.   11.  Chronic steroid use.  Endocrine gives for adrenal insufficiency.  12.  Ulcerative colitis  13.  Hematuria.  Followed by nephrology  14.  Stress with dealing with son with mental illness  15.  Loss of smell since Nov/Dec 2018.  May be relate to sinus issues.  Evaluated by ENT but no cause found.  Continues to have loss of smell  16. Bilateral hand pain. Suspect OA and DeQuervains of right hand.  X-ray with 1st [facility] osteoarthritis.  17. Urine with RBC and WBC.  Followed by     Plan:       1.  Labs   2.  Continue Plaquenil 300 mg daily and Benlysta.  At next visit in 4 months patient will decide if she wants to continue Benlysta.   Eye exam normal 2/2021.  Scheduled for Oct 28, 2022.   3. Compliant with neurontin 300 mg tid.    4.  Off Forteo from endocrine after 2 years and now on Reclast  5. Continue Voltaren gel hands.  Also alternate ice and heat.   6. Continue SPICA splint OTC  7. Patient to get flu shot and COVID booster         RTO in 4 months/prn.

## 2023-03-29 ENCOUNTER — LAB VISIT (OUTPATIENT)
Dept: LAB | Facility: HOSPITAL | Age: 63
End: 2023-03-29
Attending: INTERNAL MEDICINE
Payer: MEDICARE

## 2023-03-29 DIAGNOSIS — M32.9 SYSTEMIC LUPUS ERYTHEMATOSUS, UNSPECIFIED SLE TYPE, UNSPECIFIED ORGAN INVOLVEMENT STATUS: ICD-10-CM

## 2023-03-29 LAB
BILIRUB UR QL STRIP: NEGATIVE
CLARITY UR REFRACT.AUTO: CLEAR
COLOR UR AUTO: YELLOW
CREAT UR-MCNC: 235 MG/DL (ref 15–325)
GLUCOSE UR QL STRIP: NEGATIVE
HGB UR QL STRIP: NEGATIVE
KETONES UR QL STRIP: NEGATIVE
LEUKOCYTE ESTERASE UR QL STRIP: NEGATIVE
NITRITE UR QL STRIP: NEGATIVE
PH UR STRIP: 5 [PH] (ref 5–8)
PROT UR QL STRIP: ABNORMAL
PROT UR-MCNC: <7 MG/DL (ref 0–15)
PROT/CREAT UR: NORMAL MG/G{CREAT} (ref 0–0.2)
SP GR UR STRIP: 1.02 (ref 1–1.03)
URN SPEC COLLECT METH UR: ABNORMAL

## 2023-03-29 PROCEDURE — 84156 ASSAY OF PROTEIN URINE: CPT | Mod: HCNC | Performed by: INTERNAL MEDICINE

## 2023-03-29 PROCEDURE — 81003 URINALYSIS AUTO W/O SCOPE: CPT | Mod: HCNC | Performed by: INTERNAL MEDICINE

## 2023-04-10 ENCOUNTER — SPECIALTY PHARMACY (OUTPATIENT)
Dept: PHARMACY | Facility: CLINIC | Age: 63
End: 2023-04-10
Payer: MEDICARE

## 2023-04-10 NOTE — TELEPHONE ENCOUNTER
Specialty Pharmacy - Refill Coordination    Specialty Medication Orders Linked to Encounter      Flowsheet Row Most Recent Value   Medication #1 belimumab (BENLYSTA) 200 mg/mL AtIn (Order#[id number], Rx#[id number])            Refill Questions - Documented Responses      Flowsheet Row Most Recent Value   Patient Availability and HIPAA Verification    Does patient want to proceed with activity? Yes   HIPAA/medical authority confirmed? Yes   Relationship to patient of person spoken to? Self   Refill Screening Questions    Changes to allergies? No   Changes to medications? No   New conditions since last clinic visit? No   Unplanned office visit, urgent care, ED, or hospital admission in the last 4 weeks? No   How does patient/caregiver feel medication is working? Good   Financial problems or insurance changes? No   How many doses of your specialty medications were missed in the last 4 weeks? 0   Would patient like to speak to a pharmacist? No   When does the patient need to receive the medication? 04/16/23   Refill Delivery Questions    How will the patient receive the medication? MEDRx   When does the patient need to receive the medication? 04/16/23   Shipping Address Home   Address in [facility] confirmed and updated if neccessary? Yes   Expected Copay ($) 0   Is the patient able to afford the medication copay? Yes   Payment Method zero copay   Days supply of Refill 28   Supplies needed? No supplies needed   Refill activity completed? Yes   Refill activity plan Refill scheduled   Shipment/Pickup Date: 04/12/23            Current Outpatient Medications   Medication Sig    (Magic mouthwash) 1:1:1 diphenhydrAMINE(Benadryl) 12.5mg/5ml liq, aluminum & magnesium hydroxide-simethicone (Maalox), LIDOcaine viscous 2% Swish and spit 5 mLs every 4 (four) hours as needed (mouth sores). for mouth sores    albuterol (ACCUNEB) 0.63 mg/3 mL Nebu Take 3 mLs (0.63 mg total) by nebulization every 6 (six) hours as needed.     amLODIPine (NORVASC) 2.5 MG tablet Take 2.5 mg by mouth.    belimumab (BENLYSTA) 200 mg/mL AtIn Inject 1 mL (200 mg total) into the skin every 7 days.    calcium carbonate (OS-RISSA) 500 mg calcium (1,250 mg) tablet Take 1 tablet by mouth once daily.    cholecalciferol, vitamin D3, 125 mcg (5,000 unit) Tab Take 5,000 Units by mouth once daily.    CREON 36,000-114,000- 180,000 unit CpDR TAKE TWO CAPSULES BY MOUTH THREE TIMES DAILY WITH MEALS AND ONE CAPSULE WITH EACH SNACK    dexamethasone (DECADRON) 4 mg/mL injection Inject 1 mL (4 mg total) into the muscle daily as needed (Signs and symtpoms of severe adrenal insufficiency).    diclofenac sodium (VOLTAREN) 1 % Gel APPLY 2 GRAMS TO AFFECTED AREA 4 TIMES A DAY    dicyclomine (BENTYL) 20 mg tablet TAKE 1 TABLET BY MOUTH EVERY 6 HOURS    ferrous sulfate (IRON ORAL) Take by mouth. No sure dose    folic acid (FOLVITE) 1 MG tablet Take 1 mg by mouth once daily.    gabapentin (NEURONTIN) 300 MG capsule TAKE 1 CAPSULE BY MOUTH EVERY EVENING    hydroCHLOROthiazide (HYDRODIURIL) 12.5 MG Tab Take 12.5 mg by mouth.    hydrOXYchloroQUINE (PLAQUENIL) 200 mg tablet TAKE 1.5 TABLETS BY MOUTH EVERY DAY    hydrOXYzine HCL (ATARAX) 25 MG tablet TAKE 1 TABLET BY MOUTH NIGHTLY AS NEEDED (INSOMNIA).    levocetirizine (XYZAL) 5 MG tablet Take 1 tablet (5 mg total) by mouth every evening.    LIDOcaine (LIDODERM) 5 % Place 1 patch onto the skin once daily. Remove & Discard patch within 12 hours or as directed by MD    loperamide (IMODIUM) 2 mg capsule TAKE 1 TO 2 CAPSULES BY MOUTH FOUR TIMES DAILY    losartan (COZAAR) 50 MG tablet Take 1 tablet (50 mg total) by mouth once daily.    milnacipran (SAVELLA) 100 mg Tab Take 1 tablet (100 mg total) by mouth once daily.    multivitamin/iron/folic acid (CENTRUM WOMEN ORAL) Take 1 tablet by mouth once daily.    nebivoloL (BYSTOLIC) 10 MG Tab     niacin 100 MG Tab Take by mouth. 1 Tablet Oral At bedtime    NIFEdipine (ADALAT CC) 30 MG TbSR Take 30 mg  "by mouth.    omeprazole (PRILOSEC) 20 MG capsule TAKE 1 CAPSULE BY MOUTH TWICE A DAY    ondansetron (ZOFRAN) 4 MG tablet Take 1 tablet (4 mg total) by mouth every 12 (twelve) hours as needed for Nausea.    oxyCODONE-acetaminophen (PERCOCET) 5-325 mg per tablet Take 1 tablet by mouth every 4 (four) hours as needed for Pain.    potassium chloride SA (K-DUR,KLOR-CON M) 10 MEQ tablet Take 3 tabs a day.    predniSONE (DELTASONE) 5 MG tablet TAKE 1 TABLET (5 MG TOTAL) BY MOUTH ONCE DAILY. DOUBLE THE DOSE IN TIMES OF ILLNESS    psyllium husk/aspartame (METAMUCIL FIBER SINGLES ORAL) Take by mouth.    REPATHA SURECLICK 140 mg/mL PnIj INJECT 140 MG INTO THE SKIN EVERY 14 (FOURTEEN) DAYS    RESTASIS 0.05 % ophthalmic emulsion INSTILL 1 DROP INTO BOTH EYES TWICE A DAY    rizatriptan (MAXALT) 10 MG tablet Take 1 tablet (10 mg total) by mouth 2 (two) times daily as needed for Migraine.    rosuvastatin (CRESTOR) 5 MG tablet Take 5 mg by mouth once daily.    valACYclovir (VALTREX) 1000 MG tablet Take 1 tablet (1,000 mg total) by mouth every 12 (twelve) hours. for 10 days    zoledronic acid-mannitol & water (RECLAST) 5 mg/100 mL PgBk     zolpidem (AMBIEN) 5 MG Tab Take 1 tablet (5 mg total) by mouth nightly as needed (insomnia).   Last reviewed on 3/16/2023 11:21 AM by Idania Allison MD    Review of patient's allergies indicates:   Allergen Reactions    Aspirin      Other reaction(s): "hemorrhage"  hemorrhage    Hydrocortisone Nausea Only     Other reaction(s): sick  sick    Lactose intolerance (lactase) [lactase] Nausea And Vomiting    Vicodin [hydrocodone-acetaminophen]      Other reaction(s): hyperactivity    Asacol [mesalamine] Hallucinations     Other reaction(s): Hallucinations  hallucinations    Last reviewed on  3/16/2023 11:21 AM by Idania Allison      Tasks added this encounter   5/7/2023 - Refill Call (Auto Added)   Tasks due within next 3 months   No tasks due.     Radha Gastelum - " Specialty Pharmacy  [facility] [facility] [address]  Phone: [phone]  Fax: [phone]

## 2023-04-12 ENCOUNTER — TELEPHONE (OUTPATIENT)
Dept: HEMATOLOGY/ONCOLOGY | Facility: CLINIC | Age: 63
End: 2023-04-12
Payer: MEDICARE

## 2023-04-12 NOTE — TELEPHONE ENCOUNTER
SPOKE TO THE PATIENT TO SCHEDULE UPCOMING APPOINTMENT WITH [facility] ON MAY 8,2023  PATIENT HAS CONFIRMED APPOINTMENT        JERZY MANRIQUEZ

## 2023-04-17 ENCOUNTER — HOSPITAL ENCOUNTER (EMERGENCY)
Facility: HOSPITAL | Age: 63
Discharge: HOME | End: 2023-04-17
Attending: EMERGENCY MEDICINE
Payer: MEDICARE

## 2023-04-17 VITALS
SYSTOLIC BLOOD PRESSURE: 123 MMHG | BODY MASS INDEX: 21.34 KG/M2 | WEIGHT: 125 LBS | TEMPERATURE: 99 F | HEIGHT: 64 IN | HEART RATE: 99 BPM | RESPIRATION RATE: 20 BRPM | DIASTOLIC BLOOD PRESSURE: 59 MMHG | OXYGEN SATURATION: 96 %

## 2023-04-17 DIAGNOSIS — R05.9 COUGH: Primary | ICD-10-CM

## 2023-04-17 DIAGNOSIS — J06.9 UPPER RESPIRATORY TRACT INFECTION, UNSPECIFIED TYPE: ICD-10-CM

## 2023-04-17 LAB
CTP QC/QA: YES
INFLUENZA A ANTIGEN, POC: NEGATIVE
INFLUENZA B ANTIGEN, POC: NEGATIVE
POC RAPID STREP A: NEGATIVE
SARS-COV-2 RDRP RESP QL NAA+PROBE: NEGATIVE

## 2023-04-17 PROCEDURE — 25000003 PHARM REV CODE 250: Mod: HCNC,ER | Performed by: NURSE PRACTITIONER

## 2023-04-17 PROCEDURE — 99284 EMERGENCY DEPT VISIT MOD MDM: CPT | Mod: 25,HCNC,ER

## 2023-04-17 PROCEDURE — 87804 INFLUENZA ASSAY W/OPTIC: CPT | Mod: HCNC,ER

## 2023-04-17 RX ORDER — ALBUTEROL SULFATE 90 UG/1
1-2 AEROSOL, METERED RESPIRATORY (INHALATION) EVERY 6 HOURS PRN
Qty: 8 G | Refills: 0 | Status: SHIPPED | OUTPATIENT
Start: 2023-04-17

## 2023-04-17 RX ORDER — BENZONATATE 100 MG/1
100 CAPSULE ORAL
Status: COMPLETED | OUTPATIENT
Start: 2023-04-17 | End: 2023-04-17

## 2023-04-17 RX ORDER — PROMETHAZINE HYDROCHLORIDE AND DEXTROMETHORPHAN HYDROBROMIDE 6.25; 15 MG/5ML; MG/5ML
5 SYRUP ORAL NIGHTLY PRN
Qty: 118 ML | Refills: 0 | Status: SHIPPED | OUTPATIENT
Start: 2023-04-17 | End: 2023-04-27

## 2023-04-17 RX ORDER — CETIRIZINE HYDROCHLORIDE 10 MG/1
10 TABLET ORAL DAILY
Qty: 30 TABLET | Refills: 0 | Status: SHIPPED | OUTPATIENT
Start: 2023-04-17 | End: 2024-04-16

## 2023-04-17 RX ORDER — FLUTICASONE PROPIONATE 50 MCG
1 SPRAY, SUSPENSION (ML) NASAL 2 TIMES DAILY PRN
Qty: 15 G | Refills: 0 | Status: SHIPPED | OUTPATIENT
Start: 2023-04-17

## 2023-04-17 RX ADMIN — BENZONATATE 100 MG: 100 CAPSULE ORAL at 10:04

## 2023-04-18 ENCOUNTER — TELEPHONE (OUTPATIENT)
Dept: INTERNAL MEDICINE | Facility: CLINIC | Age: 63
End: 2023-04-18
Payer: MEDICARE

## 2023-04-18 NOTE — DISCHARGE INSTRUCTIONS
You have been prescribed PROMETHAZINE DM for cough. Please do not take this medication while working, drinking alcohol, swimming, or while driving/operating heavy machinery. This medication may cause drowsiness, impair judgment, and reduce physical capabilities.    Thank you for coming to our Emergency Department today. It is important to remember that some problems or medical conditions are difficult to diagnose and may not be found during your Emergency Department visit.     Be sure to follow up with your primary care doctor and review all labs/imaging/tests that were performed during your ER visit with them. Some labs/tests may be outside of the normal range and require non-emergent follow-up and further investigation to help diagnose/exclude/prevent complications or other potentially serious medical conditions that were not addressed during your ER visit.    If you do not have a primary care doctor, you may contact the one listed on your discharge paperwork or you may also call the [facility] Appointment Desk at [phone] to schedule an appointment and establish care with one. It is important to your health that you have a primary care doctor.    Please take all medications as directed. All medications may potentially have side-effects and it is impossible to predict which medications may give you side-effects or what side-effects (if any) they will give you.. If you feel that you are having a negative effect or side-effect of any medication you should immediately stop taking them and seek medical attention. If you feel that you are having a life-threatening reaction call 911.    Return to the ER with any questions/concerns, new/concerning symptoms, worsening or failure to improve.     Do not drive, swim, climb to height, take a bath, operate heavy machinery, drink alcohol or take potentially sedating medications, sign any legal documents or make any important decisions for 24 hours if you have received  any pain medications, sedatives or mood altering drugs during your ER visit or within 24 hours of taking them if they have been prescribed to you.     You can find additional resources for Dentists, hearing aids, durable medical equipment, low cost pharmacies and other resources at [email or web address]    BELOW THIS LINE ONLY APPLIES IF YOU HAVE A COVID TEST PENDING OR IF YOU HAVE BEEN DIAGNOSED WITH COVID:  Please access [facility] to review the results of your test. Until the results of your COVID test return, you should isolate yourself so as not to potentially spread illness to others.   If your COVID test returns positive, you should isolate yourself so as not to spread illness to others. After five full days, if you are feeling better and you have not had fever for 24 hours, you can return to your typical daily activities, but you must wear a mask around others for an additional 5 days.   If your COVID test returns negative and you are either unvaccinated or more than six months out from your two-dose vaccine and are not yet boosted, you should still quarantine for 5 full days followed by strict mask use for an additional 5 full days.   If your COVID test returns negative and you have received your 2-dose initial vaccine as well as a booster, you should continue strict mask use for 10 full days after the exposure.  For all those exposed, best practice includes a test at day 5 after the exposure. This can be a home test or a test through one of the many testing centers throughout our community.   Masking is always advised to limit the spread of COVID. Cdc.gov is an excellent site to obtain the latest up to date recommendations regarding COVID and COVID testing.     CDC Testing and Quarantine Guidelines for patients with exposure to a known-positive COVID-19 person:  A close exposure is defined as anyone who has had an exposure (masked or unmasked) to a known COVID -19 positive person within 6 feet of  someone for a cumulative total of 15 minutes or more over a 24-hour period.   Vaccinated and/or if you recently had a positive covid test within 90 days do NOT need to quarantine after contact with someone who had COVID-19 unless you develop symptoms.   Fully vaccinated people who have not had a positive test within 90 days, should get tested 3-5 days after their exposure, even if they don't have symptoms and wear a mask indoors in public for 14 days following exposure or until their test result is negative.      Unvaccinated and/or NOT had a positive test within 90 days and meet close exposure  You are required by CDC guidelines to quarantine for at least 5 days from time of exposure followed by 5 days of strict masking. It is recommended, but not required to test after 5 days, unless you develop symptoms, in which case you should test at that time.  If you get tested after 5 days and your test is positive, your 5 day period of isolation starts the day of the positive test.    If your exposure does not meet the above definition, you can return to your normal daily activities to include social distancing, wearing a mask and frequent handwashing.      Here is a link to guidance from the CDC:  https://www.cdc.gov/media/releases/2021/s1227-isolation-quarantine-guidance.html      [facility] Testing Sites:  https://ldh.la.gov/page/3934      [facility] website with testing locations and guidance:  [email or web address]

## 2023-04-18 NOTE — TELEPHONE ENCOUNTER
----- Message from Halle Danielle sent at 4/18/2023  9:41 AM CDT -----  Contact: [phone] Patient  Caller is requesting an earlier appointment then we can schedule.  Caller is requesting a message be sent to the provider.  If this is for urgent care symptoms, did you offer other providers at this location, providers at other locations, or [facility]? (yes, no, n/a):    If this is for the patients physical, did you offer to schedule next available and put on wait list, or to see NP or PA for their physical?  (yes, no, n/a):    When is the next available appointment with their provider:  nothing coming up  Reason for the appointment:  Pt has a Viral respiratory Infection went to ER. Pt is taking belimumab (BENLYSTA) 200 mg/mL AtIn and stated she needs to be seen  Patient preference of timeframe to be scheduled:  ASAP  Would the patient like a call back, or a response through their [facility] portal?:   Call Back Patient please  Comments:

## 2023-04-18 NOTE — ED PROVIDER NOTES
"Encounter Date: 4/17/2023    SCRIBE #1 NOTE: I, Liliane Cannon, am scribing for, and in the presence of,  Ondina Finnegan NP. I have scribed the following portions of the note - Other sections scribed: HPI, ROS.     History     Chief Complaint   Patient presents with    Cough     Reports cough with chest congestion x 4 days. Reports trying to contact her PCP today with no response. Denies fever     CC: cough  This is a 63 y.o. female with asthma, HLD, and others who presents to the ED for a cough onset 4 days ago. Pt reports that before her cough she was having sinus congestion. She reports attempting to alleviate her symptoms with Robitussin with no relief. Pt reports taking  5 mg of steroids daily. She denies tobacco use. Pt denies recently taking antibiotics. Pt denies any known recent sick contacts. Denies leg swelling, fever, chills, chest pain, SOB, coughing up blood.    The history is provided by the patient. No  was used.   Review of patient's allergies indicates:   Allergen Reactions    Aspirin      Other reaction(s): "hemorrhage"  hemorrhage    Hydrocortisone Nausea Only     Other reaction(s): sick  sick    Lactose intolerance (lactase) [lactase] Nausea And Vomiting    Vicodin [hydrocodone-acetaminophen]      Other reaction(s): hyperactivity    Asacol [mesalamine] Hallucinations     Other reaction(s): Hallucinations  hallucinations     Past Medical History:   Diagnosis Date    Acid reflux     Adrenal insufficiency, primary     Arthritis     Asthma     B12 deficiency anemia     Benign essential hypertension 2/7/2021    Blood transfusion 2010    Breast cancer 2/2016    Right breast infiltrating ductal CA    Cataract     Chronic pain     Deep vein thrombosis     Dry eyes     Esophagitis, unspecified     Fibromyalgia     General anesthetics causing adverse effect in therapeutic use     "slow to wake up bc I don't have an immune system    Heart murmur     History of colon polyps     " Hyperlipidemia     Hypopituitary dwarfism     Iron deficiency anemia     Lupus     Migraines, neuralgic     Nonspecific ulcerative colitis     OP (osteoporosis)     history of reclast    Osteopenia     Pancreatic cyst     Schatzki's ring     Steroid sulfatase deficiency     Ulcerative colitis     Vitamin D deficiency disease      Past Surgical History:   Procedure Laterality Date    ANORECTAL MANOMETRY N/A 1/13/2022    Procedure: MANOMETRY, ANORECTAL;  Surgeon: First Available Stu Gastelum;  Location: [facility] ENDO (4TH FLR);  Service: Endoscopy;  Laterality: N/A;  new order placed; original order placed incorrectly  1/7 instructions handed to patient-st    APPENDECTOMY      BREAST BIOPSY Right 2/2016    IDC    BREAST RECONSTRUCTION      BREAST SURGERY      CHOLECYSTECTOMY      COLON SURGERY      ENDOSCOPIC ULTRASOUND OF UPPER GASTROINTESTINAL TRACT N/A 8/6/2018    Procedure: ULTRASOUND, ENDOSCOPIC, UPPER GI TRACT;  Surgeon: Hong Finn MD;  Location: [facility] (2ND FLR);  Service: Endoscopy;  Laterality: N/A;    ESOPHAGOGASTRODUODENOSCOPY N/A 12/10/2018    Procedure: EGD (ESOPHAGOGASTRODUODENOSCOPY);  Surgeon: Reese Villalta MD;  Location: [facility] (4TH FLR);  Service: Endoscopy;  Laterality: N/A;    ESOPHAGOGASTRODUODENOSCOPY N/A 12/30/2021    Procedure: EGD (ESOPHAGOGASTRODUODENOSCOPY);  Surgeon: Reese Villalta MD;  Location: [facility] (2ND FLR);  Service: Endoscopy;  Laterality: N/A;  EGD for esophageal dysphagia and early satiety please schedule 1st provider available. wants this date-Dr Villalta only     fully vaccinated-GT  hx of adrenal insuffiency   12/27 arrival time confirmed with pt-rb    ESOPHAGOGASTRODUODENOSCOPY N/A 9/9/2022    Procedure: EGD (ESOPHAGOGASTRODUODENOSCOPY);  Surgeon: Reese Villalta MD;  Location: [facility] (4TH FLR);  Service: Endoscopy;  Laterality: N/A;  vacc  inst handed to pt-LW    FLEXIBLE SIGMOIDOSCOPY N/A 12/10/2018    Procedure: SIGMOIDOSCOPY, FLEXIBLE;  Surgeon: Reese  ADAM Villalta MD;  Location: [facility] ENDO (4TH FLR);  Service: Endoscopy;  Laterality: N/A;  Recommend full split bowel prep for this study just like for a colonoscopy    HYSTERECTOMY      ILEOSCOPY N/A 2/16/2022    Procedure: ILEOSCOPY;  Surgeon: Ceferino Rangel MD;  Location: [facility] ENDO (4TH FLR);  Service: Endoscopy;  Laterality: N/A;  medical history significant for Ulcerative colitis s/p total colectomy with ileoanal anastomosis (5901-9508), SLE on plaquenil, adrenal insufficiency on prednisone, breast cancer, and HTN who presents with 3 month history of bilateral lower abdominal pain. Patient with abd    MASTECTOMY      OOPHORECTOMY Bilateral     restorative proctectomy      total abdominal colectomy with ileostomy  2010    TOTAL COLECTOMY      TOTAL REDUCTION MAMMOPLASTY Left 2017    UPPER ENDOSCOPIC ULTRASOUND W/ FNA       Family History   Problem Relation Age of Onset    Diabetes Father     Hyperlipidemia Father     Hypertension Father     Hyperlipidemia Mother     Cancer Maternal Aunt         pancreatic cancer, late 50s at dx    Pancreatic cancer Maternal Aunt     Breast cancer Maternal Aunt 55    Breast cancer Cousin 28    Breast cancer Other 45    No Known Problems Sister     No Known Problems Brother     Cancer Maternal Uncle 65        pancreatic cancer    Pancreatic cancer Maternal Uncle     No Known Problems Paternal Aunt     No Known Problems Paternal Uncle     No Known Problems Maternal Grandmother     No Known Problems Maternal Grandfather     No Known Problems Paternal Grandmother     No Known Problems Paternal Grandfather     Breast cancer Maternal Cousin 50    Cancer Other 25        liver cancer    Amblyopia Neg Hx     Blindness Neg Hx     Cataracts Neg Hx     Macular degeneration Neg Hx     Retinal detachment Neg Hx     Strabismus Neg Hx     Stroke Neg Hx     Thyroid disease Neg Hx     Glaucoma Neg Hx     Ovarian cancer Neg Hx     Celiac disease Neg Hx     Colon cancer Neg Hx     Colon polyps  Neg Hx     Esophageal cancer Neg Hx     Liver cancer Neg Hx     Rectal cancer Neg Hx     Stomach cancer Neg Hx     Ulcerative colitis Neg Hx     Inflammatory bowel disease Neg Hx      Social History     Tobacco Use    Smoking status: Never    Smokeless tobacco: Never   Substance Use Topics    Alcohol use: No     Alcohol/week: 0.0 standard drinks    Drug use: No     Review of Systems   Constitutional:  Negative for activity change, appetite change, chills, fatigue and fever.   HENT:  Negative for congestion, sore throat, trouble swallowing and voice change.    Eyes:  Negative for photophobia, pain, redness and visual disturbance.   Respiratory:  Positive for cough. Negative for chest tightness, shortness of breath and wheezing.    Cardiovascular:  Negative for chest pain, palpitations and leg swelling.        (-)coughing up blood   Gastrointestinal:  Negative for abdominal distention, abdominal pain, anal bleeding, constipation, diarrhea, nausea and vomiting.   Endocrine: Negative for polydipsia, polyphagia and polyuria.   Genitourinary:  Negative for difficulty urinating, dysuria, frequency, hematuria, urgency, vaginal bleeding and vaginal discharge.   Musculoskeletal:  Negative for arthralgias and myalgias.   Skin:  Negative for rash and wound.   Neurological:  Negative for dizziness, weakness, light-headedness and headaches.   Hematological:  Negative for adenopathy.     Physical Exam     Initial Vitals [04/17/23 2007]   BP Pulse Resp Temp SpO2   (!) 123/59 99 20 98.5 °F (36.9 °C) 96 %      MAP       --         Physical Exam    Constitutional: She appears well-developed and well-nourished. She is not diaphoretic.  Non-toxic appearance. She does not have a sickly appearance. No distress.   HENT:   Head: Normocephalic and atraumatic.   Right Ear: Tympanic membrane, external ear and ear canal normal.   Left Ear: Tympanic membrane, external ear and ear canal normal.   Nose: Mucosal edema present.   Mouth/Throat: Uvula  is midline, oropharynx is clear and moist and mucous membranes are normal. No oropharyngeal exudate.   Eyes: Conjunctivae and EOM are normal. Pupils are equal, round, and reactive to light.   Neck:   Normal range of motion.  Cardiovascular:  Normal rate, regular rhythm, normal heart sounds and intact distal pulses.           Pulmonary/Chest: No respiratory distress.   Speaking in full and clear sentences without dyspnea or pause.  No tachypnea.   Musculoskeletal:         General: Normal range of motion.      Cervical back: Normal range of motion.      Comments: Extremities without swelling.  No pretibial edema.     Neurological: She is alert and oriented to person, place, and time.   Skin: Skin is warm and dry.   Psychiatric: She has a normal mood and affect. Her behavior is normal.       ED Course   Procedures  Labs Reviewed   SARS-COV-2 RDRP GENE    Narrative:     This test utilizes isothermal nucleic acid amplification technology to detect the SARS-CoV-2 RdRp nucleic acid segment. The analytical sensitivity (limit of detection) is 500 copies/swab.     A POSITIVE result is indicative of the presence of SARS-CoV-2 RNA; clinical correlation with patient history and other diagnostic information is necessary to determine patient infection status.    A NEGATIVE result means that SARS-CoV-2 nucleic acids are not present above the limit of detection. A NEGATIVE result should be treated as presumptive. It does not rule out the possibility of COVID-19 and should not be the sole basis for treatment decisions. If COVID-19 is strongly suspected based on clinical and exposure history, re-testing using an alternate molecular assay should be considered.     This test is only for use under the Food and Drug Administration s Emergency Use Authorization (EUA).     Commercial kits are provided by [employer]. Performance characteristics of the EUA have been independently verified by [facility] Department of  Pathology and Laboratory Medicine.   _________________________________________________________________   The authorized Fact Sheet for Healthcare Providers and the authorized Fact Sheet for Patients of the ID NOW COVID-19 are available on the FDA website:    https://www.fda.gov/media/622786/download      https://www.fda.gov/media/097722/download      POCT INFLUENZA A/B MOLECULAR   POCT STREP A MOLECULAR   POCT STREP A, RAPID   POCT RAPID INFLUENZA A/B          Imaging Results              X-Ray Chest PA And Lateral (Final result)  Result time 04/17/23 21:54:58      Final result by Bala Valles MD (04/17/23 21:54:58)                   Impression:      No acute process.      Electronically signed by: Bala Valles MD  Date:    04/17/2023  Time:    21:54               Narrative:    EXAMINATION:  XR CHEST PA AND LATERAL    CLINICAL HISTORY:  Cough, unspecified    TECHNIQUE:  PA and lateral views of the chest were performed.    COMPARISON:  12/31/2019.    FINDINGS:  The trachea is unremarkable.  The cardiomediastinal silhouette is normal limits.  The hemidiaphragms are unremarkable.  There are no pleural effusions.  There is no evidence of a pneumothorax.  There is no evidence of pneumomediastinum.  No airspace opacity is present.  There are postoperative changes in the right chest wall.  The osseous structures are unremarkable.                                       Medications   benzonatate capsule 100 mg (100 mg Oral Given 4/17/23 2212)     Medical Decision Making:   History:   Old Medical Records: I decided to obtain old medical records.  Clinical Tests:   Lab Tests: Ordered and Reviewed  Radiological Study: Ordered and Reviewed  ED Management:  This is an evaluation of a 63 y.o. female that presents to the Emergency Department for cough for 4 days. The patient is a non-toxic, afebrile, and well appearing female. On physical exam ears and pharynx are without evidence of infection. Appears well hydrated with moist mucus  membranes. Neck soft and supple with no meningeal signs or cervical lymphadenopathy. Breath sounds are clear and equal bilaterally with no adventitious breath sounds, tachypnea or respiratory distress with room air pulse ox of 96% and no evidence of hypoxia.     Vital Signs Are Reassuring.  RESULTS:   COVID and flu negative.    Chest x-ray unremarkable.    My overall impression is Viral URI. I considered, but at this time, do not suspect OM, OE, strep pharyngitis, meningitis, pneumonia, or acute bacterial sinusitis.    The diagnosis, treatment plan, instructions for follow-up and reevaluation with PCP as well as ED return precautions were discussed and understanding was verbalized. All questions or concerns have been addressed.     This case was discussed with Dr. Arechiga who is in agreement with my assessment and plan.            Scribe Attestation:   Scribe #1: I performed the above scribed service and the documentation accurately describes the services I performed. I attest to the accuracy of the note.                  Scribe attestation: I, LINA Finnegan, personally performed the services described in this documentation. All medical record entries made by the scribe were at my direction and in my presence.  I have reviewed the chart and agree that the record reflects my personal performance and is accurate and complete.   Clinical Impression:   Final diagnoses:  [R05.9] Cough (Primary)  [J06.9] Upper respiratory tract infection, unspecified type        ED Disposition Condition    Discharge Stable          ED Prescriptions       Medication Sig Dispense Start Date End Date Auth. Provider    cetirizine (ZYRTEC) 10 MG tablet Take 1 tablet (10 mg total) by mouth once daily. 30 tablet 4/17/2023 4/16/2024 Ondina Finnegan NP    fluticasone propionate (FLONASE) 50 mcg/actuation nasal spray 1 spray (50 mcg total) by Each Nostril route 2 (two) times daily as needed for Rhinitis or Allergies. 15 g 4/17/2023 -- Ondina MEDINA  Truxillo, NP    albuterol (PROVENTIL/VENTOLIN HFA) 90 mcg/actuation inhaler Inhale 1-2 puffs into the lungs every 6 (six) hours as needed for Wheezing or Shortness of Breath. Rescue 8 g 4/17/2023 -- Ondina Finnegan NP    promethazine-dextromethorphan (PROMETHAZINE-DM) 6.25-15 mg/5 mL Syrp Take 5 mLs by mouth nightly as needed (cough). 118 mL 4/17/2023 4/27/2023 Ondina Finnegan NP          Follow-up Information       Follow up With Specialties Details Why Contact Info    Shonda Anguiano MD Internal Medicine Schedule an appointment as soon as possible for a visit  For follow-up [address] [facility] [address]  [phone]      [facility] ED Emergency Medicine Go to  If symptoms worsen [address] [facility] [address] [phone]             Ondina Finnegan NP  04/17/23 4901

## 2023-04-19 ENCOUNTER — HOSPITAL ENCOUNTER (EMERGENCY)
Facility: HOSPITAL | Age: 63
Discharge: HOME | End: 2023-04-19
Attending: EMERGENCY MEDICINE
Payer: MEDICARE

## 2023-04-19 VITALS
WEIGHT: 125 LBS | TEMPERATURE: 97 F | BODY MASS INDEX: 21.46 KG/M2 | HEART RATE: 96 BPM | SYSTOLIC BLOOD PRESSURE: 124 MMHG | RESPIRATION RATE: 14 BRPM | OXYGEN SATURATION: 100 % | DIASTOLIC BLOOD PRESSURE: 79 MMHG

## 2023-04-19 DIAGNOSIS — E87.6 HYPOKALEMIA: ICD-10-CM

## 2023-04-19 DIAGNOSIS — R04.0 EPISTAXIS: Primary | ICD-10-CM

## 2023-04-19 LAB
ANION GAP SERPL CALC-SCNC: 13 MMOL/L (ref 8–16)
BASOPHILS # BLD AUTO: 0.04 K/UL (ref 0–0.2)
BASOPHILS NFR BLD: 0.6 % (ref 0–1.9)
BUN SERPL-MCNC: 12 MG/DL (ref 8–23)
CALCIUM SERPL-MCNC: 9.1 MG/DL (ref 8.7–10.5)
CHLORIDE SERPL-SCNC: 104 MMOL/L (ref 95–110)
CO2 SERPL-SCNC: 25 MMOL/L (ref 23–29)
CREAT SERPL-MCNC: 0.8 MG/DL (ref 0.5–1.4)
DIFFERENTIAL METHOD: ABNORMAL
EOSINOPHIL # BLD AUTO: 0.4 K/UL (ref 0–0.5)
EOSINOPHIL NFR BLD: 5.6 % (ref 0–8)
ERYTHROCYTE [DISTWIDTH] IN BLOOD BY AUTOMATED COUNT: 14.2 % (ref 11.5–14.5)
EST. GFR  (NO RACE VARIABLE): >60 ML/MIN/1.73 M^2
GLUCOSE SERPL-MCNC: 196 MG/DL (ref 70–110)
HCT VFR BLD AUTO: 40.9 % (ref 37–48.5)
HGB BLD-MCNC: 13 G/DL (ref 12–16)
IMM GRANULOCYTES # BLD AUTO: 0.02 K/UL (ref 0–0.04)
IMM GRANULOCYTES NFR BLD AUTO: 0.3 % (ref 0–0.5)
LYMPHOCYTES # BLD AUTO: 2.7 K/UL (ref 1–4.8)
LYMPHOCYTES NFR BLD: 42.6 % (ref 18–48)
MCH RBC QN AUTO: 27.3 PG (ref 27–31)
MCHC RBC AUTO-ENTMCNC: 31.8 G/DL (ref 32–36)
MCV RBC AUTO: 86 FL (ref 82–98)
MONOCYTES # BLD AUTO: 1.1 K/UL (ref 0.3–1)
MONOCYTES NFR BLD: 17.3 % (ref 4–15)
NEUTROPHILS # BLD AUTO: 2.2 K/UL (ref 1.8–7.7)
NEUTROPHILS NFR BLD: 33.6 % (ref 38–73)
NRBC BLD-RTO: 0 /100 WBC
PLATELET # BLD AUTO: 271 K/UL (ref 150–450)
PMV BLD AUTO: 10.3 FL (ref 9.2–12.9)
POTASSIUM SERPL-SCNC: 3.3 MMOL/L (ref 3.5–5.1)
RBC # BLD AUTO: 4.76 M/UL (ref 4–5.4)
SODIUM SERPL-SCNC: 142 MMOL/L (ref 136–145)
WBC # BLD AUTO: 6.41 K/UL (ref 3.9–12.7)

## 2023-04-19 PROCEDURE — 30903 CONTROL OF NOSEBLEED: CPT | Mod: HCNC

## 2023-04-19 PROCEDURE — 85025 COMPLETE CBC W/AUTO DIFF WBC: CPT | Mod: HCNC | Performed by: EMERGENCY MEDICINE

## 2023-04-19 PROCEDURE — 30901 CONTROL OF NOSEBLEED: CPT | Mod: RT,,, | Performed by: EMERGENCY MEDICINE

## 2023-04-19 PROCEDURE — 30901 PR CTRL 2SEBLEED,ANTER,SIMPLE: ICD-10-PCS | Mod: RT,,, | Performed by: EMERGENCY MEDICINE

## 2023-04-19 PROCEDURE — 99283 EMERGENCY DEPT VISIT LOW MDM: CPT | Mod: 25,HCNC

## 2023-04-19 PROCEDURE — 99284 PR EMERGENCY DEPT VISIT,LEVEL IV: ICD-10-PCS | Mod: 25,,, | Performed by: EMERGENCY MEDICINE

## 2023-04-19 PROCEDURE — 80048 BASIC METABOLIC PNL TOTAL CA: CPT | Mod: HCNC | Performed by: EMERGENCY MEDICINE

## 2023-04-19 PROCEDURE — 25000003 PHARM REV CODE 250: Mod: HCNC | Performed by: EMERGENCY MEDICINE

## 2023-04-19 PROCEDURE — 99284 EMERGENCY DEPT VISIT MOD MDM: CPT | Mod: 25,,, | Performed by: EMERGENCY MEDICINE

## 2023-04-19 PROCEDURE — 30901 CONTROL OF NOSEBLEED: CPT | Mod: HCNC,RT

## 2023-04-19 RX ORDER — SILVER NITRATE 38.21; 12.74 MG/1; MG/1
1 STICK TOPICAL
Status: COMPLETED | OUTPATIENT
Start: 2023-04-19 | End: 2023-04-19

## 2023-04-19 RX ORDER — OXYMETAZOLINE HCL 0.05 %
1 SPRAY, NON-AEROSOL (ML) NASAL
Status: COMPLETED | OUTPATIENT
Start: 2023-04-19 | End: 2023-04-19

## 2023-04-19 RX ORDER — HYDROMORPHONE HYDROCHLORIDE 1 MG/ML
1 INJECTION, SOLUTION INTRAMUSCULAR; INTRAVENOUS; SUBCUTANEOUS ONCE
Status: DISCONTINUED | OUTPATIENT
Start: 2023-04-19 | End: 2023-04-19

## 2023-04-19 RX ADMIN — OXYMETAZOLINE HCL 1 SPRAY: 0.05 SPRAY NASAL at 09:04

## 2023-04-19 RX ADMIN — SILVER NITRATE APPLICATORS 1 APPLICATOR: 25; 75 STICK TOPICAL at 10:04

## 2023-04-20 ENCOUNTER — HOSPITAL ENCOUNTER (EMERGENCY)
Facility: HOSPITAL | Age: 63
Discharge: HOME | End: 2023-04-20
Attending: EMERGENCY MEDICINE
Payer: MEDICARE

## 2023-04-20 ENCOUNTER — OFFICE VISIT (OUTPATIENT)
Dept: INTERNAL MEDICINE | Facility: CLINIC | Age: 63
End: 2023-04-20
Payer: MEDICARE

## 2023-04-20 VITALS
DIASTOLIC BLOOD PRESSURE: 82 MMHG | WEIGHT: 128.5 LBS | SYSTOLIC BLOOD PRESSURE: 122 MMHG | HEIGHT: 64 IN | HEART RATE: 97 BPM | BODY MASS INDEX: 21.94 KG/M2 | OXYGEN SATURATION: 97 %

## 2023-04-20 VITALS
TEMPERATURE: 97 F | OXYGEN SATURATION: 100 % | BODY MASS INDEX: 22.2 KG/M2 | SYSTOLIC BLOOD PRESSURE: 141 MMHG | HEART RATE: 94 BPM | HEIGHT: 64 IN | RESPIRATION RATE: 20 BRPM | DIASTOLIC BLOOD PRESSURE: 91 MMHG | WEIGHT: 130 LBS

## 2023-04-20 DIAGNOSIS — R04.0 EPISTAXIS: Primary | ICD-10-CM

## 2023-04-20 DIAGNOSIS — E87.6 LOW BLOOD POTASSIUM: ICD-10-CM

## 2023-04-20 DIAGNOSIS — R04.0 RECURRENT EPISTAXIS: ICD-10-CM

## 2023-04-20 DIAGNOSIS — R04.0 RIGHT-SIDED EPISTAXIS: Primary | ICD-10-CM

## 2023-04-20 DIAGNOSIS — J00 COMMON COLD: ICD-10-CM

## 2023-04-20 DIAGNOSIS — J33.0 POLYP OF RIGHT NASAL CAVITY: ICD-10-CM

## 2023-04-20 PROCEDURE — 3288F PR FALLS RISK ASSESSMENT DOCUMENTED: ICD-10-PCS | Mod: HCNC,CPTII,S$GLB, | Performed by: INTERNAL MEDICINE

## 2023-04-20 PROCEDURE — 4010F ACE/ARB THERAPY RXD/TAKEN: CPT | Mod: HCNC,CPTII,S$GLB, | Performed by: INTERNAL MEDICINE

## 2023-04-20 PROCEDURE — 30901 CONTROL OF NOSEBLEED: CPT | Mod: RT,,, | Performed by: EMERGENCY MEDICINE

## 2023-04-20 PROCEDURE — 25000003 PHARM REV CODE 250: Mod: HCNC | Performed by: EMERGENCY MEDICINE

## 2023-04-20 PROCEDURE — 3044F PR MOST RECENT HEMOGLOBIN A1C LEVEL <7.0%: ICD-10-PCS | Mod: HCNC,CPTII,S$GLB, | Performed by: INTERNAL MEDICINE

## 2023-04-20 PROCEDURE — 1159F MED LIST DOCD IN RCRD: CPT | Mod: HCNC,CPTII,S$GLB, | Performed by: INTERNAL MEDICINE

## 2023-04-20 PROCEDURE — 1160F PR REVIEW ALL MEDS BY PRESCRIBER/CLIN PHARMACIST DOCUMENTED: ICD-10-PCS | Mod: HCNC,CPTII,S$GLB, | Performed by: INTERNAL MEDICINE

## 2023-04-20 PROCEDURE — 3008F BODY MASS INDEX DOCD: CPT | Mod: HCNC,CPTII,S$GLB, | Performed by: INTERNAL MEDICINE

## 2023-04-20 PROCEDURE — 3074F PR MOST RECENT SYSTOLIC BLOOD PRESSURE < 130 MM HG: ICD-10-PCS | Mod: HCNC,CPTII,S$GLB, | Performed by: INTERNAL MEDICINE

## 2023-04-20 PROCEDURE — 30901 CONTROL OF NOSEBLEED: CPT | Mod: HCNC,RT

## 2023-04-20 PROCEDURE — 99213 OFFICE O/P EST LOW 20 MIN: CPT | Mod: HCNC,S$GLB,, | Performed by: INTERNAL MEDICINE

## 2023-04-20 PROCEDURE — 30901 PR CTRL 2SEBLEED,ANTER,SIMPLE: ICD-10-PCS | Mod: RT,,, | Performed by: EMERGENCY MEDICINE

## 2023-04-20 PROCEDURE — 99213 PR OFFICE/OUTPT VISIT, EST, LEVL III, 20-29 MIN: ICD-10-PCS | Mod: HCNC,S$GLB,, | Performed by: INTERNAL MEDICINE

## 2023-04-20 PROCEDURE — 3288F FALL RISK ASSESSMENT DOCD: CPT | Mod: HCNC,CPTII,S$GLB, | Performed by: INTERNAL MEDICINE

## 2023-04-20 PROCEDURE — 99283 EMERGENCY DEPT VISIT LOW MDM: CPT | Mod: HCNC

## 2023-04-20 PROCEDURE — 99999 PR PBB SHADOW E&M-EST. PATIENT-LVL V: CPT | Mod: PBBFAC,HCNC,, | Performed by: INTERNAL MEDICINE

## 2023-04-20 PROCEDURE — 1101F PR PT FALLS ASSESS DOC 0-1 FALLS W/OUT INJ PAST YR: ICD-10-PCS | Mod: HCNC,CPTII,S$GLB, | Performed by: INTERNAL MEDICINE

## 2023-04-20 PROCEDURE — 1159F PR MEDICATION LIST DOCUMENTED IN MEDICAL RECORD: ICD-10-PCS | Mod: HCNC,CPTII,S$GLB, | Performed by: INTERNAL MEDICINE

## 2023-04-20 PROCEDURE — 3008F PR BODY MASS INDEX (BMI) DOCUMENTED: ICD-10-PCS | Mod: HCNC,CPTII,S$GLB, | Performed by: INTERNAL MEDICINE

## 2023-04-20 PROCEDURE — 1101F PT FALLS ASSESS-DOCD LE1/YR: CPT | Mod: HCNC,CPTII,S$GLB, | Performed by: INTERNAL MEDICINE

## 2023-04-20 PROCEDURE — 3066F NEPHROPATHY DOC TX: CPT | Mod: HCNC,CPTII,S$GLB, | Performed by: INTERNAL MEDICINE

## 2023-04-20 PROCEDURE — 1160F RVW MEDS BY RX/DR IN RCRD: CPT | Mod: HCNC,CPTII,S$GLB, | Performed by: INTERNAL MEDICINE

## 2023-04-20 PROCEDURE — 3074F SYST BP LT 130 MM HG: CPT | Mod: HCNC,CPTII,S$GLB, | Performed by: INTERNAL MEDICINE

## 2023-04-20 PROCEDURE — 3044F HG A1C LEVEL LT 7.0%: CPT | Mod: HCNC,CPTII,S$GLB, | Performed by: INTERNAL MEDICINE

## 2023-04-20 PROCEDURE — 99999 PR PBB SHADOW E&M-EST. PATIENT-LVL V: ICD-10-PCS | Mod: PBBFAC,HCNC,, | Performed by: INTERNAL MEDICINE

## 2023-04-20 PROCEDURE — 99283 EMERGENCY DEPT VISIT LOW MDM: CPT | Mod: HCNC,25,, | Performed by: EMERGENCY MEDICINE

## 2023-04-20 PROCEDURE — 30903 CONTROL OF NOSEBLEED: CPT | Mod: HCNC,RT

## 2023-04-20 PROCEDURE — 3066F PR DOCUMENTATION OF TREATMENT FOR NEPHROPATHY: ICD-10-PCS | Mod: HCNC,CPTII,S$GLB, | Performed by: INTERNAL MEDICINE

## 2023-04-20 PROCEDURE — 4010F PR ACE/ARB THEARPY RXD/TAKEN: ICD-10-PCS | Mod: HCNC,CPTII,S$GLB, | Performed by: INTERNAL MEDICINE

## 2023-04-20 PROCEDURE — 3079F PR MOST RECENT DIASTOLIC BLOOD PRESSURE 80-89 MM HG: ICD-10-PCS | Mod: HCNC,CPTII,S$GLB, | Performed by: INTERNAL MEDICINE

## 2023-04-20 PROCEDURE — 99283 PR EMERGENCY DEPT VISIT,LEVEL III: ICD-10-PCS | Mod: HCNC,25,, | Performed by: EMERGENCY MEDICINE

## 2023-04-20 PROCEDURE — 3079F DIAST BP 80-89 MM HG: CPT | Mod: HCNC,CPTII,S$GLB, | Performed by: INTERNAL MEDICINE

## 2023-04-20 RX ORDER — POTASSIUM CHLORIDE 20 MEQ/1
20 TABLET, EXTENDED RELEASE ORAL 2 TIMES DAILY
Qty: 180 TABLET | Refills: 3 | Status: SHIPPED | OUTPATIENT
Start: 2023-04-20

## 2023-04-20 RX ORDER — SILVER NITRATE 38.21; 12.74 MG/1; MG/1
1 STICK TOPICAL
Status: COMPLETED | OUTPATIENT
Start: 2023-04-20 | End: 2023-04-20

## 2023-04-20 RX ORDER — POTASSIUM CHLORIDE 20 MEQ/1
20 TABLET, EXTENDED RELEASE ORAL 2 TIMES DAILY
Qty: 180 TABLET | Refills: 3 | Status: SHIPPED | OUTPATIENT
Start: 2023-04-20 | End: 2023-04-20

## 2023-04-20 RX ORDER — LIDOCAINE HYDROCHLORIDE AND EPINEPHRINE 10; 10 MG/ML; UG/ML
10 INJECTION, SOLUTION INFILTRATION; PERINEURAL ONCE
Status: DISCONTINUED | OUTPATIENT
Start: 2023-04-20 | End: 2023-04-20 | Stop reason: HOSPADM

## 2023-04-20 RX ADMIN — SILVER NITRATE APPLICATORS 1 APPLICATOR: 25; 75 STICK TOPICAL at 03:04

## 2023-04-20 NOTE — DISCHARGE INSTRUCTIONS
Recommend if nose bleed restarts to spray the Afrin up your nose and place the snuffer around your nose.   Follow up with your PCP within next week  Recommend returning to ED if bleeding restarts and becomes uncontrollable or not stopping with Afrin or snuffer.

## 2023-04-20 NOTE — ED TRIAGE NOTES
"Efe Horowitz, a 63 y.o. female presents to the ED w/ complaint of intermittent nose bleeding since 4AM. Pt reports this has been the worst bleeding all day. Denies blood thinner use. Reports dizziness when the bleeding occurs but denies any other issues.     Triage note:  Chief Complaint   Patient presents with    Epistaxis     Pt reports nose bleed that has been going on intermittently throughout the day. Denies blood thinner use. Many large clots noted around pts nose. Hx of breast ca and report chemo shot      Review of patient's allergies indicates:   Allergen Reactions    Aspirin      Other reaction(s): "hemorrhage"  hemorrhage    Hydrocortisone Nausea Only     Other reaction(s): sick  sick    Lactose intolerance (lactase) [lactase] Nausea And Vomiting    Vicodin [hydrocodone-acetaminophen]      Other reaction(s): hyperactivity    Asacol [mesalamine] Hallucinations     Other reaction(s): Hallucinations  hallucinations     Past Medical History:   Diagnosis Date    Acid reflux     Adrenal insufficiency, primary     Arthritis     Asthma     B12 deficiency anemia     Benign essential hypertension 2/7/2021    Blood transfusion 2010    Breast cancer 2/2016    Right breast infiltrating ductal CA    Cataract     Chronic pain     Deep vein thrombosis     Dry eyes     Esophagitis, unspecified     Fibromyalgia     General anesthetics causing adverse effect in therapeutic use     "slow to wake up bc I don't have an immune system    Heart murmur     History of colon polyps     Hyperlipidemia     Hypopituitary dwarfism     Iron deficiency anemia     Lupus     Migraines, neuralgic     Nonspecific ulcerative colitis     OP (osteoporosis)     history of reclast    Osteopenia     Pancreatic cyst     Schatzki's ring     Steroid sulfatase deficiency     Ulcerative colitis     Vitamin D deficiency disease       "

## 2023-04-20 NOTE — ED PROVIDER NOTES
"Encounter Date: 4/20/2023    SCRIBE #1 NOTE: I, Taty Keane, am scribing for, and in the presence of,  Gino Escalante MD. I have scribed the following portions of the note - Other sections scribed: HPI, ROS, PE.     History     Chief Complaint   Patient presents with    Epistaxis     Started about 45 minutes ago.      Time patient was seen by the provider: 2:47 PM      The patient is a 63 y.o. female with past medical history of systemic lupus, breast CA s/p mastectomy, ulcerative colitis, and fibromyalgia on Plaquenil who presents to the ED with a complaint of atraumatic epistaxis onset PTA. Patient was seen here yesterday for the same complaint. She underwent silver nitrate cauterization to the R nare and discharged home with an Afrin nasal spray. Despite treatment the pt continues to have bleeding to the R nare. No active bleeding currently. She has a nose clamp in place. States she has not had previous episodes of epistaxis in the past. Not currently on anticoagulants. Pt denies a PSHx to the nose. Denies N/V/D, fever, SOB, CP, abdominal pain, and dysuria. She does note a mild cough but attributes this to her ongoing cold. A ten point review of systems was completed and is negative except as documented above.  Patient denies any other acute medical complaint. The patients available PMH, PSH, Social History, medications, allergies, and triage vital signs were reviewed just prior to their medical evaluation.    The history is provided by the patient and medical records. No  was used.   Review of patient's allergies indicates:   Allergen Reactions    Aspirin      Other reaction(s): "hemorrhage"  hemorrhage    Hydrocortisone Nausea Only     Other reaction(s): sick  sick    Lactose intolerance (lactase) [lactase] Nausea And Vomiting    Vicodin [hydrocodone-acetaminophen]      Other reaction(s): hyperactivity    Asacol [mesalamine] Hallucinations     Other reaction(s): " "Hallucinations  hallucinations     Past Medical History:   Diagnosis Date    Acid reflux     Adrenal insufficiency, primary     Arthritis     Asthma     B12 deficiency anemia     Benign essential hypertension 2/7/2021    Blood transfusion 2010    Breast cancer 2/2016    Right breast infiltrating ductal CA    Cataract     Chronic pain     Deep vein thrombosis     Dry eyes     Esophagitis, unspecified     Fibromyalgia     General anesthetics causing adverse effect in therapeutic use     "slow to wake up bc I don't have an immune system    Heart murmur     History of colon polyps     Hyperlipidemia     Hypopituitary dwarfism     Iron deficiency anemia     Lupus     Migraines, neuralgic     Nonspecific ulcerative colitis     OP (osteoporosis)     history of reclast    Osteopenia     Pancreatic cyst     Schatzki's ring     Steroid sulfatase deficiency     Ulcerative colitis     Vitamin D deficiency disease      Past Surgical History:   Procedure Laterality Date    ANORECTAL MANOMETRY N/A 1/13/2022    Procedure: MANOMETRY, ANORECTAL;  Surgeon: First Daniella Gastelum;  Location: [facility] (4TH FLR);  Service: Endoscopy;  Laterality: N/A;  new order placed; original order placed incorrectly  1/7 instructions handed to patient-st    APPENDECTOMY      BREAST BIOPSY Right 2/2016    IDC    BREAST RECONSTRUCTION      BREAST SURGERY      CHOLECYSTECTOMY      COLON SURGERY      ENDOSCOPIC ULTRASOUND OF UPPER GASTROINTESTINAL TRACT N/A 8/6/2018    Procedure: ULTRASOUND, ENDOSCOPIC, UPPER GI TRACT;  Surgeon: Hong Finn MD;  Location: [facility] (2ND FLR);  Service: Endoscopy;  Laterality: N/A;    ESOPHAGOGASTRODUODENOSCOPY N/A 12/10/2018    Procedure: EGD (ESOPHAGOGASTRODUODENOSCOPY);  Surgeon: Reese Villalta MD;  Location: [facility] (4TH FLR);  Service: Endoscopy;  Laterality: N/A;    ESOPHAGOGASTRODUODENOSCOPY N/A 12/30/2021    Procedure: EGD (ESOPHAGOGASTRODUODENOSCOPY);  Surgeon: Reese Villalta MD;  Location: " [facility] (2ND FLR);  Service: Endoscopy;  Laterality: N/A;  EGD for esophageal dysphagia and early satiety please schedule 1st provider available. wants this date-Dr Villalta only     fully vaccinated-GT  hx of adrenal insuffiency   12/27 arrival time confirmed with pt-rb    ESOPHAGOGASTRODUODENOSCOPY N/A 9/9/2022    Procedure: EGD (ESOPHAGOGASTRODUODENOSCOPY);  Surgeon: Reese Villalta MD;  Location: [facility] (4TH FLR);  Service: Endoscopy;  Laterality: N/A;  vacc  inst handed to pt-LW    FLEXIBLE SIGMOIDOSCOPY N/A 12/10/2018    Procedure: SIGMOIDOSCOPY, FLEXIBLE;  Surgeon: Reese Villalta MD;  Location: [facility] (4TH FLR);  Service: Endoscopy;  Laterality: N/A;  Recommend full split bowel prep for this study just like for a colonoscopy    HYSTERECTOMY      ILEOSCOPY N/A 2/16/2022    Procedure: ILEOSCOPY;  Surgeon: Ceferino Rangel MD;  Location: [facility] (4TH FLR);  Service: Endoscopy;  Laterality: N/A;  medical history significant for Ulcerative colitis s/p total colectomy with ileoanal anastomosis (5004-9744), SLE on plaquenil, adrenal insufficiency on prednisone, breast cancer, and HTN who presents with 3 month history of bilateral lower abdominal pain. Patient with abd    MASTECTOMY      OOPHORECTOMY Bilateral     restorative proctectomy      total abdominal colectomy with ileostomy  2010    TOTAL COLECTOMY      TOTAL REDUCTION MAMMOPLASTY Left 2017    UPPER ENDOSCOPIC ULTRASOUND W/ FNA       Family History   Problem Relation Age of Onset    Diabetes Father     Hyperlipidemia Father     Hypertension Father     Hyperlipidemia Mother     Cancer Maternal Aunt         pancreatic cancer, late 50s at dx    Pancreatic cancer Maternal Aunt     Breast cancer Maternal Aunt 55    Breast cancer Cousin 28    Breast cancer Other 45    No Known Problems Sister     No Known Problems Brother     Cancer Maternal Uncle 65        pancreatic cancer    Pancreatic cancer Maternal Uncle     No Known Problems Paternal Aunt      No Known Problems Paternal Uncle     No Known Problems Maternal Grandmother     No Known Problems Maternal Grandfather     No Known Problems Paternal Grandmother     No Known Problems Paternal Grandfather     Breast cancer Maternal Cousin 50    Cancer Other 25        liver cancer    Amblyopia Neg Hx     Blindness Neg Hx     Cataracts Neg Hx     Macular degeneration Neg Hx     Retinal detachment Neg Hx     Strabismus Neg Hx     Stroke Neg Hx     Thyroid disease Neg Hx     Glaucoma Neg Hx     Ovarian cancer Neg Hx     Celiac disease Neg Hx     Colon cancer Neg Hx     Colon polyps Neg Hx     Esophageal cancer Neg Hx     Liver cancer Neg Hx     Rectal cancer Neg Hx     Stomach cancer Neg Hx     Ulcerative colitis Neg Hx     Inflammatory bowel disease Neg Hx      Social History     Tobacco Use    Smoking status: Never    Smokeless tobacco: Never   Substance Use Topics    Alcohol use: No     Alcohol/week: 0.0 standard drinks    Drug use: No     Review of Systems   Constitutional:  Negative for fever.   HENT:  Positive for nosebleeds.    Respiratory:  Positive for cough. Negative for shortness of breath.    Cardiovascular:  Negative for chest pain.   Gastrointestinal:  Negative for abdominal pain, diarrhea, nausea and vomiting.   Genitourinary:  Negative for dysuria.     Physical Exam     Initial Vitals [04/20/23 1435]   BP Pulse Resp Temp SpO2   (!) 141/91 94 20 97 °F (36.1 °C) 100 %      MAP       --         Physical Exam    Nursing note and vitals reviewed.  Constitutional: She appears well-developed and well-nourished. She is not diaphoretic. No distress.   HENT:   Head: Normocephalic and atraumatic.   Nose: Epistaxis is observed.   R nare epistaxis. No active bleeding. Polyp to the septal wall.   Eyes: Conjunctivae are normal. Right eye exhibits no discharge. Left eye exhibits no discharge.   Neck: Neck supple.   Normal range of motion.  Cardiovascular:  Normal rate, regular rhythm and normal heart sounds.     Exam  reveals no gallop and no friction rub.       No murmur heard.  Pulmonary/Chest: Breath sounds normal. No respiratory distress. She has no wheezes. She has no rhonchi. She has no rales.   Abdominal: She exhibits no distension.   Musculoskeletal:         General: No tenderness or edema. Normal range of motion.      Cervical back: Normal range of motion and neck supple.     Neurological: She is alert and oriented to person, place, and time. She has normal strength. GCS score is 15. GCS eye subscore is 4. GCS verbal subscore is 5. GCS motor subscore is 6.   Skin: Skin is warm and dry. No rash noted. No erythema.   Psychiatric: She has a normal mood and affect. Her behavior is normal. Judgment and thought content normal.       ED Course   Epistaxis Mgmt    Date/Time: 4/20/2023 3:14 PM  Performed by: Gino Escalante MD  Authorized by: Gino Escalante MD   Consent Done: Yes  Consent: Verbal consent obtained. Written consent not obtained.  Consent given by: patient  Patient understanding: patient states understanding of the procedure being performed  Patient consent: the patient's understanding of the procedure matches consent given  Patient identity confirmed: verbally with patient    Anesthesia:  Local Anesthetic: lidocaine 1% with epinephrine  Anesthetic total: 20 mL    Patient sedated: no  Treatment site: right anterior  Repair method: merocel sponge, oxymetazoline and silver nitrate  Post-procedure assessment: bleeding stopped  Treatment complexity: complex  Recurrence: recurrence of recent bleed  Patient tolerance: Patient tolerated the procedure well with no immediate complications  Comments: Fibrillar placed after silver nitrate      Labs Reviewed - No data to display       Imaging Results    None          Medications   LIDOcaine-EPINEPHrine 1%-1:100,000 injection 10 mL (has no administration in time range)   silver nitrate applicators applicator 1 applicator (1 applicator Topical (Top) Given by Other  4/20/23 1500)   silver nitrate applicators applicator 1 applicator (1 applicator Topical (Top) Given by Other 4/20/23 8464)     Medical Decision Making:   History:   Old Medical Records: I decided to obtain old medical records.  Old Records Summarized: other records.       <> Summary of Records: Previous ED note  ED Management:  63-year-old female presents with recurrent right-sided nosebleed.  Vitals with hypertension.  Physical exam as above.  Treated nosebleed as above.  Resolved.  Monitored in the ED for 30 minutes to assure resolution.  Patient has ENT appointment on the 1st of May.  Patient will return to ED for worsening symptoms, inability to eat/drink, fever greater than 100.4, or any other concerns. Did bedside teaching with return precautions.  All questions answered.  The patient acknowledges understanding.  Gave verbal discharge instructions.         Scribe Attestation:   Scribe #1: I performed the above scribed service and the documentation accurately describes the services I performed. I attest to the accuracy of the note.                   Clinical Impression:   Final diagnoses:  [R04.0] Right-sided epistaxis (Primary)  [R04.0] Recurrent epistaxis  [J33.0] Polyp of right nasal cavity        ED Disposition Condition    Discharge Stable          ED Prescriptions    None       Follow-up Information       Follow up With Specialties Details Why Contact Info Additional Information    [facility] Gastelum - [facility]   [address]  [facility] [id number] [phone] [facility] - Main Building, 4th Floor Please park in [facility] and use Clinic elevator    Stu Gastelum - Emergency Dept Emergency Medicine  Return to ED for worsening symptoms, inability to eat/drink, fever greater than 100.4, or any other concerns. [address]  [facility] [id number] [phone]              Gino Escalante MD  04/20/23 0891

## 2023-04-20 NOTE — ED NOTES
APPEARANCE: awake and alert in NAD.  SKIN: warm, dry and intact. No breakdown or bruising.  MUSCULOSKELETAL: Patient moving all extremities spontaneously, no obvious swelling or deformities noted. Ambulates independently.  RESPIRATORY: Denies shortness of breath.Respirations unlabored.   CARDIAC: Denies CP, 2+ distal pulses; no peripheral edema  ABDOMEN: S/ND/NT, Denies nausea  : voids spontaneously, denies difficulty  Neurologic: AAO x 4; follows commands equal strength in all extremities; denies numbness/tingling. Denies dizziness

## 2023-04-20 NOTE — PROGRESS NOTES
"INTERNAL MEDICINE ESTABLISHED PATIENT VISIT NOTE    Subjective:     Chief Complaint: Hospital Follow Up       Patient ID: Efe Horowitz is a 63 y.o. 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    Had ED visit on 4/17 for cough and congestion x4 days.  CXR wnl.  Flu, strep, and COVID neg.  Suspected viral and discharged home from ED c cough syrup and flonase.  Went back to the ER last night c epistaxis.  Tx'ed c Afrin and silver nitrate cautery, told possible nasal polyp.  Cbc c normal h/h and normal plts.      No fever.  States nasal congestion improving.  Still c cough but no sob.    Past Medical History:  Past Medical History:   Diagnosis Date    Acid reflux     Adrenal insufficiency, primary     Arthritis     Asthma     B12 deficiency anemia     Benign essential hypertension 2/7/2021    Blood transfusion 2010    Breast cancer 2/2016    Right breast infiltrating ductal CA    Cataract     Chronic pain     Deep vein thrombosis     Dry eyes     Esophagitis, unspecified     Fibromyalgia     General anesthetics causing adverse effect in therapeutic use     "slow to wake up bc I don't have an immune system    Heart murmur     History of colon polyps     Hyperlipidemia     Hypopituitary dwarfism     Iron deficiency anemia     Lupus     Migraines, neuralgic     Nonspecific ulcerative colitis     OP (osteoporosis)     history of reclast    Osteopenia     Pancreatic cyst     Schatzki's ring     " Steroid sulfatase deficiency     Ulcerative colitis     Vitamin D deficiency disease        Home Medications:  Prior to Admission medications    Medication Sig Start Date End Date Taking? Authorizing Provider   (Magic mouthwash) 1:1:1 diphenhydrAMINE(Benadryl) 12.5mg/5ml liq, aluminum & magnesium hydroxide-simethicone (Maalox), LIDOcaine viscous 2% Swish and spit 5 mLs every 4 (four) hours as needed (mouth sores). for mouth sores 3/6/23  Yes Shonda Anguiano MD   albuterol (PROVENTIL/VENTOLIN HFA) 90 mcg/actuation inhaler Inhale 1-2 puffs into the lungs every 6 (six) hours as needed for Wheezing or Shortness of Breath. Rescue 4/17/23  Yes Ondina Finnegan NP   amLODIPine (NORVASC) 2.5 MG tablet Take 2.5 mg by mouth. 9/26/22  Yes Historical Provider   belimumab (BENLYSTA) 200 mg/mL AtIn Inject 1 mL (200 mg total) into the skin every 7 days. 2/7/23  Yes Idania Allison MD   calcium carbonate (OS-RISSA) 500 mg calcium (1,250 mg) tablet Take 1 tablet by mouth once daily.   Yes Historical Provider   cetirizine (ZYRTEC) 10 MG tablet Take 1 tablet (10 mg total) by mouth once daily. 4/17/23 4/16/24 Yes Ondina Finnegan NP   cholecalciferol, vitamin D3, 125 mcg (5,000 unit) Tab Take 5,000 Units by mouth once daily.   Yes Historical Provider   CREON 36,000-114,000- 180,000 unit CpDR TAKE TWO CAPSULES BY MOUTH THREE TIMES DAILY WITH MEALS AND ONE CAPSULE WITH EACH SNACK 4/20/22  Yes Reese Villalta MD   dexamethasone (DECADRON) 4 mg/mL injection Inject 1 mL (4 mg total) into the muscle daily as needed (Signs and symtpoms of severe adrenal insufficiency). 10/6/21  Yes Ena Srinivasan MD   diclofenac sodium (VOLTAREN) 1 % Gel APPLY 2 GRAMS TO AFFECTED AREA 4 TIMES A DAY 3/24/21  Yes Idania Allison MD   dicyclomine (BENTYL) 20 mg tablet TAKE 1 TABLET BY MOUTH EVERY 6 HOURS 3/2/23  Yes Ceferino Rangel MD   ferrous sulfate (IRON ORAL) Take by mouth. No sure dose   Yes Historical Provider   fluticasone propionate  (FLONASE) 50 mcg/actuation nasal spray 1 spray (50 mcg total) by Each Nostril route 2 (two) times daily as needed for Rhinitis or Allergies. 4/17/23  Yes Ondina Finnegan NP   folic acid (FOLVITE) 1 MG tablet Take 1 mg by mouth once daily.   Yes Historical Provider   gabapentin (NEURONTIN) 300 MG capsule TAKE 1 CAPSULE BY MOUTH EVERY EVENING 11/29/21  Yes Idania Allison MD   hydroCHLOROthiazide (HYDRODIURIL) 12.5 MG Tab Take 12.5 mg by mouth. 9/26/22  Yes Historical Provider   hydrOXYchloroQUINE (PLAQUENIL) 200 mg tablet TAKE 1.5 TABLETS BY MOUTH EVERY DAY 12/6/22  Yes Idania Allison MD   hydrOXYzine HCL (ATARAX) 25 MG tablet TAKE 1 TABLET BY MOUTH NIGHTLY AS NEEDED (INSOMNIA). 11/5/22  Yes Historical Provider   levocetirizine (XYZAL) 5 MG tablet Take 1 tablet (5 mg total) by mouth every evening. 3/6/23 3/5/24 Yes Shonda Anguiano MD   LIDOcaine (LIDODERM) 5 % Place 1 patch onto the skin once daily. Remove & Discard patch within 12 hours or as directed by MD 6/26/22  Yes Ambrocio Salas MD   loperamide (IMODIUM) 2 mg capsule TAKE 1 TO 2 CAPSULES BY MOUTH FOUR TIMES DAILY 2/2/23  Yes Shandra Stapleton NP   losartan (COZAAR) 50 MG tablet Take 1 tablet (50 mg total) by mouth once daily. 3/6/23 3/5/24 Yes Shonda Anguiano MD   milnacipran (SAVELLA) 100 mg Tab Take 1 tablet (100 mg total) by mouth once daily. 9/26/22  Yes Shonda Anguiano MD   multivitamin/iron/folic acid (CENTRUM WOMEN ORAL) Take 1 tablet by mouth once daily.   Yes Historical Provider   nebivoloL (BYSTOLIC) 10 MG Tab  3/9/23  Yes Historical Provider   niacin 100 MG Tab Take by mouth. 1 Tablet Oral At bedtime   Yes Historical Provider   NIFEdipine (ADALAT CC) 30 MG TbSR Take 30 mg by mouth. 9/8/22  Yes Historical Provider   omeprazole (PRILOSEC) 20 MG capsule TAKE 1 CAPSULE BY MOUTH TWICE A DAY 1/18/23  Yes Shonda Anguiano MD   ondansetron (ZOFRAN) 4 MG tablet Take 1 tablet (4 mg total) by mouth every 12 (twelve) hours as needed for Nausea. 10/13/20  Yes  "Tierney Mcintyre, JOJO   oxyCODONE-acetaminophen (PERCOCET) 5-325 mg per tablet Take 1 tablet by mouth every 4 (four) hours as needed for Pain. 6/26/22  Yes Ambrocio Salas MD   potassium chloride SA (K-DUR,KLOR-CON M) 10 MEQ tablet Take 3 tabs a day. 9/8/22  Yes Billy Rush DO   predniSONE (DELTASONE) 5 MG tablet TAKE 1 TABLET (5 MG TOTAL) BY MOUTH ONCE DAILY. DOUBLE THE DOSE IN TIMES OF ILLNESS 9/6/22  Yes Ena Srinivasan MD   promethazine-dextromethorphan (PROMETHAZINE-DM) 6.25-15 mg/5 mL Syrp Take 5 mLs by mouth nightly as needed (cough). 4/17/23 4/27/23 Yes Ondina Finnegan NP   psyllium husk/aspartame (METAMUCIL FIBER SINGLES ORAL) Take by mouth.   Yes Historical Provider   REPATHA SURECLICK 140 mg/mL PnIj INJECT 140 MG INTO THE SKIN EVERY 14 (FOURTEEN) DAYS 7/27/21  Yes Historical Provider   RESTASIS 0.05 % ophthalmic emulsion INSTILL 1 DROP INTO BOTH EYES TWICE A DAY 5/20/21  Yes Joana Magallon, OD   rizatriptan (MAXALT) 10 MG tablet Take 1 tablet (10 mg total) by mouth 2 (two) times daily as needed for Migraine. 3/6/23  Yes Shonda Anguiano MD   rosuvastatin (CRESTOR) 5 MG tablet Take 5 mg by mouth once daily.   Yes Historical Provider   zoledronic acid-mannitol & water (RECLAST) 5 mg/100 mL PgBk  10/25/21  Yes Historical Provider   zolpidem (AMBIEN) 5 MG Tab Take 1 tablet (5 mg total) by mouth nightly as needed (insomnia). 3/6/23  Yes Shonda Anguiano MD   valACYclovir (VALTREX) 1000 MG tablet Take 1 tablet (1,000 mg total) by mouth every 12 (twelve) hours. for 10 days 11/17/22 3/6/23  Emily Street MD       Allergies:  Review of patient's allergies indicates:   Allergen Reactions    Aspirin      Other reaction(s): "hemorrhage"  hemorrhage    Hydrocortisone Nausea Only     Other reaction(s): sick  sick    Lactose intolerance (lactase) [lactase] Nausea And Vomiting    Vicodin [hydrocodone-acetaminophen]      Other reaction(s): hyperactivity    Asacol [mesalamine] Hallucinations     Other " "reaction(s): Hallucinations  hallucinations       Social History:  Social History     Tobacco Use    Smoking status: Never    Smokeless tobacco: Never   Substance Use Topics    Alcohol use: No     Alcohol/week: 0.0 standard drinks    Drug use: No        Review of Systems   Constitutional:  Negative for appetite change, chills, fatigue, fever and unexpected weight change.   HENT:  Negative for congestion, hearing loss and rhinorrhea.    Eyes:  Negative for pain and visual disturbance.   Respiratory:  Negative for cough, chest tightness, shortness of breath and wheezing.    Cardiovascular:  Negative for chest pain, palpitations and leg swelling.   Gastrointestinal:  Negative for abdominal distention and abdominal pain.   Endocrine: Negative for polydipsia and polyuria.   Genitourinary:  Negative for decreased urine volume, difficulty urinating, dysuria, hematuria and vaginal discharge.   Neurological:  Negative for weakness, numbness and headaches.   Psychiatric/Behavioral:  Negative for behavioral problems and confusion.        Health Maintenance:     Immunizations:   Influenza 11/2022  TDap 8/2021  Pneumovax 6/2014, Prevnar 13 2/2021, Prevnar 20 11/2022  Shingrix 2/2022, 9/2022  COVID 2/2021, 3/2021, 8/2021 ([employer]), 11/2022     Cancer Screening:  PAP: total hyst 2/2 fibroids, benign, ovaries removed as well.  Mammogram:  10/2022 benign  Colonoscopy: hx total proctocolectomy for UC, had pouchoscopy 12/2018 via dr. Villalta wn, biopsies taken which showed mild chronic inflammation on path.  EGD 12/2018 c hiatal hernia, otherwise unremarkable, bx c chronic inflammation in duodenum on path, normal gastric bx and neg H pylori  DEXA:  9/2021 c osteoporosis, followed by Dr. Srinivasan, now on Reclast    Objective:   /82 (BP Location: Left arm, Patient Position: Sitting, BP Method: Medium (Manual))   Pulse 97   Ht 5' 4" (1.626 m)   Wt 58.3 kg (128 lb 8.5 oz)   SpO2 97%   BMI 22.06 kg/m²        General: AAO x3, no " apparent distress  HEENT: nasal turbinates slightly boggy, small pedunculated area noted on medial aspect of R nare, no active bleeding noted, OP clear, submandibular glands full  CV: RRR, no m/r/g  Pulm: Lungs CTAB, no crackles, no wheezes      Labs:     Lab Results   Component Value Date    WBC 6.41 04/19/2023    HGB 13.0 04/19/2023    HCT 40.9 04/19/2023    MCV 86 04/19/2023     04/19/2023     Sodium   Date Value Ref Range Status   04/19/2023 142 136 - 145 mmol/L Final     Potassium   Date Value Ref Range Status   04/19/2023 3.3 (L) 3.5 - 5.1 mmol/L Final     Chloride   Date Value Ref Range Status   04/19/2023 104 95 - 110 mmol/L Final     CO2   Date Value Ref Range Status   04/19/2023 25 23 - 29 mmol/L Final     Glucose   Date Value Ref Range Status   04/19/2023 196 (H) 70 - 110 mg/dL Final     BUN   Date Value Ref Range Status   04/19/2023 12 8 - 23 mg/dL Final     Creatinine   Date Value Ref Range Status   04/19/2023 0.8 0.5 - 1.4 mg/dL Final     Calcium   Date Value Ref Range Status   04/19/2023 9.1 8.7 - 10.5 mg/dL Final     Total Protein   Date Value Ref Range Status   02/13/2023 6.8 6.0 - 8.4 g/dL Final     Albumin   Date Value Ref Range Status   03/13/2023 4.0 3.5 - 5.2 g/dL Final     Total Bilirubin   Date Value Ref Range Status   02/13/2023 0.4 0.1 - 1.0 mg/dL Final     Comment:     For infants and newborns, interpretation of results should be based  on gestational age, weight and in agreement with clinical  observations.    Premature Infant recommended reference ranges:  Up to 24 hours.............<8.0 mg/dL  Up to 48 hours............<12.0 mg/dL  3-5 days..................<15.0 mg/dL  6-29 days.................<15.0 mg/dL       Alkaline Phosphatase   Date Value Ref Range Status   02/13/2023 49 (L) 55 - 135 U/L Final     AST   Date Value Ref Range Status   02/13/2023 21 10 - 40 U/L Final     ALT   Date Value Ref Range Status   02/13/2023 17 10 - 44 U/L Final     Anion Gap   Date Value Ref Range  Status   04/19/2023 13 8 - 16 mmol/L Final     eGFR if    Date Value Ref Range Status   07/05/2022 >60.0 >60 mL/min/1.73 m^2 Final     eGFR if non    Date Value Ref Range Status   07/05/2022 >60.0 >60 mL/min/1.73 m^2 Final     Comment:     Calculation used to obtain the estimated glomerular filtration  rate (eGFR) is the CKD-EPI equation.        Lab Results   Component Value Date    HGBA1C 5.7 (H) 03/13/2023     Lab Results   Component Value Date    LDLCALC 19.6 (L) 09/06/2022     Lab Results   Component Value Date    TSH 0.974 09/06/2022         Assessment/Plan     Efe was seen today for hospital follow up.    Diagnoses and all orders for this visit:    Epistaxis  As per HPI  Cont conservative mgmt  Advised to stop Flonase  Referral placed for ENT  -     Ambulatory referral/consult to ENT; Future    Common cold  Cont conservative mgmt c otc cough meds  Discussed rest and hydration  Can add Mucinex if needed    Low blood potassium  Low from ED, likely s/e of HCTZ  Will change K supplement from 10 tid to 20 bid.  -     Discontinue: potassium chloride SA (K-DUR,KLOR-CON) 20 MEQ tablet; Take 1 tablet (20 mEq total) by mouth 2 (two) times daily. Take 3 tabs a day.  -     potassium chloride SA (K-DUR,KLOR-CON) 20 MEQ tablet; Take 1 tablet (20 mEq total) by mouth 2 (two) times daily.        HM as above  RTC in in Sept as previously scheduled, sooner if needed.    Shonda Anguiano MD  Department of Internal Medicine - [facility]  04/20/2023

## 2023-04-20 NOTE — ED PROVIDER NOTES
"Encounter Date: 4/19/2023       History     Chief Complaint   Patient presents with    Epistaxis     Pt reports nose bleed that has been going on intermittently throughout the day. Denies blood thinner use. Many large clots noted around pts nose. Hx of breast ca and report chemo shot      Ms. Horowitz is a 64 yo F with PMH systemic lupus, breast CA s/p mastectomy, Ulcerative colitis, and fibromyalgia presents with epistaxis that started around 4 am this morning. Patient was laying in bed and denies trauma or fall to the area. Denies use of blood thinner or bleeding/clotting disorders. Has had 3 episodes today at 4am, 8am, and 8pm. States she has not had previous episodes of epistaxis in the past. No changes in medications. Takes Plaquenil, Prednisone 5mg. Denies HA, blurry vision, LH, dizziness, chest pain, SOB, abd pain, blood in urine or stool. Seen at [facility] for cough 2 days ago.     The history is provided by the patient and medical records. No  was used.   Review of patient's allergies indicates:   Allergen Reactions    Aspirin      Other reaction(s): "hemorrhage"  hemorrhage    Hydrocortisone Nausea Only     Other reaction(s): sick  sick    Lactose intolerance (lactase) [lactase] Nausea And Vomiting    Vicodin [hydrocodone-acetaminophen]      Other reaction(s): hyperactivity    Asacol [mesalamine] Hallucinations     Other reaction(s): Hallucinations  hallucinations     Past Medical History:   Diagnosis Date    Acid reflux     Adrenal insufficiency, primary     Arthritis     Asthma     B12 deficiency anemia     Benign essential hypertension 2/7/2021    Blood transfusion 2010    Breast cancer 2/2016    Right breast infiltrating ductal CA    Cataract     Chronic pain     Deep vein thrombosis     Dry eyes     Esophagitis, unspecified     Fibromyalgia     General anesthetics causing adverse effect in therapeutic use     "slow to wake up bc I don't have an immune system    Heart murmur     " History of colon polyps     Hyperlipidemia     Hypopituitary dwarfism     Iron deficiency anemia     Lupus     Migraines, neuralgic     Nonspecific ulcerative colitis     OP (osteoporosis)     history of reclast    Osteopenia     Pancreatic cyst     Schatzki's ring     Steroid sulfatase deficiency     Ulcerative colitis     Vitamin D deficiency disease      Past Surgical History:   Procedure Laterality Date    ANORECTAL MANOMETRY N/A 1/13/2022    Procedure: MANOMETRY, ANORECTAL;  Surgeon: First Available [facility];  Location: [facility] (4TH FLR);  Service: Endoscopy;  Laterality: N/A;  new order placed; original order placed incorrectly  1/7 instructions handed to patient-st    APPENDECTOMY      BREAST BIOPSY Right 2/2016    IDC    BREAST RECONSTRUCTION      BREAST SURGERY      CHOLECYSTECTOMY      COLON SURGERY      ENDOSCOPIC ULTRASOUND OF UPPER GASTROINTESTINAL TRACT N/A 8/6/2018    Procedure: ULTRASOUND, ENDOSCOPIC, UPPER GI TRACT;  Surgeon: Hong Finn MD;  Location: [facility] (2ND FLR);  Service: Endoscopy;  Laterality: N/A;    ESOPHAGOGASTRODUODENOSCOPY N/A 12/10/2018    Procedure: EGD (ESOPHAGOGASTRODUODENOSCOPY);  Surgeon: Reese Villalta MD;  Location: [facility] (4TH FLR);  Service: Endoscopy;  Laterality: N/A;    ESOPHAGOGASTRODUODENOSCOPY N/A 12/30/2021    Procedure: EGD (ESOPHAGOGASTRODUODENOSCOPY);  Surgeon: Reese Villalta MD;  Location: [facility] (2ND FLR);  Service: Endoscopy;  Laterality: N/A;  EGD for esophageal dysphagia and early satiety please schedule 1st provider available. wants this date-Dr Villalta only     fully vaccinated-GT  hx of adrenal insuffiency   12/27 arrival time confirmed with pt-rb    ESOPHAGOGASTRODUODENOSCOPY N/A 9/9/2022    Procedure: EGD (ESOPHAGOGASTRODUODENOSCOPY);  Surgeon: Reese Villalta MD;  Location: [facility] (4TH FLR);  Service: Endoscopy;  Laterality: N/A;  vacc  inst handed to pt-LW    FLEXIBLE SIGMOIDOSCOPY N/A 12/10/2018    Procedure:  SIGMOIDOSCOPY, FLEXIBLE;  Surgeon: Reese Villalta MD;  Location: [facility] ENDO (4TH FLR);  Service: Endoscopy;  Laterality: N/A;  Recommend full split bowel prep for this study just like for a colonoscopy    HYSTERECTOMY      ILEOSCOPY N/A 2/16/2022    Procedure: ILEOSCOPY;  Surgeon: Ceferino Rangel MD;  Location: [facility] ENDO (4TH FLR);  Service: Endoscopy;  Laterality: N/A;  medical history significant for Ulcerative colitis s/p total colectomy with ileoanal anastomosis ([id number]), SLE on plaquenil, adrenal insufficiency on prednisone, breast cancer, and HTN who presents with 3 month history of bilateral lower abdominal pain. Patient with abd    MASTECTOMY      OOPHORECTOMY Bilateral     restorative proctectomy      total abdominal colectomy with ileostomy  2010    TOTAL COLECTOMY      TOTAL REDUCTION MAMMOPLASTY Left 2017    UPPER ENDOSCOPIC ULTRASOUND W/ FNA       Family History   Problem Relation Age of Onset    Diabetes Father     Hyperlipidemia Father     Hypertension Father     Hyperlipidemia Mother     Cancer Maternal Aunt         pancreatic cancer, late 50s at dx    Pancreatic cancer Maternal Aunt     Breast cancer Maternal Aunt 55    Breast cancer Cousin 28    Breast cancer Other 45    No Known Problems Sister     No Known Problems Brother     Cancer Maternal Uncle 65        pancreatic cancer    Pancreatic cancer Maternal Uncle     No Known Problems Paternal Aunt     No Known Problems Paternal Uncle     No Known Problems Maternal Grandmother     No Known Problems Maternal Grandfather     No Known Problems Paternal Grandmother     No Known Problems Paternal Grandfather     Breast cancer Maternal Cousin 50    Cancer Other 25        liver cancer    Amblyopia Neg Hx     Blindness Neg Hx     Cataracts Neg Hx     Macular degeneration Neg Hx     Retinal detachment Neg Hx     Strabismus Neg Hx     Stroke Neg Hx     Thyroid disease Neg Hx     Glaucoma Neg Hx     Ovarian cancer Neg Hx     Celiac disease Neg Hx      Colon cancer Neg Hx     Colon polyps Neg Hx     Esophageal cancer Neg Hx     Liver cancer Neg Hx     Rectal cancer Neg Hx     Stomach cancer Neg Hx     Ulcerative colitis Neg Hx     Inflammatory bowel disease Neg Hx      Social History     Tobacco Use    Smoking status: Never    Smokeless tobacco: Never   Substance Use Topics    Alcohol use: No     Alcohol/week: 0.0 standard drinks    Drug use: No     Review of Systems   Constitutional:  Negative for activity change, chills and fever.   HENT:  Positive for nosebleeds and postnasal drip. Negative for sore throat.    Eyes:  Negative for visual disturbance.   Respiratory:  Negative for cough and shortness of breath.    Cardiovascular:  Negative for chest pain and leg swelling.   Gastrointestinal:  Negative for abdominal distention, abdominal pain, blood in stool, constipation, nausea and vomiting.   Genitourinary:  Negative for hematuria.   Musculoskeletal:  Negative for myalgias.   Skin:  Negative for rash and wound.   Neurological:  Negative for dizziness, syncope, weakness, light-headedness and headaches.   Hematological:  Does not bruise/bleed easily.   Psychiatric/Behavioral:  Negative for confusion.      Physical Exam     Initial Vitals   BP Pulse Resp Temp SpO2   04/19/23 2109 04/19/23 2109 04/19/23 2109 04/19/23 2121 04/19/23 2109   (!) 146/90 109 18 96.6 °F (35.9 °C) 98 %      MAP       --                Physical Exam    Nursing note and vitals reviewed.  Constitutional: She appears well-developed and well-nourished. She is not diaphoretic.   HENT:   Head: Normocephalic.   Nose: Epistaxis is observed.   Polyp noted on septal wall of R nares.  Small amount of drainage into pharynx   Eyes: Conjunctivae and EOM are normal.   Cardiovascular:  Normal rate, regular rhythm and normal heart sounds.           No murmur heard.  Pulmonary/Chest: Breath sounds normal. No respiratory distress. She has no wheezes. She has no rhonchi. She exhibits no tenderness.   Abdominal:  Abdomen is soft. She exhibits no distension. There is no abdominal tenderness.   Musculoskeletal:         General: No tenderness or edema. Normal range of motion.     Neurological: She is alert and oriented to person, place, and time. She has normal strength. No cranial nerve deficit.   Skin: Skin is warm. No erythema.   Psychiatric: She has a normal mood and affect. Her behavior is normal. Thought content normal.       ED Course   Procedures  Labs Reviewed   CBC W/ AUTO DIFFERENTIAL - Abnormal; Notable for the following components:       Result Value    MCHC 31.8 (*)     Mono # 1.1 (*)     Gran % 33.6 (*)     Mono % 17.3 (*)     All other components within normal limits   BASIC METABOLIC PANEL          Imaging Results    None          Medications   oxymetazoline 0.05 % nasal spray 1 spray (1 spray Each Nostril Given 4/19/23 2373)   silver nitrate applicators applicator 1 applicator (1 applicator Topical (Top) Given by Provider 4/19/23 7772)     Medical Decision Making:   Initial Assessment:   62 yo F presents with 3 episodes of epistaxis that started this morning around 4am. Denies falls or trauma as well as blood thinner use. Large clots noted around R nares and small amount of drainage in posterior oropharynx. Last episode started 8:30 PM. Appears like polyp on septal wall of R nares. Likely anterior epistaxis. Will obtain CBC, administer afrin, nasal packing, and possibly silver nitrate.   Differential Diagnosis:   Anterior epistaxis  Posterior epistaxis   Nasal polyp  Independently Interpreted Test(s):   I have ordered and independently interpreted X-rays - see prior notes.  I have ordered and independently interpreted EKG Reading(s) - see prior notes  Clinical Tests:   Lab Tests: Ordered and Reviewed  The following lab test(s) were unremarkable: CBC and BMP  ED Management:  Arrived to the unit hypertensive 140s, tachycardic 110s, but HDS.   Large clots in R nares and small polyp on septal wall. Small amount of  blood in posterior oropharynx.  Afrin administered 2x  Nasal and oral suctioning performed  Silver nitrate applied to R nasal polyp.   CBC H/H 13/40  BMP K 3.3; Glu 196; Cr 0.8  Observed for an hour without signs of bleeding.   Stable for discharge home and discussed Afrin and snuffer use if bleeding restarts.                ED Course as of 04/19/23 2243 Wed Apr 19, 2023   2142 Epistaxis x3 today. Resolved x 2 then  830-9pm began again. No anticoagulation. [DS]   2211 Patient has a small amount of blood in posterior oropharynx.  She coughed up a small blood clot after initial assessment.  I had the patient blow her nose and then use Afrin her right Mendoza.  I feel like I see a bleeding polyp on her septum anteriorly. [DS]   2236 Patient reassessed after 15 minutes of direct pressure with snuff for after Afrin use.  I performed silver nitrate cautery [DS]      ED Course User Index  [DS] Aroldo Lozoya MD                 Clinical Impression:    Anterior Epistaxis  Nasal Polyp            Ralph Greer DO  Resident  04/19/23 4016

## 2023-04-20 NOTE — PROCEDURES - EMERGENCY DEPT.
ED Procedure Notes:   Epistaxis Mgmt    Date/Time: 4/19/2023 10:41 PM  Performed by: Ralph Greer DO  Authorized by: Aroldo Lozoya MD   Consent Done: Not Needed    Patient sedated: no  Treatment site: right anterior  Repair method: oxymetazoline, silver nitrate and suction  Post-procedure assessment: bleeding stopped  Treatment complexity: simple  Patient tolerance: Patient tolerated the procedure well with no immediate complications

## 2023-05-01 ENCOUNTER — OFFICE VISIT (OUTPATIENT)
Dept: OTOLARYNGOLOGY | Facility: CLINIC | Age: 63
End: 2023-05-01
Payer: MEDICARE

## 2023-05-01 VITALS
HEIGHT: 64 IN | SYSTOLIC BLOOD PRESSURE: 145 MMHG | WEIGHT: 136 LBS | HEART RATE: 79 BPM | DIASTOLIC BLOOD PRESSURE: 72 MMHG | BODY MASS INDEX: 23.22 KG/M2

## 2023-05-01 DIAGNOSIS — J34.3 HYPERTROPHY OF INFERIOR NASAL TURBINATE: ICD-10-CM

## 2023-05-01 DIAGNOSIS — R04.0 EPISTAXIS: Primary | ICD-10-CM

## 2023-05-01 DIAGNOSIS — J30.1 SEASONAL ALLERGIC RHINITIS DUE TO POLLEN: ICD-10-CM

## 2023-05-01 PROCEDURE — 3288F PR FALLS RISK ASSESSMENT DOCUMENTED: ICD-10-PCS | Mod: CPTII,S$GLB,, | Performed by: PHYSICIAN ASSISTANT

## 2023-05-01 PROCEDURE — 1159F MED LIST DOCD IN RCRD: CPT | Mod: CPTII,S$GLB,, | Performed by: PHYSICIAN ASSISTANT

## 2023-05-01 PROCEDURE — 3044F HG A1C LEVEL LT 7.0%: CPT | Mod: CPTII,S$GLB,, | Performed by: PHYSICIAN ASSISTANT

## 2023-05-01 PROCEDURE — 3008F BODY MASS INDEX DOCD: CPT | Mod: CPTII,S$GLB,, | Performed by: PHYSICIAN ASSISTANT

## 2023-05-01 PROCEDURE — 1101F PT FALLS ASSESS-DOCD LE1/YR: CPT | Mod: CPTII,S$GLB,, | Performed by: PHYSICIAN ASSISTANT

## 2023-05-01 PROCEDURE — 4010F PR ACE/ARB THEARPY RXD/TAKEN: ICD-10-PCS | Mod: CPTII,S$GLB,, | Performed by: PHYSICIAN ASSISTANT

## 2023-05-01 PROCEDURE — 4010F ACE/ARB THERAPY RXD/TAKEN: CPT | Mod: CPTII,S$GLB,, | Performed by: PHYSICIAN ASSISTANT

## 2023-05-01 PROCEDURE — 1101F PR PT FALLS ASSESS DOC 0-1 FALLS W/OUT INJ PAST YR: ICD-10-PCS | Mod: CPTII,S$GLB,, | Performed by: PHYSICIAN ASSISTANT

## 2023-05-01 PROCEDURE — 3066F PR DOCUMENTATION OF TREATMENT FOR NEPHROPATHY: ICD-10-PCS | Mod: CPTII,S$GLB,, | Performed by: PHYSICIAN ASSISTANT

## 2023-05-01 PROCEDURE — 3078F PR MOST RECENT DIASTOLIC BLOOD PRESSURE < 80 MM HG: ICD-10-PCS | Mod: CPTII,S$GLB,, | Performed by: PHYSICIAN ASSISTANT

## 2023-05-01 PROCEDURE — 3066F NEPHROPATHY DOC TX: CPT | Mod: CPTII,S$GLB,, | Performed by: PHYSICIAN ASSISTANT

## 2023-05-01 PROCEDURE — 3008F PR BODY MASS INDEX (BMI) DOCUMENTED: ICD-10-PCS | Mod: CPTII,S$GLB,, | Performed by: PHYSICIAN ASSISTANT

## 2023-05-01 PROCEDURE — 99213 OFFICE O/P EST LOW 20 MIN: CPT | Mod: S$GLB,,, | Performed by: PHYSICIAN ASSISTANT

## 2023-05-01 PROCEDURE — 3078F DIAST BP <80 MM HG: CPT | Mod: CPTII,S$GLB,, | Performed by: PHYSICIAN ASSISTANT

## 2023-05-01 PROCEDURE — 3077F PR MOST RECENT SYSTOLIC BLOOD PRESSURE >= 140 MM HG: ICD-10-PCS | Mod: CPTII,S$GLB,, | Performed by: PHYSICIAN ASSISTANT

## 2023-05-01 PROCEDURE — 3288F FALL RISK ASSESSMENT DOCD: CPT | Mod: CPTII,S$GLB,, | Performed by: PHYSICIAN ASSISTANT

## 2023-05-01 PROCEDURE — 3044F PR MOST RECENT HEMOGLOBIN A1C LEVEL <7.0%: ICD-10-PCS | Mod: CPTII,S$GLB,, | Performed by: PHYSICIAN ASSISTANT

## 2023-05-01 PROCEDURE — 99213 PR OFFICE/OUTPT VISIT, EST, LEVL III, 20-29 MIN: ICD-10-PCS | Mod: S$GLB,,, | Performed by: PHYSICIAN ASSISTANT

## 2023-05-01 PROCEDURE — 1159F PR MEDICATION LIST DOCUMENTED IN MEDICAL RECORD: ICD-10-PCS | Mod: CPTII,S$GLB,, | Performed by: PHYSICIAN ASSISTANT

## 2023-05-01 PROCEDURE — 3077F SYST BP >= 140 MM HG: CPT | Mod: CPTII,S$GLB,, | Performed by: PHYSICIAN ASSISTANT

## 2023-05-01 NOTE — PATIENT INSTRUCTIONS
Use nasal saline twice daily (at the least) to keep mucous membranes hydrated.   Use aquaphor or vaseline ointment 1-2 x daily    Do not take ibuprofen or drink alcohol for 1 week. Do NOT stop scheduled blood thinners unless directed by your provider.   If bleeding recurs, use oxymetazoline (Afrin) spray in the nostril and call for follow-up appointment.  Go to ER if bleeding:  - persists for greater than 15 minutes (despite above measures)  - severe bleeding or lightheadedness  - bleeding episodes become frequent    Other measures to reduce recurrence:  - Avoid heavy lifting and straining for at 2 least weeks  - Do not pick your nose or put anything into it. These actions may dislodge any blood clots.  - For the next few days, elevate your head on several pillows when lying down.  - Do not use a straw to drink as the sucking motion may cause bleeding.

## 2023-05-05 ENCOUNTER — PES CALL (OUTPATIENT)
Dept: ADMINISTRATIVE | Facility: CLINIC | Age: 63
End: 2023-05-05
Payer: MEDICARE

## 2023-05-08 DIAGNOSIS — M32.9 SYSTEMIC LUPUS ERYTHEMATOSUS, UNSPECIFIED SLE TYPE, UNSPECIFIED ORGAN INVOLVEMENT STATUS: ICD-10-CM

## 2023-05-09 RX ORDER — BELIMUMAB 200 MG/ML
200 SOLUTION SUBCUTANEOUS
Qty: 4 EACH | Refills: 2 | Status: ACTIVE | OUTPATIENT
Start: 2023-05-09 | End: 2023-07-27 | Stop reason: SDUPTHER

## 2023-05-11 ENCOUNTER — SPECIALTY PHARMACY (OUTPATIENT)
Dept: PHARMACY | Facility: CLINIC | Age: 63
End: 2023-05-11
Payer: MEDICARE

## 2023-05-11 NOTE — TELEPHONE ENCOUNTER
Specialty Pharmacy - Refill Coordination    Specialty Medication Orders Linked to Encounter      Flowsheet Row Most Recent Value   Medication #1 belimumab (BENLYSTA) 200 mg/mL AtIn (Order#[id number], Rx#[id number])            Refill Questions - Documented Responses      Flowsheet Row Most Recent Value   Refill Screening Questions    Changes to allergies? No   Changes to medications? No   New conditions since last clinic visit? No   Unplanned office visit, urgent care, ED, or hospital admission in the last 4 weeks? No   How does patient/caregiver feel medication is working? Good   Financial problems or insurance changes? No   How many doses of your specialty medications were missed in the last 4 weeks? 0   Would patient like to speak to a pharmacist? No   When does the patient need to receive the medication? 05/21/23   Refill Delivery Questions    How will the patient receive the medication? [facility]   When does the patient need to receive the medication? 05/21/23   Shipping Address Home   Address in [facility] confirmed and updated if neccessary? Yes   Expected Copay ($) 0   Is the patient able to afford the medication copay? Yes   Payment Method zero copay   Days supply of Refill 28   Supplies needed? No supplies needed   Refill activity completed? Yes   Refill activity plan Refill scheduled   Shipment/Pickup Date: 05/17/23            Current Outpatient Medications   Medication Sig    (Magic mouthwash) 1:1:1 diphenhydrAMINE(Benadryl) 12.5mg/5ml liq, aluminum & magnesium hydroxide-simethicone (Maalox), LIDOcaine viscous 2% Swish and spit 5 mLs every 4 (four) hours as needed (mouth sores). for mouth sores    albuterol (PROVENTIL/VENTOLIN HFA) 90 mcg/actuation inhaler Inhale 1-2 puffs into the lungs every 6 (six) hours as needed for Wheezing or Shortness of Breath. Rescue    amLODIPine (NORVASC) 2.5 MG tablet Take 2.5 mg by mouth.    belimumab (BENLYSTA) 200 mg/mL AtIn Inject 1 mL (200 mg total) into the skin  every 7 days.    calcium carbonate (OS-RISSA) 500 mg calcium (1,250 mg) tablet Take 1 tablet by mouth once daily.    cetirizine (ZYRTEC) 10 MG tablet Take 1 tablet (10 mg total) by mouth once daily.    cholecalciferol, vitamin D3, 125 mcg (5,000 unit) Tab Take 5,000 Units by mouth once daily.    CREON 36,000-114,000- 180,000 unit CpDR TAKE TWO CAPSULES BY MOUTH THREE TIMES DAILY WITH MEALS AND ONE CAPSULE WITH EACH SNACK    dexamethasone (DECADRON) 4 mg/mL injection Inject 1 mL (4 mg total) into the muscle daily as needed (Signs and symtpoms of severe adrenal insufficiency).    diclofenac sodium (VOLTAREN) 1 % Gel APPLY 2 GRAMS TO AFFECTED AREA 4 TIMES A DAY    dicyclomine (BENTYL) 20 mg tablet TAKE 1 TABLET BY MOUTH EVERY 6 HOURS    ferrous sulfate (IRON ORAL) Take by mouth. No sure dose    fluticasone propionate (FLONASE) 50 mcg/actuation nasal spray 1 spray (50 mcg total) by Each Nostril route 2 (two) times daily as needed for Rhinitis or Allergies.    folic acid (FOLVITE) 1 MG tablet Take 1 mg by mouth once daily.    gabapentin (NEURONTIN) 300 MG capsule TAKE 1 CAPSULE BY MOUTH EVERY EVENING    hydroCHLOROthiazide (HYDRODIURIL) 12.5 MG Tab Take 12.5 mg by mouth.    hydrOXYchloroQUINE (PLAQUENIL) 200 mg tablet TAKE 1.5 TABLETS BY MOUTH EVERY DAY    hydrOXYzine HCL (ATARAX) 25 MG tablet TAKE 1 TABLET BY MOUTH NIGHTLY AS NEEDED (INSOMNIA).    levocetirizine (XYZAL) 5 MG tablet Take 1 tablet (5 mg total) by mouth every evening.    LIDOcaine (LIDODERM) 5 % Place 1 patch onto the skin once daily. Remove & Discard patch within 12 hours or as directed by MD    loperamide (IMODIUM) 2 mg capsule TAKE 1 TO 2 CAPSULES BY MOUTH FOUR TIMES DAILY    losartan (COZAAR) 50 MG tablet Take 1 tablet (50 mg total) by mouth once daily.    milnacipran (SAVELLA) 100 mg Tab Take 1 tablet (100 mg total) by mouth once daily.    multivitamin/iron/folic acid (CENTRUM WOMEN ORAL) Take 1 tablet by mouth once daily.    nebivoloL (BYSTOLIC) 10 MG  "Tab     niacin 100 MG Tab Take by mouth. 1 Tablet Oral At bedtime    NIFEdipine (ADALAT CC) 30 MG TbSR Take 30 mg by mouth.    omeprazole (PRILOSEC) 20 MG capsule TAKE 1 CAPSULE BY MOUTH TWICE A DAY    ondansetron (ZOFRAN) 4 MG tablet Take 1 tablet (4 mg total) by mouth every 12 (twelve) hours as needed for Nausea.    oxyCODONE-acetaminophen (PERCOCET) 5-325 mg per tablet Take 1 tablet by mouth every 4 (four) hours as needed for Pain.    potassium chloride SA (K-DUR,KLOR-CON) 20 MEQ tablet Take 1 tablet (20 mEq total) by mouth 2 (two) times daily.    predniSONE (DELTASONE) 5 MG tablet TAKE 1 TABLET (5 MG TOTAL) BY MOUTH ONCE DAILY. DOUBLE THE DOSE IN TIMES OF ILLNESS    psyllium husk/aspartame (METAMUCIL FIBER SINGLES ORAL) Take by mouth.    REPATHA SURECLICK 140 mg/mL PnIj INJECT 140 MG INTO THE SKIN EVERY 14 (FOURTEEN) DAYS    RESTASIS 0.05 % ophthalmic emulsion INSTILL 1 DROP INTO BOTH EYES TWICE A DAY    rizatriptan (MAXALT) 10 MG tablet Take 1 tablet (10 mg total) by mouth 2 (two) times daily as needed for Migraine.    rosuvastatin (CRESTOR) 5 MG tablet Take 5 mg by mouth once daily.    valACYclovir (VALTREX) 1000 MG tablet Take 1 tablet (1,000 mg total) by mouth every 12 (twelve) hours. for 10 days    zoledronic acid-mannitol & water (RECLAST) 5 mg/100 mL PgBk     zolpidem (AMBIEN) 5 MG Tab Take 1 tablet (5 mg total) by mouth nightly as needed (insomnia).   Last reviewed on 5/1/2023  9:29 AM by Dilshad Keen MA    Review of patient's allergies indicates:   Allergen Reactions    Aspirin      Other reaction(s): "hemorrhage"  hemorrhage    Hydrocortisone Nausea Only     Other reaction(s): sick  sick    Lactose intolerance (lactase) [lactase] Nausea And Vomiting    Vicodin [hydrocodone-acetaminophen]      Other reaction(s): hyperactivity    Asacol [mesalamine] Hallucinations     Other reaction(s): Hallucinations  hallucinations    Last reviewed on  5/1/2023 9:29 AM by Dilshad Keen      Tasks added this " encounter   No tasks added.   Tasks due within next 3 months   8/7/2023 - Clinical Assessment (1 year recurrence)  5/11/2023 - Refill Coordination Outreach (1 time occurrence)     Yasmin Bolanos, PharmD  [facility]  [address]  [facility] [address]  Phone: [phone]  Fax: [phone]

## 2023-05-15 ENCOUNTER — OFFICE VISIT (OUTPATIENT)
Dept: HEMATOLOGY/ONCOLOGY | Facility: CLINIC | Age: 63
End: 2023-05-15
Payer: MEDICARE

## 2023-05-15 VITALS
SYSTOLIC BLOOD PRESSURE: 143 MMHG | OXYGEN SATURATION: 100 % | DIASTOLIC BLOOD PRESSURE: 68 MMHG | RESPIRATION RATE: 18 BRPM | HEART RATE: 80 BPM | TEMPERATURE: 98 F | BODY MASS INDEX: 22.8 KG/M2 | WEIGHT: 132.81 LBS

## 2023-05-15 DIAGNOSIS — K86.2 PANCREATIC CYST: ICD-10-CM

## 2023-05-15 DIAGNOSIS — Z12.31 ENCOUNTER FOR SCREENING MAMMOGRAM FOR BREAST CANCER: ICD-10-CM

## 2023-05-15 DIAGNOSIS — M32.9 SYSTEMIC LUPUS ERYTHEMATOSUS, UNSPECIFIED SLE TYPE, UNSPECIFIED ORGAN INVOLVEMENT STATUS: ICD-10-CM

## 2023-05-15 DIAGNOSIS — C50.211 MALIGNANT NEOPLASM OF UPPER-INNER QUADRANT OF RIGHT FEMALE BREAST, UNSPECIFIED ESTROGEN RECEPTOR STATUS: Primary | ICD-10-CM

## 2023-05-15 DIAGNOSIS — R73.9 HYPERGLYCEMIA: ICD-10-CM

## 2023-05-15 PROCEDURE — 99214 PR OFFICE/OUTPT VISIT, EST, LEVL IV, 30-39 MIN: ICD-10-PCS | Mod: S$GLB,,, | Performed by: NURSE PRACTITIONER

## 2023-05-15 PROCEDURE — 99499 UNLISTED E&M SERVICE: CPT | Mod: S$GLB,,, | Performed by: NURSE PRACTITIONER

## 2023-05-15 PROCEDURE — 3078F PR MOST RECENT DIASTOLIC BLOOD PRESSURE < 80 MM HG: ICD-10-PCS | Mod: CPTII,S$GLB,, | Performed by: NURSE PRACTITIONER

## 2023-05-15 PROCEDURE — 3044F HG A1C LEVEL LT 7.0%: CPT | Mod: CPTII,S$GLB,, | Performed by: NURSE PRACTITIONER

## 2023-05-15 PROCEDURE — 3044F PR MOST RECENT HEMOGLOBIN A1C LEVEL <7.0%: ICD-10-PCS | Mod: CPTII,S$GLB,, | Performed by: NURSE PRACTITIONER

## 2023-05-15 PROCEDURE — 4010F PR ACE/ARB THEARPY RXD/TAKEN: ICD-10-PCS | Mod: CPTII,S$GLB,, | Performed by: NURSE PRACTITIONER

## 2023-05-15 PROCEDURE — 4010F ACE/ARB THERAPY RXD/TAKEN: CPT | Mod: CPTII,S$GLB,, | Performed by: NURSE PRACTITIONER

## 2023-05-15 PROCEDURE — 99214 OFFICE O/P EST MOD 30 MIN: CPT | Mod: S$GLB,,, | Performed by: NURSE PRACTITIONER

## 2023-05-15 PROCEDURE — 3288F PR FALLS RISK ASSESSMENT DOCUMENTED: ICD-10-PCS | Mod: CPTII,S$GLB,, | Performed by: NURSE PRACTITIONER

## 2023-05-15 PROCEDURE — 3066F NEPHROPATHY DOC TX: CPT | Mod: CPTII,S$GLB,, | Performed by: NURSE PRACTITIONER

## 2023-05-15 PROCEDURE — 99999 PR PBB SHADOW E&M-EST. PATIENT-LVL V: ICD-10-PCS | Mod: PBBFAC,,, | Performed by: NURSE PRACTITIONER

## 2023-05-15 PROCEDURE — 3008F PR BODY MASS INDEX (BMI) DOCUMENTED: ICD-10-PCS | Mod: CPTII,S$GLB,, | Performed by: NURSE PRACTITIONER

## 2023-05-15 PROCEDURE — 3066F PR DOCUMENTATION OF TREATMENT FOR NEPHROPATHY: ICD-10-PCS | Mod: CPTII,S$GLB,, | Performed by: NURSE PRACTITIONER

## 2023-05-15 PROCEDURE — 1159F PR MEDICATION LIST DOCUMENTED IN MEDICAL RECORD: ICD-10-PCS | Mod: CPTII,S$GLB,, | Performed by: NURSE PRACTITIONER

## 2023-05-15 PROCEDURE — 1101F PR PT FALLS ASSESS DOC 0-1 FALLS W/OUT INJ PAST YR: ICD-10-PCS | Mod: CPTII,S$GLB,, | Performed by: NURSE PRACTITIONER

## 2023-05-15 PROCEDURE — 3077F PR MOST RECENT SYSTOLIC BLOOD PRESSURE >= 140 MM HG: ICD-10-PCS | Mod: CPTII,S$GLB,, | Performed by: NURSE PRACTITIONER

## 2023-05-15 PROCEDURE — 3077F SYST BP >= 140 MM HG: CPT | Mod: CPTII,S$GLB,, | Performed by: NURSE PRACTITIONER

## 2023-05-15 PROCEDURE — 99999 PR PBB SHADOW E&M-EST. PATIENT-LVL V: CPT | Mod: PBBFAC,,, | Performed by: NURSE PRACTITIONER

## 2023-05-15 PROCEDURE — 1101F PT FALLS ASSESS-DOCD LE1/YR: CPT | Mod: CPTII,S$GLB,, | Performed by: NURSE PRACTITIONER

## 2023-05-15 PROCEDURE — 3288F FALL RISK ASSESSMENT DOCD: CPT | Mod: CPTII,S$GLB,, | Performed by: NURSE PRACTITIONER

## 2023-05-15 PROCEDURE — 1159F MED LIST DOCD IN RCRD: CPT | Mod: CPTII,S$GLB,, | Performed by: NURSE PRACTITIONER

## 2023-05-15 PROCEDURE — 3008F BODY MASS INDEX DOCD: CPT | Mod: CPTII,S$GLB,, | Performed by: NURSE PRACTITIONER

## 2023-05-15 PROCEDURE — 3078F DIAST BP <80 MM HG: CPT | Mod: CPTII,S$GLB,, | Performed by: NURSE PRACTITIONER

## 2023-05-15 NOTE — PROGRESS NOTES
Subjective:       Patient ID: Efe Horowitz is a 63 y.o. female.    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   Continues to be fatigued. Weakness in legs - slowly improving.   Intermittent itching to right breast - no rashes but dark spot under arm since breast surgery. She is on gabapentin.   No fever/chills  No Mouth and nasal sores sine on Benlysta  Appetite good and weight stable.   Cough resolving - had recent URI about 4 weeks ago.   No SOB/CP.   LBP same - predated breast cancer diagnosis.   H/o ulcerative colitis. Bowels doing well. No diarrhea.      Presents for follow-up of triple negative breast cancer.      Diagnosis: Triple negative Stage 1 ([id number]) right breast carcinoma  Oncology History:  - 1/18/16 screening mammogram:  There is a focal asymmetry seen in the posterior upper-inner region of the right breast.  - 2/1/16 diagnostic mammogram and ultrasound: irregular solid mass with poorly defined margins  measuring 5 millimeters  - 2/19/16 core biopsy  FINAL PATHOLOGIC DIAGNOSIS  RIGHT BREAST, 1:00, BIOPSY:  Invasive ductal carcinoma, high-grade (duct formation - 3; nuclear pleomorphism - 3, mitosis - 2)  ER - Negative (0)  OK - Negative (0)  HER2 - Negative (0)  - 4/27/16 mastectomy and SLN biopsy  FINAL PATHOLOGIC DIAGNOSIS  RIGHT MASTECTOMY SPECIMEN SHOWING A 7 MM AREA OF INVASIVE DUCT CARCINOMA, GRADE 3  WITH NEGATIVE MARGINS.    TUMOR SIZE: 0.7 CM  HISTOLOGIC TYPE: INVASIVE DUCT CARCINOMA  HISTOLOGIC GRADE: 3, 3, 2; GRADE 3  DCIS: NOT IDENTIFIED  MARGINS: ALL MARGINS OF RESECTION ARE NEGATIVE FOR TUMOR. CLOSEST MARGIN IS THE  POSTERIOR MARGIN AT 2.5 CM  IMMUNE RECEPTOR STUDIES: MS 16-5/1/04: ER - Negative (0)  OK - Negative (0)  HER2 - Negative (0)  LYMPH NODES: Total number 4- negative  - Genetic testing performed  - With tumor > 0.6 cm she just made guideline cut off to consider  adjuvant chemotherapy with TC   However, with her active wound healing issues at that time and other comorbidities adjuvant chemotherapy was not be pursued   - She completed all of her reconstruction with Dr. Mccloud   Does have multiple comorbidities including history of lupus and fibromyalgia- she is managed by rheumatology and reports recent flare   - Reports history of easy bruising and states she was borderline on testing for von willebrands in [facility]  - additional UC diagnosis    Review of Systems   Constitutional:         See above   All other systems reviewed and are negative.      Objective:      Physical Exam  Vitals and nursing note reviewed.   Constitutional:       General: She is not in acute distress.     Appearance: Normal appearance. She is well-developed and normal weight.      Comments: Presents alone.   Very pleasant.    HENT:      Head: Normocephalic and atraumatic.   Eyes:      General: Lids are normal. No scleral icterus.     Conjunctiva/sclera: Conjunctivae normal.      Pupils: Pupils are equal, round, and reactive to light.   Neck:      Thyroid: No thyromegaly.      Vascular: No JVD.      Trachea: Trachea normal.   Cardiovascular:      Rate and Rhythm: Normal rate and regular rhythm.      Heart sounds: Murmur (soft) heard.   Pulmonary:      Effort: Pulmonary effort is normal.      Breath sounds: Normal breath sounds. No wheezing.      Comments: Breasts- right breast no concerning masses (no areas of fat necrosis) or LAD. Area of hyperpigmentation to upper outer quad that has been there for >1 year according to patient.   Left breast no masses or LAD   No tenderness.   Abdominal:      General: Bowel sounds are normal. There is no distension.      Palpations: Abdomen is soft. There is no mass.      Tenderness: There is no abdominal tenderness.      Comments: No organomegaly.    Musculoskeletal:         General: Normal range of motion.      Cervical back: Normal range of motion and neck  supple.      Comments: No spinal or paraspinal tenderness.    Lymphadenopathy:      Head:      Right side of head: No submental or submandibular adenopathy.      Left side of head: No submental or submandibular adenopathy.      Cervical: No cervical adenopathy.      Upper Body:      Right upper body: No supraclavicular or axillary adenopathy.      Left upper body: No supraclavicular or axillary adenopathy.   Skin:     General: Skin is warm and dry.      Capillary Refill: Capillary refill takes less than 2 seconds.      Coloration: Skin is not jaundiced.      Findings: No bruising or erythema.      Nails: There is no clubbing.      Comments: Dry skin patches to knees.    Neurological:      Mental Status: She is alert and oriented to person, place, and time.   Psychiatric:         Mood and Affect: Mood normal.         Speech: Speech normal.         Behavior: Behavior normal.         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   Most recent labs reviewed- she needs repeat as glucose elevated.   She should f/u with PCP for elevated glucose.   Continue f/u with all other established providers.   Pancreatic cyst- imaging UTD    Route Chart for Scheduling    Med Onc Chart Routing      Follow up with physician 6 months.   Follow up with BREANNE    Infusion scheduling note    Injection scheduling note    Labs    Imaging   [facility] 11/2023   Pharmacy appointment    Other referrals           Patient is in agreement with the proposed treatment plan. All questions were answered to the patient's satisfaction. Pt knows to call clinic for any new or worsening symptoms and if anything is needed before the next clinic  visit.      TORSTEN GlassP-C  Hematology & Oncology  [address] [place] [address]  ph. [phone]  Fax. [phone]       30 minutes of total time spent on the encounter, which includes face to face time and non-face to face time preparing to see the patient (eg, review of tests), Obtaining and/or reviewing separately obtained history, Documenting clinical information in the electronic or other health record, Independently interpreting results (not separately reported) and communicating results to the patient/family/caregiver, or Care coordination (not separately reported).

## 2023-05-22 ENCOUNTER — OFFICE VISIT (OUTPATIENT)
Dept: OTOLARYNGOLOGY | Facility: CLINIC | Age: 63
End: 2023-05-22
Payer: MEDICARE

## 2023-05-22 VITALS
HEART RATE: 75 BPM | SYSTOLIC BLOOD PRESSURE: 150 MMHG | BODY MASS INDEX: 22.02 KG/M2 | WEIGHT: 129 LBS | DIASTOLIC BLOOD PRESSURE: 85 MMHG | HEIGHT: 64 IN

## 2023-05-22 DIAGNOSIS — M32.9 SYSTEMIC LUPUS ERYTHEMATOSUS, UNSPECIFIED SLE TYPE, UNSPECIFIED ORGAN INVOLVEMENT STATUS: ICD-10-CM

## 2023-05-22 DIAGNOSIS — J30.1 SEASONAL ALLERGIC RHINITIS DUE TO POLLEN: ICD-10-CM

## 2023-05-22 DIAGNOSIS — R04.0 EPISTAXIS: Primary | ICD-10-CM

## 2023-05-22 DIAGNOSIS — K21.9 GASTROESOPHAGEAL REFLUX DISEASE WITHOUT ESOPHAGITIS: ICD-10-CM

## 2023-05-22 PROCEDURE — 1101F PR PT FALLS ASSESS DOC 0-1 FALLS W/OUT INJ PAST YR: ICD-10-PCS | Mod: CPTII,S$GLB,, | Performed by: PHYSICIAN ASSISTANT

## 2023-05-22 PROCEDURE — 3044F HG A1C LEVEL LT 7.0%: CPT | Mod: CPTII,S$GLB,, | Performed by: PHYSICIAN ASSISTANT

## 2023-05-22 PROCEDURE — 3008F BODY MASS INDEX DOCD: CPT | Mod: CPTII,S$GLB,, | Performed by: PHYSICIAN ASSISTANT

## 2023-05-22 PROCEDURE — 4010F PR ACE/ARB THEARPY RXD/TAKEN: ICD-10-PCS | Mod: CPTII,S$GLB,, | Performed by: PHYSICIAN ASSISTANT

## 2023-05-22 PROCEDURE — 3079F DIAST BP 80-89 MM HG: CPT | Mod: CPTII,S$GLB,, | Performed by: PHYSICIAN ASSISTANT

## 2023-05-22 PROCEDURE — 1101F PT FALLS ASSESS-DOCD LE1/YR: CPT | Mod: CPTII,S$GLB,, | Performed by: PHYSICIAN ASSISTANT

## 2023-05-22 PROCEDURE — 4010F ACE/ARB THERAPY RXD/TAKEN: CPT | Mod: CPTII,S$GLB,, | Performed by: PHYSICIAN ASSISTANT

## 2023-05-22 PROCEDURE — 3077F PR MOST RECENT SYSTOLIC BLOOD PRESSURE >= 140 MM HG: ICD-10-PCS | Mod: CPTII,S$GLB,, | Performed by: PHYSICIAN ASSISTANT

## 2023-05-22 PROCEDURE — 3008F PR BODY MASS INDEX (BMI) DOCUMENTED: ICD-10-PCS | Mod: CPTII,S$GLB,, | Performed by: PHYSICIAN ASSISTANT

## 2023-05-22 PROCEDURE — 3288F PR FALLS RISK ASSESSMENT DOCUMENTED: ICD-10-PCS | Mod: CPTII,S$GLB,, | Performed by: PHYSICIAN ASSISTANT

## 2023-05-22 PROCEDURE — 3066F PR DOCUMENTATION OF TREATMENT FOR NEPHROPATHY: ICD-10-PCS | Mod: CPTII,S$GLB,, | Performed by: PHYSICIAN ASSISTANT

## 2023-05-22 PROCEDURE — 3288F FALL RISK ASSESSMENT DOCD: CPT | Mod: CPTII,S$GLB,, | Performed by: PHYSICIAN ASSISTANT

## 2023-05-22 PROCEDURE — 3077F SYST BP >= 140 MM HG: CPT | Mod: CPTII,S$GLB,, | Performed by: PHYSICIAN ASSISTANT

## 2023-05-22 PROCEDURE — 3044F PR MOST RECENT HEMOGLOBIN A1C LEVEL <7.0%: ICD-10-PCS | Mod: CPTII,S$GLB,, | Performed by: PHYSICIAN ASSISTANT

## 2023-05-22 PROCEDURE — 99213 PR OFFICE/OUTPT VISIT, EST, LEVL III, 20-29 MIN: ICD-10-PCS | Mod: S$GLB,,, | Performed by: PHYSICIAN ASSISTANT

## 2023-05-22 PROCEDURE — 99213 OFFICE O/P EST LOW 20 MIN: CPT | Mod: S$GLB,,, | Performed by: PHYSICIAN ASSISTANT

## 2023-05-22 PROCEDURE — 3066F NEPHROPATHY DOC TX: CPT | Mod: CPTII,S$GLB,, | Performed by: PHYSICIAN ASSISTANT

## 2023-05-22 PROCEDURE — 3079F PR MOST RECENT DIASTOLIC BLOOD PRESSURE 80-89 MM HG: ICD-10-PCS | Mod: CPTII,S$GLB,, | Performed by: PHYSICIAN ASSISTANT

## 2023-05-22 NOTE — PATIENT INSTRUCTIONS
3 more week of twice daily nasal saline but after that you can reduce to once daily or as needed

## 2023-05-22 NOTE — PROGRESS NOTES
"  Subjective:      Efe is a 63 y.o. female with SLE (on prednisone&plaquenil), breast CA s/p mastectomy, Ulcerative colitis, and fibromyalgia who comes for follow-up of R sided nosebleeds.  Her last visit with me was on 5/1/2023.  Advised nasal saline and moisturizing ointment.     Patient reports complete resolution and right-sided epistaxis.  Patient has been compliant with nasal saline spray twice daily and Aquaphor ointment daily.  Patient currently taking 5 mg of Zyrtec.  Patient does report migraines since last visit which is managed by her PCP.  Patient also reports dry hacking cough with associated "tickling" globus sensation which sometimes wakes her up from sleep.  Patient states she drinks 1 cup of coffee a day and takes Prilosec b.i.d.      Per last office visit 5/1/2023 :  "Patient reports developing a cold 3 weeks ago with a cough and chest + sinus congestion.  She was seen at the ED for this and advised to double her Zyrtec to 10 mg.  Patient states 3 days later she woke up with a right side nosebleed.  Patient was seen in the ED was told she had bleeding from right-sided nasal polyp and underwent cautery with silver nitrate.  Patient states the bleeding returned the next day and she was evaluated in the ED again.  Patient had silver cautery again as well as fibrillar placed in her right nasal cavity.  Patient denies any further bleeding from her nose over the last 10 days.        She has previously had nasal cauterization as above.  The bleeding has required packing with fibrillar for control.  The last episode was 10 days ago, and is not currently active.  There is not a prior history of nasal surgery.  There is not a prior history of nasal trauma.  There is a history of environmental allergies.  She does currently use a nasal spray.  There is not a family history to suggest a clotting disorder.  She does not currently take a blood-thinning agent.  Patient reports easy bruising but denies " "spontaneous bleeding otherwise."    The patient's medications, allergies, past medical, surgical, social and family histories were reviewed and updated as appropriate.    A detailed review of systems was obtained with pertinent positives as per the above HPI, and otherwise negative.        Objective:     BP (!) 150/85 (BP Location: Left arm, Patient Position: Sitting, BP Method: Medium (Automatic))   Pulse 75   Ht 5' 4" (1.626 m)   Wt 58.5 kg (129 lb)   BMI 22.14 kg/m²        Constitutional:   She appears well-developed and well-nourished. She is active.  Non-toxic appearance.     Head:  Normocephalic and atraumatic. No scars (no visible lesions or masses). Facial strength is normal.      Ears:    Right Ear: No lacerations. No decreased hearing is noted.   Left Ear: No lacerations (External Ears: no auricle lesions, no apparent edema or erythema of tragus/lateral aspect of canal). No decreased hearing (speech reception thresholds grossly normal) is noted.     Nose:  No mucosal edema, rhinorrhea or septal deviation. No epistaxis. Turbinate hypertrophy.  Turbinates normal and no turbinate masses.    R IT with small erythematous faint focus of previous cauterization/epistaxis    Mouth/Throat  Oropharynx clear and moist without lesions or asymmetry (tongue with normal mobility, no obvious lesions). No lacerations (Lips: upper and lower lips pink and moist).     Neck:  No thyroid mass (no erythema, no obvious large masses visible) present.     Pulmonary/Chest:   Effort normal.     Psychiatric:   She has a normal mood and affect. Her behavior is normal.     Procedure    None    Data Reviewed    WBC (K/uL)   Date Value   04/19/2023 6.41     Eosinophil % (%)   Date Value   04/19/2023 5.6     Eos # (K/uL)   Date Value   04/19/2023 0.4     Platelets (K/uL)   Date Value   04/19/2023 271     Glucose (mg/dL)   Date Value   04/19/2023 196 (H)     No results found for: IGE    Assessment:     1. Epistaxis    2. Systemic lupus " erythematosus, unspecified SLE type, unspecified organ involvement status    3. Seasonal allergic rhinitis due to pollen    4. Gastroesophageal reflux disease without esophagitis         Plan:     Epistaxis  Systemic lupus erythematosus, unspecified SLE type, unspecified organ involvement status   - continue nasal saline  Seasonal allergic rhinitis due to pollen   - will consider started medicated nasal sprays after next appointment  Gastroesophageal reflux disease without esophagitis   - c/w PPI   - discussed possible LPR causing cough   - advised follow up with GI    She will Follow up in about 3 months (around 8/22/2023), or if symptoms worsen or fail to improve, for possible laryngoscopy, discuss starting medicated nasal spray.  I had a discussion with the patient regarding her condition and the further workup and management options.    All questions were answered, and the patient is in agreement with the above.     Disclaimer:  This note may have been prepared utilizing voice recognition software which may result in occasional typographical errors in the text such as sound alike words.   If further clarification is needed, please contact the ENT department of [facility].

## 2023-06-02 DIAGNOSIS — Z87.19 HISTORY OF ULCERATIVE COLITIS: ICD-10-CM

## 2023-06-02 RX ORDER — DICYCLOMINE HYDROCHLORIDE 20 MG/1
TABLET ORAL
Qty: 360 TABLET | Refills: 0 | Status: SHIPPED | OUTPATIENT
Start: 2023-06-02 | End: 2023-08-31

## 2023-06-07 ENCOUNTER — PATIENT MESSAGE (OUTPATIENT)
Dept: PHARMACY | Facility: CLINIC | Age: 63
End: 2023-06-07
Payer: MEDICARE

## 2023-06-07 ENCOUNTER — SPECIALTY PHARMACY (OUTPATIENT)
Dept: PHARMACY | Facility: CLINIC | Age: 63
End: 2023-06-07
Payer: MEDICARE

## 2023-06-07 DIAGNOSIS — M32.9 SYSTEMIC LUPUS ERYTHEMATOSUS, UNSPECIFIED SLE TYPE, UNSPECIFIED ORGAN INVOLVEMENT STATUS: ICD-10-CM

## 2023-06-07 RX ORDER — HYDROXYCHLOROQUINE SULFATE 200 MG/1
TABLET, FILM COATED ORAL
Qty: 45 TABLET | Refills: 5 | Status: SHIPPED | OUTPATIENT
Start: 2023-06-07 | End: 2023-12-15

## 2023-06-07 RX ORDER — LOPERAMIDE HYDROCHLORIDE 2 MG/1
CAPSULE ORAL
Qty: 240 CAPSULE | Refills: 3 | Status: SHIPPED | OUTPATIENT
Start: 2023-06-07 | End: 2023-10-17

## 2023-06-07 NOTE — TELEPHONE ENCOUNTER
Incoming call for Benlysta refill. Patient has 1 dose on hand for 6/11. Refill call rescheduled for 6/12 for delivery before 6/18.

## 2023-06-12 ENCOUNTER — LAB VISIT (OUTPATIENT)
Dept: LAB | Facility: HOSPITAL | Age: 63
End: 2023-06-12
Attending: INTERNAL MEDICINE
Payer: MEDICARE

## 2023-06-12 ENCOUNTER — OFFICE VISIT (OUTPATIENT)
Dept: GASTROENTEROLOGY | Facility: CLINIC | Age: 63
End: 2023-06-12
Payer: MEDICARE

## 2023-06-12 VITALS
BODY MASS INDEX: 21.99 KG/M2 | HEART RATE: 89 BPM | WEIGHT: 128.06 LBS | SYSTOLIC BLOOD PRESSURE: 128 MMHG | OXYGEN SATURATION: 100 % | TEMPERATURE: 98 F | DIASTOLIC BLOOD PRESSURE: 85 MMHG

## 2023-06-12 DIAGNOSIS — Z90.49 STATUS POST TOTAL COLECTOMY: ICD-10-CM

## 2023-06-12 DIAGNOSIS — K86.81 EXOCRINE PANCREATIC INSUFFICIENCY: ICD-10-CM

## 2023-06-12 DIAGNOSIS — K86.2 PANCREATIC CYST: ICD-10-CM

## 2023-06-12 DIAGNOSIS — Z79.899 LONG-TERM USE OF PLAQUENIL: ICD-10-CM

## 2023-06-12 DIAGNOSIS — D84.9 IMMUNOSUPPRESSION: ICD-10-CM

## 2023-06-12 DIAGNOSIS — M32.9 SYSTEMIC LUPUS ERYTHEMATOSUS, UNSPECIFIED SLE TYPE, UNSPECIFIED ORGAN INVOLVEMENT STATUS: ICD-10-CM

## 2023-06-12 DIAGNOSIS — M62.89 PELVIC FLOOR DYSFUNCTION: Primary | ICD-10-CM

## 2023-06-12 DIAGNOSIS — M79.7 FIBROMYALGIA: ICD-10-CM

## 2023-06-12 LAB
ALBUMIN SERPL BCP-MCNC: 3.8 G/DL (ref 3.5–5.2)
ALP SERPL-CCNC: 47 U/L (ref 55–135)
ALT SERPL W/O P-5'-P-CCNC: 14 U/L (ref 10–44)
ANION GAP SERPL CALC-SCNC: 13 MMOL/L (ref 8–16)
AST SERPL-CCNC: 20 U/L (ref 10–40)
BASOPHILS # BLD AUTO: 0.04 K/UL (ref 0–0.2)
BASOPHILS NFR BLD: 0.5 % (ref 0–1.9)
BILIRUB SERPL-MCNC: 0.2 MG/DL (ref 0.1–1)
BUN SERPL-MCNC: 10 MG/DL (ref 8–23)
C3 SERPL-MCNC: 120 MG/DL (ref 50–180)
C4 SERPL-MCNC: 33 MG/DL (ref 11–44)
CALCIUM SERPL-MCNC: 9.7 MG/DL (ref 8.7–10.5)
CHLORIDE SERPL-SCNC: 103 MMOL/L (ref 95–110)
CK SERPL-CCNC: 88 U/L (ref 20–180)
CO2 SERPL-SCNC: 27 MMOL/L (ref 23–29)
CREAT SERPL-MCNC: 0.8 MG/DL (ref 0.5–1.4)
CRP SERPL-MCNC: 5 MG/L (ref 0–8.2)
DIFFERENTIAL METHOD: ABNORMAL
EOSINOPHIL # BLD AUTO: 0.3 K/UL (ref 0–0.5)
EOSINOPHIL NFR BLD: 3.9 % (ref 0–8)
ERYTHROCYTE [DISTWIDTH] IN BLOOD BY AUTOMATED COUNT: 14.3 % (ref 11.5–14.5)
ERYTHROCYTE [SEDIMENTATION RATE] IN BLOOD BY PHOTOMETRIC METHOD: 7 MM/HR (ref 0–36)
EST. GFR  (NO RACE VARIABLE): >60 ML/MIN/1.73 M^2
GLUCOSE SERPL-MCNC: 79 MG/DL (ref 70–110)
HCT VFR BLD AUTO: 41.2 % (ref 37–48.5)
HGB BLD-MCNC: 12.6 G/DL (ref 12–16)
IMM GRANULOCYTES # BLD AUTO: 0.03 K/UL (ref 0–0.04)
IMM GRANULOCYTES NFR BLD AUTO: 0.4 % (ref 0–0.5)
LYMPHOCYTES # BLD AUTO: 2.6 K/UL (ref 1–4.8)
LYMPHOCYTES NFR BLD: 32.4 % (ref 18–48)
MCH RBC QN AUTO: 27.1 PG (ref 27–31)
MCHC RBC AUTO-ENTMCNC: 30.6 G/DL (ref 32–36)
MCV RBC AUTO: 89 FL (ref 82–98)
MONOCYTES # BLD AUTO: 0.8 K/UL (ref 0.3–1)
MONOCYTES NFR BLD: 9.4 % (ref 4–15)
NEUTROPHILS # BLD AUTO: 4.4 K/UL (ref 1.8–7.7)
NEUTROPHILS NFR BLD: 53.4 % (ref 38–73)
NRBC BLD-RTO: 0 /100 WBC
PLATELET # BLD AUTO: 322 K/UL (ref 150–450)
PMV BLD AUTO: 11 FL (ref 9.2–12.9)
POTASSIUM SERPL-SCNC: 3.4 MMOL/L (ref 3.5–5.1)
PROT SERPL-MCNC: 6.7 G/DL (ref 6–8.4)
RBC # BLD AUTO: 4.65 M/UL (ref 4–5.4)
SODIUM SERPL-SCNC: 143 MMOL/L (ref 136–145)
WBC # BLD AUTO: 8.15 K/UL (ref 3.9–12.7)

## 2023-06-12 PROCEDURE — 80053 COMPREHEN METABOLIC PANEL: CPT | Performed by: INTERNAL MEDICINE

## 2023-06-12 PROCEDURE — 86160 COMPLEMENT ANTIGEN: CPT | Mod: 59 | Performed by: INTERNAL MEDICINE

## 2023-06-12 PROCEDURE — 1160F PR REVIEW ALL MEDS BY PRESCRIBER/CLIN PHARMACIST DOCUMENTED: ICD-10-PCS | Mod: CPTII,S$GLB,, | Performed by: INTERNAL MEDICINE

## 2023-06-12 PROCEDURE — 1159F MED LIST DOCD IN RCRD: CPT | Mod: CPTII,S$GLB,, | Performed by: INTERNAL MEDICINE

## 2023-06-12 PROCEDURE — 3079F DIAST BP 80-89 MM HG: CPT | Mod: CPTII,S$GLB,, | Performed by: INTERNAL MEDICINE

## 2023-06-12 PROCEDURE — 3008F PR BODY MASS INDEX (BMI) DOCUMENTED: ICD-10-PCS | Mod: CPTII,S$GLB,, | Performed by: INTERNAL MEDICINE

## 2023-06-12 PROCEDURE — 82550 ASSAY OF CK (CPK): CPT | Performed by: INTERNAL MEDICINE

## 2023-06-12 PROCEDURE — 36415 COLL VENOUS BLD VENIPUNCTURE: CPT | Mod: PO | Performed by: INTERNAL MEDICINE

## 2023-06-12 PROCEDURE — 99214 PR OFFICE/OUTPT VISIT, EST, LEVL IV, 30-39 MIN: ICD-10-PCS | Mod: S$GLB,,, | Performed by: INTERNAL MEDICINE

## 2023-06-12 PROCEDURE — 86140 C-REACTIVE PROTEIN: CPT | Performed by: INTERNAL MEDICINE

## 2023-06-12 PROCEDURE — 1101F PR PT FALLS ASSESS DOC 0-1 FALLS W/OUT INJ PAST YR: ICD-10-PCS | Mod: CPTII,S$GLB,, | Performed by: INTERNAL MEDICINE

## 2023-06-12 PROCEDURE — 3288F FALL RISK ASSESSMENT DOCD: CPT | Mod: CPTII,S$GLB,, | Performed by: INTERNAL MEDICINE

## 2023-06-12 PROCEDURE — 3008F BODY MASS INDEX DOCD: CPT | Mod: CPTII,S$GLB,, | Performed by: INTERNAL MEDICINE

## 2023-06-12 PROCEDURE — 3044F HG A1C LEVEL LT 7.0%: CPT | Mod: CPTII,S$GLB,, | Performed by: INTERNAL MEDICINE

## 2023-06-12 PROCEDURE — 85025 COMPLETE CBC W/AUTO DIFF WBC: CPT | Performed by: INTERNAL MEDICINE

## 2023-06-12 PROCEDURE — 1159F PR MEDICATION LIST DOCUMENTED IN MEDICAL RECORD: ICD-10-PCS | Mod: CPTII,S$GLB,, | Performed by: INTERNAL MEDICINE

## 2023-06-12 PROCEDURE — 3074F PR MOST RECENT SYSTOLIC BLOOD PRESSURE < 130 MM HG: ICD-10-PCS | Mod: CPTII,S$GLB,, | Performed by: INTERNAL MEDICINE

## 2023-06-12 PROCEDURE — 4010F PR ACE/ARB THEARPY RXD/TAKEN: ICD-10-PCS | Mod: CPTII,S$GLB,, | Performed by: INTERNAL MEDICINE

## 2023-06-12 PROCEDURE — 3079F PR MOST RECENT DIASTOLIC BLOOD PRESSURE 80-89 MM HG: ICD-10-PCS | Mod: CPTII,S$GLB,, | Performed by: INTERNAL MEDICINE

## 2023-06-12 PROCEDURE — 3288F PR FALLS RISK ASSESSMENT DOCUMENTED: ICD-10-PCS | Mod: CPTII,S$GLB,, | Performed by: INTERNAL MEDICINE

## 2023-06-12 PROCEDURE — 4010F ACE/ARB THERAPY RXD/TAKEN: CPT | Mod: CPTII,S$GLB,, | Performed by: INTERNAL MEDICINE

## 2023-06-12 PROCEDURE — 3066F NEPHROPATHY DOC TX: CPT | Mod: CPTII,S$GLB,, | Performed by: INTERNAL MEDICINE

## 2023-06-12 PROCEDURE — 86225 DNA ANTIBODY NATIVE: CPT | Performed by: INTERNAL MEDICINE

## 2023-06-12 PROCEDURE — 99214 OFFICE O/P EST MOD 30 MIN: CPT | Mod: S$GLB,,, | Performed by: INTERNAL MEDICINE

## 2023-06-12 PROCEDURE — 1160F RVW MEDS BY RX/DR IN RCRD: CPT | Mod: CPTII,S$GLB,, | Performed by: INTERNAL MEDICINE

## 2023-06-12 PROCEDURE — 1101F PT FALLS ASSESS-DOCD LE1/YR: CPT | Mod: CPTII,S$GLB,, | Performed by: INTERNAL MEDICINE

## 2023-06-12 PROCEDURE — 86160 COMPLEMENT ANTIGEN: CPT | Performed by: INTERNAL MEDICINE

## 2023-06-12 PROCEDURE — 3066F PR DOCUMENTATION OF TREATMENT FOR NEPHROPATHY: ICD-10-PCS | Mod: CPTII,S$GLB,, | Performed by: INTERNAL MEDICINE

## 2023-06-12 PROCEDURE — 85652 RBC SED RATE AUTOMATED: CPT | Performed by: INTERNAL MEDICINE

## 2023-06-12 PROCEDURE — 3044F PR MOST RECENT HEMOGLOBIN A1C LEVEL <7.0%: ICD-10-PCS | Mod: CPTII,S$GLB,, | Performed by: INTERNAL MEDICINE

## 2023-06-12 PROCEDURE — 3074F SYST BP LT 130 MM HG: CPT | Mod: CPTII,S$GLB,, | Performed by: INTERNAL MEDICINE

## 2023-06-12 RX ORDER — GADOBUTROL 604.72 MG/ML
INJECTION INTRAVENOUS
COMMUNITY
Start: 2023-01-09

## 2023-06-12 RX ORDER — POTASSIUM GLUCONATE 2 MEQ
TABLET ORAL 2 TIMES DAILY
COMMUNITY
End: 2024-01-22

## 2023-06-12 NOTE — PROGRESS NOTES
[facility]          Inflammatory Bowel Disease Follow Up Note         TODAY'S VISIT DATE:  6/12/2023    Reason for Consult:    Chief Complaint   Patient presents with    Ulcerative Colitis       PCP: Shonda Anguiano      Referring MD:   No ref. provider found    History of Present Illness:  Efe Horowitz who is a 63 y.o. female is being seen today at the [facility] on 06/12/2023 for inflammatory bowel disease- inflammatory bowel disease-unspecified.  Since our last visit she underwent a follow-up MRI of her pancreatic lesions and these were stable.  A 1 year repeat was recommended by our advanced endoscopist.  She had been doing quite well and continued to work with pelvic floor therapy and doing the exercises that were provided to her.  She had been having normal bowel movements and no abdominal bloating, cramping, other symptoms.  Last Friday she developed acute onset nausea and vomiting along with some diarrhea.  This continued on through Saturday and she was unable to tolerate any solid food but was able to keep down liquids.  Yesterday she had no vomiting or diarrhea and was able to keep down liquids but did not try any solid foods.  Today she feels like her symptoms have resolved and she went to the gym this morning.  She still has not tried solid food yet.  She denies any other complaints at this time.    IBD History:  She has a history of ulcerative colitis and underwent total proctocolectomy in 2009 due to refractory disease.  She has not had any significant issues following her surgery with regards to inflammatory bowel disease.  She also has a history of pelvic floor disorder, a pancreatic cystic lesion that has been followed through imaging, pancreatic insufficiency, and dysphagia without any clear etiology on upper endoscopy.      Pertinent Labs:  Lab Results   Component Value Date    SEDRATE 3 02/13/2023    CRP 1.4 02/13/2023      Lab Results   Component Value Date    TTGIGA 5 11/26/2021     11/26/2021     Lab Results   Component Value Date    TSH 0.974 09/06/2022    FREET4 0.72 08/24/2021     Lab Results   Component Value Date    KJUDRVQF82TK 42 04/25/2022    COSNKUSA98 812 11/26/2021     Lab Results   Component Value Date    HEPBSAG Non-reactive 10/27/2022    HEPBCAB Non-reactive 10/27/2022    HEPCAB Non-reactive 10/27/2022     Lab Results   Component Value Date    KZW05ZNYG Non-reactive 10/27/2022     Lab Results   Component Value Date    NIL 0.45969 10/27/2022    MITOGENNIL 9.991 10/27/2022     Lab Results   Component Value Date    TPTMINTERP SEE BELOW 12/21/2021     Lab Results   Component Value Date    STOOLCULTURE  12/22/2021     No Salmonella,Shigella,Vibrio,Campylobacter,Yersinia isolated.    CBMEEPZBIL4X Negative 12/22/2021    TLFFGTLFWO3N Negative 12/22/2021    CDIFFICILEAN Negative 04/28/2021    CDIFFTOX Negative 04/28/2021     Lab Results   Component Value Date    CALPROTECTIN 56.8 (H) 12/22/2021       Therapeutic Drug Monitoring Labs:  No results found for: PROMETH  No results found for: ANSADAINIT, INFLIXIMAB, INFLIXINTERP    Vaccinations:  Lab Results   Component Value Date    HEPBSAB >1000.00 10/27/2022    HEPBSAB Reactive 10/27/2022     Lab Results   Component Value Date    HEPAIGG Reactive 10/27/2022     Lab Results   Component Value Date    VARICELLAZOS 3.59 (H) 10/27/2022    VARICELLAINT Positive (A) 10/27/2022     Immunization History   Administered Date(s) Administered    COVID-19, MRNA, LN-S, PF (Pfizer) (Purple Cap) 02/20/2021, 03/12/2021, 08/16/2021    COVID-19, mRNA, LNP-S, bivalent booster, PF (PFIZER OMICRON) 11/06/2022    Hepatitis A / Hepatitis B 10/08/2008, 10/08/2008, 11/19/2008, 11/19/2008, 04/08/2009, 04/08/2009    Hepatitis A, Adult 01/10/2022, 07/11/2022    Hepatitis B (recombinant) Adjuvanted, 2 dose 01/10/2022, 02/07/2022    Influenza 10/03/2019, 08/31/2020    Influenza - Quadrivalent  "10/12/2017, 10/12/2017, 09/23/2019    Influenza - Quadrivalent - PF *Preferred* (6 months and older) 08/29/2020, 08/29/2020, 11/27/2021, 11/06/2022    Influenza - Trivalent (ADULT) 10/04/2011, 10/02/2013    Influenza - Trivalent - PF (ADULT) 09/23/2014, 09/23/2014    Influenza Split 10/02/2013    Pneumococcal Conjugate - 13 Valent 02/08/2021    Pneumococcal Conjugate - 20 Valent 11/17/2022    Pneumococcal Polysaccharide - 23 Valent 06/23/2014    Tdap 08/23/2021    Zoster Recombinant 02/23/2022, 09/06/2022         Review of Systems  Review of Systems   Constitutional:  Negative for chills, fever and weight loss.   HENT:  Negative for sore throat.    Eyes:  Negative for pain, discharge and redness.   Respiratory:  Negative for cough, shortness of breath and wheezing.    Cardiovascular:  Negative for chest pain, orthopnea and leg swelling.   Gastrointestinal:  Positive for abdominal pain, heartburn, nausea and vomiting. Negative for blood in stool, constipation, diarrhea and melena.   Genitourinary:  Negative for dysuria, frequency and urgency.   Musculoskeletal:  Negative for back pain, joint pain and myalgias.   Skin:  Negative for itching and rash.   Neurological:  Negative for focal weakness and seizures.   Endo/Heme/Allergies:  Does not bruise/bleed easily.   Psychiatric/Behavioral:  Negative for depression. The patient is not nervous/anxious.      Medical History:   Past Medical History:   Diagnosis Date    Acid reflux     Adrenal insufficiency, primary     Arthritis     Asthma     B12 deficiency anemia     Benign essential hypertension 2/7/2021    Blood transfusion 2010    Breast cancer 2/2016    Right breast infiltrating ductal CA    Cataract     Chronic pain     Deep vein thrombosis     Dry eyes     Esophagitis, unspecified     Fibromyalgia     General anesthetics causing adverse effect in therapeutic use     "slow to wake up bc I don't have an immune system    Heart murmur     History of colon polyps     " Hyperlipidemia     Hypopituitary dwarfism     Iron deficiency anemia     Lupus     Migraines, neuralgic     Nonspecific ulcerative colitis     OP (osteoporosis)     history of reclast    Osteopenia     Pancreatic cyst     Schatzki's ring     Steroid sulfatase deficiency     Ulcerative colitis     Vitamin D deficiency disease        Surgical History:  Past Surgical History:   Procedure Laterality Date    ANORECTAL MANOMETRY N/A 1/13/2022    Procedure: MANOMETRY, ANORECTAL;  Surgeon: First Available Stu Ambrocioliz;  Location: [facility] (4TH FLR);  Service: Endoscopy;  Laterality: N/A;  new order placed; original order placed incorrectly  1/7 instructions handed to patient-st    APPENDECTOMY      BREAST BIOPSY Right 2/2016    IDC    BREAST RECONSTRUCTION      BREAST SURGERY      CHOLECYSTECTOMY      COLON SURGERY      ENDOSCOPIC ULTRASOUND OF UPPER GASTROINTESTINAL TRACT N/A 8/6/2018    Procedure: ULTRASOUND, ENDOSCOPIC, UPPER GI TRACT;  Surgeon: Hong Finn MD;  Location: [facility] (2ND FLR);  Service: Endoscopy;  Laterality: N/A;    ESOPHAGOGASTRODUODENOSCOPY N/A 12/10/2018    Procedure: EGD (ESOPHAGOGASTRODUODENOSCOPY);  Surgeon: Reese Villalta MD;  Location: [facility] (4TH FLR);  Service: Endoscopy;  Laterality: N/A;    ESOPHAGOGASTRODUODENOSCOPY N/A 12/30/2021    Procedure: EGD (ESOPHAGOGASTRODUODENOSCOPY);  Surgeon: Reese Villalta MD;  Location: [facility] (2ND FLR);  Service: Endoscopy;  Laterality: N/A;  EGD for esophageal dysphagia and early satiety please schedule 1st provider available. wants this date-Dr Villalta only     fully vaccinated-GT  hx of adrenal insuffiency   12/27 arrival time confirmed with pt-rb    ESOPHAGOGASTRODUODENOSCOPY N/A 9/9/2022    Procedure: EGD (ESOPHAGOGASTRODUODENOSCOPY);  Surgeon: Reese Villalta MD;  Location: [facility] (4TH FLR);  Service: Endoscopy;  Laterality: N/A;  vacc  inst handed to pt-LW    FLEXIBLE SIGMOIDOSCOPY N/A 12/10/2018    Procedure: SIGMOIDOSCOPY,  FLEXIBLE;  Surgeon: Reese Villalta MD;  Location: [facility] END[facility] (4TH FLR);  Service: Endoscopy;  Laterality: N/A;  Recommend full split bowel prep for this study just like for a colonoscopy    HYSTERECTOMY      ILEOSCOPY N/A 2/16/2022    Procedure: ILEOSCOPY;  Surgeon: Ceferino Rangel MD;  Location: [facility] ENDO (4TH FLR);  Service: Endoscopy;  Laterality: N/A;  medical history significant for Ulcerative colitis s/p total colectomy with ileoanal anastomosis ([id number]), SLE on plaquenil, adrenal insufficiency on prednisone, breast cancer, and HTN who presents with 3 month history of bilateral lower abdominal pain. Patient with abd    MASTECTOMY      OOPHORECTOMY Bilateral     restorative proctectomy      total abdominal colectomy with ileostomy  2010    TOTAL COLECTOMY      TOTAL REDUCTION MAMMOPLASTY Left 2017    UPPER ENDOSCOPIC ULTRASOUND W/ FNA         Family History:   Family History   Problem Relation Age of Onset    Diabetes Father     Hyperlipidemia Father     Hypertension Father     Hyperlipidemia Mother     Cancer Maternal Aunt         pancreatic cancer, late 50s at dx    Pancreatic cancer Maternal Aunt     Breast cancer Maternal Aunt 55    Breast cancer Cousin 28    Breast cancer Other 45    No Known Problems Sister     No Known Problems Brother     Cancer Maternal Uncle 65        pancreatic cancer    Pancreatic cancer Maternal Uncle     No Known Problems Paternal Aunt     No Known Problems Paternal Uncle     No Known Problems Maternal Grandmother     No Known Problems Maternal Grandfather     No Known Problems Paternal Grandmother     No Known Problems Paternal Grandfather     Breast cancer Maternal Cousin 50    Cancer Other 25        liver cancer    Amblyopia Neg Hx     Blindness Neg Hx     Cataracts Neg Hx     Macular degeneration Neg Hx     Retinal detachment Neg Hx     Strabismus Neg Hx     Stroke Neg Hx     Thyroid disease Neg Hx     Glaucoma Neg Hx     Ovarian cancer Neg Hx     Celiac disease Neg  Hx     Colon cancer Neg Hx     Colon polyps Neg Hx     Esophageal cancer Neg Hx     Liver cancer Neg Hx     Rectal cancer Neg Hx     Stomach cancer Neg Hx     Ulcerative colitis Neg Hx     Inflammatory bowel disease Neg Hx        Social History:   Social History     Tobacco Use    Smoking status: Never    Smokeless tobacco: Never   Substance Use Topics    Alcohol use: No     Alcohol/week: 0.0 standard drinks    Drug use: No       Allergies: Reviewed    Home Medications:   Medication List with Changes/Refills   Current Medications    (MAGIC MOUTHWASH) 1:1:1 DIPHENHYDRAMINE(BENADRYL) 12.5MG/5ML LIQ, ALUMINUM & MAGNESIUM HYDROXIDE-SIMETHICONE (MAALOX), LIDOCAINE VISCOUS 2%    Swish and spit 5 mLs every 4 (four) hours as needed (mouth sores). for mouth sores    ALBUTEROL (PROVENTIL/VENTOLIN HFA) 90 MCG/ACTUATION INHALER    Inhale 1-2 puffs into the lungs every 6 (six) hours as needed for Wheezing or Shortness of Breath. Rescue    AMLODIPINE (NORVASC) 2.5 MG TABLET    Take 2.5 mg by mouth.    BELIMUMAB (BENLYSTA) 200 MG/ML ATIN    Inject 1 mL (200 mg total) into the skin every 7 days.    CALCIUM CARBONATE (OS-RISSA) 500 MG CALCIUM (1,250 MG) TABLET    Take 1 tablet by mouth once daily.    CETIRIZINE (ZYRTEC) 10 MG TABLET    Take 1 tablet (10 mg total) by mouth once daily.    CHOLECALCIFEROL, VITAMIN D3, 125 MCG (5,000 UNIT) TAB    Take 5,000 Units by mouth once daily.    CREON 36,000-114,000- 180,000 UNIT CPDR    TAKE TWO CAPSULES BY MOUTH THREE TIMES DAILY WITH MEALS AND ONE CAPSULE WITH EACH SNACK    DEXAMETHASONE (DECADRON) 4 MG/ML INJECTION    Inject 1 mL (4 mg total) into the muscle daily as needed (Signs and symtpoms of severe adrenal insufficiency).    DICLOFENAC SODIUM (VOLTAREN) 1 % GEL    APPLY 2 GRAMS TO AFFECTED AREA 4 TIMES A DAY    DICYCLOMINE (BENTYL) 20 MG TABLET    TAKE 1 TABLET BY MOUTH EVERY 6 HOURS    FERROUS SULFATE (IRON ORAL)    Take by mouth. No sure dose    FLUTICASONE PROPIONATE (FLONASE) 50  MCG/ACTUATION NASAL SPRAY    1 spray (50 mcg total) by Each Nostril route 2 (two) times daily as needed for Rhinitis or Allergies.    FOLIC ACID (FOLVITE) 1 MG TABLET    Take 1 mg by mouth once daily.    GABAPENTIN (NEURONTIN) 300 MG CAPSULE    TAKE 1 CAPSULE BY MOUTH EVERY EVENING    GADAVIST 1 MMOL/ML (604.72 MG/ML) SOLN INJECTION        HYDROCHLOROTHIAZIDE (HYDRODIURIL) 12.5 MG TAB    Take 12.5 mg by mouth.    HYDROXYCHLOROQUINE (PLAQUENIL) 200 MG TABLET    TAKE 1 AND 1/2 TABLETS BY MOUTH EVERY DAY    HYDROXYZINE HCL (ATARAX) 25 MG TABLET    TAKE 1 TABLET BY MOUTH NIGHTLY AS NEEDED (INSOMNIA).    LEVOCETIRIZINE (XYZAL) 5 MG TABLET    Take 1 tablet (5 mg total) by mouth every evening.    LIDOCAINE (LIDODERM) 5 %    Place 1 patch onto the skin once daily. Remove & Discard patch within 12 hours or as directed by MD    LOPERAMIDE (IMODIUM) 2 MG CAPSULE    TAKE 1 TO 2 CAPSULES BY MOUTH FOUR TIMES DAILY    LOSARTAN (COZAAR) 50 MG TABLET    Take 1 tablet (50 mg total) by mouth once daily.    MILNACIPRAN (SAVELLA) 100 MG TAB    Take 1 tablet (100 mg total) by mouth once daily.    MULTIVITAMIN/IRON/FOLIC ACID (CENTRUM WOMEN ORAL)    Take 1 tablet by mouth once daily.    NEBIVOLOL (BYSTOLIC) 10 MG TAB        NIACIN 100 MG TAB    Take by mouth. 1 Tablet Oral At bedtime    NIFEDIPINE (ADALAT CC) 30 MG TBSR    Take 30 mg by mouth.    OMEPRAZOLE (PRILOSEC) 20 MG CAPSULE    TAKE 1 CAPSULE BY MOUTH TWICE A DAY    ONDANSETRON (ZOFRAN) 4 MG TABLET    Take 1 tablet (4 mg total) by mouth every 12 (twelve) hours as needed for Nausea.    OXYCODONE-ACETAMINOPHEN (PERCOCET) 5-325 MG PER TABLET    Take 1 tablet by mouth every 4 (four) hours as needed for Pain.    POTASSIUM CHLORIDE SA (K-DUR,KLOR-CON) 20 MEQ TABLET    Take 1 tablet (20 mEq total) by mouth 2 (two) times daily.    POTASSIUM GLUCONATE 500 MG (83 MG) TAB    Take by mouth 2 (two) times daily.    PREDNISONE (DELTASONE) 5 MG TABLET    TAKE 1 TABLET (5 MG TOTAL) BY MOUTH ONCE  DAILY. DOUBLE THE DOSE IN TIMES OF ILLNESS    PSYLLIUM HUSK/ASPARTAME (METAMUCIL FIBER SINGLES ORAL)    Take by mouth.    REPATHA SURECLICK 140 MG/ML PNIJ    INJECT 140 MG INTO THE SKIN EVERY 14 (FOURTEEN) DAYS    RESTASIS 0.05 % OPHTHALMIC EMULSION    INSTILL 1 DROP INTO BOTH EYES TWICE A DAY    RIZATRIPTAN (MAXALT) 10 MG TABLET    Take 1 tablet (10 mg total) by mouth 2 (two) times daily as needed for Migraine.    ROSUVASTATIN (CRESTOR) 5 MG TABLET    Take 5 mg by mouth once daily.    VALACYCLOVIR (VALTREX) 1000 MG TABLET    Take 1 tablet (1,000 mg total) by mouth every 12 (twelve) hours. for 10 days    ZOLEDRONIC ACID-MANNITOL & WATER (RECLAST) 5 MG/100 ML PGBK        ZOLPIDEM (AMBIEN) 5 MG TAB    Take 1 tablet (5 mg total) by mouth nightly as needed (insomnia).       Physical Exam:  Vital Signs:  /85 (Patient Position: Sitting)   Pulse 89   Temp 98.2 °F (36.8 °C)   Wt 58.1 kg (128 lb 1.4 oz)   SpO2 100%   BMI 21.99 kg/m²   Body mass index is 21.99 kg/m².    Physical Exam  Vitals and nursing note reviewed.   Constitutional:       General: She is not in acute distress.     Appearance: Normal appearance. She is well-developed. She is not ill-appearing or toxic-appearing.   Eyes:      Conjunctiva/sclera: Conjunctivae normal.      Pupils: Pupils are equal, round, and reactive to light.   Neck:      Thyroid: No thyromegaly.   Cardiovascular:      Rate and Rhythm: Normal rate and regular rhythm.      Heart sounds: Normal heart sounds. No murmur heard.  Pulmonary:      Effort: Pulmonary effort is normal.      Breath sounds: Normal breath sounds. No wheezing or rales.   Abdominal:      General: Bowel sounds are normal. There is no distension.      Palpations: Abdomen is soft. There is no mass.      Tenderness: There is no abdominal tenderness.   Musculoskeletal:         General: No tenderness. Normal range of motion.   Lymphadenopathy:      Cervical: No cervical adenopathy.   Skin:     Findings: No erythema or  rash.   Neurological:      General: No focal deficit present.      Mental Status: She is alert and oriented to person, place, and time.   Psychiatric:         Mood and Affect: Mood normal.         Behavior: Behavior normal.         Thought Content: Thought content normal.         Judgment: Judgment normal.       Labs: reviewed and pertinent noted above    Assessment/Plan:  Efe Horowitz is a 63 y.o. female with a history of ulcerative colitis status post total proctocolectomy and J pouch placement, history of pancreatic insufficiency, chronic degenerative changes in the spine, lupus, and lactose intolerance as well as pelvic floor dysfunction and pancreatic cystic lesions. The following issues were addresssed:    1. Pelvic floor dysfunction    2. Exocrine pancreatic insufficiency    3. Status post total colectomy    4. Pancreatic cyst      1. History of ulcerative colitis:  She has a J pouch with no signs of inflammation.  No treatment necessary.  Continue to monitor.    2. Pelvic floor dysfunction:  Continue current management.  She is doing quite well..    3. Lower abdominal pain:  Essentially resolved with improvement of pelvic floor dysfunction.  Continue to use dicyclomine as needed.    4. Pancreatic insufficiency: Continue Creon.      5. Pancreatic cyst:  Repeat MRI in early 2024.    6. Nausea, vomiting, diarrhea colon symptoms have resolved.  Likely a viral gastroenteritis.     Follow up: Follow up in about 1 year (around 6/12/2024).      Thank you again for sending Efe Horowitz to see Dr. Jay Rangel today at the [facility]. Please don't hesitate to contact Dr. Rangel if there are any questions regarding this evaluation, or if you have any other patients with inflammatory bowel disease for whom you would like a consultation. You can reach Dr. Rangel at [phone] or by email at [email or web address]    Ceferino Rangel MD  Department of  Gastroenterology  Inflammatory Bowel Disease

## 2023-06-12 NOTE — PROGRESS NOTES
IBD PATIENT INTAKE:    COVID symptoms in the last 7 days (runny nose, sore throat, congestion, cough, fever): No  PCP: Shonda Anguiano  If not PCP-  number given to establish [phone]: N/A    ALLERGIES REVIEWED:  Yes    CHIEF COMPLAINT:    Chief Complaint   Patient presents with    Ulcerative Colitis       VITAL SIGNS:  /85 (Patient Position: Sitting)   Pulse 89   Temp 98.2 °F (36.8 °C)   Wt 58.1 kg (128 lb 1.4 oz)   SpO2 100%   BMI 21.99 kg/m²      Change in medical, surgical, family or social history: No    IBD THERAPY (name, dose/frequency):  none  Last dose:  n/a    Next dose:  n/a  Infusion/Pharmacy: NA    NSAIDs (aspirin, ibuprofen-advil or motrin, naproxen-aleve, diclofenac-voltaren, BC powder, excedrin, goodies): No    Alternative/Complementary Medications (i.e. probiotics, turmeric, fish oil, aloe vera):      No  Name/dose:  n/a    Vitamins:   Vit D:  5000U     Vit B-12:  no   Folic Acid: no       Calcium: 1250mg    Iron:  no      MVI: yes    Antibiotics (past 30 Days):  No  If yes   Indication:  Name of antibiotic:  Completion date:     REVIEWED MEDICATION LIST RECONCILED INCLUDING ABOVE MEDS:  Yes                  Answers submitted by the patient for this visit:  Established Patient Questionnaire  (Submitted on 6/12/2023)  mouth sores: Yes  abdominal pain: Yes  nausea: Yes  vomiting: Yes  heartburn: Yes

## 2023-06-13 ENCOUNTER — PATIENT MESSAGE (OUTPATIENT)
Dept: RHEUMATOLOGY | Facility: CLINIC | Age: 63
End: 2023-06-13
Payer: MEDICARE

## 2023-06-13 LAB — DSDNA AB SER-ACNC: NORMAL [IU]/ML

## 2023-06-14 ENCOUNTER — PATIENT MESSAGE (OUTPATIENT)
Dept: RHEUMATOLOGY | Facility: CLINIC | Age: 63
End: 2023-06-14
Payer: MEDICARE

## 2023-06-14 NOTE — TELEPHONE ENCOUNTER
Specialty Pharmacy - Refill Coordination    Specialty Medication Orders Linked to Encounter      Flowsheet Row Most Recent Value   Medication #1 belimumab (BENLYSTA) 200 mg/mL AtIn (Order#[id number], Rx#[id number])            Refill Questions - Documented Responses      Flowsheet Row Most Recent Value   Patient Availability and HIPAA Verification    Does patient want to proceed with activity? Yes   HIPAA/medical authority confirmed? Yes   Relationship to patient of person spoken to? Self   Refill Screening Questions    Changes to allergies? No   Changes to medications? No   New conditions since last clinic visit? No   Unplanned office visit, urgent care, ED, or hospital admission in the last 4 weeks? No   How does patient/caregiver feel medication is working? Very good   Financial problems or insurance changes? No   How many doses of your specialty medications were missed in the last 4 weeks? 0   Would patient like to speak to a pharmacist? No   When does the patient need to receive the medication? 06/18/23   Refill Delivery Questions    How will the patient receive the medication? MEDRx   When does the patient need to receive the medication? 06/18/23   Shipping Address Home   Address in [facility] confirmed and updated if neccessary? Yes   Expected Copay ($) 0   Is the patient able to afford the medication copay? Yes   Payment Method zero copay   Days supply of Refill 28   Supplies needed? No supplies needed   Refill activity completed? Yes   Refill activity plan Refill scheduled   Shipment/Pickup Date: 06/14/23            Current Outpatient Medications   Medication Sig    (Magic mouthwash) 1:1:1 diphenhydrAMINE(Benadryl) 12.5mg/5ml liq, aluminum & magnesium hydroxide-simethicone (Maalox), LIDOcaine viscous 2% Swish and spit 5 mLs every 4 (four) hours as needed (mouth sores). for mouth sores    albuterol (PROVENTIL/VENTOLIN HFA) 90 mcg/actuation inhaler Inhale 1-2 puffs into the lungs every 6 (six) hours as  needed for Wheezing or Shortness of Breath. Rescue (Patient not taking: Reported on 6/12/2023)    amLODIPine (NORVASC) 2.5 MG tablet Take 2.5 mg by mouth.    belimumab (BENLYSTA) 200 mg/mL AtIn Inject 1 mL (200 mg total) into the skin every 7 days.    calcium carbonate (OS-RISSA) 500 mg calcium (1,250 mg) tablet Take 1 tablet by mouth once daily.    cetirizine (ZYRTEC) 10 MG tablet Take 1 tablet (10 mg total) by mouth once daily.    cholecalciferol, vitamin D3, 125 mcg (5,000 unit) Tab Take 5,000 Units by mouth once daily.    CREON 36,000-114,000- 180,000 unit CpDR TAKE TWO CAPSULES BY MOUTH THREE TIMES DAILY WITH MEALS AND ONE CAPSULE WITH EACH SNACK    dexamethasone (DECADRON) 4 mg/mL injection Inject 1 mL (4 mg total) into the muscle daily as needed (Signs and symtpoms of severe adrenal insufficiency).    diclofenac sodium (VOLTAREN) 1 % Gel APPLY 2 GRAMS TO AFFECTED AREA 4 TIMES A DAY    dicyclomine (BENTYL) 20 mg tablet TAKE 1 TABLET BY MOUTH EVERY 6 HOURS    ferrous sulfate (IRON ORAL) Take by mouth. No sure dose    fluticasone propionate (FLONASE) 50 mcg/actuation nasal spray 1 spray (50 mcg total) by Each Nostril route 2 (two) times daily as needed for Rhinitis or Allergies. (Patient not taking: Reported on 6/12/2023)    folic acid (FOLVITE) 1 MG tablet Take 1 mg by mouth once daily.    gabapentin (NEURONTIN) 300 MG capsule TAKE 1 CAPSULE BY MOUTH EVERY EVENING    GADAVIST 1 mmol/mL (604.72 mg/mL) Soln injection     hydroCHLOROthiazide (HYDRODIURIL) 12.5 MG Tab Take 12.5 mg by mouth.    hydrOXYchloroQUINE (PLAQUENIL) 200 mg tablet TAKE 1 AND 1/2 TABLETS BY MOUTH EVERY DAY    hydrOXYzine HCL (ATARAX) 25 MG tablet TAKE 1 TABLET BY MOUTH NIGHTLY AS NEEDED (INSOMNIA).    levocetirizine (XYZAL) 5 MG tablet Take 1 tablet (5 mg total) by mouth every evening.    LIDOcaine (LIDODERM) 5 % Place 1 patch onto the skin once daily. Remove & Discard patch within 12 hours or as directed by MD (Patient not taking: Reported on  6/12/2023)    loperamide (IMODIUM) 2 mg capsule TAKE 1 TO 2 CAPSULES BY MOUTH FOUR TIMES DAILY    losartan (COZAAR) 50 MG tablet Take 1 tablet (50 mg total) by mouth once daily.    milnacipran (SAVELLA) 100 mg Tab Take 1 tablet (100 mg total) by mouth once daily.    multivitamin/iron/folic acid (CENTRUM WOMEN ORAL) Take 1 tablet by mouth once daily.    nebivoloL (BYSTOLIC) 10 MG Tab     niacin 100 MG Tab Take by mouth. 1 Tablet Oral At bedtime    NIFEdipine (ADALAT CC) 30 MG TbSR Take 30 mg by mouth.    omeprazole (PRILOSEC) 20 MG capsule TAKE 1 CAPSULE BY MOUTH TWICE A DAY    ondansetron (ZOFRAN) 4 MG tablet Take 1 tablet (4 mg total) by mouth every 12 (twelve) hours as needed for Nausea.    oxyCODONE-acetaminophen (PERCOCET) 5-325 mg per tablet Take 1 tablet by mouth every 4 (four) hours as needed for Pain. (Patient not taking: Reported on 6/12/2023)    potassium chloride SA (K-DUR,KLOR-CON) 20 MEQ tablet Take 1 tablet (20 mEq total) by mouth 2 (two) times daily.    potassium gluconate 500 mg (83 mg) Tab Take by mouth 2 (two) times daily.    predniSONE (DELTASONE) 5 MG tablet TAKE 1 TABLET (5 MG TOTAL) BY MOUTH ONCE DAILY. DOUBLE THE DOSE IN TIMES OF ILLNESS    psyllium husk/aspartame (METAMUCIL FIBER SINGLES ORAL) Take by mouth.    REPATHA SURECLICK 140 mg/mL PnIj INJECT 140 MG INTO THE SKIN EVERY 14 (FOURTEEN) DAYS    RESTASIS 0.05 % ophthalmic emulsion INSTILL 1 DROP INTO BOTH EYES TWICE A DAY    rizatriptan (MAXALT) 10 MG tablet Take 1 tablet (10 mg total) by mouth 2 (two) times daily as needed for Migraine.    rosuvastatin (CRESTOR) 5 MG tablet Take 5 mg by mouth once daily.    valACYclovir (VALTREX) 1000 MG tablet Take 1 tablet (1,000 mg total) by mouth every 12 (twelve) hours. for 10 days    zoledronic acid-mannitol & water (RECLAST) 5 mg/100 mL PgBk     zolpidem (AMBIEN) 5 MG Tab Take 1 tablet (5 mg total) by mouth nightly as needed (insomnia).   Last reviewed on 6/12/2023  9:16 AM by Ceferino Rangel,  "MD    Review of patient's allergies indicates:   Allergen Reactions    Aspirin      Other reaction(s): "hemorrhage"  hemorrhage    Hydrocortisone Nausea Only     Other reaction(s): sick  sick    Lactose intolerance (lactase) [lactase] Nausea And Vomiting    Vicodin [hydrocodone-acetaminophen]      Other reaction(s): hyperactivity    Asacol [mesalamine] Hallucinations     Other reaction(s): Hallucinations  hallucinations    Last reviewed on  6/12/2023 9:16 AM by Ceferino Rangel      Tasks added this encounter   No tasks added.   Tasks due within next 3 months   8/7/2023 - Clinical Assessment (1 year recurrence)  6/16/2023 - Refill Coordination Outreach (1 time occurrence)     Kavon Jaimes, PharmD  [facility]  [address] [facility] [address]  Phone: [phone]  Fax: [phone]        "

## 2023-06-27 ENCOUNTER — OFFICE VISIT (OUTPATIENT)
Dept: RHEUMATOLOGY | Facility: CLINIC | Age: 63
End: 2023-06-27
Payer: MEDICARE

## 2023-06-27 VITALS
WEIGHT: 129 LBS | HEIGHT: 64 IN | BODY MASS INDEX: 22.02 KG/M2 | SYSTOLIC BLOOD PRESSURE: 148 MMHG | DIASTOLIC BLOOD PRESSURE: 84 MMHG | HEART RATE: 65 BPM

## 2023-06-27 DIAGNOSIS — M32.9 SYSTEMIC LUPUS ERYTHEMATOSUS, UNSPECIFIED SLE TYPE, UNSPECIFIED ORGAN INVOLVEMENT STATUS: Primary | ICD-10-CM

## 2023-06-27 PROCEDURE — 1101F PR PT FALLS ASSESS DOC 0-1 FALLS W/OUT INJ PAST YR: ICD-10-PCS | Mod: CPTII,S$GLB,, | Performed by: INTERNAL MEDICINE

## 2023-06-27 PROCEDURE — 3066F NEPHROPATHY DOC TX: CPT | Mod: CPTII,S$GLB,, | Performed by: INTERNAL MEDICINE

## 2023-06-27 PROCEDURE — 3077F SYST BP >= 140 MM HG: CPT | Mod: CPTII,S$GLB,, | Performed by: INTERNAL MEDICINE

## 2023-06-27 PROCEDURE — 99499 RISK ADDL DX/OHS AUDIT: ICD-10-PCS | Mod: HCNC,S$GLB,, | Performed by: INTERNAL MEDICINE

## 2023-06-27 PROCEDURE — 3079F PR MOST RECENT DIASTOLIC BLOOD PRESSURE 80-89 MM HG: ICD-10-PCS | Mod: CPTII,S$GLB,, | Performed by: INTERNAL MEDICINE

## 2023-06-27 PROCEDURE — 1101F PT FALLS ASSESS-DOCD LE1/YR: CPT | Mod: CPTII,S$GLB,, | Performed by: INTERNAL MEDICINE

## 2023-06-27 PROCEDURE — 99499 UNLISTED E&M SERVICE: CPT | Mod: HCNC,S$GLB,, | Performed by: INTERNAL MEDICINE

## 2023-06-27 PROCEDURE — 3077F PR MOST RECENT SYSTOLIC BLOOD PRESSURE >= 140 MM HG: ICD-10-PCS | Mod: CPTII,S$GLB,, | Performed by: INTERNAL MEDICINE

## 2023-06-27 PROCEDURE — 1159F PR MEDICATION LIST DOCUMENTED IN MEDICAL RECORD: ICD-10-PCS | Mod: CPTII,S$GLB,, | Performed by: INTERNAL MEDICINE

## 2023-06-27 PROCEDURE — 4010F ACE/ARB THERAPY RXD/TAKEN: CPT | Mod: CPTII,S$GLB,, | Performed by: INTERNAL MEDICINE

## 2023-06-27 PROCEDURE — 1160F PR REVIEW ALL MEDS BY PRESCRIBER/CLIN PHARMACIST DOCUMENTED: ICD-10-PCS | Mod: CPTII,S$GLB,, | Performed by: INTERNAL MEDICINE

## 2023-06-27 PROCEDURE — 99214 OFFICE O/P EST MOD 30 MIN: CPT | Mod: S$GLB,,, | Performed by: INTERNAL MEDICINE

## 2023-06-27 PROCEDURE — 3079F DIAST BP 80-89 MM HG: CPT | Mod: CPTII,S$GLB,, | Performed by: INTERNAL MEDICINE

## 2023-06-27 PROCEDURE — 3008F PR BODY MASS INDEX (BMI) DOCUMENTED: ICD-10-PCS | Mod: CPTII,S$GLB,, | Performed by: INTERNAL MEDICINE

## 2023-06-27 PROCEDURE — 99999 PR PBB SHADOW E&M-EST. PATIENT-LVL IV: ICD-10-PCS | Mod: PBBFAC,,, | Performed by: INTERNAL MEDICINE

## 2023-06-27 PROCEDURE — 3044F HG A1C LEVEL LT 7.0%: CPT | Mod: CPTII,S$GLB,, | Performed by: INTERNAL MEDICINE

## 2023-06-27 PROCEDURE — 1159F MED LIST DOCD IN RCRD: CPT | Mod: CPTII,S$GLB,, | Performed by: INTERNAL MEDICINE

## 2023-06-27 PROCEDURE — 1160F RVW MEDS BY RX/DR IN RCRD: CPT | Mod: CPTII,S$GLB,, | Performed by: INTERNAL MEDICINE

## 2023-06-27 PROCEDURE — 3044F PR MOST RECENT HEMOGLOBIN A1C LEVEL <7.0%: ICD-10-PCS | Mod: CPTII,S$GLB,, | Performed by: INTERNAL MEDICINE

## 2023-06-27 PROCEDURE — 4010F PR ACE/ARB THEARPY RXD/TAKEN: ICD-10-PCS | Mod: CPTII,S$GLB,, | Performed by: INTERNAL MEDICINE

## 2023-06-27 PROCEDURE — 3008F BODY MASS INDEX DOCD: CPT | Mod: CPTII,S$GLB,, | Performed by: INTERNAL MEDICINE

## 2023-06-27 PROCEDURE — 3288F PR FALLS RISK ASSESSMENT DOCUMENTED: ICD-10-PCS | Mod: CPTII,S$GLB,, | Performed by: INTERNAL MEDICINE

## 2023-06-27 PROCEDURE — 99214 PR OFFICE/OUTPT VISIT, EST, LEVL IV, 30-39 MIN: ICD-10-PCS | Mod: S$GLB,,, | Performed by: INTERNAL MEDICINE

## 2023-06-27 PROCEDURE — 3288F FALL RISK ASSESSMENT DOCD: CPT | Mod: CPTII,S$GLB,, | Performed by: INTERNAL MEDICINE

## 2023-06-27 PROCEDURE — 3066F PR DOCUMENTATION OF TREATMENT FOR NEPHROPATHY: ICD-10-PCS | Mod: CPTII,S$GLB,, | Performed by: INTERNAL MEDICINE

## 2023-06-27 PROCEDURE — 99999 PR PBB SHADOW E&M-EST. PATIENT-LVL IV: CPT | Mod: PBBFAC,,, | Performed by: INTERNAL MEDICINE

## 2023-06-27 NOTE — PROGRESS NOTES
Rapid3 Question Responses and Scores 6/27/2023   MDHAQ Score 0.5   Psychologic Score 1.1   Pain Score 7.5   When you awakened in the morning OVER THE LAST WEEK, did you feel stiff? Yes   If Yes, please indicate the number of hours until you are as limber as you will be for the day 2   Fatigue Score 3   Global Health Score 3   RAPID3 Score 4.06     Answers submitted by the patient for this visit:  Rheumatology Questionnaire (Submitted on 6/27/2023)  fever: No  eye redness: Yes  mouth sores: Yes  headaches: Yes  shortness of breath: No  chest pain: No  trouble swallowing: No  diarrhea: Yes  constipation: No  unexpected weight change: No  genital sore: No  dysuria: No  During the last 3 days, have you had a skin rash?: No  Bruises or bleeds easily: No  cough: No

## 2023-06-27 NOTE — PROGRESS NOTES
Subjective:       Patient ID: Efe Horowitz is a 63 y.o. female.    Chief Complaint: Lupus    HPI:  Efe Horowitz is a 63 y.o. female with history of lupus since age 39.   Her manifestations include joint pains, headache, positive DERRICK, and a   double-stranded DNA. She also has an equivocal anticardiolipin IgM.   She has been treated with Plaquenil 1-1/2 tablets daily. Eye exam 12/2017 was normal.      In addition to lupus, she has a   history of ulcerative colitis, osteopenia, fibromyalgia as well.   In 2009 she had a colectomy with ileostomy and in May 2011 the ileostomy  was closed. History of bilateral carpal tunnel.       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  She was treated with surgical resection.  Another lesion breast suspected to be lymphoma was later felt to be fibrous tissue.      May 1, 2017 had right breast fibrous material removed and reconstruction on both breasts at [facility].       Interval History:      Still fatigued.    Oral ulcer last week.     She feels since on Benlysta migraine headaches more severe and ulcers are more often mouth/nose.  Benlysta has helped with body aches, itching in scalp, swelling in hands and pain improved (no longer uses patches or creams for pain).   No rashes.   Morning stiffness for 2 hour.    Continues taking injection for cholesterol.  Completed Forteo and now on Reclast with Dr. Srinivasan for osteoporosis with L3 vertebral fractures (top and bottom) on MRI.        Review of Systems   Constitutional:  Positive for fatigue. Negative for fever and unexpected weight change.   HENT:  Negative for mouth sores and trouble swallowing.    Eyes:  Negative for redness.        Dry eyes   Respiratory:  Negative for cough and shortness of breath.    Cardiovascular:  Negative for chest pain.   Gastrointestinal:  Negative for constipation and diarrhea.   Endocrine: Negative.    Genitourinary: Negative.  Negative for dysuria and genital sores.   Musculoskeletal:   "Positive for arthralgias.   Skin: Negative.  Negative for rash.   Allergic/Immunologic: Negative.    Neurological:  Positive for headaches.   Hematological: Negative.  Does not bruise/bleed easily.   Psychiatric/Behavioral: Negative.         Objective:   BP (!) 148/84   Pulse 65   Ht 5' 4" (1.626 m)   Wt 58.5 kg (128 lb 15.5 oz)   BMI 22.14 kg/m²         Physical Exam   Constitutional: She is oriented to person, place, and time.   HENT:   Head: Normocephalic and atraumatic.   Eyes: Conjunctivae are normal.   Cardiovascular: Normal rate, regular rhythm and normal heart sounds.   Pulmonary/Chest: Effort normal and breath sounds normal.   Abdominal: Soft. Bowel sounds are normal.   Musculoskeletal:         General: No swelling or tenderness.      Cervical back: Neck supple.      Comments:      Neurological: She is alert and oriented to person, place, and time. Gait normal.   Skin: Skin is warm and dry.   Psychiatric: Mood and affect normal.       LABS        Component      Latest Ref Rng & Units 6/12/2023   WBC      3.90 - 12.70 K/uL 8.15   RBC      4.00 - 5.40 M/uL 4.65   Hemoglobin      12.0 - 16.0 g/dL 12.6   Hematocrit      37.0 - 48.5 % 41.2   MCV      82 - 98 fL 89   MCH      27.0 - 31.0 pg 27.1   MCHC      32.0 - 36.0 g/dL 30.6 (L)   RDW      11.5 - 14.5 % 14.3   Platelets      150 - 450 K/uL 322   MPV      9.2 - 12.9 fL 11.0   Immature Granulocytes      0.0 - 0.5 % 0.4   Gran # (ANC)      1.8 - 7.7 K/uL 4.4   Immature Grans (Abs)      0.00 - 0.04 K/uL 0.03   Lymph #      1.0 - 4.8 K/uL 2.6   Mono #      0.3 - 1.0 K/uL 0.8   Eos #      0.0 - 0.5 K/uL 0.3   Baso #      0.00 - 0.20 K/uL 0.04   nRBC      0 /100 WBC 0   Gran %      38.0 - 73.0 % 53.4   Lymph %      18.0 - 48.0 % 32.4   Mono %      4.0 - 15.0 % 9.4   Eosinophil %      0.0 - 8.0 % 3.9   Basophil %      0.0 - 1.9 % 0.5   Differential Method       Automated   Sodium      136 - 145 mmol/L 143   Potassium      3.5 - 5.1 mmol/L 3.4 (L)   Chloride      " 95 - 110 mmol/L 103   CO2      23 - 29 mmol/L 27   Glucose      70 - 110 mg/dL 79   BUN      8 - 23 mg/dL 10   Creatinine      0.5 - 1.4 mg/dL 0.8   Calcium      8.7 - 10.5 mg/dL 9.7   PROTEIN TOTAL      6.0 - 8.4 g/dL 6.7   Albumin      3.5 - 5.2 g/dL 3.8   BILIRUBIN TOTAL      0.1 - 1.0 mg/dL 0.2   Alkaline Phosphatase      55 - 135 U/L 47 (L)   AST      10 - 40 U/L 20   ALT      10 - 44 U/L 14   Anion Gap      8 - 16 mmol/L 13   eGFR      >60 mL/min/1.73 m:2 >60.0   Specimen UA       Urine, Clean CatchUrine, Illeal LoopUrin   Color, UA      Yellow, Straw, Amanda Yellow   Appearance, UA      Clear Clear   pH, UA      5.0 - 8.0 5.0   Specific Gravity, UA      1.005 - 1.030 1.010   Protein, UA      Negative Negative   Glucose, UA      Negative Negative   Ketones, UA      Negative Negative   Bilirubin (UA)      Negative Negative   Occult Blood UA      Negative Negative   NITRITE UA      Negative Negative   Leukocytes, UA      Negative Trace (A)   RBC, UA      0 - 4 /hpf 1   WBC, UA      0 - 5 /hpf 1   Bacteria, UA      None-Occ /hpf Rare   Hyaline Casts, UA      0-1/lpf /lpf 3 (A)   Microscopic Comment       SEE COMMENT   Protein, Urine Random      0 - 15 mg/dL <7   Creatinine, Urine      15.0 - 325.0 mg/dL 100.0   Prot/Creat Ratio, Urine      0.00 - 0.20 Unable to calculate   ds DNA Ab      Negative 1:10 Negative 1:10   Complement (C-3)      50 - 180 mg/dL 120   Complement (C-4)      11 - 44 mg/dL 33   CPK      20 - 180 U/L 88   Sed Rate      0 - 36 mm/Hr 7   CRP      0.0 - 8.2 mg/L 5.0         Assessment:       1. Lupus. Lupus manifestations include joint pains, headache, positive DERRICK (positive 5/26/2005 in labs), and a   double-stranded DNA. Has fatigue as well as intermittent oral/nasal ulcer. SLEDAI=  Some improvement on Benlysta.   2. Fatigue.  Worse than usual  3. Encounter for long-term (current) use of other medications   4. Myalgia and myositis. Fibromyalgia on Savella.  Patient stopped gabapentin since it  made her sleepy but no worsening in pain.    5. Trochanteric bursitis. Bilateral improved with injection.  6. Shoulder pain. Stable   7. Vitamin D deficiency disease. Now normal  8. Suspected Shingles.   9. Migraine.  Did acupuncture with Dr. Tinsley.  Treated with Maxalt by primary doctor which helps.     10.  Osteoporosis based vertebral fracture.  Reclast in the past.  Dr. Srinivasan gave Forteo for 2 years now back on Reclast.   11.  Chronic steroid use.  Endocrine gives for adrenal insufficiency.  12.  Ulcerative colitis  13.  Hematuria.  Followed by nephrology  14.  Stress with dealing with son with mental illness  15.  Loss of smell since Nov/Dec 2018.  May be relate to sinus issues.  Evaluated by ENT but no cause found.  Continues to have loss of smell  16. Bilateral hand pain. Suspect OA and DeQuervains of right hand.  X-ray with 1st [facility] osteoarthritis.  17. Urine with RBC and WBC.  Followed by     Plan:       1.  Labs   2.  Continue Plaquenil 300 mg daily and Benlysta.  Will continue Benlysta.   Eye exam normal Oct 28, 2022.   3. Compliant with neurontin 300 mg tid.    4. Off Forteo from endocrine after 2 years and now on Reclast  5. Continue Voltaren gel hands.  Also alternate ice and heat.   6. Continue SPICA splint OTC         RTO in 4 months/prn.

## 2023-07-05 ENCOUNTER — SPECIALTY PHARMACY (OUTPATIENT)
Dept: PHARMACY | Facility: CLINIC | Age: 63
End: 2023-07-05
Payer: MEDICARE

## 2023-07-05 NOTE — TELEPHONE ENCOUNTER
Specialty Pharmacy - Refill Coordination    Specialty Medication Orders Linked to Encounter      Flowsheet Row Most Recent Value   Medication #1 belimumab (BENLYSTA) 200 mg/mL AtIn (Order#[id number], Rx#[id number])            Refill Questions - Documented Responses      Flowsheet Row Most Recent Value   Patient Availability and HIPAA Verification    Does patient want to proceed with activity? Yes   HIPAA/medical authority confirmed? Yes   Relationship to patient of person spoken to? Self   Refill Screening Questions    Changes to allergies? No   Changes to medications? No   New conditions since last clinic visit? No   Unplanned office visit, urgent care, ED, or hospital admission in the last 4 weeks? No   How does patient/caregiver feel medication is working? Good   Financial problems or insurance changes? No   How many doses of your specialty medications were missed in the last 4 weeks? 0   Would patient like to speak to a pharmacist? No   When does the patient need to receive the medication? 07/09/23   Refill Delivery Questions    How will the patient receive the medication? MEDRx   When does the patient need to receive the medication? 07/09/23   Shipping Address Home   Address in [facility] confirmed and updated if neccessary? Yes   Expected Copay ($) 0   Is the patient able to afford the medication copay? Yes   Payment Method zero copay   Days supply of Refill 28   Supplies needed? No supplies needed   Refill activity completed? Yes   Refill activity plan Refill scheduled   Shipment/Pickup Date: 07/06/23            Current Outpatient Medications   Medication Sig    (Magic mouthwash) 1:1:1 diphenhydrAMINE(Benadryl) 12.5mg/5ml liq, aluminum & magnesium hydroxide-simethicone (Maalox), LIDOcaine viscous 2% Swish and spit 5 mLs every 4 (four) hours as needed (mouth sores). for mouth sores    albuterol (PROVENTIL/VENTOLIN HFA) 90 mcg/actuation inhaler Inhale 1-2 puffs into the lungs every 6 (six) hours as  needed for Wheezing or Shortness of Breath. Rescue    amLODIPine (NORVASC) 2.5 MG tablet Take 2.5 mg by mouth.    belimumab (BENLYSTA) 200 mg/mL AtIn Inject 1 mL (200 mg total) into the skin every 7 days.    calcium carbonate (OS-RISSA) 500 mg calcium (1,250 mg) tablet Take 1 tablet by mouth once daily.    cetirizine (ZYRTEC) 10 MG tablet Take 1 tablet (10 mg total) by mouth once daily.    cholecalciferol, vitamin D3, 125 mcg (5,000 unit) Tab Take 5,000 Units by mouth once daily.    CREON 36,000-114,000- 180,000 unit CpDR TAKE TWO CAPSULES BY MOUTH THREE TIMES DAILY WITH MEALS AND ONE CAPSULE WITH EACH SNACK    dexamethasone (DECADRON) 4 mg/mL injection Inject 1 mL (4 mg total) into the muscle daily as needed (Signs and symtpoms of severe adrenal insufficiency).    diclofenac sodium (VOLTAREN) 1 % Gel APPLY 2 GRAMS TO AFFECTED AREA 4 TIMES A DAY    dicyclomine (BENTYL) 20 mg tablet TAKE 1 TABLET BY MOUTH EVERY 6 HOURS    ferrous sulfate (IRON ORAL) Take by mouth. No sure dose    fluticasone propionate (FLONASE) 50 mcg/actuation nasal spray 1 spray (50 mcg total) by Each Nostril route 2 (two) times daily as needed for Rhinitis or Allergies.    folic acid (FOLVITE) 1 MG tablet Take 1 mg by mouth once daily.    gabapentin (NEURONTIN) 300 MG capsule TAKE 1 CAPSULE BY MOUTH EVERY EVENING    GADAVIST 1 mmol/mL (604.72 mg/mL) Soln injection     hydroCHLOROthiazide (HYDRODIURIL) 12.5 MG Tab Take 12.5 mg by mouth.    hydrOXYchloroQUINE (PLAQUENIL) 200 mg tablet TAKE 1 AND 1/2 TABLETS BY MOUTH EVERY DAY    hydrOXYzine HCL (ATARAX) 25 MG tablet TAKE 1 TABLET BY MOUTH NIGHTLY AS NEEDED (INSOMNIA).    levocetirizine (XYZAL) 5 MG tablet Take 1 tablet (5 mg total) by mouth every evening.    LIDOcaine (LIDODERM) 5 % Place 1 patch onto the skin once daily. Remove & Discard patch within 12 hours or as directed by MD    loperamide (IMODIUM) 2 mg capsule TAKE 1 TO 2 CAPSULES BY MOUTH FOUR TIMES DAILY    losartan (COZAAR) 50 MG tablet  "Take 1 tablet (50 mg total) by mouth once daily.    milnacipran (SAVELLA) 100 mg Tab Take 1 tablet (100 mg total) by mouth once daily.    multivitamin/iron/folic acid (CENTRUM WOMEN ORAL) Take 1 tablet by mouth once daily.    nebivoloL (BYSTOLIC) 10 MG Tab     niacin 100 MG Tab Take by mouth. 1 Tablet Oral At bedtime    NIFEdipine (ADALAT CC) 30 MG TbSR Take 30 mg by mouth.    omeprazole (PRILOSEC) 20 MG capsule TAKE 1 CAPSULE BY MOUTH TWICE A DAY    ondansetron (ZOFRAN) 4 MG tablet Take 1 tablet (4 mg total) by mouth every 12 (twelve) hours as needed for Nausea.    oxyCODONE-acetaminophen (PERCOCET) 5-325 mg per tablet Take 1 tablet by mouth every 4 (four) hours as needed for Pain.    potassium chloride SA (K-DUR,KLOR-CON) 20 MEQ tablet Take 1 tablet (20 mEq total) by mouth 2 (two) times daily.    potassium gluconate 500 mg (83 mg) Tab Take by mouth 2 (two) times daily.    predniSONE (DELTASONE) 5 MG tablet TAKE 1 TABLET (5 MG TOTAL) BY MOUTH ONCE DAILY. DOUBLE THE DOSE IN TIMES OF ILLNESS    psyllium husk/aspartame (METAMUCIL FIBER SINGLES ORAL) Take by mouth.    REPATHA SURECLICK 140 mg/mL PnIj INJECT 140 MG INTO THE SKIN EVERY 14 (FOURTEEN) DAYS    RESTASIS 0.05 % ophthalmic emulsion INSTILL 1 DROP INTO BOTH EYES TWICE A DAY    rizatriptan (MAXALT) 10 MG tablet Take 1 tablet (10 mg total) by mouth 2 (two) times daily as needed for Migraine.    rosuvastatin (CRESTOR) 5 MG tablet Take 5 mg by mouth once daily.    valACYclovir (VALTREX) 1000 MG tablet Take 1 tablet (1,000 mg total) by mouth every 12 (twelve) hours. for 10 days    zoledronic acid-mannitol & water (RECLAST) 5 mg/100 mL PgBk     zolpidem (AMBIEN) 5 MG Tab Take 1 tablet (5 mg total) by mouth nightly as needed (insomnia).   Last reviewed on 6/27/2023 11:55 AM by Idania Allison MD    Review of patient's allergies indicates:   Allergen Reactions    Aspirin      Other reaction(s): "hemorrhage"  hemorrhage    Hydrocortisone Nausea Only     Other " reaction(s): sick  sick    Lactose intolerance (lactase) [lactase] Nausea And Vomiting    Vicodin [hydrocodone-acetaminophen]      Other reaction(s): hyperactivity    Asacol [mesalamine] Hallucinations     Other reaction(s): Hallucinations  hallucinations    Last reviewed on  6/27/2023 11:55 AM by Idania Allison      Tasks added this encounter   No tasks added.   Tasks due within next 3 months   8/7/2023 - Clinical Assessment (1 year recurrence)  7/5/2023 - Refill Coordination Outreach (1 time occurrence)     [facility]  [address]  [facility] [address]  Phone: [phone]  Fax: [phone]

## 2023-07-27 ENCOUNTER — SPECIALTY PHARMACY (OUTPATIENT)
Dept: PHARMACY | Facility: CLINIC | Age: 63
End: 2023-07-27
Payer: MEDICARE

## 2023-07-27 DIAGNOSIS — M32.9 SYSTEMIC LUPUS ERYTHEMATOSUS, UNSPECIFIED SLE TYPE, UNSPECIFIED ORGAN INVOLVEMENT STATUS: ICD-10-CM

## 2023-07-27 RX ORDER — BELIMUMAB 200 MG/ML
200 SOLUTION SUBCUTANEOUS
Qty: 4 EACH | Refills: 2 | Status: ACTIVE | OUTPATIENT
Start: 2023-07-27 | End: 2023-08-28 | Stop reason: SDUPTHER

## 2023-07-27 NOTE — TELEPHONE ENCOUNTER
Outgoing call to pt regarding Benlysta refill. Refill request was sent to authorizing provider, confirmed with pt. Pt needs for 8/3. Will follow up once refills received.

## 2023-07-28 NOTE — TELEPHONE ENCOUNTER
Specialty Pharmacy - Refill Coordination    Specialty Medication Orders Linked to Encounter      Flowsheet Row Most Recent Value   Medication #1 belimumab (BENLYSTA) 200 mg/mL AtIn (Order#[id number], Rx#[id number])            Refill Questions - Documented Responses      Flowsheet Row Most Recent Value   Patient Availability and HIPAA Verification    Does patient want to proceed with activity? Yes   HIPAA/medical authority confirmed? Yes   Relationship to patient of person spoken to? Self   Refill Screening Questions    Changes to allergies? No   Changes to medications? No   New conditions since last clinic visit? No   Unplanned office visit, urgent care, ED, or hospital admission in the last 4 weeks? No   How does patient/caregiver feel medication is working? Good   Financial problems or insurance changes? No   How many doses of your specialty medications were missed in the last 4 weeks? 0   Would patient like to speak to a pharmacist? No   When does the patient need to receive the medication? 08/06/23   Refill Delivery Questions    How will the patient receive the medication? [facility]   When does the patient need to receive the medication? 08/06/23   Shipping Address Home   Address in [facility] confirmed and updated if neccessary? Yes   Expected Copay ($) 0   Is the patient able to afford the medication copay? Yes   Payment Method zero copay   Days supply of Refill 28   Supplies needed? No supplies needed   Refill activity completed? Yes   Refill activity plan Refill scheduled   Shipment/Pickup Date: 08/02/23            Current Outpatient Medications   Medication Sig    (Magic mouthwash) 1:1:1 diphenhydrAMINE(Benadryl) 12.5mg/5ml liq, aluminum & magnesium hydroxide-simethicone (Maalox), LIDOcaine viscous 2% Swish and spit 5 mLs every 4 (four) hours as needed (mouth sores). for mouth sores    albuterol (PROVENTIL/VENTOLIN HFA) 90 mcg/actuation inhaler Inhale 1-2 puffs into the lungs every 6 (six) hours as  needed for Wheezing or Shortness of Breath. Rescue    amLODIPine (NORVASC) 2.5 MG tablet Take 2.5 mg by mouth.    belimumab (BENLYSTA) 200 mg/mL AtIn Inject 1 mL (200 mg total) into the skin every 7 days.    calcium carbonate (OS-RISSA) 500 mg calcium (1,250 mg) tablet Take 1 tablet by mouth once daily.    cetirizine (ZYRTEC) 10 MG tablet Take 1 tablet (10 mg total) by mouth once daily.    cholecalciferol, vitamin D3, 125 mcg (5,000 unit) Tab Take 5,000 Units by mouth once daily.    CREON 36,000-114,000- 180,000 unit CpDR TAKE TWO CAPSULES BY MOUTH THREE TIMES DAILY WITH MEALS AND ONE CAPSULE WITH EACH SNACK    dexamethasone (DECADRON) 4 mg/mL injection Inject 1 mL (4 mg total) into the muscle daily as needed (Signs and symtpoms of severe adrenal insufficiency).    diclofenac sodium (VOLTAREN) 1 % Gel APPLY 2 GRAMS TO AFFECTED AREA 4 TIMES A DAY    dicyclomine (BENTYL) 20 mg tablet TAKE 1 TABLET BY MOUTH EVERY 6 HOURS    ferrous sulfate (IRON ORAL) Take by mouth. No sure dose    fluticasone propionate (FLONASE) 50 mcg/actuation nasal spray 1 spray (50 mcg total) by Each Nostril route 2 (two) times daily as needed for Rhinitis or Allergies.    folic acid (FOLVITE) 1 MG tablet Take 1 mg by mouth once daily.    gabapentin (NEURONTIN) 300 MG capsule TAKE 1 CAPSULE BY MOUTH EVERY EVENING    GADAVIST 1 mmol/mL (604.72 mg/mL) Soln injection     hydroCHLOROthiazide (HYDRODIURIL) 12.5 MG Tab Take 12.5 mg by mouth.    hydrOXYchloroQUINE (PLAQUENIL) 200 mg tablet TAKE 1 AND 1/2 TABLETS BY MOUTH EVERY DAY    hydrOXYzine HCL (ATARAX) 25 MG tablet TAKE 1 TABLET BY MOUTH NIGHTLY AS NEEDED (INSOMNIA).    levocetirizine (XYZAL) 5 MG tablet Take 1 tablet (5 mg total) by mouth every evening.    LIDOcaine (LIDODERM) 5 % Place 1 patch onto the skin once daily. Remove & Discard patch within 12 hours or as directed by MD    loperamide (IMODIUM) 2 mg capsule TAKE 1 TO 2 CAPSULES BY MOUTH FOUR TIMES DAILY    losartan (COZAAR) 50 MG tablet  "Take 1 tablet (50 mg total) by mouth once daily.    milnacipran (SAVELLA) 100 mg Tab Take 1 tablet (100 mg total) by mouth once daily.    multivitamin/iron/folic acid (CENTRUM WOMEN ORAL) Take 1 tablet by mouth once daily.    nebivoloL (BYSTOLIC) 10 MG Tab     niacin 100 MG Tab Take by mouth. 1 Tablet Oral At bedtime    NIFEdipine (ADALAT CC) 30 MG TbSR Take 30 mg by mouth.    omeprazole (PRILOSEC) 20 MG capsule TAKE 1 CAPSULE BY MOUTH TWICE A DAY    ondansetron (ZOFRAN) 4 MG tablet Take 1 tablet (4 mg total) by mouth every 12 (twelve) hours as needed for Nausea.    oxyCODONE-acetaminophen (PERCOCET) 5-325 mg per tablet Take 1 tablet by mouth every 4 (four) hours as needed for Pain.    potassium chloride SA (K-DUR,KLOR-CON) 20 MEQ tablet Take 1 tablet (20 mEq total) by mouth 2 (two) times daily.    potassium gluconate 500 mg (83 mg) Tab Take by mouth 2 (two) times daily.    predniSONE (DELTASONE) 5 MG tablet TAKE 1 TABLET (5 MG TOTAL) BY MOUTH ONCE DAILY. DOUBLE THE DOSE IN TIMES OF ILLNESS    psyllium husk/aspartame (METAMUCIL FIBER SINGLES ORAL) Take by mouth.    REPATHA SURECLICK 140 mg/mL PnIj INJECT 140 MG INTO THE SKIN EVERY 14 (FOURTEEN) DAYS    RESTASIS 0.05 % ophthalmic emulsion INSTILL 1 DROP INTO BOTH EYES TWICE A DAY    rizatriptan (MAXALT) 10 MG tablet Take 1 tablet (10 mg total) by mouth 2 (two) times daily as needed for Migraine.    rosuvastatin (CRESTOR) 5 MG tablet Take 5 mg by mouth once daily.    valACYclovir (VALTREX) 1000 MG tablet Take 1 tablet (1,000 mg total) by mouth every 12 (twelve) hours. for 10 days    zoledronic acid-mannitol & water (RECLAST) 5 mg/100 mL PgBk     zolpidem (AMBIEN) 5 MG Tab Take 1 tablet (5 mg total) by mouth nightly as needed (insomnia).   Last reviewed on 6/27/2023 11:55 AM by Idania Allison MD    Review of patient's allergies indicates:   Allergen Reactions    Aspirin      Other reaction(s): "hemorrhage"  hemorrhage    Hydrocortisone Nausea Only     Other " reaction(s): sick  sick    Lactose intolerance (lactase) [lactase] Nausea And Vomiting    Vicodin [hydrocodone-acetaminophen]      Other reaction(s): hyperactivity    Asacol [mesalamine] Hallucinations     Other reaction(s): Hallucinations  hallucinations    Last reviewed on  6/27/2023 11:55 AM by Idania Allison      Tasks added this encounter   No tasks added.   Tasks due within next 3 months   8/7/2023 - Clinical Assessment (1 year recurrence)  7/31/2023 - Refill Coordination Outreach (1 time occurrence)     Keke Peraza [facility]  [address] [facility] [address]  Phone: [phone]  Fax: [phone]

## 2023-08-18 ENCOUNTER — LAB VISIT (OUTPATIENT)
Dept: LAB | Facility: HOSPITAL | Age: 63
End: 2023-08-18
Payer: MEDICARE

## 2023-08-18 DIAGNOSIS — E78.2 MIXED HYPERLIPIDEMIA: ICD-10-CM

## 2023-08-18 LAB
ALBUMIN SERPL BCP-MCNC: 3.6 G/DL (ref 3.5–5.2)
ALP SERPL-CCNC: 49 U/L (ref 55–135)
ALT SERPL W/O P-5'-P-CCNC: 16 U/L (ref 10–44)
AST SERPL-CCNC: 22 U/L (ref 10–40)
BILIRUB DIRECT SERPL-MCNC: 0.1 MG/DL (ref 0.1–0.3)
BILIRUB SERPL-MCNC: 0.3 MG/DL (ref 0.1–1)
CHOLEST SERPL-MCNC: 125 MG/DL (ref 120–199)
CHOLEST/HDLC SERPL: 1.6 {RATIO} (ref 2–5)
HDLC SERPL-MCNC: 77 MG/DL (ref 40–75)
HDLC SERPL: 61.6 % (ref 20–50)
LDLC SERPL CALC-MCNC: 27.4 MG/DL (ref 63–159)
NONHDLC SERPL-MCNC: 48 MG/DL
PROT SERPL-MCNC: 6.4 G/DL (ref 6–8.4)
TRIGL SERPL-MCNC: 103 MG/DL (ref 30–150)

## 2023-08-18 PROCEDURE — 36415 COLL VENOUS BLD VENIPUNCTURE: CPT | Mod: HCNC,PO | Performed by: INTERNAL MEDICINE

## 2023-08-18 PROCEDURE — 80076 HEPATIC FUNCTION PANEL: CPT | Mod: HCNC | Performed by: INTERNAL MEDICINE

## 2023-08-18 PROCEDURE — 80061 LIPID PANEL: CPT | Mod: HCNC | Performed by: INTERNAL MEDICINE

## 2023-08-28 ENCOUNTER — LAB VISIT (OUTPATIENT)
Dept: LAB | Facility: HOSPITAL | Age: 63
End: 2023-08-28
Attending: INTERNAL MEDICINE
Payer: MEDICARE

## 2023-08-28 ENCOUNTER — OFFICE VISIT (OUTPATIENT)
Dept: RHEUMATOLOGY | Facility: CLINIC | Age: 63
End: 2023-08-28
Payer: MEDICARE

## 2023-08-28 VITALS
DIASTOLIC BLOOD PRESSURE: 83 MMHG | SYSTOLIC BLOOD PRESSURE: 138 MMHG | HEART RATE: 76 BPM | HEIGHT: 64 IN | WEIGHT: 127.88 LBS | BODY MASS INDEX: 21.83 KG/M2

## 2023-08-28 DIAGNOSIS — M32.9 SYSTEMIC LUPUS ERYTHEMATOSUS, UNSPECIFIED SLE TYPE, UNSPECIFIED ORGAN INVOLVEMENT STATUS: ICD-10-CM

## 2023-08-28 DIAGNOSIS — M32.9 SYSTEMIC LUPUS ERYTHEMATOSUS, UNSPECIFIED SLE TYPE, UNSPECIFIED ORGAN INVOLVEMENT STATUS: Primary | ICD-10-CM

## 2023-08-28 DIAGNOSIS — Z79.899 IMMUNOSUPPRESSION DUE TO DRUG THERAPY: ICD-10-CM

## 2023-08-28 DIAGNOSIS — D84.9 IMMUNOSUPPRESSION: ICD-10-CM

## 2023-08-28 DIAGNOSIS — D84.821 IMMUNOSUPPRESSION DUE TO DRUG THERAPY: ICD-10-CM

## 2023-08-28 DIAGNOSIS — Z79.899 LONG-TERM USE OF PLAQUENIL: ICD-10-CM

## 2023-08-28 DIAGNOSIS — M79.7 FIBROMYALGIA: ICD-10-CM

## 2023-08-28 LAB
C3 SERPL-MCNC: 97 MG/DL (ref 50–180)
C4 SERPL-MCNC: 33 MG/DL (ref 11–44)
CK SERPL-CCNC: 259 U/L (ref 20–180)
CRP SERPL-MCNC: 1.3 MG/L (ref 0–8.2)
ERYTHROCYTE [SEDIMENTATION RATE] IN BLOOD BY PHOTOMETRIC METHOD: 8 MM/HR (ref 0–36)

## 2023-08-28 PROCEDURE — 3066F PR DOCUMENTATION OF TREATMENT FOR NEPHROPATHY: ICD-10-PCS | Mod: HCNC,CPTII,S$GLB, | Performed by: INTERNAL MEDICINE

## 2023-08-28 PROCEDURE — 3008F PR BODY MASS INDEX (BMI) DOCUMENTED: ICD-10-PCS | Mod: HCNC,CPTII,S$GLB, | Performed by: INTERNAL MEDICINE

## 2023-08-28 PROCEDURE — 3288F FALL RISK ASSESSMENT DOCD: CPT | Mod: HCNC,CPTII,S$GLB, | Performed by: INTERNAL MEDICINE

## 2023-08-28 PROCEDURE — 4010F PR ACE/ARB THEARPY RXD/TAKEN: ICD-10-PCS | Mod: HCNC,CPTII,S$GLB, | Performed by: INTERNAL MEDICINE

## 2023-08-28 PROCEDURE — 3044F PR MOST RECENT HEMOGLOBIN A1C LEVEL <7.0%: ICD-10-PCS | Mod: HCNC,CPTII,S$GLB, | Performed by: INTERNAL MEDICINE

## 2023-08-28 PROCEDURE — 1159F PR MEDICATION LIST DOCUMENTED IN MEDICAL RECORD: ICD-10-PCS | Mod: HCNC,CPTII,S$GLB, | Performed by: INTERNAL MEDICINE

## 2023-08-28 PROCEDURE — 3044F HG A1C LEVEL LT 7.0%: CPT | Mod: HCNC,CPTII,S$GLB, | Performed by: INTERNAL MEDICINE

## 2023-08-28 PROCEDURE — 3066F NEPHROPATHY DOC TX: CPT | Mod: HCNC,CPTII,S$GLB, | Performed by: INTERNAL MEDICINE

## 2023-08-28 PROCEDURE — 3079F PR MOST RECENT DIASTOLIC BLOOD PRESSURE 80-89 MM HG: ICD-10-PCS | Mod: HCNC,CPTII,S$GLB, | Performed by: INTERNAL MEDICINE

## 2023-08-28 PROCEDURE — 1159F MED LIST DOCD IN RCRD: CPT | Mod: HCNC,CPTII,S$GLB, | Performed by: INTERNAL MEDICINE

## 2023-08-28 PROCEDURE — 99499 RISK ADDL DX/OHS AUDIT: ICD-10-PCS | Mod: HCNC,S$GLB,, | Performed by: INTERNAL MEDICINE

## 2023-08-28 PROCEDURE — 99214 PR OFFICE/OUTPT VISIT, EST, LEVL IV, 30-39 MIN: ICD-10-PCS | Mod: HCNC,S$GLB,, | Performed by: INTERNAL MEDICINE

## 2023-08-28 PROCEDURE — 99499 UNLISTED E&M SERVICE: CPT | Mod: HCNC,S$GLB,, | Performed by: INTERNAL MEDICINE

## 2023-08-28 PROCEDURE — 86225 DNA ANTIBODY NATIVE: CPT | Mod: HCNC | Performed by: INTERNAL MEDICINE

## 2023-08-28 PROCEDURE — 1160F RVW MEDS BY RX/DR IN RCRD: CPT | Mod: HCNC,CPTII,S$GLB, | Performed by: INTERNAL MEDICINE

## 2023-08-28 PROCEDURE — 3288F PR FALLS RISK ASSESSMENT DOCUMENTED: ICD-10-PCS | Mod: HCNC,CPTII,S$GLB, | Performed by: INTERNAL MEDICINE

## 2023-08-28 PROCEDURE — 36415 COLL VENOUS BLD VENIPUNCTURE: CPT | Mod: HCNC | Performed by: INTERNAL MEDICINE

## 2023-08-28 PROCEDURE — 3075F SYST BP GE 130 - 139MM HG: CPT | Mod: HCNC,CPTII,S$GLB, | Performed by: INTERNAL MEDICINE

## 2023-08-28 PROCEDURE — 85652 RBC SED RATE AUTOMATED: CPT | Mod: HCNC | Performed by: INTERNAL MEDICINE

## 2023-08-28 PROCEDURE — 86140 C-REACTIVE PROTEIN: CPT | Mod: HCNC | Performed by: INTERNAL MEDICINE

## 2023-08-28 PROCEDURE — 3008F BODY MASS INDEX DOCD: CPT | Mod: HCNC,CPTII,S$GLB, | Performed by: INTERNAL MEDICINE

## 2023-08-28 PROCEDURE — 1101F PT FALLS ASSESS-DOCD LE1/YR: CPT | Mod: HCNC,CPTII,S$GLB, | Performed by: INTERNAL MEDICINE

## 2023-08-28 PROCEDURE — 3079F DIAST BP 80-89 MM HG: CPT | Mod: HCNC,CPTII,S$GLB, | Performed by: INTERNAL MEDICINE

## 2023-08-28 PROCEDURE — 99214 OFFICE O/P EST MOD 30 MIN: CPT | Mod: HCNC,S$GLB,, | Performed by: INTERNAL MEDICINE

## 2023-08-28 PROCEDURE — 86160 COMPLEMENT ANTIGEN: CPT | Mod: HCNC | Performed by: INTERNAL MEDICINE

## 2023-08-28 PROCEDURE — 86160 COMPLEMENT ANTIGEN: CPT | Mod: 59,HCNC | Performed by: INTERNAL MEDICINE

## 2023-08-28 PROCEDURE — 99999 PR PBB SHADOW E&M-EST. PATIENT-LVL IV: ICD-10-PCS | Mod: PBBFAC,HCNC,, | Performed by: INTERNAL MEDICINE

## 2023-08-28 PROCEDURE — 3075F PR MOST RECENT SYSTOLIC BLOOD PRESS GE 130-139MM HG: ICD-10-PCS | Mod: HCNC,CPTII,S$GLB, | Performed by: INTERNAL MEDICINE

## 2023-08-28 PROCEDURE — 1101F PR PT FALLS ASSESS DOC 0-1 FALLS W/OUT INJ PAST YR: ICD-10-PCS | Mod: HCNC,CPTII,S$GLB, | Performed by: INTERNAL MEDICINE

## 2023-08-28 PROCEDURE — 1160F PR REVIEW ALL MEDS BY PRESCRIBER/CLIN PHARMACIST DOCUMENTED: ICD-10-PCS | Mod: HCNC,CPTII,S$GLB, | Performed by: INTERNAL MEDICINE

## 2023-08-28 PROCEDURE — 82550 ASSAY OF CK (CPK): CPT | Mod: HCNC | Performed by: INTERNAL MEDICINE

## 2023-08-28 PROCEDURE — 99999 PR PBB SHADOW E&M-EST. PATIENT-LVL IV: CPT | Mod: PBBFAC,HCNC,, | Performed by: INTERNAL MEDICINE

## 2023-08-28 PROCEDURE — 4010F ACE/ARB THERAPY RXD/TAKEN: CPT | Mod: HCNC,CPTII,S$GLB, | Performed by: INTERNAL MEDICINE

## 2023-08-28 RX ORDER — ERGOCALCIFEROL 1.25 MG/1
1 CAPSULE ORAL
COMMUNITY

## 2023-08-28 RX ORDER — BELIMUMAB 200 MG/ML
200 SOLUTION SUBCUTANEOUS
Qty: 4 EACH | Refills: 2 | Status: ACTIVE | OUTPATIENT
Start: 2023-08-28 | End: 2023-11-08 | Stop reason: SDUPTHER

## 2023-08-28 ASSESSMENT — ROUTINE ASSESSMENT OF PATIENT INDEX DATA (RAPID3)
FATIGUE SCORE: 9.5
PSYCHOLOGICAL DISTRESS SCORE: 1.1
AM STIFFNESS SCORE: 1, YES
TOTAL RAPID3 SCORE: 6.06
PAIN SCORE: 8.5
MDHAQ FUNCTION SCORE: 0.5
PATIENT GLOBAL ASSESSMENT SCORE: 8

## 2023-08-28 NOTE — PROGRESS NOTES
Subjective:       Patient ID: Efe Horowitz is a 63 y.o. female.    Chief Complaint: Lupus    HPI:  Efe Horowitz is a 63 y.o. female with history of lupus since age 39.   Her manifestations include joint pains, headache, positive DERRICK, and a   double-stranded DNA. She also has an equivocal anticardiolipin IgM.   She has been treated with Plaquenil 1-1/2 tablets daily. Eye exam 12/2017 was normal.      In addition to lupus, she has a   history of ulcerative colitis, osteopenia, fibromyalgia as well.   In 2009 she had a colectomy with ileostomy and in May 2011 the ileostomy  was closed. History of bilateral carpal tunnel.       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  She was treated with surgical resection.  Another lesion breast suspected to be lymphoma was later felt to be fibrous tissue.      May 1, 2017 had right breast fibrous material removed and reconstruction on both breasts at [facility].       Interval History:      Still fatigued.    Oral ulcers now and frequently.     She feels less headaches on Benlysta now.  Previously felt it caused increased HA.   Hair is shedding with sores in scalp intermittently along with oral ulcers.    Able to wash car this weekend which she has not been able to do for 2 years.   Bumps on hand improved with cream.   Benlysta has helped with body aches, itching in scalp, swelling in hands and pain improved (no longer uses patches or creams for pain).   No rashes.   Morning stiffness for 2 hour.    Continues taking injection for cholesterol.  Completed Forteo and still on Reclast with Dr. Srinivasan for osteoporosis with L3 vertebral fractures (top and bottom) on MRI.        Review of Systems   Constitutional:  Positive for fatigue. Negative for fever and unexpected weight change.   HENT:  Negative for mouth sores and trouble swallowing.    Eyes:  Negative for redness.        Dry eyes   Respiratory:  Negative for cough and shortness of breath.    Cardiovascular:   "Negative for chest pain.   Gastrointestinal:  Negative for constipation and diarrhea.   Endocrine: Negative.    Genitourinary: Negative.  Negative for dysuria and genital sores.   Musculoskeletal:  Positive for arthralgias.   Skin: Negative.  Negative for rash.   Allergic/Immunologic: Negative.    Neurological:  Positive for headaches.   Hematological: Negative.  Does not bruise/bleed easily.   Psychiatric/Behavioral: Negative.           Objective:   /83   Pulse 76   Ht 5' 4" (1.626 m)   Wt 58 kg (127 lb 13.9 oz)   BMI 21.95 kg/m²         Physical Exam   Constitutional: She is oriented to person, place, and time.   HENT:   Head: Normocephalic and atraumatic.   Eyes: Conjunctivae are normal.   Cardiovascular: Normal rate, regular rhythm and normal heart sounds.   Pulmonary/Chest: Effort normal and breath sounds normal.   Abdominal: Soft. Bowel sounds are normal.   Musculoskeletal:         General: No swelling or tenderness.      Cervical back: Neck supple.      Comments:      Neurological: She is alert and oriented to person, place, and time. Gait normal.   Skin: Skin is warm and dry.   Psychiatric: Mood and affect normal.         LABS        Component      Latest Ref Rng & Units 6/12/2023   WBC      3.90 - 12.70 K/uL 8.15   RBC      4.00 - 5.40 M/uL 4.65   Hemoglobin      12.0 - 16.0 g/dL 12.6   Hematocrit      37.0 - 48.5 % 41.2   MCV      82 - 98 fL 89   MCH      27.0 - 31.0 pg 27.1   MCHC      32.0 - 36.0 g/dL 30.6 (L)   RDW      11.5 - 14.5 % 14.3   Platelets      150 - 450 K/uL 322   MPV      9.2 - 12.9 fL 11.0   Immature Granulocytes      0.0 - 0.5 % 0.4   Gran # (ANC)      1.8 - 7.7 K/uL 4.4   Immature Grans (Abs)      0.00 - 0.04 K/uL 0.03   Lymph #      1.0 - 4.8 K/uL 2.6   Mono #      0.3 - 1.0 K/uL 0.8   Eos #      0.0 - 0.5 K/uL 0.3   Baso #      0.00 - 0.20 K/uL 0.04   nRBC      0 /100 WBC 0   Gran %      38.0 - 73.0 % 53.4   Lymph %      18.0 - 48.0 % 32.4   Mono %      4.0 - 15.0 % 9.4 "   Eosinophil %      0.0 - 8.0 % 3.9   Basophil %      0.0 - 1.9 % 0.5   Differential Method       Automated   Sodium      136 - 145 mmol/L 143   Potassium      3.5 - 5.1 mmol/L 3.4 (L)   Chloride      95 - 110 mmol/L 103   CO2      23 - 29 mmol/L 27   Glucose      70 - 110 mg/dL 79   BUN      8 - 23 mg/dL 10   Creatinine      0.5 - 1.4 mg/dL 0.8   Calcium      8.7 - 10.5 mg/dL 9.7   PROTEIN TOTAL      6.0 - 8.4 g/dL 6.7   Albumin      3.5 - 5.2 g/dL 3.8   BILIRUBIN TOTAL      0.1 - 1.0 mg/dL 0.2   Alkaline Phosphatase      55 - 135 U/L 47 (L)   AST      10 - 40 U/L 20   ALT      10 - 44 U/L 14   Anion Gap      8 - 16 mmol/L 13   eGFR      >60 mL/min/1.73 m:2 >60.0   Specimen UA       Urine, Clean CatchUrine, Illeal LoopUrin   Color, UA      Yellow, Straw, Amanda Yellow   Appearance, UA      Clear Clear   pH, UA      5.0 - 8.0 5.0   Specific Gravity, UA      1.005 - 1.030 1.010   Protein, UA      Negative Negative   Glucose, UA      Negative Negative   Ketones, UA      Negative Negative   Bilirubin (UA)      Negative Negative   Occult Blood UA      Negative Negative   NITRITE UA      Negative Negative   Leukocytes, UA      Negative Trace (A)   RBC, UA      0 - 4 /hpf 1   WBC, UA      0 - 5 /hpf 1   Bacteria, UA      None-Occ /hpf Rare   Hyaline Casts, UA      0-1/lpf /lpf 3 (A)   Microscopic Comment       SEE COMMENT   Protein, Urine Random      0 - 15 mg/dL <7   Creatinine, Urine      15.0 - 325.0 mg/dL 100.0   Prot/Creat Ratio, Urine      0.00 - 0.20 Unable to calculate   ds DNA Ab      Negative 1:10 Negative 1:10   Complement (C-3)      50 - 180 mg/dL 120   Complement (C-4)      11 - 44 mg/dL 33   CPK      20 - 180 U/L 88   Sed Rate      0 - 36 mm/Hr 7   CRP      0.0 - 8.2 mg/L 5.0         Assessment:       1. Lupus. Lupus manifestations include joint pains, headache, positive DERRICK (positive 5/26/2005 in labs), and a   double-stranded DNA. Has fatigue as well as intermittent oral/nasal ulcer. SLEDAI before labs is  2  Still with improvement on Benlysta.   2. Fatigue.  Worse than usual  3. Encounter for long-term (current) use of other medications   4. Myalgia and myositis. Fibromyalgia on Savella.  Patient stopped gabapentin since it made her sleepy but no worsening in pain.    5. Trochanteric bursitis. Bilateral improved with injection.  6. Shoulder pain. Stable   7. Vitamin D deficiency disease. Now normal  8. Suspected Shingles.   9. Migraine.  Did acupuncture with Dr. Tinsley.  Treated with Maxalt by primary doctor which helps.     10.  Osteoporosis based vertebral fracture.  Reclast in the past.  Dr. Srinivasan gave Forteo for 2 years now back on Reclast.   11.  Chronic steroid use.  Endocrine gives for adrenal insufficiency.  12.  Ulcerative colitis  13.  Hematuria.  Followed by nephrology  14.  Stress with dealing with son with mental illness  15.  Loss of smell since Nov/Dec 2018.  May be relate to sinus issues.  Evaluated by ENT but no cause found.  Continues to have loss of smell  16. Bilateral hand pain. Suspect OA and DeQuervains of right hand.  X-ray with 1st [facility] osteoarthritis.  17. Urine with RBC and WBC.  Followed by     Plan:       1.  Labs q 3month  2.  Continue Plaquenil 300 mg daily and Benlysta.  Will continue Benlysta.   Eye exam normal Oct 28, 2022.   3. Compliant with neurontin 300 mg tid.    4. Off Forteo from endocrine after 2 years and now on Reclast  5. Continue Voltaren gel hands.  Also alternate ice and heat.   6. Continue SPICA splint OTC         RTO in 6 months/prn.

## 2023-08-29 ENCOUNTER — PATIENT MESSAGE (OUTPATIENT)
Dept: RHEUMATOLOGY | Facility: CLINIC | Age: 63
End: 2023-08-29
Payer: MEDICARE

## 2023-08-29 LAB — DSDNA AB SER-ACNC: NORMAL [IU]/ML

## 2023-08-30 DIAGNOSIS — Z87.19 HISTORY OF ULCERATIVE COLITIS: ICD-10-CM

## 2023-08-31 RX ORDER — DICYCLOMINE HYDROCHLORIDE 20 MG/1
TABLET ORAL
Qty: 360 TABLET | Refills: 0 | Status: SHIPPED | OUTPATIENT
Start: 2023-08-31 | End: 2023-10-23

## 2023-08-31 NOTE — TELEPHONE ENCOUNTER
IBD medications none    Refill request for dicyclomine 20mg    Allergies reviewed Yes    Drug Monitoring labs/frequency for all IBD meds:  CBC, CMP - q 6 months  TB, HEP B - Yearly    Lab Results   Component Value Date    HEPBSAG Non-reactive 10/27/2022    HEPBCAB Non-reactive 10/27/2022     Lab Results   Component Value Date    TBGOLDPLUS Negative 10/27/2022     Lab Results   Component Value Date    PEJJYFOM35DD 42 04/25/2022    FBIIXIHK36 812 11/26/2021     Lab Results   Component Value Date    WBC 8.15 06/12/2023    HGB 12.6 06/12/2023    HCT 41.2 06/12/2023    MCV 89 06/12/2023     06/12/2023     Lab Results   Component Value Date    CREATININE 0.8 06/12/2023    ALBUMIN 3.6 08/18/2023    BILITOT 0.3 08/18/2023    ALKPHOS 49 (L) 08/18/2023    AST 22 08/18/2023    ALT 16 08/18/2023       Lab due date (mo/yr):  10/2023    Labs scheduled: Yes 11/28/2023    Next appt: 06/10/2024    RX refill sent to provider for amount until next labs: Yes

## 2023-09-06 ENCOUNTER — TELEPHONE (OUTPATIENT)
Dept: INTERNAL MEDICINE | Facility: CLINIC | Age: 63
End: 2023-09-06
Payer: MEDICARE

## 2023-09-06 NOTE — TELEPHONE ENCOUNTER
----- Message from Shirley Pepe sent at 9/6/2023  3:36 PM CDT -----  Contact: Pt [phone]  Caller is requesting an earlier appointment then we can schedule.  Caller is requesting a message be sent to the provider.      When is the next available appointment with their provider:  1/22/23  Reason for the appointment:  6 mo f/u and go over results  s:

## 2023-09-19 RX ORDER — MILNACIPRAN HYDROCHLORIDE 100 MG/1
1 TABLET, FILM COATED ORAL
Qty: 90 TABLET | Refills: 3 | Status: SHIPPED | OUTPATIENT
Start: 2023-09-19

## 2023-09-19 NOTE — TELEPHONE ENCOUNTER
No care due was identified.  [facility] Embedded Care Due Messages. Reference number: [id number].   9/19/2023 8:59:53 AM CDT

## 2023-09-21 ENCOUNTER — IMMUNIZATION (OUTPATIENT)
Dept: INTERNAL MEDICINE | Facility: CLINIC | Age: 63
End: 2023-09-21
Payer: MEDICARE

## 2023-09-21 ENCOUNTER — OFFICE VISIT (OUTPATIENT)
Dept: INTERNAL MEDICINE | Facility: CLINIC | Age: 63
End: 2023-09-21
Payer: MEDICARE

## 2023-09-21 VITALS
SYSTOLIC BLOOD PRESSURE: 110 MMHG | DIASTOLIC BLOOD PRESSURE: 72 MMHG | HEIGHT: 64 IN | HEART RATE: 79 BPM | OXYGEN SATURATION: 97 % | BODY MASS INDEX: 22.17 KG/M2 | WEIGHT: 129.88 LBS

## 2023-09-21 DIAGNOSIS — E53.8 VITAMIN B12 DEFICIENCY: ICD-10-CM

## 2023-09-21 DIAGNOSIS — D84.821 IMMUNOSUPPRESSION DUE TO DRUG THERAPY: ICD-10-CM

## 2023-09-21 DIAGNOSIS — K86.81 EXOCRINE PANCREATIC INSUFFICIENCY: ICD-10-CM

## 2023-09-21 DIAGNOSIS — Z79.899 LONG-TERM USE OF PLAQUENIL: ICD-10-CM

## 2023-09-21 DIAGNOSIS — K86.2 PANCREAS CYST: ICD-10-CM

## 2023-09-21 DIAGNOSIS — Z79.899 IMMUNOSUPPRESSION DUE TO DRUG THERAPY: ICD-10-CM

## 2023-09-21 DIAGNOSIS — C50.211 MALIGNANT NEOPLASM OF UPPER-INNER QUADRANT OF RIGHT FEMALE BREAST, UNSPECIFIED ESTROGEN RECEPTOR STATUS: ICD-10-CM

## 2023-09-21 DIAGNOSIS — I10 BENIGN ESSENTIAL HYPERTENSION: ICD-10-CM

## 2023-09-21 DIAGNOSIS — E78.2 MIXED HYPERLIPIDEMIA: ICD-10-CM

## 2023-09-21 DIAGNOSIS — M80.00XG AGE-RELATED OSTEOPOROSIS WITH CURRENT PATHOLOGICAL FRACTURE WITH DELAYED HEALING: ICD-10-CM

## 2023-09-21 DIAGNOSIS — Z90.49 S/P TOTAL COLECTOMY: ICD-10-CM

## 2023-09-21 DIAGNOSIS — M32.9 SYSTEMIC LUPUS ERYTHEMATOSUS, UNSPECIFIED SLE TYPE, UNSPECIFIED ORGAN INVOLVEMENT STATUS: ICD-10-CM

## 2023-09-21 DIAGNOSIS — E87.6 HYPOKALEMIA: ICD-10-CM

## 2023-09-21 DIAGNOSIS — M79.7 FIBROMYALGIA: ICD-10-CM

## 2023-09-21 DIAGNOSIS — E27.49 SECONDARY ADRENAL INSUFFICIENCY: ICD-10-CM

## 2023-09-21 DIAGNOSIS — K51.918 ULCERATIVE COLITIS WITH OTHER COMPLICATION, UNSPECIFIED LOCATION: ICD-10-CM

## 2023-09-21 DIAGNOSIS — R73.03 PREDIABETES: ICD-10-CM

## 2023-09-21 DIAGNOSIS — Z79.52 LONG TERM CURRENT USE OF SYSTEMIC STEROIDS: ICD-10-CM

## 2023-09-21 DIAGNOSIS — R74.8 ELEVATED CK: Primary | ICD-10-CM

## 2023-09-21 DIAGNOSIS — F51.01 PRIMARY INSOMNIA: ICD-10-CM

## 2023-09-21 DIAGNOSIS — E05.90 SUBCLINICAL HYPERTHYROIDISM: ICD-10-CM

## 2023-09-21 DIAGNOSIS — E55.9 VITAMIN D DEFICIENCY DISEASE: ICD-10-CM

## 2023-09-21 PROCEDURE — 4010F ACE/ARB THERAPY RXD/TAKEN: CPT | Mod: HCNC,CPTII,S$GLB, | Performed by: INTERNAL MEDICINE

## 2023-09-21 PROCEDURE — 3008F BODY MASS INDEX DOCD: CPT | Mod: HCNC,CPTII,S$GLB, | Performed by: INTERNAL MEDICINE

## 2023-09-21 PROCEDURE — 4010F PR ACE/ARB THEARPY RXD/TAKEN: ICD-10-PCS | Mod: HCNC,CPTII,S$GLB, | Performed by: INTERNAL MEDICINE

## 2023-09-21 PROCEDURE — 99999 PR PBB SHADOW E&M-EST. PATIENT-LVL V: ICD-10-PCS | Mod: PBBFAC,HCNC,, | Performed by: INTERNAL MEDICINE

## 2023-09-21 PROCEDURE — 1101F PT FALLS ASSESS-DOCD LE1/YR: CPT | Mod: HCNC,CPTII,S$GLB, | Performed by: INTERNAL MEDICINE

## 2023-09-21 PROCEDURE — 99999 PR PBB SHADOW E&M-EST. PATIENT-LVL V: CPT | Mod: PBBFAC,HCNC,, | Performed by: INTERNAL MEDICINE

## 2023-09-21 PROCEDURE — 3066F PR DOCUMENTATION OF TREATMENT FOR NEPHROPATHY: ICD-10-PCS | Mod: HCNC,CPTII,S$GLB, | Performed by: INTERNAL MEDICINE

## 2023-09-21 PROCEDURE — 1159F PR MEDICATION LIST DOCUMENTED IN MEDICAL RECORD: ICD-10-PCS | Mod: HCNC,CPTII,S$GLB, | Performed by: INTERNAL MEDICINE

## 2023-09-21 PROCEDURE — 3044F PR MOST RECENT HEMOGLOBIN A1C LEVEL <7.0%: ICD-10-PCS | Mod: HCNC,CPTII,S$GLB, | Performed by: INTERNAL MEDICINE

## 2023-09-21 PROCEDURE — 99214 PR OFFICE/OUTPT VISIT, EST, LEVL IV, 30-39 MIN: ICD-10-PCS | Mod: HCNC,S$GLB,, | Performed by: INTERNAL MEDICINE

## 2023-09-21 PROCEDURE — 1159F MED LIST DOCD IN RCRD: CPT | Mod: HCNC,CPTII,S$GLB, | Performed by: INTERNAL MEDICINE

## 2023-09-21 PROCEDURE — 90694 VACC AIIV4 NO PRSRV 0.5ML IM: CPT | Mod: HCNC,S$GLB,, | Performed by: INTERNAL MEDICINE

## 2023-09-21 PROCEDURE — 3078F PR MOST RECENT DIASTOLIC BLOOD PRESSURE < 80 MM HG: ICD-10-PCS | Mod: HCNC,CPTII,S$GLB, | Performed by: INTERNAL MEDICINE

## 2023-09-21 PROCEDURE — 3288F FALL RISK ASSESSMENT DOCD: CPT | Mod: HCNC,CPTII,S$GLB, | Performed by: INTERNAL MEDICINE

## 2023-09-21 PROCEDURE — 99214 OFFICE O/P EST MOD 30 MIN: CPT | Mod: HCNC,S$GLB,, | Performed by: INTERNAL MEDICINE

## 2023-09-21 PROCEDURE — 3074F PR MOST RECENT SYSTOLIC BLOOD PRESSURE < 130 MM HG: ICD-10-PCS | Mod: HCNC,CPTII,S$GLB, | Performed by: INTERNAL MEDICINE

## 2023-09-21 PROCEDURE — G0008 ADMIN INFLUENZA VIRUS VAC: HCPCS | Mod: HCNC,S$GLB,, | Performed by: INTERNAL MEDICINE

## 2023-09-21 PROCEDURE — 1160F PR REVIEW ALL MEDS BY PRESCRIBER/CLIN PHARMACIST DOCUMENTED: ICD-10-PCS | Mod: HCNC,CPTII,S$GLB, | Performed by: INTERNAL MEDICINE

## 2023-09-21 PROCEDURE — 3008F PR BODY MASS INDEX (BMI) DOCUMENTED: ICD-10-PCS | Mod: HCNC,CPTII,S$GLB, | Performed by: INTERNAL MEDICINE

## 2023-09-21 PROCEDURE — 90694 FLU VACCINE - QUADRIVALENT - ADJUVANTED: ICD-10-PCS | Mod: HCNC,S$GLB,, | Performed by: INTERNAL MEDICINE

## 2023-09-21 PROCEDURE — 3288F PR FALLS RISK ASSESSMENT DOCUMENTED: ICD-10-PCS | Mod: HCNC,CPTII,S$GLB, | Performed by: INTERNAL MEDICINE

## 2023-09-21 PROCEDURE — 1101F PR PT FALLS ASSESS DOC 0-1 FALLS W/OUT INJ PAST YR: ICD-10-PCS | Mod: HCNC,CPTII,S$GLB, | Performed by: INTERNAL MEDICINE

## 2023-09-21 PROCEDURE — 3044F HG A1C LEVEL LT 7.0%: CPT | Mod: HCNC,CPTII,S$GLB, | Performed by: INTERNAL MEDICINE

## 2023-09-21 PROCEDURE — 1160F RVW MEDS BY RX/DR IN RCRD: CPT | Mod: HCNC,CPTII,S$GLB, | Performed by: INTERNAL MEDICINE

## 2023-09-21 PROCEDURE — 3074F SYST BP LT 130 MM HG: CPT | Mod: HCNC,CPTII,S$GLB, | Performed by: INTERNAL MEDICINE

## 2023-09-21 PROCEDURE — G0008 FLU VACCINE - QUADRIVALENT - ADJUVANTED: ICD-10-PCS | Mod: HCNC,S$GLB,, | Performed by: INTERNAL MEDICINE

## 2023-09-21 PROCEDURE — 3066F NEPHROPATHY DOC TX: CPT | Mod: HCNC,CPTII,S$GLB, | Performed by: INTERNAL MEDICINE

## 2023-09-21 PROCEDURE — 3078F DIAST BP <80 MM HG: CPT | Mod: HCNC,CPTII,S$GLB, | Performed by: INTERNAL MEDICINE

## 2023-09-21 RX ORDER — ZOLPIDEM TARTRATE 5 MG/1
TABLET ORAL
Qty: 30 TABLET | Refills: 0 | Status: SHIPPED | OUTPATIENT
Start: 2023-09-21 | End: 2023-10-23

## 2023-09-21 NOTE — PROGRESS NOTES
"INTERNAL MEDICINE ESTABLISHED PATIENT VISIT NOTE    Subjective:     Chief Complaint: Follow-up  HTN, HLD, preDM     Patient ID: Efe Horowitz is a 63 y.o. 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    Has been followed by Dr. Rangel for GI issues c last visit in June c plan for repeat MRI in early 2024 to f/u pancreatic cyst.  Still on Creon for pancreatic insufficiency.    Still on Benlysta and Plaquenil for lupus and on Savella for FM and had f/u c Dr. Jiang at the end of Aug.    Followed by ENT for issues c recurrent epistaxis.    Past Medical History:  Past Medical History:   Diagnosis Date    Acid reflux     Adrenal insufficiency, primary     Arthritis     Asthma     B12 deficiency anemia     Benign essential hypertension 2/7/2021    Blood transfusion 2010    Breast cancer 2/2016    Right breast infiltrating ductal CA    Cataract     Chronic pain     Deep vein thrombosis     Dry eyes     Esophagitis, unspecified     Fibromyalgia     General anesthetics causing adverse effect in therapeutic use     "slow to wake up bc I don't have an immune system    Heart murmur     History of colon polyps     Hyperlipidemia     Hypopituitary dwarfism     Iron deficiency anemia     Lupus     Migraines, neuralgic     Nonspecific ulcerative colitis     OP (osteoporosis)     history of reclast    Osteopenia     Pancreatic cyst     Schatzki's ring     Steroid sulfatase deficiency     " Ulcerative colitis     Vitamin D deficiency disease        Home Medications:  Prior to Admission medications    Medication Sig Start Date End Date Taking? Authorizing Provider   (Magic mouthwash) 1:1:1 diphenhydrAMINE(Benadryl) 12.5mg/5ml liq, aluminum & magnesium hydroxide-simethicone (Maalox), LIDOcaine viscous 2% Swish and spit 5 mLs every 4 (four) hours as needed (mouth sores). for mouth sores 3/6/23   Shonda Anguiano MD   albuterol (PROVENTIL/VENTOLIN HFA) 90 mcg/actuation inhaler Inhale 1-2 puffs into the lungs every 6 (six) hours as needed for Wheezing or Shortness of Breath. Rescue 4/17/23   Ondina Finnegan NP   amLODIPine (NORVASC) 2.5 MG tablet Take 2.5 mg by mouth. 9/26/22   Provider, Historical   belimumab (BENLYSTA) 200 mg/mL AtIn Inject 1 mL (200 mg total) into the skin every 7 days. 8/28/23   Idania Allison MD   calcium carbonate (OS-RISSA) 500 mg calcium (1,250 mg) tablet Take 1 tablet by mouth once daily.    Provider, Historical   cetirizine (ZYRTEC) 10 MG tablet Take 1 tablet (10 mg total) by mouth once daily. 4/17/23 4/16/24  Ondina Finnegan NP   cholecalciferol, vitamin D3, 125 mcg (5,000 unit) Tab Take 5,000 Units by mouth once daily.    Provider, Historical   CREON 36,000-114,000- 180,000 unit CpDR TAKE TWO CAPSULES BY MOUTH THREE TIMES DAILY WITH MEALS AND ONE CAPSULE WITH EACH SNACK 4/20/22   Reese Villalta MD   dexamethasone (DECADRON) 4 mg/mL injection Inject 1 mL (4 mg total) into the muscle daily as needed (Signs and symtpoms of severe adrenal insufficiency). 10/6/21   Ena Srinivasan MD   diclofenac sodium (VOLTAREN) 1 % Gel APPLY 2 GRAMS TO AFFECTED AREA 4 TIMES A DAY 3/24/21   Idania Allison MD   dicyclomine (BENTYL) 20 mg tablet TAKE 1 TABLET BY MOUTH EVERY 6 HOURS 8/31/23   Ceferino Rangel MD   ergocalciferol (ERGOCALCIFEROL) 50,000 unit Cap Take 1 capsule by mouth twice a week.    Provider, Historical   ferrous sulfate (IRON ORAL) Take by mouth. No sure  dose    Provider, Historical   fluticasone propionate (FLONASE) 50 mcg/actuation nasal spray 1 spray (50 mcg total) by Each Nostril route 2 (two) times daily as needed for Rhinitis or Allergies. 4/17/23   Ondina Finnegan NP   folic acid (FOLVITE) 1 MG tablet Take 1 mg by mouth once daily.    Provider, Historical   gabapentin (NEURONTIN) 300 MG capsule TAKE 1 CAPSULE BY MOUTH EVERY EVENING 11/29/21   Idania Allison MD   GADAVIST 1 mmol/mL (604.72 mg/mL) Soln injection  1/9/23   Provider, Historical   hydroCHLOROthiazide (HYDRODIURIL) 12.5 MG Tab Take 12.5 mg by mouth. 9/26/22   Provider, Historical   hydrOXYchloroQUINE (PLAQUENIL) 200 mg tablet TAKE 1 AND 1/2 TABLETS BY MOUTH EVERY DAY 6/7/23   Idania Allison MD   hydrOXYzine HCL (ATARAX) 25 MG tablet TAKE 1 TABLET BY MOUTH NIGHTLY AS NEEDED (INSOMNIA). 11/5/22   Provider, Historical   levocetirizine (XYZAL) 5 MG tablet Take 1 tablet (5 mg total) by mouth every evening. 3/6/23 3/5/24  Shonda Anguiano MD   LIDOcaine (LIDODERM) 5 % Place 1 patch onto the skin once daily. Remove & Discard patch within 12 hours or as directed by MD 6/26/22   Ambrocio Salas MD   loperamide (IMODIUM) 2 mg capsule TAKE 1 TO 2 CAPSULES BY MOUTH FOUR TIMES DAILY 6/7/23   Shandra Stapleton NP   losartan (COZAAR) 50 MG tablet Take 1 tablet (50 mg total) by mouth once daily. 3/6/23 3/5/24  Shonda Anguiano MD   multivitamin/iron/folic acid (CENTRUM WOMEN ORAL) Take 1 tablet by mouth once daily.    Provider, Historical   nebivoloL (BYSTOLIC) 10 MG Tab  3/9/23   Provider, Historical   niacin 100 MG Tab Take by mouth. 1 Tablet Oral At bedtime    Provider, Historical   NIFEdipine (ADALAT CC) 30 MG TbSR Take 30 mg by mouth. 9/8/22   Provider, Historical   omeprazole (PRILOSEC) 20 MG capsule TAKE 1 CAPSULE BY MOUTH TWICE A DAY 1/18/23   Shonda Anguiano MD   ondansetron (ZOFRAN) 4 MG tablet Take 1 tablet (4 mg total) by mouth every 12 (twelve) hours as needed for Nausea. 10/13/20    "Romulo-Tierney Packer, JOJO   oxyCODONE-acetaminophen (PERCOCET) 5-325 mg per tablet Take 1 tablet by mouth every 4 (four) hours as needed for Pain. 6/26/22   Ambrocio Salas MD   potassium chloride SA (K-DUR,KLOR-CON) 20 MEQ tablet Take 1 tablet (20 mEq total) by mouth 2 (two) times daily. 4/20/23   Shonda Anguiano MD   potassium gluconate 500 mg (83 mg) Tab Take by mouth 2 (two) times daily.    Provider, Historical   predniSONE (DELTASONE) 5 MG tablet TAKE 1 TABLET (5 MG TOTAL) BY MOUTH ONCE DAILY. DOUBLE THE DOSE IN TIMES OF ILLNESS 9/6/22   Ena Srinivasan MD   psyllium husk/aspartame (METAMUCIL FIBER SINGLES ORAL) Take by mouth.    Provider, Historical   REPATHA SURECLICK 140 mg/mL PnIj INJECT 140 MG INTO THE SKIN EVERY 14 (FOURTEEN) DAYS 7/27/21   Provider, Historical   RESTASIS 0.05 % ophthalmic emulsion INSTILL 1 DROP INTO BOTH EYES TWICE A DAY 5/20/21   Vo, Joana, OD   rizatriptan (MAXALT) 10 MG tablet Take 1 tablet (10 mg total) by mouth 2 (two) times daily as needed for Migraine. 3/6/23   Shonda Anguiano MD   rosuvastatin (CRESTOR) 5 MG tablet Take 5 mg by mouth once daily.    Provider, Historical   SAVELLA 100 mg Tab TAKE 1 TABLET BY MOUTH EVERY DAY 9/19/23   Shonda Anguiano MD   valACYclovir (VALTREX) 1000 MG tablet Take 1 tablet (1,000 mg total) by mouth every 12 (twelve) hours. for 10 days 11/17/22 6/12/23  Emily Street MD   zoledronic acid-mannitol & water (RECLAST) 5 mg/100 mL PgBk  10/25/21   Provider, Historical   zolpidem (AMBIEN) 5 MG Tab Take 1 tablet (5 mg total) by mouth nightly as needed (insomnia). 3/6/23   Shonda Anguiano MD       Allergies:  Review of patient's allergies indicates:   Allergen Reactions    Aspirin      Other reaction(s): "hemorrhage"  hemorrhage    Hydrocortisone Nausea Only     Other reaction(s): sick  sick    Lactose intolerance (lactase) [lactase] Nausea And Vomiting    Vicodin [hydrocodone-acetaminophen]      Other reaction(s): hyperactivity    Asacol [mesalamine] " Hallucinations     Other reaction(s): Hallucinations  hallucinations       Social History:  Social History     Tobacco Use    Smoking status: Never    Smokeless tobacco: Never   Substance Use Topics    Alcohol use: No     Alcohol/week: 0.0 standard drinks of alcohol    Drug use: No        Review of Systems   Constitutional:  Negative for appetite change, chills, fatigue, fever and unexpected weight change.   HENT:  Negative for congestion, hearing loss and rhinorrhea.    Eyes:  Negative for pain and visual disturbance.   Respiratory:  Negative for cough, chest tightness, shortness of breath and wheezing.    Cardiovascular:  Negative for chest pain, palpitations and leg swelling.   Gastrointestinal:  Negative for abdominal distention and abdominal pain.   Endocrine: Negative for polydipsia and polyuria.   Genitourinary:  Negative for decreased urine volume, difficulty urinating, dysuria, hematuria and vaginal discharge.   Neurological:  Negative for weakness, numbness and headaches.   Psychiatric/Behavioral:  Negative for behavioral problems and confusion.          Health Maintenance:     Immunizations:   Influenza 11/2022, rec repeat today.  TDap 8/2021  Pneumovax 6/2014, Prevnar 13 2/2021, Prevnar 20 11/2022  Shingrix 2/2022, 9/2022  COVID 2/2021, 3/2021, 8/2021 (Pfizer), 11/2022, rec updated booster next month once available     Cancer Screening:  PAP: total hyst 2/2 fibroids, benign, ovaries removed as well.  Mammogram:  10/2022 benign, repeat scheduled for Nov.  Colonoscopy: hx total proctocolectomy for UC, had pouchoscopy 12/2018 via dr. Villalta wnl, biopsies taken which showed mild chronic inflammation on path.  EGD 12/2018 c hiatal hernia, otherwise unremarkable, bx c chronic inflammation in duodenum on path, normal gastric bx and neg H pylori  DEXA:  9/2021 c osteoporosis, followed by Dr. Srinivasan, now on Reclast, repeat due, advised to schedule through Dr. Srinivasan    Objective:   /72 (BP Location: Right  "arm, Patient Position: Sitting, BP Method: Medium (Manual))   Pulse 79   Ht 5' 4" (1.626 m)   Wt 58.9 kg (129 lb 13.6 oz)   SpO2 97%   BMI 22.29 kg/m²        General: AAO x3, no apparent distress  HEENT: no cervical LAD, no thyroid masses appreciated  CV: RRR, no m/r/g  Pulm: Lungs CTAB, no crackles, no wheezes  Abd: s/NT/ND +BS  Extremities: no c/c/e    Labs:     Lab Results   Component Value Date    WBC 8.15 06/12/2023    HGB 12.6 06/12/2023    HCT 41.2 06/12/2023    MCV 89 06/12/2023     06/12/2023     Sodium   Date Value Ref Range Status   06/12/2023 143 136 - 145 mmol/L Final     Potassium   Date Value Ref Range Status   06/12/2023 3.4 (L) 3.5 - 5.1 mmol/L Final     Chloride   Date Value Ref Range Status   06/12/2023 103 95 - 110 mmol/L Final     CO2   Date Value Ref Range Status   06/12/2023 27 23 - 29 mmol/L Final     Glucose   Date Value Ref Range Status   06/12/2023 79 70 - 110 mg/dL Final     BUN   Date Value Ref Range Status   06/12/2023 10 8 - 23 mg/dL Final     Creatinine   Date Value Ref Range Status   06/12/2023 0.8 0.5 - 1.4 mg/dL Final     Calcium   Date Value Ref Range Status   06/12/2023 9.7 8.7 - 10.5 mg/dL Final     Total Protein   Date Value Ref Range Status   08/18/2023 6.4 6.0 - 8.4 g/dL Final     Albumin   Date Value Ref Range Status   08/18/2023 3.6 3.5 - 5.2 g/dL Final     Total Bilirubin   Date Value Ref Range Status   08/18/2023 0.3 0.1 - 1.0 mg/dL Final     Comment:     For infants and newborns, interpretation of results should be based  on gestational age, weight and in agreement with clinical  observations.    Premature Infant recommended reference ranges:  Up to 24 hours.............<8.0 mg/dL  Up to 48 hours............<12.0 mg/dL  3-5 days..................<15.0 mg/dL  6-29 days.................<15.0 mg/dL       Alkaline Phosphatase   Date Value Ref Range Status   08/18/2023 49 (L) 55 - 135 U/L Final     AST   Date Value Ref Range Status   08/18/2023 22 10 - 40 U/L " Final     ALT   Date Value Ref Range Status   08/18/2023 16 10 - 44 U/L Final     Anion Gap   Date Value Ref Range Status   06/12/2023 13 8 - 16 mmol/L Final     eGFR if    Date Value Ref Range Status   07/05/2022 >60.0 >60 mL/min/1.73 m^2 Final     eGFR if non    Date Value Ref Range Status   07/05/2022 >60.0 >60 mL/min/1.73 m^2 Final     Comment:     Calculation used to obtain the estimated glomerular filtration  rate (eGFR) is the CKD-EPI equation.        Lab Results   Component Value Date    HGBA1C 5.7 (H) 03/13/2023     Lab Results   Component Value Date    LDLCALC 27.4 (L) 08/18/2023     Lab Results   Component Value Date    TSH 0.974 09/06/2022         Assessment/Plan     Efe was seen today for follow-up.    Diagnoses and all orders for this visit:    Elevated CK  Noted on labs from Rheum  Advised to hold statin for now (already on Repatha as per Dr. Jordan/Cards)  Will send for repeat labs to be added to Rheum labs in Nov.   -     CK; Future    Prediabetes  Lab Results   Component Value Date    HGBA1C 5.7 (H) 03/13/2023     Chronic steroid use likely contributing  Will monitor  Counseled on dietary modifications  -     Hemoglobin A1C; Future    Benign essential hypertension  at goal.  Cont current medications.    Mixed hyperlipidemia  Lab Results   Component Value Date    LDLCALC 27.4 (L) 08/18/2023     At goal, will stop statin since CPK elevated and on Repatha, written instructions given to pt to let her Cardiologist know why Rosuvastatin stopped.    Systemic lupus erythematosus, unspecified SLE type, unspecified organ involvement status  Long-term use of Plaquenil  Long term current use of systemic steroids  Immunosuppression due to drug therapy  Followed by Dr. Jiang and on Benlysta and Plaquenil as well as Prednisone.  Cont routine f/u, pt states eye exam utd as well.    Fibromyalgia  On Savella as per Rheum  Cont Vit D supplement as well.    Secondary adrenal  insufficiency  Followed by Dr. Srinivasan, no acute issues    Subclinical hyperthyroidism  Lab Results   Component Value Date    TSH 0.974 09/06/2022     Normal on last check, no issues    Ulcerative colitis with other complication, unspecified location  S/P total colectomy  Followed by Dr. Rangel  Cont routine f/u    Vitamin B12 deficiency  Lab Results   Component Value Date    OIDPTKLR12 812 11/26/2021     Normal on last check, cont supplement.    Vitamin D deficiency disease  On supplement, cont meds    Exocrine pancreatic insufficiency  On Creon as per GI  Cont routine f/u    Pancreas cyst  As per HPI  Plan for repeat MRI early next year as per GI    Malignant neoplasm of upper-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, unspecified estrogen receptor status  Stable, followed by H/O, no issues    Hypokalemia  Likely 2/2 HCTZ  K increased at last visit  Will have repeat labs in Nov.    Age-related osteoporosis with current pathological fracture with delayed healing  On tx as per Dr. Srinivasan, advised to schedule f/u DEX A c their office.    Primary insomnia  On Ambien, ok to fill every 6 mos since pt failed tx c Hydroxyzine in the past, Trazodone contraindicated due to potential med interactions.      HM as above  RTC in 6 mos, sooner if needed.  Will have my labs added to Rheum labs in Nov.    Shonda Anguiano MD  Department of Internal Medicine - [facility]  09/24/2023

## 2023-09-21 NOTE — TELEPHONE ENCOUNTER
No care due was identified.  [facility] Embedded Care Due Messages. Reference number: [id number].   9/21/2023 1:08:28 PM CDT

## 2023-09-21 NOTE — PATIENT INSTRUCTIONS
Message Dr. Srinivasan to schedule your next DEXA scan (bone density scan)    Let Dr. Jordan know we are stopping your Crestor (Rosuvastatin) because of your muscle aches and CPK was elevated at 259.

## 2023-09-22 ENCOUNTER — OFFICE VISIT (OUTPATIENT)
Dept: OTOLARYNGOLOGY | Facility: CLINIC | Age: 63
End: 2023-09-22
Payer: MEDICARE

## 2023-09-22 VITALS
WEIGHT: 127.44 LBS | HEART RATE: 82 BPM | SYSTOLIC BLOOD PRESSURE: 118 MMHG | DIASTOLIC BLOOD PRESSURE: 83 MMHG | BODY MASS INDEX: 21.87 KG/M2

## 2023-09-22 DIAGNOSIS — J30.1 SEASONAL ALLERGIC RHINITIS DUE TO POLLEN: ICD-10-CM

## 2023-09-22 DIAGNOSIS — K21.9 GASTROESOPHAGEAL REFLUX DISEASE WITHOUT ESOPHAGITIS: ICD-10-CM

## 2023-09-22 DIAGNOSIS — M32.9 SYSTEMIC LUPUS ERYTHEMATOSUS, UNSPECIFIED SLE TYPE, UNSPECIFIED ORGAN INVOLVEMENT STATUS: ICD-10-CM

## 2023-09-22 DIAGNOSIS — J34.3 HYPERTROPHY OF INFERIOR NASAL TURBINATE: ICD-10-CM

## 2023-09-22 DIAGNOSIS — R04.0 EPISTAXIS: Primary | ICD-10-CM

## 2023-09-22 PROCEDURE — 3079F DIAST BP 80-89 MM HG: CPT | Mod: CPTII,S$GLB,, | Performed by: PHYSICIAN ASSISTANT

## 2023-09-22 PROCEDURE — 99999 PR PBB SHADOW E&M-EST. PATIENT-LVL IV: CPT | Mod: PBBFAC,,, | Performed by: PHYSICIAN ASSISTANT

## 2023-09-22 PROCEDURE — 3044F HG A1C LEVEL LT 7.0%: CPT | Mod: CPTII,S$GLB,, | Performed by: PHYSICIAN ASSISTANT

## 2023-09-22 PROCEDURE — 4010F PR ACE/ARB THEARPY RXD/TAKEN: ICD-10-PCS | Mod: CPTII,S$GLB,, | Performed by: PHYSICIAN ASSISTANT

## 2023-09-22 PROCEDURE — 1101F PR PT FALLS ASSESS DOC 0-1 FALLS W/OUT INJ PAST YR: ICD-10-PCS | Mod: CPTII,S$GLB,, | Performed by: PHYSICIAN ASSISTANT

## 2023-09-22 PROCEDURE — 99999 PR PBB SHADOW E&M-EST. PATIENT-LVL IV: ICD-10-PCS | Mod: PBBFAC,,, | Performed by: PHYSICIAN ASSISTANT

## 2023-09-22 PROCEDURE — 1101F PT FALLS ASSESS-DOCD LE1/YR: CPT | Mod: CPTII,S$GLB,, | Performed by: PHYSICIAN ASSISTANT

## 2023-09-22 PROCEDURE — 1159F PR MEDICATION LIST DOCUMENTED IN MEDICAL RECORD: ICD-10-PCS | Mod: CPTII,S$GLB,, | Performed by: PHYSICIAN ASSISTANT

## 2023-09-22 PROCEDURE — 99213 OFFICE O/P EST LOW 20 MIN: CPT | Mod: S$GLB,,, | Performed by: PHYSICIAN ASSISTANT

## 2023-09-22 PROCEDURE — 1159F MED LIST DOCD IN RCRD: CPT | Mod: CPTII,S$GLB,, | Performed by: PHYSICIAN ASSISTANT

## 2023-09-22 PROCEDURE — 3079F PR MOST RECENT DIASTOLIC BLOOD PRESSURE 80-89 MM HG: ICD-10-PCS | Mod: CPTII,S$GLB,, | Performed by: PHYSICIAN ASSISTANT

## 2023-09-22 PROCEDURE — 3008F BODY MASS INDEX DOCD: CPT | Mod: CPTII,S$GLB,, | Performed by: PHYSICIAN ASSISTANT

## 2023-09-22 PROCEDURE — 3066F NEPHROPATHY DOC TX: CPT | Mod: CPTII,S$GLB,, | Performed by: PHYSICIAN ASSISTANT

## 2023-09-22 PROCEDURE — 3074F PR MOST RECENT SYSTOLIC BLOOD PRESSURE < 130 MM HG: ICD-10-PCS | Mod: CPTII,S$GLB,, | Performed by: PHYSICIAN ASSISTANT

## 2023-09-22 PROCEDURE — 4010F ACE/ARB THERAPY RXD/TAKEN: CPT | Mod: CPTII,S$GLB,, | Performed by: PHYSICIAN ASSISTANT

## 2023-09-22 PROCEDURE — 3288F FALL RISK ASSESSMENT DOCD: CPT | Mod: CPTII,S$GLB,, | Performed by: PHYSICIAN ASSISTANT

## 2023-09-22 PROCEDURE — 3066F PR DOCUMENTATION OF TREATMENT FOR NEPHROPATHY: ICD-10-PCS | Mod: CPTII,S$GLB,, | Performed by: PHYSICIAN ASSISTANT

## 2023-09-22 PROCEDURE — 3288F PR FALLS RISK ASSESSMENT DOCUMENTED: ICD-10-PCS | Mod: CPTII,S$GLB,, | Performed by: PHYSICIAN ASSISTANT

## 2023-09-22 PROCEDURE — 3074F SYST BP LT 130 MM HG: CPT | Mod: CPTII,S$GLB,, | Performed by: PHYSICIAN ASSISTANT

## 2023-09-22 PROCEDURE — 99213 PR OFFICE/OUTPT VISIT, EST, LEVL III, 20-29 MIN: ICD-10-PCS | Mod: S$GLB,,, | Performed by: PHYSICIAN ASSISTANT

## 2023-09-22 PROCEDURE — 3044F PR MOST RECENT HEMOGLOBIN A1C LEVEL <7.0%: ICD-10-PCS | Mod: CPTII,S$GLB,, | Performed by: PHYSICIAN ASSISTANT

## 2023-09-22 PROCEDURE — 3008F PR BODY MASS INDEX (BMI) DOCUMENTED: ICD-10-PCS | Mod: CPTII,S$GLB,, | Performed by: PHYSICIAN ASSISTANT

## 2023-09-22 RX ORDER — AZELASTINE 1 MG/ML
1 SPRAY, METERED NASAL 2 TIMES DAILY
Qty: 15 ML | Refills: 2 | Status: SHIPPED | OUTPATIENT
Start: 2023-09-22 | End: 2023-10-23

## 2023-09-22 NOTE — PROGRESS NOTES
"  Subjective:      Efe is a 63 y.o. female with SLE (on prednisone&plaquenil), breast CA s/p mastectomy, Ulcerative colitis, and fibromyalgia who comes for follow-up of R sided nosebleeds. Her last visit with me was on 5/22/2023.      Patient reports complete resolution of epistaxis.  Patient denies any runny nose, postnasal drip, sneezing, or watery itchy eyes.  Patient does report developing a cough about a month ago but that is resolved.  Patient reports burning in her throat that wakes her up from sleep if she does not take a PPI twice daily.  Patient under the care of Dr. Villalta with GI.  Patient reports appointment with him next month.  Patient reports taking GERD precautions.     Per last office visit 5/22/2023 :  Her last visit with me was on 5/1/2023.  Advised nasal saline and moisturizing ointment.      Patient reports complete resolution and right-sided epistaxis.  Patient has been compliant with nasal saline spray twice daily and Aquaphor ointment daily.  Patient currently taking 5 mg of Zyrtec.  Patient does report migraines since last visit which is managed by her PCP.  Patient also reports dry hacking cough with associated "tickling" globus sensation which sometimes wakes her up from sleep.  Patient states she drinks 1 cup of coffee a day and takes Prilosec b.i.d.    PLAN  Epistaxis  Systemic lupus erythematosus, unspecified SLE type, unspecified organ involvement status              - continue nasal saline  Seasonal allergic rhinitis due to pollen              - will consider started medicated nasal sprays after next appointment  Gastroesophageal reflux disease without esophagitis              - c/w PPI              - discussed possible LPR causing cough              - advised follow up with GI    Per last office visit 5/1/2023 :  "Patient reports developing a cold 3 weeks ago with a cough and chest + sinus congestion.  She was seen at the ED for this and advised to double her Zyrtec to 10 mg.  " "Patient states 3 days later she woke up with a right side nosebleed.  Patient was seen in the ED was told she had bleeding from right-sided nasal polyp and underwent cautery with silver nitrate.  Patient states the bleeding returned the next day and she was evaluated in the ED again.  Patient had silver cautery again as well as fibrillar placed in her right nasal cavity.  Patient denies any further bleeding from her nose over the last 10 days.        She has previously had nasal cauterization as above.  The bleeding has required packing with fibrillar for control.  The last episode was 10 days ago, and is not currently active.  There is not a prior history of nasal surgery.  There is not a prior history of nasal trauma.  There is a history of environmental allergies.  She does currently use a nasal spray.  There is not a family history to suggest a clotting disorder.  She does not currently take a blood-thinning agent.  Patient reports easy bruising but denies spontaneous bleeding otherwise."         The patient's medications, allergies, past medical, surgical, social and family histories were reviewed and updated as appropriate.    A detailed review of systems was obtained with pertinent positives as per the above HPI, and otherwise negative.        Objective:     /83 (BP Location: Left arm, Patient Position: Sitting, BP Method: Small (Automatic))   Pulse 82   Wt 57.8 kg (127 lb 6.8 oz)   BMI 21.87 kg/m²        Constitutional:   She appears well-developed and well-nourished. She is active. Normal speech.      Head:  Normocephalic and atraumatic.     Nose:  No mucosal edema, rhinorrhea or septal deviation. No epistaxis. Turbinate hypertrophy.  Right sinus exhibits no maxillary sinus tenderness and no frontal sinus tenderness. Left sinus exhibits no maxillary sinus tenderness and no frontal sinus tenderness.     Pulmonary/Chest:   Effort normal.     Psychiatric:   She has a normal mood and affect. Her speech " "is normal and behavior is normal.        Procedure    None    Data Reviewed    WBC (K/uL)   Date Value   06/12/2023 8.15     Eosinophil % (%)   Date Value   06/12/2023 3.9     Eos # (K/uL)   Date Value   06/12/2023 0.3     Platelets (K/uL)   Date Value   06/12/2023 322     Glucose (mg/dL)   Date Value   06/12/2023 79     No results found for: "IGE"    Assessment:     1. Epistaxis    2. Systemic lupus erythematosus, unspecified SLE type, unspecified organ involvement status    3. Seasonal allergic rhinitis due to pollen    4. Hypertrophy of inferior nasal turbinate    5. Gastroesophageal reflux disease without esophagitis         Plan:     Epistaxis completely resolved.  Patient advised to follow up with me as needed    Prescribed azelastine nasal spray to use as needed for her seasonal allergies    Advised patient to continue follow up with GI in regards to her GERD/dysphagia    I had a discussion with the patient regarding her condition and the further workup and management options.    All questions were answered, and the patient is in agreement with the above.     Disclaimer:  This note may have been prepared utilizing voice recognition software which may result in occasional typographical errors in the text such as sound alike words.   If further clarification is needed, please contact the ENT department of [facility].    "

## 2023-09-24 PROBLEM — D69.6 THROMBOCYTOPENIA: Status: RESOLVED | Noted: 2023-03-06 | Resolved: 2023-09-24

## 2023-10-17 RX ORDER — LOPERAMIDE HYDROCHLORIDE 2 MG/1
CAPSULE ORAL
Qty: 240 CAPSULE | Refills: 3 | Status: SHIPPED | OUTPATIENT
Start: 2023-10-17 | End: 2024-02-16

## 2023-10-21 DIAGNOSIS — J30.1 SEASONAL ALLERGIC RHINITIS DUE TO POLLEN: ICD-10-CM

## 2023-10-21 DIAGNOSIS — E27.40 HYPOADRENALISM: ICD-10-CM

## 2023-10-21 DIAGNOSIS — F51.01 PRIMARY INSOMNIA: ICD-10-CM

## 2023-10-21 DIAGNOSIS — Z87.19 HISTORY OF ULCERATIVE COLITIS: ICD-10-CM

## 2023-10-21 NOTE — TELEPHONE ENCOUNTER
No care due was identified.  [facility] Embedded Care Due Messages. Reference number: [id number].   10/21/2023 11:15:12 AM CDT

## 2023-10-23 RX ORDER — PREDNISONE 5 MG/1
TABLET ORAL
Qty: 90 TABLET | Refills: 4 | Status: SHIPPED | OUTPATIENT
Start: 2023-10-23

## 2023-10-23 RX ORDER — ZOLPIDEM TARTRATE 5 MG/1
TABLET ORAL
Qty: 30 TABLET | Refills: 0 | Status: SHIPPED | OUTPATIENT
Start: 2023-10-23 | End: 2023-11-19

## 2023-10-23 RX ORDER — DICYCLOMINE HYDROCHLORIDE 20 MG/1
TABLET ORAL
Qty: 360 TABLET | Refills: 0 | Status: SHIPPED | OUTPATIENT
Start: 2023-10-23 | End: 2024-02-26

## 2023-10-23 RX ORDER — AZELASTINE 1 MG/ML
1 SPRAY, METERED NASAL 2 TIMES DAILY
Qty: 90 ML | Refills: 6 | Status: SHIPPED | OUTPATIENT
Start: 2023-10-23

## 2023-10-25 ENCOUNTER — LAB VISIT (OUTPATIENT)
Dept: LAB | Facility: HOSPITAL | Age: 63
End: 2023-10-25
Attending: INTERNAL MEDICINE
Payer: MEDICARE

## 2023-10-25 DIAGNOSIS — Z79.899 IMMUNOSUPPRESSION DUE TO DRUG THERAPY: ICD-10-CM

## 2023-10-25 DIAGNOSIS — D84.821 IMMUNOSUPPRESSION DUE TO DRUG THERAPY: ICD-10-CM

## 2023-10-25 DIAGNOSIS — M32.9 SYSTEMIC LUPUS ERYTHEMATOSUS, UNSPECIFIED SLE TYPE, UNSPECIFIED ORGAN INVOLVEMENT STATUS: ICD-10-CM

## 2023-10-25 DIAGNOSIS — Z78.0 MENOPAUSE: ICD-10-CM

## 2023-10-25 LAB
ALBUMIN SERPL BCP-MCNC: 3.8 G/DL (ref 3.5–5.2)
ALP SERPL-CCNC: 52 U/L (ref 55–135)
ALT SERPL W/O P-5'-P-CCNC: 14 U/L (ref 10–44)
AMORPH CRY UR QL COMP ASSIST: ABNORMAL
ANION GAP SERPL CALC-SCNC: 10 MMOL/L (ref 8–16)
AST SERPL-CCNC: 21 U/L (ref 10–40)
BACTERIA #/AREA URNS AUTO: ABNORMAL /HPF
BASOPHILS # BLD AUTO: 0.04 K/UL (ref 0–0.2)
BASOPHILS NFR BLD: 0.7 % (ref 0–1.9)
BILIRUB SERPL-MCNC: 0.4 MG/DL (ref 0.1–1)
BILIRUB UR QL STRIP: NEGATIVE
BUN SERPL-MCNC: 10 MG/DL (ref 8–23)
C3 SERPL-MCNC: 117 MG/DL (ref 50–180)
C4 SERPL-MCNC: 38 MG/DL (ref 11–44)
CALCIUM SERPL-MCNC: 9.4 MG/DL (ref 8.7–10.5)
CHLORIDE SERPL-SCNC: 104 MMOL/L (ref 95–110)
CLARITY UR REFRACT.AUTO: CLEAR
CO2 SERPL-SCNC: 27 MMOL/L (ref 23–29)
COLOR UR AUTO: YELLOW
CREAT SERPL-MCNC: 0.8 MG/DL (ref 0.5–1.4)
CREAT UR-MCNC: 352 MG/DL (ref 15–325)
CRP SERPL-MCNC: 2.2 MG/L (ref 0–8.2)
DIFFERENTIAL METHOD: ABNORMAL
EOSINOPHIL # BLD AUTO: 0.2 K/UL (ref 0–0.5)
EOSINOPHIL NFR BLD: 3.7 % (ref 0–8)
ERYTHROCYTE [DISTWIDTH] IN BLOOD BY AUTOMATED COUNT: 16.2 % (ref 11.5–14.5)
ERYTHROCYTE [SEDIMENTATION RATE] IN BLOOD BY PHOTOMETRIC METHOD: 3 MM/HR (ref 0–36)
EST. GFR  (NO RACE VARIABLE): >60 ML/MIN/1.73 M^2
GLUCOSE SERPL-MCNC: 94 MG/DL (ref 70–110)
GLUCOSE UR QL STRIP: NEGATIVE
HCT VFR BLD AUTO: 39.6 % (ref 37–48.5)
HGB BLD-MCNC: 12.3 G/DL (ref 12–16)
HGB UR QL STRIP: NEGATIVE
HYALINE CASTS UR QL AUTO: 0 /LPF
IMM GRANULOCYTES # BLD AUTO: 0.02 K/UL (ref 0–0.04)
IMM GRANULOCYTES NFR BLD AUTO: 0.3 % (ref 0–0.5)
KETONES UR QL STRIP: NEGATIVE
LEUKOCYTE ESTERASE UR QL STRIP: NEGATIVE
LYMPHOCYTES # BLD AUTO: 1.8 K/UL (ref 1–4.8)
LYMPHOCYTES NFR BLD: 30.5 % (ref 18–48)
MCH RBC QN AUTO: 26.9 PG (ref 27–31)
MCHC RBC AUTO-ENTMCNC: 31.1 G/DL (ref 32–36)
MCV RBC AUTO: 87 FL (ref 82–98)
MICROSCOPIC COMMENT: ABNORMAL
MONOCYTES # BLD AUTO: 0.7 K/UL (ref 0.3–1)
MONOCYTES NFR BLD: 11.1 % (ref 4–15)
NEUTROPHILS # BLD AUTO: 3.2 K/UL (ref 1.8–7.7)
NEUTROPHILS NFR BLD: 53.7 % (ref 38–73)
NITRITE UR QL STRIP: NEGATIVE
NRBC BLD-RTO: 0 /100 WBC
PH UR STRIP: 5 [PH] (ref 5–8)
PLATELET # BLD AUTO: 296 K/UL (ref 150–450)
PMV BLD AUTO: 11.4 FL (ref 9.2–12.9)
POTASSIUM SERPL-SCNC: 3.3 MMOL/L (ref 3.5–5.1)
PROT SERPL-MCNC: 6.8 G/DL (ref 6–8.4)
PROT UR QL STRIP: ABNORMAL
PROT UR-MCNC: 22 MG/DL (ref 0–15)
PROT/CREAT UR: 0.06 MG/G{CREAT} (ref 0–0.2)
RBC # BLD AUTO: 4.58 M/UL (ref 4–5.4)
RBC #/AREA URNS AUTO: 6 /HPF (ref 0–4)
SODIUM SERPL-SCNC: 141 MMOL/L (ref 136–145)
SP GR UR STRIP: 1.02 (ref 1–1.03)
URN SPEC COLLECT METH UR: ABNORMAL
WBC # BLD AUTO: 5.94 K/UL (ref 3.9–12.7)
WBC #/AREA URNS AUTO: 1 /HPF (ref 0–5)

## 2023-10-25 PROCEDURE — 85652 RBC SED RATE AUTOMATED: CPT | Mod: HCNC | Performed by: INTERNAL MEDICINE

## 2023-10-25 PROCEDURE — 86225 DNA ANTIBODY NATIVE: CPT | Mod: HCNC | Performed by: INTERNAL MEDICINE

## 2023-10-25 PROCEDURE — 81001 URINALYSIS AUTO W/SCOPE: CPT | Mod: HCNC | Performed by: INTERNAL MEDICINE

## 2023-10-25 PROCEDURE — 36415 COLL VENOUS BLD VENIPUNCTURE: CPT | Mod: HCNC,PO | Performed by: INTERNAL MEDICINE

## 2023-10-25 PROCEDURE — 86140 C-REACTIVE PROTEIN: CPT | Mod: HCNC | Performed by: INTERNAL MEDICINE

## 2023-10-25 PROCEDURE — 85025 COMPLETE CBC W/AUTO DIFF WBC: CPT | Mod: HCNC | Performed by: INTERNAL MEDICINE

## 2023-10-25 PROCEDURE — 86160 COMPLEMENT ANTIGEN: CPT | Mod: 59,HCNC | Performed by: INTERNAL MEDICINE

## 2023-10-25 PROCEDURE — 86160 COMPLEMENT ANTIGEN: CPT | Mod: HCNC | Performed by: INTERNAL MEDICINE

## 2023-10-25 PROCEDURE — 80053 COMPREHEN METABOLIC PANEL: CPT | Mod: HCNC | Performed by: INTERNAL MEDICINE

## 2023-10-25 PROCEDURE — 82570 ASSAY OF URINE CREATININE: CPT | Mod: HCNC | Performed by: INTERNAL MEDICINE

## 2023-10-26 ENCOUNTER — PATIENT MESSAGE (OUTPATIENT)
Dept: RHEUMATOLOGY | Facility: CLINIC | Age: 63
End: 2023-10-26
Payer: MEDICARE

## 2023-10-26 LAB — DSDNA AB SER-ACNC: NORMAL [IU]/ML

## 2023-11-01 ENCOUNTER — HOSPITAL ENCOUNTER (OUTPATIENT)
Dept: RADIOLOGY | Facility: HOSPITAL | Age: 63
Discharge: HOME | End: 2023-11-01
Attending: NURSE PRACTITIONER
Payer: MEDICARE

## 2023-11-01 DIAGNOSIS — Z12.31 ENCOUNTER FOR SCREENING MAMMOGRAM FOR BREAST CANCER: ICD-10-CM

## 2023-11-01 PROCEDURE — 77067 SCR MAMMO BI INCL CAD: CPT | Mod: 26,52,HCNC, | Performed by: RADIOLOGY

## 2023-11-01 PROCEDURE — 77067 MAMMO DIGITAL SCREENING LEFT WITH TOMO: ICD-10-PCS | Mod: 26,52,HCNC, | Performed by: RADIOLOGY

## 2023-11-01 PROCEDURE — 77063 MAMMO DIGITAL SCREENING LEFT WITH TOMO: ICD-10-PCS | Mod: 26,52,HCNC, | Performed by: RADIOLOGY

## 2023-11-01 PROCEDURE — 77063 BREAST TOMOSYNTHESIS BI: CPT | Mod: 26,52,HCNC, | Performed by: RADIOLOGY

## 2023-11-01 PROCEDURE — 77067 SCR MAMMO BI INCL CAD: CPT | Mod: TC,52,HCNC

## 2023-11-02 ENCOUNTER — OFFICE VISIT (OUTPATIENT)
Dept: HEMATOLOGY/ONCOLOGY | Facility: CLINIC | Age: 63
End: 2023-11-02
Payer: MEDICARE

## 2023-11-02 VITALS
HEIGHT: 64 IN | OXYGEN SATURATION: 100 % | BODY MASS INDEX: 21.86 KG/M2 | WEIGHT: 128.06 LBS | TEMPERATURE: 97 F | RESPIRATION RATE: 17 BRPM | HEART RATE: 71 BPM | DIASTOLIC BLOOD PRESSURE: 66 MMHG | SYSTOLIC BLOOD PRESSURE: 148 MMHG

## 2023-11-02 DIAGNOSIS — C50.211 MALIGNANT NEOPLASM OF UPPER-INNER QUADRANT OF RIGHT FEMALE BREAST, UNSPECIFIED ESTROGEN RECEPTOR STATUS: Primary | ICD-10-CM

## 2023-11-02 DIAGNOSIS — Z80.0 FAMILY HISTORY OF PANCREATIC CANCER: ICD-10-CM

## 2023-11-02 DIAGNOSIS — Z79.52 LONG TERM CURRENT USE OF SYSTEMIC STEROIDS: ICD-10-CM

## 2023-11-02 DIAGNOSIS — K86.1 CHRONIC PANCREATITIS, UNSPECIFIED PANCREATITIS TYPE: ICD-10-CM

## 2023-11-02 DIAGNOSIS — E27.49 SECONDARY ADRENAL INSUFFICIENCY: ICD-10-CM

## 2023-11-02 DIAGNOSIS — Z12.31 ENCOUNTER FOR SCREENING MAMMOGRAM FOR MALIGNANT NEOPLASM OF BREAST: ICD-10-CM

## 2023-11-02 DIAGNOSIS — I10 BENIGN ESSENTIAL HYPERTENSION: ICD-10-CM

## 2023-11-02 DIAGNOSIS — M47.817 LUMBAR AND SACRAL SPONDYLARTHRITIS: ICD-10-CM

## 2023-11-02 PROCEDURE — 3288F PR FALLS RISK ASSESSMENT DOCUMENTED: ICD-10-PCS | Mod: HCNC,CPTII,S$GLB, | Performed by: INTERNAL MEDICINE

## 2023-11-02 PROCEDURE — 3077F SYST BP >= 140 MM HG: CPT | Mod: HCNC,CPTII,S$GLB, | Performed by: INTERNAL MEDICINE

## 2023-11-02 PROCEDURE — 99999 PR PBB SHADOW E&M-EST. PATIENT-LVL V: CPT | Mod: PBBFAC,HCNC,, | Performed by: INTERNAL MEDICINE

## 2023-11-02 PROCEDURE — 99999 PR PBB SHADOW E&M-EST. PATIENT-LVL V: ICD-10-PCS | Mod: PBBFAC,HCNC,, | Performed by: INTERNAL MEDICINE

## 2023-11-02 PROCEDURE — 4010F ACE/ARB THERAPY RXD/TAKEN: CPT | Mod: HCNC,CPTII,S$GLB, | Performed by: INTERNAL MEDICINE

## 2023-11-02 PROCEDURE — 1160F PR REVIEW ALL MEDS BY PRESCRIBER/CLIN PHARMACIST DOCUMENTED: ICD-10-PCS | Mod: HCNC,CPTII,S$GLB, | Performed by: INTERNAL MEDICINE

## 2023-11-02 PROCEDURE — 1101F PR PT FALLS ASSESS DOC 0-1 FALLS W/OUT INJ PAST YR: ICD-10-PCS | Mod: HCNC,CPTII,S$GLB, | Performed by: INTERNAL MEDICINE

## 2023-11-02 PROCEDURE — 3008F BODY MASS INDEX DOCD: CPT | Mod: HCNC,CPTII,S$GLB, | Performed by: INTERNAL MEDICINE

## 2023-11-02 PROCEDURE — 3066F PR DOCUMENTATION OF TREATMENT FOR NEPHROPATHY: ICD-10-PCS | Mod: HCNC,CPTII,S$GLB, | Performed by: INTERNAL MEDICINE

## 2023-11-02 PROCEDURE — 1159F MED LIST DOCD IN RCRD: CPT | Mod: HCNC,CPTII,S$GLB, | Performed by: INTERNAL MEDICINE

## 2023-11-02 PROCEDURE — 3066F NEPHROPATHY DOC TX: CPT | Mod: HCNC,CPTII,S$GLB, | Performed by: INTERNAL MEDICINE

## 2023-11-02 PROCEDURE — 1160F RVW MEDS BY RX/DR IN RCRD: CPT | Mod: HCNC,CPTII,S$GLB, | Performed by: INTERNAL MEDICINE

## 2023-11-02 PROCEDURE — 3078F PR MOST RECENT DIASTOLIC BLOOD PRESSURE < 80 MM HG: ICD-10-PCS | Mod: HCNC,CPTII,S$GLB, | Performed by: INTERNAL MEDICINE

## 2023-11-02 PROCEDURE — 4010F PR ACE/ARB THEARPY RXD/TAKEN: ICD-10-PCS | Mod: HCNC,CPTII,S$GLB, | Performed by: INTERNAL MEDICINE

## 2023-11-02 PROCEDURE — 3008F PR BODY MASS INDEX (BMI) DOCUMENTED: ICD-10-PCS | Mod: HCNC,CPTII,S$GLB, | Performed by: INTERNAL MEDICINE

## 2023-11-02 PROCEDURE — 99214 PR OFFICE/OUTPT VISIT, EST, LEVL IV, 30-39 MIN: ICD-10-PCS | Mod: HCNC,S$GLB,, | Performed by: INTERNAL MEDICINE

## 2023-11-02 PROCEDURE — 3078F DIAST BP <80 MM HG: CPT | Mod: HCNC,CPTII,S$GLB, | Performed by: INTERNAL MEDICINE

## 2023-11-02 PROCEDURE — 3288F FALL RISK ASSESSMENT DOCD: CPT | Mod: HCNC,CPTII,S$GLB, | Performed by: INTERNAL MEDICINE

## 2023-11-02 PROCEDURE — 3044F PR MOST RECENT HEMOGLOBIN A1C LEVEL <7.0%: ICD-10-PCS | Mod: HCNC,CPTII,S$GLB, | Performed by: INTERNAL MEDICINE

## 2023-11-02 PROCEDURE — 1159F PR MEDICATION LIST DOCUMENTED IN MEDICAL RECORD: ICD-10-PCS | Mod: HCNC,CPTII,S$GLB, | Performed by: INTERNAL MEDICINE

## 2023-11-02 PROCEDURE — 3077F PR MOST RECENT SYSTOLIC BLOOD PRESSURE >= 140 MM HG: ICD-10-PCS | Mod: HCNC,CPTII,S$GLB, | Performed by: INTERNAL MEDICINE

## 2023-11-02 PROCEDURE — 1101F PT FALLS ASSESS-DOCD LE1/YR: CPT | Mod: HCNC,CPTII,S$GLB, | Performed by: INTERNAL MEDICINE

## 2023-11-02 PROCEDURE — 3044F HG A1C LEVEL LT 7.0%: CPT | Mod: HCNC,CPTII,S$GLB, | Performed by: INTERNAL MEDICINE

## 2023-11-02 PROCEDURE — 99214 OFFICE O/P EST MOD 30 MIN: CPT | Mod: HCNC,S$GLB,, | Performed by: INTERNAL MEDICINE

## 2023-11-02 NOTE — PROGRESS NOTES
Subjective     Patient ID: Efe Horowitz is a 63 y.o. female.    Chief Complaint: Breast Cancer    HPI    Here for follow up of breast cancer  Reports her issues remain rheumatologic- she follows with rheumatology and symptoms better managed on current regimen  She has noted a red rash at some extensor surfaces  Weight stable  Denies new pain.  Son graduating and joining [employer]!     Presents for follow-up of triple negative breast cancer.      Diagnosis: Triple negative Stage 1 ([id number]) right breast carcinoma  Oncology History:  - 1/18/16 screening mammogram: focal asymmetry seen in the posterior upper-inner region of the right breast.    - 2/1/16 diagnostic mammogram and ultrasound: irregular solid mass with poorly defined margins  measuring 5 millimeters    - 2/19/16 core biopsy  FINAL PATHOLOGIC DIAGNOSIS  Invasive ductal carcinoma, high-grade (duct formation - 3; nuclear pleomorphism - 3, mitosis - 2)  ER - Negative (0)  NM - Negative (0)  HER2 - Negative (0)    - 4/27/16 mastectomy and SLN biopsy  FINAL PATHOLOGIC DIAGNOSIS  RIGHT MASTECTOMY SPECIMEN SHOWING A 7 MM AREA OF INVASIVE DUCT CARCINOMA, GRADE 3  WITH NEGATIVE MARGINS.    TUMOR SIZE: 0.7 CM  HISTOLOGIC TYPE: INVASIVE DUCT CARCINOMA  HISTOLOGIC GRADE: 3, 3, 2; GRADE 3  DCIS: NOT IDENTIFIED  MARGINS: ALL MARGINS OF RESECTION ARE NEGATIVE FOR TUMOR. CLOSEST MARGIN IS THE  POSTERIOR MARGIN AT 2.5 CM  IMMUNE RECEPTOR STUDIES: MS 16-5/1/04: ER - Negative (0)  NM - Negative (0)  HER2 - Negative (0)  LYMPH NODES: Total number 4- negative    - Genetic testing performed and negative    - With tumor > 0.6 cm she just made guideline cut off to consider adjuvant chemotherapy with TC   However, with her active wound healing issues at that time and other comorbidities adjuvant chemotherapy was not be pursued     - She completed all of her reconstruction with Dr. Mccloud     Does have multiple comorbidities including history of lupus and fibromyalgia- she  is managed by rheumatology and reports recent flare     - Reports history of easy bruising and states she was borderline on testing for von willebrands in [facility]    - additional UC diagnosis     Review of Systems   Constitutional:  Positive for fatigue. Negative for activity change, appetite change, chills, fever and unexpected weight change.        See above   HENT:  Negative for nasal congestion, mouth sores, sore throat and trouble swallowing.    Eyes:  Negative for visual disturbance.   Respiratory:  Negative for cough, shortness of breath and wheezing.    Cardiovascular:  Negative for chest pain, palpitations, leg swelling and claudication.   Gastrointestinal:  Positive for diarrhea (chronic). Negative for abdominal distention, abdominal pain, change in bowel habit, constipation, nausea and reflux.   Genitourinary:  Negative for bladder incontinence, decreased urine volume, difficulty urinating, dysuria, frequency and hematuria.   Musculoskeletal:  Positive for arthralgias, joint swelling and joint deformity.   Integumentary:  Positive for rash. Negative for color change, breast mass, breast discharge and breast tenderness.   Neurological:  Negative for dizziness, weakness, light-headedness, numbness and headaches.   Hematological:  Negative for adenopathy. Bruises/bleeds easily.   Psychiatric/Behavioral:  Negative for dysphoric mood.    Breast: Negative for mass and tenderness         Objective     Physical Exam  Vitals and nursing note reviewed.   Constitutional:       General: She is not in acute distress.     Appearance: Normal appearance. She is well-developed and normal weight. She is not ill-appearing.   HENT:      Head: Normocephalic and atraumatic.   Eyes:      General: Lids are normal. No scleral icterus.     Extraocular Movements: Extraocular movements intact.      Conjunctiva/sclera: Conjunctivae normal.      Pupils: Pupils are equal, round, and reactive to light.   Neck:      Thyroid: No  thyromegaly.      Vascular: No JVD.      Trachea: Trachea normal.   Cardiovascular:      Rate and Rhythm: Normal rate and regular rhythm.      Heart sounds: Murmur (soft) heard.   Pulmonary:      Effort: Pulmonary effort is normal.      Breath sounds: Normal breath sounds. No wheezing.      Comments: Breasts- right breast no concerning masses (area of fat necrosis) or LAD. Area of hyperpigmentation to upper outer quad that has been there for >1 year according to patient.   Left breast no masses or LAD   No tenderness.   Abdominal:      General: Abdomen is flat. Bowel sounds are normal. There is no distension.      Palpations: Abdomen is soft. There is no mass.      Tenderness: There is no abdominal tenderness. There is no rebound.      Comments: No organomegaly.    Musculoskeletal:         General: Tenderness and deformity present. No swelling. Normal range of motion.      Cervical back: Normal range of motion and neck supple. No rigidity or tenderness.      Right lower leg: No edema.      Left lower leg: No edema.      Comments: No spinal or paraspinal tenderness.    Lymphadenopathy:      Head:      Right side of head: No submental or submandibular adenopathy.      Left side of head: No submental or submandibular adenopathy.      Cervical: No cervical adenopathy.      Upper Body:      Right upper body: No supraclavicular or axillary adenopathy.      Left upper body: No supraclavicular or axillary adenopathy.   Skin:     General: Skin is warm and dry.      Capillary Refill: Capillary refill takes less than 2 seconds.      Coloration: Skin is not jaundiced.      Findings: Erythema and rash present. No bruising.      Nails: There is no clubbing.   Neurological:      General: No focal deficit present.      Mental Status: She is alert and oriented to person, place, and time.      Sensory: No sensory deficit.      Motor: No weakness.      Coordination: Coordination normal.      Gait: Gait normal.   Psychiatric:          Mood and Affect: Mood normal.         Speech: Speech normal.         Behavior: Behavior normal.         Thought Content: Thought content normal.         Judgment: Judgment normal.     Labs- reviewed  Awaiting final mammogram result     Assessment and Plan     1. Malignant neoplasm of upper-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, unspecified estrogen receptor status    2. Family history of pancreatic cancer    3. Long term current use of systemic steroids    4. Secondary adrenal insufficiency    5. Chronic pancreatitis, unspecified pancreatitis type    6. Lumbar and sacral spondylarthritis    7. Benign essential hypertension      Triple negative breast cancer   Clinically NEEMA  Awaiting left mammogram results, repeat 1 year    Pancreatic cyst- imaging UTD    Rheum management to continued    Derm referral    25 minutes direct with patient    Route Chart for Scheduling    Med Onc Chart Routing      Follow up with physician    Follow up with BREANNE 1 year. left mammogram and labs same day   Infusion scheduling note    Injection scheduling note    Labs    Imaging    Pharmacy appointment    Other referrals                Therapy Plan Information  Medications  zoledronic acid-mannitol & water 5 mg/100 mL infusion 5 mg  5 mg, Intravenous, Every visit  Flushes  sodium chloride 0.9% flush 10 mL  10 mL, Intravenous, PRN

## 2023-11-08 DIAGNOSIS — M32.9 SYSTEMIC LUPUS ERYTHEMATOSUS, UNSPECIFIED SLE TYPE, UNSPECIFIED ORGAN INVOLVEMENT STATUS: ICD-10-CM

## 2023-11-08 RX ORDER — BELIMUMAB 200 MG/ML
200 SOLUTION SUBCUTANEOUS
Qty: 4 EACH | Refills: 2 | Status: ACTIVE | OUTPATIENT
Start: 2023-11-08 | End: 2024-01-29 | Stop reason: SDUPTHER

## 2023-11-17 DIAGNOSIS — F51.01 PRIMARY INSOMNIA: ICD-10-CM

## 2023-11-18 NOTE — TELEPHONE ENCOUNTER
No care due was identified.  [facility] Embedded Care Due Messages. Reference number: [id number].   11/17/2023 7:19:03 PM CST

## 2023-11-19 RX ORDER — ZOLPIDEM TARTRATE 5 MG/1
TABLET ORAL
Qty: 30 TABLET | Refills: 5 | Status: SHIPPED | OUTPATIENT
Start: 2023-11-19

## 2023-11-28 ENCOUNTER — LAB VISIT (OUTPATIENT)
Dept: LAB | Facility: HOSPITAL | Age: 63
End: 2023-11-28
Attending: INTERNAL MEDICINE
Payer: MEDICARE

## 2023-11-28 DIAGNOSIS — Z79.899 IMMUNOSUPPRESSION DUE TO DRUG THERAPY: ICD-10-CM

## 2023-11-28 DIAGNOSIS — R74.8 ELEVATED CK: ICD-10-CM

## 2023-11-28 DIAGNOSIS — M32.9 SYSTEMIC LUPUS ERYTHEMATOSUS, UNSPECIFIED SLE TYPE, UNSPECIFIED ORGAN INVOLVEMENT STATUS: ICD-10-CM

## 2023-11-28 DIAGNOSIS — D84.821 IMMUNOSUPPRESSION DUE TO DRUG THERAPY: ICD-10-CM

## 2023-11-28 DIAGNOSIS — R73.03 PREDIABETES: ICD-10-CM

## 2023-11-28 LAB
ALBUMIN SERPL BCP-MCNC: 3.5 G/DL (ref 3.5–5.2)
ALP SERPL-CCNC: 48 U/L (ref 55–135)
ALT SERPL W/O P-5'-P-CCNC: 13 U/L (ref 10–44)
ANION GAP SERPL CALC-SCNC: 11 MMOL/L (ref 8–16)
AST SERPL-CCNC: 21 U/L (ref 10–40)
BASOPHILS # BLD AUTO: 0.04 K/UL (ref 0–0.2)
BASOPHILS NFR BLD: 0.4 % (ref 0–1.9)
BILIRUB SERPL-MCNC: 0.5 MG/DL (ref 0.1–1)
BUN SERPL-MCNC: 15 MG/DL (ref 8–23)
C3 SERPL-MCNC: 98 MG/DL (ref 50–180)
C4 SERPL-MCNC: 33 MG/DL (ref 11–44)
CALCIUM SERPL-MCNC: 9 MG/DL (ref 8.7–10.5)
CHLORIDE SERPL-SCNC: 104 MMOL/L (ref 95–110)
CK SERPL-CCNC: 213 U/L (ref 20–180)
CO2 SERPL-SCNC: 28 MMOL/L (ref 23–29)
CREAT SERPL-MCNC: 0.8 MG/DL (ref 0.5–1.4)
CRP SERPL-MCNC: 1.6 MG/L (ref 0–8.2)
DIFFERENTIAL METHOD: ABNORMAL
EOSINOPHIL # BLD AUTO: 0.3 K/UL (ref 0–0.5)
EOSINOPHIL NFR BLD: 2.8 % (ref 0–8)
ERYTHROCYTE [DISTWIDTH] IN BLOOD BY AUTOMATED COUNT: 14.8 % (ref 11.5–14.5)
ERYTHROCYTE [SEDIMENTATION RATE] IN BLOOD BY PHOTOMETRIC METHOD: 4 MM/HR (ref 0–36)
EST. GFR  (NO RACE VARIABLE): >60 ML/MIN/1.73 M^2
ESTIMATED AVG GLUCOSE: 120 MG/DL (ref 68–131)
GLUCOSE SERPL-MCNC: 74 MG/DL (ref 70–110)
HBA1C MFR BLD: 5.8 % (ref 4–5.6)
HCT VFR BLD AUTO: 36.8 % (ref 37–48.5)
HGB BLD-MCNC: 11.5 G/DL (ref 12–16)
IMM GRANULOCYTES # BLD AUTO: 0.03 K/UL (ref 0–0.04)
IMM GRANULOCYTES NFR BLD AUTO: 0.3 % (ref 0–0.5)
LYMPHOCYTES # BLD AUTO: 2.2 K/UL (ref 1–4.8)
LYMPHOCYTES NFR BLD: 22.7 % (ref 18–48)
MCH RBC QN AUTO: 27.4 PG (ref 27–31)
MCHC RBC AUTO-ENTMCNC: 31.3 G/DL (ref 32–36)
MCV RBC AUTO: 88 FL (ref 82–98)
MONOCYTES # BLD AUTO: 0.8 K/UL (ref 0.3–1)
MONOCYTES NFR BLD: 8.8 % (ref 4–15)
NEUTROPHILS # BLD AUTO: 6.2 K/UL (ref 1.8–7.7)
NEUTROPHILS NFR BLD: 65 % (ref 38–73)
NRBC BLD-RTO: 0 /100 WBC
PLATELET # BLD AUTO: 279 K/UL (ref 150–450)
PMV BLD AUTO: 11.5 FL (ref 9.2–12.9)
POTASSIUM SERPL-SCNC: 3.2 MMOL/L (ref 3.5–5.1)
PROT SERPL-MCNC: 6.2 G/DL (ref 6–8.4)
RBC # BLD AUTO: 4.2 M/UL (ref 4–5.4)
SODIUM SERPL-SCNC: 143 MMOL/L (ref 136–145)
WBC # BLD AUTO: 9.58 K/UL (ref 3.9–12.7)

## 2023-11-28 PROCEDURE — 86160 COMPLEMENT ANTIGEN: CPT | Mod: HCNC | Performed by: INTERNAL MEDICINE

## 2023-11-28 PROCEDURE — 86140 C-REACTIVE PROTEIN: CPT | Mod: HCNC | Performed by: INTERNAL MEDICINE

## 2023-11-28 PROCEDURE — 85652 RBC SED RATE AUTOMATED: CPT | Mod: HCNC | Performed by: INTERNAL MEDICINE

## 2023-11-28 PROCEDURE — 83036 HEMOGLOBIN GLYCOSYLATED A1C: CPT | Mod: HCNC | Performed by: INTERNAL MEDICINE

## 2023-11-28 PROCEDURE — 82550 ASSAY OF CK (CPK): CPT | Mod: HCNC | Performed by: INTERNAL MEDICINE

## 2023-11-28 PROCEDURE — 36415 COLL VENOUS BLD VENIPUNCTURE: CPT | Mod: HCNC,PO | Performed by: INTERNAL MEDICINE

## 2023-11-28 PROCEDURE — 86225 DNA ANTIBODY NATIVE: CPT | Mod: HCNC | Performed by: INTERNAL MEDICINE

## 2023-11-28 PROCEDURE — 86160 COMPLEMENT ANTIGEN: CPT | Mod: 59,HCNC | Performed by: INTERNAL MEDICINE

## 2023-11-28 PROCEDURE — 85025 COMPLETE CBC W/AUTO DIFF WBC: CPT | Mod: HCNC | Performed by: INTERNAL MEDICINE

## 2023-11-28 PROCEDURE — 80053 COMPREHEN METABOLIC PANEL: CPT | Mod: HCNC | Performed by: INTERNAL MEDICINE

## 2023-11-29 ENCOUNTER — PATIENT MESSAGE (OUTPATIENT)
Dept: RHEUMATOLOGY | Facility: CLINIC | Age: 63
End: 2023-11-29
Payer: MEDICARE

## 2023-11-29 LAB — DSDNA AB SER-ACNC: NORMAL [IU]/ML

## 2023-12-15 ENCOUNTER — TELEPHONE (OUTPATIENT)
Dept: ENDOSCOPY | Facility: HOSPITAL | Age: 63
End: 2023-12-15
Payer: MEDICARE

## 2023-12-15 ENCOUNTER — TELEPHONE (OUTPATIENT)
Dept: GASTROENTEROLOGY | Facility: CLINIC | Age: 63
End: 2023-12-15
Payer: MEDICARE

## 2023-12-15 DIAGNOSIS — K86.2 PANCREAS CYST: Primary | ICD-10-CM

## 2023-12-15 DIAGNOSIS — M32.9 SYSTEMIC LUPUS ERYTHEMATOSUS, UNSPECIFIED SLE TYPE, UNSPECIFIED ORGAN INVOLVEMENT STATUS: ICD-10-CM

## 2023-12-15 RX ORDER — HYDROXYCHLOROQUINE SULFATE 200 MG/1
TABLET, FILM COATED ORAL
Qty: 45 TABLET | Refills: 5 | Status: SHIPPED | OUTPATIENT
Start: 2023-12-15

## 2024-01-14 ENCOUNTER — HOSPITAL ENCOUNTER (OUTPATIENT)
Dept: RADIOLOGY | Facility: HOSPITAL | Age: 64
Discharge: HOME | End: 2024-01-14
Attending: INTERNAL MEDICINE
Payer: MEDICARE

## 2024-01-14 DIAGNOSIS — K86.2 PANCREAS CYST: ICD-10-CM

## 2024-01-14 PROCEDURE — 76376 3D RENDER W/INTRP POSTPROCES: CPT | Mod: TC

## 2024-01-14 PROCEDURE — 74183 MRI ABD W/O CNTR FLWD CNTR: CPT | Mod: 26,,, | Performed by: RADIOLOGY

## 2024-01-14 PROCEDURE — 76376 3D RENDER W/INTRP POSTPROCES: CPT | Mod: 26,,, | Performed by: RADIOLOGY

## 2024-01-14 PROCEDURE — A9585 GADOBUTROL INJECTION: HCPCS | Performed by: INTERNAL MEDICINE

## 2024-01-14 PROCEDURE — 25500020 PHARM REV CODE 255: Performed by: INTERNAL MEDICINE

## 2024-01-14 RX ORDER — GADOBUTROL 604.72 MG/ML
10 INJECTION INTRAVENOUS
Status: COMPLETED | OUTPATIENT
Start: 2024-01-14 | End: 2024-01-14

## 2024-01-14 RX ADMIN — GADOBUTROL 10 ML: 604.72 INJECTION INTRAVENOUS at 05:01

## 2024-01-22 ENCOUNTER — OFFICE VISIT (OUTPATIENT)
Dept: INTERNAL MEDICINE | Facility: CLINIC | Age: 64
End: 2024-01-22
Payer: MEDICARE

## 2024-01-22 VITALS
WEIGHT: 128.5 LBS | HEIGHT: 64 IN | HEART RATE: 66 BPM | SYSTOLIC BLOOD PRESSURE: 144 MMHG | BODY MASS INDEX: 21.94 KG/M2 | DIASTOLIC BLOOD PRESSURE: 96 MMHG | OXYGEN SATURATION: 100 %

## 2024-01-22 DIAGNOSIS — K86.2 PANCREAS CYST: ICD-10-CM

## 2024-01-22 DIAGNOSIS — M79.7 FIBROMYALGIA: ICD-10-CM

## 2024-01-22 DIAGNOSIS — R73.03 PREDIABETES: ICD-10-CM

## 2024-01-22 DIAGNOSIS — I27.20 PULMONARY HTN: ICD-10-CM

## 2024-01-22 DIAGNOSIS — Z79.899 IMMUNOSUPPRESSION DUE TO DRUG THERAPY: ICD-10-CM

## 2024-01-22 DIAGNOSIS — E78.2 MIXED HYPERLIPIDEMIA: ICD-10-CM

## 2024-01-22 DIAGNOSIS — I10 BENIGN ESSENTIAL HYPERTENSION: Primary | ICD-10-CM

## 2024-01-22 DIAGNOSIS — K21.9 GASTROESOPHAGEAL REFLUX DISEASE WITHOUT ESOPHAGITIS: ICD-10-CM

## 2024-01-22 DIAGNOSIS — C50.211 MALIGNANT NEOPLASM OF UPPER-INNER QUADRANT OF RIGHT FEMALE BREAST, UNSPECIFIED ESTROGEN RECEPTOR STATUS: ICD-10-CM

## 2024-01-22 DIAGNOSIS — K86.1 CHRONIC PANCREATITIS, UNSPECIFIED PANCREATITIS TYPE: ICD-10-CM

## 2024-01-22 DIAGNOSIS — F51.01 PRIMARY INSOMNIA: ICD-10-CM

## 2024-01-22 DIAGNOSIS — M80.00XG AGE-RELATED OSTEOPOROSIS WITH CURRENT PATHOLOGICAL FRACTURE WITH DELAYED HEALING: ICD-10-CM

## 2024-01-22 DIAGNOSIS — E27.49 SECONDARY ADRENAL INSUFFICIENCY: ICD-10-CM

## 2024-01-22 DIAGNOSIS — I77.9 BILATERAL CAROTID ARTERY DISEASE, UNSPECIFIED TYPE: ICD-10-CM

## 2024-01-22 DIAGNOSIS — K51.918 ULCERATIVE COLITIS WITH OTHER COMPLICATION, UNSPECIFIED LOCATION: ICD-10-CM

## 2024-01-22 DIAGNOSIS — M32.9 SYSTEMIC LUPUS ERYTHEMATOSUS, UNSPECIFIED SLE TYPE, UNSPECIFIED ORGAN INVOLVEMENT STATUS: ICD-10-CM

## 2024-01-22 DIAGNOSIS — K86.81 EXOCRINE PANCREATIC INSUFFICIENCY: ICD-10-CM

## 2024-01-22 DIAGNOSIS — E05.90 SUBCLINICAL HYPERTHYROIDISM: ICD-10-CM

## 2024-01-22 DIAGNOSIS — L30.4 INTERTRIGO: ICD-10-CM

## 2024-01-22 DIAGNOSIS — R21 RASH OF BOTH HANDS: ICD-10-CM

## 2024-01-22 DIAGNOSIS — K86.2 PANCREAS CYST: Primary | ICD-10-CM

## 2024-01-22 DIAGNOSIS — Z79.52 LONG TERM CURRENT USE OF SYSTEMIC STEROIDS: ICD-10-CM

## 2024-01-22 DIAGNOSIS — D84.821 IMMUNOSUPPRESSION DUE TO DRUG THERAPY: ICD-10-CM

## 2024-01-22 PROCEDURE — 1125F AMNT PAIN NOTED PAIN PRSNT: CPT | Mod: CPTII,S$GLB,, | Performed by: INTERNAL MEDICINE

## 2024-01-22 PROCEDURE — 99999 PR PBB SHADOW E&M-EST. PATIENT-LVL V: CPT | Mod: PBBFAC,,, | Performed by: INTERNAL MEDICINE

## 2024-01-22 PROCEDURE — 3008F BODY MASS INDEX DOCD: CPT | Mod: CPTII,S$GLB,, | Performed by: INTERNAL MEDICINE

## 2024-01-22 PROCEDURE — 1101F PT FALLS ASSESS-DOCD LE1/YR: CPT | Mod: CPTII,S$GLB,, | Performed by: INTERNAL MEDICINE

## 2024-01-22 PROCEDURE — 1160F RVW MEDS BY RX/DR IN RCRD: CPT | Mod: CPTII,S$GLB,, | Performed by: INTERNAL MEDICINE

## 2024-01-22 PROCEDURE — 99214 OFFICE O/P EST MOD 30 MIN: CPT | Mod: S$GLB,,, | Performed by: INTERNAL MEDICINE

## 2024-01-22 PROCEDURE — 1159F MED LIST DOCD IN RCRD: CPT | Mod: CPTII,S$GLB,, | Performed by: INTERNAL MEDICINE

## 2024-01-22 PROCEDURE — 3288F FALL RISK ASSESSMENT DOCD: CPT | Mod: CPTII,S$GLB,, | Performed by: INTERNAL MEDICINE

## 2024-01-22 PROCEDURE — 3077F SYST BP >= 140 MM HG: CPT | Mod: CPTII,S$GLB,, | Performed by: INTERNAL MEDICINE

## 2024-01-22 PROCEDURE — 3080F DIAST BP >= 90 MM HG: CPT | Mod: CPTII,S$GLB,, | Performed by: INTERNAL MEDICINE

## 2024-01-22 RX ORDER — AMLODIPINE BESYLATE 5 MG/1
5 TABLET ORAL DAILY
Qty: 90 TABLET | Refills: 3 | Status: SHIPPED | OUTPATIENT
Start: 2024-01-22

## 2024-01-22 RX ORDER — NYSTATIN 100000 [USP'U]/G
POWDER TOPICAL 4 TIMES DAILY
Qty: 30 G | Refills: 0 | Status: SHIPPED | OUTPATIENT
Start: 2024-01-22 | End: 2024-01-23

## 2024-01-22 NOTE — PROGRESS NOTES
INTERNAL MEDICINE ESTABLISHED PATIENT VISIT NOTE    Subjective:     Chief Complaint: Follow-up  HTN, pain issues     Patient ID: Efe Horowitz is a 64 y.o. female with HTN c proteinuria, HLD, preDM, UC s/p total colectomy and at baseline c some chronic abd discomfort, GERD, iron def anemia, SLE, FM, adrenal insufficency, exocrine pancreatic insufficency, B12 def, Vit D def, hx lymphoma in breast dx'ed 1/2016 s/p resection and s/p breast reconstruction at [facility] in 2017, osteoporosis c hx of L3 vertebral fractures on MRI in the past, subclinical hyperthyroidism, pancreatic cyst, hepatic hemangiomas stable on imaging, hx migraines, lumbar spondylarthritis, insomnia on Ambien, last seen by me in Sept, here today for HTN, HLD, preDM.    Had f/u c Dr. Wright for f/u breast CA in Nov.  States her hands have been very red and states Rheum saw it and told her to d/w Dr. Wright but Dr. Wright rec f/uc Derm but states referral never made.  States her hands often look red but no associated pain or burning.  No itching.  No dryness.  Denies worsening c cold temps.    Has also been having diffuse itching and burning all over her body, particularly on her chest and under her breasts.  Has also been having diffuse pain in her muscles and states it feels like her FM pain.    Had recent MRI abd but had issues c machine malfunction and was unable to get contrast so has to return for post contrast images.    Still on Benlysta and Plaquenil as managed by Dr. Jiang for lupus.  Last seen in Aug and has f/u pending in March.      Past Medical History:  Past Medical History:   Diagnosis Date    Acid reflux     Adrenal insufficiency, primary     Arthritis     Asthma     B12 deficiency anemia     Benign essential hypertension 2/7/2021    Blood transfusion 2010    Breast cancer 2/2016    Right breast infiltrating ductal CA    Cataract     Chronic pain     Deep vein thrombosis     Dry eyes     Esophagitis, unspecified     Fibromyalgia      "General anesthetics causing adverse effect in therapeutic use     "slow to wake up bc I don't have an immune system    Heart murmur     History of colon polyps     Hyperlipidemia     Hypopituitary dwarfism     Iron deficiency anemia     Lupus     Migraines, neuralgic     Nonspecific ulcerative colitis     OP (osteoporosis)     history of reclast    Osteopenia     Pancreatic cyst     Schatzki's ring     Steroid sulfatase deficiency     Ulcerative colitis     Vitamin D deficiency disease        Home Medications:  Prior to Admission medications    Medication Sig Start Date End Date Taking? Authorizing Provider   (Magic mouthwash) 1:1:1 diphenhydrAMINE(Benadryl) 12.5mg/5ml liq, aluminum & magnesium hydroxide-simethicone (Maalox), LIDOcaine viscous 2% Swish and spit 5 mLs every 4 (four) hours as needed (mouth sores). for mouth sores 3/6/23   Shonda Anguiano MD   albuterol (PROVENTIL/VENTOLIN HFA) 90 mcg/actuation inhaler Inhale 1-2 puffs into the lungs every 6 (six) hours as needed for Wheezing or Shortness of Breath. Rescue 4/17/23   Ondina Finnegan NP   amLODIPine (NORVASC) 2.5 MG tablet Take 2.5 mg by mouth. 9/26/22   Provider, Historical   azelastine (ASTELIN) 137 mcg (0.1 %) nasal spray SPRAY 1 SPRAY BY NASAL ROUTE 2 TIMES DAILY. 10/23/23   Ben Levin PA-C   belimumab (BENLYSTA) 200 mg/mL AtIn Inject 1 mL (200 mg total) into the skin every 7 days. 11/8/23   Keny Casanova MD   calcium carbonate (OS-RISSA) 500 mg calcium (1,250 mg) tablet Take 1 tablet by mouth once daily.    Provider, Historical   cetirizine (ZYRTEC) 10 MG tablet Take 1 tablet (10 mg total) by mouth once daily. 4/17/23 4/16/24  Ondina Finnegan NP   cholecalciferol, vitamin D3, 125 mcg (5,000 unit) Tab Take 5,000 Units by mouth once daily.    Provider, Historical   CREON 36,000-114,000- 180,000 unit CpDR TAKE TWO CAPSULES BY MOUTH THREE TIMES DAILY WITH MEALS AND ONE CAPSULE WITH EACH SNACK 4/20/22   Reese Villalta MD "   dexamethasone (DECADRON) 4 mg/mL injection Inject 1 mL (4 mg total) into the muscle daily as needed (Signs and symtpoms of severe adrenal insufficiency). 10/6/21   Ena Srinivasan MD   diclofenac sodium (VOLTAREN) 1 % Gel APPLY 2 GRAMS TO AFFECTED AREA 4 TIMES A DAY 3/24/21   Idania Allison MD   dicyclomine (BENTYL) 20 mg tablet TAKE 1 TABLET BY MOUTH EVERY 6 HOURS 10/23/23   Ceferino Rangel MD   ergocalciferol (ERGOCALCIFEROL) 50,000 unit Cap Take 1 capsule by mouth twice a week.    Provider, Historical   ferrous sulfate (IRON ORAL) Take by mouth. No sure dose    Provider, Historical   fluticasone propionate (FLONASE) 50 mcg/actuation nasal spray 1 spray (50 mcg total) by Each Nostril route 2 (two) times daily as needed for Rhinitis or Allergies. 4/17/23   Ondina Finnegan NP   folic acid (FOLVITE) 1 MG tablet Take 1 mg by mouth once daily.    Provider, Historical   gabapentin (NEURONTIN) 300 MG capsule TAKE 1 CAPSULE BY MOUTH EVERY EVENING 11/29/21   Idania Allison MD   GADAVIST 1 mmol/mL (604.72 mg/mL) Soln injection  1/9/23   Provider, Historical   hydroCHLOROthiazide (HYDRODIURIL) 12.5 MG Tab Take 12.5 mg by mouth. 9/26/22   Provider, Historical   hydroxychloroquine (PLAQUENIL) 200 mg tablet TAKE 1 AND 1/2 TABLETS DAILY BY MOUTH 12/15/23   Idania Allison MD   hydrOXYzine HCL (ATARAX) 25 MG tablet TAKE 1 TABLET BY MOUTH NIGHTLY AS NEEDED (INSOMNIA). 11/5/22   Provider, Historical   levocetirizine (XYZAL) 5 MG tablet Take 1 tablet (5 mg total) by mouth every evening. 3/6/23 3/5/24  Shonda Anguiano MD   LIDOcaine (LIDODERM) 5 % Place 1 patch onto the skin once daily. Remove & Discard patch within 12 hours or as directed by MD 6/26/22   Ambrocio Salas MD   loperamide (IMODIUM) 2 mg capsule TAKE 1 TO 2 CAPSULES BY MOUTH FOUR TIMES DAILY 10/17/23   Shandra Stapleton NP   losartan (COZAAR) 50 MG tablet Take 1 tablet (50 mg total) by mouth once daily. 3/6/23 3/5/24  Annmarie  MD Shonda   multivitamin/iron/folic acid (CENTRUM WOMEN ORAL) Take 1 tablet by mouth once daily.    Provider, Historical   nebivoloL (BYSTOLIC) 10 MG Tab  3/9/23   Provider, Historical   niacin 100 MG Tab Take by mouth. 1 Tablet Oral At bedtime    Provider, Historical   NIFEdipine (ADALAT CC) 30 MG TbSR Take 30 mg by mouth. 9/8/22   Provider, Historical   omeprazole (PRILOSEC) 20 MG capsule TAKE 1 CAPSULE BY MOUTH TWICE A DAY 1/18/23   Shonda Anguiano MD   ondansetron (ZOFRAN) 4 MG tablet Take 1 tablet (4 mg total) by mouth every 12 (twelve) hours as needed for Nausea. 10/13/20   Pretus-Tierney Packer, JOJO   oxyCODONE-acetaminophen (PERCOCET) 5-325 mg per tablet Take 1 tablet by mouth every 4 (four) hours as needed for Pain. 6/26/22   Ambrocio Salas MD   potassium chloride SA (K-DUR,KLOR-CON) 20 MEQ tablet Take 1 tablet (20 mEq total) by mouth 2 (two) times daily. 4/20/23   Shonda Anguiano MD   potassium gluconate 500 mg (83 mg) Tab Take by mouth 2 (two) times daily.    Provider, Historical   predniSONE (DELTASONE) 5 MG tablet TAKE 1 TABLET BY MOUTH ONCE DAILY. DOUBLE THE DOSE IN TIMES OF ILLNESS 10/23/23   Ena Srinivasan MD   psyllium husk/aspartame (METAMUCIL FIBER SINGLES ORAL) Take by mouth.    Provider, Historical   REPATHA SURECLICK 140 mg/mL PnIj INJECT 140 MG INTO THE SKIN EVERY 14 (FOURTEEN) DAYS 7/27/21   Provider, Historical   RESTASIS 0.05 % ophthalmic emulsion INSTILL 1 DROP INTO BOTH EYES TWICE A DAY 5/20/21   Vo, Joana, OD   rizatriptan (MAXALT) 10 MG tablet Take 1 tablet (10 mg total) by mouth 2 (two) times daily as needed for Migraine. 3/6/23   Shonda Anguiano MD   SAVELLA 100 mg Tab TAKE 1 TABLET BY MOUTH EVERY DAY 9/19/23   Shonda Anguiano MD   valACYclovir (VALTREX) 1000 MG tablet Take 1 tablet (1,000 mg total) by mouth every 12 (twelve) hours. for 10 days 11/17/22 9/21/23  Emily Street MD   zoledronic acid-mannitol & water (RECLAST) 5 mg/100 mL PgBk  10/25/21   Provider, Historical  "  zolpidem (AMBIEN) 5 MG Tab TAKE 1 TABLET BY MOUTH NIGHTLY AS NEEDED (INSOMNIA). 11/19/23   Shonda Anguiano MD       Allergies:  Review of patient's allergies indicates:   Allergen Reactions    Aspirin      Other reaction(s): "hemorrhage"  hemorrhage    Hydrocortisone Nausea Only     Other reaction(s): sick  sick    Lactose intolerance (lactase) [lactase] Nausea And Vomiting    Vicodin [hydrocodone-acetaminophen]      Other reaction(s): hyperactivity    Asacol [mesalamine] Hallucinations     Other reaction(s): Hallucinations  hallucinations       Social History:  Social History     Tobacco Use    Smoking status: Never    Smokeless tobacco: Never   Substance Use Topics    Alcohol use: No     Alcohol/week: 0.0 standard drinks of alcohol    Drug use: No        Review of Systems   Constitutional:  Negative for chills, fatigue and fever.   Respiratory:  Negative for cough, chest tightness and shortness of breath.    Cardiovascular:  Negative for chest pain.   Gastrointestinal:  Negative for abdominal pain and blood in stool.   Genitourinary:  Negative for dysuria and frequency.   Musculoskeletal:  Positive for arthralgias and myalgias.   Skin:  Positive for rash.         Health Maintenance:     Immunizations:   Influenza 9/2023  TDap 8/2021  Pneumovax 6/2014, Prevnar 13 2/2021, Prevnar 20 11/2022  Shingrix 2/2022, 9/2022  COVID 2/2021, 3/2021, 8/2021 ([employer]), 11/2022, 9/2023  RSV 9/2023     Cancer Screening:  PAP: total hyst 2/2 fibroids, benign, ovaries removed as well.  Mammogram:  11/2023 benign  Colonoscopy: hx total proctocolectomy for UC, had pouchoscopy 12/2018 via Dr. Villalta wn, biopsies taken which showed mild chronic inflammation on path.  EGD 12/2018 c hiatal hernia, otherwise unremarkable, bx c chronic inflammation in duodenum on path, normal gastric bx and neg H pylori  DEXA:  9/2021 c osteoporosis, followed by Dr. Srinivasan, now on Reclast, repeat due, order in system, advised to schedule.    Objective:   BP " "(!) 144/96 (BP Location: Left arm, Patient Position: Sitting)   Pulse 66   Ht 5' 4" (1.626 m)   Wt 58.3 kg (128 lb 8.5 oz)   SpO2 100%   BMI 22.06 kg/m²        General: AAO x3, no apparent distress  HEENT: no cervical LAD, no thyroid masses appreciated  CV: RRR, no m/r/g  Pulm: Lungs CTAB, no crackles, no wheezes  Abd: s/NT/ND +BS  Extremities: no c/c/e; hands appear diffusely erythematous on exam on palmar aspect.  No associated scaling.  No white/blue discoloration.  Non blanching.    Labs:     Lab Results   Component Value Date    WBC 9.58 11/28/2023    HGB 11.5 (L) 11/28/2023    HCT 36.8 (L) 11/28/2023    MCV 88 11/28/2023     11/28/2023     Sodium   Date Value Ref Range Status   11/28/2023 143 136 - 145 mmol/L Final     Potassium   Date Value Ref Range Status   11/28/2023 3.2 (L) 3.5 - 5.1 mmol/L Final     Chloride   Date Value Ref Range Status   11/28/2023 104 95 - 110 mmol/L Final     CO2   Date Value Ref Range Status   11/28/2023 28 23 - 29 mmol/L Final     Glucose   Date Value Ref Range Status   11/28/2023 74 70 - 110 mg/dL Final     BUN   Date Value Ref Range Status   11/28/2023 15 8 - 23 mg/dL Final     Creatinine   Date Value Ref Range Status   11/28/2023 0.8 0.5 - 1.4 mg/dL Final     Calcium   Date Value Ref Range Status   11/28/2023 9.0 8.7 - 10.5 mg/dL Final     Total Protein   Date Value Ref Range Status   11/28/2023 6.2 6.0 - 8.4 g/dL Final     Albumin   Date Value Ref Range Status   11/28/2023 3.5 3.5 - 5.2 g/dL Final     Total Bilirubin   Date Value Ref Range Status   11/28/2023 0.5 0.1 - 1.0 mg/dL Final     Comment:     For infants and newborns, interpretation of results should be based  on gestational age, weight and in agreement with clinical  observations.    Premature Infant recommended reference ranges:  Up to 24 hours.............<8.0 mg/dL  Up to 48 hours............<12.0 mg/dL  3-5 days..................<15.0 mg/dL  6-29 days.................<15.0 mg/dL       Alkaline " Phosphatase   Date Value Ref Range Status   11/28/2023 48 (L) 55 - 135 U/L Final     AST   Date Value Ref Range Status   11/28/2023 21 10 - 40 U/L Final     ALT   Date Value Ref Range Status   11/28/2023 13 10 - 44 U/L Final     Anion Gap   Date Value Ref Range Status   11/28/2023 11 8 - 16 mmol/L Final     eGFR if    Date Value Ref Range Status   07/05/2022 >60.0 >60 mL/min/1.73 m^2 Final     eGFR if non    Date Value Ref Range Status   07/05/2022 >60.0 >60 mL/min/1.73 m^2 Final     Comment:     Calculation used to obtain the estimated glomerular filtration  rate (eGFR) is the CKD-EPI equation.        Lab Results   Component Value Date    HGBA1C 5.8 (H) 11/28/2023     Lab Results   Component Value Date    LDLCALC 27.4 (L) 08/18/2023     Lab Results   Component Value Date    TSH 0.974 09/06/2022         Assessment/Plan     Efe was seen today for follow-up.    Diagnoses and all orders for this visit:    Benign essential hypertension  Bp slightly elevated and remaining high on repeat.  Will cont HCTZ and increase Amlodipine from 2.5 to 5 mg  -     amLODIPine (NORVASC) 5 MG tablet; Take 1 tablet (5 mg total) by mouth once daily.    Mixed hyperlipidemia  Lab Results   Component Value Date    LDLCALC 27.4 (L) 08/18/2023     at goal.  No longer on statin due to previously elevated CPK  Cont Repatha  -     Lipid Panel; Future    Prediabetes  Lab Results   Component Value Date    HGBA1C 5.8 (H) 11/28/2023     Rec low sugar/low carb diet.  Will repeat A1c c labs in March.  -     Hemoglobin A1C; Future    Subclinical hyperthyroidism  Lab Results   Component Value Date    TSH 0.974 09/06/2022     Normal on last check  Will repeat c March labs.  -     TSH; Future    Rash of both hands  Unsure if Raynauds  States Heme/Onc rec Derm eval, okay to place referral  Will see if increasing Amlodipine helps.  -     Ambulatory referral/consult to Dermatology; Future    Intertrigo  Itching under breast  area  Will tx c Nystatin  -     nystatin (MYCOSTATIN) powder; Apply topically 4 (four) times daily.    Systemic lupus erythematosus, unspecified SLE type, unspecified organ involvement status  Long term current use of systemic steroids  Secondary adrenal insufficiency  Immunosuppression due to drug therapy  On Benlysta and Plaquenil as per Rheum, cont routine f/u  Dr. Srinivasan managing adrenal insufficiency      Fibromyalgia  On Savella as per Rheum, cont routine f/u    Pulmonary HTN  No acute issues    Malignant neoplasm of upper-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, unspecified estrogen receptor status   No acute issues  Followed by Dr. Wright    Chronic pancreatitis, unspecified pancreatitis type  Exocrine pancreatic insufficiency  Pancreas cyst  Followed by Gastro, on Creon  Has f/u MRI c post contrast images scheduled for f/u pancreatic cyst    Ulcerative colitis with other complication, unspecified location  Gastroesophageal reflux disease without esophagitis  As per HPI  Cont routine f/u c gastro    Bilateral carotid artery disease, unspecified type  Followed by Cardiology/Dr. Jordan at   Cont bp and lipid control    Age-related osteoporosis with current pathological fracture with delayed healing  On Reclast as per Endo, cont routine f/u    Primary insomnia  On Ambien, okay to fill every 6 mos since pt failed tx c Hydroxyzine in the past, Trazodone contraindicated due to potential med interactions.      HM as above  RTC in 4 mos, sooner if needed.  Add my labs to labs in March.    Shonda Anguiano MD  Department of Internal Medicine - [facility]  01/22/2024

## 2024-01-23 ENCOUNTER — PATIENT MESSAGE (OUTPATIENT)
Dept: INTERNAL MEDICINE | Facility: CLINIC | Age: 64
End: 2024-01-23
Payer: MEDICARE

## 2024-01-23 DIAGNOSIS — K21.9 GASTROESOPHAGEAL REFLUX DISEASE WITHOUT ESOPHAGITIS: ICD-10-CM

## 2024-01-23 RX ORDER — OMEPRAZOLE 20 MG/1
20 CAPSULE, DELAYED RELEASE ORAL 2 TIMES DAILY
Qty: 180 CAPSULE | Refills: 2 | Status: SHIPPED | OUTPATIENT
Start: 2024-01-23

## 2024-01-23 RX ORDER — NYSTATIN 100000 U/G
CREAM TOPICAL 2 TIMES DAILY
Qty: 30 G | Refills: 0 | Status: SHIPPED | OUTPATIENT
Start: 2024-01-23

## 2024-01-23 NOTE — TELEPHONE ENCOUNTER
No care due was identified.  [facility] Embedded Care Due Messages. Reference number: [id number].   1/23/2024 3:47:11 PM CST

## 2024-01-23 NOTE — TELEPHONE ENCOUNTER
----- Message from Renzo Mitchell sent at 1/23/2024  3:07 PM CST -----  Contact: Pt  [phone]  Requesting an RX refill or new RX.  Is this a refill or new RX: Refill  RX name and strength (copy/paste from chart):omeprazole (PRILOSEC) 20 MG capsule    Is this a 30 day or 90 day RX:   Pharmacy name and phone # (copy/paste from chart):  [facility]/pharmacy #[id number] - [place] - [address]   Phone: [phone]  Fax: [phone]    The doctors have asked that we provide their patients with the following 2 reminders -- prescription refills can take up to 72 hours, and a friendly reminder that in the future you can use your [facility] account to request refills: yes

## 2024-01-25 ENCOUNTER — TELEPHONE (OUTPATIENT)
Dept: RHEUMATOLOGY | Facility: HOSPITAL | Age: 64
End: 2024-01-25
Payer: MEDICARE

## 2024-01-29 ENCOUNTER — TELEPHONE (OUTPATIENT)
Dept: DERMATOLOGY | Facility: CLINIC | Age: 64
End: 2024-01-29
Payer: MEDICARE

## 2024-01-29 ENCOUNTER — LAB VISIT (OUTPATIENT)
Dept: LAB | Facility: HOSPITAL | Age: 64
End: 2024-01-29
Attending: INTERNAL MEDICINE
Payer: MEDICARE

## 2024-01-29 DIAGNOSIS — M32.9 SYSTEMIC LUPUS ERYTHEMATOSUS, UNSPECIFIED SLE TYPE, UNSPECIFIED ORGAN INVOLVEMENT STATUS: ICD-10-CM

## 2024-01-29 DIAGNOSIS — D84.821 IMMUNOSUPPRESSION DUE TO DRUG THERAPY: ICD-10-CM

## 2024-01-29 DIAGNOSIS — Z79.899 IMMUNOSUPPRESSION DUE TO DRUG THERAPY: ICD-10-CM

## 2024-01-29 LAB
ALBUMIN SERPL BCP-MCNC: 3.8 G/DL (ref 3.5–5.2)
ALP SERPL-CCNC: 51 U/L (ref 55–135)
ALT SERPL W/O P-5'-P-CCNC: 15 U/L (ref 10–44)
ANION GAP SERPL CALC-SCNC: 6 MMOL/L (ref 8–16)
AST SERPL-CCNC: 21 U/L (ref 10–40)
BASOPHILS # BLD AUTO: 0.04 K/UL (ref 0–0.2)
BASOPHILS NFR BLD: 0.6 % (ref 0–1.9)
BILIRUB SERPL-MCNC: 0.3 MG/DL (ref 0.1–1)
BUN SERPL-MCNC: 11 MG/DL (ref 8–23)
C3 SERPL-MCNC: 121 MG/DL (ref 50–180)
C4 SERPL-MCNC: 44 MG/DL (ref 11–44)
CALCIUM SERPL-MCNC: 9.8 MG/DL (ref 8.7–10.5)
CHLORIDE SERPL-SCNC: 106 MMOL/L (ref 95–110)
CO2 SERPL-SCNC: 29 MMOL/L (ref 23–29)
CREAT SERPL-MCNC: 0.8 MG/DL (ref 0.5–1.4)
CRP SERPL-MCNC: 3.9 MG/L (ref 0–8.2)
DIFFERENTIAL METHOD BLD: ABNORMAL
EOSINOPHIL # BLD AUTO: 0.4 K/UL (ref 0–0.5)
EOSINOPHIL NFR BLD: 6.2 % (ref 0–8)
ERYTHROCYTE [DISTWIDTH] IN BLOOD BY AUTOMATED COUNT: 14.7 % (ref 11.5–14.5)
ERYTHROCYTE [SEDIMENTATION RATE] IN BLOOD BY PHOTOMETRIC METHOD: 8 MM/HR (ref 0–36)
EST. GFR  (NO RACE VARIABLE): >60 ML/MIN/1.73 M^2
GLUCOSE SERPL-MCNC: 98 MG/DL (ref 70–110)
HCT VFR BLD AUTO: 44.5 % (ref 37–48.5)
HGB BLD-MCNC: 13.8 G/DL (ref 12–16)
IMM GRANULOCYTES # BLD AUTO: 0.04 K/UL (ref 0–0.04)
IMM GRANULOCYTES NFR BLD AUTO: 0.6 % (ref 0–0.5)
LYMPHOCYTES # BLD AUTO: 1.2 K/UL (ref 1–4.8)
LYMPHOCYTES NFR BLD: 17.7 % (ref 18–48)
MCH RBC QN AUTO: 27.4 PG (ref 27–31)
MCHC RBC AUTO-ENTMCNC: 31 G/DL (ref 32–36)
MCV RBC AUTO: 88 FL (ref 82–98)
MONOCYTES # BLD AUTO: 0.8 K/UL (ref 0.3–1)
MONOCYTES NFR BLD: 11.6 % (ref 4–15)
NEUTROPHILS # BLD AUTO: 4.2 K/UL (ref 1.8–7.7)
NEUTROPHILS NFR BLD: 63.3 % (ref 38–73)
NRBC BLD-RTO: 0 /100 WBC
PLATELET # BLD AUTO: 290 K/UL (ref 150–450)
PMV BLD AUTO: 11 FL (ref 9.2–12.9)
POTASSIUM SERPL-SCNC: 4.1 MMOL/L (ref 3.5–5.1)
PROT SERPL-MCNC: 7 G/DL (ref 6–8.4)
RBC # BLD AUTO: 5.04 M/UL (ref 4–5.4)
SODIUM SERPL-SCNC: 141 MMOL/L (ref 136–145)
WBC # BLD AUTO: 6.61 K/UL (ref 3.9–12.7)

## 2024-01-29 PROCEDURE — 86160 COMPLEMENT ANTIGEN: CPT | Mod: 59 | Performed by: INTERNAL MEDICINE

## 2024-01-29 PROCEDURE — 86140 C-REACTIVE PROTEIN: CPT | Performed by: INTERNAL MEDICINE

## 2024-01-29 PROCEDURE — 86160 COMPLEMENT ANTIGEN: CPT | Performed by: INTERNAL MEDICINE

## 2024-01-29 PROCEDURE — 36415 COLL VENOUS BLD VENIPUNCTURE: CPT | Mod: PO | Performed by: INTERNAL MEDICINE

## 2024-01-29 PROCEDURE — 80053 COMPREHEN METABOLIC PANEL: CPT | Performed by: INTERNAL MEDICINE

## 2024-01-29 PROCEDURE — 85025 COMPLETE CBC W/AUTO DIFF WBC: CPT | Performed by: INTERNAL MEDICINE

## 2024-01-29 PROCEDURE — 86225 DNA ANTIBODY NATIVE: CPT | Performed by: INTERNAL MEDICINE

## 2024-01-29 PROCEDURE — 85652 RBC SED RATE AUTOMATED: CPT | Performed by: INTERNAL MEDICINE

## 2024-01-29 NOTE — TELEPHONE ENCOUNTER
"----- Message from Emily Pond sent at 1/29/2024 12:44 PM CST -----  Consult/Advisory:      Name Of Caller: Self    Contact Preference:   [phone]     What is the nature of the call?:  Pt is scheduled for 1/31@8:45am, would like to know if she can be seen if possible at 10am. Please advised           Additional Notes:       "Thank you for all that you do for our patients"          "

## 2024-01-30 LAB — DSDNA AB SER-ACNC: NORMAL [IU]/ML

## 2024-01-30 RX ORDER — BELIMUMAB 200 MG/ML
200 SOLUTION SUBCUTANEOUS
Qty: 4 EACH | Refills: 2 | Status: ACTIVE | OUTPATIENT
Start: 2024-01-30 | End: 2024-04-24 | Stop reason: SDUPTHER

## 2024-01-31 ENCOUNTER — OFFICE VISIT (OUTPATIENT)
Dept: DERMATOLOGY | Facility: CLINIC | Age: 64
End: 2024-01-31
Payer: MEDICARE

## 2024-01-31 ENCOUNTER — PATIENT MESSAGE (OUTPATIENT)
Dept: RHEUMATOLOGY | Facility: CLINIC | Age: 64
End: 2024-01-31
Payer: MEDICARE

## 2024-01-31 DIAGNOSIS — L30.4 INTERTRIGO: Primary | ICD-10-CM

## 2024-01-31 DIAGNOSIS — L53.8 PALMAR ERYTHEMA: ICD-10-CM

## 2024-01-31 PROCEDURE — 99999 PR PBB SHADOW E&M-EST. PATIENT-LVL V: CPT | Mod: PBBFAC,,, | Performed by: DERMATOLOGY

## 2024-01-31 PROCEDURE — 1159F MED LIST DOCD IN RCRD: CPT | Mod: CPTII,S$GLB,, | Performed by: DERMATOLOGY

## 2024-01-31 PROCEDURE — 1125F AMNT PAIN NOTED PAIN PRSNT: CPT | Mod: CPTII,S$GLB,, | Performed by: DERMATOLOGY

## 2024-01-31 PROCEDURE — 1101F PT FALLS ASSESS-DOCD LE1/YR: CPT | Mod: CPTII,S$GLB,, | Performed by: DERMATOLOGY

## 2024-01-31 PROCEDURE — 3288F FALL RISK ASSESSMENT DOCD: CPT | Mod: CPTII,S$GLB,, | Performed by: DERMATOLOGY

## 2024-01-31 PROCEDURE — 1160F RVW MEDS BY RX/DR IN RCRD: CPT | Mod: CPTII,S$GLB,, | Performed by: DERMATOLOGY

## 2024-01-31 PROCEDURE — 99202 OFFICE O/P NEW SF 15 MIN: CPT | Mod: S$GLB,,, | Performed by: DERMATOLOGY

## 2024-01-31 NOTE — PROGRESS NOTES
Subjective:      Patient ID:  Efe Horowitz is a 64 y.o. female who presents for   Chief Complaint   Patient presents with    Rash     Patient with new complaint of Rash  Location: Hands, Under breasts  Duration: + 6 months  Symptoms: Redness, itching  Relieving factors/Previous treatments: For hands Otc moisturizer, For breasts Nystatin cream x 1 week        Chest rash somewhat better  No change to palms  Discoloration to inframammary folds since breast surgery years ago    Review of Systems   Skin:  Positive for itching and rash.       Objective:   Physical Exam   Skin:               Diagram Legend     Erythematous scaling macule/papule c/w actinic keratosis       Vascular papule c/w angioma      Pigmented verrucoid papule/plaque c/w seborrheic keratosis      Yellow umbilicated papule c/w sebaceous hyperplasia      Irregularly shaped tan macule c/w lentigo     1-2 mm smooth white papules consistent with Milia      Movable subcutaneous cyst with punctum c/w epidermal inclusion cyst      Subcutaneous movable cyst c/w pilar cyst      Firm pink to brown papule c/w dermatofibroma      Pedunculated fleshy papule(s) c/w skin tag(s)      Evenly pigmented macule c/w junctional nevus     Mildly variegated pigmented, slightly irregular-bordered macule c/w mildly atypical nevus      Flesh colored to evenly pigmented papule c/w intradermal nevus       Pink pearly papule/plaque c/w basal cell carcinoma      Erythematous hyperkeratotic cursted plaque c/w SCC      Surgical scar with no sign of skin cancer recurrence      Open and closed comedones      Inflammatory papules and pustules      Verrucoid papule consistent consistent with wart     Erythematous eczematous patches and plaques     Dystrophic onycholytic nail with subungual debris c/w onychomycosis     Umbilicated papule    Erythematous-base heme-crusted tan verrucoid plaque consistent with inflamed seborrheic keratosis     Erythematous Silvery Scaling Plaque c/w  Psoriasis     See annotation                    Assessment / Plan:        Intertrigo    Palmar erythema  -     Ambulatory referral/consult to Dermatology    Postinflammatory hyperpigmentation to inframammary folds     Continue Nystatin to chest  Refer to Dr. Gamino to address possible occult allergic contact dermatitis on hands vs erythema secondary to SLE

## 2024-02-11 ENCOUNTER — HOSPITAL ENCOUNTER (OUTPATIENT)
Dept: RADIOLOGY | Facility: HOSPITAL | Age: 64
Discharge: HOME | End: 2024-02-11
Attending: INTERNAL MEDICINE
Payer: MEDICARE

## 2024-02-11 DIAGNOSIS — K86.2 PANCREAS CYST: ICD-10-CM

## 2024-02-11 PROCEDURE — 74183 MRI ABD W/O CNTR FLWD CNTR: CPT | Mod: 26,,, | Performed by: STUDENT IN AN ORGANIZED HEALTH CARE EDUCATION/TRAINING PROGRAM

## 2024-02-11 PROCEDURE — 25500020 PHARM REV CODE 255: Performed by: INTERNAL MEDICINE

## 2024-02-11 PROCEDURE — A9585 GADOBUTROL INJECTION: HCPCS | Performed by: INTERNAL MEDICINE

## 2024-02-11 PROCEDURE — 76376 3D RENDER W/INTRP POSTPROCES: CPT | Mod: 26,,, | Performed by: STUDENT IN AN ORGANIZED HEALTH CARE EDUCATION/TRAINING PROGRAM

## 2024-02-11 PROCEDURE — 76376 3D RENDER W/INTRP POSTPROCES: CPT | Mod: TC

## 2024-02-11 RX ORDER — GADOBUTROL 604.72 MG/ML
10 INJECTION INTRAVENOUS
Status: COMPLETED | OUTPATIENT
Start: 2024-02-11 | End: 2024-02-11

## 2024-02-11 RX ADMIN — GADOBUTROL 10 ML: 604.72 INJECTION INTRAVENOUS at 02:02

## 2024-02-16 RX ORDER — LOPERAMIDE HYDROCHLORIDE 2 MG/1
CAPSULE ORAL
Qty: 240 CAPSULE | Refills: 3 | Status: SHIPPED | OUTPATIENT
Start: 2024-02-16 | End: 2024-06-11

## 2024-02-23 ENCOUNTER — HOSPITAL ENCOUNTER (OUTPATIENT)
Dept: RADIOLOGY | Facility: CLINIC | Age: 64
Discharge: HOME | End: 2024-02-23
Attending: INTERNAL MEDICINE
Payer: MEDICARE

## 2024-02-23 DIAGNOSIS — Z78.0 MENOPAUSE: ICD-10-CM

## 2024-02-23 PROCEDURE — 77080 DXA BONE DENSITY AXIAL: CPT | Mod: TC

## 2024-02-23 PROCEDURE — 77080 DXA BONE DENSITY AXIAL: CPT | Mod: 26,,, | Performed by: INTERNAL MEDICINE

## 2024-02-24 DIAGNOSIS — M32.9 SYSTEMIC LUPUS ERYTHEMATOSUS, UNSPECIFIED SLE TYPE, UNSPECIFIED ORGAN INVOLVEMENT STATUS: ICD-10-CM

## 2024-02-24 DIAGNOSIS — I10 HYPERTENSION, UNSPECIFIED TYPE: ICD-10-CM

## 2024-02-24 DIAGNOSIS — Z87.19 HISTORY OF ULCERATIVE COLITIS: ICD-10-CM

## 2024-02-24 DIAGNOSIS — E87.6 HYPOKALEMIA: ICD-10-CM

## 2024-02-24 NOTE — TELEPHONE ENCOUNTER
No care due was identified.  [facility] Embedded Care Due Messages. Reference number: [id number].   2/24/2024 1:00:22 AM CST

## 2024-02-24 NOTE — TELEPHONE ENCOUNTER
Refill Routing Note   Refill Routing Note   Medication(s) are not appropriate for processing by [facility] for the following reason(s):        Required vitals abnormal    [facility] action(s):  Defer             Appointments  past 12m or future 3m with PCP    Date Provider   Last Visit   1/22/2024 Shonda Anguiano MD   Next Visit   3/26/2024 Shonda Anguiano MD   ED visits in past 90 days: 0        Note composed:5:49 AM 02/24/2024

## 2024-02-25 RX ORDER — LOSARTAN POTASSIUM 50 MG/1
50 TABLET ORAL
Qty: 90 TABLET | Refills: 3 | Status: SHIPPED | OUTPATIENT
Start: 2024-02-25

## 2024-02-26 ENCOUNTER — TELEPHONE (OUTPATIENT)
Dept: GASTROENTEROLOGY | Facility: CLINIC | Age: 64
End: 2024-02-26
Payer: MEDICARE

## 2024-02-26 ENCOUNTER — TELEPHONE (OUTPATIENT)
Dept: ENDOSCOPY | Facility: HOSPITAL | Age: 64
End: 2024-02-26
Payer: MEDICARE

## 2024-02-26 RX ORDER — DICYCLOMINE HYDROCHLORIDE 20 MG/1
TABLET ORAL
Qty: 360 TABLET | Refills: 0 | Status: SHIPPED | OUTPATIENT
Start: 2024-02-26 | End: 2024-06-04

## 2024-02-26 NOTE — TELEPHONE ENCOUNTER
Dr. Villalta,  I spoke with pt to get her in for the Urgent EGD , Pt declined 03/04/24 [facility] , Pt states she will call you on your cell phone to discuss further .   Please advise

## 2024-02-26 NOTE — TELEPHONE ENCOUNTER
Reese Villalta MD Totaro, Raeann, MA; Yuri Estrada MA  Caller: Unspecified (Today,  2:31 PM)  Yes referral for urgent EGD      Patient informed that Dr Villalta ordered an EGD.

## 2024-02-26 NOTE — TELEPHONE ENCOUNTER
Ma spoke with pt   Pt was offered an appointment on 3/4 [facility] , Pt declined stating she's not going to the [facility] and will call dr montenegro on his cell

## 2024-02-26 NOTE — TELEPHONE ENCOUNTER
----- Message from Guadalupe Marmolejo sent at 2/26/2024  2:21 PM CST -----  Regarding: Appt  Contact: Pt  [phone]  Pt is calling to state food will not digest states everything keep coming up please call

## 2024-02-26 NOTE — TELEPHONE ENCOUNTER
Says her food is not going down.   I did offer an appointment with another provider. She wants me to ask if EGD is needed.

## 2024-02-26 NOTE — TELEPHONE ENCOUNTER
"----- Message from Reese Villalta MD sent at 2024  3:06 PM CST -----  Procedure: EGD    Diagnosis: Dysphagia and vomiting    Procedure Timin-4 weeks    #If within 4 weeks selected, please stanislav as high priority#    #If greater than 12 weeks, please select "5-12 weeks" and delay sending until 3 months prior to requested date#     Provider: Any endoscopist    Location: No Preference    Additional Scheduling Information: No scheduling concerns    Prep Specifications:Gastroparesis (Extended clear liquid)    Is the patient taking a GLP-1 Agonist:no    Have you attached a patient to this message: yes     "

## 2024-02-27 ENCOUNTER — TELEPHONE (OUTPATIENT)
Dept: ENDOSCOPY | Facility: HOSPITAL | Age: 64
End: 2024-02-27
Payer: MEDICARE

## 2024-02-27 VITALS — WEIGHT: 128 LBS | BODY MASS INDEX: 21.85 KG/M2 | HEIGHT: 64 IN

## 2024-02-27 DIAGNOSIS — R11.10 VOMITING, UNSPECIFIED VOMITING TYPE, UNSPECIFIED WHETHER NAUSEA PRESENT: ICD-10-CM

## 2024-02-27 DIAGNOSIS — R13.10 DYSPHAGIA, UNSPECIFIED TYPE: Primary | ICD-10-CM

## 2024-02-27 NOTE — TELEPHONE ENCOUNTER
Spoke to [facility] to schedule procedure(s) Upper Endoscopy (EGD)       Physician to perform procedure(s) Dr. CHERELLE Villalta  Date of Procedure (s) 03/14/24  Arrival Time 8:00 AM  Time of Procedure(s) 9:00 AM   Location of Procedure(s) [address]  Type of Rx Prep sent to patient: Other  Instructions provided to patient via [facility]    Patient was informed on the following information and verbalized understanding. Screening questionnaire reviewed with patient and complete. If procedure requires anesthesia, a responsible adult needs to be present to accompany the patient home, patient cannot drive after receiving anesthesia. Appointment details are tentative, especially check-in time. Patient will receive a prep-op call 7 days prior to confirm check-in time for procedure. If applicable the patient should contact their pharmacy to verify Rx for procedure prep is ready for pick-up. Patient was advised to call the scheduling department at [phone] if pharmacy states no Rx is available. Patient was advised to call the endoscopy scheduling department if any questions or concerns arise.       Endoscopy Scheduling Department

## 2024-03-08 ENCOUNTER — TELEPHONE (OUTPATIENT)
Dept: ENDOSCOPY | Facility: HOSPITAL | Age: 64
End: 2024-03-08
Payer: MEDICARE

## 2024-03-11 ENCOUNTER — TELEPHONE (OUTPATIENT)
Dept: ENDOSCOPY | Facility: HOSPITAL | Age: 64
End: 2024-03-11
Payer: MEDICARE

## 2024-03-11 NOTE — TELEPHONE ENCOUNTER
Contacted patient to confirm EGD on 3/14/24. Reviewed instructions with pt, pt verbalized understanding.

## 2024-03-14 ENCOUNTER — ANESTHESIA (OUTPATIENT)
Dept: ENDOSCOPY | Facility: HOSPITAL | Age: 64
End: 2024-03-14
Payer: MEDICARE

## 2024-03-14 ENCOUNTER — ANESTHESIA EVENT (OUTPATIENT)
Dept: ENDOSCOPY | Facility: HOSPITAL | Age: 64
End: 2024-03-14
Payer: MEDICARE

## 2024-03-14 ENCOUNTER — HOSPITAL ENCOUNTER (OUTPATIENT)
Facility: HOSPITAL | Age: 64
Discharge: HOME | End: 2024-03-14
Attending: INTERNAL MEDICINE | Admitting: INTERNAL MEDICINE
Payer: MEDICARE

## 2024-03-14 VITALS
WEIGHT: 128 LBS | TEMPERATURE: 98 F | DIASTOLIC BLOOD PRESSURE: 69 MMHG | RESPIRATION RATE: 18 BRPM | HEIGHT: 64 IN | BODY MASS INDEX: 21.85 KG/M2 | OXYGEN SATURATION: 96 % | HEART RATE: 63 BPM | SYSTOLIC BLOOD PRESSURE: 122 MMHG

## 2024-03-14 DIAGNOSIS — R13.10 DYSPHAGIA: ICD-10-CM

## 2024-03-14 PROCEDURE — D9220A PRA ANESTHESIA: Mod: ANES,,, | Performed by: ANESTHESIOLOGY

## 2024-03-14 PROCEDURE — 88342 IMHCHEM/IMCYTCHM 1ST ANTB: CPT | Performed by: PATHOLOGY

## 2024-03-14 PROCEDURE — 88305 TISSUE EXAM BY PATHOLOGIST: CPT | Performed by: PATHOLOGY

## 2024-03-14 PROCEDURE — 88305 TISSUE EXAM BY PATHOLOGIST: CPT | Mod: 26,,, | Performed by: PATHOLOGY

## 2024-03-14 PROCEDURE — 88342 IMHCHEM/IMCYTCHM 1ST ANTB: CPT | Mod: 26,,, | Performed by: PATHOLOGY

## 2024-03-14 PROCEDURE — 43239 EGD BIOPSY SINGLE/MULTIPLE: CPT | Performed by: INTERNAL MEDICINE

## 2024-03-14 PROCEDURE — 25000003 PHARM REV CODE 250: Performed by: NURSE ANESTHETIST, CERTIFIED REGISTERED

## 2024-03-14 PROCEDURE — 37000008 HC ANESTHESIA 1ST 15 MINUTES: Performed by: INTERNAL MEDICINE

## 2024-03-14 PROCEDURE — 43239 EGD BIOPSY SINGLE/MULTIPLE: CPT | Mod: ,,, | Performed by: INTERNAL MEDICINE

## 2024-03-14 PROCEDURE — 37000009 HC ANESTHESIA EA ADD 15 MINS: Performed by: INTERNAL MEDICINE

## 2024-03-14 PROCEDURE — 63600175 PHARM REV CODE 636 W HCPCS: Performed by: NURSE ANESTHETIST, CERTIFIED REGISTERED

## 2024-03-14 PROCEDURE — D9220A PRA ANESTHESIA: Mod: CRNA,,, | Performed by: NURSE ANESTHETIST, CERTIFIED REGISTERED

## 2024-03-14 PROCEDURE — 27201012 HC FORCEPS, HOT/COLD, DISP: Performed by: INTERNAL MEDICINE

## 2024-03-14 RX ORDER — FENTANYL CITRATE 50 UG/ML
25 INJECTION, SOLUTION INTRAMUSCULAR; INTRAVENOUS EVERY 5 MIN PRN
Status: DISCONTINUED | OUTPATIENT
Start: 2024-03-14 | End: 2024-03-14 | Stop reason: HOSPADM

## 2024-03-14 RX ORDER — SODIUM CHLORIDE 9 MG/ML
INJECTION, SOLUTION INTRAVENOUS CONTINUOUS
Status: DISCONTINUED | OUTPATIENT
Start: 2024-03-14 | End: 2024-03-14 | Stop reason: HOSPADM

## 2024-03-14 RX ORDER — PROPOFOL 10 MG/ML
VIAL (ML) INTRAVENOUS CONTINUOUS PRN
Status: DISCONTINUED | OUTPATIENT
Start: 2024-03-14 | End: 2024-03-14

## 2024-03-14 RX ORDER — LIDOCAINE HYDROCHLORIDE 20 MG/ML
INJECTION INTRAVENOUS
Status: DISCONTINUED | OUTPATIENT
Start: 2024-03-14 | End: 2024-03-14

## 2024-03-14 RX ORDER — PROPOFOL 10 MG/ML
VIAL (ML) INTRAVENOUS
Status: DISCONTINUED | OUTPATIENT
Start: 2024-03-14 | End: 2024-03-14

## 2024-03-14 RX ORDER — SODIUM CHLORIDE 0.9 % (FLUSH) 0.9 %
3 SYRINGE (ML) INJECTION
Status: DISCONTINUED | OUTPATIENT
Start: 2024-03-14 | End: 2024-03-14 | Stop reason: HOSPADM

## 2024-03-14 RX ADMIN — PROPOFOL 50 MG: 10 INJECTION, EMULSION INTRAVENOUS at 09:03

## 2024-03-14 RX ADMIN — LIDOCAINE HYDROCHLORIDE 80 MG: 20 INJECTION INTRAVENOUS at 09:03

## 2024-03-14 RX ADMIN — SODIUM CHLORIDE: 9 INJECTION, SOLUTION INTRAVENOUS at 09:03

## 2024-03-14 RX ADMIN — PROPOFOL 150 MCG/KG/MIN: 10 INJECTION, EMULSION INTRAVENOUS at 09:03

## 2024-03-14 NOTE — PROVATION PATIENT INSTRUCTIONS
Discharge Summary/Instructions after an Endoscopic Procedure  Patient Name: Efe Horowitz  Patient MRN: [id number]  Patient YOB: 1960 Thursday, March 14, 2024  Reese Villalta MD  Dear patient,  As a result of recent federal legislation (The Federal Cures Act), you may   receive lab or pathology results from your procedure in your [facility]   account before your physician is able to contact you. Your physician or   their representative will relay the results to you with their   recommendations at their soonest availability.  Thank you,  RESTRICTIONS:  During your procedure today, you received medications for sedation.  These   medications may affect your judgment, balance and coordination.  Therefore,   for 24 hours, you have the following restrictions:   - DO NOT drive a car, operate machinery, make legal/financial decisions,   sign important papers or drink alcohol.    ACTIVITY:  Today: no heavy lifting, straining or running due to procedural   sedation/anesthesia.  The following day: return to full activity including work.  DIET:  Eat and drink normally unless instructed otherwise.     TREATMENT FOR COMMON SIDE EFFECTS:  - Mild abdominal pain, nausea, belching, bloating or excessive gas:  rest,   eat lightly and use a heating pad.  - Sore Throat: treat with throat lozenges and/or gargle with warm salt   water.  - Because air was used during the procedure, expelling large amounts of air   from your rectum or belching is normal.  - If a bowel prep was taken, you may not have a bowel movement for 1-3 days.    This is normal.  SYMPTOMS TO WATCH FOR AND REPORT TO YOUR PHYSICIAN:  1. Abdominal pain or bloating, other than gas cramps.  2. Chest pain.  3. Back pain.  4. Signs of infection such as: chills or fever occurring within 24 hours   after the procedure.  5. Rectal bleeding, which would show as bright red, maroon, or black stools.   (A tablespoon of blood from the rectum is not serious, especially if    hemorrhoids are present.)  6. Vomiting.  7. Weakness or dizziness.  GO DIRECTLY TO THE NEAREST EMERGENCY ROOM IF YOU HAVE ANY OF THE FOLLOWING:      Difficulty breathing              Chills and/or fever over 101 F   Persistent vomiting and/or vomiting blood   Severe abdominal pain   Severe chest pain   Black, tarry stools   Bleeding- more than one tablespoon   Any other symptom or condition that you feel may need urgent attention  Your doctor recommends these additional instructions:  If any biopsies were taken, your doctors clinic will contact you in 1 to 2   weeks with any results.  - Discharge patient to home.   - Await pathology results.   - Telephone endoscopist for pathology results in 3 weeks.   - Return to GI clinic at the next available appointment.   - Follow an antireflux regimen indefinitely.   - Continue present medications. Omeprazole (PRILOSEC) 20 MG capsule every 12   hours   - The findings and recommendations were discussed with the patient.  For questions, problems or results please call your physician - Reese Villalta MD at Work:  [phone].  [facility], EMERGENCY ROOM PHONE NUMBER: [phone]  IF A COMPLICATION OR EMERGENCY SITUATION ARISES AND YOU ARE UNABLE TO REACH   YOUR PHYSICIAN - GO DIRECTLY TO THE EMERGENCY ROOM.  Reese Villalta MD  3/14/2024 10:11:39 AM  This report has been verified and signed electronically.  Dear patient,  As a result of recent federal legislation (The Federal Cures Act), you may   receive lab or pathology results from your procedure in your [facility]   account before your physician is able to contact you. Your physician or   their representative will relay the results to you with their   recommendations at their soonest availability.  Thank you,  PROVATION

## 2024-03-14 NOTE — ANESTHESIA PREPROCEDURE EVALUATION
"                                                                                                             03/14/2024  Efe Horowitz is a 64 y.o., female.    Pre-operative evaluation for Procedure(s) (LRB):  EGD (ESOPHAGOGASTRODUODENOSCOPY) (N/A)    Efe Horowitz is a 64 y.o. female     Patient Active Problem List   Diagnosis    Vitamin D deficiency disease    Vitamin B12 deficiency    Mixed hyperlipidemia    Lumbar and sacral spondylarthritis    Migraine syndrome    Fatigue    Secondary adrenal insufficiency    Ulcerative colitis    S/P total colectomy    GERD (gastroesophageal reflux disease)    Hypokalemia    Myalgia and myositis    Shoulder pain    Long term current use of systemic steroids    Fibromyalgia    Lupus (systemic lupus erythematosus)    Bilateral low back pain without sciatica    Proteinuria    Immunosuppression due to drug therapy    Malignant neoplasm of upper-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    Exocrine pancreatic insufficiency    Family history of pancreatic cancer    Age-related osteoporosis with current pathological fracture with delayed healing    Abnormal gait    Pancreas cyst    Pain in both hands    Pain in both feet    Nuclear sclerosis of both eyes    Long-term use of Plaquenil    Subclinical hyperthyroidism    Benign essential hypertension    Refractive error    Primary insomnia    Lack of coordination    Prediabetes    Bilateral carotid artery disease    Chronic pancreatitis, unspecified pancreatitis type    Pulmonary HTN       Review of patient's allergies indicates:   Allergen Reactions    Aspirin      Other reaction(s): "hemorrhage"  hemorrhage    Hydrocortisone Nausea Only     Other reaction(s): sick  sick    Lactose intolerance (lactase) [lactase] Nausea And Vomiting    Vicodin [hydrocodone-acetaminophen]      Other reaction(s): hyperactivity    Asacol [mesalamine] Hallucinations     Other reaction(s): " Hallucinations  hallucinations       No current facility-administered medications on file prior to encounter.     Current Outpatient Medications on File Prior to Encounter   Medication Sig Dispense Refill    (Magic mouthwash) 1:1:1 diphenhydrAMINE(Benadryl) 12.5mg/5ml liq, aluminum & magnesium hydroxide-simethicone (Maalox), LIDOcaine viscous 2% Swish and spit 5 mLs every 4 (four) hours as needed (mouth sores). for mouth sores 90 mL 1    albuterol (PROVENTIL/VENTOLIN HFA) 90 mcg/actuation inhaler Inhale 1-2 puffs into the lungs every 6 (six) hours as needed for Wheezing or Shortness of Breath. Rescue 8 g 0    amLODIPine (NORVASC) 5 MG tablet Take 1 tablet (5 mg total) by mouth once daily. 90 tablet 3    azelastine (ASTELIN) 137 mcg (0.1 %) nasal spray SPRAY 1 SPRAY BY NASAL ROUTE 2 TIMES DAILY. 90 mL 6    belimumab (BENLYSTA) 200 mg/mL AtIn Inject 1 mL (200 mg total) into the skin every 7 days. 4 each 2    calcium carbonate (OS-RISSA) 500 mg calcium (1,250 mg) tablet Take 1 tablet by mouth once daily.      cetirizine (ZYRTEC) 10 MG tablet Take 1 tablet (10 mg total) by mouth once daily. 30 tablet 0    cholecalciferol, vitamin D3, 125 mcg (5,000 unit) Tab Take 5,000 Units by mouth once daily.      CREON 36,000-114,000- 180,000 unit CpDR TAKE TWO CAPSULES BY MOUTH THREE TIMES DAILY WITH MEALS AND ONE CAPSULE WITH EACH SNACK 180 capsule 7    dexamethasone (DECADRON) 4 mg/mL injection Inject 1 mL (4 mg total) into the muscle daily as needed (Signs and symtpoms of severe adrenal insufficiency). 1 mL 1    diclofenac sodium (VOLTAREN) 1 % Gel APPLY 2 GRAMS TO AFFECTED AREA 4 TIMES A  g 2    dicyclomine (BENTYL) 20 mg tablet TAKE 1 TABLET BY MOUTH EVERY 6 HOURS 360 tablet 0    ergocalciferol (ERGOCALCIFEROL) 50,000 unit Cap Take 1 capsule by mouth twice a week.      ferrous sulfate (IRON ORAL) Take by mouth. No sure dose      fluticasone propionate (FLONASE) 50 mcg/actuation nasal spray 1 spray (50 mcg  total) by Each Nostril route 2 (two) times daily as needed for Rhinitis or Allergies. 15 g 0    folic acid (FOLVITE) 1 MG tablet Take 1 mg by mouth once daily.      gabapentin (NEURONTIN) 300 MG capsule TAKE 1 CAPSULE BY MOUTH EVERY EVENING 90 capsule 3    GADAVIST 1 mmol/mL (604.72 mg/mL) Soln injection       hydroCHLOROthiazide (HYDRODIURIL) 12.5 MG Tab Take 12.5 mg by mouth.      hydroxychloroquine (PLAQUENIL) 200 mg tablet TAKE 1 AND 1/2 TABLETS DAILY BY MOUTH 45 tablet 5    hydrOXYzine HCL (ATARAX) 25 MG tablet TAKE 1 TABLET BY MOUTH NIGHTLY AS NEEDED (INSOMNIA).      levocetirizine (XYZAL) 5 MG tablet Take 1 tablet (5 mg total) by mouth every evening. 90 tablet 3    LIDOcaine (LIDODERM) 5 % Place 1 patch onto the skin once daily. Remove & Discard patch within 12 hours or as directed by MD 15 patch 0    loperamide (IMODIUM) 2 mg capsule TAKE 1 TO 2 CAPSULES BY MOUTH FOUR TIMES DAILY 240 capsule 3    losartan (COZAAR) 50 MG tablet TAKE 1 TABLET BY MOUTH EVERY DAY 90 tablet 3    multivitamin/iron/folic acid (CENTRUM WOMEN ORAL) Take 1 tablet by mouth once daily.      nebivoloL (BYSTOLIC) 10 MG Tab       niacin 100 MG Tab Take by mouth. 1 Tablet Oral At bedtime      nystatin (MYCOSTATIN) cream Apply topically 2 (two) times daily. 30 g 0    omeprazole (PRILOSEC) 20 MG capsule Take 1 capsule (20 mg total) by mouth 2 (two) times daily. 180 capsule 2    ondansetron (ZOFRAN) 4 MG tablet Take 1 tablet (4 mg total) by mouth every 12 (twelve) hours as needed for Nausea. 30 tablet 6    oxyCODONE-acetaminophen (PERCOCET) 5-325 mg per tablet Take 1 tablet by mouth every 4 (four) hours as needed for Pain. 10 tablet 0    potassium chloride SA (K-DUR,KLOR-CON) 20 MEQ tablet Take 1 tablet (20 mEq total) by mouth 2 (two) times daily. 180 tablet 3    predniSONE (DELTASONE) 5 MG tablet TAKE 1 TABLET BY MOUTH ONCE DAILY. DOUBLE THE DOSE IN TIMES OF ILLNESS 90 tablet 4    psyllium husk/aspartame (METAMUCIL FIBER  SINGLES ORAL) Take by mouth.      REPATHA SURECLICK 140 mg/mL PnIj INJECT 140 MG INTO THE SKIN EVERY 14 (FOURTEEN) DAYS      RESTASIS 0.05 % ophthalmic emulsion INSTILL 1 DROP INTO BOTH EYES TWICE A  vial 3    rizatriptan (MAXALT) 10 MG tablet Take 1 tablet (10 mg total) by mouth 2 (two) times daily as needed for Migraine. 30 tablet 3    SAVELLA 100 mg Tab TAKE 1 TABLET BY MOUTH EVERY DAY 90 tablet 3    valACYclovir (VALTREX) 1000 MG tablet Take 1 tablet (1,000 mg total) by mouth every 12 (twelve) hours. for 10 days 20 tablet 1    zoledronic acid-mannitol & water (RECLAST) 5 mg/100 mL PgBk       zolpidem (AMBIEN) 5 MG Tab TAKE 1 TABLET BY MOUTH NIGHTLY AS NEEDED (INSOMNIA). 30 tablet 5       Past Surgical History:   Procedure Laterality Date    ANORECTAL MANOMETRY N/A 1/13/2022    Procedure: MANOMETRY, ANORECTAL;  Surgeon: First Available Stu Gastelum;  [facility] (4[facility] F[facility]);  Service: Endoscopy;  Laterality: N/A;  new order placed; original order placed incorrectly  1/7 instructions handed to patient-st    APPENDECTOMY      BREAST BIOPSY Right 2/2016    [facility]    BREAST RECONSTRUCTION      BREAST SURGERY      CHOLECYSTECTOMY      COLON SURGERY      ENDOSCOPIC ULTRASOUND OF UPPER GASTROINTESTINAL TRACT N/A 8/6/2018    Procedure: ULTRASOUND, ENDOSCOPIC, UPPER GI TRACT;  Surgeon: Hong Finn MD;  Location: [facility] (2ND FLR);  Service: Endoscopy;  Laterality: N/A;    ESOPHAGOGASTRODUODENOSCOPY N/A 12/10/2018    Procedure: EGD (ESOPHAGOGASTRODUODENOSCOPY);  Surgeon: Reese Villalta MD;  Location: [facility] (4TH FLR);  Service: Endoscopy;  Laterality: N/A;    ESOPHAGOGASTRODUODENOSCOPY N/A 12/30/2021    Procedure: EGD (ESOPHAGOGASTRODUODENOSCOPY);  Surgeon: Reese Villalta MD;  Location: [facility] (2ND FLR);  Service: Endoscopy;  Laterality: N/A;  EGD for esophageal dysphagia and early satiety please schedule 1st provider available. wants this date-Dr Villalta only     fully  vaccinated-GT  hx of adrenal insuffiency   12/27 arrival time confirmed with pt-rb    ESOPHAGOGASTRODUODENOSCOPY N/A 9/9/2022    Procedure: EGD (ESOPHAGOGASTRODUODENOSCOPY);  Surgeon: Reese Villalta MD;  Location: [facility] (4TH FLR);  Service: Endoscopy;  Laterality: N/A;  vacc  inst handed to pt-LW    FLEXIBLE SIGMOIDOSCOPY N/A 12/10/2018    Procedure: SIGMOIDOSCOPY, FLEXIBLE;  Surgeon: Reese Villalta MD;  [facility]  Service: Endoscopy;  Laterality: N/A;  Recommend full split bowel prep for this study just like for a colonoscopy    HYSTERECTOMY      ILEOSCOPY N/A 2/16/2022    Procedure: ILEOSCOPY;  Surgeon: Ceferino Rangel MD;  [facility]  Service: Endoscopy;  Laterality: N/A;  medical history significant for Ulcerative colitis s/p total colectomy with ileoanal anastomosis ([id number]), SLE on plaquenil, adrenal insufficiency on prednisone, breast cancer, and HTN who presents with 3 month history of bilateral lower abdominal pain. Patient with abd    MASTECTOMY      OOPHORECTOMY Bilateral     restorative proctectomy      total abdominal colectomy with ileostomy  2010    TOTAL COLECTOMY      TOTAL REDUCTION MAMMOPLASTY Left 2017    UPPER ENDOSCOPIC ULTRASOUND W/ FNA           Pre-op Assessment    I have reviewed the Patient Summary Reports.     I have reviewed the Nursing Notes. I have reviewed the NPO Status.   I have reviewed the Medications.     Review of Systems  Anesthesia Hx:  No problems with previous Anesthesia                Cardiovascular:     Hypertension                                        Pulmonary:    Asthma                    Hepatic/GI:   PUD,  GERD, poorly controlled                 Physical Exam  General: Cooperative    Airway:  Mallampati: II   Mouth Opening: Normal  TM Distance: Normal  Tongue: Normal  Neck ROM: Normal ROM    Dental:  Edentulous, Partial Dentures, Dentures    Anesthesia Plan  Type of Anesthesia, risks & benefits  discussed:    Anesthesia Type: Gen Natural Airway  Intra-op Monitoring Plan: Standard ASA Monitors  Post Op Pain Control Plan: multimodal analgesia  Induction:  IV  Informed Consent: Informed consent signed with the Patient and all parties understand the risks and agree with anesthesia plan.  All questions answered.   ASA Score: 3  Day of Surgery Review of History & Physical: H&P Update referred to the surgeon/provider.    Ready For Surgery From Anesthesia Perspective.   .

## 2024-03-14 NOTE — PLAN OF CARE
Patient is stable and ready for discharge. Instructions  given to patient and family. Questions answered. Patient tolerating po liquids with no difficulty. Patient has no pain or states it is a tolerable level for them. Anesthesia consent and surgical consent in chart.

## 2024-03-14 NOTE — TRANSFER OF CARE
"Anesthesia Transfer of Care Note    Patient: Efe Horoiwtz    Procedure(s) Performed: Procedure(s) (LRB):  EGD (ESOPHAGOGASTRODUODENOSCOPY) (N/A)    Patient location: PACU    Anesthesia Type: general    Transport from OR: Transported from OR on room air with adequate spontaneous ventilation    Post pain: adequate analgesia    Post assessment: no apparent anesthetic complications and tolerated procedure well    Post vital signs: stable    Level of consciousness: awake, alert and oriented    Nausea/Vomiting: no nausea/vomiting    Complications: none    Transfer of care protocol was followed      Last vitals: Visit Vitals  /57 (Patient Position: Lying)   Pulse 65   Temp 36.7 °C (98 °F) (Temporal)   Resp 16   Ht 5' 4" (1.626 m)   Wt 58.1 kg (128 lb)   SpO2 98%   Breastfeeding No   BMI 21.97 kg/m²     "

## 2024-03-14 NOTE — ANESTHESIA POSTPROCEDURE EVALUATION
Anesthesia Post Evaluation    Patient: Efe Horowitz    Procedure(s) Performed: Procedure(s) (LRB):  EGD (ESOPHAGOGASTRODUODENOSCOPY) (N/A)    Final Anesthesia Type: general      Patient location during evaluation: PACU  Patient participation: No - Unable to Participate, Sedation  Level of consciousness: sedated  Post-procedure vital signs: reviewed and stable  Pain management: adequate  Airway patency: patent    PONV status at discharge: No PONV  Anesthetic complications: no      Cardiovascular status: hemodynamically stable  Respiratory status: unassisted and spontaneous ventilation  Hydration status: euvolemic  Follow-up not needed.          Vitals Value Taken Time   /72 03/14/24 1016   Temp 36.8 °C (98.2 °F) 03/14/24 1000   Pulse 61 03/14/24 1020   Resp 33 03/14/24 1020   SpO2 100 % 03/14/24 1020   Vitals shown include unvalidated device data.      No case tracking events are documented in the log.      Pain/Natalie Score: Natalie Score: 9 (3/14/2024 10:00 AM)

## 2024-03-14 NOTE — H&P
"Stu Gastelum - Endoscopy  History & Physical    Subjective:      Chief Complaint/Reason for Admission:     EGD for dysphagia and vomiting.    Efe Horowitz is a 64 y.o. female.    Past Medical History:   Diagnosis Date    Acid reflux     Adrenal insufficiency, primary     Arthritis     Asthma     B12 deficiency anemia     Benign essential hypertension 2/7/2021    Blood transfusion 2010    Breast cancer 2/2016    Right breast infiltrating ductal CA    Cataract     Chronic pain     Deep vein thrombosis     Dry eyes     Esophagitis, unspecified     Fibromyalgia     General anesthetics causing adverse effect in therapeutic use     "slow to wake up bc I don't have an immune system    Heart murmur     History of colon polyps     Hyperlipidemia     Hypopituitary dwarfism     Iron deficiency anemia     Lupus     Migraines, neuralgic     Nonspecific ulcerative colitis     OP (osteoporosis)     history of reclast    Osteopenia     Pancreatic cyst     Schatzki's ring     Steroid sulfatase deficiency     Ulcerative colitis     Vitamin D deficiency disease      Past Surgical History:   Procedure Laterality Date    ANORECTAL MANOMETRY N/A 1/13/2022    Procedure: MANOMETRY, ANORECTAL;  Surgeon: First Available Stu Gastelum;  Location: [facility] (4TH FLR);  Service: Endoscopy;  Laterality: N/A;  new order placed; original order placed incorrectly  1/7 instructions handed to patient-st    APPENDECTOMY      BREAST BIOPSY Right 2/2016    IDC    BREAST RECONSTRUCTION      BREAST SURGERY      CHOLECYSTECTOMY      COLON SURGERY      ENDOSCOPIC ULTRASOUND OF UPPER GASTROINTESTINAL TRACT N/A 8/6/2018    Procedure: ULTRASOUND, ENDOSCOPIC, UPPER GI TRACT;  Surgeon: Hong Finn MD;  Location: [facility] (2ND FLR);  Service: Endoscopy;  Laterality: N/A;    ESOPHAGOGASTRODUODENOSCOPY N/A 12/10/2018    Procedure: EGD (ESOPHAGOGASTRODUODENOSCOPY);  Surgeon: Reese Villalta MD;  Location: [facility] (4TH FLR);  Service: Endoscopy;  " Laterality: N/A;    ESOPHAGOGASTRODUODENOSCOPY N/A 12/30/2021    Procedure: EGD (ESOPHAGOGASTRODUODENOSCOPY);  Surgeon: Reese Villalta MD;  Location: [facility] ENDO (2ND FLR);  Service: Endoscopy;  Laterality: N/A;  EGD for esophageal dysphagia and early satiety please schedule 1st provider available. wants this date-Dr Villalta only     fully vaccinated-GT  hx of adrenal insuffiency   12/27 arrival time confirmed with pt-rb    ESOPHAGOGASTRODUODENOSCOPY N/A 9/9/2022    Procedure: EGD (ESOPHAGOGASTRODUODENOSCOPY);  Surgeon: Reese Villalta MD;  Location: [facility] ENDO (4TH FLR);  Service: Endoscopy;  Laterality: N/A;  vacc  inst handed to pt-LW    FLEXIBLE SIGMOIDOSCOPY N/A 12/10/2018    Procedure: SIGMOIDOSCOPY, FLEXIBLE;  Surgeon: Reese Villalta MD;  Location: [facility] ENDO (4TH FLR);  Service: Endoscopy;  Laterality: N/A;  Recommend full split bowel prep for this study just like for a colonoscopy    HYSTERECTOMY      ILEOSCOPY N/A 2/16/2022    Procedure: ILEOSCOPY;  Surgeon: Ceferino Rangel MD;  Location: [facility] ENDO (4TH FLR);  Service: Endoscopy;  Laterality: N/A;  medical history significant for Ulcerative colitis s/p total colectomy with ileoanal anastomosis ([id number]), SLE on plaquenil, adrenal insufficiency on prednisone, breast cancer, and HTN who presents with 3 month history of bilateral lower abdominal pain. Patient with abd    MASTECTOMY      OOPHORECTOMY Bilateral     restorative proctectomy      total abdominal colectomy with ileostomy  2010    TOTAL COLECTOMY      TOTAL REDUCTION MAMMOPLASTY Left 2017    UPPER ENDOSCOPIC ULTRASOUND W/ FNA       Social History     Tobacco Use    Smoking status: Never    Smokeless tobacco: Never   Substance Use Topics    Alcohol use: No     Alcohol/week: 0.0 standard drinks of alcohol    Drug use: No       PTA Medications   Medication Sig    (Magic mouthwash) 1:1:1 diphenhydrAMINE(Benadryl) 12.5mg/5ml liq, aluminum & magnesium hydroxide-simethicone (Maalox), LIDOcaine  viscous 2% Swish and spit 5 mLs every 4 (four) hours as needed (mouth sores). for mouth sores    albuterol (PROVENTIL/VENTOLIN HFA) 90 mcg/actuation inhaler Inhale 1-2 puffs into the lungs every 6 (six) hours as needed for Wheezing or Shortness of Breath. Rescue    amLODIPine (NORVASC) 5 MG tablet Take 1 tablet (5 mg total) by mouth once daily.    azelastine (ASTELIN) 137 mcg (0.1 %) nasal spray SPRAY 1 SPRAY BY NASAL ROUTE 2 TIMES DAILY.    belimumab (BENLYSTA) 200 mg/mL AtIn Inject 1 mL (200 mg total) into the skin every 7 days.    calcium carbonate (OS-RISSA) 500 mg calcium (1,250 mg) tablet Take 1 tablet by mouth once daily.    cetirizine (ZYRTEC) 10 MG tablet Take 1 tablet (10 mg total) by mouth once daily.    cholecalciferol, vitamin D3, 125 mcg (5,000 unit) Tab Take 5,000 Units by mouth once daily.    CREON 36,000-114,000- 180,000 unit CpDR TAKE TWO CAPSULES BY MOUTH THREE TIMES DAILY WITH MEALS AND ONE CAPSULE WITH EACH SNACK    dexamethasone (DECADRON) 4 mg/mL injection Inject 1 mL (4 mg total) into the muscle daily as needed (Signs and symtpoms of severe adrenal insufficiency).    diclofenac sodium (VOLTAREN) 1 % Gel APPLY 2 GRAMS TO AFFECTED AREA 4 TIMES A DAY    dicyclomine (BENTYL) 20 mg tablet TAKE 1 TABLET BY MOUTH EVERY 6 HOURS    ergocalciferol (ERGOCALCIFEROL) 50,000 unit Cap Take 1 capsule by mouth twice a week.    ferrous sulfate (IRON ORAL) Take by mouth. No sure dose    fluticasone propionate (FLONASE) 50 mcg/actuation nasal spray 1 spray (50 mcg total) by Each Nostril route 2 (two) times daily as needed for Rhinitis or Allergies.    folic acid (FOLVITE) 1 MG tablet Take 1 mg by mouth once daily.    gabapentin (NEURONTIN) 300 MG capsule TAKE 1 CAPSULE BY MOUTH EVERY EVENING    GADAVIST 1 mmol/mL (604.72 mg/mL) Soln injection     hydroCHLOROthiazide (HYDRODIURIL) 12.5 MG Tab Take 12.5 mg by mouth.    hydroxychloroquine (PLAQUENIL) 200 mg tablet TAKE 1 AND 1/2 TABLETS DAILY BY MOUTH    hydrOXYzine  HCL (ATARAX) 25 MG tablet TAKE 1 TABLET BY MOUTH NIGHTLY AS NEEDED (INSOMNIA).    levocetirizine (XYZAL) 5 MG tablet Take 1 tablet (5 mg total) by mouth every evening.    LIDOcaine (LIDODERM) 5 % Place 1 patch onto the skin once daily. Remove & Discard patch within 12 hours or as directed by MD    loperamide (IMODIUM) 2 mg capsule TAKE 1 TO 2 CAPSULES BY MOUTH FOUR TIMES DAILY    losartan (COZAAR) 50 MG tablet TAKE 1 TABLET BY MOUTH EVERY DAY    multivitamin/iron/folic acid (CENTRUM WOMEN ORAL) Take 1 tablet by mouth once daily.    nebivoloL (BYSTOLIC) 10 MG Tab     niacin 100 MG Tab Take by mouth. 1 Tablet Oral At bedtime    nystatin (MYCOSTATIN) cream Apply topically 2 (two) times daily.    omeprazole (PRILOSEC) 20 MG capsule Take 1 capsule (20 mg total) by mouth 2 (two) times daily.    ondansetron (ZOFRAN) 4 MG tablet Take 1 tablet (4 mg total) by mouth every 12 (twelve) hours as needed for Nausea.    oxyCODONE-acetaminophen (PERCOCET) 5-325 mg per tablet Take 1 tablet by mouth every 4 (four) hours as needed for Pain.    potassium chloride SA (K-DUR,KLOR-CON) 20 MEQ tablet Take 1 tablet (20 mEq total) by mouth 2 (two) times daily.    predniSONE (DELTASONE) 5 MG tablet TAKE 1 TABLET BY MOUTH ONCE DAILY. DOUBLE THE DOSE IN TIMES OF ILLNESS    psyllium husk/aspartame (METAMUCIL FIBER SINGLES ORAL) Take by mouth.    REPATHA SURECLICK 140 mg/mL PnIj INJECT 140 MG INTO THE SKIN EVERY 14 (FOURTEEN) DAYS    RESTASIS 0.05 % ophthalmic emulsion INSTILL 1 DROP INTO BOTH EYES TWICE A DAY    rizatriptan (MAXALT) 10 MG tablet Take 1 tablet (10 mg total) by mouth 2 (two) times daily as needed for Migraine.    SAVELLA 100 mg Tab TAKE 1 TABLET BY MOUTH EVERY DAY    valACYclovir (VALTREX) 1000 MG tablet Take 1 tablet (1,000 mg total) by mouth every 12 (twelve) hours. for 10 days    zoledronic acid-mannitol & water (RECLAST) 5 mg/100 mL PgBk     zolpidem (AMBIEN) 5 MG Tab TAKE 1 TABLET BY MOUTH NIGHTLY AS NEEDED (INSOMNIA).  "    Review of patient's allergies indicates:   Allergen Reactions    Aspirin      Other reaction(s): "hemorrhage"  hemorrhage    Hydrocortisone Nausea Only     Other reaction(s): sick  sick    Lactose intolerance (lactase) [lactase] Nausea And Vomiting    Vicodin [hydrocodone-acetaminophen]      Other reaction(s): hyperactivity    Asacol [mesalamine] Hallucinations     Other reaction(s): Hallucinations  hallucinations        Review of Systems   Constitutional:  Negative for fever.       Objective:      Vital Signs (Most Recent)       Vital Signs Range (Last 24H):       Physical Exam  Neurological:      Mental Status: She is alert and oriented to person, place, and time.             Assessment:      EGD for dysphagia and vomiting.      Plan:    EGD for dysphagia and vomiting.      "

## 2024-03-18 ENCOUNTER — HOSPITAL ENCOUNTER (OUTPATIENT)
Dept: RADIOLOGY | Facility: HOSPITAL | Age: 64
Discharge: HOME | End: 2024-03-18
Attending: INTERNAL MEDICINE
Payer: MEDICARE

## 2024-03-18 ENCOUNTER — OFFICE VISIT (OUTPATIENT)
Dept: RHEUMATOLOGY | Facility: CLINIC | Age: 64
End: 2024-03-18
Payer: MEDICARE

## 2024-03-18 VITALS
HEIGHT: 64 IN | BODY MASS INDEX: 21.68 KG/M2 | WEIGHT: 127 LBS | HEART RATE: 61 BPM | DIASTOLIC BLOOD PRESSURE: 80 MMHG | SYSTOLIC BLOOD PRESSURE: 131 MMHG

## 2024-03-18 DIAGNOSIS — Z79.899 IMMUNOSUPPRESSION DUE TO DRUG THERAPY: ICD-10-CM

## 2024-03-18 DIAGNOSIS — M47.816 OSTEOARTHRITIS OF LUMBAR SPINE, UNSPECIFIED SPINAL OSTEOARTHRITIS COMPLICATION STATUS: ICD-10-CM

## 2024-03-18 DIAGNOSIS — M79.7 FIBROMYALGIA: ICD-10-CM

## 2024-03-18 DIAGNOSIS — M32.9 SYSTEMIC LUPUS ERYTHEMATOSUS, UNSPECIFIED SLE TYPE, UNSPECIFIED ORGAN INVOLVEMENT STATUS: Primary | ICD-10-CM

## 2024-03-18 DIAGNOSIS — M32.9 SYSTEMIC LUPUS ERYTHEMATOSUS, UNSPECIFIED SLE TYPE, UNSPECIFIED ORGAN INVOLVEMENT STATUS: ICD-10-CM

## 2024-03-18 DIAGNOSIS — Z79.52 LONG TERM CURRENT USE OF SYSTEMIC STEROIDS: ICD-10-CM

## 2024-03-18 DIAGNOSIS — D84.821 IMMUNOSUPPRESSION DUE TO DRUG THERAPY: ICD-10-CM

## 2024-03-18 DIAGNOSIS — M54.9 BACK PAIN, UNSPECIFIED BACK LOCATION, UNSPECIFIED BACK PAIN LATERALITY, UNSPECIFIED CHRONICITY: ICD-10-CM

## 2024-03-18 PROCEDURE — 3075F SYST BP GE 130 - 139MM HG: CPT | Mod: CPTII,S$GLB,, | Performed by: INTERNAL MEDICINE

## 2024-03-18 PROCEDURE — 1125F AMNT PAIN NOTED PAIN PRSNT: CPT | Mod: CPTII,S$GLB,, | Performed by: INTERNAL MEDICINE

## 2024-03-18 PROCEDURE — 1101F PT FALLS ASSESS-DOCD LE1/YR: CPT | Mod: CPTII,S$GLB,, | Performed by: INTERNAL MEDICINE

## 2024-03-18 PROCEDURE — 99214 OFFICE O/P EST MOD 30 MIN: CPT | Mod: S$GLB,,, | Performed by: INTERNAL MEDICINE

## 2024-03-18 PROCEDURE — 72114 X-RAY EXAM L-S SPINE BENDING: CPT | Mod: TC

## 2024-03-18 PROCEDURE — 72114 X-RAY EXAM L-S SPINE BENDING: CPT | Mod: 26,,, | Performed by: RADIOLOGY

## 2024-03-18 PROCEDURE — 3079F DIAST BP 80-89 MM HG: CPT | Mod: CPTII,S$GLB,, | Performed by: INTERNAL MEDICINE

## 2024-03-18 PROCEDURE — 1160F RVW MEDS BY RX/DR IN RCRD: CPT | Mod: CPTII,S$GLB,, | Performed by: INTERNAL MEDICINE

## 2024-03-18 PROCEDURE — 3008F BODY MASS INDEX DOCD: CPT | Mod: CPTII,S$GLB,, | Performed by: INTERNAL MEDICINE

## 2024-03-18 PROCEDURE — 3288F FALL RISK ASSESSMENT DOCD: CPT | Mod: CPTII,S$GLB,, | Performed by: INTERNAL MEDICINE

## 2024-03-18 PROCEDURE — 1159F MED LIST DOCD IN RCRD: CPT | Mod: CPTII,S$GLB,, | Performed by: INTERNAL MEDICINE

## 2024-03-18 PROCEDURE — 99999 PR PBB SHADOW E&M-EST. PATIENT-LVL IV: CPT | Mod: PBBFAC,,, | Performed by: INTERNAL MEDICINE

## 2024-03-18 PROCEDURE — 4010F ACE/ARB THERAPY RXD/TAKEN: CPT | Mod: CPTII,S$GLB,, | Performed by: INTERNAL MEDICINE

## 2024-03-18 RX ORDER — GABAPENTIN 300 MG/1
300 CAPSULE ORAL 3 TIMES DAILY
Qty: 270 CAPSULE | Refills: 3 | Status: SHIPPED | OUTPATIENT
Start: 2024-03-18

## 2024-03-18 NOTE — PROGRESS NOTES
3/18/2024     8:11 AM   Rapid3 Question Responses and Scores   MDHAQ Score 0.2   Psychologic Score 1.1   Pain Score 9   When you awakened in the morning OVER THE LAST WEEK, did you feel stiff? Yes   If Yes, please indicate the number of hours until you are as limber as you will be for the day 2   Fatigue Score 10   Global Health Score 5   RAPID3 Score 4.89     Answers submitted by the patient for this visit:  Rheumatology Questionnaire (Submitted on 3/18/2024)  fever: No  eye redness: Yes  mouth sores: Yes  headaches: Yes  shortness of breath: No  chest pain: No  trouble swallowing: No  diarrhea: No  constipation: No  unexpected weight change: No  genital sore: No  dysuria: No  During the last 3 days, have you had a skin rash?: Yes  Bruises or bleeds easily: No  cough: No

## 2024-03-18 NOTE — PROGRESS NOTES
Subjective:       Patient ID: Efe Horowitz is a 64 y.o. female.    Chief Complaint: Lupus    HPI:  Efe Horowitz is a 64 y.o. female with history of lupus since age 39.   Her manifestations include joint pains, headache, positive DERRICK, and a   double-stranded DNA. She also has an equivocal anticardiolipin IgM.   She has been treated with Plaquenil 1-1/2 tablets daily. Eye exam 12/2017 was normal.      In addition to lupus, she has a   history of ulcerative colitis, osteopenia, fibromyalgia as well.   In 2009 she had a colectomy with ileostomy and in May 2011 the ileostomy  was closed. History of bilateral carpal tunnel.       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  She was treated with surgical resection.  Another lesion breast suspected to be lymphoma was later felt to be fibrous tissue.      May 1, 2017 had right breast fibrous material removed and reconstruction on both breasts at [facility].       Interval History:    Notes hands become numb at night.   Previously she saw a rheumatologist who thought she had carpal tunnel and recommended vitamin A and braces.  She has not been using braces.   Hands turn red intermittently though no redness today.   Dermatologist Dr. Pedersen planned to send her to another dermatologist (note says Dr. Gamino).     Still fatigued.    Oral and nasal ulcers 2 months ago.     She feels less headaches on Benlysta now.  Previously felt it caused increased HA.   Hair is stable but still has soreness of scalp.   Benlysta has helped with body aches, itching in scalp, swelling in hands and pain improved (no longer uses patches or creams for pain).   Morning stiffness for 2 hours.  Pain today is 9/10 ache in back, feet and knees.  She has history nerve issues with back     Continues taking injection for cholesterol.  Completed Forteo and still on Reclast with Dr. Srinivasan for osteoporosis with L3 vertebral fractures (top and bottom) on MRI.        Review of Systems  "  Constitutional:  Positive for fatigue. Negative for fever and unexpected weight change.   HENT:  Negative for mouth sores and trouble swallowing.    Eyes:  Negative for redness.        Dry eyes   Respiratory:  Negative for cough and shortness of breath.    Cardiovascular:  Negative for chest pain.   Gastrointestinal:  Negative for constipation and diarrhea.   Endocrine: Negative.    Genitourinary: Negative.  Negative for dysuria and genital sores.   Musculoskeletal:  Positive for arthralgias.   Skin: Negative.  Negative for rash.   Allergic/Immunologic: Negative.    Neurological:  Positive for headaches.   Hematological: Negative.  Does not bruise/bleed easily.   Psychiatric/Behavioral: Negative.           Objective:   /80   Pulse 61   Ht 5' 4" (1.626 m)   Wt 57.6 kg (126 lb 15.8 oz)   BMI 21.80 kg/m²         Physical Exam   Constitutional: She is oriented to person, place, and time.   HENT:   Head: Normocephalic and atraumatic.   Eyes: Conjunctivae are normal.   Cardiovascular: Normal rate, regular rhythm and normal heart sounds.   Pulmonary/Chest: Effort normal and breath sounds normal.   Abdominal: Soft. Bowel sounds are normal.   Musculoskeletal:         General: No swelling or tenderness.      Cervical back: Neck supple.      Comments:      Neurological: She is alert and oriented to person, place, and time. Gait normal.   Bilateral Phalens and Tinels positive   Skin: Skin is warm and dry.   Psychiatric: Mood and affect normal.         LABS        Component      Latest Ref [facility] 1/29/2024   WBC      3.90 - 12.70 K/uL 6.61    RBC      4.00 - 5.40 M/uL 5.04    Hemoglobin      12.0 - 16.0 g/dL 13.8    Hematocrit      37.0 - 48.5 % 44.5    MCV      82 - 98 fL 88    MCH      27.0 - 31.0 pg 27.4    MCHC      32.0 - 36.0 g/dL 31.0 (L)    RDW      11.5 - 14.5 % 14.7 (H)    Platelet Count      150 - 450 K/uL 290    MPV      9.2 - 12.9 fL 11.0    Immature Granulocytes      0.0 - 0.5 % 0.6 (H)    Gran # (ANC)     "  1.8 - 7.7 K/uL 4.2    Immature Grans (Abs)      0.00 - 0.04 K/uL 0.04    Lymph #      1.0 - 4.8 K/uL 1.2    Mono #      0.3 - 1.0 K/uL 0.8    Eos #      0.0 - 0.5 K/uL 0.4    Baso #      0.00 - 0.20 K/uL 0.04    nRBC      0 /100 WBC 0    Gran %      38.0 - 73.0 % 63.3    Lymph %      18.0 - 48.0 % 17.7 (L)    Mono %      4.0 - 15.0 % 11.6    Eos %      0.0 - 8.0 % 6.2    Basophil %      0.0 - 1.9 % 0.6    Differential Method Automated    Sodium      136 - 145 mmol/L 141    Potassium      3.5 - 5.1 mmol/L 4.1    Chloride      95 - 110 mmol/L 106    CO2      23 - 29 mmol/L 29    Glucose      70 - 110 mg/dL 98    BUN      8 - 23 mg/dL 11    Creatinine      0.5 - 1.4 mg/dL 0.8    Calcium      8.7 - 10.5 mg/dL 9.8    PROTEIN TOTAL      6.0 - 8.4 g/dL 7.0    Albumin      3.5 - 5.2 g/dL 3.8    BILIRUBIN TOTAL      0.1 - 1.0 mg/dL 0.3    ALP      55 - 135 U/L 51 (L)    AST      10 - 40 U/L 21    ALT      10 - 44 U/L 15    eGFR      >60 mL/min/1.73 m^2 >60.0    Anion Gap      8 - 16 mmol/L 6 (L)    Specimen UA Urine, Clean Catch    Color, UA      Yellow, Straw, Amanda  Straw    Appearance, UA      Clear  Clear    pH, UA      5.0 - 8.0  7.0    Specific Gravity, UA      1.005 - 1.030  1.010    Protein, UA      Negative  Negative    Glucose, UA      Negative  Negative    Ketones, UA      Negative  Negative    Bilirubin (UA)      Negative  Negative    Blood, UA      Negative  Negative    NITRITE UA      Negative  Negative    Leukocyte Esterase, UA      Negative  Negative    Urine Protein      0 - 15 mg/dL <7    Urine Creatinine      15.0 - 325.0 mg/dL 35.0    Prot/Creat Ratio, Urine      0.00 - 0.20  Unable to calculate    ds DNA Ab      Negative 1:10  Negative 1:10    Complement (C-3)      50 - 180 mg/dL 121    Complement (C-4)      11 - 44 mg/dL 44    Sed Rate      0 - 36 mm/Hr 8    CRP      0.0 - 8.2 mg/L 3.9       Legend:  (L) Low  (H) High      Assessment:       1. Lupus. Lupus manifestations include joint pains, headache,  positive DERRICK (positive 5/26/2005 in labs), and a   double-stranded DNA. Has intermittent erythema of hands, fatigue as well as intermittent oral/nasal ulcer. SLEDAI before labs is 2  Still with improvement on Benlysta.   2. Fatigue.  Worse than usual  3. Encounter for long-term (current) use of other medications   4. Myalgia and myositis. Fibromyalgia on Savella and on gabapentin 300 mg tid.    5. Trochanteric bursitis. Bilateral improved with injection.  6. Shoulder pain. Stable   7. Vitamin D deficiency disease. Now normal  8. Suspected Shingles.   9. Migraine.  Did acupuncture with Dr. Tinsley.  Treated with Maxalt by primary doctor which helps.     10.  Osteoporosis based vertebral fracture.  Reclast in the past.  Dr. Srinivasan gave Forteo for 2 years now back on Reclast.   11.  Chronic steroid use.  Endocrine gives for adrenal insufficiency.  12.  Ulcerative colitis  13.  Hematuria.  Followed by nephrology  14.  Stress with dealing with son with mental illness  15.  Loss of smell since Nov/Dec 2018.  May be relate to sinus issues.  Evaluated by ENT but no cause found.  Continues to have loss of smell  16. Bilateral hand pain. CTS and OA bilateral hands. OA and DeQuervains of right hand.  X-ray with 1st [facility] osteoarthritis.  17. Urine with RBC and WBC.  Followed by     Plan:       1.  Labs q 3month  2.  Continue Plaquenil 300 mg daily and Benlysta.  Will continue Benlysta.   Eye exam normal Oct 28, 2022.  She is scheduled in June.  3. Compliant with neurontin 300 mg tid.    4. Off Forteo from endocrine after 2 years and now on Reclast  5. Increase Voltaren gel hands from tid to qid.  Also alternate ice and heat.   6. Start using CTS braces         RTO in 6 months/prn.

## 2024-03-19 ENCOUNTER — PATIENT MESSAGE (OUTPATIENT)
Dept: RHEUMATOLOGY | Facility: CLINIC | Age: 64
End: 2024-03-19
Payer: MEDICARE

## 2024-03-20 LAB
FINAL PATHOLOGIC DIAGNOSIS: NORMAL
GROSS: NORMAL
Lab: NORMAL

## 2024-03-26 ENCOUNTER — OFFICE VISIT (OUTPATIENT)
Dept: INTERNAL MEDICINE | Facility: CLINIC | Age: 64
End: 2024-03-26
Payer: MEDICARE

## 2024-03-26 VITALS
HEIGHT: 64 IN | DIASTOLIC BLOOD PRESSURE: 78 MMHG | WEIGHT: 127.63 LBS | OXYGEN SATURATION: 96 % | SYSTOLIC BLOOD PRESSURE: 122 MMHG | BODY MASS INDEX: 21.79 KG/M2 | HEART RATE: 76 BPM

## 2024-03-26 DIAGNOSIS — M32.9 SYSTEMIC LUPUS ERYTHEMATOSUS, UNSPECIFIED SLE TYPE, UNSPECIFIED ORGAN INVOLVEMENT STATUS: ICD-10-CM

## 2024-03-26 DIAGNOSIS — I10 BENIGN ESSENTIAL HYPERTENSION: Primary | ICD-10-CM

## 2024-03-26 DIAGNOSIS — K86.2 PANCREAS CYST: ICD-10-CM

## 2024-03-26 DIAGNOSIS — K86.1 CHRONIC PANCREATITIS, UNSPECIFIED PANCREATITIS TYPE: ICD-10-CM

## 2024-03-26 DIAGNOSIS — M80.00XG AGE-RELATED OSTEOPOROSIS WITH CURRENT PATHOLOGICAL FRACTURE WITH DELAYED HEALING: ICD-10-CM

## 2024-03-26 DIAGNOSIS — R73.03 PREDIABETES: ICD-10-CM

## 2024-03-26 DIAGNOSIS — Z90.49 S/P TOTAL COLECTOMY: ICD-10-CM

## 2024-03-26 DIAGNOSIS — C50.211 MALIGNANT NEOPLASM OF UPPER-INNER QUADRANT OF RIGHT FEMALE BREAST, UNSPECIFIED ESTROGEN RECEPTOR STATUS: ICD-10-CM

## 2024-03-26 DIAGNOSIS — K51.918 ULCERATIVE COLITIS WITH OTHER COMPLICATION, UNSPECIFIED LOCATION: ICD-10-CM

## 2024-03-26 DIAGNOSIS — E78.2 MIXED HYPERLIPIDEMIA: ICD-10-CM

## 2024-03-26 DIAGNOSIS — K86.81 EXOCRINE PANCREATIC INSUFFICIENCY: ICD-10-CM

## 2024-03-26 DIAGNOSIS — M79.7 FIBROMYALGIA: ICD-10-CM

## 2024-03-26 PROCEDURE — 99214 OFFICE O/P EST MOD 30 MIN: CPT | Mod: S$GLB,,, | Performed by: INTERNAL MEDICINE

## 2024-03-26 PROCEDURE — 4010F ACE/ARB THERAPY RXD/TAKEN: CPT | Mod: CPTII,S$GLB,, | Performed by: INTERNAL MEDICINE

## 2024-03-26 PROCEDURE — 1101F PT FALLS ASSESS-DOCD LE1/YR: CPT | Mod: CPTII,S$GLB,, | Performed by: INTERNAL MEDICINE

## 2024-03-26 PROCEDURE — 1160F RVW MEDS BY RX/DR IN RCRD: CPT | Mod: CPTII,S$GLB,, | Performed by: INTERNAL MEDICINE

## 2024-03-26 PROCEDURE — 3288F FALL RISK ASSESSMENT DOCD: CPT | Mod: CPTII,S$GLB,, | Performed by: INTERNAL MEDICINE

## 2024-03-26 PROCEDURE — 1125F AMNT PAIN NOTED PAIN PRSNT: CPT | Mod: CPTII,S$GLB,, | Performed by: INTERNAL MEDICINE

## 2024-03-26 PROCEDURE — 1159F MED LIST DOCD IN RCRD: CPT | Mod: CPTII,S$GLB,, | Performed by: INTERNAL MEDICINE

## 2024-03-26 PROCEDURE — 99999 PR PBB SHADOW E&M-EST. PATIENT-LVL V: CPT | Mod: PBBFAC,,, | Performed by: INTERNAL MEDICINE

## 2024-03-26 PROCEDURE — 3074F SYST BP LT 130 MM HG: CPT | Mod: CPTII,S$GLB,, | Performed by: INTERNAL MEDICINE

## 2024-03-26 PROCEDURE — 3008F BODY MASS INDEX DOCD: CPT | Mod: CPTII,S$GLB,, | Performed by: INTERNAL MEDICINE

## 2024-03-26 PROCEDURE — 3078F DIAST BP <80 MM HG: CPT | Mod: CPTII,S$GLB,, | Performed by: INTERNAL MEDICINE

## 2024-03-26 NOTE — PROGRESS NOTES
"INTERNAL MEDICINE ESTABLISHED PATIENT VISIT NOTE    Subjective:     Chief Complaint: Follow-up  HTN, HLD     Patient ID: Efe Horowitz is a 64 y.o. female with HTN c proteinuria, HLD, preDM, UC s/p total colectomy and at baseline c some chronic abd discomfort, GERD, iron def anemia, SLE, FM, adrenal insufficency, exocrine pancreatic insufficency, B12 def, Vit D def, hx lymphoma in breast dx'ed 1/2016 s/p resection and s/p breast reconstruction at [facility] in 2017, osteoporosis c hx of L3 vertebral fractures on MRI in the past, subclinical hyperthyroidism, pancreatic cyst, hepatic hemangiomas stable on imaging, hx migraines, lumbar spondylarthritis, insomnia on Ambien, last seen by me 1/2024, here today for f/u HTN.    Had the post contrast component of her MRI abd done in Feb which showed stable subcm pancreatic cystic lesions favored to represent side-branch IPMN with no suspicious features and recommended repeat exam in 2 years.  Also with stable hepatic lesions consistent with hemangioma.  Also with stable intra and extrahepatic biliary ductal dilatation possibly related to cholecystectomy status.    Still seeing Dr. Jiang with labs done about a week ago which were all stable overall.    Past Medical History:  Past Medical History:   Diagnosis Date    Acid reflux     Adrenal insufficiency, primary     Arthritis     Asthma     B12 deficiency anemia     Benign essential hypertension 2/7/2021    Blood transfusion 2010    Breast cancer 2/2016    Right breast infiltrating ductal CA    Cataract     Chronic pain     Deep vein thrombosis     Dry eyes     Esophagitis, unspecified     Fibromyalgia     General anesthetics causing adverse effect in therapeutic use     "slow to wake up bc I don't have an immune system    Heart murmur     History of colon polyps     Hyperlipidemia     Hypopituitary dwarfism     Iron deficiency anemia     Lupus     Migraines, neuralgic     Nonspecific ulcerative colitis     OP (osteoporosis) "     history of reclast    Osteopenia     Pancreatic cyst     Schatzki's ring     Steroid sulfatase deficiency     Ulcerative colitis     Vitamin D deficiency disease        Home Medications:  Prior to Admission medications    Medication Sig Start Date End Date Taking? Authorizing Provider   (Magic mouthwash) 1:1:1 diphenhydrAMINE(Benadryl) 12.5mg/5ml liq, aluminum & magnesium hydroxide-simethicone (Maalox), LIDOcaine viscous 2% Swish and spit 5 mLs every 4 (four) hours as needed (mouth sores). for mouth sores 3/6/23   Shonda Anguiano MD   albuterol (PROVENTIL/VENTOLIN HFA) 90 mcg/actuation inhaler Inhale 1-2 puffs into the lungs every 6 (six) hours as needed for Wheezing or Shortness of Breath. Rescue 4/17/23   Ondina Ang NP   amLODIPine (NORVASC) 5 MG tablet Take 1 tablet (5 mg total) by mouth once daily. 1/22/24   Shonda Anguiano MD   azelastine (ASTELIN) 137 mcg (0.1 %) nasal spray SPRAY 1 SPRAY BY NASAL ROUTE 2 TIMES DAILY. 10/23/23   Ben Levin, AC   belimumab (BENLYSTA) 200 mg/mL AtIn Inject 1 mL (200 mg total) into the skin every 7 days. 1/30/24   Idania Allison MD   calcium carbonate (OS-RISSA) 500 mg calcium (1,250 mg) tablet Take 1 tablet by mouth once daily.    Provider, Historical   cetirizine (ZYRTEC) 10 MG tablet Take 1 tablet (10 mg total) by mouth once daily. 4/17/23 4/16/24  Ondina Ang NP   cholecalciferol, vitamin D3, 125 mcg (5,000 unit) Tab Take 5,000 Units by mouth once daily.    Provider, Historical   CREON 36,000-114,000- 180,000 unit CpDR TAKE TWO CAPSULES BY MOUTH THREE TIMES DAILY WITH MEALS AND ONE CAPSULE WITH EACH SNACK 4/20/22   Reese Villalta MD   dexamethasone (DECADRON) 4 mg/mL injection Inject 1 mL (4 mg total) into the muscle daily as needed (Signs and symtpoms of severe adrenal insufficiency). 10/6/21   Ena Srinivasan MD   diclofenac sodium (VOLTAREN) 1 % Gel APPLY 2 GRAMS TO AFFECTED AREA 4 TIMES A DAY 3/24/21   Idania Allison MD   dicyclomine  (BENTYL) 20 mg tablet TAKE 1 TABLET BY MOUTH EVERY 6 HOURS 2/26/24   Kemi Mendez MD   ergocalciferol (ERGOCALCIFEROL) 50,000 unit Cap Take 1 capsule by mouth twice a week.    Provider, Historical   ferrous sulfate (IRON ORAL) Take by mouth. No sure dose    Provider, Historical   fluticasone propionate (FLONASE) 50 mcg/actuation nasal spray 1 spray (50 mcg total) by Each Nostril route 2 (two) times daily as needed for Rhinitis or Allergies. 4/17/23   Ondina Ang NP   folic acid (FOLVITE) 1 MG tablet Take 1 mg by mouth once daily.    Provider, Historical   gabapentin (NEURONTIN) 300 MG capsule Take 1 capsule (300 mg total) by mouth 3 (three) times daily. 3/18/24   Idania Allison MD   GADAVIST 1 mmol/mL (604.72 mg/mL) Soln injection  1/9/23   Provider, Historical   hydroCHLOROthiazide (HYDRODIURIL) 12.5 MG Tab Take 12.5 mg by mouth. 9/26/22   Provider, Historical   hydroxychloroquine (PLAQUENIL) 200 mg tablet TAKE 1 AND 1/2 TABLETS DAILY BY MOUTH 12/15/23   Idania Allison MD   hydrOXYzine HCL (ATARAX) 25 MG tablet TAKE 1 TABLET BY MOUTH NIGHTLY AS NEEDED (INSOMNIA). 11/5/22   Provider, Historical   levocetirizine (XYZAL) 5 MG tablet Take 1 tablet (5 mg total) by mouth every evening. 3/6/23 3/5/24  Shonda Anguiano MD   LIDOcaine (LIDODERM) 5 % Place 1 patch onto the skin once daily. Remove & Discard patch within 12 hours or as directed by MD 6/26/22   Ambrocio Salas MD   loperamide (IMODIUM) 2 mg capsule TAKE 1 TO 2 CAPSULES BY MOUTH FOUR TIMES DAILY 2/16/24   Shandra Stapleton NP   losartan (COZAAR) 50 MG tablet TAKE 1 TABLET BY MOUTH EVERY DAY 2/25/24   Shonda Anguiano MD   multivitamin/iron/folic acid (CENTRUM WOMEN ORAL) Take 1 tablet by mouth once daily.    Provider, Historical   nebivoloL (BYSTOLIC) 10 MG Tab  3/9/23   Provider, Historical   niacin 100 MG Tab Take by mouth. 1 Tablet Oral At bedtime    Provider, Historical   nystatin (MYCOSTATIN) cream Apply topically 2 (two) times daily.  "1/23/24   Shonda Anguiano MD   omeprazole (PRILOSEC) 20 MG capsule Take 1 capsule (20 mg total) by mouth 2 (two) times daily. 1/23/24   Shonda Anguiano MD   ondansetron (ZOFRAN) 4 MG tablet Take 1 tablet (4 mg total) by mouth every 12 (twelve) hours as needed for Nausea. 10/13/20   Pretus-Tierney Packer, NP   oxyCODONE-acetaminophen (PERCOCET) 5-325 mg per tablet Take 1 tablet by mouth every 4 (four) hours as needed for Pain. 6/26/22   Ambrocio Salas MD   potassium chloride SA (K-DUR,KLOR-CON) 20 MEQ tablet Take 1 tablet (20 mEq total) by mouth 2 (two) times daily. 4/20/23   Shonda Anguiano MD   predniSONE (DELTASONE) 5 MG tablet TAKE 1 TABLET BY MOUTH ONCE DAILY. DOUBLE THE DOSE IN TIMES OF ILLNESS 10/23/23   Ena Srinivasan MD   psyllium husk/aspartame (METAMUCIL FIBER SINGLES ORAL) Take by mouth.    Provider, Historical   REPATHA SURECLICK 140 mg/mL PnIj INJECT 140 MG INTO THE SKIN EVERY 14 (FOURTEEN) DAYS 7/27/21   Provider, Historical   RESTASIS 0.05 % ophthalmic emulsion INSTILL 1 DROP INTO BOTH EYES TWICE A DAY 5/20/21   Vo, Joana, OD   rizatriptan (MAXALT) 10 MG tablet Take 1 tablet (10 mg total) by mouth 2 (two) times daily as needed for Migraine. 3/6/23   Shonda Anguiano MD   SAVELLA 100 mg Tab TAKE 1 TABLET BY MOUTH EVERY DAY 9/19/23   Shonda Anguiano MD   valACYclovir (VALTREX) 1000 MG tablet Take 1 tablet (1,000 mg total) by mouth every 12 (twelve) hours. for 10 days 11/17/22 9/21/23  Emily Street MD   zoledronic acid-mannitol & water (RECLAST) 5 mg/100 mL PgBk  10/25/21   Provider, Historical   zolpidem (AMBIEN) 5 MG Tab TAKE 1 TABLET BY MOUTH NIGHTLY AS NEEDED (INSOMNIA). 11/19/23   Shonda Anguiano MD       Allergies:  Review of patient's allergies indicates:   Allergen Reactions    Aspirin      Other reaction(s): "hemorrhage"  hemorrhage    Hydrocortisone Nausea Only     Other reaction(s): sick  sick    Lactose intolerance (lactase) [lactase] Nausea And Vomiting    Vicodin [hydrocodone-acetaminophen]  " "    Other reaction(s): hyperactivity    Asacol [mesalamine] Hallucinations     Other reaction(s): Hallucinations  hallucinations       Social History:  Social History     Tobacco Use    Smoking status: Never    Smokeless tobacco: Never   Substance Use Topics    Alcohol use: No     Alcohol/week: 0.0 standard drinks of alcohol    Drug use: No        Review of Systems   Constitutional:  Negative for chills, fatigue and fever.   Respiratory:  Negative for cough, chest tightness and shortness of breath.    Cardiovascular:  Negative for chest pain.   Gastrointestinal:  Positive for abdominal pain (intermittent, at baseline). Negative for blood in stool.   Genitourinary:  Negative for dysuria and frequency.   Musculoskeletal:  Positive for arthralgias (at baseline) and back pain (intermittent, at baseline).         Health Maintenance:     Immunizations:   Influenza 9/2023  TDap 8/2021  Pneumovax 6/2014, Prevnar 13 2/2021, Prevnar 20 11/2022  Shingrix 2/2022, 9/2022  COVID 2/2021, 3/2021, 8/2021 (Pfizer), 11/2022, 9/2023  RSV 9/2023     Cancer Screening:  PAP: total hyst 2/2 fibroids, benign, ovaries removed as well.  Mammogram:  11/2023 benign  Colonoscopy: hx total proctocolectomy for UC, had pouchoscopy 12/2018 via Dr. Villalta wn, biopsies taken which showed mild chronic inflammation on path.  EGD 12/2018 c hiatal hernia, otherwise unremarkable, bx c chronic inflammation in duodenum on path, normal gastric bx and neg H pylori  DEXA:  2/2024 c osteoporosis, followed by Dr. Srinivasan, now on Reclast, has f/u pending this summer c Dr. Srinivasan    Objective:   /78 (BP Location: Left arm, Patient Position: Sitting, BP Method: Medium (Manual))   Pulse 76   Ht 5' 4" (1.626 m)   Wt 57.9 kg (127 lb 10.3 oz)   SpO2 96%   BMI 21.91 kg/m²        General: AAO x3, no apparent distress  CV: RRR, no m/r/g  Pulm: Lungs CTAB, no crackles, no wheezes  Abd: s/NT/ND +BS  Extremities: no c/c/e    Labs:     Lab Results   Component Value " Date    WBC 5.93 03/18/2024    HGB 12.0 03/18/2024    HCT 37.9 03/18/2024    MCV 88 03/18/2024     03/18/2024     Sodium   Date Value Ref Range Status   03/18/2024 142 136 - 145 mmol/L Final     Potassium   Date Value Ref Range Status   03/18/2024 3.9 3.5 - 5.1 mmol/L Final     Chloride   Date Value Ref Range Status   03/18/2024 105 95 - 110 mmol/L Final     CO2   Date Value Ref Range Status   03/18/2024 29 23 - 29 mmol/L Final     Glucose   Date Value Ref Range Status   03/18/2024 91 70 - 110 mg/dL Final     BUN   Date Value Ref Range Status   03/18/2024 11 8 - 23 mg/dL Final     Creatinine   Date Value Ref Range Status   03/18/2024 0.8 0.5 - 1.4 mg/dL Final     Calcium   Date Value Ref Range Status   03/18/2024 9.6 8.7 - 10.5 mg/dL Final     Total Protein   Date Value Ref Range Status   03/18/2024 6.6 6.0 - 8.4 g/dL Final     Albumin   Date Value Ref Range Status   03/18/2024 3.8 3.5 - 5.2 g/dL Final     Total Bilirubin   Date Value Ref Range Status   03/18/2024 0.3 0.1 - 1.0 mg/dL Final     Comment:     For infants and newborns, interpretation of results should be based  on gestational age, weight and in agreement with clinical  observations.    Premature Infant recommended reference ranges:  Up to 24 hours.............<8.0 mg/dL  Up to 48 hours............<12.0 mg/dL  3-5 days..................<15.0 mg/dL  6-29 days.................<15.0 mg/dL       Alkaline Phosphatase   Date Value Ref Range Status   03/18/2024 46 (L) 55 - 135 U/L Final     AST   Date Value Ref Range Status   03/18/2024 18 10 - 40 U/L Final     ALT   Date Value Ref Range Status   03/18/2024 13 10 - 44 U/L Final     Anion Gap   Date Value Ref Range Status   03/18/2024 8 8 - 16 mmol/L Final     eGFR if    Date Value Ref Range Status   07/05/2022 >60.0 >60 mL/min/1.73 m^2 Final     eGFR if non    Date Value Ref Range Status   07/05/2022 >60.0 >60 mL/min/1.73 m^2 Final     Comment:     Calculation used to  obtain the estimated glomerular filtration  rate (eGFR) is the CKD-EPI equation.        Lab Results   Component Value Date    HGBA1C 5.8 (H) 11/28/2023     Lab Results   Component Value Date    LDLCALC 27.4 (L) 08/18/2023     Lab Results   Component Value Date    TSH 0.974 09/06/2022         Assessment/Plan     Efe was seen today for follow-up.    Diagnoses and all orders for this visit:    Benign essential hypertension  At goal, also sees Dr. Jordan for management.  Continue current regimen.      Prediabetes  Lab Results   Component Value Date    HGBA1C 5.8 (H) 11/28/2023     Stable on last check, has follow-up labs pending in April, continue dietary modifications     Mixed hyperlipidemia  Lab Results   Component Value Date    LDLCALC 27.4 (L) 08/18/2023     At goal, continue current regimen.      Systemic lupus erythematosus, unspecified SLE type, unspecified organ involvement status  Fibromyalgia  Overall stable, continue routine follow-up with Dr. Jiang.  Recent labs stable.      Malignant neoplasm of upper-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, unspecified estrogen receptor status  No acute issues, mammogram up-to-date     Ulcerative colitis with other complication, unspecified location  S/P total colectomy  Chronic pancreatitis, unspecified pancreatitis type  Exocrine pancreatic insufficiency  Pancreas cyst  Followed by Gastro, no acute issues, recent MRI abdomen stable with recommendation to repeat in 2 years    Age-related osteoporosis with current pathological fracture with delayed healing  As per HPI, DEXA up-to-date, has follow-up with Dr. Srinivasan scheduled in July.      HM as above  RTC in 3 months, sooner if needed.    Shonda Anguiano MD  Department of Internal Medicine - [facility]  03/26/2024

## 2024-03-26 NOTE — PATIENT INSTRUCTIONS
Get your Pfizer COVID booster at [facility] or [facility].

## 2024-04-11 DIAGNOSIS — E87.6 LOW BLOOD POTASSIUM: ICD-10-CM

## 2024-04-11 RX ORDER — POTASSIUM CHLORIDE 20 MEQ/1
20 TABLET, EXTENDED RELEASE ORAL 2 TIMES DAILY
Qty: 180 TABLET | Refills: 3 | Status: SHIPPED | OUTPATIENT
Start: 2024-04-11

## 2024-04-11 NOTE — TELEPHONE ENCOUNTER
Refill Routing Note   Medication(s) are not appropriate for processing by [facility] for the following reason(s):        Drug-disease interaction    [facility] action(s):  Defer        Medication Therapy Plan: potassium chloride SA and Ulcerative colitis; Ulcerative colitis with other complication, unspecified location    Pharmacist review requested: Yes     Appointments  past 12m or future 3m with PCP    Date Provider   Last Visit   3/26/2024 Shonda Anguiano MD   Next Visit   7/3/2024 Shonda Anguiano MD   ED visits in past 90 days: 0        Note composed:9:30 AM 04/11/2024

## 2024-04-11 NOTE — TELEPHONE ENCOUNTER
Refill Decision Note   Efe Horowitz  is requesting a refill authorization.  Brief Assessment and Rationale for Refill:  Approve     Medication Therapy Plan:         Pharmacist review requested: Yes   Extended chart review required: Yes   Comments:     Note composed:11:23 AM 04/11/2024

## 2024-04-11 NOTE — TELEPHONE ENCOUNTER
No care due was identified.  [facility] Embedded Care Due Messages. Reference number: [id number].   4/11/2024 12:59:59 AM CDT

## 2024-04-24 DIAGNOSIS — M32.9 SYSTEMIC LUPUS ERYTHEMATOSUS, UNSPECIFIED SLE TYPE, UNSPECIFIED ORGAN INVOLVEMENT STATUS: ICD-10-CM

## 2024-04-25 ENCOUNTER — LAB VISIT (OUTPATIENT)
Dept: LAB | Facility: HOSPITAL | Age: 64
End: 2024-04-25
Attending: INTERNAL MEDICINE
Payer: MEDICARE

## 2024-04-25 DIAGNOSIS — E78.2 MIXED HYPERLIPIDEMIA: ICD-10-CM

## 2024-04-25 DIAGNOSIS — M32.9 SYSTEMIC LUPUS ERYTHEMATOSUS, UNSPECIFIED SLE TYPE, UNSPECIFIED ORGAN INVOLVEMENT STATUS: ICD-10-CM

## 2024-04-25 DIAGNOSIS — E05.90 SUBCLINICAL HYPERTHYROIDISM: ICD-10-CM

## 2024-04-25 DIAGNOSIS — Z79.899 IMMUNOSUPPRESSION DUE TO DRUG THERAPY: ICD-10-CM

## 2024-04-25 DIAGNOSIS — R73.03 PREDIABETES: ICD-10-CM

## 2024-04-25 DIAGNOSIS — D84.821 IMMUNOSUPPRESSION DUE TO DRUG THERAPY: ICD-10-CM

## 2024-04-25 LAB
ALBUMIN SERPL BCP-MCNC: 3.6 G/DL (ref 3.5–5.2)
ALP SERPL-CCNC: 45 U/L (ref 55–135)
ALT SERPL W/O P-5'-P-CCNC: 18 U/L (ref 10–44)
ANION GAP SERPL CALC-SCNC: 11 MMOL/L (ref 8–16)
AST SERPL-CCNC: 23 U/L (ref 10–40)
BASOPHILS # BLD AUTO: 0.08 K/UL (ref 0–0.2)
BASOPHILS NFR BLD: 1 % (ref 0–1.9)
BILIRUB SERPL-MCNC: 0.4 MG/DL (ref 0.1–1)
BUN SERPL-MCNC: 14 MG/DL (ref 8–23)
C3 SERPL-MCNC: 100 MG/DL (ref 50–180)
C4 SERPL-MCNC: 32 MG/DL (ref 11–44)
CALCIUM SERPL-MCNC: 9.2 MG/DL (ref 8.7–10.5)
CHLORIDE SERPL-SCNC: 106 MMOL/L (ref 95–110)
CHOLEST SERPL-MCNC: 120 MG/DL (ref 120–199)
CHOLEST/HDLC SERPL: 1.4 {RATIO} (ref 2–5)
CO2 SERPL-SCNC: 26 MMOL/L (ref 23–29)
CREAT SERPL-MCNC: 0.9 MG/DL (ref 0.5–1.4)
CRP SERPL-MCNC: 1.2 MG/L (ref 0–8.2)
DIFFERENTIAL METHOD BLD: ABNORMAL
EOSINOPHIL # BLD AUTO: 0.3 K/UL (ref 0–0.5)
EOSINOPHIL NFR BLD: 3.9 % (ref 0–8)
ERYTHROCYTE [DISTWIDTH] IN BLOOD BY AUTOMATED COUNT: 15.4 % (ref 11.5–14.5)
ERYTHROCYTE [SEDIMENTATION RATE] IN BLOOD BY PHOTOMETRIC METHOD: <2 MM/HR (ref 0–36)
EST. GFR  (NO RACE VARIABLE): >60 ML/MIN/1.73 M^2
ESTIMATED AVG GLUCOSE: 117 MG/DL (ref 68–131)
GLUCOSE SERPL-MCNC: 72 MG/DL (ref 70–110)
HBA1C MFR BLD: 5.7 % (ref 4–5.6)
HCT VFR BLD AUTO: 38.4 % (ref 37–48.5)
HDLC SERPL-MCNC: 86 MG/DL (ref 40–75)
HDLC SERPL: 71.7 % (ref 20–50)
HGB BLD-MCNC: 12.2 G/DL (ref 12–16)
IMM GRANULOCYTES # BLD AUTO: 0.11 K/UL (ref 0–0.04)
IMM GRANULOCYTES NFR BLD AUTO: 1.4 % (ref 0–0.5)
LDLC SERPL CALC-MCNC: 14.8 MG/DL (ref 63–159)
LYMPHOCYTES # BLD AUTO: 2.7 K/UL (ref 1–4.8)
LYMPHOCYTES NFR BLD: 34.7 % (ref 18–48)
MCH RBC QN AUTO: 27.2 PG (ref 27–31)
MCHC RBC AUTO-ENTMCNC: 31.8 G/DL (ref 32–36)
MCV RBC AUTO: 86 FL (ref 82–98)
MONOCYTES # BLD AUTO: 0.8 K/UL (ref 0.3–1)
MONOCYTES NFR BLD: 9.7 % (ref 4–15)
NEUTROPHILS # BLD AUTO: 3.8 K/UL (ref 1.8–7.7)
NEUTROPHILS NFR BLD: 49.3 % (ref 38–73)
NONHDLC SERPL-MCNC: 34 MG/DL
NRBC BLD-RTO: 0 /100 WBC
PLATELET # BLD AUTO: 255 K/UL (ref 150–450)
PMV BLD AUTO: 11.6 FL (ref 9.2–12.9)
POTASSIUM SERPL-SCNC: 3.5 MMOL/L (ref 3.5–5.1)
PROT SERPL-MCNC: 6.3 G/DL (ref 6–8.4)
RBC # BLD AUTO: 4.49 M/UL (ref 4–5.4)
SODIUM SERPL-SCNC: 143 MMOL/L (ref 136–145)
TRIGL SERPL-MCNC: 96 MG/DL (ref 30–150)
TSH SERPL DL<=0.005 MIU/L-ACNC: 1.22 UIU/ML (ref 0.4–4)
WBC # BLD AUTO: 7.75 K/UL (ref 3.9–12.7)

## 2024-04-25 PROCEDURE — 86225 DNA ANTIBODY NATIVE: CPT | Performed by: INTERNAL MEDICINE

## 2024-04-25 PROCEDURE — 80061 LIPID PANEL: CPT | Performed by: INTERNAL MEDICINE

## 2024-04-25 PROCEDURE — 83036 HEMOGLOBIN GLYCOSYLATED A1C: CPT | Performed by: INTERNAL MEDICINE

## 2024-04-25 PROCEDURE — 86160 COMPLEMENT ANTIGEN: CPT | Performed by: INTERNAL MEDICINE

## 2024-04-25 PROCEDURE — 85652 RBC SED RATE AUTOMATED: CPT | Performed by: INTERNAL MEDICINE

## 2024-04-25 PROCEDURE — 86140 C-REACTIVE PROTEIN: CPT | Performed by: INTERNAL MEDICINE

## 2024-04-25 PROCEDURE — 84443 ASSAY THYROID STIM HORMONE: CPT | Performed by: INTERNAL MEDICINE

## 2024-04-25 PROCEDURE — 36415 COLL VENOUS BLD VENIPUNCTURE: CPT | Mod: PO | Performed by: INTERNAL MEDICINE

## 2024-04-25 PROCEDURE — 85025 COMPLETE CBC W/AUTO DIFF WBC: CPT | Performed by: INTERNAL MEDICINE

## 2024-04-25 PROCEDURE — 80053 COMPREHEN METABOLIC PANEL: CPT | Performed by: INTERNAL MEDICINE

## 2024-04-25 PROCEDURE — 86160 COMPLEMENT ANTIGEN: CPT | Mod: 59 | Performed by: INTERNAL MEDICINE

## 2024-04-26 ENCOUNTER — PATIENT MESSAGE (OUTPATIENT)
Dept: RHEUMATOLOGY | Facility: CLINIC | Age: 64
End: 2024-04-26
Payer: MEDICARE

## 2024-04-26 LAB — DSDNA AB SER-ACNC: NORMAL [IU]/ML

## 2024-04-26 RX ORDER — BELIMUMAB 200 MG/ML
200 SOLUTION SUBCUTANEOUS
Qty: 4 EACH | Refills: 2 | Status: ACTIVE | OUTPATIENT
Start: 2024-04-26

## 2024-04-26 NOTE — PROGRESS NOTES
"INTERNAL MEDICINE ESTABLISHED PATIENT VISIT NOTE    Subjective:     Chief Complaint: Follow-up  HTN, HLD, preDM     Patient ID: Efe Horowitz is a 62 y.o. female with HTN c proteinuria, HLD, preDM, UC s/p total colectomy and at baseline c some chronic abd discomfort, GERD, iron def anemia, SLE, FM, adrenal insufficency, exocrine pancreatic insufficency, B12 def, Vit D def, hx lymphoma in breast dx'ed 1/2016 s/p resection and s/p breast reconstruction at [facility] in 2017, osteoporosis c hx of L3 vertebral fractures on MRI in the past, subclinical hyperthyroidism, pancreatic cyst, hepatic hemangiomas stable on imaging, hx migraines, lumbar spondylarthritis, last seen by me in Aug, here today for f/u.    Still having issues c sleep.  Has been on Ambien in the past which she states she has taken s complications.    Tried Hydroxyzine but states it did not work, even at the 50 mg dose.  No longer seeing pain mgmt but states 60 pills of Percocet have lasted a year but due for refills for today.    Now on Losartan as per Dr. Rush 2/2 hx proteinuria and issues c hypokalemia.    States Dr. Jiang is considering changing her Plaquenil to a different med in the future.    Sees Dr. Jordan/Cardiology who has been managing her cholesterol and has been on Praluent and Crestor but states he might take her off Praluent soon.    Past Medical History:  Past Medical History:   Diagnosis Date    Acid reflux     Adrenal insufficiency, primary     Arthritis     Asthma     B12 deficiency anemia     Benign essential hypertension 2/7/2021    Blood transfusion 2010    Breast cancer 2/2016    Right breast infiltrating ductal CA    Cataract     Chronic pain     Deep vein thrombosis     Dry eyes     Esophagitis, unspecified     Fibromyalgia     General anesthetics causing adverse effect in therapeutic use     "slow to wake up bc I don't have an immune system    Heart murmur     History of colon polyps     Hyperlipidemia  " From: Pretty Huber  To: Anil Bryan  Sent: 4/25/2024 10:55 AM CDT  Subject: RE: Pretty Cough    Good morning, 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 She just can't shake it off. There's no fever no flu symptoms of any kind. We have been giving her Robitussin 20 cc around the clock and nothing seems to be helping .          Hypopituitary dwarfism     Iron deficiency anemia     Lupus     Migraines, neuralgic     Nonspecific ulcerative colitis     OP (osteoporosis)     history of reclast    Osteopenia     Pancreatic cyst     Schatzki's ring     Steroid sulfatase deficiency     Ulcerative colitis     Vitamin D deficiency disease        Home Medications:  Prior to Admission medications    Medication Sig Start Date End Date Taking? Authorizing Provider   albuterol (ACCUNEB) 0.63 mg/3 mL Nebu Take 3 mLs (0.63 mg total) by nebulization every 6 (six) hours as needed. 2/26/14 5/26/22  Manjula Mixon MD   alirocumab (PRALUENT PEN) 75 mg/mL PnIj Inject 75 mg into the skin every 14 (fourteen) days.    Historical Provider   amLODIPine (NORVASC) 2.5 MG tablet Take 2.5 mg by mouth once daily.    Historical Provider   atorvastatin (LIPITOR) 10 MG tablet Take 10 mg by mouth once daily.    Historical Provider   CREON 36,000-114,000- 180,000 unit CpDR TAKE TWO CAPSULES BY MOUTH THREE TIMES DAILY WITH MEALS AND ONE CAPSULE WITH EACH SNACK 4/13/21   Reese Villalta MD   dexamethasone (DECADRON) 4 mg/mL injection Inject 1 mL (4 mg total) into the muscle daily as needed (Signs and symtpoms of severe adrenal insufficiency). 10/6/21   Ena Srinivasan MD   diclofenac sodium (VOLTAREN) 1 % Gel APPLY 2 GRAMS TO AFFECTED AREA 4 TIMES A DAY 3/24/21   Idania Allison MD   ergocalciferol (VITAMIN D2) 50,000 unit Cap Take 1 capsule (50,000 Units total) by mouth twice a week. Every Monday and Friday 9/13/21   Ena Srinivasan MD   gabapentin (NEURONTIN) 300 MG capsule TAKE 1 CAPSULE BY MOUTH EVERY EVENING 11/29/21   Idania Allison MD   hydrOXYchloroQUINE (PLAQUENIL) 200 mg tablet TAKE 1 AND 1/2 TABLETS BY MOUTH EVERY DAY 1/5/22   Idania Allison MD   hydrOXYzine HCL (ATARAX) 25 MG tablet Take 1 tablet (25 mg total) by mouth nightly as needed (insomnia). 1/27/22   Shonda Anguiano MD   lidocaine (LIDODERM) 5 % Place 1  patch onto the skin once daily. Remove & Discard patch within 12 hours or as directed by MD 11/27/17   Darin Starkey II, MD   loperamide (IMODIUM) 2 mg capsule TAKE 1 TO 2 CAPSULES BY MOUTH FOUR TIMES DAILY 6/22/21   Alejandro Maxwell MD   losartan (COZAAR) 50 MG tablet Take 1 tablet (50 mg total) by mouth once daily. 1/27/22 1/27/23  Billy Rush DO   multivitamin/iron/folic acid (CENTRUM WOMEN ORAL) Take 1 tablet by mouth once daily.    Historical Provider   nebivoloL (BYSTOLIC) 10 MG Tab Take 1 tablet by mouth once daily. 5/5/21 5/5/22  Historical Provider   niacin 100 MG Tab Take by mouth. 1 Tablet Oral At bedtime    Historical Provider   omeprazole (PRILOSEC) 20 MG capsule TAKE 1 CAPSULE BY MOUTH TWICE A DAY 11/27/21   Shonda Anguiano MD   ondansetron (ZOFRAN) 4 MG tablet Take 1 tablet (4 mg total) by mouth every 12 (twelve) hours as needed for Nausea. 10/13/20   Tierney Mcintyre NP   oxyCODONE-acetaminophen (PERCOCET) 5-325 mg per tablet Take 1 tablet by mouth every 12 (twelve) hours as needed for Pain. 2/8/21   Shonda Anguiano MD   polycarbophil (FIBERCON) 625 mg tablet Take by mouth. 1 Tablet Oral Every 12 hours.  Take with 12 ounces of water with each pill.    Historical Provider   potassium chloride SA (K-DUR,KLOR-CON) 10 MEQ tablet TAKE TWO TABLETS BY MOUTH ONCE DAILY 8/21/21   Reese Villalta MD   predniSONE (DELTASONE) 5 MG tablet Take 1 tablet (5 mg total) by mouth once daily. Double the dose in times of illness 9/24/21   Ena Srinivasan MD   REPATHA SURECLICK 140 mg/mL PnIj INJECT 140 MG INTO THE SKIN EVERY 14 (FOURTEEN) DAYS 7/27/21   Historical Provider   RESTASIS 0.05 % ophthalmic emulsion INSTILL 1 DROP INTO BOTH EYES TWICE A DAY 5/20/21   Joana Vo, OD   rizatriptan (MAXALT) 10 MG tablet Take 1 tablet (10 mg total) by mouth every 6 (six) hours as needed for Migraine. 10/13/20   Tierney Mcintyre NP   rosuvastatin (CRESTOR) 5 MG tablet Take 5 mg by mouth once daily.    Historical  "Provider   SAVELLA 100 mg Tab TAKE 1 TABLET BY MOUTH EVERY DAY 7/21/21   Shonda Anguiano MD   zolpidem (AMBIEN) 5 MG Tab Take 1 tablet (5 mg total) by mouth nightly as needed (insomnia). 30 pills to last 5-6 months.  No refills until follow up visit. 9/15/21   Shonda Anguiano MD       Allergies:  Review of patient's allergies indicates:   Allergen Reactions    Aspirin      Other reaction(s): "hemorrhage"  hemorrhage    Hydrocortisone Nausea Only     Other reaction(s): sick  sick    Lactose intolerance (lactase) [lactase] Nausea And Vomiting    Vicodin [hydrocodone-acetaminophen]      Other reaction(s): hyperactivity    Asacol [mesalamine] Hallucinations     Other reaction(s): Hallucinations  hallucinations       Social History:  Social History     Tobacco Use    Smoking status: Never Smoker    Smokeless tobacco: Never Used   Substance Use Topics    Alcohol use: No     Alcohol/week: 0.0 standard drinks    Drug use: No        Review of Systems   Constitutional: Negative for chills, fatigue and fever.   Respiratory: Negative for cough, chest tightness and shortness of breath.    Cardiovascular: Negative for chest pain.   Gastrointestinal: Positive for abdominal pain. Negative for blood in stool.   Genitourinary: Negative for dysuria and frequency.   Musculoskeletal: Positive for arthralgias and myalgias.   Psychiatric/Behavioral: Positive for sleep disturbance.         Health Maintenance:     Immunizations:   Influenza 11/2021  TDap 8/2021  Pneumovax 6/2014, rec Prevnar 13 2/2021  Shingrix today  COVID 2/2021, 3/2021, 8/2021 ([employer])     Cancer Screening:  PAP: total hyst 2/2 fibroids, benign, ovaries removed as well.  Mammogram:  10/2021 on L benign c rec f/u one year  Colonoscopy: hx total proctocolectomy for UC, had pouchoscopy 12/2018 via dr. Villalta wnl, biopsies taken which showed mild chronic inflammation on path.  EGD 12/2018 c hiatal hernia, otherwise unremarkable, bx c chronic inflammation in duodenum on " "path, normal gastric bx and neg H pylori  DEXA:  9/2021 c osteoporosis, followed by Dr. Srinivasan, now on Reclast    Objective:   /68 (BP Location: Left arm, Patient Position: Sitting, BP Method: Medium (Manual))   Pulse 84   Ht 5' 3" (1.6 m)   Wt 59.3 kg (130 lb 11.7 oz)   SpO2 99%   BMI 23.16 kg/m²        General: AAO x3, no apparent distress  HEENT: PERRL, OP clear  CV: RRR, no m/r/g  Pulm: Lungs CTAB, no crackles, no wheezes  Abd: s/NT/ND +BS  Extremities: no c/c/e    Labs:     Lab Results   Component Value Date    WBC 7.06 03/29/2022    HGB 12.0 03/29/2022    HCT 38.5 03/29/2022    MCV 87 03/29/2022     03/29/2022     Sodium   Date Value Ref Range Status   03/29/2022 142 136 - 145 mmol/L Final     Potassium   Date Value Ref Range Status   03/29/2022 3.3 (L) 3.5 - 5.1 mmol/L Final     Chloride   Date Value Ref Range Status   03/29/2022 102 95 - 110 mmol/L Final     CO2   Date Value Ref Range Status   03/29/2022 30 (H) 23 - 29 mmol/L Final     Glucose   Date Value Ref Range Status   03/29/2022 85 70 - 110 mg/dL Final     BUN   Date Value Ref Range Status   03/29/2022 11 8 - 23 mg/dL Final     Creatinine   Date Value Ref Range Status   03/29/2022 0.8 0.5 - 1.4 mg/dL Final     Calcium   Date Value Ref Range Status   03/29/2022 9.6 8.7 - 10.5 mg/dL Final     Total Protein   Date Value Ref Range Status   03/29/2022 6.5 6.0 - 8.4 g/dL Final     Albumin   Date Value Ref Range Status   03/29/2022 3.6 3.5 - 5.2 g/dL Final     Total Bilirubin   Date Value Ref Range Status   03/29/2022 0.4 0.1 - 1.0 mg/dL Final     Comment:     For infants and newborns, interpretation of results should be based  on gestational age, weight and in agreement with clinical  observations.    Premature Infant recommended reference ranges:  Up to 24 hours.............<8.0 mg/dL  Up to 48 hours............<12.0 mg/dL  3-5 days..................<15.0 mg/dL  6-29 days.................<15.0 mg/dL       Alkaline Phosphatase   Date Value " Ref Range Status   03/29/2022 46 (L) 55 - 135 U/L Final     AST   Date Value Ref Range Status   03/29/2022 17 10 - 40 U/L Final     ALT   Date Value Ref Range Status   03/29/2022 13 10 - 44 U/L Final     Anion Gap   Date Value Ref Range Status   03/29/2022 10 8 - 16 mmol/L Final     eGFR if    Date Value Ref Range Status   03/29/2022 >60.0 >60 mL/min/1.73 m^2 Final     eGFR if non    Date Value Ref Range Status   03/29/2022 >60.0 >60 mL/min/1.73 m^2 Final     Comment:     Calculation used to obtain the estimated glomerular filtration  rate (eGFR) is the CKD-EPI equation.        Lab Results   Component Value Date    HGBA1C 5.9 (H) 03/29/2022     Lab Results   Component Value Date    LDLCALC 16.4 (L) 03/14/2022     Lab Results   Component Value Date    TSH 1.455 12/21/2021         Assessment/Plan     Efe was seen today for follow-up.    Diagnoses and all orders for this visit:    Benign essential hypertension  at goal.  Cont current medications.    Mixed hyperlipidemia  Well controlled, was on Crestor and Praluent via Cards, the latter of which pt states [facility] is considering stopping, cont f/u    Prediabetes  5.8 in Aug, will send for repeat labs.  -     Hemoglobin A1C; Future    Ulcerative colitis with other complication, unspecified location  Exocrine pancreatic insufficiency  Gastroesophageal reflux disease without esophagitis  Complex GI hx c chronic GI complaints c extensive GI w/u in the past, cont f/u c Dr. Villalta  On Creon for pancreatic insufficiency    Systemic lupus erythematosus, unspecified SLE type, unspecified organ involvement status  Long-term use of Plaquenil  Immunosuppression  Long term current use of systemic steroids  Fibromyalgia  As per HPI  Has been on Plaquenil and Prednisone as per Dr. Jiang.  Last PCP was rx'ing prn Percocet, with 60 pills lasting a year, okay to cont current regimen, Risks and benefits were discussed with patient.  Also sees Dr. Ahn  for back issues.  -     oxyCODONE-acetaminophen (PERCOCET) 5-325 mg per tablet; Take 1 tablet by mouth every 12 (twelve) hours as needed for Pain.    Secondary adrenal insufficiency  Hypokalemia  Subclinical hyperthyroidism  Age-related osteoporosis with current pathological fracture with delayed healing  Managed by Dr. Srinivasan  Has been on forteo for osteoporosis in the past but now on Reclast  Cont f/u    Primary insomnia  As per HPI  Discussed risks of being on Ambien at her age.  Has tried hydroxyzine which did not help.  Cannot take Trazodone due to potential med interaction so will cont c Ambien for now.   -     zolpidem (AMBIEN) 5 MG Tab; Take 1 tablet (5 mg total) by mouth nightly as needed (insomnia).    Malignant neoplasm of upper-inner quadrant of right breast in female, estrogen receptor negative  No acute issues  mmg utd    Lumbar and sacral spondylarthritis  Chronic pain syndrome  Cont f/u c Dr. Ahn as needed  Okay to fill percocet prn as above  -     oxyCODONE-acetaminophen (PERCOCET) 5-325 mg per tablet; Take 1 tablet by mouth every 12 (twelve) hours as needed for Pain.      HM as above  RTC in 6 mos, sooner if needed.    Shonda Anguiano MD  Department of Internal Medicine - [facility]  04/20/2022

## 2024-04-27 ENCOUNTER — PATIENT MESSAGE (OUTPATIENT)
Dept: GASTROENTEROLOGY | Facility: CLINIC | Age: 64
End: 2024-04-27
Payer: MEDICARE

## 2024-04-27 NOTE — PROGRESS NOTES
Dareen your EGD pathology was benign    1. DUODENAL BIOPSIES:  NO SIGNIFICANT HISTOLOGIC ALTERATION  NO EVIDENCE OF CELIAC SPRUE    2. GASTRIC BIOPSIES:  NO SIGNIFICANT HISTOLOGIC ALTERATION  NO HELICOBACTER HAVE BEEN IDENTIFIED WITH AN IMMUNOHISTOCHEMICAL STAIN FOR HELICOBACTER.  THE POSITIVE AND NEGATIVE CONTROLS STAINED APPROPRIATELY    3. BIOPSIES OF PROXIMAL AND DISTAL ESOPHAGUS:  NO SIGNIFICANT HISTOLOGIC ALTERATION  INNOCUOUS SQUAMOUS MUCOSA WITH NO EOSINOPHILIC INFILTRATE IDENTIFIED   Comment: Interp By Rick Donnelly M.D., Signed on 03/20/2024

## 2024-05-16 DIAGNOSIS — F51.01 PRIMARY INSOMNIA: ICD-10-CM

## 2024-05-16 RX ORDER — ZOLPIDEM TARTRATE 5 MG/1
TABLET ORAL
Qty: 30 TABLET | Refills: 0 | Status: SHIPPED | OUTPATIENT
Start: 2024-05-16 | End: 2024-06-18

## 2024-05-16 NOTE — TELEPHONE ENCOUNTER
No care due was identified.  [facility] Embedded Care Due Messages. Reference number: [id number].   5/16/2024 2:07:35 PM CDT

## 2024-06-04 DIAGNOSIS — Z87.19 HISTORY OF ULCERATIVE COLITIS: ICD-10-CM

## 2024-06-04 RX ORDER — DICYCLOMINE HYDROCHLORIDE 20 MG/1
TABLET ORAL
Qty: 360 TABLET | Refills: 0 | Status: SHIPPED | OUTPATIENT
Start: 2024-06-04

## 2024-06-04 NOTE — TELEPHONE ENCOUNTER
Primary provider: GG    IBD medications She has a J pouch with no signs of inflammation. No treatment necessary.   Lower abdominal pain:  Continue to use dicyclomine as needed.     Refill request for dicyclomine 20 mg PO q6h     Allergies reviewed Yes    Drug Monitoring labs/frequency for all IBD meds:  CBC CMP yearly    Lab Results   Component Value Date    HEPBSAG Non-reactive 03/18/2024    HEPBCAB Non-reactive 03/18/2024     Lab Results   Component Value Date    TBGOLDPLUS Negative 10/27/2022     Lab Results   Component Value Date    QUANTIFERON Not Detected 12/02/2009      Lab Results   Component Value Date    [id number] 42 04/25/2022    [id number] 812 11/26/2021     Lab Results   Component Value Date    WBC 7.75 04/25/2024    HGB 12.2 04/25/2024    HCT 38.4 04/25/2024    MCV 86 04/25/2024     04/25/2024     Lab Results   Component Value Date    CREATININE 0.9 04/25/2024    ALBUMIN 3.6 04/25/2024    BILITOT 0.4 04/25/2024    ALKPHOS 45 (L) 04/25/2024    AST 23 04/25/2024    ALT 18 04/25/2024       Lab due date (mo/yr):  4/2025    Labs scheduled: no - next Dr Rangel apt 6/10/24    Next appt: 6/10/24    RX refill sent to provider for amount until next labs: Yes

## 2024-06-07 ENCOUNTER — TELEPHONE (OUTPATIENT)
Dept: GASTROENTEROLOGY | Facility: CLINIC | Age: 64
End: 2024-06-07
Payer: MEDICARE

## 2024-06-08 DIAGNOSIS — M32.9 SYSTEMIC LUPUS ERYTHEMATOSUS, UNSPECIFIED SLE TYPE, UNSPECIFIED ORGAN INVOLVEMENT STATUS: ICD-10-CM

## 2024-06-10 ENCOUNTER — PATIENT MESSAGE (OUTPATIENT)
Dept: INTERNAL MEDICINE | Facility: CLINIC | Age: 64
End: 2024-06-10
Payer: MEDICARE

## 2024-06-10 ENCOUNTER — OFFICE VISIT (OUTPATIENT)
Dept: GASTROENTEROLOGY | Facility: CLINIC | Age: 64
End: 2024-06-10
Payer: MEDICARE

## 2024-06-10 VITALS
WEIGHT: 123.69 LBS | HEIGHT: 64 IN | TEMPERATURE: 98 F | OXYGEN SATURATION: 98 % | DIASTOLIC BLOOD PRESSURE: 75 MMHG | BODY MASS INDEX: 21.11 KG/M2 | SYSTOLIC BLOOD PRESSURE: 126 MMHG | HEART RATE: 70 BPM

## 2024-06-10 DIAGNOSIS — Z87.19 HISTORY OF ULCERATIVE COLITIS: ICD-10-CM

## 2024-06-10 DIAGNOSIS — R13.10 DYSPHAGIA, UNSPECIFIED TYPE: ICD-10-CM

## 2024-06-10 DIAGNOSIS — Z90.49 STATUS POST TOTAL COLECTOMY: Primary | ICD-10-CM

## 2024-06-10 DIAGNOSIS — K86.2 PANCREATIC CYST: ICD-10-CM

## 2024-06-10 DIAGNOSIS — M62.89 PELVIC FLOOR DYSFUNCTION: ICD-10-CM

## 2024-06-10 PROCEDURE — 1160F RVW MEDS BY RX/DR IN RCRD: CPT | Mod: CPTII,S$GLB,, | Performed by: INTERNAL MEDICINE

## 2024-06-10 PROCEDURE — 3078F DIAST BP <80 MM HG: CPT | Mod: CPTII,S$GLB,, | Performed by: INTERNAL MEDICINE

## 2024-06-10 PROCEDURE — 4010F ACE/ARB THERAPY RXD/TAKEN: CPT | Mod: CPTII,S$GLB,, | Performed by: INTERNAL MEDICINE

## 2024-06-10 PROCEDURE — 3074F SYST BP LT 130 MM HG: CPT | Mod: CPTII,S$GLB,, | Performed by: INTERNAL MEDICINE

## 2024-06-10 PROCEDURE — 3288F FALL RISK ASSESSMENT DOCD: CPT | Mod: CPTII,S$GLB,, | Performed by: INTERNAL MEDICINE

## 2024-06-10 PROCEDURE — 1125F AMNT PAIN NOTED PAIN PRSNT: CPT | Mod: CPTII,S$GLB,, | Performed by: INTERNAL MEDICINE

## 2024-06-10 PROCEDURE — 1101F PT FALLS ASSESS-DOCD LE1/YR: CPT | Mod: CPTII,S$GLB,, | Performed by: INTERNAL MEDICINE

## 2024-06-10 PROCEDURE — 99214 OFFICE O/P EST MOD 30 MIN: CPT | Mod: S$GLB,,, | Performed by: INTERNAL MEDICINE

## 2024-06-10 PROCEDURE — 1159F MED LIST DOCD IN RCRD: CPT | Mod: CPTII,S$GLB,, | Performed by: INTERNAL MEDICINE

## 2024-06-10 PROCEDURE — 3044F HG A1C LEVEL LT 7.0%: CPT | Mod: CPTII,S$GLB,, | Performed by: INTERNAL MEDICINE

## 2024-06-10 PROCEDURE — G2211 COMPLEX E/M VISIT ADD ON: HCPCS | Mod: S$GLB,,, | Performed by: INTERNAL MEDICINE

## 2024-06-10 PROCEDURE — 3008F BODY MASS INDEX DOCD: CPT | Mod: CPTII,S$GLB,, | Performed by: INTERNAL MEDICINE

## 2024-06-10 RX ORDER — HYDROXYCHLOROQUINE SULFATE 200 MG/1
300 TABLET, FILM COATED ORAL
Qty: 45 TABLET | Refills: 5 | Status: SHIPPED | OUTPATIENT
Start: 2024-06-10

## 2024-06-10 NOTE — PATIENT INSTRUCTIONS
Schedule barium esophagram  Plan for pouchoscopy after swallowing issues evaluated  Follow up in 1 year

## 2024-06-10 NOTE — PROGRESS NOTES
[facility]          Inflammatory Bowel Disease Follow Up Note         TODAY'S VISIT DATE:  6/10/2024    Reason for Consult:    Chief Complaint   Patient presents with    Ulcerative Colitis       PCP: Shonda Anguiano      Referring MD:   No ref. provider found    History of Present Illness:  Efe Horowitz who is a 64 y.o. female is being seen today at the [facility] on 06/10/2024 for inflammatory bowel disease- inflammatory bowel disease-unspecified.  She is doing well from a bowel standpoint.  She continues to use Imodium as needed, fiber supplementation, and has been continuing to regularly do the exercises she was instructed to do at pelvic floor therapy.  With doing that she has not had any significant bowel issues.  Her pouch function seems to be very good.  Over the last several months she has been having worsening dysphagia issues.  She feels like pills and sometimes food and liquids will stick in her neck but as they pass downward they also progress relatively slowly and cause discomfort.  She had an upper endoscopy in February that showed a small hiatal hernia.  Biopsies of the esophagus did not show any signs of eosinophilic esophagitis.  She is taking omeprazole 20 mg every morning and sometimes takes this at night for some reflux symptoms.  She denies any improvement with the omeprazole.  She also had a follow-up MRI of her pancreatic cyst that was stable.    IBD History:  She has a history of ulcerative colitis and underwent total proctocolectomy in 2009 due to refractory disease.  She has not had any significant issues following her surgery with regards to inflammatory bowel disease.  She also has a history of pelvic floor disorder, a pancreatic cystic lesion that has been followed through imaging, pancreatic insufficiency, and dysphagia without any clear etiology on upper endoscopy.      Pertinent Labs:  Lab Results   Component  "Value Date    SEDRATE <2 04/25/2024    CRP 1.2 04/25/2024     Lab Results   Component Value Date    TTGIGA 5 11/26/2021     11/26/2021     Lab Results   Component Value Date    TSH 1.216 04/25/2024    FREET4 0.72 08/24/2021     Lab Results   Component Value Date    RIMLDALR46FV 42 04/25/2022    BZEZRFHR33 812 11/26/2021     Lab Results   Component Value Date    HEPBSAG Non-reactive 03/18/2024    HEPBCAB Non-reactive 03/18/2024    HEPCAB Non-reactive 03/18/2024     Lab Results   Component Value Date    EFI80JIKM Non-reactive 10/27/2022     Lab Results   Component Value Date    NIL 0.56749 10/27/2022    MITOGENNIL 9.991 10/27/2022     Lab Results   Component Value Date    TPTMINTERP SEE BELOW 12/21/2021     Lab Results   Component Value Date    STOOLCULTURE  12/22/2021     No Salmonella,Shigella,Vibrio,Campylobacter,Yersinia isolated.    SZICGCJWGK3V Negative 12/22/2021    WPXSFDDAWM2G Negative 12/22/2021    CDIFFICILEAN Negative 04/28/2021    CDIFFTOX Negative 04/28/2021     Lab Results   Component Value Date    CALPROTECTIN 56.8 (H) 12/22/2021       Therapeutic Drug Monitoring Labs:  No results found for: "PROMETH"  No results found for: "ANSADAINIT", "INFLIXIMAB", "INFLIXINTERP"    Vaccinations:  Lab Results   Component Value Date    HEPBSAB >1000.00 03/18/2024    HEPBSAB Reactive 03/18/2024     Lab Results   Component Value Date    HEPAIGG Reactive 10/27/2022     Lab Results   Component Value Date    VARICELLAZOS 3.59 (H) 10/27/2022    VARICELLAINT Positive (A) 10/27/2022     Immunization History   Administered Date(s) Administered    COVID-19, MRNA, LN-S, PF (Pfizer) (Purple Cap) 02/20/2021, 03/12/2021, 08/16/2021    COVID-19, mRNA, LNP-S, bivalent booster, PF (PFIZER OMICRON) 11/06/2022    Hepatitis A / Hepatitis B 10/08/2008, 10/08/2008, 11/19/2008, 11/19/2008, 04/08/2009, 04/08/2009    Hepatitis A, Adult 01/10/2022, 07/11/2022    Hepatitis B (recombinant) Adjuvanted, 2 dose 01/10/2022, 02/07/2022    " Influenza 10/03/2019, 08/31/2020    Influenza (FLUAD) - Quadrivalent - Adjuvanted - PF *Preferred* (65+) 09/21/2023    Influenza - Quadrivalent 10/12/2017, 10/12/2017, 09/23/2019    Influenza - Quadrivalent - PF *Preferred* (6 months and older) 08/29/2020, 08/29/2020, 11/27/2021, 11/06/2022    Influenza - Trivalent (ADULT) 10/04/2011, 10/02/2013    Influenza - Trivalent - PF (ADULT) 09/23/2014, 09/23/2014    Influenza Split 10/02/2013    Pneumococcal Conjugate - 13 Valent 02/08/2021    Pneumococcal Conjugate - 20 Valent 11/17/2022    Pneumococcal Polysaccharide - 23 Valent 06/23/2014    RSVpreF (Arexvy) 09/21/2023    Tdap 08/23/2021    Zoster Recombinant 02/23/2022, 09/06/2022         Review of Systems  Review of Systems   Constitutional:  Negative for chills, fever and weight loss.   HENT:  Negative for sore throat.    Eyes:  Negative for pain, discharge and redness.   Respiratory:  Negative for cough, shortness of breath and wheezing.    Cardiovascular:  Negative for chest pain, orthopnea and leg swelling.   Gastrointestinal:  Negative for abdominal pain, blood in stool, constipation, diarrhea, heartburn, melena, nausea and vomiting.   Genitourinary:  Negative for dysuria, frequency and urgency.   Musculoskeletal:  Negative for back pain, joint pain and myalgias.   Skin:  Negative for itching and rash.   Neurological:  Negative for focal weakness and seizures.   Endo/Heme/Allergies:  Does not bruise/bleed easily.   Psychiatric/Behavioral:  Negative for depression. The patient is not nervous/anxious.        Medical History:   Past Medical History:   Diagnosis Date    Acid reflux     Adrenal insufficiency, primary     Arthritis     Asthma     B12 deficiency anemia     Benign essential hypertension 2/7/2021    Blood transfusion 2010    Breast cancer 2/2016    Right breast infiltrating ductal CA    Cataract     Chronic pain     Deep vein thrombosis     Dry eyes     Esophagitis, unspecified     Fibromyalgia     General  "anesthetics causing adverse effect in therapeutic use     "slow to wake up bc I don't have an immune system    Heart murmur     History of colon polyps     Hyperlipidemia     Hypopituitary dwarfism     Iron deficiency anemia     Lupus     Migraines, neuralgic     Nonspecific ulcerative colitis     OP (osteoporosis)     history of reclast    Osteopenia     Pancreatic cyst     Schatzki's ring     Steroid sulfatase deficiency     Ulcerative colitis     Vitamin D deficiency disease        Surgical History:  Past Surgical History:   Procedure Laterality Date    ANORECTAL MANOMETRY N/A 1/13/2022    Procedure: MANOMETRY, ANORECTAL;  Surgeon: First Available Stu Gastelum;  Location: [facility] (4TH FLR);  Service: Endoscopy;  Laterality: N/A;  new order placed; original order placed incorrectly  1/7 instructions handed to patient-st    APPENDECTOMY      BREAST BIOPSY Right 2/2016    IDC    BREAST RECONSTRUCTION      BREAST SURGERY      CHOLECYSTECTOMY      COLON SURGERY      ENDOSCOPIC ULTRASOUND OF UPPER GASTROINTESTINAL TRACT N/A 8/6/2018    Procedure: ULTRASOUND, ENDOSCOPIC, UPPER GI TRACT;  Surgeon: Hong Finn MD;  Location: [facility] (2ND FLR);  Service: Endoscopy;  Laterality: N/A;    ESOPHAGOGASTRODUODENOSCOPY N/A 12/10/2018    Procedure: EGD (ESOPHAGOGASTRODUODENOSCOPY);  Surgeon: Reese Villalta MD;  Location: [facility] (4TH FLR);  Service: Endoscopy;  Laterality: N/A;    ESOPHAGOGASTRODUODENOSCOPY N/A 12/30/2021    Procedure: EGD (ESOPHAGOGASTRODUODENOSCOPY);  Surgeon: Reese Villalta MD;  Location: [facility] (2ND FLR);  Service: Endoscopy;  Laterality: N/A;  EGD for esophageal dysphagia and early satiety please schedule 1st provider available. wants this date-Dr Villalta only     fully vaccinated-GT  hx of adrenal insuffiency   12/27 arrival time confirmed with pt-rb    ESOPHAGOGASTRODUODENOSCOPY N/A 9/9/2022    Procedure: EGD (ESOPHAGOGASTRODUODENOSCOPY);  Surgeon: Reese Villalta MD;  Location: [facility]" ENDO (4TH FLR);  Service: Endoscopy;  Laterality: N/A;  vacc  inst handed to pt-LW    ESOPHAGOGASTRODUODENOSCOPY N/A 3/14/2024    Procedure: EGD (ESOPHAGOGASTRODUODENOSCOPY);  Surgeon: Reese Villalta MD;  Location: [facility] ENDO (2ND FLR);  Service: Endoscopy;  Laterality: N/A;  prep instructions sent to pt via portal/ lan pt/ gastroparesis prep  3/8-lvm for precall-MS  3/11-precall complete-KPvt    FLEXIBLE SIGMOIDOSCOPY N/A 12/10/2018    Procedure: SIGMOIDOSCOPY, FLEXIBLE;  Surgeon: Reese Villalta MD;  Location: [facility] ENDO (4TH FLR);  Service: Endoscopy;  Laterality: N/A;  Recommend full split bowel prep for this study just like for a colonoscopy    HYSTERECTOMY      ILEOSCOPY N/A 2/16/2022    Procedure: ILEOSCOPY;  Surgeon: Ceferino Rangel MD;  Location: [facility] ENDO (4TH FLR);  Service: Endoscopy;  Laterality: N/A;  medical history significant for Ulcerative colitis s/p total colectomy with ileoanal anastomosis ([id number]), SLE on plaquenil, adrenal insufficiency on prednisone, breast cancer, and HTN who presents with 3 month history of bilateral lower abdominal pain. Patient with abd    MASTECTOMY      OOPHORECTOMY Bilateral     restorative proctectomy      total abdominal colectomy with ileostomy  2010    TOTAL COLECTOMY      TOTAL REDUCTION MAMMOPLASTY Left 2017    UPPER ENDOSCOPIC ULTRASOUND W/ FNA         Family History:   Family History   Problem Relation Name Age of Onset    Diabetes Father      Hyperlipidemia Father      Hypertension Father      Hyperlipidemia Mother      Cancer Maternal Aunt          pancreatic cancer, late 50s at dx    Pancreatic cancer Maternal Aunt      Breast cancer Maternal Aunt  55    Breast cancer Cousin M 1st cousin 28    Breast cancer Other M Great aunt 45    No Known Problems Sister      No Known Problems Brother      Cancer Maternal Uncle  65        pancreatic cancer    Pancreatic cancer Maternal Uncle      No Known Problems Paternal Aunt      No Known Problems  Paternal Uncle      No Known Problems Maternal Grandmother      No Known Problems Maternal Grandfather      No Known Problems Paternal Grandmother      No Known Problems Paternal Grandfather      Breast cancer Maternal Cousin  50    Cancer Other nephew 25        liver cancer    Amblyopia Neg Hx      Blindness Neg Hx      Cataracts Neg Hx      Macular degeneration Neg Hx      Retinal detachment Neg Hx      Strabismus Neg Hx      Stroke Neg Hx      Thyroid disease Neg Hx      Glaucoma Neg Hx      Ovarian cancer Neg Hx      Celiac disease Neg Hx      Colon cancer Neg Hx      Colon polyps Neg Hx      Esophageal cancer Neg Hx      Liver cancer Neg Hx      Rectal cancer Neg Hx      Stomach cancer Neg Hx      Ulcerative colitis Neg Hx      Inflammatory bowel disease Neg Hx         Social History:   Social History     Tobacco Use    Smoking status: Never    Smokeless tobacco: Never   Substance Use Topics    Alcohol use: No     Alcohol/week: 0.0 standard drinks of alcohol    Drug use: No       Allergies: Reviewed    Home Medications:   Medication List with Changes/Refills   Current Medications    (MAGIC MOUTHWASH) 1:1:1 DIPHENHYDRAMINE(BENADRYL) 12.5MG/5ML LIQ, ALUMINUM & MAGNESIUM HYDROXIDE-SIMETHICONE (MAALOX), LIDOCAINE VISCOUS 2%    Swish and spit 5 mLs every 4 (four) hours as needed (mouth sores). for mouth sores    ALBUTEROL (PROVENTIL/VENTOLIN HFA) 90 MCG/ACTUATION INHALER    Inhale 1-2 puffs into the lungs every 6 (six) hours as needed for Wheezing or Shortness of Breath. Rescue    AMLODIPINE (NORVASC) 5 MG TABLET    Take 1 tablet (5 mg total) by mouth once daily.    AZELASTINE (ASTELIN) 137 MCG (0.1 %) NASAL SPRAY    SPRAY 1 SPRAY BY NASAL ROUTE 2 TIMES DAILY.    BELIMUMAB (BENLYSTA) 200 MG/ML ATIN    Inject 1 mL (200 mg total) into the skin every 7 days.    CALCIUM CARBONATE (OS-RISSA) 500 MG CALCIUM (1,250 MG) TABLET    Take 1 tablet by mouth once daily.    CETIRIZINE (ZYRTEC) 10 MG TABLET    Take 1 tablet (10 mg  total) by mouth once daily.    CHOLECALCIFEROL, VITAMIN D3, 125 MCG (5,000 UNIT) TAB    Take 5,000 Units by mouth once daily.    CREON 36,000-114,000- 180,000 UNIT CPDR    TAKE TWO CAPSULES BY MOUTH THREE TIMES DAILY WITH MEALS AND ONE CAPSULE WITH EACH SNACK    DEXAMETHASONE (DECADRON) 4 MG/ML INJECTION    Inject 1 mL (4 mg total) into the muscle daily as needed (Signs and symtpoms of severe adrenal insufficiency).    DICLOFENAC SODIUM (VOLTAREN) 1 % GEL    APPLY 2 GRAMS TO AFFECTED AREA 4 TIMES A DAY    DICYCLOMINE (BENTYL) 20 MG TABLET    TAKE 1 TABLET BY MOUTH EVERY 6 HOURS    FERROUS SULFATE (IRON ORAL)    Take by mouth. No sure dose    FLUTICASONE PROPIONATE (FLONASE) 50 MCG/ACTUATION NASAL SPRAY    1 spray (50 mcg total) by Each Nostril route 2 (two) times daily as needed for Rhinitis or Allergies.    FOLIC ACID (FOLVITE) 1 MG TABLET    Take 1 mg by mouth once daily.    GABAPENTIN (NEURONTIN) 300 MG CAPSULE    Take 1 capsule (300 mg total) by mouth 3 (three) times daily.    HYDROCHLOROTHIAZIDE (HYDRODIURIL) 12.5 MG TAB    Take 12.5 mg by mouth.    HYDROXYCHLOROQUINE (PLAQUENIL) 200 MG TABLET    TAKE 1 AND 1/2 TABLETS DAILY BY MOUTH    HYDROXYZINE HCL (ATARAX) 25 MG TABLET    TAKE 1 TABLET BY MOUTH NIGHTLY AS NEEDED (INSOMNIA).    LEVOCETIRIZINE (XYZAL) 5 MG TABLET    Take 1 tablet (5 mg total) by mouth every evening.    LOPERAMIDE (IMODIUM) 2 MG CAPSULE    TAKE 1 TO 2 CAPSULES BY MOUTH FOUR TIMES DAILY    LOSARTAN (COZAAR) 50 MG TABLET    TAKE 1 TABLET BY MOUTH EVERY DAY    MULTIVITAMIN/IRON/FOLIC ACID (CENTRUM WOMEN ORAL)    Take 1 tablet by mouth once daily.    NEBIVOLOL (BYSTOLIC) 10 MG TAB        NIACIN 100 MG TAB    Take by mouth. 1 Tablet Oral At bedtime    NYSTATIN (MYCOSTATIN) CREAM    Apply topically 2 (two) times daily.    OMEPRAZOLE (PRILOSEC) 20 MG CAPSULE    Take 1 capsule (20 mg total) by mouth 2 (two) times daily.    ONDANSETRON (ZOFRAN) 4 MG TABLET    Take 1 tablet (4 mg total) by mouth every  "12 (twelve) hours as needed for Nausea.    OXYCODONE-ACETAMINOPHEN (PERCOCET) 5-325 MG PER TABLET    Take 1 tablet by mouth every 4 (four) hours as needed for Pain.    POTASSIUM CHLORIDE SA (K-DUR,KLOR-CON) 20 MEQ TABLET    TAKE 1 TABLET BY MOUTH TWICE A DAY    PREDNISONE (DELTASONE) 5 MG TABLET    TAKE 1 TABLET BY MOUTH ONCE DAILY. DOUBLE THE DOSE IN TIMES OF ILLNESS    PSYLLIUM HUSK/ASPARTAME (METAMUCIL FIBER SINGLES ORAL)    Take by mouth.    REPATHA SURECLICK 140 MG/ML PNIJ    INJECT 140 MG INTO THE SKIN EVERY 14 (FOURTEEN) DAYS    RESTASIS 0.05 % OPHTHALMIC EMULSION    INSTILL 1 DROP INTO BOTH EYES TWICE A DAY    RIZATRIPTAN (MAXALT) 10 MG TABLET    TAKE 1 TABLET (10 MG TOTAL) BY MOUTH 2 (TWO) TIMES DAILY AS NEEDED FOR MIGRAINE.    SAVELLA 100 MG TAB    TAKE 1 TABLET BY MOUTH EVERY DAY    VALACYCLOVIR (VALTREX) 1000 MG TABLET    Take 1 tablet (1,000 mg total) by mouth every 12 (twelve) hours. for 10 days    ZOLPIDEM (AMBIEN) 5 MG TAB    TAKE 1 TABLET BY MOUTH NIGHTLY AS NEEDED (INSOMNIA).   Discontinued Medications    ERGOCALCIFEROL (ERGOCALCIFEROL) 50,000 UNIT CAP    Take 1 capsule by mouth twice a week.    GADAVIST 1 MMOL/ML (604.72 MG/ML) SOLN INJECTION        LIDOCAINE (LIDODERM) 5 %    Place 1 patch onto the skin once daily. Remove & Discard patch within 12 hours or as directed by MD    ZOLEDRONIC ACID-MANNITOL & WATER (RECLAST) 5 MG/100 ML PGBK           Physical Exam:  Vital Signs:  /75 (BP Location: Left arm, Patient Position: Sitting)   Pulse 70   Temp 97.5 °F (36.4 °C)   Ht 5' 4" (1.626 m)   Wt 56.1 kg (123 lb 10.9 oz)   SpO2 98%   BMI 21.23 kg/m²   Body mass index is 21.23 kg/m².    Physical Exam  Vitals and nursing note reviewed.   Constitutional:       General: She is not in acute distress.     Appearance: Normal appearance. She is well-developed. She is not ill-appearing or toxic-appearing.   Eyes:      Conjunctiva/sclera: Conjunctivae normal.      Pupils: Pupils are equal, round, and " reactive to light.   Neck:      Thyroid: No thyromegaly.   Cardiovascular:      Rate and Rhythm: Normal rate and regular rhythm.      Heart sounds: Normal heart sounds. No murmur heard.  Pulmonary:      Effort: Pulmonary effort is normal.      Breath sounds: Normal breath sounds. No wheezing or rales.   Abdominal:      General: Bowel sounds are normal. There is no distension.      Palpations: Abdomen is soft. There is no mass.      Tenderness: There is no abdominal tenderness.   Musculoskeletal:         General: No tenderness. Normal range of motion.   Lymphadenopathy:      Cervical: No cervical adenopathy.   Skin:     Findings: No erythema or rash.   Neurological:      General: No focal deficit present.      Mental Status: She is alert and oriented to person, place, and time.   Psychiatric:         Mood and Affect: Mood normal.         Behavior: Behavior normal.         Thought Content: Thought content normal.         Judgment: Judgment normal.         Labs: reviewed and pertinent noted above    Assessment/Plan:  Efe Horowitz is a 64 y.o. female with a history of ulcerative colitis status post total proctocolectomy and J pouch placement, history of pancreatic insufficiency, chronic degenerative changes in the spine, lupus, and lactose intolerance as well as pelvic floor dysfunction and pancreatic cystic lesions. The following issues were addresssed:    1. Status post total colectomy    2. Pelvic floor dysfunction    3. Pancreatic cyst    4. History of ulcerative colitis    5. Dysphagia, unspecified type      1. History of ulcerative colitis:  She has a J pouch with no signs of inflammation.  No treatment necessary.  Continue to monitor.  Plan for repeat pouchoscopy in the near future.  We will wait until her dysphagia evaluation has been completed to determine if she may need an esophageal manometry or repeat upper endoscopy along with the pouchoscopy.    2. Pelvic floor dysfunction:  Continue current  management.  She is doing quite well..    3. Dysphagia:  Symptoms sound oropharyngeal in nature but there has no coughing or choking.  There may also be an esophageal component.  Recommend starting with a barium esophagram.  If this is unremarkable we will plan for a modified barium swallow.  Depending on the results may need to consider esophageal manometry were a repeat upper endoscopy.    4. Pancreatic insufficiency: Continue Creon.      5. Pancreatic cyst:  Repeat MRI stable.  Continue to monitor.         Follow up: Follow up in about 1 year (around 6/10/2025).    Visit today is associated with current or anticipated ongoing medical care related to this patient's single serious condition/complex condition (history of ulcerative colitis).        Thank you again for sending Efe Rubiojaz Horowitz to see Dr. Jay Rangel today at the [facility]. Please don't hesitate to contact Dr. Rangel if there are any questions regarding this evaluation, or if you have any other patients with inflammatory bowel disease for whom you would like a consultation. You can reach Dr. Rangel at [phone] or by email at [email or web address]    Ceferino Rangel MD  Department of Gastroenterology  Inflammatory Bowel Disease

## 2024-06-11 DIAGNOSIS — M32.9 SYSTEMIC LUPUS ERYTHEMATOSUS, UNSPECIFIED SLE TYPE, UNSPECIFIED ORGAN INVOLVEMENT STATUS: Primary | ICD-10-CM

## 2024-06-11 DIAGNOSIS — Z79.899 LONG-TERM USE OF PLAQUENIL: ICD-10-CM

## 2024-06-11 RX ORDER — LOPERAMIDE HYDROCHLORIDE 2 MG/1
CAPSULE ORAL
Qty: 240 CAPSULE | Refills: 3 | Status: SHIPPED | OUTPATIENT
Start: 2024-06-11

## 2024-06-14 ENCOUNTER — CLINICAL SUPPORT (OUTPATIENT)
Dept: OPHTHALMOLOGY | Facility: CLINIC | Age: 64
End: 2024-06-14
Payer: MEDICARE

## 2024-06-14 ENCOUNTER — OFFICE VISIT (OUTPATIENT)
Dept: OPTOMETRY | Facility: CLINIC | Age: 64
End: 2024-06-14
Payer: MEDICARE

## 2024-06-14 DIAGNOSIS — M32.9 SYSTEMIC LUPUS ERYTHEMATOSUS, UNSPECIFIED SLE TYPE, UNSPECIFIED ORGAN INVOLVEMENT STATUS: Primary | ICD-10-CM

## 2024-06-14 DIAGNOSIS — Z79.899 LONG-TERM USE OF PLAQUENIL: ICD-10-CM

## 2024-06-14 DIAGNOSIS — H16.223 KERATOCONJUNCTIVITIS SICCA OF BOTH EYES NOT SPECIFIED AS SJOGREN'S: ICD-10-CM

## 2024-06-14 DIAGNOSIS — M32.9 SYSTEMIC LUPUS ERYTHEMATOSUS, UNSPECIFIED SLE TYPE, UNSPECIFIED ORGAN INVOLVEMENT STATUS: ICD-10-CM

## 2024-06-14 DIAGNOSIS — H25.13 NUCLEAR SCLEROSIS OF BOTH EYES: ICD-10-CM

## 2024-06-14 DIAGNOSIS — H52.7 REFRACTIVE ERROR: ICD-10-CM

## 2024-06-14 PROCEDURE — 1160F RVW MEDS BY RX/DR IN RCRD: CPT | Mod: CPTII,S$GLB,, | Performed by: OPTOMETRIST

## 2024-06-14 PROCEDURE — 3044F HG A1C LEVEL LT 7.0%: CPT | Mod: CPTII,S$GLB,, | Performed by: OPTOMETRIST

## 2024-06-14 PROCEDURE — 99999 PR PBB SHADOW E&M-EST. PATIENT-LVL III: CPT | Mod: PBBFAC,,, | Performed by: OPTOMETRIST

## 2024-06-14 PROCEDURE — 4010F ACE/ARB THERAPY RXD/TAKEN: CPT | Mod: CPTII,S$GLB,, | Performed by: OPTOMETRIST

## 2024-06-14 PROCEDURE — 92015 DETERMINE REFRACTIVE STATE: CPT | Mod: S$GLB,,, | Performed by: OPTOMETRIST

## 2024-06-14 PROCEDURE — 1126F AMNT PAIN NOTED NONE PRSNT: CPT | Mod: CPTII,S$GLB,, | Performed by: OPTOMETRIST

## 2024-06-14 PROCEDURE — 99214 OFFICE O/P EST MOD 30 MIN: CPT | Mod: S$GLB,,, | Performed by: OPTOMETRIST

## 2024-06-14 PROCEDURE — 1101F PT FALLS ASSESS-DOCD LE1/YR: CPT | Mod: CPTII,S$GLB,, | Performed by: OPTOMETRIST

## 2024-06-14 PROCEDURE — 1159F MED LIST DOCD IN RCRD: CPT | Mod: CPTII,S$GLB,, | Performed by: OPTOMETRIST

## 2024-06-14 PROCEDURE — 3288F FALL RISK ASSESSMENT DOCD: CPT | Mod: CPTII,S$GLB,, | Performed by: OPTOMETRIST

## 2024-06-14 NOTE — PROGRESS NOTES
Visual field test done.  Patient stated no latex allergies used coverlet        Axis Add      Right +1.25 +0.50 180 +2.50   Left +1.00 Sphere  +2.50   Expiration Date: 10/28/2023

## 2024-06-14 NOTE — PROGRESS NOTES
Subjective:       Patient ID: Efe Horowitz is a 64 y.o. female      Chief Complaint   Patient presents with    Concerns About Ocular Health    Plaquenil Eye Exam     History of Present Illness  Dls: 10/28/22 Dr. Magallon     63 y/o female presents today for plaquenil ck   Pt c/o blurry vision at distance ou x 2 months  Pt wears bifocal glasses.   Pt c/o dry eyes ou    + ou tearing  No itching  No burning  No pain  + ha's  No floaters  No flashes    Eye meds  Restatsis OU TID   Pohx:   None    Fohx:  Glaucoma - father          Assessment/Plan:     1. Systemic lupus erythematosus, unspecified SLE type, unspecified organ involvement status  2. Long-term use of Plaquenil  Pt sees Dr. Idania Allison (rheumatology), on plaquenil since 199, taking 300 daily . No evidence of plaquenil maculopathy on exam, can continue with plaquenil therapy. OCT mac and HVF WNL OU today. Color vision normal. Return in 1 years for DFE, HVF 10-2, and OCT macula.       3. Nuclear sclerosis of both eyes  Educated pt on presence of cataracts and effects on vision. No surgery at this time. Recheck in one year, sooner PRN.    4. Keratoconjunctivitis sicca of both eyes not specified as Sjogren's  Continue restasis TID OU, can supplement with AT PRN    5. Refractive error  Refractive shift in Rx. New MRx sharper. Educated patient on refractive error and discussed lens options. Dispensed updated spectacle Rx. Educated about adaptation period to new specs.    Eyeglass Final Rx       Eyeglass Final Rx         Sphere Cylinder Axis Add    Right Larned +0.50 010 +2.50    Left +0.25 +0.75 175 +2.50      Expiration Date: 6/14/2025                      Follow up in about 1 year (around 6/14/2025) for Plaquenil check, HVF 10-2, OCT macula.

## 2024-06-18 ENCOUNTER — PATIENT MESSAGE (OUTPATIENT)
Dept: RHEUMATOLOGY | Facility: CLINIC | Age: 64
End: 2024-06-18
Payer: MEDICARE

## 2024-06-18 ENCOUNTER — LAB VISIT (OUTPATIENT)
Dept: LAB | Facility: HOSPITAL | Age: 64
End: 2024-06-18
Attending: INTERNAL MEDICINE
Payer: MEDICARE

## 2024-06-18 DIAGNOSIS — M32.9 SYSTEMIC LUPUS ERYTHEMATOSUS, UNSPECIFIED SLE TYPE, UNSPECIFIED ORGAN INVOLVEMENT STATUS: ICD-10-CM

## 2024-06-18 DIAGNOSIS — F51.01 PRIMARY INSOMNIA: ICD-10-CM

## 2024-06-18 DIAGNOSIS — Z79.899 IMMUNOSUPPRESSION DUE TO DRUG THERAPY: ICD-10-CM

## 2024-06-18 DIAGNOSIS — D84.821 IMMUNOSUPPRESSION DUE TO DRUG THERAPY: ICD-10-CM

## 2024-06-18 LAB
ALBUMIN SERPL BCP-MCNC: 3.4 G/DL (ref 3.5–5.2)
ALP SERPL-CCNC: 45 U/L (ref 55–135)
ALT SERPL W/O P-5'-P-CCNC: 18 U/L (ref 10–44)
ANION GAP SERPL CALC-SCNC: 10 MMOL/L (ref 8–16)
AST SERPL-CCNC: 23 U/L (ref 10–40)
BASOPHILS # BLD AUTO: 0.06 K/UL (ref 0–0.2)
BASOPHILS NFR BLD: 0.8 % (ref 0–1.9)
BILIRUB SERPL-MCNC: 0.3 MG/DL (ref 0.1–1)
BUN SERPL-MCNC: 14 MG/DL (ref 8–23)
C3 SERPL-MCNC: 99 MG/DL (ref 50–180)
C4 SERPL-MCNC: 31 MG/DL (ref 11–44)
CALCIUM SERPL-MCNC: 9.4 MG/DL (ref 8.7–10.5)
CHLORIDE SERPL-SCNC: 105 MMOL/L (ref 95–110)
CO2 SERPL-SCNC: 29 MMOL/L (ref 23–29)
CREAT SERPL-MCNC: 0.8 MG/DL (ref 0.5–1.4)
CRP SERPL-MCNC: 1.6 MG/L (ref 0–8.2)
DIFFERENTIAL METHOD BLD: ABNORMAL
EOSINOPHIL # BLD AUTO: 0.3 K/UL (ref 0–0.5)
EOSINOPHIL NFR BLD: 4.2 % (ref 0–8)
ERYTHROCYTE [DISTWIDTH] IN BLOOD BY AUTOMATED COUNT: 15.3 % (ref 11.5–14.5)
ERYTHROCYTE [SEDIMENTATION RATE] IN BLOOD BY PHOTOMETRIC METHOD: <2 MM/HR (ref 0–36)
EST. GFR  (NO RACE VARIABLE): >60 ML/MIN/1.73 M^2
GLUCOSE SERPL-MCNC: 83 MG/DL (ref 70–110)
HCT VFR BLD AUTO: 37.8 % (ref 37–48.5)
HGB BLD-MCNC: 12 G/DL (ref 12–16)
IMM GRANULOCYTES # BLD AUTO: 0.02 K/UL (ref 0–0.04)
IMM GRANULOCYTES NFR BLD AUTO: 0.3 % (ref 0–0.5)
LYMPHOCYTES # BLD AUTO: 2.8 K/UL (ref 1–4.8)
LYMPHOCYTES NFR BLD: 38.5 % (ref 18–48)
MCH RBC QN AUTO: 28 PG (ref 27–31)
MCHC RBC AUTO-ENTMCNC: 31.7 G/DL (ref 32–36)
MCV RBC AUTO: 88 FL (ref 82–98)
MONOCYTES # BLD AUTO: 0.7 K/UL (ref 0.3–1)
MONOCYTES NFR BLD: 10 % (ref 4–15)
NEUTROPHILS # BLD AUTO: 3.3 K/UL (ref 1.8–7.7)
NEUTROPHILS NFR BLD: 46.2 % (ref 38–73)
NRBC BLD-RTO: 0 /100 WBC
PLATELET # BLD AUTO: 291 K/UL (ref 150–450)
PMV BLD AUTO: 10.7 FL (ref 9.2–12.9)
POTASSIUM SERPL-SCNC: 3.6 MMOL/L (ref 3.5–5.1)
PROT SERPL-MCNC: 6 G/DL (ref 6–8.4)
RBC # BLD AUTO: 4.29 M/UL (ref 4–5.4)
SODIUM SERPL-SCNC: 144 MMOL/L (ref 136–145)
WBC # BLD AUTO: 7.19 K/UL (ref 3.9–12.7)

## 2024-06-18 PROCEDURE — 86160 COMPLEMENT ANTIGEN: CPT | Performed by: INTERNAL MEDICINE

## 2024-06-18 PROCEDURE — 86140 C-REACTIVE PROTEIN: CPT | Performed by: INTERNAL MEDICINE

## 2024-06-18 PROCEDURE — 85025 COMPLETE CBC W/AUTO DIFF WBC: CPT | Performed by: INTERNAL MEDICINE

## 2024-06-18 PROCEDURE — 36415 COLL VENOUS BLD VENIPUNCTURE: CPT | Mod: PO | Performed by: INTERNAL MEDICINE

## 2024-06-18 PROCEDURE — 86160 COMPLEMENT ANTIGEN: CPT | Mod: 59 | Performed by: INTERNAL MEDICINE

## 2024-06-18 PROCEDURE — 85652 RBC SED RATE AUTOMATED: CPT | Performed by: INTERNAL MEDICINE

## 2024-06-18 PROCEDURE — 86225 DNA ANTIBODY NATIVE: CPT | Performed by: INTERNAL MEDICINE

## 2024-06-18 PROCEDURE — 80053 COMPREHEN METABOLIC PANEL: CPT | Performed by: INTERNAL MEDICINE

## 2024-06-18 RX ORDER — ZOLPIDEM TARTRATE 5 MG/1
TABLET ORAL
Qty: 30 TABLET | Refills: 0 | Status: SHIPPED | OUTPATIENT
Start: 2024-06-18

## 2024-06-18 NOTE — TELEPHONE ENCOUNTER
No care due was identified.  [facility] Embedded Care Due Messages. Reference number: [id number].   6/18/2024 2:54:48 PM CDT

## 2024-06-19 ENCOUNTER — HOSPITAL ENCOUNTER (OUTPATIENT)
Dept: RADIOLOGY | Facility: HOSPITAL | Age: 64
Discharge: HOME | End: 2024-06-19
Attending: INTERNAL MEDICINE
Payer: MEDICARE

## 2024-06-19 DIAGNOSIS — R13.10 DYSPHAGIA, UNSPECIFIED TYPE: ICD-10-CM

## 2024-06-19 LAB — DSDNA AB SER-ACNC: NORMAL [IU]/ML

## 2024-06-19 PROCEDURE — 74220 X-RAY XM ESOPHAGUS 1CNTRST: CPT | Mod: 26,,, | Performed by: RADIOLOGY

## 2024-06-19 PROCEDURE — 25500020 PHARM REV CODE 255: Performed by: INTERNAL MEDICINE

## 2024-06-19 PROCEDURE — A9698 NON-RAD CONTRAST MATERIALNOC: HCPCS | Performed by: INTERNAL MEDICINE

## 2024-06-19 PROCEDURE — 74220 X-RAY XM ESOPHAGUS 1CNTRST: CPT | Mod: TC

## 2024-06-19 RX ADMIN — BARIUM SULFATE 200 ML: 0.6 SUSPENSION ORAL at 08:06

## 2024-06-26 ENCOUNTER — PATIENT MESSAGE (OUTPATIENT)
Dept: GASTROENTEROLOGY | Facility: HOSPITAL | Age: 64
End: 2024-06-26
Payer: MEDICARE

## 2024-06-26 DIAGNOSIS — R13.10 DYSPHAGIA, UNSPECIFIED TYPE: Primary | ICD-10-CM

## 2024-06-27 ENCOUNTER — TELEPHONE (OUTPATIENT)
Dept: GASTROENTEROLOGY | Facility: CLINIC | Age: 64
End: 2024-06-27
Payer: MEDICARE

## 2024-06-27 NOTE — TELEPHONE ENCOUNTER
Message sent to speech pathology to reach out to the patient to schedule a date and time for a modified barium swallow.   Yvonne

## 2024-07-03 ENCOUNTER — OFFICE VISIT (OUTPATIENT)
Dept: INTERNAL MEDICINE | Facility: CLINIC | Age: 64
End: 2024-07-03
Payer: MEDICARE

## 2024-07-03 VITALS
BODY MASS INDEX: 21.3 KG/M2 | OXYGEN SATURATION: 99 % | HEIGHT: 64 IN | DIASTOLIC BLOOD PRESSURE: 82 MMHG | WEIGHT: 124.75 LBS | SYSTOLIC BLOOD PRESSURE: 132 MMHG | HEART RATE: 68 BPM

## 2024-07-03 DIAGNOSIS — C50.211 MALIGNANT NEOPLASM OF UPPER-INNER QUADRANT OF RIGHT FEMALE BREAST, UNSPECIFIED ESTROGEN RECEPTOR STATUS: ICD-10-CM

## 2024-07-03 DIAGNOSIS — K86.81 EXOCRINE PANCREATIC INSUFFICIENCY: ICD-10-CM

## 2024-07-03 DIAGNOSIS — D84.821 IMMUNOSUPPRESSION DUE TO DRUG THERAPY: ICD-10-CM

## 2024-07-03 DIAGNOSIS — I27.20 PULMONARY HTN: ICD-10-CM

## 2024-07-03 DIAGNOSIS — E05.90 SUBCLINICAL HYPERTHYROIDISM: ICD-10-CM

## 2024-07-03 DIAGNOSIS — M80.00XG AGE-RELATED OSTEOPOROSIS WITH CURRENT PATHOLOGICAL FRACTURE WITH DELAYED HEALING: ICD-10-CM

## 2024-07-03 DIAGNOSIS — E78.2 MIXED HYPERLIPIDEMIA: ICD-10-CM

## 2024-07-03 DIAGNOSIS — Z90.49 S/P TOTAL COLECTOMY: ICD-10-CM

## 2024-07-03 DIAGNOSIS — E27.49 SECONDARY ADRENAL INSUFFICIENCY: ICD-10-CM

## 2024-07-03 DIAGNOSIS — K86.1 CHRONIC PANCREATITIS, UNSPECIFIED PANCREATITIS TYPE: ICD-10-CM

## 2024-07-03 DIAGNOSIS — M79.7 FIBROMYALGIA: ICD-10-CM

## 2024-07-03 DIAGNOSIS — K51.918 ULCERATIVE COLITIS WITH OTHER COMPLICATION, UNSPECIFIED LOCATION: ICD-10-CM

## 2024-07-03 DIAGNOSIS — M32.9 SYSTEMIC LUPUS ERYTHEMATOSUS, UNSPECIFIED SLE TYPE, UNSPECIFIED ORGAN INVOLVEMENT STATUS: ICD-10-CM

## 2024-07-03 DIAGNOSIS — Z79.52 LONG TERM CURRENT USE OF SYSTEMIC STEROIDS: ICD-10-CM

## 2024-07-03 DIAGNOSIS — I77.9 BILATERAL CAROTID ARTERY DISEASE, UNSPECIFIED TYPE: ICD-10-CM

## 2024-07-03 DIAGNOSIS — I10 BENIGN ESSENTIAL HYPERTENSION: Primary | ICD-10-CM

## 2024-07-03 DIAGNOSIS — R73.03 PREDIABETES: ICD-10-CM

## 2024-07-03 DIAGNOSIS — Z79.899 IMMUNOSUPPRESSION DUE TO DRUG THERAPY: ICD-10-CM

## 2024-07-03 PROCEDURE — 99999 PR PBB SHADOW E&M-EST. PATIENT-LVL V: CPT | Mod: PBBFAC,,, | Performed by: INTERNAL MEDICINE

## 2024-07-03 NOTE — PATIENT INSTRUCTIONS
Get your Pfizer covid booster at [facility] this week.  Call [facility] to refill your gabapentin (refills on file)

## 2024-07-03 NOTE — PROGRESS NOTES
"INTERNAL MEDICINE ESTABLISHED PATIENT VISIT NOTE    Subjective:     Chief Complaint: Follow-up  HTN     Patient ID: Efe Horowitz is a 64 y.o. female with HTN c proteinuria, HLD, preDM, UC s/p total colectomy and at baseline c some chronic abd discomfort, GERD, iron def anemia, SLE, FM, adrenal insufficency, exocrine pancreatic insufficency, B12 def, Vit D def, hx lymphoma in breast dx'ed 1/2016 s/p resection and s/p breast reconstruction at [facility] in 2017, osteoporosis c hx of L3 vertebral fractures on MRI in the past, subclinical hyperthyroidism, pancreatic cyst, hepatic hemangiomas stable on imaging, hx migraines, lumbar spondylarthritis, insomnia on Ambien, last seen by me 3/2024, here today for f/u HTN.    Followed by Dr. Jordan for cardiac issues.  At last visit c him, Crestor decreased to 5 mg.  Still on Repatha.    Followed by Dr. Rangel for UC.  Had barium esophagram due to issues c dysphagia which showed a single episode of reflux so is now awaiting MBSS.    Still seeing Dr. Jiang for lupus mgmt, had labs done last month which were stable.    Past Medical History:  Past Medical History:   Diagnosis Date    Acid reflux     Adrenal insufficiency, primary     Arthritis     Asthma     B12 deficiency anemia     Benign essential hypertension 2/7/2021    Blood transfusion 2010    Breast cancer 2/2016    Right breast infiltrating ductal CA    Cataract     Chronic pain     Deep vein thrombosis     Dry eyes     Esophagitis, unspecified     Fibromyalgia     General anesthetics causing adverse effect in therapeutic use     "slow to wake up bc I don't have an immune system    Heart murmur     History of colon polyps     Hyperlipidemia     Hypopituitary dwarfism     Iron deficiency anemia     Lupus     Migraines, neuralgic     Nonspecific ulcerative colitis     OP (osteoporosis)     history of reclast    Osteopenia     Pancreatic cyst     Schatzki's ring     Steroid sulfatase deficiency     Ulcerative colitis  "    Vitamin D deficiency disease        Home Medications:  Prior to Admission medications    Medication Sig Start Date End Date Taking? Authorizing Provider   (Magic mouthwash) 1:1:1 diphenhydrAMINE(Benadryl) 12.5mg/5ml liq, aluminum & magnesium hydroxide-simethicone (Maalox), LIDOcaine viscous 2% Swish and spit 5 mLs every 4 (four) hours as needed (mouth sores). for mouth sores 3/6/23   Shonda Anguiano MD   albuterol (PROVENTIL/VENTOLIN HFA) 90 mcg/actuation inhaler Inhale 1-2 puffs into the lungs every 6 (six) hours as needed for Wheezing or Shortness of Breath. Rescue 4/17/23   Ondina Ang NP   amLODIPine (NORVASC) 5 MG tablet Take 1 tablet (5 mg total) by mouth once daily. 1/22/24   Shonda Anguiano MD   azelastine (ASTELIN) 137 mcg (0.1 %) nasal spray SPRAY 1 SPRAY BY NASAL ROUTE 2 TIMES DAILY. 10/23/23   Ben Levin, AC   belimumab (BENLYSTA) 200 mg/mL AtIn Inject 1 mL (200 mg total) into the skin every 7 days. 4/26/24   Bryant Holt MD   calcium carbonate (OS-RISSA) 500 mg calcium (1,250 mg) tablet Take 1 tablet by mouth once daily.    Provider, Historical   cetirizine (ZYRTEC) 10 MG tablet Take 1 tablet (10 mg total) by mouth once daily. 4/17/23 6/10/24  Ondina Ang NP   cholecalciferol, vitamin D3, 125 mcg (5,000 unit) Tab Take 5,000 Units by mouth once daily.    Provider, Historical   CREON 36,000-114,000- 180,000 unit CpDR TAKE TWO CAPSULES BY MOUTH THREE TIMES DAILY WITH MEALS AND ONE CAPSULE WITH EACH SNACK 4/20/22   Reese Villalta MD   dexamethasone (DECADRON) 4 mg/mL injection Inject 1 mL (4 mg total) into the muscle daily as needed (Signs and symtpoms of severe adrenal insufficiency). 10/6/21   Ena Srinivasan MD   diclofenac sodium (VOLTAREN) 1 % Gel APPLY 2 GRAMS TO AFFECTED AREA 4 TIMES A DAY 3/24/21   Idania Allison MD   dicyclomine (BENTYL) 20 mg tablet TAKE 1 TABLET BY MOUTH EVERY 6 HOURS 6/4/24   Ceferino Rangel MD   ferrous sulfate (IRON ORAL) Take by mouth. No sure  dose    Provider, Historical   fluticasone propionate (FLONASE) 50 mcg/actuation nasal spray 1 spray (50 mcg total) by Each Nostril route 2 (two) times daily as needed for Rhinitis or Allergies. 4/17/23   Ondina Ang NP   folic acid (FOLVITE) 1 MG tablet Take 1 mg by mouth once daily.    Provider, Historical   gabapentin (NEURONTIN) 300 MG capsule Take 1 capsule (300 mg total) by mouth 3 (three) times daily. 3/18/24   Idania Allison MD   hydroCHLOROthiazide (HYDRODIURIL) 12.5 MG Tab Take 12.5 mg by mouth. 9/26/22   Provider, Historical   hydroxychloroquine (PLAQUENIL) 200 mg tablet TAKE 1 AND 1/2 TABLETS BY MOUTH DAILY 6/10/24   Idania Allison MD   hydrOXYzine HCL (ATARAX) 25 MG tablet TAKE 1 TABLET BY MOUTH NIGHTLY AS NEEDED (INSOMNIA). 11/5/22   Provider, Historical   levocetirizine (XYZAL) 5 MG tablet Take 1 tablet (5 mg total) by mouth every evening. 3/6/23 3/26/24  Shonda Anguiano MD   loperamide (IMODIUM) 2 mg capsule TAKE 1 TO 2 CAPSULES BY MOUTH FOUR TIMES DAILY 6/11/24   Brisa Jones NP   losartan (COZAAR) 50 MG tablet TAKE 1 TABLET BY MOUTH EVERY DAY 2/25/24   Shonda Anguiano MD   multivitamin/iron/folic acid (CENTRUM WOMEN ORAL) Take 1 tablet by mouth once daily.    Provider, Historical   nebivoloL (BYSTOLIC) 10 MG Tab  3/9/23   Provider, Historical   niacin 100 MG Tab Take by mouth. 1 Tablet Oral At bedtime    Provider, Historical   nystatin (MYCOSTATIN) cream Apply topically 2 (two) times daily. 1/23/24   Shonda Anguiano MD   omeprazole (PRILOSEC) 20 MG capsule Take 1 capsule (20 mg total) by mouth 2 (two) times daily. 1/23/24   Shonda Anguiano MD   ondansetron (ZOFRAN) 4 MG tablet Take 1 tablet (4 mg total) by mouth every 12 (twelve) hours as needed for Nausea. 10/13/20   Tierney Mcintyre NP   oxyCODONE-acetaminophen (PERCOCET) 5-325 mg per tablet Take 1 tablet by mouth every 4 (four) hours as needed for Pain. 6/26/22   Ambrocio Salas MD   potassium chloride SA  "(K-DUR,KLOR-CON) 20 MEQ tablet TAKE 1 TABLET BY MOUTH TWICE A DAY 4/11/24   Shonda Anguiano MD   predniSONE (DELTASONE) 5 MG tablet TAKE 1 TABLET BY MOUTH ONCE DAILY. DOUBLE THE DOSE IN TIMES OF ILLNESS 10/23/23   Ena Srinivasan MD   psyllium husk/aspartame (METAMUCIL FIBER SINGLES ORAL) Take by mouth.    Provider, Historical   REPATHA SURECLICK 140 mg/mL PnIj INJECT 140 MG INTO THE SKIN EVERY 14 (FOURTEEN) DAYS 7/27/21   Provider, Historical   RESTASIS 0.05 % ophthalmic emulsion INSTILL 1 DROP INTO BOTH EYES TWICE A DAY 5/20/21   Vo, Joana, OD   rizatriptan (MAXALT) 10 MG tablet TAKE 1 TABLET (10 MG TOTAL) BY MOUTH 2 (TWO) TIMES DAILY AS NEEDED FOR MIGRAINE. 4/15/24   Shonda Anguiano MD   SAVELLA 100 mg Tab TAKE 1 TABLET BY MOUTH EVERY DAY 9/19/23   Shonda Anguiano MD   valACYclovir (VALTREX) 1000 MG tablet Take 1 tablet (1,000 mg total) by mouth every 12 (twelve) hours. for 10 days 11/17/22 6/10/24  Emily Street MD   zolpidem (AMBIEN) 5 MG Tab TAKE 1 TABLET BY MOUTH NIGHTLY AS NEEDED (INSOMNIA). 6/18/24   Tierney Mcintyre NP       Allergies:  Review of patient's allergies indicates:   Allergen Reactions    Aspirin      Other reaction(s): "hemorrhage"  hemorrhage    Hydrocortisone Nausea Only     Other reaction(s): sick  sick    Lactose intolerance (lactase) [lactase] Nausea And Vomiting    Vicodin [hydrocodone-acetaminophen]      Other reaction(s): hyperactivity    Asacol [mesalamine] Hallucinations     Other reaction(s): Hallucinations  hallucinations       Social History:  Social History     Tobacco Use    Smoking status: Never    Smokeless tobacco: Never   Substance Use Topics    Alcohol use: No     Alcohol/week: 0.0 standard drinks of alcohol    Drug use: No        Review of Systems   Constitutional:  Negative for appetite change, chills, fatigue, fever and unexpected weight change.   HENT:  Negative for congestion, hearing loss and rhinorrhea.    Eyes:  Negative for pain and visual disturbance. " "  Respiratory:  Negative for cough, chest tightness, shortness of breath and wheezing.    Cardiovascular:  Negative for chest pain, palpitations and leg swelling.   Gastrointestinal:  Negative for abdominal distention and abdominal pain.   Endocrine: Negative for polydipsia and polyuria.   Genitourinary:  Negative for decreased urine volume, difficulty urinating, dysuria, hematuria and vaginal discharge.   Neurological:  Negative for weakness, numbness and headaches.   Psychiatric/Behavioral:  Negative for behavioral problems and confusion.          Health Maintenance:     Immunizations:   Influenza 9/2023  TDap 8/2021  Pneumovax 6/2014, Prevnar 13 2/2021, Prevnar 20 11/2022  Shingrix 2/2022, 9/2022  COVID 2/2021, 3/2021, 8/2021 (Pfizer), 11/2022, 9/2023  RSV 9/2023     Cancer Screening:  PAP: total hyst 2/2 fibroids, benign, ovaries removed as well.  Mammogram:  11/2023 benign  Colonoscopy: hx total proctocolectomy for UC, had pouchoscopy 12/2018 via Dr. Villalta wn, biopsies taken which showed mild chronic inflammation on path.  EGD 12/2018 c hiatal hernia, otherwise unremarkable, bx c chronic inflammation in duodenum on path, normal gastric bx and neg H pylori  DEXA:  2/2024 c osteoporosis, followed by Dr. Srinivasan, now on Reclast, has f/u pending this summer c Dr. Srinivasan    Objective:   /82 (BP Location: Left arm, Patient Position: Sitting, BP Method: Medium (Manual))   Pulse 68   Ht 5' 4" (1.626 m)   Wt 56.6 kg (124 lb 12.5 oz)   SpO2 99%   BMI 21.42 kg/m²        General: AAO x3, no apparent distress  HEENT: no cervical LAD, no thyroid masses appreciated  CV: RRR, no m/r/g  Pulm: Lungs CTAB, no crackles, no wheezes  Abd: s/NT/ND +BS  Extremities: no c/c/e    Labs:     Lab Results   Component Value Date    WBC 7.19 06/18/2024    HGB 12.0 06/18/2024    HCT 37.8 06/18/2024    MCV 88 06/18/2024     06/18/2024     Sodium   Date Value Ref Range Status   06/18/2024 144 136 - 145 mmol/L Final     Potassium "   Date Value Ref Range Status   06/18/2024 3.6 3.5 - 5.1 mmol/L Final     Chloride   Date Value Ref Range Status   06/18/2024 105 95 - 110 mmol/L Final     CO2   Date Value Ref Range Status   06/18/2024 29 23 - 29 mmol/L Final     Glucose   Date Value Ref Range Status   06/18/2024 83 70 - 110 mg/dL Final     BUN   Date Value Ref Range Status   06/18/2024 14 8 - 23 mg/dL Final     Creatinine   Date Value Ref Range Status   06/18/2024 0.8 0.5 - 1.4 mg/dL Final     Calcium   Date Value Ref Range Status   06/18/2024 9.4 8.7 - 10.5 mg/dL Final     Total Protein   Date Value Ref Range Status   06/18/2024 6.0 6.0 - 8.4 g/dL Final     Albumin   Date Value Ref Range Status   06/18/2024 3.4 (L) 3.5 - 5.2 g/dL Final     Total Bilirubin   Date Value Ref Range Status   06/18/2024 0.3 0.1 - 1.0 mg/dL Final     Comment:     For infants and newborns, interpretation of results should be based  on gestational age, weight and in agreement with clinical  observations.    Premature Infant recommended reference ranges:  Up to 24 hours.............<8.0 mg/dL  Up to 48 hours............<12.0 mg/dL  3-5 days..................<15.0 mg/dL  6-29 days.................<15.0 mg/dL       Alkaline Phosphatase   Date Value Ref Range Status   06/18/2024 45 (L) 55 - 135 U/L Final     AST   Date Value Ref Range Status   06/18/2024 23 10 - 40 U/L Final     ALT   Date Value Ref Range Status   06/18/2024 18 10 - 44 U/L Final     Anion Gap   Date Value Ref Range Status   06/18/2024 10 8 - 16 mmol/L Final     eGFR if    Date Value Ref Range Status   07/05/2022 >60.0 >60 mL/min/1.73 m^2 Final     eGFR if non    Date Value Ref Range Status   07/05/2022 >60.0 >60 mL/min/1.73 m^2 Final     Comment:     Calculation used to obtain the estimated glomerular filtration  rate (eGFR) is the CKD-EPI equation.        Lab Results   Component Value Date    HGBA1C 5.7 (H) 04/25/2024     Lab Results   Component Value Date    LDLCALC 14.8 (L)  04/25/2024     Lab Results   Component Value Date    TSH 1.216 04/25/2024         Assessment/Plan     Efe was seen today for follow-up.    Diagnoses and all orders for this visit:    Benign essential hypertension  At goal  Cont meds    Prediabetes  Lab Results   Component Value Date    HGBA1C 5.7 (H) 04/25/2024     Mild  Diet controlled  Will repeat labs c Rheum labs in Aug  -     Hemoglobin A1C; Future    Mixed hyperlipidemia  Lab Results   Component Value Date    LDLCALC 14.8 (L) 04/25/2024     Low, crestor decreased by Cards since also on Repatha, mgmt as per Dr. Jordan    Ulcerative colitis with other complication, unspecified location  S/P total colectomy  Exocrine pancreatic insufficiency  Chronic pancreatitis, unspecified pancreatitis type  Secondary adrenal insufficiency  As per HPI  Cont f/u c Gastro  Awaiting MBSS via GI    Systemic lupus erythematosus, unspecified SLE type, unspecified organ involvement status  Long term current use of systemic steroids  Fibromyalgia  Immunosuppression due to drug therapy  As per HPI  Doing well clinically, recent labs stable.  Cont routine f/u c Rheum    Malignant neoplasm of upper-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, unspecified estrogen receptor status  No acute issues  Repeat mmg due in Nov, advised to schedule at checkout, orders in system from Dr. Wright    Age-related osteoporosis with current pathological fracture with delayed healing  On Prolia as per endo  Cont routine f/u, DEXA utd    Subclinical hyperthyroidism  Last TSH wnl  No issues  Can check annually, sooner if changes in clinical sx    Bilateral carotid artery disease, unspecified type  Pulmonary HTN  Followed by Cards, no issues      HM as above  RTC in 3 mos, sooner if needed.    Shonda Anguiano MD  Department of Internal Medicine - [facility]  07/03/2024

## 2024-07-08 ENCOUNTER — TELEPHONE (OUTPATIENT)
Dept: SPEECH THERAPY | Facility: HOSPITAL | Age: 64
End: 2024-07-08
Payer: MEDICARE

## 2024-07-08 NOTE — TELEPHONE ENCOUNTER
Angelito pt to schedule mbss 7/25@2:30. Informed to fast 2hrs before test [facility] radiology clinic.----- Message from Ameena Correa sent at 7/8/2024  9:00 AM CDT -----  Regarding: appt  Contact: [phone]  Pt would like to schedule with main campus for swallow study test. Pls call to discuss.

## 2024-07-12 NOTE — PROGRESS NOTES
Subjective:      Patient ID: Efe Horowitz is a 64 y.o. female.    Chief Complaint:  No chief complaint on file.    History of Present Illness  Efe Horowitz is here for management of osteoporosis and secondary adrenal insufficiency.  Previously seen by Dr. Srinivasan.  Last seen 05/2022.      Ms. Horowitz is a 62 year old woman who is here for follow up, with a chronic history of secondary AI (steroid use for UC) and osteoporosis that is complicated by vertebral fracture s/p kyphoplasty.     She is currently on Prednisone 5 mg for lupus and ulcerative colitis. Per Dr. Starkey, she is not on Hydrocortisone due to possible UC flare-up and side effects.    Denies any recent past or future hospitalization, surgeries, or upcoming procedures.  Denies vomiting diarrhea, fever or flu like illness   Denies any falls or fractures since last visit  Endorses Calcium and Vitamin D supplementation.     Osteoporosis diagnosed twenty years ago  Completed  forteo ( 9/2019 -  2021)   10/2021 --> reclast infusion -- no side effects.      Previously used:  reclast 2013, 2014, 2017, 2021     kyphoplasty 9/25/2019     Supplememts:  Calcium: one tablet daily   Diet: broccoli, dark leafy greens almost daily   Vitamin D stopped   MVI - has vitamin D      Falls: no   Fractures: vertebral  Balance: pretty good.      Has medic alert tag that reads adrenal gland deficiency  Steroid injection     Review of Systems   Constitutional:  Negative for activity change, appetite change, fatigue and fever.   Gastrointestinal:  Negative for nausea and vomiting.   Endocrine: Negative for polydipsia and polyuria.   Neurological:  Negative for syncope.     As above    Objective:   Physical Exam  Constitutional:       Appearance: Normal appearance.   HENT:      Head: Normocephalic and atraumatic.   Eyes:      Extraocular Movements: Extraocular movements intact.   Skin:     General: Skin is dry.   Neurological:      Mental Status: She is alert.  "  Psychiatric:         Mood and Affect: Mood normal.         Behavior: Behavior normal.         Visit Vitals  /78 (BP Location: Left arm, Patient Position: Sitting, BP Method: Medium (Manual))   Pulse 76   Ht 5' 3" (1.6 m)   Wt 57.3 kg (126 lb 6.9 oz)   SpO2 98%   BMI 22.40 kg/m²       Body mass index is 22.4 kg/m².    Lab Review:   Lab Results   Component Value Date    HGBA1C 5.7 (H) 04/25/2024    HGBA1C 5.8 (H) 11/28/2023    HGBA1C 5.7 (H) 03/13/2023       Lab Results   Component Value Date    CHOL 120 04/25/2024    HDL 86 (H) 04/25/2024    LDLCALC 14.8 (L) 04/25/2024    TRIG 96 04/25/2024    CHOLHDL 71.7 (H) 04/25/2024     Lab Results   Component Value Date     06/18/2024    K 3.6 06/18/2024     06/18/2024    CO2 29 06/18/2024    GLU 83 06/18/2024    BUN 14 06/18/2024    CREATININE 0.8 06/18/2024    CALCIUM 9.4 06/18/2024    PROT 6.0 06/18/2024    ALBUMIN 3.4 (L) 06/18/2024    BILITOT 0.3 06/18/2024    ALKPHOS 45 (L) 06/18/2024    AST 23 06/18/2024    ALT 18 06/18/2024    ANIONGAP 10 06/18/2024    ESTGFRAFRICA >60.0 07/05/2022    EGFRNONAA >60.0 07/05/2022    TSH 1.216 04/25/2024     Vit D, 25-Hydroxy   Date Value Ref Range Status   04/25/2022 42 30 - 96 ng/mL Final     Comment:     Vitamin D deficiency.........<10 ng/mL                              Vitamin D insufficiency......10-29 ng/mL       Vitamin D sufficiency........> or equal to 30 ng/mL  Vitamin D toxicity............>100 ng/mL       Assessment and Plan     1. Osteopenia of necks of both femurs  Vitamin D      2. Osteopenia, unspecified location        3. Vitamin D deficiency, unspecified  Vitamin D      4. Secondary adrenal insufficiency        5. Prediabetes        6. Age-related osteoporosis with current pathological fracture with delayed healing            Secondary adrenal insufficiency  Currently taking prednisone 5mg daily for UC and Lupus, no other steroids being used at this time  Denies any upcoming surgeries or " procedures  Is aware to notify before any procedure or surgery to stress dose steroids  Denies any symptoms since last seen    -Continue current prednisone regimen  -Notify team if any planned procedure is pending  -Wears medic alert    Prediabetes  No symptoms of diabetes  Controlled at this time with diet and exercise    Age-related osteoporosis with current pathological fracture with delayed healing  Reviewed previous DEXA scans, appears last one is from 2021, compared to 2019, some difference/variability in scan at area of interest of the hip therefore analyzing is not accurate.   Reported on last DEXA scan (2021) an 11% decrease at the hip. FRAX  11% total, and 1.8% at the hip.  Last Reclast infusion was 2021    -Will go ahead and order another reclast infusion, consent signed on this visit  -On next DEXA scan to be planned in 2 years, advise techs to perform 1/3 distal forearm for better analysis of scan due to variability secondary to positioning and kyphoplasty procedure.  -Continue Calcium and Vitamin D supplementation  -Will order a Vitamin D level on this visit.       Follow up in about 1 year (around 7/25/2025).    Case discussed with Dr. Srinivasan  Recommendations were discussed with the patient in detail  The patient verbalized understanding and agrees with the plan outlined as above.     Emeka Lindsay MD  Endocrinology

## 2024-07-15 ENCOUNTER — HOSPITAL ENCOUNTER (EMERGENCY)
Facility: HOSPITAL | Age: 64
Discharge: HOME | End: 2024-07-15
Attending: INTERNAL MEDICINE
Payer: MEDICARE

## 2024-07-15 VITALS
BODY MASS INDEX: 21.26 KG/M2 | RESPIRATION RATE: 18 BRPM | HEIGHT: 63 IN | SYSTOLIC BLOOD PRESSURE: 131 MMHG | TEMPERATURE: 98 F | DIASTOLIC BLOOD PRESSURE: 77 MMHG | WEIGHT: 120 LBS | OXYGEN SATURATION: 98 % | HEART RATE: 74 BPM

## 2024-07-15 DIAGNOSIS — M54.32 SCIATICA, LEFT SIDE: Primary | ICD-10-CM

## 2024-07-15 PROCEDURE — 96372 THER/PROPH/DIAG INJ SC/IM: CPT | Performed by: INTERNAL MEDICINE

## 2024-07-15 PROCEDURE — 99284 EMERGENCY DEPT VISIT MOD MDM: CPT | Mod: 25,ER

## 2024-07-15 PROCEDURE — 63600175 PHARM REV CODE 636 W HCPCS: Mod: ER | Performed by: INTERNAL MEDICINE

## 2024-07-15 RX ORDER — DEXAMETHASONE SODIUM PHOSPHATE 4 MG/ML
8 INJECTION, SOLUTION INTRA-ARTICULAR; INTRALESIONAL; INTRAMUSCULAR; INTRAVENOUS; SOFT TISSUE
Status: COMPLETED | OUTPATIENT
Start: 2024-07-15 | End: 2024-07-15

## 2024-07-15 RX ORDER — KETOROLAC TROMETHAMINE 30 MG/ML
30 INJECTION, SOLUTION INTRAMUSCULAR; INTRAVENOUS
Status: DISCONTINUED | OUTPATIENT
Start: 2024-07-15 | End: 2024-07-15 | Stop reason: HOSPADM

## 2024-07-15 RX ADMIN — DEXAMETHASONE SODIUM PHOSPHATE 8 MG: 4 INJECTION INTRA-ARTICULAR; INTRALESIONAL; INTRAMUSCULAR; INTRAVENOUS; SOFT TISSUE at 08:07

## 2024-07-16 DIAGNOSIS — M54.9 DORSALGIA, UNSPECIFIED: Primary | ICD-10-CM

## 2024-07-16 DIAGNOSIS — G89.29 CHRONIC BILATERAL LOW BACK PAIN WITH LEFT-SIDED SCIATICA: ICD-10-CM

## 2024-07-16 DIAGNOSIS — M54.42 CHRONIC BILATERAL LOW BACK PAIN WITH LEFT-SIDED SCIATICA: ICD-10-CM

## 2024-07-16 NOTE — PROGRESS NOTES
"DATE: 7/30/2024  PATIENT: Efe Horowitz    Supervising Physician: Alejandro Gilbert M.D.    CHIEF COMPLAINT: back pain    HISTORY:  Efe Horowitz is a 64 y.o. female here for initial evaluation of low back and left leg pain (Back - 5, Leg - 6).  The pain in the left leg is what bothers her most.  The pain has been present for about 3 weeks. The patient describes the pain as sharp.  The pain is worse with standing and improved by nothing. There is no associated numbness and tingling. There is subjective weakness in the left leg. Prior treatments have included an RIKKI years ago, Gabapentin, Tylenol, but no surgery.  The patient denies myelopathic symptoms such as handwriting changes or difficulty with buttons/coins/keys. Denies perineal paresthesias, bowel/bladder dysfunction.    PAST MEDICAL/SURGICAL HISTORY:  Past Medical History:   Diagnosis Date    Acid reflux     Adrenal insufficiency, primary     Arthritis     Asthma     B12 deficiency anemia     Benign essential hypertension 2/7/2021    Blood transfusion 2010    Breast cancer 2/2016    Right breast infiltrating ductal CA    Cataract     Chronic pain     Deep vein thrombosis     Dry eyes     Esophagitis, unspecified     Fibromyalgia     General anesthetics causing adverse effect in therapeutic use     "slow to wake up bc I don't have an immune system    Heart murmur     History of colon polyps     Hyperlipidemia     Hypopituitary dwarfism     Iron deficiency anemia     Lupus     Migraines, neuralgic     Nonspecific ulcerative colitis     OP (osteoporosis)     history of reclast    Osteopenia     Pancreatic cyst     Schatzki's ring     Steroid sulfatase deficiency     Ulcerative colitis     Vitamin D deficiency disease      Past Surgical History:   Procedure Laterality Date    ANORECTAL MANOMETRY N/A 1/13/2022    Procedure: MANOMETRY, ANORECTAL;  Surgeon: First Available Stu Gastelum;  Location: [facility] ([address] [place] [address]);  Service: Endoscopy;  Laterality: N/A;  " new order placed; original order placed incorrectly  1/7 instructions handed to patient-st    APPENDECTOMY      BREAST BIOPSY Right 2/2016    IDC    BREAST RECONSTRUCTION      BREAST SURGERY      CHOLECYSTECTOMY      COLON SURGERY      ENDOSCOPIC ULTRASOUND OF UPPER GASTROINTESTINAL TRACT N/A 8/6/2018    Procedure: ULTRASOUND, ENDOSCOPIC, UPPER GI TRACT;  Surgeon: Hong Finn MD;  Location: [facility] (2ND FLR);  Service: Endoscopy;  Laterality: N/A;    ESOPHAGOGASTRODUODENOSCOPY N/A 12/10/2018    Procedure: EGD (ESOPHAGOGASTRODUODENOSCOPY);  Surgeon: Reese Villalta MD;  Location: [facility] (4TH FLR);  Service: Endoscopy;  Laterality: N/A;    ESOPHAGOGASTRODUODENOSCOPY N/A 12/30/2021    Procedure: EGD (ESOPHAGOGASTRODUODENOSCOPY);  Surgeon: Reese Villalta MD;  Location: [facility] (2ND FLR);  Service: Endoscopy;  Laterality: N/A;  EGD for esophageal dysphagia and early satiety please schedule 1st provider available. wants this date-Dr Villalta only     fully vaccinated-GT  hx of adrenal insuffiency   12/27 arrival time confirmed with pt-rb    ESOPHAGOGASTRODUODENOSCOPY N/A 9/9/2022    Procedure: EGD (ESOPHAGOGASTRODUODENOSCOPY);  Surgeon: Reese Villalta MD;  Location: [facility] (4TH FLR);  Service: Endoscopy;  Laterality: N/A;  vacc  inst handed to pt-LW    ESOPHAGOGASTRODUODENOSCOPY N/A 3/14/2024    Procedure: EGD (ESOPHAGOGASTRODUODENOSCOPY);  Surgeon: Reese Villalta MD;  Location: [facility] (2ND FLR);  Service: Endoscopy;  Laterality: N/A;  prep instructions sent to pt via portal/ lan pt/ gastroparesis prep  3/8-lvm for precall-MS  3/11-precall complete-KPvt    FLEXIBLE SIGMOIDOSCOPY N/A 12/10/2018    Procedure: SIGMOIDOSCOPY, FLEXIBLE;  Surgeon: Reese Villalta MD;  Location: [facility] (4TH FLR);  Service: Endoscopy;  Laterality: N/A;  Recommend full split bowel prep for this study just like for a colonoscopy    HYSTERECTOMY      ILEOSCOPY N/A 2/16/2022    Procedure: ILEOSCOPY;   Surgeon: Ceferino Rangel MD;  Location: [facility] ([address] [place] [address]);  Service: Endoscopy;  Laterality: N/A;  medical history significant for Ulcerative colitis s/p total colectomy with ileoanal anastomosis ([id number]), SLE on plaquenil, adrenal insufficiency on prednisone, breast cancer, and HTN who presents with 3 month history of bilateral lower abdominal pain. Patient with abd    MASTECTOMY      OOPHORECTOMY Bilateral     restorative proctectomy      total abdominal colectomy with ileostomy  2010    TOTAL COLECTOMY      TOTAL REDUCTION MAMMOPLASTY Left 2017    UPPER ENDOSCOPIC ULTRASOUND W/ FNA         Medications:   Current Outpatient Medications on File Prior to Visit   Medication Sig Dispense Refill    (Magic mouthwash) 1:1:1 diphenhydrAMINE(Benadryl) 12.5mg/5ml liq, aluminum & magnesium hydroxide-simethicone (Maalox), LIDOcaine viscous 2% Swish and spit 5 mLs every 4 (four) hours as needed (mouth sores). for mouth sores 90 mL 1    albuterol (PROVENTIL/VENTOLIN HFA) 90 mcg/actuation inhaler Inhale 1-2 puffs into the lungs every 6 (six) hours as needed for Wheezing or Shortness of Breath. Rescue 8 g 0    amLODIPine (NORVASC) 5 MG tablet Take 1 tablet (5 mg total) by mouth once daily. 90 tablet 3    azelastine (ASTELIN) 137 mcg (0.1 %) nasal spray SPRAY 1 SPRAY BY NASAL ROUTE 2 TIMES DAILY. 90 mL 6    belimumab (BENLYSTA) 200 mg/mL AtIn Inject 1 mL (200 mg total) into the skin every 7 days. 4 each 2    calcium carbonate (OS-RISSA) 500 mg calcium (1,250 mg) tablet Take 1 tablet by mouth once daily.      cholecalciferol, vitamin D3, 125 mcg (5,000 unit) Tab Take 5,000 Units by mouth once daily.      CREON 36,000-114,000- 180,000 unit CpDR TAKE TWO CAPSULES BY MOUTH THREE TIMES DAILY WITH MEALS AND ONE CAPSULE WITH EACH SNACK 180 capsule 7    dexamethasone (DECADRON) 4 mg/mL injection Inject 1 mL (4 mg total) into the muscle daily as needed (Signs and symtpoms of severe adrenal insufficiency). 1 mL 1    diclofenac sodium  (VOLTAREN) 1 % Gel APPLY 2 GRAMS TO AFFECTED AREA 4 TIMES A  g 2    dicyclomine (BENTYL) 20 mg tablet TAKE 1 TABLET BY MOUTH EVERY 6 HOURS 360 tablet 0    ferrous sulfate (IRON ORAL) Take by mouth. No sure dose      fluticasone propionate (FLONASE) 50 mcg/actuation nasal spray 1 spray (50 mcg total) by Each Nostril route 2 (two) times daily as needed for Rhinitis or Allergies. 15 g 0    folic acid (FOLVITE) 1 MG tablet Take 1 mg by mouth once daily.      gabapentin (NEURONTIN) 300 MG capsule Take 1 capsule (300 mg total) by mouth 3 (three) times daily. 270 capsule 3    hydroCHLOROthiazide (HYDRODIURIL) 12.5 MG Tab Take 12.5 mg by mouth.      hydroxychloroquine (PLAQUENIL) 200 mg tablet TAKE 1 AND 1/2 TABLETS BY MOUTH DAILY 45 tablet 5    hydrOXYzine HCL (ATARAX) 25 MG tablet TAKE 1 TABLET BY MOUTH NIGHTLY AS NEEDED (INSOMNIA).      loperamide (IMODIUM) 2 mg capsule TAKE 1 TO 2 CAPSULES BY MOUTH FOUR TIMES DAILY 240 capsule 3    losartan (COZAAR) 50 MG tablet TAKE 1 TABLET BY MOUTH EVERY DAY 90 tablet 3    multivitamin/iron/folic acid (CENTRUM WOMEN ORAL) Take 1 tablet by mouth once daily.      nebivoloL (BYSTOLIC) 10 MG Tab       niacin 100 MG Tab Take by mouth. 1 Tablet Oral At bedtime      nystatin (MYCOSTATIN) cream Apply topically 2 (two) times daily. 30 g 0    omeprazole (PRILOSEC) 20 MG capsule Take 1 capsule (20 mg total) by mouth 2 (two) times daily. 180 capsule 2    ondansetron (ZOFRAN) 4 MG tablet Take 1 tablet (4 mg total) by mouth every 12 (twelve) hours as needed for Nausea. 30 tablet 6    oxyCODONE-acetaminophen (PERCOCET) 5-325 mg per tablet Take 1 tablet by mouth every 4 (four) hours as needed for Pain. 10 tablet 0    potassium chloride SA (K-DUR,KLOR-CON) 20 MEQ tablet TAKE 1 TABLET BY MOUTH TWICE A  tablet 3    predniSONE (DELTASONE) 5 MG tablet TAKE 1 TABLET BY MOUTH ONCE DAILY. DOUBLE THE DOSE IN TIMES OF ILLNESS 90 tablet 4    psyllium husk/aspartame (METAMUCIL FIBER SINGLES ORAL)  Take by mouth.      REPATHA SURECLICK 140 mg/mL PnIj INJECT 140 MG INTO THE SKIN EVERY 14 (FOURTEEN) DAYS      RESTASIS 0.05 % ophthalmic emulsion INSTILL 1 DROP INTO BOTH EYES TWICE A  vial 3    rizatriptan (MAXALT) 10 MG tablet TAKE 1 TABLET (10 MG TOTAL) BY MOUTH 2 (TWO) TIMES DAILY AS NEEDED FOR MIGRAINE. 27 tablet 3    rosuvastatin (CRESTOR) 5 MG tablet Take 5 mg by mouth.      SAVELLA 100 mg Tab TAKE 1 TABLET BY MOUTH EVERY DAY 90 tablet 3    zolpidem (AMBIEN) 5 MG Tab TAKE 1 TABLET BY MOUTH NIGHTLY AS NEEDED (INSOMNIA). 30 tablet 0    cetirizine (ZYRTEC) 10 MG tablet Take 1 tablet (10 mg total) by mouth once daily. 30 tablet 0    levocetirizine (XYZAL) 5 MG tablet Take 1 tablet (5 mg total) by mouth every evening. 90 tablet 3    valACYclovir (VALTREX) 1000 MG tablet Take 1 tablet (1,000 mg total) by mouth every 12 (twelve) hours. for 10 days 20 tablet 1     No current facility-administered medications on file prior to visit.       Social History:   Social History     Socioeconomic History    Marital status: Single    Number of children: 1    Years of education: 12 + 2   Occupational History    Occupation: disabled due to UC   Tobacco Use    Smoking status: Never    Smokeless tobacco: Never   Substance and Sexual Activity    Alcohol use: No     Alcohol/week: 0.0 standard drinks of alcohol    Drug use: No    Sexual activity: Not Currently     Partners: Male   Social History Narrative    Adult Screenings updated and reviewed  6/23/14    Mammogram( for females) last done  10/4/2013    Pap ( for females)s/p hysterectomy    Colonoscopy - Dr Carreno- done for  2013    Flu shot 10/2/2013     Td 2005 due again  2015    Pneumovax done 2008, updated  Today  6/23/14    Zostavax recommended one time at  age  58    Eye exam done 2014    Bone density  10/19/2013 History of reclast due in one year.     Social Determinants of Health     Financial Resource Strain: Medium Risk (1/31/2024)    Overall Financial Resource  "Strain (CARDIA)     Difficulty of Paying Living Expenses: Somewhat hard   Food Insecurity: Food Insecurity Present (1/31/2024)    Hunger Vital Sign     Worried About Running Out of Food in the Last Year: Sometimes true     Ran Out of Food in the Last Year: Sometimes true   Transportation Needs: No Transportation Needs (1/31/2024)    PRAPARE - Transportation     Lack of Transportation (Medical): No     Lack of Transportation (Non-Medical): No   Physical Activity: Sufficiently Active (1/31/2024)    Exercise Vital Sign     Days of Exercise per Week: 7 days     Minutes of Exercise per Session: 30 min   Stress: No Stress Concern Present (1/31/2024)    [employer] - Occupational Stress Questionnaire     Feeling of Stress : Not at all   Housing Stability: Low Risk  (1/31/2024)    Housing Stability Vital Sign     Unable to Pay for Housing in the Last Year: No     Number of Places Lived in the Last Year: 1     Unstable Housing in the Last Year: No       REVIEW OF SYSTEMS:  Constitution: Negative. Negative for chills, fever and night sweats.   Cardiovascular: Negative for chest pain and syncope.   Respiratory: Negative for cough and shortness of breath.   Gastrointestinal: See HPI. Negative for nausea/vomiting. Negative for abdominal pain.  Genitourinary: See HPI. Negative for discoloration or dysuria.  Skin: Negative for dry skin, itching and rash.   Hematologic/Lymphatic: Negative for bleeding problem. Does not bruise/bleed easily.   Musculoskeletal: Negative for falls and muscle weakness.   Neurological: See HPI. No seizures.   Endocrine: Negative for polydipsia, polyphagia and polyuria.   Allergic/Immunologic: Negative for hives and persistent infections.     EXAM:  Ht 5' 3" (1.6 m)   Wt 57.1 kg (125 lb 14.1 oz)   BMI 22.30 kg/m²     General: The patient is a 64 y.o. female in no apparent distress, the patient is oriented to person, place and time.  Psych: Normal mood and affect  HEENT: " Vision grossly intact, hearing intact to the spoken word.  Lungs: Respirations unlabored.  Gait: Normal station and gait, no difficulty with toe or heel walk.   Skin: Dorsal lumbar skin negative for rashes, lesions, hairy patches and surgical scars. There is mild lumbar tenderness to palpation.  Range of motion: Lumbar range of motion is acceptable.  Spinal Balance: Global saggital and coronal spinal balance acceptable, not significant for scoliosis and kyphosis.  Musculoskeletal: No pain with the range of motion of the bilateral hips. No trochanteric tenderness to palpation.  Vascular: Bilateral lower extremities warm and well perfused, dorsalis pedis pulses 2+ bilaterally.  Neurological: Normal strength and tone in all major motor groups in the bilateral lower extremities. Normal sensation to light touch in the L2-S1 dermatomes bilaterally.  Deep tendon reflexes symmetric 2+ in the bilateral lower extremities.  Negative Babinski bilaterally. Straight leg raise negative bilaterally.    IMAGING:      Today I personally reviewed AP, Lat and Flex/Ex  upright L-spine films that demonstrate severe DDD at L5/S1. Moderate DDD throughout. Kyphoplasty at L3.      Lumbar MRI shows mild stenosis from L2-4. Foraminal narrowing from L3-S1. Remote compression fracture of the L3 vertebral body with mild height loss and cement augmentation changes, similar when compared to radiograph dated 03/18/2024. No acute compression fracture.      Body mass index is 22.3 kg/m².    Hemoglobin A1C   Date Value Ref Range Status   04/25/2024 5.7 (H) 4.0 - 5.6 % Final     Comment:     ADA Screening Guidelines:  5.7-6.4%  Consistent with prediabetes  >or=6.5%  Consistent with diabetes    High levels of fetal hemoglobin interfere with the HbA1C  assay. Heterozygous hemoglobin variants (HbS, HgC, etc)do  not significantly interfere with this assay.   However, presence of multiple variants may affect accuracy.     11/28/2023 5.8 (H) 4.0 - 5.6 % Final      Comment:     ADA Screening Guidelines:  5.7-6.4%  Consistent with prediabetes  >or=6.5%  Consistent with diabetes    High levels of fetal hemoglobin interfere with the HbA1C  assay. Heterozygous hemoglobin variants (HbS, HgC, etc)do  not significantly interfere with this assay.   However, presence of multiple variants may affect accuracy.     03/13/2023 5.7 (H) 4.0 - 5.6 % Final     Comment:     ADA Screening Guidelines:  5.7-6.4%  Consistent with prediabetes  >or=6.5%  Consistent with diabetes    High levels of fetal hemoglobin interfere with the HbA1C  assay. Heterozygous hemoglobin variants (HbS, HgC, etc)do  not significantly interfere with this assay.   However, presence of multiple variants may affect accuracy.             ASSESSMENT/PLAN:    Efe was seen today for low-back pain, back pain and leg pain.    Diagnoses and all orders for this visit:    Chronic bilateral low back pain with left-sided sciatica  -     [facility]ulatory referral[facility]  -     Procedure Order to Pain Management; Future  -     methocarbamoL (ROBAXIN) 500 MG Tab; Take 1 tablet (500 mg total) by mouth 4 (four) times daily as needed (muscle tension).      Today we discussed at length all of the different treatment options including anti-inflammatories, acetaminophen, rest, ice, heat, physical therapy including strengthening and stretching exercises, home exercises, ROM, aerobic conditioning, aqua therapy, other modalities including ultrasound, massage, and dry needling, epidural steroid injections and finally surgical intervention.  left TF RIKKI ordered. Follow up 2 weeks after if her pain persists.

## 2024-07-16 NOTE — ED PROVIDER NOTES
"Encounter Date: 7/15/2024    SCRIBE #1 NOTE: I, Liliane Ortiz, am scribing for, and in the presence of,  Víctor Mayorga MD. I have scribed the following portions of the note - Other sections scribed: HPI,ROS,PE,MDM.       History     Chief Complaint   Patient presents with    Leg Pain     Pain radiating from left hip down to left foot for over 1 week. Denies injury. Taking Neurontin w/out relief.      Efe Horowitz is a 64 y.o. female, with a PMHx of sciatica, arthritis, osteoporosis, Lupus, who presents to the ED with complaint of left leg pain that shoots down from left hip to left lower leg that began 1 week ago. Patient reports pain is similar to sciatica. States she attempted treatment with Neurontin medication to no relief. No other exacerbating or alleviating factors. Denies any other concerns at this time.     The history is provided by the patient. No  was used.     Review of patient's allergies indicates:   Allergen Reactions    Aspirin      Other reaction(s): "hemorrhage"  hemorrhage    Hydrocortisone Nausea Only     Other reaction(s): sick  sick    Lactose intolerance (lactase) [lactase] Nausea And Vomiting    Vicodin [hydrocodone-acetaminophen]      Other reaction(s): hyperactivity    Asacol [mesalamine] Hallucinations     Other reaction(s): Hallucinations  hallucinations     Past Medical History:   Diagnosis Date    Acid reflux     Adrenal insufficiency, primary     Arthritis     Asthma     B12 deficiency anemia     Benign essential hypertension 2/7/2021    Blood transfusion 2010    Breast cancer 2/2016    Right breast infiltrating ductal CA    Cataract     Chronic pain     Deep vein thrombosis     Dry eyes     Esophagitis, unspecified     Fibromyalgia     General anesthetics causing adverse effect in therapeutic use     "slow to wake up bc I don't have an immune system    Heart murmur     History of colon polyps     Hyperlipidemia     Hypopituitary dwarfism     Iron " deficiency anemia     Lupus     Migraines, neuralgic     Nonspecific ulcerative colitis     OP (osteoporosis)     history of reclast    Osteopenia     Pancreatic cyst     Schatzki's ring     Steroid sulfatase deficiency     Ulcerative colitis     Vitamin D deficiency disease      Past Surgical History:   Procedure Laterality Date    ANORECTAL MANOMETRY N/A 1/13/2022    Procedure: MANOMETRY, ANORECTAL;  Surgeon: First Available Stu Gastelum;  Location: [facility] (4TH FLR);  Service: Endoscopy;  Laterality: N/A;  new order placed; original order placed incorrectly  1/7 instructions handed to patient-st    APPENDECTOMY      BREAST BIOPSY Right 2/2016    IDC    BREAST RECONSTRUCTION      BREAST SURGERY      CHOLECYSTECTOMY      COLON SURGERY      ENDOSCOPIC ULTRASOUND OF UPPER GASTROINTESTINAL TRACT N/A 8/6/2018    Procedure: ULTRASOUND, ENDOSCOPIC, UPPER GI TRACT;  Surgeon: Hong Finn MD;  Location: [facility] (2ND FLR);  Service: Endoscopy;  Laterality: N/A;    ESOPHAGOGASTRODUODENOSCOPY N/A 12/10/2018    Procedure: EGD (ESOPHAGOGASTRODUODENOSCOPY);  Surgeon: Reese Villalta MD;  Location: [facility] (4TH FLR);  Service: Endoscopy;  Laterality: N/A;    ESOPHAGOGASTRODUODENOSCOPY N/A 12/30/2021    Procedure: EGD (ESOPHAGOGASTRODUODENOSCOPY);  Surgeon: Reese Villalta MD;  Location: [facility] (2ND FLR);  Service: Endoscopy;  Laterality: N/A;  EGD for esophageal dysphagia and early satiety please schedule 1st provider available. wants this date-Dr Villalta only     fully vaccinated-GT  hx of adrenal insuffiency   12/27 arrival time confirmed with pt-rb    ESOPHAGOGASTRODUODENOSCOPY N/A 9/9/2022    Procedure: EGD (ESOPHAGOGASTRODUODENOSCOPY);  Surgeon: Reese Villalta MD;  Location: [facility] (4TH FLR);  Service: Endoscopy;  Laterality: N/A;  vacc  inst handed to pt-LW    ESOPHAGOGASTRODUODENOSCOPY N/A 3/14/2024    Procedure: EGD (ESOPHAGOGASTRODUODENOSCOPY);  Surgeon: Reese Villalta MD;  Location: [facility]  (2ND FLR);  Service: Endoscopy;  Laterality: N/A;  prep instructions sent to pt via portal/ lan pt/ gastroparesis prep  3/8-lvm for precall-MS  3/11-precall complete-KPvt    FLEXIBLE SIGMOIDOSCOPY N/A 12/10/2018    Procedure: SIGMOIDOSCOPY, FLEXIBLE;  Surgeon: Reese Villalta MD;  Location: [facility] (4TH FLR);  Service: Endoscopy;  Laterality: N/A;  Recommend full split bowel prep for this study just like for a colonoscopy    HYSTERECTOMY      ILEOSCOPY N/A 2/16/2022    Procedure: ILEOSCOPY;  Surgeon: Ceferino Rangel MD;  Location: [facility] (4TH FLR);  Service: Endoscopy;  Laterality: N/A;  medical history significant for Ulcerative colitis s/p total colectomy with ileoanal anastomosis (5223-3419), SLE on plaquenil, adrenal insufficiency on prednisone, breast cancer, and HTN who presents with 3 month history of bilateral lower abdominal pain. Patient with abd    MASTECTOMY      OOPHORECTOMY Bilateral     restorative proctectomy      total abdominal colectomy with ileostomy  2010    TOTAL COLECTOMY      TOTAL REDUCTION MAMMOPLASTY Left 2017    UPPER ENDOSCOPIC ULTRASOUND W/ FNA       Family History   Problem Relation Name Age of Onset    Hyperlipidemia Mother      Diabetes Father      Hyperlipidemia Father      Hypertension Father      Glaucoma Father      No Known Problems Sister      No Known Problems Brother      Cancer Maternal Aunt          pancreatic cancer, late 50s at dx    Pancreatic cancer Maternal Aunt      Breast cancer Maternal Aunt  55    Cancer Maternal Uncle  65        pancreatic cancer    Pancreatic cancer Maternal Uncle      No Known Problems Paternal Aunt      No Known Problems Paternal Uncle      No Known Problems Maternal Grandmother      No Known Problems Maternal Grandfather      No Known Problems Paternal Grandmother      No Known Problems Paternal Grandfather      Breast cancer Cousin M 1st cousin 28    Breast cancer Maternal Cousin  50    Breast cancer Other M Great aunt 45     Cancer Other nephew 25        liver cancer    Amblyopia Neg Hx      Blindness Neg Hx      Cataracts Neg Hx      Macular degeneration Neg Hx      Retinal detachment Neg Hx      Strabismus Neg Hx      Stroke Neg Hx      Thyroid disease Neg Hx      Ovarian cancer Neg Hx      Celiac disease Neg Hx      Colon cancer Neg Hx      Colon polyps Neg Hx      Esophageal cancer Neg Hx      Liver cancer Neg Hx      Rectal cancer Neg Hx      Stomach cancer Neg Hx      Ulcerative colitis Neg Hx      Inflammatory bowel disease Neg Hx       Social History     Tobacco Use    Smoking status: Never    Smokeless tobacco: Never   Substance Use Topics    Alcohol use: No     Alcohol/week: 0.0 standard drinks of alcohol    Drug use: No     Review of Systems   Constitutional:  Negative for fever.   HENT:  Negative for sore throat.    Respiratory:  Negative for shortness of breath.    Cardiovascular:  Negative for chest pain.   Gastrointestinal:  Negative for abdominal pain, diarrhea, nausea and vomiting.   Genitourinary:  Negative for dysuria.   Musculoskeletal:  Positive for myalgias (left leg). Negative for back pain.   Skin:  Negative for rash.   Neurological:  Negative for weakness and headaches.   Psychiatric/Behavioral:  Negative for behavioral problems.    All other systems reviewed and are negative.      Physical Exam     Initial Vitals [07/15/24 1933]   BP Pulse Resp Temp SpO2   135/82 69 14 97.9 °F (36.6 °C) 99 %      MAP       --         Physical Exam    Nursing note and vitals reviewed.  Constitutional: She appears well-developed and well-nourished.   HENT:   Head: Normocephalic and atraumatic.   Eyes: Conjunctivae are normal.   Neck: Neck supple.   Normal range of motion.  Cardiovascular:  Normal rate, regular rhythm and normal heart sounds.     Exam reveals no gallop and no friction rub.       No murmur heard.  Pulmonary/Chest: Breath sounds normal. No respiratory distress. She has no wheezes. She has no rhonchi. She has no  rales.   Abdominal: Abdomen is soft. There is no abdominal tenderness.   Musculoskeletal:         General: No edema. Normal range of motion.      Cervical back: Normal range of motion and neck supple.      Comments: There is left lumbar tenderness with movement radiating down left lower extremity. Bilateral lower extremities are neurovascularly intact.      Neurological: She is alert and oriented to person, place, and time. GCS score is 15. GCS eye subscore is 4. GCS verbal subscore is 5. GCS motor subscore is 6.   Skin: Skin is warm and dry.   Psychiatric: She has a normal mood and affect.         ED Course   Procedures  Labs Reviewed - No data to display       Imaging Results    None          Medications   dexAMETHasone injection 8 mg (8 mg Intramuscular Given 7/15/24 2050)     Medical Decision Making  fEe Horowitz is a 64 y.o. female, with a PMHx of sciatica, arthritis, osteoporosis, Lupus, who presents to the ED with complaint of left leg pain that shoots down from left hip to left lower leg that began 1 week ago. Patient reports pain is similar to sciatica. States she attempted treatment with Neurontin medication to no relief. No other exacerbating or alleviating factors. Denies any other concerns at this time.   Course of ED stay:   Patient received instructions for sciatica as well as Toradol/Decadron in the emergency department.  Instructions were to continue gabapentin and follow-up with her primary care physician within the next week for re-evaluation/return to the emergency department condition worsens.    Risk  Prescription drug management.            Scribe Attestation:   Scribe #1: I performed the above scribed service and the documentation accurately describes the services I performed. I attest to the accuracy of the note.                             This document was produced by a scribe under my direction and in my presence. I agree with the content of the note and have made any necessary  edits.     Dr. Mayorga    07/15/2024 11:11 PM    Clinical Impression:  Final diagnoses:  [M54.32] Sciatica, left side (Primary)          ED Disposition Condition    Discharge Stable          ED Prescriptions    None       Follow-up Information       Follow up With Specialties Details Why Contact Info    Shonda Angiuano MD Internal Medicine Schedule an appointment as soon as possible for a visit in 1 week For reevaluation [address]  [place] [address]  [phone]               Víctor Mayorga MD  07/15/24 [id number]

## 2024-07-16 NOTE — PROGRESS NOTES
Pt c low back pain c L sided sciatic sx extending down to her foot.  +numbness and tingling.  On Neurontin c minimal relief.  Will check MRI L spine and refer to back and spine clinic.

## 2024-07-17 DIAGNOSIS — F51.01 PRIMARY INSOMNIA: ICD-10-CM

## 2024-07-17 DIAGNOSIS — C50.211 MALIGNANT NEOPLASM OF UPPER-INNER QUADRANT OF RIGHT FEMALE BREAST, UNSPECIFIED ESTROGEN RECEPTOR STATUS: Primary | ICD-10-CM

## 2024-07-18 DIAGNOSIS — M32.9 SYSTEMIC LUPUS ERYTHEMATOSUS, UNSPECIFIED SLE TYPE, UNSPECIFIED ORGAN INVOLVEMENT STATUS: ICD-10-CM

## 2024-07-18 DIAGNOSIS — F51.01 PRIMARY INSOMNIA: ICD-10-CM

## 2024-07-18 RX ORDER — ZOLPIDEM TARTRATE 5 MG/1
TABLET ORAL
Qty: 30 TABLET | Refills: 0 | OUTPATIENT
Start: 2024-07-18

## 2024-07-18 RX ORDER — BELIMUMAB 200 MG/ML
200 SOLUTION SUBCUTANEOUS
Qty: 4 EACH | Refills: 2 | Status: CANCELLED | OUTPATIENT
Start: 2024-07-18

## 2024-07-18 RX ORDER — BELIMUMAB 200 MG/ML
200 SOLUTION SUBCUTANEOUS
Qty: 4 EACH | Refills: 2 | Status: ACTIVE | OUTPATIENT
Start: 2024-07-18

## 2024-07-18 NOTE — TELEPHONE ENCOUNTER
No care due was identified.  [facility] Embedded Care Due Messages. Reference number: [id number].   7/18/2024 1:22:03 PM CDT

## 2024-07-18 NOTE — TELEPHONE ENCOUNTER
----- Message from Clarisa Newman sent at 7/18/2024  1:08 PM CDT -----  Contact: [phone]  Requesting an RX refill or new RX.    Is this a refill or new RX:     RX name and strength (copy/paste from chart):  zolpidem (AMBIEN) 5 MG Tab 30 tablet    Is this a 30 day or 90 day RX:     Pharmacy name and phone # (copy/paste from chart):  [facility]/pharmacy #[id number] - [place] - [address]  [address] [place] [address]  Phone: [phone] Fax: [phone]

## 2024-07-19 RX ORDER — ZOLPIDEM TARTRATE 5 MG/1
TABLET ORAL
Qty: 30 TABLET | Refills: 0 | Status: SHIPPED | OUTPATIENT
Start: 2024-07-19

## 2024-07-19 RX ORDER — ZOLPIDEM TARTRATE 5 MG/1
5 TABLET ORAL NIGHTLY
Qty: 30 TABLET | Refills: 0 | OUTPATIENT
Start: 2024-07-19

## 2024-07-20 ENCOUNTER — HOSPITAL ENCOUNTER (OUTPATIENT)
Dept: RADIOLOGY | Facility: HOSPITAL | Age: 64
Discharge: HOME | End: 2024-07-20
Attending: INTERNAL MEDICINE
Payer: MEDICARE

## 2024-07-20 DIAGNOSIS — G89.29 CHRONIC BILATERAL LOW BACK PAIN WITH LEFT-SIDED SCIATICA: ICD-10-CM

## 2024-07-20 DIAGNOSIS — M54.9 DORSALGIA, UNSPECIFIED: ICD-10-CM

## 2024-07-20 DIAGNOSIS — M54.42 CHRONIC BILATERAL LOW BACK PAIN WITH LEFT-SIDED SCIATICA: ICD-10-CM

## 2024-07-20 PROCEDURE — 72148 MRI LUMBAR SPINE W/O DYE: CPT | Mod: 26,,, | Performed by: RADIOLOGY

## 2024-07-20 PROCEDURE — 72148 MRI LUMBAR SPINE W/O DYE: CPT | Mod: TC

## 2024-07-22 ENCOUNTER — PATIENT MESSAGE (OUTPATIENT)
Dept: INTERNAL MEDICINE | Facility: CLINIC | Age: 64
End: 2024-07-22
Payer: MEDICARE

## 2024-07-25 ENCOUNTER — HOSPITAL ENCOUNTER (OUTPATIENT)
Dept: RADIOLOGY | Facility: HOSPITAL | Age: 64
Discharge: HOME | End: 2024-07-25
Attending: INTERNAL MEDICINE
Payer: MEDICARE

## 2024-07-25 ENCOUNTER — OFFICE VISIT (OUTPATIENT)
Dept: ENDOCRINOLOGY | Facility: CLINIC | Age: 64
End: 2024-07-25
Payer: MEDICARE

## 2024-07-25 ENCOUNTER — CLINICAL SUPPORT (OUTPATIENT)
Dept: SPEECH THERAPY | Facility: HOSPITAL | Age: 64
End: 2024-07-25
Attending: INTERNAL MEDICINE
Payer: MEDICARE

## 2024-07-25 VITALS
HEIGHT: 63 IN | WEIGHT: 126.44 LBS | BODY MASS INDEX: 22.4 KG/M2 | SYSTOLIC BLOOD PRESSURE: 126 MMHG | OXYGEN SATURATION: 98 % | HEART RATE: 76 BPM | DIASTOLIC BLOOD PRESSURE: 78 MMHG

## 2024-07-25 DIAGNOSIS — E55.9 VITAMIN D DEFICIENCY, UNSPECIFIED: ICD-10-CM

## 2024-07-25 DIAGNOSIS — M80.00XG AGE-RELATED OSTEOPOROSIS WITH CURRENT PATHOLOGICAL FRACTURE WITH DELAYED HEALING: ICD-10-CM

## 2024-07-25 DIAGNOSIS — R73.03 PREDIABETES: ICD-10-CM

## 2024-07-25 DIAGNOSIS — M85.80 OSTEOPENIA, UNSPECIFIED LOCATION: ICD-10-CM

## 2024-07-25 DIAGNOSIS — M85.851 OSTEOPENIA OF NECKS OF BOTH FEMURS: Primary | ICD-10-CM

## 2024-07-25 DIAGNOSIS — R13.10 DYSPHAGIA, UNSPECIFIED TYPE: ICD-10-CM

## 2024-07-25 DIAGNOSIS — E27.49 SECONDARY ADRENAL INSUFFICIENCY: ICD-10-CM

## 2024-07-25 DIAGNOSIS — M85.852 OSTEOPENIA OF NECKS OF BOTH FEMURS: Primary | ICD-10-CM

## 2024-07-25 PROCEDURE — 3008F BODY MASS INDEX DOCD: CPT | Mod: CPTII,S$GLB,, | Performed by: INTERNAL MEDICINE

## 2024-07-25 PROCEDURE — 1101F PT FALLS ASSESS-DOCD LE1/YR: CPT | Mod: CPTII,S$GLB,, | Performed by: INTERNAL MEDICINE

## 2024-07-25 PROCEDURE — 74230 X-RAY XM SWLNG FUNCJ C+: CPT | Mod: TC

## 2024-07-25 PROCEDURE — A9698 NON-RAD CONTRAST MATERIALNOC: HCPCS | Performed by: INTERNAL MEDICINE

## 2024-07-25 PROCEDURE — 4010F ACE/ARB THERAPY RXD/TAKEN: CPT | Mod: CPTII,S$GLB,, | Performed by: INTERNAL MEDICINE

## 2024-07-25 PROCEDURE — 92610 EVALUATE SWALLOWING FUNCTION: CPT | Performed by: SPEECH-LANGUAGE PATHOLOGIST

## 2024-07-25 PROCEDURE — 25500020 PHARM REV CODE 255: Performed by: INTERNAL MEDICINE

## 2024-07-25 PROCEDURE — 3044F HG A1C LEVEL LT 7.0%: CPT | Mod: CPTII,S$GLB,, | Performed by: INTERNAL MEDICINE

## 2024-07-25 PROCEDURE — 1159F MED LIST DOCD IN RCRD: CPT | Mod: CPTII,S$GLB,, | Performed by: INTERNAL MEDICINE

## 2024-07-25 PROCEDURE — 92611 MOTION FLUOROSCOPY/SWALLOW: CPT | Performed by: SPEECH-LANGUAGE PATHOLOGIST

## 2024-07-25 PROCEDURE — 99999 PR PBB SHADOW E&M-EST. PATIENT-LVL V: CPT | Mod: PBBFAC,,, | Performed by: INTERNAL MEDICINE

## 2024-07-25 PROCEDURE — 3074F SYST BP LT 130 MM HG: CPT | Mod: CPTII,S$GLB,, | Performed by: INTERNAL MEDICINE

## 2024-07-25 PROCEDURE — 3078F DIAST BP <80 MM HG: CPT | Mod: CPTII,S$GLB,, | Performed by: INTERNAL MEDICINE

## 2024-07-25 PROCEDURE — 1125F AMNT PAIN NOTED PAIN PRSNT: CPT | Mod: CPTII,S$GLB,, | Performed by: INTERNAL MEDICINE

## 2024-07-25 PROCEDURE — 99214 OFFICE O/P EST MOD 30 MIN: CPT | Mod: S$GLB,,, | Performed by: INTERNAL MEDICINE

## 2024-07-25 PROCEDURE — 3288F FALL RISK ASSESSMENT DOCD: CPT | Mod: CPTII,S$GLB,, | Performed by: INTERNAL MEDICINE

## 2024-07-25 RX ORDER — ZOLEDRONIC ACID 5 MG/100ML
5 INJECTION, SOLUTION INTRAVENOUS
OUTPATIENT
Start: 2024-07-25

## 2024-07-25 RX ORDER — SODIUM CHLORIDE 0.9 % (FLUSH) 0.9 %
10 SYRINGE (ML) INJECTION
OUTPATIENT
Start: 2024-07-25

## 2024-07-25 RX ADMIN — BARIUM SULFATE 60 ML: 0.81 POWDER, FOR SUSPENSION ORAL at 02:07

## 2024-07-25 NOTE — PROGRESS NOTES
I have reviewed and concur with Dr. Lindsay's history, physical, assessment, and plan.  I have personally interviewed and examined the patient.  See below addendum for my evaluation and additional findings.    Agree with reclast X1  Reviewed administration, double dose of vitamin D, drink plenty of fluids and acetaminophen prior to and Q8 hours for one day    Ena Srinivasan MD

## 2024-07-25 NOTE — PATIENT INSTRUCTIONS
I have ordered the reclast. There are a few things I ask my patients to do before receiving reclast infusion.     1) The day before and day of the infusion please drink plenty of fluids.   2) The day of the infusion take over the counter tylenol one dose every six to eight hours for one day. This minimizes the joint pains that can occur with reclast.  3) Take an extra dose of over the counter vitamin D the day before the infusion.

## 2024-07-25 NOTE — ASSESSMENT & PLAN NOTE
Currently taking prednisone 5mg daily for UC and Lupus, no other steroids being used at this time  Denies any upcoming surgeries or procedures  Is aware to notify before any procedure or surgery to stress dose steroids  Denies any symptoms since last seen    -Continue current prednisone regimen  -Notify team if any planned procedure is pending  -Wears medic alert

## 2024-07-25 NOTE — ASSESSMENT & PLAN NOTE
Reviewed previous DEXA scans, appears last one is from 2021, compared to 2019, some difference/variability in scan at area of interest of the hip therefore analyzing is not accurate.   Reported on last DEXA scan (2021) an 11% decrease at the hip. FRAX  11% total, and 1.8% at the hip.  Last Reclast infusion was 2021    -Will go ahead and order another reclast infusion, consent signed on this visit  -On next DEXA scan to be planned in 2 years, advise techs to perform 1/3 distal forearm for better analysis of scan due to variability secondary to positioning and kyphoplasty procedure.  -Continue Calcium and Vitamin D supplementation  -Will order a Vitamin D level on this visit.

## 2024-07-26 NOTE — PROGRESS NOTES
See Modified Barium Swallow Study (MBSS) in plan of care.     Alysia Fuentes MA, L-SLP, CCC-SLP  Speech Language Pathologist  7/26/2024

## 2024-07-26 NOTE — PLAN OF CARE
[facility]   Outpatient MODIFIED BARIUM SWALLOW STUDY    Date: 7/25/2024     Name: Efe Horowitz   MRN: [id number]    Therapy Diagnosis: mild pharyngeal-esophageal dysphagia    Physician: Ceferino Rangel MD  Physician Orders: Fl Modified Barium Swallow Speech/SLP Video Swallow  Medical Diagnosis from Referral: Dysphagia, unspecified type [R13.10]     Date of Evaluation:  7/25/2024    Procedure Min.   Swallow and Oral Function Evaluation   15   Fl Modified Barium Swallow Speech  15     Time in: 2:40 PM  Time out: 3:10 PM  Total Billable Time: 30 minutes    Radiation time: 2.9 minutes    Precautions: Standard  Subjective   History of Current Condition: Efe Horowitz is a 64 y.o. female here today for Modified Barium Swallow Study (MBSS). Patient's medical history is significant for migraine syndrome, pulmonary hypertension, hyperlipidemia, bilateral carotid artery disease, lupus, immunosuppression due to drug therapy, history of breast cancer, long-term use of steroids, prediabetes, ulcerative colitis, GERD, chronic pancreatitis, and s/p colectomy. She reports years long history of difficulty swallow pills and solids, and infrequently liquids. She reports primary symptom is globus sensation which lasts for hours after taking medication or eating. Use of liquid was is ineffective to clear globus sensation. Symptoms have recently progressed to occurring more at night, waking her up from sleep with associated burning sensation/acid taste in her mouth and infrequently resulting in emesis. She denies overt signs of aspiration, unintentional weight loss, or recent pneumonia. She does have significant GERD for which she takes Prilosec daily.     In consideration of the interrelationships between body systems and swallowing, History was provided by patient, and/or taken from chart review. The following are medical conditions present in the patient's history which can result in or be  attributed to dysphagia:  Respiratory None noted in this category   Cardiovascular Pulmonary hypertension   Hyperlipidemia   Bilateral carotid artery disease    Digestive GERD  Chronic pancreatitis    Infections  None noted in this category   Urinary None noted in this category   Endocrine Prediabetes    Nervous None noted in this category   Skeletal None noted in this category   Immune Lupus   Cancer Breast    Psychiatric  None noted in this category   Congenital  None noted in this category   Other None noted in this category     -Current diet at home: regular diet with thin liquids (IDDSI 7/0)  -Recommended diet from previous study: NA  -Therapy received: NA    The following observations were made:   -Mental status: Alert and Cooperative  -Factors affecting performance: no difficulties participating in the study  -Feeding Method: independent in self-feeding    Respiratory Status:   -Respiratory Status: room air    Past Medical History: Efe Horowitz  has a past medical history of Acid reflux, Adrenal insufficiency, primary, Arthritis, Asthma, B12 deficiency anemia, Benign essential hypertension (2/7/2021), Blood transfusion (2010), Breast cancer (2/2016), Cataract, Chronic pain, Deep vein thrombosis, Dry eyes, Esophagitis, unspecified, Fibromyalgia, General anesthetics causing adverse effect in therapeutic use, Heart murmur, History of colon polyps, Hyperlipidemia, Hypopituitary dwarfism, Iron deficiency anemia, Lupus, Migraines, neuralgic, Nonspecific ulcerative colitis, OP (osteoporosis), Osteopenia, Pancreatic cyst, Schatzki's ring, Steroid sulfatase deficiency, Ulcerative colitis, and Vitamin D deficiency disease.  Efe Horowitz  has a past surgical history that includes restorative proctectomy; total abdominal colectomy with ileostomy (2010); Cholecystectomy; Hysterectomy; Oophorectomy (Bilateral); Appendectomy; Breast biopsy (Right, 2/2016); Colon surgery; Breast surgery; Total Reduction  "Mammoplasty (Left, 2017); Upper endoscopic ultrasound w/ FNA; Endoscopic ultrasound of upper gastrointestinal tract (N/A, 8/6/2018); Esophagogastroduodenoscopy (N/A, 12/10/2018); Flexible sigmoidoscopy (N/A, 12/10/2018); Mastectomy; Breast reconstruction; Total colectomy; Esophagogastroduodenoscopy (N/A, 12/30/2021); Anorectal manometry (N/A, 1/13/2022); Ileoscopy (N/A, 2/16/2022); Esophagogastroduodenoscopy (N/A, 9/9/2022); and Esophagogastroduodenoscopy (N/A, 3/14/2024).    Medical Hx and Allergies:   Review of patient's allergies indicates:   Allergen Reactions    Aspirin      Other reaction(s): "hemorrhage"  hemorrhage    Hydrocortisone Nausea Only     Other reaction(s): sick  sick    Lactose intolerance (lactase) [lactase] Nausea And Vomiting    Vicodin [hydrocodone-acetaminophen]      Other reaction(s): hyperactivity    Asacol [mesalamine] Hallucinations     Other reaction(s): Hallucinations  hallucinations       Relevant Imaging:    Barium Esophagram 6/19/24:  FINDINGS:  Swallowing: The oral and pharyngeal stages of swallowing are normal without evidence of laryngeal penetration or aspiration. No pharyngeal mass. 13 mm barium tablet readily passes into the stomach.     Esophagus: The esophagus is normal in contour and caliber without evidence of masses or strictures. Normal esophageal motility is noted.     Gastroesophageal reflux: Single episode of gastroesophageal reflux with stress maneuvers.  No spontaneous reflux.     Other findings: Small sliding hiatal hernia.  The gastric cardia is unremarkable in appearance.     Impression:     Small hiatal hernia.  Single episode of gastroesophageal reflux with stress maneuvers.     Electronically signed by resident: Carson Villalpando  Date:                                            06/19/2024  Time:                                           09:16     Electronically signed by:Marcio Castillo MD  Date:                                            06/19/2024  Time:          "                                  14:39    EGD/Upper GI Endoscopy, 3/14/24:      Findings:       The examined duodenum was normal. Biopsies for histology were taken        with a cold forceps for evaluation of celiac disease. Biopsies were        taken with a cold forceps for histology. Estimated blood loss was        minimal.        Patchy mildly erythematous mucosa without bleeding was found in the        cardia, in the gastric fundus, in the gastric body and in the        gastric antrum. Biopsies were taken with a cold forceps for        histology. Biopsies were taken with a cold forceps for histology.        Estimated blood loss was minimal.        The cardia and gastric fundus were normal on retroflexion.        A 3 cm hiatal hernia was found. The proximal extent of the gastric        folds (end of tubular esophagus) was 33 cm from the incisors. The        hiatal narrowing was 36 cm from the incisors. The Z-line was 33 cm        from the incisors. Z-line was sharp. No esophagitis and no columnar        esophagus and no lesions seen with NBI or white light.        The Z-line was regular and was found 33 cm from the incisors.        Biopsies were obtained from the proximal and distal esophagus with        cold forceps for histology of suspected eosinophilic esophagitis.        Estimated blood loss was minimal.   Impression:            - Normal examined duodenum. Biopsied.                          - Erythematous mucosa in the cardia, gastric                          fundus, gastric body and antrum. Biopsied.                          - 3 cm hiatal hernia.                          - Z-line regular, 33 cm from the incisors.                          - Biopsies were taken with a cold forceps for                          evaluation of eosinophilic esophagitis     Objective     Modified Barium Swallow Study  Purpose: to evaluate anatomy and physiology of the oropharyngeal swallow, to determine effectiveness of  "rehabilitation strategies, and to determine diet consistency and intervention recommendations. The study was performed using the "Gold Standard" of 30 fps with as low as reasonably achievable (ALARA) exposure.     The patient was seen in radiology seated in High Cameron's position in a video imaging chair for lateral views of the larynx and an A/P view. The study was conducted using Varibar thin liquid (IDDSI 0), Varibar nectar liquid (IDDSI 2), Varibar pudding (IDDSI 4), Peaches mixed with Varibar thin liquid (IDDSI 6/0), and solid coated in Varibar pudding (IDDSI 7). She tolerated the procedure well.    A cranial nerve evaluation revealed:   Cranial Nerve Examination  Cranial Nerve 5: Trigeminal Nerve  Motor Jaw Posture at rest: Closed  Mandible Elevation/Depression: WFL  Mandible lateralization: WFL  Abnormal movement: absent Interpretation: Intact bilaterally    Sensory Forehead: WFL  Cheek: WFL  Jaw: WFL  Facial Pain: None noted Interpretation: Intact bilaterally      Cranial Nerve 7: Facial Nerve  Motor Facial Symmetry: WNL  Wrinkle Forehead: WFL  Close eyes tightly: WFL  Labial Protrusion: WFL  Labial Retraction: WFL  Buccal Strength with Labial Seal: WFL  Abnormal movement: absent Interpretation: Intact bilaterally    Sensory Formal testing not completed.       Cranial Nerves IX and X: Glossopharyngeal and Vagus Nerves  Motor Palatal Symmetry (Rest): WNL  Palatal Symmetry (Movement): WNL  Cough: Perceptually strong  Voice Prior to PO intake: Clear  Resonance: Normal  Abnormal movement: absent Interpretation: Intact bilaterally      Cranial Nerve XII: Hypoglossal Nerve  Motor Tongue at rest: WNL  Lingual Protrusion: WNL  Lingual Protrusion against Resistance: WNL  Lingual Lateralization: WNL  Abnormal movement: absent Interpretation: Intact bilaterally      Other information:  Volitional Swallow: Able to palpate laryngeal rise  Mucosal Quality: No abnormal findings  Secretion Management: no overt deficits " noted/observed  Dentition:  partial top and back bottom, and Good condition for speech and mastication        Consistency  Presentation  Findings Strategy Attempted Rosenbeck's Penetration/Aspiration Scale (PAS)   Thin (IDDSI 0) Method: Self-fed    Volume: cup sip x5,  sequential sips    Projection: lateral view; AP view  Oral phase: WFL    Pharyngeal phase: flash, deep penetration during the swallow for single sips and sequential sips. Trace base of tongue, pyriform sinus and valleculae residue clears with spontaneous sequential swallow.     Esophageal screen: in AP view, aerophagia, trace retrograde flow of bolus at the distal esophagus and retention at the medial-distal esophagus that does not clear within 1 minute of swallow initiation.   Best: (1) Material does not enter the airway    Worst: (4) Material enters the airway, contacts the vocal folds, and is ejected from the airway     Nectar thick (mildly thick/IDDSI 2) Method:Self-fed    Volume: cup sip x2     Projection: lateral view Oral phase: WFL    Pharyngeal phase: WFL      Best: (1) Material does not enter the airway    Worst: (1) Material does not enter the airway     Puree (extremely thick/ IDDSI 4) Method: Self-fed    Volume: tsp bite x2     Projection: lateral view Oral phase: WFL    Pharyngeal phase: WFL        Best: (1) Material does not enter the airway    Worst: (1) Material does not enter the airway     Solid (regular/ IDDSI 7) Method: Self-fed    Volume: bite x3     Projection: lateral view, AP view Oral phase: WFL    Pharyngeal phase: WFL    Esophageal screen: in AP view, retrograde flow of bolus at the distal esophagus with retention/delayed emptying at the medial-distal esophagus that does not clear within 1 minute of swallow initiation   Best: (1) Material does not enter the airway    Worst: (1) Material does not enter the airway     Mixed consistency (thin/ IDDSI 0 + soft and bite sized/ IDDSI 6) Method: Self-fed    Volume: bite x2      Projection: lateral view Oral phase: WFL    Pharyngeal phase: WFL  Best: (1) Material does not enter the airway    Worst: (1) Material does not enter the airway     Barium tablet  Method: Self-fed    Volume: single tablet with water bolus(es)    Projection: AP view Timely and efficient oropharyngeal clearance.          Treatment   Treatment Time In: -  Treatment Time Out: -  Total Treatment Time: 0 mins  Patient educated regarding results and recommendations of the evaluation. See the recommendations section below.    Education: Plan of Care and role of SLP in care and anatomy and physiology of swallow mechanism as it relates to MBSS findings and recommendations were discussed with the patient. Patient expressed understanding. All questions were answered.     Assessment     Efe Horowitz is a 64 y.o. female referred for Modified Barium Swallow Study with a medical diagnosis of Dysphagia, unspecified type [R13.10]. The patient presents with mild pharyngeal-esophageal dysphagia as determined by the Dysphagia Outcome and Severity Scale (ELIZABETH). Level 5: Mild Dysphagia.    Modified Barium Swallow Study (MBSS) revealed oral phase characterized by adequate lingual and labial strength and range of motion for tongue control, bolus preparation and transport. Lip closure was adequate with no labial escape. Bolus prep and mastication was timely and efficient. Lingual motion was brisk for adequate bolus transport. There was no significant oral residue. The swallow was initiated when the head of the bolus passed the ramus of the mandible.    Pharyngeal phase characterized by timely initiation of swallow across consistencies. The soft palate elevated for complete closure of the velopharyngeal port. During pharyngeal swallow, adequate base of tongue retraction, anterior hyoid excursion, laryngeal elevation, and pharyngeal stripping wave resulted in incomplete epiglottic inversion and UES opening with flash, deep  penetration of thin liquids during the swallow that clears laryngeal vestibule spontaneously. Transient laryngeal penetration is not indicative of swallowing dysfunction in patients over 65 years old (Lolis et al, 2006). No aspiration or significant pharyngeal residue was observed in today's study.     Robust Esophageal Screening Test (REST) was used to screen esophageal phase of swallow (Tran et al, 2021) in today's study, completed in anterior/posterior view, with intake of barium coated solid, large self-regulated sips of liquid, and barium tablet. Screening significant for dysmotility with deviation of solid bolus flow through the esophagus, deviation of straight esophageal contour/deviation of liquid bolus flow, retrograde movement of thin and solid boluses within esophagus, disordered peristalsis resulting in retrograde flow/retention of bolus >1 minute for solid or liquid, and aerophagia noted. Further esophageal imaging including high-resolution manometry as well as follow-up with GI is recommended.      Impressions: Patient presents with mild pharyngeal-esophageal dysphagia, likely chronic and related to age and primary esophageal findings in today's study in the setting of chronic and significant GERD, resulting in consistent aerophagia, retrograde flow of boluses within the esophagus, and retention/delayed emptying > 1 minute post swallow initiation for solid and liquid boluses as observed in AP view in today's study. Patient was educated regarding findings and recommendations including normal oropharyngeal swallow functioning, esophageal findings, and dysmotility/GERD strategies to implement during/after meals; she expressed understanding of all information provided. In consideration of the Dynamic Imaging Grade of Swallowing Toxicity (DIGEST) (Serjio et al, 2017), patient presents with preserved safety of swallow and preserved efficiency of swallow. Patient appears to be at low risk for  aspiration related PNA in consideration of three pillars of aspiration pneumonia (Hardeep, 2005) including preserved oral health status, overall health/immune status, and laryngeal vestibule closure/severity of dysphagia. However, unable to assess risk related to aspiration pneumonia cause by the aspiration of gastric content. Patient appears to be a poor candidate for behavioral swallow rehabilitation.     Functional Oral Intake Scale (FOIS)  The Functional Oral Intake Scale (FOIS) is an ordinal scale that is used to assess the current status and meaningful change in the oral intake. FOIS levels include:    TUBE DEPENDENT (levels 1-3) 1. No oral intake  2. Tube dependent with minimal/inconsistent oral intake  3. Tube supplements with consistent oral intake      TOTAL ORAL INTAKE (levels 4-7) 4. Total oral intake of a single consistency  5. Total oral intake of multiple consistencies requiring special preparation  6. Total oral intake with no special preparation, but must avoid specific foods or liquid items  7. Total oral intake with no restrictions     Patient is currently judged to be at FOIS level 7.      References:  HERRERA Meier (2005, March). Pneumonia: Factors Beyond Aspiration. Perspectives in Swallowing and Swallowing Disorders (Dysphagia), 14, 10-16.  SOFYA Danielle., REHANA Douglas., CHRISTINA Forrester, & JAQUELINE Beltran. (2006). Laryngeal penetration during deglutition in normal subjects of various ages. Dysphagia, 21(4), 270-274. [email or web address]/[email or web address]  González KA, Tanvir MP, Berlin DA, Claudette JK, Kylee HY, Elbert RS, Gilmer CD, Kurtis SY, Maxwell CP, Benson J, Lazarus CL, May A, Jarrod J, Antelmo JW, Uriel HM, Dimas JS. Dynamic Imaging Grade of Swallowing Toxicity (DIGEST): Scale development and validation. Cancer. 2017 Jan 1;123(1):62-70. doi: 10.1002/cncr.01923. Epub 2016 Aug 26. PMID: 17665587; PMCID: [id number].  CHERELLE Tran, HERRERA Johns, CHRISTINA Flaherty, & HERRERA Sanders (2021). Diagnostic Accuracy of an  Esophageal Screening Protocol Interpreted by the Speech-Language Pathologist. Dysphagia, 366), 5996-6492. [email or web address]    Recommendations:     Consistency Recommendations: Thin liquids (IDDSI 0) and Regular consistencies (IDDSI 7).  Medications should be taken whole in thin liquids.   Risk Management/Swallow Guidelines: use good oral hygiene, sit upright for all PO intake, increase physical mobility as tolerated, behavioral reflux precautions, remain upright for at least 2-3 hours following any PO intake, and eat small meals throughout the day to reduce discomfort associated with delayed emptying of the esophagus  Specialist Referrals: GI  Ancillary Tests: Consider High Resolution Manometry   Therapy: Dysphagia therapy is not recommended at this time.  Follow-up exam: Follow up swallow study is not indicated at this time.    Please contact [facility] at [phone] if there are questions re: the above or if we can be of additional service to this patient.    Alysia Fuentes MA, L-SLP, CCC-SLP  Speech Language Pathologist  7/26/2024

## 2024-07-30 ENCOUNTER — OFFICE VISIT (OUTPATIENT)
Dept: ORTHOPEDICS | Facility: CLINIC | Age: 64
End: 2024-07-30
Payer: MEDICARE

## 2024-07-30 VITALS — WEIGHT: 125.88 LBS | BODY MASS INDEX: 22.3 KG/M2 | HEIGHT: 63 IN

## 2024-07-30 DIAGNOSIS — M54.42 CHRONIC BILATERAL LOW BACK PAIN WITH LEFT-SIDED SCIATICA: ICD-10-CM

## 2024-07-30 DIAGNOSIS — M54.42 CHRONIC BILATERAL LOW BACK PAIN WITH LEFT-SIDED SCIATICA: Primary | ICD-10-CM

## 2024-07-30 DIAGNOSIS — G89.29 CHRONIC BILATERAL LOW BACK PAIN WITH LEFT-SIDED SCIATICA: Primary | ICD-10-CM

## 2024-07-30 DIAGNOSIS — G89.29 CHRONIC BILATERAL LOW BACK PAIN WITH LEFT-SIDED SCIATICA: ICD-10-CM

## 2024-07-30 PROCEDURE — 99999 PR PBB SHADOW E&M-EST. PATIENT-LVL IV: CPT | Mod: PBBFAC,,, | Performed by: REGISTERED NURSE

## 2024-07-30 RX ORDER — ROSUVASTATIN CALCIUM 5 MG/1
5 TABLET, COATED ORAL
COMMUNITY
Start: 2024-07-26

## 2024-07-30 RX ORDER — METHOCARBAMOL 500 MG/1
500 TABLET, FILM COATED ORAL 4 TIMES DAILY PRN
Qty: 40 TABLET | Refills: 0 | Status: SHIPPED | OUTPATIENT
Start: 2024-07-30 | End: 2024-08-09

## 2024-08-01 ENCOUNTER — PATIENT MESSAGE (OUTPATIENT)
Dept: PAIN MEDICINE | Facility: OTHER | Age: 64
End: 2024-08-01
Payer: MEDICARE

## 2024-08-06 ENCOUNTER — HOSPITAL ENCOUNTER (OUTPATIENT)
Facility: OTHER | Age: 64
Discharge: HOME | End: 2024-08-06
Attending: ANESTHESIOLOGY | Admitting: ANESTHESIOLOGY
Payer: MEDICARE

## 2024-08-06 VITALS
WEIGHT: 120 LBS | HEART RATE: 68 BPM | OXYGEN SATURATION: 97 % | TEMPERATURE: 98 F | RESPIRATION RATE: 16 BRPM | SYSTOLIC BLOOD PRESSURE: 155 MMHG | HEIGHT: 64 IN | BODY MASS INDEX: 20.49 KG/M2 | DIASTOLIC BLOOD PRESSURE: 74 MMHG

## 2024-08-06 DIAGNOSIS — G89.29 CHRONIC PAIN: ICD-10-CM

## 2024-08-06 DIAGNOSIS — M54.17 LUMBOSACRAL RADICULOPATHY: Primary | ICD-10-CM

## 2024-08-06 PROCEDURE — 64483 NJX AA&/STRD TFRM EPI L/S 1: CPT | Mod: LT | Performed by: ANESTHESIOLOGY

## 2024-08-06 PROCEDURE — 63600175 PHARM REV CODE 636 W HCPCS: Performed by: ANESTHESIOLOGY

## 2024-08-06 PROCEDURE — 25500020 PHARM REV CODE 255: Performed by: ANESTHESIOLOGY

## 2024-08-06 PROCEDURE — 25000003 PHARM REV CODE 250: Performed by: ANESTHESIOLOGY

## 2024-08-06 PROCEDURE — 64484 NJX AA&/STRD TFRM EPI L/S EA: CPT | Mod: LT,,, | Performed by: ANESTHESIOLOGY

## 2024-08-06 PROCEDURE — 25000003 PHARM REV CODE 250: Performed by: STUDENT IN AN ORGANIZED HEALTH CARE EDUCATION/TRAINING PROGRAM

## 2024-08-06 PROCEDURE — 64483 NJX AA&/STRD TFRM EPI L/S 1: CPT | Mod: LT,,, | Performed by: ANESTHESIOLOGY

## 2024-08-06 PROCEDURE — 64484 NJX AA&/STRD TFRM EPI L/S EA: CPT | Mod: LT | Performed by: ANESTHESIOLOGY

## 2024-08-06 RX ORDER — SODIUM CHLORIDE 9 MG/ML
INJECTION, SOLUTION INTRAVENOUS CONTINUOUS
Status: DISCONTINUED | OUTPATIENT
Start: 2024-08-06 | End: 2024-08-06 | Stop reason: HOSPADM

## 2024-08-06 RX ORDER — LIDOCAINE HYDROCHLORIDE 20 MG/ML
INJECTION, SOLUTION INFILTRATION; PERINEURAL
Status: DISCONTINUED | OUTPATIENT
Start: 2024-08-06 | End: 2024-08-06 | Stop reason: HOSPADM

## 2024-08-06 RX ORDER — FENTANYL CITRATE 50 UG/ML
INJECTION, SOLUTION INTRAMUSCULAR; INTRAVENOUS
Status: DISCONTINUED | OUTPATIENT
Start: 2024-08-06 | End: 2024-08-06 | Stop reason: HOSPADM

## 2024-08-06 RX ORDER — DEXAMETHASONE SODIUM PHOSPHATE 10 MG/ML
INJECTION INTRAMUSCULAR; INTRAVENOUS
Status: DISCONTINUED | OUTPATIENT
Start: 2024-08-06 | End: 2024-08-06 | Stop reason: HOSPADM

## 2024-08-06 RX ORDER — MIDAZOLAM HYDROCHLORIDE 1 MG/ML
INJECTION INTRAMUSCULAR; INTRAVENOUS
Status: DISCONTINUED | OUTPATIENT
Start: 2024-08-06 | End: 2024-08-06 | Stop reason: HOSPADM

## 2024-08-06 RX ORDER — LIDOCAINE HYDROCHLORIDE 10 MG/ML
INJECTION, SOLUTION EPIDURAL; INFILTRATION; INTRACAUDAL; PERINEURAL
Status: DISCONTINUED | OUTPATIENT
Start: 2024-08-06 | End: 2024-08-06 | Stop reason: HOSPADM

## 2024-08-06 NOTE — DISCHARGE SUMMARY
Discharge Note  Short Stay      SUMMARY     Admit Date: 8/6/2024    Attending Physician: Sadie Anguiano      Discharge Physician: Sadie Anguiano      Discharge Date: 8/6/2024 3:24 PM    Procedure(s) (LRB):  LUMBAR TRANSFORAMINAL LEFT L4/5 AND L5/S1 DIRECT REFERRAL (Left)    Final Diagnosis: Chronic bilateral low back pain with left-sided sciatica [M54.42, G89.29]    Disposition: Home or self care    Patient Instructions:   Current Discharge Medication List        CONTINUE these medications which have NOT CHANGED    Details   (Magic mouthwash) 1:1:1 diphenhydrAMINE(Benadryl) 12.5mg/5ml liq, aluminum & magnesium hydroxide-simethicone (Maalox), LIDOcaine viscous 2% Swish and spit 5 mLs every 4 (four) hours as needed (mouth sores). for mouth sores  Qty: 90 mL, Refills: 1    Comments: Ratio as per standard protocol  Associated Diagnoses: Mouth sore      albuterol (PROVENTIL/VENTOLIN HFA) 90 mcg/actuation inhaler Inhale 1-2 puffs into the lungs every 6 (six) hours as needed for Wheezing or Shortness of Breath. Rescue  Qty: 8 g, Refills: 0      amLODIPine (NORVASC) 5 MG tablet Take 1 tablet (5 mg total) by mouth once daily.  Qty: 90 tablet, Refills: 3    Comments: .  Associated Diagnoses: Benign essential hypertension      azelastine (ASTELIN) 137 mcg (0.1 %) nasal spray SPRAY 1 SPRAY BY NASAL ROUTE 2 TIMES DAILY.  Qty: 90 mL, Refills: 6    Associated Diagnoses: Seasonal allergic rhinitis due to pollen      belimumab (BENLYSTA) 200 mg/mL AtIn Inject 1 mL (200 mg total) into the skin every 7 days.  Qty: 4 each, Refills: 2    Associated Diagnoses: Systemic lupus erythematosus, unspecified SLE type, unspecified organ involvement status      calcium carbonate (OS-RISSA) 500 mg calcium (1,250 mg) tablet Take 1 tablet by mouth once daily.      cetirizine (ZYRTEC) 10 MG tablet Take 1 tablet (10 mg total) by mouth once daily.  Qty: 30 tablet, Refills: 0      cholecalciferol, vitamin D3, 125 mcg (5,000 unit) Tab Take 5,000 Units by  mouth once daily.      CREON 36,000-114,000- 180,000 unit CpDR TAKE TWO CAPSULES BY MOUTH THREE TIMES DAILY WITH MEALS AND ONE CAPSULE WITH EACH SNACK  Qty: 180 capsule, Refills: 7    Associated Diagnoses: Exocrine pancreatic insufficiency; Sciatica of right side      dexamethasone (DECADRON) 4 mg/mL injection Inject 1 mL (4 mg total) into the muscle daily as needed (Signs and symtpoms of severe adrenal insufficiency).  Qty: 1 mL, Refills: 1    Associated Diagnoses: Long term current use of systemic steroids; Secondary adrenal insufficiency      diclofenac sodium (VOLTAREN) 1 % Gel APPLY 2 GRAMS TO AFFECTED AREA 4 TIMES A DAY  Qty: 200 g, Refills: 2    Associated Diagnoses: Bilateral hand pain; De Quervain's tenosynovitis, right; Ulcerative colitis with complication, unspecified location      dicyclomine (BENTYL) 20 mg tablet TAKE 1 TABLET BY MOUTH EVERY 6 HOURS  Qty: 360 tablet, Refills: 0    Associated Diagnoses: History of ulcerative colitis      ferrous sulfate (IRON ORAL) Take by mouth. No sure dose      fluticasone propionate (FLONASE) 50 mcg/actuation nasal spray 1 spray (50 mcg total) by Each Nostril route 2 (two) times daily as needed for Rhinitis or Allergies.  Qty: 15 g, Refills: 0      folic acid (FOLVITE) 1 MG tablet Take 1 mg by mouth once daily.      gabapentin (NEURONTIN) 300 MG capsule Take 1 capsule (300 mg total) by mouth 3 (three) times daily.  Qty: 270 capsule, Refills: 3    Associated Diagnoses: Fibromyalgia      hydroCHLOROthiazide (HYDRODIURIL) 12.5 MG Tab Take 12.5 mg by mouth.      hydroxychloroquine (PLAQUENIL) 200 mg tablet TAKE 1 AND 1/2 TABLETS BY MOUTH DAILY  Qty: 45 tablet, Refills: 5    Associated Diagnoses: Systemic lupus erythematosus, unspecified SLE type, unspecified organ involvement status      hydrOXYzine HCL (ATARAX) 25 MG tablet TAKE 1 TABLET BY MOUTH NIGHTLY AS NEEDED (INSOMNIA).      levocetirizine (XYZAL) 5 MG tablet Take 1 tablet (5 mg total) by mouth every  evening.  Qty: 90 tablet, Refills: 3    Associated Diagnoses: Seasonal allergic rhinitis due to other allergic trigger      loperamide (IMODIUM) 2 mg capsule TAKE 1 TO 2 CAPSULES BY MOUTH FOUR TIMES DAILY  Qty: 240 capsule, Refills: 3      losartan (COZAAR) 50 MG tablet TAKE 1 TABLET BY MOUTH EVERY DAY  Qty: 90 tablet, Refills: 3    Comments: .  Associated Diagnoses: Hypokalemia; Hypertension, unspecified type; Systemic lupus erythematosus, unspecified SLE type, unspecified organ involvement status      methocarbamoL (ROBAXIN) 500 MG Tab Take 1 tablet (500 mg total) by mouth 4 (four) times daily as needed (muscle tension).  Qty: 40 tablet, Refills: 0    Associated Diagnoses: Chronic bilateral low back pain with left-sided sciatica      multivitamin/iron/folic acid (CENTRUM WOMEN ORAL) Take 1 tablet by mouth once daily.      nebivoloL (BYSTOLIC) 10 MG Tab       niacin 100 MG Tab Take by mouth. 1 Tablet Oral At bedtime      nystatin (MYCOSTATIN) cream Apply topically 2 (two) times daily.  Qty: 30 g, Refills: 0    Associated Diagnoses: Intertrigo      omeprazole (PRILOSEC) 20 MG capsule Take 1 capsule (20 mg total) by mouth 2 (two) times daily.  Qty: 180 capsule, Refills: 2    Associated Diagnoses: Gastroesophageal reflux disease without esophagitis      ondansetron (ZOFRAN) 4 MG tablet Take 1 tablet (4 mg total) by mouth every 12 (twelve) hours as needed for Nausea.  Qty: 30 tablet, Refills: 6    Associated Diagnoses: Gastroesophageal reflux disease without esophagitis; Osteoporosis, idiopathic; Ulcerative pancolitis with complication; Encounter for monitoring proton pump inhibitor therapy; Dysphagia, unspecified type; Nausea      oxyCODONE-acetaminophen (PERCOCET) 5-325 mg per tablet Take 1 tablet by mouth every 4 (four) hours as needed for Pain.  Qty: 10 tablet, Refills: 0    Comments: Quantity prescribed more than 7 day supply? No      potassium chloride SA (K-DUR,KLOR-CON) 20 MEQ tablet TAKE 1 TABLET BY MOUTH  TWICE A DAY  Qty: 180 tablet, Refills: 3    Associated Diagnoses: Low blood potassium      predniSONE (DELTASONE) 5 MG tablet TAKE 1 TABLET BY MOUTH ONCE DAILY. DOUBLE THE DOSE IN TIMES OF ILLNESS  Qty: 90 tablet, Refills: 4    Associated Diagnoses: Hypoadrenalism      psyllium husk/aspartame (METAMUCIL FIBER SINGLES ORAL) Take by mouth.      REPATHA SURECLICK 140 mg/mL PnIj INJECT 140 MG INTO THE SKIN EVERY 14 (FOURTEEN) DAYS      RESTASIS 0.05 % ophthalmic emulsion INSTILL 1 DROP INTO BOTH EYES TWICE A DAY  Qty: 180 vial, Refills: 3    Associated Diagnoses: Keratoconjunctivitis sicca of both eyes not specified as Sjogren's      rizatriptan (MAXALT) 10 MG tablet TAKE 1 TABLET (10 MG TOTAL) BY MOUTH 2 (TWO) TIMES DAILY AS NEEDED FOR MIGRAINE.  Qty: 27 tablet, Refills: 3    Comments: DX Code Needed  .  Associated Diagnoses: Migraine syndrome      rosuvastatin (CRESTOR) 5 MG tablet Take 5 mg by mouth.      SAVELLA 100 mg Tab TAKE 1 TABLET BY MOUTH EVERY DAY  Qty: 90 tablet, Refills: 3      valACYclovir (VALTREX) 1000 MG tablet Take 1 tablet (1,000 mg total) by mouth every 12 (twelve) hours. 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  Qty: 30 tablet, Refills: 0    Comments: Not to exceed 5 additional fills before 12/15/2024 DX Code Needed  .  Associated Diagnoses: Primary insomnia                 Discharge Diagnosis: Chronic bilateral low back pain with left-sided sciatica [M54.42, G89.29]  Condition on Discharge: Stable with no complications to procedure   Diet on Discharge: Same as before.  Activity: as per instruction sheet.  Discharge to: Home with a responsible adult.  Follow up: 2-4 weeks       Please call my office or pager at [phone] if experienced any weakness or loss of sensation, fever > 101.5, pain uncontrolled with oral medications, persistent nausea/vomiting/or diarrhea,  redness or drainage from the incisions, or any other worrisome concerns. If physician on call was not reached or could not communicate with our office for any reason please go to the nearest emergency department

## 2024-08-06 NOTE — H&P
"HPI  Patient presenting for Procedure(s) (LRB):  LUMBAR TRANSFORAMINAL LEFT L4/5 AND L5/S1 DIRECT REFERRAL (Left)     Patient on Anti-coagulation No    No health changes since previous encounter    Past Medical History:   Diagnosis Date    Acid reflux     Adrenal insufficiency, primary     Arthritis     Asthma     B12 deficiency anemia     Benign essential hypertension 2/7/2021    Blood transfusion 2010    Breast cancer 2/2016    Right breast infiltrating ductal CA    Cataract     Chronic pain     Deep vein thrombosis     Dry eyes     Esophagitis, unspecified     Fibromyalgia     General anesthetics causing adverse effect in therapeutic use     "slow to wake up bc I don't have an immune system    Heart murmur     History of colon polyps     Hyperlipidemia     Hypopituitary dwarfism     Iron deficiency anemia     Lupus     Migraines, neuralgic     Nonspecific ulcerative colitis     OP (osteoporosis)     history of reclast    Osteopenia     Pancreatic cyst     Schatzki's ring     Steroid sulfatase deficiency     Ulcerative colitis     Vitamin D deficiency disease      Past Surgical History:   Procedure Laterality Date    ANORECTAL MANOMETRY N/A 1/13/2022    Procedure: MANOMETRY, ANORECTAL;  Surgeon: First Daniella Gastelum;  Location: [facility] (4TH FLR);  Service: Endoscopy;  Laterality: N/A;  new order placed; original order placed incorrectly  1/7 instructions handed to patient-st    APPENDECTOMY      BREAST BIOPSY Right 2/2016    IDC    BREAST RECONSTRUCTION      BREAST SURGERY      CHOLECYSTECTOMY      COLON SURGERY      ENDOSCOPIC ULTRASOUND OF UPPER GASTROINTESTINAL TRACT N/A 8/6/2018    Procedure: ULTRASOUND, ENDOSCOPIC, UPPER GI TRACT;  Surgeon: Hong Finn MD;  Location: [facility] (2ND FLR);  Service: Endoscopy;  Laterality: N/A;    ESOPHAGOGASTRODUODENOSCOPY N/A 12/10/2018    Procedure: EGD (ESOPHAGOGASTRODUODENOSCOPY);  Surgeon: Reese Villalta MD;  Location: [facility] (4TH FLR);  Service: " Endoscopy;  Laterality: N/A;    ESOPHAGOGASTRODUODENOSCOPY N/A 12/30/2021    Procedure: EGD (ESOPHAGOGASTRODUODENOSCOPY);  Surgeon: Reese Villalta MD;  Location: [facility] (2ND FLR);  Service: Endoscopy;  Laterality: N/A;  EGD for esophageal dysphagia and early satiety please schedule 1st provider available. wants this date-Dr Villalta only     fully vaccinated-GT  hx of adrenal insuffiency   12/27 arrival time confirmed with pt-rb    ESOPHAGOGASTRODUODENOSCOPY N/A 9/9/2022    Procedure: EGD (ESOPHAGOGASTRODUODENOSCOPY);  Surgeon: Reese Villalta MD;  Location: [facility] (4TH FLR);  Service: Endoscopy;  Laterality: N/A;  vacc  inst handed to pt-LW    ESOPHAGOGASTRODUODENOSCOPY N/A 3/14/2024    Procedure: EGD (ESOPHAGOGASTRODUODENOSCOPY);  Surgeon: Reese Villalta MD;  Location: [facility] (2ND FLR);  Service: Endoscopy;  Laterality: N/A;  prep instructions sent to pt via portal/ lan pt/ gastroparesis prep  3/8-lvm for precall-MS  3/11-precall complete-KPvt    FLEXIBLE SIGMOIDOSCOPY N/A 12/10/2018    Procedure: SIGMOIDOSCOPY, FLEXIBLE;  Surgeon: Reese Villalta MD;  Location: [facility] (4TH FLR);  Service: Endoscopy;  Laterality: N/A;  Recommend full split bowel prep for this study just like for a colonoscopy    HYSTERECTOMY      ILEOSCOPY N/A 2/16/2022    Procedure: ILEOSCOPY;  Surgeon: Ceferino Rangel MD;  Location: [facility] (4TH FLR);  Service: Endoscopy;  Laterality: N/A;  medical history significant for Ulcerative colitis s/p total colectomy with ileoanal anastomosis ([id number]), SLE on plaquenil, adrenal insufficiency on prednisone, breast cancer, and HTN who presents with 3 month history of bilateral lower abdominal pain. Patient with abd    MASTECTOMY      OOPHORECTOMY Bilateral     restorative proctectomy      total abdominal colectomy with ileostomy  2010    TOTAL COLECTOMY      TOTAL REDUCTION MAMMOPLASTY Left 2017    UPPER ENDOSCOPIC ULTRASOUND W/ FNA       Review of patient's allergies  "indicates:   Allergen Reactions    Aspirin      Other reaction(s): "hemorrhage"  hemorrhage    Hydrocortisone Nausea Only     Other reaction(s): sick  sick    Lactose intolerance (lactase) [lactase] Nausea And Vomiting    Vicodin [hydrocodone-acetaminophen]      Other reaction(s): hyperactivity    Asacol [mesalamine] Hallucinations     Other reaction(s): Hallucinations  hallucinations      No current facility-administered medications for this encounter.       PMHx, PSHx, Allergies, Medications reviewed in epic    ROS negative except pain complaints in HPI    OBJECTIVE:    There were no vitals taken for this visit.    PHYSICAL EXAMINATION:    GENERAL: Well appearing, in no acute distress, alert and oriented x3.  PSYCH:  Mood and affect appropriate.  SKIN: Skin color, texture, turgor normal, no rashes or lesions which will impact the procedure.  CV: RRR with palpation of the radial artery.  PULM: No evidence of respiratory difficulty, symmetric chest rise. Clear to auscultation.  NEURO: Cranial nerves grossly intact.    Plan:    Proceed with procedure as planned Procedure(s) (LRB):  LUMBAR TRANSFORAMINAL LEFT L4/5 AND L5/S1 DIRECT REFERRAL (Left)    Sadie Anguiano  08/06/2024            "

## 2024-08-06 NOTE — DISCHARGE INSTRUCTIONS

## 2024-08-06 NOTE — OP NOTE
Erivedge Counseling- I discussed with the patient the risks of Erivedge including but not limited to nausea, vomiting, diarrhea, constipation, weight loss, changes in the sense of taste, decreased appetite, muscle spasms, and hair loss.  The patient verbalized understanding of the proper use and possible adverse effects of Erivedge.  All of the patient's questions and concerns were addressed. Lumbar Transforaminal Epidural Steroid Injection under Fluoroscopic Guidance    The procedure, risks, benefits, and options were discussed with the patient. There are no contraindications to the procedure. The patent expressed understanding and agreed to the procedure. Informed written consent was obtained prior to the start of the procedure and can be found in the patient's chart.    PATIENT NAME: Efe Horowitz   MRN: [id number]     DATE OF PROCEDURE: 08/06/2024    PROCEDURE:  Left  L4/5 and L5/S1 Lumbar Transforaminal Epidural Steroid Injection under Fluoroscopic Guidance    PRE-OP DIAGNOSIS: Chronic bilateral low back pain with left-sided sciatica [M54.42, G89.29] Lumbar radiculopathy [M54.16]    POST-OP DIAGNOSIS: Same    PHYSICIAN: Azucena Mnuiz MD    ASSISTANTS: none    MEDICATIONS INJECTED: Preservative-free Decadron 10mg with 5cc of Lidocaine 1% MPF     LOCAL ANESTHETIC INJECTED: Xylocaine 2%     SEDATION: Versed 1.5 and Fentanyl 50mcg                                                                                                                                                                                     Conscious sedation ordered by M.D. Patient re-evaluation prior to administration of conscious sedation. No changes noted in patient's status from initial evaluation. The patient's vital signs were monitored by RN and patient remained hemodynamically stable throughout the procedure.    Event Time In   Sedation Start 1516   Sedation End 1523       ESTIMATED BLOOD LOSS: None    COMPLICATIONS: None    TECHNIQUE: Time-out was performed to identify the patient and procedure to be performed. With the patient laying in a prone position, the surgical area was prepped and draped in the usual sterile fashion using ChloraPrep and a fenestrated drape.The levels were determined under fluoroscopy guidance. Skin anesthesia was achieved by injecting Lidocaine 2% over the injection sites. The transforaminal spaces were  Griseofulvin Counseling:  I discussed with the patient the risks of griseofulvin including but not limited to photosensitivity, cytopenia, liver damage, nausea/vomiting and severe allergy.  The patient understands that this medication is best absorbed when taken with a fatty meal (e.g., ice cream or french fries). then approached with a 25 gauge, 3.5 inch spinal quinke needle that was introduced under fluoroscopic guidance in the AP and Lateral views. Once the needle tip was in the area of the transforaminal space, and there was no blood, CSF or paraesthesias, contrast dye Omnipaque (300mg/mL) was injected to confirm placement and there was no vascular runoff. Fluoroscopic imaging in the AP and lateral views revealed a clear outline of the spinal nerve with proximal spread of agent through the neural foramen into the epidural space. 3 mL of the medication mixture listed above was injected slowly at each site. Displacement of the radio opaque contrast after injection of the medication confirmed that the medication went into the area of the transforaminal spaces. The needles were removed and bleeding was nil. A sterile dressing was applied. No specimens collected. The patient tolerated the procedure well.       The patient was monitored after the procedure in the recovery area. They were given post-procedure and discharge instructions to follow at home. The patient was discharged in a stable condition.      Sadie Anguiano MD      I reviewed and edited the fellow's note. I conducted my own interview and physical examination. I agree with the findings. I was present and supervising all critical portions of the procedure.   Hydroquinone Counseling:  Patient advised that medication may result in skin irritation, lightening (hypopigmentation), dryness, and burning.  In the event of skin irritation, the patient was advised to reduce the amount of the drug applied or use it less frequently.  Rarely, spots that are treated with hydroquinone can become darker (pseudoochronosis).  Should this occur, patient instructed to stop medication and call the office. The patient verbalized understanding of the proper use and possible adverse effects of hydroquinone.  All of the patient's questions and concerns were addressed. Hydroquinone Pregnancy And Lactation Text: This medication has not been assigned a Pregnancy Risk Category but animal studies failed to show danger with the topical medication. It is unknown if the medication is excreted in breast milk. Birth Control Pills Counseling: Birth Control Pill Counseling: I discussed with the patient the potential side effects of OCPs including but not limited to increased risk of stroke, heart attack, thrombophlebitis, deep venous thrombosis, hepatic adenomas, breast changes, GI upset, headaches, and depression.  The patient verbalized understanding of the proper use and possible adverse effects of OCPs. All of the patient's questions and concerns were addressed. Terbinafine Pregnancy And Lactation Text: This medication is Pregnancy Category B and is considered safe during pregnancy. It is also excreted in breast milk and breast feeding isn't recommended. Stelara Pregnancy And Lactation Text: This medication is Pregnancy Category B and is considered safe during pregnancy. It is unknown if this medication is excreted in breast milk. Benzoyl Peroxide Counseling: Patient counseled that medicine may cause skin irritation and bleach clothing.  In the event of skin irritation, the patient was advised to reduce the amount of the drug applied or use it less frequently.   The patient verbalized understanding of the proper use and possible adverse effects of benzoyl peroxide.  All of the patient's questions and concerns were addressed. Cyclosporine Counseling:  I discussed with the patient the risks of cyclosporine including but not limited to hypertension, gingival hyperplasia,myelosuppression, immunosuppression, liver damage, kidney damage, neurotoxicity, lymphoma, and serious infections. The patient understands that monitoring is required including baseline blood pressure, CBC, CMP, lipid panel and uric acid, and then 1-2 times monthly CMP and blood pressure. Arava Counseling:  Patient counseled regarding adverse effects of Arava including but not limited to nausea, vomiting, abnormalities in liver function tests. Patients may develop mouth sores, rash, diarrhea, and abnormalities in blood counts. The patient understands that monitoring is required including LFTs and blood counts.  There is a rare possibility of scarring of the liver and lung problems that can occur when taking methotrexate. Persistent nausea, loss of appetite, pale stools, dark urine, cough, and shortness of breath should be reported immediately. Patient advised to discontinue Arava treatment and consult with a physician prior to attempting conception. The patient will have to undergo a treatment to eliminate Arava from the body prior to conception. Elidel Pregnancy And Lactation Text: This medication is Pregnancy Category C. It is unknown if this medication is excreted in breast milk. Humira Counseling:  I discussed with the patient the risks of adalimumab including but not limited to myelosuppression, immunosuppression, autoimmune hepatitis, demyelinating diseases, lymphoma, and serious infections.  The patient understands that monitoring is required including a PPD at baseline and must alert us or the primary physician if symptoms of infection or other concerning signs are noted. High Dose Vitamin A Counseling: Side effects reviewed, pt to contact office should one occur. Thalidomide Pregnancy And Lactation Text: This medication is Pregnancy Category X and is absolutely contraindicated during pregnancy. It is unknown if it is excreted in breast milk. Doxycycline Pregnancy And Lactation Text: This medication is Pregnancy Category D and not consider safe during pregnancy. It is also excreted in breast milk but is considered safe for shorter treatment courses. Erythromycin Pregnancy And Lactation Text: This medication is Pregnancy Category B and is considered safe during pregnancy. It is also excreted in breast milk. Clofazimine Counseling:  I discussed with the patient the risks of clofazimine including but not limited to skin and eye pigmentation, liver damage, nausea/vomiting, gastrointestinal bleeding and allergy. Rifampin Counseling: I discussed with the patient the risks of rifampin including but not limited to liver damage, kidney damage, red-orange body fluids, nausea/vomiting and severe allergy. Colchicine Counseling:  Patient counseled regarding adverse effects including but not limited to stomach upset (nausea, vomiting, stomach pain, or diarrhea).  Patient instructed to limit alcohol consumption while taking this medication.  Colchicine may reduce blood counts especially with prolonged use.  The patient understands that monitoring of kidney function and blood counts may be required, especially at baseline. The patient verbalized understanding of the proper use and possible adverse effects of colchicine.  All of the patient's questions and concerns were addressed. Tremfya Counseling: I discussed with the patient the risks of guselkumab including but not limited to immunosuppression, serious infections, worsening of inflammatory bowel disease and drug reactions.  The patient understands that monitoring is required including a PPD at baseline and must alert us or the primary physician if symptoms of infection or other concerning signs are noted. Prednisone Counseling:  I discussed with the patient the risks of prolonged use of prednisone including but not limited to weight gain, insomnia, osteoporosis, mood changes, diabetes, susceptibility to infection, glaucoma and high blood pressure.  In cases where prednisone use is prolonged, patients should be monitored with blood pressure checks, serum glucose levels and an eye exam.  Additionally, the patient may need to be placed on GI prophylaxis, PCP prophylaxis, and calcium and vitamin D supplementation and/or a bisphosphonate.  The patient verbalized understanding of the proper use and the possible adverse effects of prednisone.  All of the patient's questions and concerns were addressed. Elidel Counseling: Patient may experience a mild burning sensation during topical application. Elidel is not approved in children less than 2 years of age. There have been case reports of hematologic and skin malignancies in patients using topical calcineurin inhibitors although causality is questionable. Topical Clindamycin Counseling: Patient counseled that this medication may cause skin irritation or allergic reactions.  In the event of skin irritation, the patient was advised to reduce the amount of the drug applied or use it less frequently.   The patient verbalized understanding of the proper use and possible adverse effects of clindamycin.  All of the patient's questions and concerns were addressed. Isotretinoin Counseling: Patient should get monthly blood tests, not donate blood, not drive at night if vision affected, not share medication, and not undergo elective surgery for 6 months after tx completed. Side effects reviewed, pt to contact office should one occur. Colchicine Pregnancy And Lactation Text: This medication is Pregnancy Category C and isn't considered safe during pregnancy. It is excreted in breast milk. Methotrexate Counseling:  Patient counseled regarding adverse effects of methotrexate including but not limited to nausea, vomiting, abnormalities in liver function tests. Patients may develop mouth sores, rash, diarrhea, and abnormalities in blood counts. The patient understands that monitoring is required including LFT's and blood counts.  There is a rare possibility of scarring of the liver and lung problems that can occur when taking methotrexate. Persistent nausea, loss of appetite, pale stools, dark urine, cough, and shortness of breath should be reported immediately. Patient advised to discontinue methotrexate treatment at least three months before attempting to become pregnant.  I discussed the need for folate supplements while taking methotrexate.  These supplements can decrease side effects during methotrexate treatment. The patient verbalized understanding of the proper use and possible adverse effects of methotrexate.  All of the patient's questions and concerns were addressed. Tazorac Counseling:  Patient advised that medication is irritating and drying.  Patient may need to apply sparingly and wash off after an hour before eventually leaving it on overnight.  The patient verbalized understanding of the proper use and possible adverse effects of tazorac.  All of the patient's questions and concerns were addressed. Hydroxyzine Counseling: Patient advised that the medication is sedating and not to drive a car after taking this medication.  Patient informed of potential adverse effects including but not limited to dry mouth, urinary retention, and blurry vision.  The patient verbalized understanding of the proper use and possible adverse effects of hydroxyzine.  All of the patient's questions and concerns were addressed. Itraconazole Pregnancy And Lactation Text: This medication is Pregnancy Category C and it isn't know if it is safe during pregnancy. It is also excreted in breast milk. Acitretin Pregnancy And Lactation Text: This medication is Pregnancy Category X and should not be given to women who are pregnant or may become pregnant in the future. This medication is excreted in breast milk. Tetracycline Counseling: Patient counseled regarding possible photosensitivity and increased risk for sunburn.  Patient instructed to avoid sunlight, if possible.  When exposed to sunlight, patients should wear protective clothing, sunglasses, and sunscreen.  The patient was instructed to call the office immediately if the following severe adverse effects occur:  hearing changes, easy bruising/bleeding, severe headache, or vision changes.  The patient verbalized understanding of the proper use and possible adverse effects of tetracycline.  All of the patient's questions and concerns were addressed. Patient understands to avoid pregnancy while on therapy due to potential birth defects. Topical Retinoid counseling:  Patient advised to apply a pea-sized amount only at bedtime and wait 30 minutes after washing their face before applying.  If too drying, patient may add a non-comedogenic moisturizer. The patient verbalized understanding of the proper use and possible adverse effects of retinoids.  All of the patient's questions and concerns were addressed. Azithromycin Pregnancy And Lactation Text: This medication is considered safe during pregnancy and is also secreted in breast milk. Ketoconazole Counseling:   Patient counseled regarding improving absorption with orange juice.  Adverse effects include but are not limited to breast enlargement, headache, diarrhea, nausea, upset stomach, liver function test abnormalities, taste disturbance, and stomach pain.  There is a rare possibility of liver failure that can occur when taking ketoconazole. The patient understands that monitoring of LFTs may be required, especially at baseline. The patient verbalized understanding of the proper use and possible adverse effects of ketoconazole.  All of the patient's questions and concerns were addressed. Dupixent Counseling: I discussed with the patient the risks of dupilumab including but not limited to eye infection and irritation, cold sores, injection site reactions, worsening of asthma, allergic reactions and increased risk of parasitic infection.  Live vaccines should be avoided while taking dupilumab. Dupilumab will also interact with certain medications such as warfarin and cyclosporine. The patient understands that monitoring is required and they must alert us or the primary physician if symptoms of infection or other concerning signs are noted. Taltz Counseling: I discussed with the patient the risks of ixekizumab including but not limited to immunosuppression, serious infections, worsening of inflammatory bowel disease and drug reactions.  The patient understands that monitoring is required including a PPD at baseline and must alert us or the primary physician if symptoms of infection or other concerning signs are noted. Albendazole Counseling:  I discussed with the patient the risks of albendazole including but not limited to cytopenia, kidney damage, nausea/vomiting and severe allergy.  The patient understands that this medication is being used in an off-label manner. Tremfya Pregnancy And Lactation Text: The risk during pregnancy and breastfeeding is uncertain with this medication. Fluconazole Counseling:  Patient counseled regarding adverse effects of fluconazole including but not limited to headache, diarrhea, nausea, upset stomach, liver function test abnormalities, taste disturbance, and stomach pain.  There is a rare possibility of liver failure that can occur when taking fluconazole.  The patient understands that monitoring of LFTs and kidney function test may be required, especially at baseline. The patient verbalized understanding of the proper use and possible adverse effects of fluconazole.  All of the patient's questions and concerns were addressed. Minoxidil Counseling: Minoxidil is a topical medication which can increase blood flow where it is applied. It is uncertain how this medication increases hair growth. Side effects are uncommon and include stinging and allergic reactions. Cellcept Counseling:  I discussed with the patient the risks of mycophenolate mofetil including but not limited to infection/immunosuppression, GI upset, hypokalemia, hypercholesterolemia, bone marrow suppression, lymphoproliferative disorders, malignancy, GI ulceration/bleed/perforation, colitis, interstitial lung disease, kidney failure, progressive multifocal leukoencephalopathy, and birth defects.  The patient understands that monitoring is required including a baseline creatinine and regular CBC testing. In addition, patient must alert us immediately if symptoms of infection or other concerning signs are noted. Detail Level: Simple Solaraze Counseling:  I discussed with the patient the risks of Solaraze including but not limited to erythema, scaling, itching, weeping, crusting, and pain. Bexarotene Counseling:  I discussed with the patient the risks of bexarotene including but not limited to hair loss, dry lips/skin/eyes, liver abnormalities, hyperlipidemia, pancreatitis, depression/suicidal ideation, photosensitivity, drug rash/allergic reactions, hypothyroidism, anemia, leukopenia, infection, cataracts, and teratogenicity.  Patient understands that they will need regular blood tests to check lipid profile, liver function tests, white blood cell count, thyroid function tests and pregnancy test if applicable. Drysol Pregnancy And Lactation Text: This medication is considered safe during pregnancy and breast feeding. Spironolactone Pregnancy And Lactation Text: This medication can cause feminization of the male fetus and should be avoided during pregnancy. The active metabolite is also found in breast milk. Hydroxychloroquine Counseling:  I discussed with the patient that a baseline ophthalmologic exam is needed at the start of therapy and every year thereafter while on therapy. A CBC may also be warranted for monitoring.  The side effects of this medication were discussed with the patient, including but not limited to agranulocytosis, aplastic anemia, seizures, rashes, retinopathy, and liver toxicity. Patient instructed to call the office should any adverse effect occur.  The patient verbalized understanding of the proper use and possible adverse effects of Plaquenil.  All the patient's questions and concerns were addressed. Prednisone Pregnancy And Lactation Text: This medication is Pregnancy Category C and it isn't know if it is safe during pregnancy. This medication is excreted in breast milk. Bactrim Pregnancy And Lactation Text: This medication is Pregnancy Category D and is known to cause fetal risk.  It is also excreted in breast milk. Topical Clindamycin Pregnancy And Lactation Text: This medication is Pregnancy Category B and is considered safe during pregnancy. It is unknown if it is excreted in breast milk. Benzoyl Peroxide Pregnancy And Lactation Text: This medication is Pregnancy Category C. It is unknown if benzoyl peroxide is excreted in breast milk. Cephalexin Pregnancy And Lactation Text: This medication is Pregnancy Category B and considered safe during pregnancy.  It is also excreted in breast milk but can be used safely for shorter doses. Valtrex Counseling: I discussed with the patient the risks of valacyclovir including but not limited to kidney damage, nausea, vomiting and severe allergy.  The patient understands that if the infection seems to be worsening or is not improving, they are to call. Rituxan Counseling:  I discussed with the patient the risks of Rituxan infusions. Side effects can include infusion reactions, severe drug rashes including mucocutaneous reactions, reactivation of latent hepatitis and other infections and rarely progressive multifocal leukoencephalopathy.  All of the patient's questions and concerns were addressed. Azathioprine Counseling:  I discussed with the patient the risks of azathioprine including but not limited to myelosuppression, immunosuppression, hepatotoxicity, lymphoma, and infections.  The patient understands that monitoring is required including baseline LFTs, Creatinine, possible TPMP genotyping and weekly CBCs for the first month and then every 2 weeks thereafter.  The patient verbalized understanding of the proper use and possible adverse effects of azathioprine.  All of the patient's questions and concerns were addressed. Doxepin Counseling:  Patient advised that the medication is sedating and not to drive a car after taking this medication. Patient informed of potential adverse effects including but not limited to dry mouth, urinary retention, and blurry vision.  The patient verbalized understanding of the proper use and possible adverse effects of doxepin.  All of the patient's questions and concerns were addressed. Rifampin Pregnancy And Lactation Text: This medication is Pregnancy Category C and it isn't know if it is safe during pregnancy. It is also excreted in breast milk and should not be used if you are breast feeding. Xolair Pregnancy And Lactation Text: This medication is Pregnancy Category B and is considered safe during pregnancy. This medication is excreted in breast milk. Xolair Counseling:  Patient informed of potential adverse effects including but not limited to fever, muscle aches, rash and allergic reactions.  The patient verbalized understanding of the proper use and possible adverse effects of Xolair.  All of the patient's questions and concerns were addressed. Topical Sulfur Applications Pregnancy And Lactation Text: This medication is Pregnancy Category C and has an unknown safety profile during pregnancy. It is unknown if this topical medication is excreted in breast milk. Acitretin Counseling:  I discussed with the patient the risks of acitretin including but not limited to hair loss, dry lips/skin/eyes, liver damage, hyperlipidemia, depression/suicidal ideation, photosensitivity.  Serious rare side effects can include but are not limited to pancreatitis, pseudotumor cerebri, bony changes, clot formation/stroke/heart attack.  Patient understands that alcohol is contraindicated since it can result in liver toxicity and significantly prolong the elimination of the drug by many years. 5-Fu Counseling: 5-Fluorouracil Counseling:  I discussed with the patient the risks of 5-fluorouracil including but not limited to erythema, scaling, itching, weeping, crusting, and pain. Use Enhanced Medication Counseling?: No Cellcept Pregnancy And Lactation Text: This medication is Pregnancy Category D and isn't considered safe during pregnancy. It is unknown if this medication is excreted in breast milk. Carac Pregnancy And Lactation Text: This medication is Pregnancy Category X and contraindicated in pregnancy and in women who may become pregnant. It is unknown if this medication is excreted in breast milk. Quinolones Counseling:  I discussed with the patient the risks of fluoroquinolones including but not limited to GI upset, allergic reaction, drug rash, diarrhea, dizziness, photosensitivity, yeast infections, liver function test abnormalities, tendonitis/tendon rupture. Ivermectin Counseling:  Patient instructed to take medication on an empty stomach with a full glass of water.  Patient informed of potential adverse effects including but not limited to nausea, diarrhea, dizziness, itching, and swelling of the extremities or lymph nodes.  The patient verbalized understanding of the proper use and possible adverse effects of ivermectin.  All of the patient's questions and concerns were addressed. Tazorac Pregnancy And Lactation Text: This medication is not safe during pregnancy. It is unknown if this medication is excreted in breast milk. Terbinafine Counseling: Patient counseling regarding adverse effects of terbinafine including but not limited to headache, diarrhea, rash, upset stomach, liver function test abnormalities, itching, taste/smell disturbance, nausea, abdominal pain, and flatulence.  There is a rare possibility of liver failure that can occur when taking terbinafine.  The patient understands that a baseline LFT and kidney function test may be required. The patient verbalized understanding of the proper use and possible adverse effects of terbinafine.  All of the patient's questions and concerns were addressed. Stelara Counseling:  I discussed with the patient the risks of ustekinumab including but not limited to immunosuppression, malignancy, posterior leukoencephalopathy syndrome, and serious infections.  The patient understands that monitoring is required including a PPD at baseline and must alert us or the primary physician if symptoms of infection or other concerning signs are noted. Ketoconazole Pregnancy And Lactation Text: This medication is Pregnancy Category C and it isn't know if it is safe during pregnancy. It is also excreted in breast milk and breast feeding isn't recommended. Doxycycline Counseling:  Patient counseled regarding possible photosensitivity and increased risk for sunburn.  Patient instructed to avoid sunlight, if possible.  When exposed to sunlight, patients should wear protective clothing, sunglasses, and sunscreen.  The patient was instructed to call the office immediately if the following severe adverse effects occur:  hearing changes, easy bruising/bleeding, severe headache, or vision changes.  The patient verbalized understanding of the proper use and possible adverse effects of doxycycline.  All of the patient's questions and concerns were addressed. Isotretinoin Pregnancy And Lactation Text: This medication is Pregnancy Category X and is considered extremely dangerous during pregnancy. It is unknown if it is excreted in breast milk. Protopic Counseling: Patient may experience a mild burning sensation during topical application. Protopic is not approved in children less than 2 years of age. There have been case reports of hematologic and skin malignancies in patients using topical calcineurin inhibitors although causality is questionable. Erythromycin Counseling:  I discussed with the patient the risks of erythromycin including but not limited to GI upset, allergic reaction, drug rash, diarrhea, increase in liver enzymes, and yeast infections. SSKI Counseling:  I discussed with the patient the risks of SSKI including but not limited to thyroid abnormalities, metallic taste, GI upset, fever, headache, acne, arthralgias, paraesthesias, lymphadenopathy, easy bleeding, arrhythmias, and allergic reaction. Cosentyx Counseling:  I discussed with the patient the risks of Cosentyx including but not limited to worsening of Crohn's disease, immunosuppression, allergic reactions and infections.  The patient understands that monitoring is required including a PPD at baseline and must alert us or the primary physician if symptoms of infection or other concerning signs are noted. Hydroxychloroquine Pregnancy And Lactation Text: This medication has been shown to cause fetal harm but it isn't assigned a Pregnancy Risk Category. There are small amounts excreted in breast milk. Enbrel Counseling:  I discussed with the patient the risks of etanercept including but not limited to myelosuppression, immunosuppression, autoimmune hepatitis, demyelinating diseases, lymphoma, and infections.  The patient understands that monitoring is required including a PPD at baseline and must alert us or the primary physician if symptoms of infection or other concerning signs are noted. Methotrexate Pregnancy And Lactation Text: This medication is Pregnancy Category X and is known to cause fetal harm. This medication is excreted in breast milk. Hydroxyzine Pregnancy And Lactation Text: This medication is not safe during pregnancy and should not be taken. It is also excreted in breast milk and breast feeding isn't recommended. Birth Control Pills Pregnancy And Lactation Text: This medication should be avoided if pregnant and for the first 30 days post-partum. Bexarotene Pregnancy And Lactation Text: This medication is Pregnancy Category X and should not be given to women who are pregnant or may become pregnant. This medication should not be used if you are breast feeding. Doxepin Pregnancy And Lactation Text: This medication is Pregnancy Category C and it isn't known if it is safe during pregnancy. It is also excreted in breast milk and breast feeding isn't recommended. Minocycline Counseling: Patient advised regarding possible photosensitivity and discoloration of the teeth, skin, lips, tongue and gums.  Patient instructed to avoid sunlight, if possible.  When exposed to sunlight, patients should wear protective clothing, sunglasses, and sunscreen.  The patient was instructed to call the office immediately if the following severe adverse effects occur:  hearing changes, easy bruising/bleeding, severe headache, or vision changes.  The patient verbalized understanding of the proper use and possible adverse effects of minocycline.  All of the patient's questions and concerns were addressed. Odomzo Counseling- I discussed with the patient the risks of Odomzo including but not limited to nausea, vomiting, diarrhea, constipation, weight loss, changes in the sense of taste, decreased appetite, muscle spasms, and hair loss.  The patient verbalized understanding of the proper use and possible adverse effects of Odomzo.  All of the patient's questions and concerns were addressed. Carac Counseling:  I discussed with the patient the risks of Carac including but not limited to erythema, scaling, itching, weeping, crusting, and pain. Dapsone Pregnancy And Lactation Text: This medication is Pregnancy Category C and is not considered safe during pregnancy or breast feeding. Itraconazole Counseling:  I discussed with the patient the risks of itraconazole including but not limited to liver damage, nausea/vomiting, neuropathy, and severe allergy.  The patient understands that this medication is best absorbed when taken with acidic beverages such as non-diet cola or ginger ale.  The patient understands that monitoring is required including baseline LFTs and repeat LFTs at intervals.  The patient understands that they are to contact us or the primary physician if concerning signs are noted. Thalidomide Counseling: I discussed with the patient the risks of thalidomide including but not limited to birth defects, anxiety, weakness, chest pain, dizziness, cough and severe allergy. Nsaids Pregnancy And Lactation Text: These medications are considered safe up to 30 weeks gestation. It is excreted in breast milk. Griseofulvin Pregnancy And Lactation Text: This medication is Pregnancy Category X and is known to cause serious birth defects. It is unknown if this medication is excreted in breast milk but breast feeding should be avoided. High Dose Vitamin A Pregnancy And Lactation Text: High dose vitamin A therapy is contraindicated during pregnancy and breast feeding. Dapsone Counseling: I discussed with the patient the risks of dapsone including but not limited to hemolytic anemia, agranulocytosis, rashes, methemoglobinemia, kidney failure, peripheral neuropathy, headaches, GI upset, and liver toxicity.  Patients who start dapsone require monitoring including baseline LFTs and weekly CBCs for the first month, then every month thereafter.  The patient verbalized understanding of the proper use and possible adverse effects of dapsone.  All of the patient's questions and concerns were addressed. Drysol Counseling:  I discussed with the patient the risks of drysol/aluminum chloride including but not limited to skin rash, itching, irritation, burning. Metronidazole Counseling:  I discussed with the patient the risks of metronidazole including but not limited to seizures, nausea/vomiting, a metallic taste in the mouth, nausea/vomiting and severe allergy. Eucrisa Counseling: Patient may experience a mild burning sensation during topical application. Eucrisa is not approved in children less than 2 years of age. Nsaids Counseling: NSAID Counseling: I discussed with the patient that NSAIDs should be taken with food. Prolonged use of NSAIDs can result in the development of stomach ulcers.  Patient advised to stop taking NSAIDs if abdominal pain occurs.  The patient verbalized understanding of the proper use and possible adverse effects of NSAIDs.  All of the patient's questions and concerns were addressed. Solaraze Pregnancy And Lactation Text: This medication is Pregnancy Category B and is considered safe. There is some data to suggest avoiding during the third trimester. It is unknown if this medication is excreted in breast milk. Imiquimod Counseling:  I discussed with the patient the risks of imiquimod including but not limited to erythema, scaling, itching, weeping, crusting, and pain.  Patient understands that the inflammatory response to imiquimod is variable from person to person and was educated regarded proper titration schedule.  If flu-like symptoms develop, patient knows to discontinue the medication and contact us. Zyclara Counseling:  I discussed with the patient the risks of imiquimod including but not limited to erythema, scaling, itching, weeping, crusting, and pain.  Patient understands that the inflammatory response to imiquimod is variable from person to person and was educated regarded proper titration schedule.  If flu-like symptoms develop, patient knows to discontinue the medication and contact us. Tetracycline Pregnancy And Lactation Text: This medication is Pregnancy Category D and not consider safe during pregnancy. It is also excreted in breast milk. Protopic Pregnancy And Lactation Text: This medication is Pregnancy Category C. It is unknown if this medication is excreted in breast milk when applied topically. Ivermectin Pregnancy And Lactation Text: This medication is Pregnancy Category C and it isn't known if it is safe during pregnancy. It is also excreted in breast milk. Simponi Counseling:  I discussed with the patient the risks of golimumab including but not limited to myelosuppression, immunosuppression, autoimmune hepatitis, demyelinating diseases, lymphoma, and serious infections.  The patient understands that monitoring is required including a PPD at baseline and must alert us or the primary physician if symptoms of infection or other concerning signs are noted. Infliximab Counseling:  I discussed with the patient the risks of infliximab including but not limited to myelosuppression, immunosuppression, autoimmune hepatitis, demyelinating diseases, lymphoma, and serious infections.  The patient understands that monitoring is required including a PPD at baseline and must alert us or the primary physician if symptoms of infection or other concerning signs are noted. Xeljanz Counseling: I discussed with the patient the risks of Xeljanz therapy including increased risk of infection, liver issues, headache, diarrhea, or cold symptoms. Live vaccines should be avoided. They were instructed to call if they have any problems. Cimetidine Counseling:  I discussed with the patient the risks of Cimetidine including but not limited to gynecomastia, headache, diarrhea, nausea, drowsiness, arrhythmias, pancreatitis, skin rashes, psychosis, bone marrow suppression and kidney toxicity. Spironolactone Counseling: Patient advised regarding risks of diarrhea, abdominal pain, hyperkalemia, birth defects (for female patients), liver toxicity and renal toxicity. The patient may need blood work to monitor liver and kidney function and potassium levels while on therapy. The patient verbalized understanding of the proper use and possible adverse effects of spironolactone.  All of the patient's questions and concerns were addressed. Xeljuanz Pregnancy And Lactation Text: This medication is Pregnancy Category D and is not considered safe during pregnancy.  The risk during breast feeding is also uncertain. Bactrim Counseling:  I discussed with the patient the risks of sulfa antibiotics including but not limited to GI upset, allergic reaction, drug rash, diarrhea, dizziness, photosensitivity, and yeast infections.  Rarely, more serious reactions can occur including but not limited to aplastic anemia, agranulocytosis, methemoglobinemia, blood dyscrasias, liver or kidney failure, lung infiltrates or desquamative/blistering drug rashes. Dupixent Pregnancy And Lactation Text: This medication likely crosses the placenta but the risk for the fetus is uncertain. This medication is excreted in breast milk. Gabapentin Counseling: I discussed with the patient the risks of gabapentin including but not limited to dizziness, somnolence, fatigue and ataxia. Cephalexin Counseling: I counseled the patient regarding use of cephalexin as an antibiotic for prophylactic and/or therapeutic purposes. Cephalexin (commonly prescribed under brand name Keflex) is a cephalosporin antibiotic which is active against numerous classes of bacteria, including most skin bacteria. Side effects may include nausea, diarrhea, gastrointestinal upset, rash, hives, yeast infections, and in rare cases, hepatitis, kidney disease, seizures, fever, confusion, neurologic symptoms, and others. Patients with severe allergies to penicillin medications are cautioned that there is about a 10% incidence of cross-reactivity with cephalosporins. When possible, patients with penicillin allergies should use alternatives to cephalosporins for antibiotic therapy. Valtrex Pregnancy And Lactation Text: this medication is Pregnancy Category B and is considered safe during pregnancy. This medication is not directly found in breast milk but it's metabolite acyclovir is present. Rituxan Pregnancy And Lactation Text: This medication is Pregnancy Category C and it isn't know if it is safe during pregnancy. It is unknown if this medication is excreted in breast milk but similar antibodies are known to be excreted. Azithromycin Counseling:  I discussed with the patient the risks of azithromycin including but not limited to GI upset, allergic reaction, drug rash, diarrhea, and yeast infections. Metronidazole Pregnancy And Lactation Text: This medication is Pregnancy Category B and considered safe during pregnancy.  It is also excreted in breast milk. Picato Counseling:  I discussed with the patient the risks of Picato including but not limited to erythema, scaling, itching, weeping, crusting, and pain. Sski Pregnancy And Lactation Text: This medication is Pregnancy Category D and isn't considered safe during pregnancy. It is excreted in breast milk. Topical Sulfur Applications Counseling: Topical Sulfur Counseling: Patient counseled that this medication may cause skin irritation or allergic reactions.  In the event of skin irritation, the patient was advised to reduce the amount of the drug applied or use it less frequently.   The patient verbalized understanding of the proper use and possible adverse effects of topical sulfur application.  All of the patient's questions and concerns were addressed.

## 2024-08-18 DIAGNOSIS — F51.01 PRIMARY INSOMNIA: ICD-10-CM

## 2024-08-18 NOTE — TELEPHONE ENCOUNTER
No care due was identified.  [facility] Embedded Care Due Messages. Reference number: [id number].   8/18/2024 5:28:03 PM CDT

## 2024-08-19 RX ORDER — ZOLPIDEM TARTRATE 5 MG/1
TABLET ORAL
Qty: 30 TABLET | Refills: 0 | Status: SHIPPED | OUTPATIENT
Start: 2024-08-19

## 2024-09-06 DIAGNOSIS — Z87.19 HISTORY OF ULCERATIVE COLITIS: ICD-10-CM

## 2024-09-06 RX ORDER — DICYCLOMINE HYDROCHLORIDE 20 MG/1
TABLET ORAL
Qty: 360 TABLET | Refills: 0 | Status: SHIPPED | OUTPATIENT
Start: 2024-09-06

## 2024-09-06 NOTE — TELEPHONE ENCOUNTER
Allergies reviewed   Yearly CBC CMP UTD  Next OV with Dr Rangel: 6/16/25  Rx pended for approval

## 2024-09-18 DIAGNOSIS — F51.01 PRIMARY INSOMNIA: ICD-10-CM

## 2024-09-18 RX ORDER — ZOLPIDEM TARTRATE 5 MG/1
TABLET ORAL
Qty: 30 TABLET | Refills: 0 | Status: SHIPPED | OUTPATIENT
Start: 2024-09-18

## 2024-09-18 NOTE — TELEPHONE ENCOUNTER
No care due was identified.  [facility] Embedded Care Due Messages. Reference number: [id number].   9/18/2024 9:25:19 AM CDT

## 2024-09-19 ENCOUNTER — LAB VISIT (OUTPATIENT)
Dept: LAB | Facility: HOSPITAL | Age: 64
End: 2024-09-19
Attending: INTERNAL MEDICINE
Payer: MEDICARE

## 2024-09-19 DIAGNOSIS — C50.211 MALIGNANT NEOPLASM OF UPPER-INNER QUADRANT OF RIGHT FEMALE BREAST, UNSPECIFIED ESTROGEN RECEPTOR STATUS: ICD-10-CM

## 2024-09-19 DIAGNOSIS — M32.9 SYSTEMIC LUPUS ERYTHEMATOSUS, UNSPECIFIED SLE TYPE, UNSPECIFIED ORGAN INVOLVEMENT STATUS: ICD-10-CM

## 2024-09-19 DIAGNOSIS — D84.821 IMMUNOSUPPRESSION DUE TO DRUG THERAPY: ICD-10-CM

## 2024-09-19 DIAGNOSIS — R73.03 PREDIABETES: ICD-10-CM

## 2024-09-19 DIAGNOSIS — Z79.899 IMMUNOSUPPRESSION DUE TO DRUG THERAPY: ICD-10-CM

## 2024-09-19 LAB
ALBUMIN SERPL BCP-MCNC: 3.5 G/DL (ref 3.5–5.2)
ALBUMIN SERPL BCP-MCNC: 3.5 G/DL (ref 3.5–5.2)
ALP SERPL-CCNC: 44 U/L (ref 55–135)
ALP SERPL-CCNC: 44 U/L (ref 55–135)
ALT SERPL W/O P-5'-P-CCNC: 14 U/L (ref 10–44)
ALT SERPL W/O P-5'-P-CCNC: 14 U/L (ref 10–44)
ANION GAP SERPL CALC-SCNC: 8 MMOL/L (ref 8–16)
ANION GAP SERPL CALC-SCNC: 8 MMOL/L (ref 8–16)
AST SERPL-CCNC: 20 U/L (ref 10–40)
AST SERPL-CCNC: 20 U/L (ref 10–40)
BASOPHILS # BLD AUTO: 0.06 K/UL (ref 0–0.2)
BASOPHILS # BLD AUTO: 0.06 K/UL (ref 0–0.2)
BASOPHILS NFR BLD: 0.9 % (ref 0–1.9)
BASOPHILS NFR BLD: 0.9 % (ref 0–1.9)
BILIRUB SERPL-MCNC: 0.4 MG/DL (ref 0.1–1)
BILIRUB SERPL-MCNC: 0.4 MG/DL (ref 0.1–1)
BUN SERPL-MCNC: 15 MG/DL (ref 8–23)
BUN SERPL-MCNC: 15 MG/DL (ref 8–23)
C3 SERPL-MCNC: 94 MG/DL (ref 50–180)
C4 SERPL-MCNC: 30 MG/DL (ref 11–44)
CALCIUM SERPL-MCNC: 9.1 MG/DL (ref 8.7–10.5)
CALCIUM SERPL-MCNC: 9.1 MG/DL (ref 8.7–10.5)
CHLORIDE SERPL-SCNC: 107 MMOL/L (ref 95–110)
CHLORIDE SERPL-SCNC: 107 MMOL/L (ref 95–110)
CO2 SERPL-SCNC: 27 MMOL/L (ref 23–29)
CO2 SERPL-SCNC: 27 MMOL/L (ref 23–29)
CREAT SERPL-MCNC: 0.9 MG/DL (ref 0.5–1.4)
CREAT SERPL-MCNC: 0.9 MG/DL (ref 0.5–1.4)
CRP SERPL-MCNC: 1.2 MG/L (ref 0–8.2)
DIFFERENTIAL METHOD BLD: ABNORMAL
DIFFERENTIAL METHOD BLD: ABNORMAL
EOSINOPHIL # BLD AUTO: 0.4 K/UL (ref 0–0.5)
EOSINOPHIL # BLD AUTO: 0.4 K/UL (ref 0–0.5)
EOSINOPHIL NFR BLD: 5.3 % (ref 0–8)
EOSINOPHIL NFR BLD: 5.3 % (ref 0–8)
ERYTHROCYTE [DISTWIDTH] IN BLOOD BY AUTOMATED COUNT: 14.8 % (ref 11.5–14.5)
ERYTHROCYTE [DISTWIDTH] IN BLOOD BY AUTOMATED COUNT: 14.8 % (ref 11.5–14.5)
ERYTHROCYTE [SEDIMENTATION RATE] IN BLOOD BY PHOTOMETRIC METHOD: <2 MM/HR (ref 0–36)
EST. GFR  (NO RACE VARIABLE): >60 ML/MIN/1.73 M^2
EST. GFR  (NO RACE VARIABLE): >60 ML/MIN/1.73 M^2
ESTIMATED AVG GLUCOSE: 114 MG/DL (ref 68–131)
GLUCOSE SERPL-MCNC: 84 MG/DL (ref 70–110)
GLUCOSE SERPL-MCNC: 84 MG/DL (ref 70–110)
HBA1C MFR BLD: 5.6 % (ref 4–5.6)
HCT VFR BLD AUTO: 39.5 % (ref 37–48.5)
HCT VFR BLD AUTO: 39.5 % (ref 37–48.5)
HGB BLD-MCNC: 11.9 G/DL (ref 12–16)
HGB BLD-MCNC: 11.9 G/DL (ref 12–16)
IMM GRANULOCYTES # BLD AUTO: 0.03 K/UL (ref 0–0.04)
IMM GRANULOCYTES # BLD AUTO: 0.03 K/UL (ref 0–0.04)
IMM GRANULOCYTES NFR BLD AUTO: 0.5 % (ref 0–0.5)
IMM GRANULOCYTES NFR BLD AUTO: 0.5 % (ref 0–0.5)
LYMPHOCYTES # BLD AUTO: 2.2 K/UL (ref 1–4.8)
LYMPHOCYTES # BLD AUTO: 2.2 K/UL (ref 1–4.8)
LYMPHOCYTES NFR BLD: 32.9 % (ref 18–48)
LYMPHOCYTES NFR BLD: 32.9 % (ref 18–48)
MCH RBC QN AUTO: 27 PG (ref 27–31)
MCH RBC QN AUTO: 27 PG (ref 27–31)
MCHC RBC AUTO-ENTMCNC: 30.1 G/DL (ref 32–36)
MCHC RBC AUTO-ENTMCNC: 30.1 G/DL (ref 32–36)
MCV RBC AUTO: 90 FL (ref 82–98)
MCV RBC AUTO: 90 FL (ref 82–98)
MONOCYTES # BLD AUTO: 0.7 K/UL (ref 0.3–1)
MONOCYTES # BLD AUTO: 0.7 K/UL (ref 0.3–1)
MONOCYTES NFR BLD: 10.7 % (ref 4–15)
MONOCYTES NFR BLD: 10.7 % (ref 4–15)
NEUTROPHILS # BLD AUTO: 3.3 K/UL (ref 1.8–7.7)
NEUTROPHILS # BLD AUTO: 3.3 K/UL (ref 1.8–7.7)
NEUTROPHILS NFR BLD: 49.7 % (ref 38–73)
NEUTROPHILS NFR BLD: 49.7 % (ref 38–73)
NRBC BLD-RTO: 0 /100 WBC
NRBC BLD-RTO: 0 /100 WBC
PLATELET # BLD AUTO: 287 K/UL (ref 150–450)
PLATELET # BLD AUTO: 287 K/UL (ref 150–450)
PMV BLD AUTO: 10.8 FL (ref 9.2–12.9)
PMV BLD AUTO: 10.8 FL (ref 9.2–12.9)
POTASSIUM SERPL-SCNC: 3.4 MMOL/L (ref 3.5–5.1)
POTASSIUM SERPL-SCNC: 3.4 MMOL/L (ref 3.5–5.1)
PROT SERPL-MCNC: 6.2 G/DL (ref 6–8.4)
PROT SERPL-MCNC: 6.2 G/DL (ref 6–8.4)
RBC # BLD AUTO: 4.41 M/UL (ref 4–5.4)
RBC # BLD AUTO: 4.41 M/UL (ref 4–5.4)
SODIUM SERPL-SCNC: 142 MMOL/L (ref 136–145)
SODIUM SERPL-SCNC: 142 MMOL/L (ref 136–145)
WBC # BLD AUTO: 6.56 K/UL (ref 3.9–12.7)
WBC # BLD AUTO: 6.56 K/UL (ref 3.9–12.7)

## 2024-09-19 PROCEDURE — 86140 C-REACTIVE PROTEIN: CPT | Performed by: INTERNAL MEDICINE

## 2024-09-19 PROCEDURE — 86225 DNA ANTIBODY NATIVE: CPT | Performed by: INTERNAL MEDICINE

## 2024-09-19 PROCEDURE — 85025 COMPLETE CBC W/AUTO DIFF WBC: CPT | Performed by: INTERNAL MEDICINE

## 2024-09-19 PROCEDURE — 86160 COMPLEMENT ANTIGEN: CPT | Performed by: INTERNAL MEDICINE

## 2024-09-19 PROCEDURE — 85652 RBC SED RATE AUTOMATED: CPT | Performed by: INTERNAL MEDICINE

## 2024-09-19 PROCEDURE — 36415 COLL VENOUS BLD VENIPUNCTURE: CPT | Mod: PO | Performed by: INTERNAL MEDICINE

## 2024-09-19 PROCEDURE — 80053 COMPREHEN METABOLIC PANEL: CPT | Performed by: INTERNAL MEDICINE

## 2024-09-19 PROCEDURE — 83036 HEMOGLOBIN GLYCOSYLATED A1C: CPT | Performed by: INTERNAL MEDICINE

## 2024-09-19 PROCEDURE — 86160 COMPLEMENT ANTIGEN: CPT | Mod: 59 | Performed by: INTERNAL MEDICINE

## 2024-09-20 LAB — DSDNA AB SER-ACNC: NORMAL [IU]/ML

## 2024-10-03 RX ORDER — LOPERAMIDE HYDROCHLORIDE 2 MG/1
CAPSULE ORAL
Qty: 240 CAPSULE | Refills: 3 | Status: SHIPPED | OUTPATIENT
Start: 2024-10-03

## 2024-10-09 ENCOUNTER — OFFICE VISIT (OUTPATIENT)
Dept: INTERNAL MEDICINE | Facility: CLINIC | Age: 64
End: 2024-10-09
Payer: MEDICARE

## 2024-10-09 VITALS
HEART RATE: 62 BPM | WEIGHT: 124.13 LBS | OXYGEN SATURATION: 95 % | HEIGHT: 64 IN | SYSTOLIC BLOOD PRESSURE: 138 MMHG | DIASTOLIC BLOOD PRESSURE: 62 MMHG | BODY MASS INDEX: 21.19 KG/M2

## 2024-10-09 DIAGNOSIS — I10 BENIGN ESSENTIAL HYPERTENSION: Primary | ICD-10-CM

## 2024-10-09 DIAGNOSIS — E05.90 SUBCLINICAL HYPERTHYROIDISM: ICD-10-CM

## 2024-10-09 DIAGNOSIS — M79.7 FIBROMYALGIA: ICD-10-CM

## 2024-10-09 DIAGNOSIS — C50.211 MALIGNANT NEOPLASM OF UPPER-INNER QUADRANT OF RIGHT FEMALE BREAST, UNSPECIFIED ESTROGEN RECEPTOR STATUS: ICD-10-CM

## 2024-10-09 DIAGNOSIS — Z79.899 IMMUNOSUPPRESSION DUE TO DRUG THERAPY: ICD-10-CM

## 2024-10-09 DIAGNOSIS — Z79.52 LONG TERM CURRENT USE OF SYSTEMIC STEROIDS: ICD-10-CM

## 2024-10-09 DIAGNOSIS — H57.89 REDNESS OF BOTH EYES: ICD-10-CM

## 2024-10-09 DIAGNOSIS — R73.03 PREDIABETES: ICD-10-CM

## 2024-10-09 DIAGNOSIS — D84.821 IMMUNOSUPPRESSION DUE TO DRUG THERAPY: ICD-10-CM

## 2024-10-09 DIAGNOSIS — K86.81 EXOCRINE PANCREATIC INSUFFICIENCY: ICD-10-CM

## 2024-10-09 DIAGNOSIS — E78.2 MIXED HYPERLIPIDEMIA: ICD-10-CM

## 2024-10-09 DIAGNOSIS — E27.49 SECONDARY ADRENAL INSUFFICIENCY: ICD-10-CM

## 2024-10-09 DIAGNOSIS — K86.1 CHRONIC PANCREATITIS, UNSPECIFIED PANCREATITIS TYPE: ICD-10-CM

## 2024-10-09 DIAGNOSIS — M32.9 SYSTEMIC LUPUS ERYTHEMATOSUS, UNSPECIFIED SLE TYPE, UNSPECIFIED ORGAN INVOLVEMENT STATUS: ICD-10-CM

## 2024-10-09 DIAGNOSIS — K51.918 ULCERATIVE COLITIS WITH OTHER COMPLICATION, UNSPECIFIED LOCATION: ICD-10-CM

## 2024-10-09 PROCEDURE — 99999 PR PBB SHADOW E&M-EST. PATIENT-LVL V: CPT | Mod: PBBFAC,,, | Performed by: INTERNAL MEDICINE

## 2024-10-09 NOTE — PROGRESS NOTES
"INTERNAL MEDICINE ESTABLISHED PATIENT VISIT NOTE    Subjective:     Chief Complaint: Follow-up  HTN, preDM     Patient ID: Efe Horowitz is a 64 y.o. 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    Still seeing Dr. Jordan for mgmt of cardiac issues and cholesterol.    Dr. Rangel managing UC.    Dr. Jiang managing lupus.    C/o red eyes today.  No itching/watery eyes.  Not painful.  Uses lubricating eye drops and followed by optometry.      Past Medical History:  Past Medical History:   Diagnosis Date    Acid reflux     Adrenal insufficiency, primary     Arthritis     Asthma     B12 deficiency anemia     Benign essential hypertension 2/7/2021    Blood transfusion 2010    Breast cancer 2/2016    Right breast infiltrating ductal CA    Cataract     Chronic pain     Deep vein thrombosis     Dry eyes     Esophagitis, unspecified     Fibromyalgia     General anesthetics causing adverse effect in therapeutic use     "slow to wake up bc I don't have an immune system    Heart murmur     History of colon polyps     Hyperlipidemia     Hypopituitary dwarfism     Iron deficiency anemia     Lupus     Migraines, neuralgic     Nonspecific ulcerative colitis     OP (osteoporosis)     history of reclast    Osteopenia     Pancreatic cyst     Schatzki's ring     Steroid sulfatase deficiency     Ulcerative colitis     Vitamin D deficiency disease        Home Medications:  Prior to Admission " medications    Medication Sig Start Date End Date Taking? Authorizing Provider   (Magic mouthwash) 1:1:1 diphenhydrAMINE(Benadryl) 12.5mg/5ml liq, aluminum & magnesium hydroxide-simethicone (Maalox), LIDOcaine viscous 2% Swish and spit 5 mLs every 4 (four) hours as needed (mouth sores). for mouth sores 3/6/23   Shonda Anguiano MD   albuterol (PROVENTIL/VENTOLIN HFA) 90 mcg/actuation inhaler Inhale 1-2 puffs into the lungs every 6 (six) hours as needed for Wheezing or Shortness of Breath. Rescue 4/17/23   Ondina Ang NP   amLODIPine (NORVASC) 5 MG tablet Take 1 tablet (5 mg total) by mouth once daily. 1/22/24   Shonda Anguiano MD   azelastine (ASTELIN) 137 mcg (0.1 %) nasal spray SPRAY 1 SPRAY BY NASAL ROUTE 2 TIMES DAILY. 10/23/23   Ben Levin PA-C   belimumab (BENLYSTA) 200 mg/mL AtIn Inject 1 mL (200 mg total) into the skin every 7 days. 7/18/24   Idania Allison MD   calcium carbonate (OS-RISSA) 500 mg calcium (1,250 mg) tablet Take 1 tablet by mouth once daily.    Provider, Historical   cetirizine (ZYRTEC) 10 MG tablet Take 1 tablet (10 mg total) by mouth once daily. 4/17/23 6/10/24  Ondina Ang NP   cholecalciferol, vitamin D3, 125 mcg (5,000 unit) Tab Take 5,000 Units by mouth once daily.    Provider, Historical   CREON 36,000-114,000- 180,000 unit CpDR TAKE TWO CAPSULES BY MOUTH THREE TIMES DAILY WITH MEALS AND ONE CAPSULE WITH EACH SNACK 4/20/22   Reese Villalta MD   dexamethasone (DECADRON) 4 mg/mL injection Inject 1 mL (4 mg total) into the muscle daily as needed (Signs and symtpoms of severe adrenal insufficiency). 10/6/21   Ena Srinivasan MD   diclofenac sodium (VOLTAREN) 1 % Gel APPLY 2 GRAMS TO AFFECTED AREA 4 TIMES A DAY 3/24/21   Idania Allison MD   dicyclomine (BENTYL) 20 mg tablet TAKE 1 TABLET BY MOUTH EVERY 6 HOURS 9/6/24   Ceferino Rangel MD   ferrous sulfate (IRON ORAL) Take by mouth. No sure dose    Provider, Historical   fluticasone propionate (FLONASE) 50  mcg/actuation nasal spray 1 spray (50 mcg total) by Each Nostril route 2 (two) times daily as needed for Rhinitis or Allergies. 4/17/23   Ondina Ang NP   folic acid (FOLVITE) 1 MG tablet Take 1 mg by mouth once daily.    Provider, Historical   gabapentin (NEURONTIN) 300 MG capsule Take 1 capsule (300 mg total) by mouth 3 (three) times daily. 3/18/24   Idania Allison MD   hydroCHLOROthiazide (HYDRODIURIL) 12.5 MG Tab Take 12.5 mg by mouth. 9/26/22   Provider, Historical   hydroxychloroquine (PLAQUENIL) 200 mg tablet TAKE 1 AND 1/2 TABLETS BY MOUTH DAILY 6/10/24   Idania Allison MD   hydrOXYzine HCL (ATARAX) 25 MG tablet TAKE 1 TABLET BY MOUTH NIGHTLY AS NEEDED (INSOMNIA). 11/5/22   Provider, Historical   levocetirizine (XYZAL) 5 MG tablet Take 1 tablet (5 mg total) by mouth every evening. 3/6/23 3/26/24  Shonda Anguiano MD   loperamide (IMODIUM) 2 mg capsule TAKE 1 TO 2 CAPSULES BY MOUTH FOUR TIMES DAILY 10/3/24   Brisa Jones NP   losartan (COZAAR) 50 MG tablet TAKE 1 TABLET BY MOUTH EVERY DAY 2/25/24   Shonda Anguiano MD   multivitamin/iron/folic acid (CENTRUM WOMEN ORAL) Take 1 tablet by mouth once daily.    Provider, Historical   nebivoloL (BYSTOLIC) 10 MG Tab  3/9/23   Provider, Historical   niacin 100 MG Tab Take by mouth. 1 Tablet Oral At bedtime    Provider, Historical   nystatin (MYCOSTATIN) cream Apply topically 2 (two) times daily. 1/23/24   Shonda Anguiano MD   omeprazole (PRILOSEC) 20 MG capsule Take 1 capsule (20 mg total) by mouth 2 (two) times daily. 1/23/24   Shonda Anguiano MD   ondansetron (ZOFRAN) 4 MG tablet Take 1 tablet (4 mg total) by mouth every 12 (twelve) hours as needed for Nausea. 10/13/20   Romulo-Tierney Packer, JOJO   oxyCODONE-acetaminophen (PERCOCET) 5-325 mg per tablet Take 1 tablet by mouth every 4 (four) hours as needed for Pain. 6/26/22   Ambrocio Salas MD   potassium chloride SA (K-DUR,KLOR-CON) 20 MEQ tablet TAKE 1 TABLET BY MOUTH TWICE A DAY 4/11/24    "Shonda Anguiano MD   predniSONE (DELTASONE) 5 MG tablet TAKE 1 TABLET BY MOUTH ONCE DAILY. DOUBLE THE DOSE IN TIMES OF ILLNESS 10/23/23   Ena Srinivasan MD   psyllium husk/aspartame (METAMUCIL FIBER SINGLES ORAL) Take by mouth.    Provider, Historical   REPATHA SURECLICK 140 mg/mL PnIj INJECT 140 MG INTO THE SKIN EVERY 14 (FOURTEEN) DAYS 7/27/21   Provider, Historical   RESTASIS 0.05 % ophthalmic emulsion INSTILL 1 DROP INTO BOTH EYES TWICE A DAY 5/20/21   Vo, Joana, OD   rizatriptan (MAXALT) 10 MG tablet TAKE 1 TABLET (10 MG TOTAL) BY MOUTH 2 (TWO) TIMES DAILY AS NEEDED FOR MIGRAINE. 4/15/24   Shonda Anguiano MD   rosuvastatin (CRESTOR) 5 MG tablet Take 5 mg by mouth. 7/26/24   Provider, Historical   SAVELLA 100 mg Tab TAKE 1 TABLET BY MOUTH EVERY DAY 9/19/23   Shonda Anguiano MD   valACYclovir (VALTREX) 1000 MG tablet Take 1 tablet (1,000 mg total) by mouth every 12 (twelve) hours. for 10 days 11/17/22 7/3/24  Emily Street MD   zolpidem (AMBIEN) 5 MG Tab TAKE 1 TABLET BY MOUTH NIGHTLY AS NEEDED (INSOMNIA). 9/18/24   Shonda Anguiano MD       Allergies:  Review of patient's allergies indicates:   Allergen Reactions    Aspirin      Other reaction(s): "hemorrhage"  hemorrhage    Hydrocortisone Nausea Only     Other reaction(s): sick  sick    Lactose intolerance (lactase) [lactase] Nausea And Vomiting    Vicodin [hydrocodone-acetaminophen]      Other reaction(s): hyperactivity    Asacol [mesalamine] Hallucinations     Other reaction(s): Hallucinations  hallucinations       Social History:  Social History     Tobacco Use    Smoking status: Never    Smokeless tobacco: Never   Substance Use Topics    Alcohol use: No     Alcohol/week: 0.0 standard drinks of alcohol    Drug use: No        Review of Systems   Constitutional:  Negative for appetite change, chills, fatigue, fever and unexpected weight change.   HENT:  Negative for congestion, hearing loss and rhinorrhea.    Eyes:  Negative for pain and visual disturbance. " "  Respiratory:  Negative for cough, chest tightness, shortness of breath and wheezing.    Cardiovascular:  Negative for chest pain, palpitations and leg swelling.   Gastrointestinal:  Negative for abdominal distention and abdominal pain.   Endocrine: Negative for polydipsia and polyuria.   Genitourinary:  Negative for decreased urine volume, difficulty urinating, dysuria, hematuria and vaginal discharge.   Musculoskeletal:  Positive for arthralgias.   Neurological:  Negative for weakness, numbness and headaches.   Psychiatric/Behavioral:  Negative for behavioral problems and confusion.          Health Maintenance:     Immunizations:   Influenza 9/2023, flu shot today  TDap 8/2021  Pneumovax 6/2014, Prevnar 13 2/2021, Prevnar 20 11/2022  Shingrix 2/2022, 9/2022  COVID 2/2021, 3/2021, 8/2021 (Pfizer), 11/2022, 9/2023, 9/2024  RSV 9/2023     Cancer Screening:  PAP: total hyst 2/2 fibroids, benign, ovaries removed as well.  Mammogram:  11/2023 benign, has repeat scheduled for Nov  Colonoscopy: hx total proctocolectomy for UC, had pouchoscopy 12/2018 via Dr. Villalta wn, biopsies taken which showed mild chronic inflammation on path.  EGD 12/2018 c hiatal hernia, otherwise unremarkable, bx c chronic inflammation in duodenum on path, normal gastric bx and neg H pylori  DEXA:  2/2024 c osteoporosis, followed by Dr. Srinivasan, now on Reclast, has f/u pending this summer c Dr. Srinivasan    Objective:   /62 (BP Location: Left arm, Patient Position: Sitting)   Pulse 62   Ht 5' 4" (1.626 m)   Wt 56.3 kg (124 lb 1.9 oz)   SpO2 95%   BMI 21.30 kg/m²        General: AAO x3, no apparent distress  HEENT: no cervical LAD, no thyroid masses appreciated  CV: RRR, no m/r/g  Pulm: Lungs CTAB, no crackles, no wheezes  Abd: s/NT/ND +BS  Extremities: no c/c/e    Labs:     Lab Results   Component Value Date    WBC 6.56 09/19/2024    WBC 6.56 09/19/2024    HGB 11.9 (L) 09/19/2024    HGB 11.9 (L) 09/19/2024    HCT 39.5 09/19/2024    HCT 39.5 " 09/19/2024    MCV 90 09/19/2024    MCV 90 09/19/2024     09/19/2024     09/19/2024     Sodium   Date Value Ref Range Status   09/19/2024 142 136 - 145 mmol/L Final   09/19/2024 142 136 - 145 mmol/L Final     Potassium   Date Value Ref Range Status   09/19/2024 3.4 (L) 3.5 - 5.1 mmol/L Final   09/19/2024 3.4 (L) 3.5 - 5.1 mmol/L Final     Chloride   Date Value Ref Range Status   09/19/2024 107 95 - 110 mmol/L Final   09/19/2024 107 95 - 110 mmol/L Final     CO2   Date Value Ref Range Status   09/19/2024 27 23 - 29 mmol/L Final   09/19/2024 27 23 - 29 mmol/L Final     Glucose   Date Value Ref Range Status   09/19/2024 84 70 - 110 mg/dL Final   09/19/2024 84 70 - 110 mg/dL Final     BUN   Date Value Ref Range Status   09/19/2024 15 8 - 23 mg/dL Final   09/19/2024 15 8 - 23 mg/dL Final     Creatinine   Date Value Ref Range Status   09/19/2024 0.9 0.5 - 1.4 mg/dL Final   09/19/2024 0.9 0.5 - 1.4 mg/dL Final     Calcium   Date Value Ref Range Status   09/19/2024 9.1 8.7 - 10.5 mg/dL Final   09/19/2024 9.1 8.7 - 10.5 mg/dL Final     Total Protein   Date Value Ref Range Status   09/19/2024 6.2 6.0 - 8.4 g/dL Final   09/19/2024 6.2 6.0 - 8.4 g/dL Final     Albumin   Date Value Ref Range Status   09/19/2024 3.5 3.5 - 5.2 g/dL Final   09/19/2024 3.5 3.5 - 5.2 g/dL Final     Total Bilirubin   Date Value Ref Range Status   09/19/2024 0.4 0.1 - 1.0 mg/dL Final     Comment:     For infants and newborns, interpretation of results should be based  on gestational age, weight and in agreement with clinical  observations.    Premature Infant recommended reference ranges:  Up to 24 hours.............<8.0 mg/dL  Up to 48 hours............<12.0 mg/dL  3-5 days..................<15.0 mg/dL  6-29 days.................<15.0 mg/dL     09/19/2024 0.4 0.1 - 1.0 mg/dL Final     Comment:     For infants and newborns, interpretation of results should be based  on gestational age, weight and in agreement with  clinical  observations.    Premature Infant recommended reference ranges:  Up to 24 hours.............<8.0 mg/dL  Up to 48 hours............<12.0 mg/dL  3-5 days..................<15.0 mg/dL  6-29 days.................<15.0 mg/dL       Alkaline Phosphatase   Date Value Ref Range Status   09/19/2024 44 (L) 55 - 135 U/L Final   09/19/2024 44 (L) 55 - 135 U/L Final     AST   Date Value Ref Range Status   09/19/2024 20 10 - 40 U/L Final   09/19/2024 20 10 - 40 U/L Final     ALT   Date Value Ref Range Status   09/19/2024 14 10 - 44 U/L Final   09/19/2024 14 10 - 44 U/L Final     Anion Gap   Date Value Ref Range Status   09/19/2024 8 8 - 16 mmol/L Final   09/19/2024 8 8 - 16 mmol/L Final     eGFR if    Date Value Ref Range Status   07/05/2022 >60.0 >60 mL/min/1.73 m^2 Final     eGFR if non    Date Value Ref Range Status   07/05/2022 >60.0 >60 mL/min/1.73 m^2 Final     Comment:     Calculation used to obtain the estimated glomerular filtration  rate (eGFR) is the CKD-EPI equation.        Lab Results   Component Value Date    HGBA1C 5.6 09/19/2024     Lab Results   Component Value Date    LDLCALC 14.8 (L) 04/25/2024     Lab Results   Component Value Date    TSH 1.216 04/25/2024         Assessment/Plan     Efe was seen today for follow-up.    Diagnoses and all orders for this visit:    Benign essential hypertension  at goal.  Cont current medications.    Prediabetes  Lab Results   Component Value Date    HGBA1C 5.6 09/19/2024     Normalized on recent labs  Doing well c diet and exercise  Cont lifestyle modifications.    Mixed hyperlipidemia  Lab Results   Component Value Date    LDLCALC 14.8 (L) 04/25/2024     Low on last check, Crestor previously decreased  On Crestor and Repatha which has been managed by Dr. Jordan  Has repeat labs tomorrow and states Dr. Jordan is considering stopping Crestor.    Redness of both eyes  Followed by ophtho, can cont routine f/u    Subclinical  hyperthyroidism  Lab Results   Component Value Date    TSH 1.216 04/25/2024     Normal on last check, no issues    Malignant neoplasm of upper-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, unspecified estrogen receptor status  Followed by onc, no issues, repeat mmg pending    Systemic lupus erythematosus, unspecified SLE type, unspecified organ involvement status  Fibromyalgia  Long term current use of systemic steroids  Immunosuppression due to drug therapy  Followed by Rheum  Recent labs stable.  No issues  Eye exam utd    Ulcerative colitis with other complication, unspecified location  Chronic pancreatitis, unspecified pancreatitis type  Exocrine pancreatic insufficiency  Secondary adrenal insufficiency  Followed by gastro  No acute issues  Requesting refill on Creon, messaged Dr. Perdue to let him now since she had trouble reaching his office      HM as above  RTC in 4 mos, sooner if needed.    Shonda Anguiano MD  Department of Internal Medicine - [facility]  10/09/2024

## 2024-10-15 DIAGNOSIS — M32.9 SYSTEMIC LUPUS ERYTHEMATOSUS, UNSPECIFIED SLE TYPE, UNSPECIFIED ORGAN INVOLVEMENT STATUS: ICD-10-CM

## 2024-10-16 NOTE — TELEPHONE ENCOUNTER
Please see the attached refill request. Pt completed labs on 9/19 and is scheduled for 12/2 please advise if you would like more labs scheduled before refill is filled.

## 2024-10-17 DIAGNOSIS — F51.01 PRIMARY INSOMNIA: ICD-10-CM

## 2024-10-17 RX ORDER — BELIMUMAB 200 MG/ML
200 SOLUTION SUBCUTANEOUS
Qty: 4 EACH | Refills: 2 | Status: ACTIVE | OUTPATIENT
Start: 2024-10-17

## 2024-10-18 NOTE — TELEPHONE ENCOUNTER
No care due was identified.  [facility] Embedded Care Due Messages. Reference number: [id number].   10/17/2024 7:22:55 PM CDT

## 2024-10-20 RX ORDER — ZOLPIDEM TARTRATE 5 MG/1
TABLET ORAL
Qty: 30 TABLET | Refills: 0 | Status: SHIPPED | OUTPATIENT
Start: 2024-10-20

## 2024-10-24 DIAGNOSIS — K21.9 GASTROESOPHAGEAL REFLUX DISEASE WITHOUT ESOPHAGITIS: ICD-10-CM

## 2024-10-24 RX ORDER — OMEPRAZOLE 20 MG/1
20 CAPSULE, DELAYED RELEASE ORAL 2 TIMES DAILY
Qty: 180 CAPSULE | Refills: 3 | Status: SHIPPED | OUTPATIENT
Start: 2024-10-24

## 2024-10-24 NOTE — TELEPHONE ENCOUNTER
Refill Decision Note   Efe Horowitz  is requesting a refill authorization.  Brief Assessment and Rationale for Refill:  Approve     Medication Therapy Plan:        Alert overridden per protocol: Yes   Pharmacist review requested: Yes   Comments:     Note composed:1:05 PM 10/24/2024

## 2024-10-24 NOTE — TELEPHONE ENCOUNTER
Refill Routing Note   Medication(s) are not appropriate for processing by [facility] for the following reason(s):        Drug-disease interaction: omeprazole and Lupus (systemic lupus erythematosus); Systemic lupus erythematosus, unspecified SLE type, unspecified organ involvement status     [facility] action(s):  Defer             Pharmacist review requested: Yes     Appointments  past 12m or future 3m with PCP    Date Provider   Last Visit   10/9/2024 Shonda Anguiano MD   Next Visit   2/11/2025 Shonda Anguiano MD   ED visits in past 90 days: 0        Note composed:12:09 PM 10/24/2024

## 2024-10-24 NOTE — TELEPHONE ENCOUNTER
No care due was identified.  [facility] Embedded Care Due Messages. Reference number: [id number].   10/24/2024 12:48:36 AM CDT

## 2024-11-02 ENCOUNTER — HOSPITAL ENCOUNTER (OUTPATIENT)
Dept: RADIOLOGY | Facility: HOSPITAL | Age: 64
Discharge: HOME | End: 2024-11-02
Attending: INTERNAL MEDICINE
Payer: MEDICARE

## 2024-11-02 DIAGNOSIS — C50.211 MALIGNANT NEOPLASM OF UPPER-INNER QUADRANT OF RIGHT FEMALE BREAST, UNSPECIFIED ESTROGEN RECEPTOR STATUS: ICD-10-CM

## 2024-11-02 DIAGNOSIS — Z12.31 ENCOUNTER FOR SCREENING MAMMOGRAM FOR MALIGNANT NEOPLASM OF BREAST: ICD-10-CM

## 2024-11-02 PROCEDURE — 77067 SCR MAMMO BI INCL CAD: CPT | Mod: TC,52

## 2024-11-03 DIAGNOSIS — J30.1 SEASONAL ALLERGIC RHINITIS DUE TO POLLEN: ICD-10-CM

## 2024-11-04 RX ORDER — AZELASTINE 1 MG/ML
1 SPRAY, METERED NASAL 2 TIMES DAILY
Qty: 30 ML | Refills: 6 | Status: SHIPPED | OUTPATIENT
Start: 2024-11-04

## 2024-11-12 RX ORDER — MILNACIPRAN HYDROCHLORIDE 100 MG/1
1 TABLET, FILM COATED ORAL
Qty: 90 TABLET | Refills: 3 | Status: SHIPPED | OUTPATIENT
Start: 2024-11-12

## 2024-11-12 NOTE — TELEPHONE ENCOUNTER
No care due was identified.  [facility] Embedded Care Due Messages. Reference number: [id number].   11/12/2024 1:34:30 PM CST

## 2024-11-13 ENCOUNTER — TELEPHONE (OUTPATIENT)
Dept: RADIOLOGY | Facility: HOSPITAL | Age: 64
End: 2024-11-13
Payer: MEDICARE

## 2024-11-13 NOTE — TELEPHONE ENCOUNTER
Spoke with patient and explained mammogram findings.Patient expressed understanding of results. Patient scheduled abnormal mammogram follow up appointment at [facility] [place] on 11/18/2024.

## 2024-11-16 NOTE — TELEPHONE ENCOUNTER
Messages from Insurance company: ([employer])The quantity limit for this drug is 60.00 tablet per 30 days. The submitted quantity exceeds the drug's quantity limit. Please ensure the requested amount is accurate before proceeding with the request. If accurate, please provide rationale for an exception to the quantity limit.

## 2024-11-16 NOTE — TELEPHONE ENCOUNTER
Prior authorization has been initiated through [employer]. Authorization is currently pending approval by insurance company.May take 24-72 hours for your insurance to respond

## 2024-11-18 ENCOUNTER — OFFICE VISIT (OUTPATIENT)
Dept: HEMATOLOGY/ONCOLOGY | Facility: CLINIC | Age: 64
End: 2024-11-18
Payer: MEDICARE

## 2024-11-18 ENCOUNTER — HOSPITAL ENCOUNTER (OUTPATIENT)
Dept: RADIOLOGY | Facility: HOSPITAL | Age: 64
Discharge: HOME | End: 2024-11-18
Attending: INTERNAL MEDICINE
Payer: MEDICARE

## 2024-11-18 ENCOUNTER — HOSPITAL ENCOUNTER (OUTPATIENT)
Dept: RADIOLOGY | Facility: HOSPITAL | Age: 64
Discharge: HOME | End: 2024-11-18
Attending: NURSE PRACTITIONER
Payer: MEDICARE

## 2024-11-18 VITALS
HEART RATE: 53 BPM | HEIGHT: 64 IN | RESPIRATION RATE: 18 BRPM | WEIGHT: 122.56 LBS | OXYGEN SATURATION: 100 % | BODY MASS INDEX: 20.92 KG/M2 | SYSTOLIC BLOOD PRESSURE: 148 MMHG | DIASTOLIC BLOOD PRESSURE: 89 MMHG

## 2024-11-18 DIAGNOSIS — Z79.899 IMMUNOSUPPRESSION DUE TO DRUG THERAPY: ICD-10-CM

## 2024-11-18 DIAGNOSIS — Z80.0 FAMILY HISTORY OF PANCREATIC CANCER: ICD-10-CM

## 2024-11-18 DIAGNOSIS — M32.19 OTHER SYSTEMIC LUPUS ERYTHEMATOSUS WITH OTHER ORGAN INVOLVEMENT: ICD-10-CM

## 2024-11-18 DIAGNOSIS — E55.9 VITAMIN D DEFICIENCY DISEASE: ICD-10-CM

## 2024-11-18 DIAGNOSIS — R92.8 ABNORMAL FINDING ON BREAST IMAGING: ICD-10-CM

## 2024-11-18 DIAGNOSIS — N63.21 MASS OF UPPER OUTER QUADRANT OF LEFT BREAST: ICD-10-CM

## 2024-11-18 DIAGNOSIS — C50.211 MALIGNANT NEOPLASM OF UPPER-INNER QUADRANT OF RIGHT FEMALE BREAST, UNSPECIFIED ESTROGEN RECEPTOR STATUS: Primary | ICD-10-CM

## 2024-11-18 DIAGNOSIS — D84.821 IMMUNOSUPPRESSION DUE TO DRUG THERAPY: ICD-10-CM

## 2024-11-18 DIAGNOSIS — Z12.31 ENCOUNTER FOR SCREENING MAMMOGRAM FOR BREAST CANCER: ICD-10-CM

## 2024-11-18 PROCEDURE — 76642 ULTRASOUND BREAST LIMITED: CPT | Mod: TC,LT

## 2024-11-18 PROCEDURE — 77061 BREAST TOMOSYNTHESIS UNI: CPT | Mod: 26,LT,, | Performed by: RADIOLOGY

## 2024-11-18 PROCEDURE — 3044F HG A1C LEVEL LT 7.0%: CPT | Mod: CPTII,S$GLB,, | Performed by: NURSE PRACTITIONER

## 2024-11-18 PROCEDURE — 99215 OFFICE O/P EST HI 40 MIN: CPT | Mod: S$GLB,,, | Performed by: NURSE PRACTITIONER

## 2024-11-18 PROCEDURE — 4010F ACE/ARB THERAPY RXD/TAKEN: CPT | Mod: CPTII,S$GLB,, | Performed by: NURSE PRACTITIONER

## 2024-11-18 PROCEDURE — 1101F PT FALLS ASSESS-DOCD LE1/YR: CPT | Mod: CPTII,S$GLB,, | Performed by: NURSE PRACTITIONER

## 2024-11-18 PROCEDURE — 1159F MED LIST DOCD IN RCRD: CPT | Mod: CPTII,S$GLB,, | Performed by: NURSE PRACTITIONER

## 2024-11-18 PROCEDURE — G2211 COMPLEX E/M VISIT ADD ON: HCPCS | Mod: S$GLB,,, | Performed by: NURSE PRACTITIONER

## 2024-11-18 PROCEDURE — 3008F BODY MASS INDEX DOCD: CPT | Mod: CPTII,S$GLB,, | Performed by: NURSE PRACTITIONER

## 2024-11-18 PROCEDURE — 1126F AMNT PAIN NOTED NONE PRSNT: CPT | Mod: CPTII,S$GLB,, | Performed by: NURSE PRACTITIONER

## 2024-11-18 PROCEDURE — 3079F DIAST BP 80-89 MM HG: CPT | Mod: CPTII,S$GLB,, | Performed by: NURSE PRACTITIONER

## 2024-11-18 PROCEDURE — 77065 DX MAMMO INCL CAD UNI: CPT | Mod: 26,LT,, | Performed by: RADIOLOGY

## 2024-11-18 PROCEDURE — 77061 BREAST TOMOSYNTHESIS UNI: CPT | Mod: TC,LT

## 2024-11-18 PROCEDURE — 76642 ULTRASOUND BREAST LIMITED: CPT | Mod: 26,LT,, | Performed by: RADIOLOGY

## 2024-11-18 PROCEDURE — 3077F SYST BP >= 140 MM HG: CPT | Mod: CPTII,S$GLB,, | Performed by: NURSE PRACTITIONER

## 2024-11-18 PROCEDURE — 3288F FALL RISK ASSESSMENT DOCD: CPT | Mod: CPTII,S$GLB,, | Performed by: NURSE PRACTITIONER

## 2024-11-18 PROCEDURE — 99999 PR PBB SHADOW E&M-EST. PATIENT-LVL V: CPT | Mod: PBBFAC,,, | Performed by: NURSE PRACTITIONER

## 2024-11-18 NOTE — PROGRESS NOTES
Subjective     Patient ID: Efe Horowitz is a 64 y.o. female.    Chief Complaint: Malignant neoplasm of upper-inner quadrant of right female     HPI    Here for follow up of breast cancer  Skin itches at times to right breast area that she had XRT.   She also notes swelling in left breast x 6 months. It is tender and feels full in lower outer quadrant  Notes itching to left nipple as well.   No rashes.   Reports her issues remain rheumatologic- she follows with rheumatology and symptoms better managed on current regimen  Appetite poor but Weight stable  Denies new pain except left breast complaints above  Son graduated and joining [employer]!    L MMG today negative    2/11/2024 MRI abd:   FINDINGS:  Image quality is degraded by motion artifact.    Inferior Thorax: Unremarkable.   Liver: Normal size and background parenchymal signal.  Stable enhancing lesions within the right hepatic lobe measuring 2.2 and 1.1 cm compatible with hemangioma.  Mildly enlarged left hepatic lobe cyst measuring 1.3 cm.  Few additional scattered subcentimeter cysts.  Portal veins are patent.  Gallbladder: Surgically absent.  Bile Ducts: Stable mild intra and extrahepatic biliary ductal dilatation.  Common bile duct measures 9 mm and mildly tapers to the ampulla.  No evidence of stricture or filling defect.  Pancreas: Stable subcentimeter T2 hyperintense cystic foci within the pancreatic body and tail which appear to communicate with the main duct suggestive for side branch IPMN (series 4, image 10, 9, 8).  No suspicious enhancement.  No main duct dilatation.  Spleen: Unremarkable.  Adrenals: Unremarkable.  Kidneys/Ureters: No mass or hydroureteronephrosis.   GI Tract/Mesentery: Small hiatal hernia.  Postoperative changes of colectomy.  No evidence of bowel obstruction or inflammation.  Peritoneal Space: No ascites.  Retroperitoneum: No pathologically enlarged nodes.  Abdominal wall: Postsurgical changes of the ventral abdominal  wall.  Vasculature: No aneurysm.  Bones: Degenerative changes of the spine.  Postprocedural changes of the L3 vertebra.  Impression:  1. Stable subcentimeter pancreatic cystic lesions favored to represent side branch IPMN.  No suspicious features.  Recommend repeat examination in 2 years.  2. Stable enhancing hepatic lesions consistent with hemangioma., 08/13/2019 several hepatic cysts.  3. Stable intra and extrahepatic biliary ductal dilatation, possibly related to cholecystectomy status.  4. Additional findings as above.       Presents for follow-up of triple negative breast cancer.      Diagnosis: Triple negative Stage 1 ([id number]) right breast carcinoma  Oncology History:  - 1/18/16 screening mammogram: focal asymmetry seen in the posterior upper-inner region of the right breast.    - 2/1/16 diagnostic mammogram and ultrasound: irregular solid mass with poorly defined margins  measuring 5 millimeters    - 2/19/16 core biopsy  FINAL PATHOLOGIC DIAGNOSIS  Invasive ductal carcinoma, high-grade (duct formation - 3; nuclear pleomorphism - 3, mitosis - 2)  ER - Negative (0)  RI - Negative (0)  HER2 - Negative (0)    - 4/27/16 mastectomy and SLN biopsy  FINAL PATHOLOGIC DIAGNOSIS  RIGHT MASTECTOMY SPECIMEN SHOWING A 7 MM AREA OF INVASIVE DUCT CARCINOMA, GRADE 3  WITH NEGATIVE MARGINS.    TUMOR SIZE: 0.7 CM  HISTOLOGIC TYPE: INVASIVE DUCT CARCINOMA  HISTOLOGIC GRADE: 3, 3, 2; GRADE 3  DCIS: NOT IDENTIFIED  MARGINS: ALL MARGINS OF RESECTION ARE NEGATIVE FOR TUMOR. CLOSEST MARGIN IS THE  POSTERIOR MARGIN AT 2.5 CM  IMMUNE RECEPTOR STUDIES: MS 16-5/1/04: ER - Negative (0)  RI - Negative (0)  HER2 - Negative (0)  LYMPH NODES: Total number 4- negative    - Genetic testing performed and negative    - With tumor > 0.6 cm she just made guideline cut off to consider adjuvant chemotherapy with TC   However, with her active wound healing issues at that time and other comorbidities adjuvant chemotherapy was not be pursued     - She  completed all of her reconstruction with Dr. Mccloud     Does have multiple comorbidities including history of lupus and fibromyalgia- she is managed by rheumatology and reports recent flare     - Reports history of easy bruising and states she was borderline on testing for von willebrands in [facility]    - additional UC diagnosis     Review of Systems   Constitutional:         See above   All other systems reviewed and are negative.         Objective     Physical Exam  Vitals and nursing note reviewed.   Constitutional:       General: She is not in acute distress.     Appearance: Normal appearance. She is well-developed and normal weight. She is not ill-appearing.   HENT:      Head: Normocephalic and atraumatic.   Eyes:      General: Lids are normal. No scleral icterus.     Extraocular Movements: Extraocular movements intact.      Conjunctiva/sclera: Conjunctivae normal.      Pupils: Pupils are equal, round, and reactive to light.   Neck:      Thyroid: No thyromegaly.      Vascular: No JVD.      Trachea: Trachea normal.   Cardiovascular:      Rate and Rhythm: Normal rate and regular rhythm.      Heart sounds: Murmur (soft) heard.   Pulmonary:      Effort: Pulmonary effort is normal.      Breath sounds: Normal breath sounds. No wheezing.      Comments: Breasts- right breast no concerning masses or LAD. Area of hyperpigmentation to upper outer quad that has been there for >1 year according to patient.   Chest:          Comments: Left breast mass/thickened tissue in left upper outer/axilla area marked above. Fullness noted in left outer lower quadrant.   Abdominal:      General: Abdomen is flat. Bowel sounds are normal. There is no distension.      Palpations: Abdomen is soft. There is no mass.      Tenderness: There is no abdominal tenderness. There is no rebound.      Comments: No organomegaly.    Musculoskeletal:         General: Tenderness and deformity present. No swelling. Normal range of motion.      Cervical  back: Normal range of motion and neck supple. No rigidity or tenderness.      Right lower leg: No edema.      Left lower leg: No edema.      Comments: No spinal or paraspinal tenderness.    Lymphadenopathy:      Head:      Right side of head: No submental or submandibular adenopathy.      Left side of head: No submental or submandibular adenopathy.      Cervical: No cervical adenopathy.      Upper Body:      Right upper body: No supraclavicular or axillary adenopathy.      Left upper body: No supraclavicular or axillary adenopathy.   Skin:     General: Skin is warm and dry.      Capillary Refill: Capillary refill takes less than 2 seconds.      Coloration: Skin is not jaundiced.      Findings: No bruising, erythema or rash.      Nails: There is no clubbing.   Neurological:      General: No focal deficit present.      Mental Status: She is alert and oriented to person, place, and time.      Sensory: No sensory deficit.      Motor: No weakness.      Coordination: Coordination normal.      Gait: Gait normal.   Psychiatric:         Mood and Affect: Mood normal.         Speech: Speech normal.         Behavior: Behavior normal.         Thought Content: Thought content normal.         Judgment: Judgment normal.       Labs- reviewed most recent       Assessment and Plan     1. Malignant neoplasm of upper-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, unspecified estrogen receptor status    2. Mass of upper outer quadrant of left breast  -     US Breast Left Limited; Future; Expected date: 11/18/2024    3. Family history of pancreatic cancer    4. Other systemic lupus erythematosus with other organ involvement    5. Immunosuppression due to drug therapy    6. Vitamin D deficiency disease    7. Encounter for screening mammogram for breast cancer  -     Mammo Digital Screening Left with Mar[facility]; Future; Expected date: 11/19/2025        Triple negative breast cancer  L MMG negative however will get US as with abnormal exam and above  complaints.     Pancreatic cyst- imaging UTD. Next MRI Abd 2/2026    Rheum management to continued    Annual derm appt    Most recent Vit D level normal.    Route Chart for Scheduling    Med Onc Chart Routing      Follow up with physician    Follow up with [facility] 1 year. with L MMG   Infusion scheduling note    Injection scheduling note    Labs    Imaging    Pharmacy appointment    Other referrals                  Therapy Plan Information  INF RECLAST for Long term current use of systemic steroids, noted on 6/23/2014  INF RECLAST for Age-related osteoporosis with current pathological fracture with delayed healing, noted on 1/25/2017  Medications  zoledronic acid-mannitol & water 5 mg/100 mL infusion 5 mg  5 mg, Intravenous, Every visit  Flushes  sodium chloride 0.9% flush 10 mL  10 mL, Intravenous, PRN      No therapy plan of the specified type found.    No therapy plan of the specified type found.               Patient is in agreement with the proposed treatment plan. All questions were answered to the patient's satisfaction. Pt knows to call clinic for any new or worsening symptoms and if anything is needed before the next clinic visit.    Everardo Carranza, MSN, APRN, FNP-C  Nurse Practitioner to Dr. Shaila Wright  Lead [facility]  [facility]  Hematology & Medical Oncology  [address] [place] [id number]  ph. [phone] ext [phone]  Fax. [phone]              40 minutes of total time spent on the encounter, which includes face to face time and non-face to face time preparing to see the patient (eg, review of tests), Obtaining and/or reviewing separately obtained history, Documenting clinical information in the electronic or other health record, Independently interpreting results (not separately reported) and communicating results to the patient/family/caregiver, or Care coordination (not separately reported).

## 2024-11-19 DIAGNOSIS — F51.01 PRIMARY INSOMNIA: ICD-10-CM

## 2024-11-19 RX ORDER — ZOLPIDEM TARTRATE 5 MG/1
TABLET ORAL
Qty: 30 TABLET | Refills: 0 | Status: SHIPPED | OUTPATIENT
Start: 2024-11-19

## 2024-11-19 NOTE — TELEPHONE ENCOUNTER
No care due was identified.  Health [facility] Embedded Care Due Messages. Reference number: [id number].   11/19/2024 11:54:54 AM CST

## 2024-11-19 NOTE — TELEPHONE ENCOUNTER
Please see the attached refill request.  Last ordered: 1 month ago (10/20/2024) by Shonda Anguiano MD     Last refill: 10/20/2024

## 2024-12-01 DIAGNOSIS — Z87.19 HISTORY OF ULCERATIVE COLITIS: ICD-10-CM

## 2024-12-02 RX ORDER — DICYCLOMINE HYDROCHLORIDE 20 MG/1
TABLET ORAL
Qty: 360 TABLET | Refills: 0 | Status: SHIPPED | OUTPATIENT
Start: 2024-12-02

## 2024-12-02 NOTE — TELEPHONE ENCOUNTER
"Primary provider: Dr. Ceferino Rangel    IBD medications: She has a J pouch with no signs of inflammation. No treatment necessary.     Refill request for Dicyclomine 20mg every 6 hours    Allergies reviewed: Yes    Drug Monitoring labs/frequency for all IBD meds:    CBC/CMP every 12 months    Lab Results   Component Value Date    HEPBSAG Non-reactive 03/18/2024    HEPBCAB Non-reactive 03/18/2024     Lab Results   Component Value Date    TBGOLDPLUS Negative 10/27/2022     Lab Results   Component Value Date    QUANTIFERON Not Detected 12/02/2009      No results found for: "TSPOTSCREN"  Lab Results   Component Value Date    CJBSFNTE18TM 40 07/25/2024    FUGDCXTL35 812 11/26/2021     Lab Results   Component Value Date    WBC 8.93 11/18/2024    HGB 13.3 11/18/2024    HCT 41.8 11/18/2024    MCV 88 11/18/2024     11/18/2024     Lab Results   Component Value Date    CREATININE 0.9 11/18/2024    ALBUMIN 3.7 11/18/2024    BILITOT 0.4 11/18/2024    ALKPHOS 47 11/18/2024    AST 20 11/18/2024    ALT 16 11/18/2024       Lab due date (mo/yr):  11/2025    Labs scheduled: no - Labs up to date and OV before next labs are due    Next appt: 6/16/25    RX refill sent to provider for amount until next labs: Yes-pended for 90 days ( PRN med)     "

## 2024-12-03 ENCOUNTER — OFFICE VISIT (OUTPATIENT)
Dept: RHEUMATOLOGY | Facility: CLINIC | Age: 64
End: 2024-12-03
Payer: MEDICARE

## 2024-12-03 ENCOUNTER — LAB VISIT (OUTPATIENT)
Dept: LAB | Facility: HOSPITAL | Age: 64
End: 2024-12-03
Attending: INTERNAL MEDICINE
Payer: MEDICARE

## 2024-12-03 VITALS
DIASTOLIC BLOOD PRESSURE: 82 MMHG | BODY MASS INDEX: 21.46 KG/M2 | HEIGHT: 64 IN | SYSTOLIC BLOOD PRESSURE: 171 MMHG | WEIGHT: 125.69 LBS | HEART RATE: 65 BPM

## 2024-12-03 DIAGNOSIS — R73.03 PREDIABETES: ICD-10-CM

## 2024-12-03 DIAGNOSIS — Z79.52 LONG TERM CURRENT USE OF SYSTEMIC STEROIDS: ICD-10-CM

## 2024-12-03 DIAGNOSIS — Z79.899 IMMUNOSUPPRESSION DUE TO DRUG THERAPY: ICD-10-CM

## 2024-12-03 DIAGNOSIS — E55.9 VITAMIN D DEFICIENCY DISEASE: ICD-10-CM

## 2024-12-03 DIAGNOSIS — R53.83 OTHER FATIGUE: ICD-10-CM

## 2024-12-03 DIAGNOSIS — M79.7 FIBROMYALGIA: ICD-10-CM

## 2024-12-03 DIAGNOSIS — R53.83 FATIGUE, UNSPECIFIED TYPE: ICD-10-CM

## 2024-12-03 DIAGNOSIS — R73.09 ELEVATED HEMOGLOBIN A1C: ICD-10-CM

## 2024-12-03 DIAGNOSIS — D84.821 IMMUNOSUPPRESSION DUE TO DRUG THERAPY: ICD-10-CM

## 2024-12-03 DIAGNOSIS — Z79.899 LONG-TERM USE OF PLAQUENIL: ICD-10-CM

## 2024-12-03 DIAGNOSIS — M32.9 SYSTEMIC LUPUS ERYTHEMATOSUS, UNSPECIFIED SLE TYPE, UNSPECIFIED ORGAN INVOLVEMENT STATUS: Primary | ICD-10-CM

## 2024-12-03 DIAGNOSIS — E55.9 VITAMIN D DEFICIENCY DISEASE: Primary | ICD-10-CM

## 2024-12-03 DIAGNOSIS — E27.49 SECONDARY ADRENAL INSUFFICIENCY: ICD-10-CM

## 2024-12-03 DIAGNOSIS — M32.9 SYSTEMIC LUPUS ERYTHEMATOSUS, UNSPECIFIED SLE TYPE, UNSPECIFIED ORGAN INVOLVEMENT STATUS: ICD-10-CM

## 2024-12-03 LAB
ALBUMIN SERPL BCP-MCNC: 3.8 G/DL (ref 3.5–5.2)
ALP SERPL-CCNC: 55 U/L (ref 40–150)
ALT SERPL W/O P-5'-P-CCNC: 17 U/L (ref 10–44)
ANION GAP SERPL CALC-SCNC: 11 MMOL/L (ref 8–16)
AST SERPL-CCNC: 22 U/L (ref 10–40)
BASOPHILS # BLD AUTO: 0.05 K/UL (ref 0–0.2)
BASOPHILS NFR BLD: 0.6 % (ref 0–1.9)
BILIRUB SERPL-MCNC: 0.3 MG/DL (ref 0.1–1)
BUN SERPL-MCNC: 12 MG/DL (ref 8–23)
C3 SERPL-MCNC: 112 MG/DL (ref 50–180)
C4 SERPL-MCNC: 34 MG/DL (ref 11–44)
CALCIUM SERPL-MCNC: 9.3 MG/DL (ref 8.7–10.5)
CHLORIDE SERPL-SCNC: 106 MMOL/L (ref 95–110)
CK SERPL-CCNC: 124 U/L (ref 20–180)
CO2 SERPL-SCNC: 25 MMOL/L (ref 23–29)
CORTIS SERPL-MCNC: 2.4 UG/DL (ref 4.3–22.4)
CREAT SERPL-MCNC: 0.8 MG/DL (ref 0.5–1.4)
CRP SERPL-MCNC: 4.5 MG/L (ref 0–8.2)
DIFFERENTIAL METHOD BLD: ABNORMAL
EOSINOPHIL # BLD AUTO: 0.1 K/UL (ref 0–0.5)
EOSINOPHIL NFR BLD: 1.8 % (ref 0–8)
ERYTHROCYTE [DISTWIDTH] IN BLOOD BY AUTOMATED COUNT: 14.3 % (ref 11.5–14.5)
ERYTHROCYTE [SEDIMENTATION RATE] IN BLOOD BY PHOTOMETRIC METHOD: 15 MM/HR (ref 0–36)
EST. GFR  (NO RACE VARIABLE): >60 ML/MIN/1.73 M^2
ESTIMATED AVG GLUCOSE: 117 MG/DL (ref 68–131)
GLUCOSE SERPL-MCNC: 84 MG/DL (ref 70–110)
HBA1C MFR BLD: 5.7 % (ref 4–5.6)
HBV CORE AB SERPL QL IA: NORMAL
HBV SURFACE AB SER-ACNC: >1000 MIU/ML
HBV SURFACE AB SER-ACNC: REACTIVE M[IU]/ML
HBV SURFACE AG SERPL QL IA: NORMAL
HCT VFR BLD AUTO: 40.4 % (ref 37–48.5)
HCV AB SERPL QL IA: NORMAL
HGB BLD-MCNC: 13.2 G/DL (ref 12–16)
IMM GRANULOCYTES # BLD AUTO: 0.05 K/UL (ref 0–0.04)
IMM GRANULOCYTES NFR BLD AUTO: 0.6 % (ref 0–0.5)
LYMPHOCYTES # BLD AUTO: 1.5 K/UL (ref 1–4.8)
LYMPHOCYTES NFR BLD: 19.6 % (ref 18–48)
MCH RBC QN AUTO: 27.8 PG (ref 27–31)
MCHC RBC AUTO-ENTMCNC: 32.7 G/DL (ref 32–36)
MCV RBC AUTO: 85 FL (ref 82–98)
MONOCYTES # BLD AUTO: 0.7 K/UL (ref 0.3–1)
MONOCYTES NFR BLD: 9.2 % (ref 4–15)
NEUTROPHILS # BLD AUTO: 5.4 K/UL (ref 1.8–7.7)
NEUTROPHILS NFR BLD: 68.2 % (ref 38–73)
NRBC BLD-RTO: 0 /100 WBC
PLATELET # BLD AUTO: 277 K/UL (ref 150–450)
PMV BLD AUTO: 10.6 FL (ref 9.2–12.9)
POTASSIUM SERPL-SCNC: 3.9 MMOL/L (ref 3.5–5.1)
PROT SERPL-MCNC: 6.9 G/DL (ref 6–8.4)
RBC # BLD AUTO: 4.74 M/UL (ref 4–5.4)
SODIUM SERPL-SCNC: 142 MMOL/L (ref 136–145)
T4 FREE SERPL-MCNC: 0.69 NG/DL (ref 0.71–1.51)
TSH SERPL DL<=0.005 MIU/L-ACNC: 0.46 UIU/ML (ref 0.4–4)
WBC # BLD AUTO: 7.85 K/UL (ref 3.9–12.7)

## 2024-12-03 PROCEDURE — 82024 ASSAY OF ACTH: CPT | Performed by: INTERNAL MEDICINE

## 2024-12-03 PROCEDURE — 86140 C-REACTIVE PROTEIN: CPT | Performed by: INTERNAL MEDICINE

## 2024-12-03 PROCEDURE — 85025 COMPLETE CBC W/AUTO DIFF WBC: CPT | Performed by: INTERNAL MEDICINE

## 2024-12-03 PROCEDURE — 3044F HG A1C LEVEL LT 7.0%: CPT | Mod: CPTII,S$GLB,, | Performed by: INTERNAL MEDICINE

## 2024-12-03 PROCEDURE — 86160 COMPLEMENT ANTIGEN: CPT | Performed by: INTERNAL MEDICINE

## 2024-12-03 PROCEDURE — 99999 PR PBB SHADOW E&M-EST. PATIENT-LVL IV: CPT | Mod: PBBFAC,,, | Performed by: INTERNAL MEDICINE

## 2024-12-03 PROCEDURE — 86225 DNA ANTIBODY NATIVE: CPT | Performed by: INTERNAL MEDICINE

## 2024-12-03 PROCEDURE — 3288F FALL RISK ASSESSMENT DOCD: CPT | Mod: CPTII,S$GLB,, | Performed by: INTERNAL MEDICINE

## 2024-12-03 PROCEDURE — 82550 ASSAY OF CK (CPK): CPT | Performed by: INTERNAL MEDICINE

## 2024-12-03 PROCEDURE — 82533 TOTAL CORTISOL: CPT | Performed by: INTERNAL MEDICINE

## 2024-12-03 PROCEDURE — 83036 HEMOGLOBIN GLYCOSYLATED A1C: CPT | Performed by: INTERNAL MEDICINE

## 2024-12-03 PROCEDURE — 99215 OFFICE O/P EST HI 40 MIN: CPT | Mod: S$GLB,,, | Performed by: INTERNAL MEDICINE

## 2024-12-03 PROCEDURE — 86160 COMPLEMENT ANTIGEN: CPT | Mod: 59 | Performed by: INTERNAL MEDICINE

## 2024-12-03 PROCEDURE — 80053 COMPREHEN METABOLIC PANEL: CPT | Performed by: INTERNAL MEDICINE

## 2024-12-03 PROCEDURE — 1160F RVW MEDS BY RX/DR IN RCRD: CPT | Mod: CPTII,S$GLB,, | Performed by: INTERNAL MEDICINE

## 2024-12-03 PROCEDURE — 87340 HEPATITIS B SURFACE AG IA: CPT | Performed by: INTERNAL MEDICINE

## 2024-12-03 PROCEDURE — 84443 ASSAY THYROID STIM HORMONE: CPT | Performed by: INTERNAL MEDICINE

## 2024-12-03 PROCEDURE — 3079F DIAST BP 80-89 MM HG: CPT | Mod: CPTII,S$GLB,, | Performed by: INTERNAL MEDICINE

## 2024-12-03 PROCEDURE — 84439 ASSAY OF FREE THYROXINE: CPT | Performed by: INTERNAL MEDICINE

## 2024-12-03 PROCEDURE — 3077F SYST BP >= 140 MM HG: CPT | Mod: CPTII,S$GLB,, | Performed by: INTERNAL MEDICINE

## 2024-12-03 PROCEDURE — 86704 HEP B CORE ANTIBODY TOTAL: CPT | Performed by: INTERNAL MEDICINE

## 2024-12-03 PROCEDURE — 1159F MED LIST DOCD IN RCRD: CPT | Mod: CPTII,S$GLB,, | Performed by: INTERNAL MEDICINE

## 2024-12-03 PROCEDURE — 82088 ASSAY OF ALDOSTERONE: CPT | Performed by: INTERNAL MEDICINE

## 2024-12-03 PROCEDURE — G2211 COMPLEX E/M VISIT ADD ON: HCPCS | Mod: S$GLB,,, | Performed by: INTERNAL MEDICINE

## 2024-12-03 PROCEDURE — 3008F BODY MASS INDEX DOCD: CPT | Mod: CPTII,S$GLB,, | Performed by: INTERNAL MEDICINE

## 2024-12-03 PROCEDURE — 1101F PT FALLS ASSESS-DOCD LE1/YR: CPT | Mod: CPTII,S$GLB,, | Performed by: INTERNAL MEDICINE

## 2024-12-03 PROCEDURE — 86803 HEPATITIS C AB TEST: CPT | Performed by: INTERNAL MEDICINE

## 2024-12-03 PROCEDURE — 85652 RBC SED RATE AUTOMATED: CPT | Performed by: INTERNAL MEDICINE

## 2024-12-03 PROCEDURE — 36415 COLL VENOUS BLD VENIPUNCTURE: CPT | Performed by: INTERNAL MEDICINE

## 2024-12-03 PROCEDURE — 4010F ACE/ARB THERAPY RXD/TAKEN: CPT | Mod: CPTII,S$GLB,, | Performed by: INTERNAL MEDICINE

## 2024-12-03 PROCEDURE — 1125F AMNT PAIN NOTED PAIN PRSNT: CPT | Mod: CPTII,S$GLB,, | Performed by: INTERNAL MEDICINE

## 2024-12-03 PROCEDURE — 86706 HEP B SURFACE ANTIBODY: CPT | Performed by: INTERNAL MEDICINE

## 2024-12-03 ASSESSMENT — ROUTINE ASSESSMENT OF PATIENT INDEX DATA (RAPID3)
MDHAQ FUNCTION SCORE: 0.4
PAIN SCORE: 10
AM STIFFNESS SCORE: 1, YES
PSYCHOLOGICAL DISTRESS SCORE: 2.2
FATIGUE SCORE: 8.5
TOTAL RAPID3 SCORE: 6.78
PATIENT GLOBAL ASSESSMENT SCORE: 9
WHEN YOU AWAKENED IN THE MORNING OVER THE LAST WEEK, PLEASE INDICATE THE AMOUNT OF TIME IT TAKES UNTIL YOU ARE AS LIMBER AS YOU WILL BE FOR THE DAY: 2 HOURS

## 2024-12-03 NOTE — PROGRESS NOTES
"Subjective:      Patient ID: Efe Horowitz is a 64 y.o. female.    Chief Complaint: Lupus    HPI   Patient with history of lupus since age 39.   Her manifestations include joint pains, headache, positive DERRICK, and a   double-stranded DNA. She also has an equivocal anticardiolipin IgM.   She has been treated with Plaquenil 1-1/2 tablets daily. Eye exam 12/2017 was normal.      In addition to lupus, she has a   history of ulcerative colitis, osteopenia, fibromyalgia as well.   In 2009 she had a colectomy with ileostomy and in May 2011 the ileostomy  was closed. History of bilateral carpal tunnel.       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  She was treated with surgical resection.  Another lesion breast suspected to be lymphoma was later felt to be fibrous tissue.      May 1, 2017 had right breast fibrous material removed and reconstruction on [facility] breasts [facility].         Interval History (12/3/2024):    Dealing with fatigue for past two months.   She reports new breast cyst too small to biopsy but imaging done did not show abnormality.   Recurrent oral ulcers.   Patient denies rashes, alopecia or joint pains.        Review of Systems   Constitutional:  Positive for fatigue.   HENT: Negative.     Eyes: Negative.    Respiratory: Negative.     Cardiovascular: Negative.    Gastrointestinal: Negative.    Endocrine: Negative.    Genitourinary: Negative.    Musculoskeletal:  Positive for arthralgias and myalgias.   Skin: Negative.    Allergic/Immunologic: Negative.    Neurological: Negative.    Hematological: Negative.    Psychiatric/Behavioral: Negative.          Objective:   BP (!) 171/82   Pulse 65   Ht 5' 4" (1.626 m)   Wt 57 kg (125 lb 10.6 oz)   BMI 21.57 kg/m²   Physical Exam   Constitutional: She is oriented to person, place, and time. normal appearance.   HENT:   Head: Normocephalic and atraumatic.   Mouth/Throat: Mucous membranes are moist. Oropharynx is clear.   Eyes: Conjunctivae are " normal.   Cardiovascular: Normal rate and regular rhythm.   Pulmonary/Chest: Effort normal and breath sounds normal.   Abdominal: Soft. Bowel sounds are normal.   Musculoskeletal:      Comments: 28 joint count: 0 swollen and 0 tender  No MTP pain on compression   Neurological: She is alert and oriented to person, place, and time.   Skin: Skin is warm. No erythema.   Psychiatric: Mood normal.       LABS    Component      Latest Ref Rng 9/19/2024 11/18/2024   WBC      3.90 - 12.70 K/uL 6.56  8.93    WBC       6.56     RBC      4.00 - 5.40 M/uL 4.41  4.74    RBC       4.41     Hemoglobin      12.0 - 16.0 g/dL 11.9 (L)  13.3    Hemoglobin       11.9 (L)     Hematocrit      37.0 - 48.5 % 39.5  41.8    Hematocrit       39.5     MCV      82 - 98 fL 90  88    MCV       90     MCH      27.0 - 31.0 pg 27.0  28.1    MCH       27.0     MCHC      32.0 - 36.0 g/dL 30.1 (L)  31.8 (L)    MCHC       30.1 (L)     RDW      11.5 - 14.5 % 14.8 (H)  14.2    RDW       14.8 (H)     Platelet Count      150 - 450 K/uL 287  322    Platelet Count       287     MPV      9.2 - 12.9 fL 10.8  10.7    MPV       10.8     Immature Granulocytes      0.0 - 0.5 % 0.5     Immature Granulocytes      0.0 - 0.5 % 0.5     Gran # (ANC)      1.8 - 7.7 K/uL 3.3  5.3    Gran # (ANC)       3.3     Immature Grans (Abs)      0.00 - 0.04 K/uL 0.03  0.03    Immature Grans (Abs)       0.03     Lymph #      1.0 - 4.8 K/uL 2.2     Lymph #      1.0 - 4.8 K/uL 2.2     Mono #      0.3 - 1.0 K/uL 0.7     Mono #      0.3 - 1.0 K/uL 0.7     Eos #      0.0 - 0.5 K/uL 0.4     Eos #      0.0 - 0.5 K/uL 0.4     Baso #      0.00 - 0.20 K/uL 0.06     Baso #      0.00 - 0.20 K/uL 0.06     nRBC      0 /100 WBC 0     nRBC      0 /100 WBC 0     Gran %      38.0 - 73.0 % 49.7     Gran %      38.0 - 73.0 % 49.7     Lymph %      18.0 - 48.0 % 32.9     Lymph %      18.0 - 48.0 % 32.9     Mono %      4.0 - 15.0 % 10.7     Mono %      4.0 - 15.0 % 10.7     Eos %      0.0 - 8.0 % 5.3     Eos  %      0.0 - 8.0 % 5.3     Basophil %      0.0 - 1.9 % 0.9     Basophil %      0.0 - 1.9 % 0.9     Differential Method Automated     Differential Method Automated     Sodium      136 - 145 mmol/L 142  143    Sodium       142     Potassium      3.5 - 5.1 mmol/L 3.4 (L)  4.0    Potassium       3.4 (L)     Chloride      95 - 110 mmol/L 107  106    Chloride       107     CO2      23 - 29 mmol/L 27  29    CO2       27     Glucose      70 - 110 mg/dL 84  86    Glucose       84     BUN      8 - 23 mg/dL 15  15    BUN       15     Creatinine      0.5 - 1.4 mg/dL 0.9  0.9    Creatinine       0.9     Calcium      8.7 - 10.5 mg/dL 9.1  9.9    Calcium       9.1     PROTEIN TOTAL      6.0 - 8.4 g/dL 6.2  6.7    PROTEIN TOTAL       6.2     Albumin      3.5 - 5.2 g/dL 3.5  3.7    Albumin       3.5     BILIRUBIN TOTAL      0.1 - 1.0 mg/dL 0.4  0.4    BILIRUBIN TOTAL       0.4     ALP      40 - 150 U/L 44 (L)  47    ALP       44 (L)     AST      10 - 40 U/L 20  20    AST       20     ALT      10 - 44 U/L 14  16    ALT       14     eGFR      >60 mL/min/1.73 m^2 >60.0  >60    eGFR       >60.0     Anion Gap      8 - 16 mmol/L 8  8    Anion Gap       8     Specimen UA Urine, Clean Catch     Color, UA      Yellow, Straw, Amanda  Yellow     Appearance, UA      Clear  Clear     pH, UA      5.0 - 8.0  6.0     Spec Grav UA      1.005 - 1.030  1.030     Protein, UA      Negative  Trace !     Glucose, UA      Negative  Negative     Ketones, UA      Negative  Negative     Bilirubin (UA)      Negative  Negative     Blood, UA      Negative  Negative     NITRITE UA      Negative  Negative     Leukocyte Esterase, UA      Negative  Negative     Urine Protein      0 - 15 mg/dL 7     Urine Creatinine      15.0 - 325.0 mg/dL 265.0     Urine Protein/Creatinine Ratio      0.00 - 0.20  0.03     Hemoglobin A1C External      4.0 - 5.6 % 5.6     Estimated Avg Glucose      68 - 131 mg/dL 114     ds DNA Ab      Negative 1:10  Negative 1:10     Complement  (C-3)      50 - 180 mg/dL 94     Complement (C-4)      11 - 44 mg/dL 30     Sed Rate      0 - 36 mm/Hr <2     CRP      0.0 - 8.2 mg/L 1.2        Legend:  (L) Low  (H) High  ! Abnormal       Assessment:     1. Systemic lupus erythematosus, unspecified SLE type, unspecified organ involvement status.  Lupus manifestations include joint pains, headache, positive DERRICK (positive 5/26/2005 in labs), and a   double-stranded DNA. Has intermittent erythema of hands, fatigue as well as intermittent oral/nasal ulcer. SLEDAI before labs is 2  Still with improvement on Benlysta.     2. Long-term use of Plaquenil    3. Long term current use of systemic steroids    4. Immunosuppression due to drug therapy    5. Fibromyalgia    6. Fatigue, unspecified type        SLICC Score: 3  Any cataract ever: No  Retinal change or optic atrophy: No  Cognitive Impairment or Major Psychosis: No  Seizures requiring therapy for 6 months: No  Cerebrovascular accident ever: No  Cranial or Peripheral Neuropathy (excluding optic): No  Transverse Myelitis: No  Estimated or measured glomerular filtration rate < 50%: No  Proteinuria >= 3.5 gm/24 hours: No  End Stage Renal Disease: No  Pulmonary Hypertension: No  Pulmonary Fibrosis: No  Shrinking Lung: No  Pleural Fibrosis: No  Pulmonary Infarction: No  Angina or Coronary Artery Bypass: No  Myocardial Infarction ever: No  Cardiomyopathy: No  Valvular Disease: No  Pericarditis for 6 months, or Pericardiectomy: No  Claudication for 6 months: No  Minor tissue loss: No  Significant tissue loss ever: No  Venous Thrombosis with swelling, ulceration, or venous stasis: No  Infarction or resction of bowel below duodenum spleen, liver, or gall bladder ever: Yes - Multiple Sites  Mesenteric Insufficiency: No  Chronic Peritonitis: No  Stricture or Upper GI Tract surgery ever: No  Muscle Atrophy or weakness: No  Deforming or Erosive Arthritis: No  Osteoporosis with fracture or vertebral collapse: No  Avascular Necrosis:  No  Osteomyelitis: No  Scarring Chronic Alopecia: No  Extensive scarring or Panniculum other than scalp or pulp space: No  Skin Ulceration for > 6 months: No  Premature Gonadal Failure: No  Diabetes (regardless of treatment): No  Malignancy: Yes - Single Site    Plan:     Problem List Items Addressed This Visit          ID    Long-term use of Plaquenil       Immunology/Multi System    Lupus (systemic lupus erythematosus) - Primary.  Continue Plaquenil 300 mg daily and Benlysta.  Will continue Benlysta.   Eye exam normal June 2024.      Immunosuppression due to drug therapy       Endocrine    Long term current use of systemic steroids       Orthopedic    Fibromyalgia.  Fibromyalgia on Savella and on gabapentin 300 mg tid.         Other    Fatigue.  Message to endocrinology regarding fatigue and adrenal insufficiency     RTO 6 months/prn

## 2024-12-04 ENCOUNTER — PATIENT MESSAGE (OUTPATIENT)
Dept: RHEUMATOLOGY | Facility: CLINIC | Age: 64
End: 2024-12-04
Payer: MEDICARE

## 2024-12-04 DIAGNOSIS — R53.83 FATIGUE, UNSPECIFIED TYPE: Primary | ICD-10-CM

## 2024-12-04 LAB — DSDNA AB SER-ACNC: NORMAL [IU]/ML

## 2024-12-04 NOTE — PROGRESS NOTES
TSH   Date Value Ref Range Status   12/03/2024 0.462 0.400 - 4.000 uIU/mL Final     Free T4   Date Value Ref Range Status   12/03/2024 0.69 (L) 0.71 - 1.51 ng/dL Final       PLAN:   Repeat labs in one month

## 2024-12-05 ENCOUNTER — PATIENT MESSAGE (OUTPATIENT)
Dept: ENDOCRINOLOGY | Facility: CLINIC | Age: 64
End: 2024-12-05
Payer: MEDICARE

## 2024-12-06 DIAGNOSIS — M32.9 SYSTEMIC LUPUS ERYTHEMATOSUS, UNSPECIFIED SLE TYPE, UNSPECIFIED ORGAN INVOLVEMENT STATUS: ICD-10-CM

## 2024-12-06 LAB
ACTH PLAS-MCNC: <5 PG/ML (ref 0–46)
ALDOST SERPL-MCNC: 22.4 NG/DL

## 2024-12-10 RX ORDER — HYDROXYCHLOROQUINE SULFATE 200 MG/1
TABLET, FILM COATED ORAL
Qty: 45 TABLET | Refills: 5 | Status: SHIPPED | OUTPATIENT
Start: 2024-12-10

## 2024-12-16 DIAGNOSIS — F51.01 PRIMARY INSOMNIA: ICD-10-CM

## 2024-12-16 RX ORDER — ZOLPIDEM TARTRATE 5 MG/1
TABLET ORAL
Qty: 30 TABLET | Refills: 0 | Status: SHIPPED | OUTPATIENT
Start: 2024-12-16

## 2024-12-16 NOTE — TELEPHONE ENCOUNTER
No care due was identified.  Health [facility] Embedded Care Due Messages. Reference number: [id number].   12/16/2024 12:23:55 PM CST

## 2025-01-04 ENCOUNTER — LAB VISIT (OUTPATIENT)
Dept: LAB | Facility: HOSPITAL | Age: 65
End: 2025-01-04
Attending: INTERNAL MEDICINE
Payer: MEDICARE

## 2025-01-04 DIAGNOSIS — R53.83 FATIGUE, UNSPECIFIED TYPE: ICD-10-CM

## 2025-01-04 LAB
T3 SERPL-MCNC: 101 NG/DL (ref 60–180)
T4 FREE SERPL-MCNC: 0.83 NG/DL (ref 0.71–1.51)
TSH SERPL DL<=0.005 MIU/L-ACNC: 1.18 UIU/ML (ref 0.4–4)

## 2025-01-04 PROCEDURE — 84480 ASSAY TRIIODOTHYRONINE (T3): CPT | Performed by: INTERNAL MEDICINE

## 2025-01-04 PROCEDURE — 84443 ASSAY THYROID STIM HORMONE: CPT | Performed by: INTERNAL MEDICINE

## 2025-01-04 PROCEDURE — 84439 ASSAY OF FREE THYROXINE: CPT | Performed by: INTERNAL MEDICINE

## 2025-01-04 PROCEDURE — 36415 COLL VENOUS BLD VENIPUNCTURE: CPT | Mod: PO | Performed by: INTERNAL MEDICINE

## 2025-01-06 DIAGNOSIS — M32.9 SYSTEMIC LUPUS ERYTHEMATOSUS, UNSPECIFIED SLE TYPE, UNSPECIFIED ORGAN INVOLVEMENT STATUS: ICD-10-CM

## 2025-01-06 DIAGNOSIS — E87.6 HYPOKALEMIA: ICD-10-CM

## 2025-01-06 DIAGNOSIS — I10 HYPERTENSION, UNSPECIFIED TYPE: ICD-10-CM

## 2025-01-06 NOTE — TELEPHONE ENCOUNTER
No care due was identified.  Health [facility] Embedded Care Due Messages. Reference number: [id number].   1/06/2025 10:16:53 AM CST

## 2025-01-07 RX ORDER — LOSARTAN POTASSIUM 50 MG/1
50 TABLET ORAL
Qty: 90 TABLET | Refills: 3 | Status: SHIPPED | OUTPATIENT
Start: 2025-01-07

## 2025-01-07 NOTE — TELEPHONE ENCOUNTER
Refill Routing Note   Medication(s) are not appropriate for processing by [facility] for the following reason(s):        Required vitals abnormal: 171/82     [facility] action(s):  Defer             Appointments  past 12m or future 3m with PCP    Date Provider   Last Visit   10/9/2024 Shonda Anguiano MD   Next Visit   2/11/2025 Shonda Anguiano MD   ED visits in past 90 days: 0        Note composed:9:58 PM 01/06/2025

## 2025-01-09 DIAGNOSIS — M32.9 SYSTEMIC LUPUS ERYTHEMATOSUS, UNSPECIFIED SLE TYPE, UNSPECIFIED ORGAN INVOLVEMENT STATUS: ICD-10-CM

## 2025-01-09 RX ORDER — BELIMUMAB 200 MG/ML
200 SOLUTION SUBCUTANEOUS
Qty: 4 EACH | Refills: 2 | Status: ACTIVE | OUTPATIENT
Start: 2025-01-09

## 2025-01-19 DIAGNOSIS — F51.01 PRIMARY INSOMNIA: ICD-10-CM

## 2025-01-19 NOTE — TELEPHONE ENCOUNTER
No care due was identified.  Health [facility] Embedded Care Due Messages. Reference number: [id number].   1/19/2025 4:25:58 PM CST

## 2025-01-21 DIAGNOSIS — F51.01 PRIMARY INSOMNIA: ICD-10-CM

## 2025-01-21 RX ORDER — ZOLPIDEM TARTRATE 5 MG/1
TABLET ORAL
Qty: 30 TABLET | Refills: 0 | Status: SHIPPED | OUTPATIENT
Start: 2025-01-21

## 2025-01-21 RX ORDER — ZOLPIDEM TARTRATE 5 MG/1
TABLET ORAL
Qty: 30 TABLET | Refills: 0 | OUTPATIENT
Start: 2025-01-21

## 2025-01-21 NOTE — TELEPHONE ENCOUNTER
No care due was identified.  Health [facility] Embedded Care Due Messages. Reference number: [id number].   1/21/2025 10:44:01 AM CST

## 2025-01-21 NOTE — TELEPHONE ENCOUNTER
Your refill request was forwarded to your provider for approval. Message sent via patient portal

## 2025-01-22 DIAGNOSIS — I10 BENIGN ESSENTIAL HYPERTENSION: ICD-10-CM

## 2025-01-22 RX ORDER — AMLODIPINE BESYLATE 5 MG/1
5 TABLET ORAL
Qty: 90 TABLET | Refills: 3 | Status: SHIPPED | OUTPATIENT
Start: 2025-01-22

## 2025-01-22 NOTE — TELEPHONE ENCOUNTER
No care due was identified.  Health [facility] Embedded Care Due Messages. Reference number: [id number].   1/22/2025 12:29:08 AM CST  
Refill Routing Note   Medication(s) are not appropriate for processing by [facility] for the following reason(s):        Required vitals abnormal    [facility] action(s):  Defer               Appointments  past 12m or future 3m with PCP    Date Provider   Last Visit   10/9/2024 Shonda Anguiano MD   Next Visit   2/11/2025 Shonda Anguiano MD   ED visits in past 90 days: 0        Note composed:3:43 AM 01/22/2025           
Detail Level: Detailed

## 2025-02-03 RX ORDER — VALACYCLOVIR HYDROCHLORIDE 1 G/1
1000 TABLET, FILM COATED ORAL EVERY 12 HOURS
Qty: 20 TABLET | Refills: 0 | Status: SHIPPED | OUTPATIENT
Start: 2025-02-03 | End: 2025-02-13

## 2025-02-05 ENCOUNTER — OFFICE VISIT (OUTPATIENT)
Dept: OPTOMETRY | Facility: CLINIC | Age: 65
End: 2025-02-05
Payer: MEDICARE

## 2025-02-05 DIAGNOSIS — H25.041 POSTERIOR SUBCAPSULAR AGE-RELATED CATARACT OF RIGHT EYE: Primary | ICD-10-CM

## 2025-02-05 DIAGNOSIS — M32.9 SYSTEMIC LUPUS ERYTHEMATOSUS, UNSPECIFIED SLE TYPE, UNSPECIFIED ORGAN INVOLVEMENT STATUS: ICD-10-CM

## 2025-02-05 DIAGNOSIS — Z79.899 LONG-TERM USE OF PLAQUENIL: ICD-10-CM

## 2025-02-05 DIAGNOSIS — H52.7 REFRACTIVE ERROR: ICD-10-CM

## 2025-02-05 DIAGNOSIS — H25.13 NUCLEAR SCLEROSIS OF BOTH EYES: ICD-10-CM

## 2025-02-05 PROCEDURE — 1126F AMNT PAIN NOTED NONE PRSNT: CPT | Mod: CPTII,S$GLB,, | Performed by: OPTOMETRIST

## 2025-02-05 PROCEDURE — 1159F MED LIST DOCD IN RCRD: CPT | Mod: CPTII,S$GLB,, | Performed by: OPTOMETRIST

## 2025-02-05 PROCEDURE — 1160F RVW MEDS BY RX/DR IN RCRD: CPT | Mod: CPTII,S$GLB,, | Performed by: OPTOMETRIST

## 2025-02-05 PROCEDURE — 92015 DETERMINE REFRACTIVE STATE: CPT | Mod: S$GLB,,, | Performed by: OPTOMETRIST

## 2025-02-05 PROCEDURE — 92134 CPTRZ OPH DX IMG PST SGM RTA: CPT | Mod: S$GLB,,, | Performed by: OPTOMETRIST

## 2025-02-05 PROCEDURE — 4010F ACE/ARB THERAPY RXD/TAKEN: CPT | Mod: CPTII,S$GLB,, | Performed by: OPTOMETRIST

## 2025-02-05 PROCEDURE — 99214 OFFICE O/P EST MOD 30 MIN: CPT | Mod: S$GLB,,, | Performed by: OPTOMETRIST

## 2025-02-05 PROCEDURE — G2211 COMPLEX E/M VISIT ADD ON: HCPCS | Mod: S$GLB,,, | Performed by: OPTOMETRIST

## 2025-02-05 PROCEDURE — 99999 PR PBB SHADOW E&M-EST. PATIENT-LVL III: CPT | Mod: PBBFAC,,, | Performed by: OPTOMETRIST

## 2025-02-05 NOTE — PROGRESS NOTES
Subjective:       Patient ID: Efe Horowitz is a 65 y.o. female      Chief Complaint   Patient presents with    Eye Problem     History of Present Illness  Dls: 6/14/24 Dr. Magallon     66 y/o female presents today with c/o blurry vision ou worse when driving at night x 2 months.      Eye meds  Restatsis OU BID   Systane OU BID        Assessment/Plan:     1. Posterior subcapsular age-related cataract of right eye (Primary)  2. Nuclear sclerosis of both eyes  Visually significant cataract OD. Discussed diagnosis with patient and surgical process. Referral for cataract evaluation.     - Ambulatory referral/consult to Ophthalmology    3. Systemic lupus erythematosus, unspecified SLE type, unspecified organ involvement status  4. Long-term use of Plaquenil  Pt sees Dr. Idania Allison (rheumatology), DLS 12/3/24, on plaquenil since 1999, taking 300 daily. OCT completed today. Test results reviewed with patient. Color vision normal.    Discussed with pt potential effects of long term plaquenil on vision, including irreversible vision loss, and importance of yearly DFE and testing. Pt to continue follow up care with prescribing doctor. Yearly follow up with an eye care provider, sooner if patient notices any changes in vision or as directed by prescriber.    Return in 1 years for DFE, HVF 10-2, and OCT macula.     - Posterior Segment OCT Retina-Both eyes    5. Refractive error  Patient referred for cataract surgery, advised to hold on new glasses as prescription will change after cataract surgery.     Today's visit is associated with current and anticipated ongoing medical care related to this patient's single serious/complex condition (plaquenil). Follow up is to be continued indefinitely to monitor the condition.     Follow up for cataract consult.

## 2025-02-09 NOTE — TELEPHONE ENCOUNTER
No care due was identified.  [facility] Embedded Care Due Messages. Reference number: [id number].   2/09/2025 10:40:49 AM CST

## 2025-02-10 ENCOUNTER — PATIENT MESSAGE (OUTPATIENT)
Dept: GASTROENTEROLOGY | Facility: CLINIC | Age: 65
End: 2025-02-10
Payer: MEDICARE

## 2025-02-10 RX ORDER — MILNACIPRAN HYDROCHLORIDE 100 MG/1
1 TABLET, FILM COATED ORAL DAILY
Qty: 90 TABLET | Refills: 3 | Status: SHIPPED | OUTPATIENT
Start: 2025-02-10

## 2025-02-11 ENCOUNTER — LAB VISIT (OUTPATIENT)
Dept: LAB | Facility: HOSPITAL | Age: 65
End: 2025-02-11
Attending: INTERNAL MEDICINE
Payer: MEDICARE

## 2025-02-11 ENCOUNTER — OFFICE VISIT (OUTPATIENT)
Dept: INTERNAL MEDICINE | Facility: CLINIC | Age: 65
End: 2025-02-11
Payer: MEDICARE

## 2025-02-11 VITALS
SYSTOLIC BLOOD PRESSURE: 130 MMHG | BODY MASS INDEX: 21.64 KG/M2 | WEIGHT: 126.75 LBS | HEART RATE: 77 BPM | OXYGEN SATURATION: 98 % | DIASTOLIC BLOOD PRESSURE: 80 MMHG | HEIGHT: 64 IN

## 2025-02-11 DIAGNOSIS — E78.2 MIXED HYPERLIPIDEMIA: ICD-10-CM

## 2025-02-11 DIAGNOSIS — R53.82 CHRONIC FATIGUE: ICD-10-CM

## 2025-02-11 DIAGNOSIS — K13.79 MOUTH SORE: ICD-10-CM

## 2025-02-11 DIAGNOSIS — C50.211 MALIGNANT NEOPLASM OF UPPER-INNER QUADRANT OF RIGHT FEMALE BREAST, UNSPECIFIED ESTROGEN RECEPTOR STATUS: ICD-10-CM

## 2025-02-11 DIAGNOSIS — D84.821 IMMUNOSUPPRESSION DUE TO DRUG THERAPY: ICD-10-CM

## 2025-02-11 DIAGNOSIS — E27.49 SECONDARY ADRENAL INSUFFICIENCY: ICD-10-CM

## 2025-02-11 DIAGNOSIS — K51.918 ULCERATIVE COLITIS WITH OTHER COMPLICATION, UNSPECIFIED LOCATION: ICD-10-CM

## 2025-02-11 DIAGNOSIS — I10 BENIGN ESSENTIAL HYPERTENSION: Primary | ICD-10-CM

## 2025-02-11 DIAGNOSIS — M32.9 SYSTEMIC LUPUS ERYTHEMATOSUS, UNSPECIFIED SLE TYPE, UNSPECIFIED ORGAN INVOLVEMENT STATUS: ICD-10-CM

## 2025-02-11 DIAGNOSIS — R73.03 PREDIABETES: ICD-10-CM

## 2025-02-11 DIAGNOSIS — E53.8 VITAMIN B12 DEFICIENCY: ICD-10-CM

## 2025-02-11 DIAGNOSIS — I27.20 PULMONARY HTN: ICD-10-CM

## 2025-02-11 DIAGNOSIS — F51.01 PRIMARY INSOMNIA: ICD-10-CM

## 2025-02-11 DIAGNOSIS — K86.81 EXOCRINE PANCREATIC INSUFFICIENCY: ICD-10-CM

## 2025-02-11 DIAGNOSIS — K86.1 CHRONIC PANCREATITIS, UNSPECIFIED PANCREATITIS TYPE: ICD-10-CM

## 2025-02-11 DIAGNOSIS — Z79.899 IMMUNOSUPPRESSION DUE TO DRUG THERAPY: ICD-10-CM

## 2025-02-11 DIAGNOSIS — E87.6 LOW BLOOD POTASSIUM: ICD-10-CM

## 2025-02-11 DIAGNOSIS — E55.9 VITAMIN D DEFICIENCY DISEASE: ICD-10-CM

## 2025-02-11 DIAGNOSIS — E05.90 SUBCLINICAL HYPERTHYROIDISM: ICD-10-CM

## 2025-02-11 LAB
CHOLEST SERPL-MCNC: 146 MG/DL (ref 120–199)
CHOLEST/HDLC SERPL: 1.7 {RATIO} (ref 2–5)
HDLC SERPL-MCNC: 87 MG/DL (ref 40–75)
HDLC SERPL: 59.6 % (ref 20–50)
LDLC SERPL CALC-MCNC: 41.6 MG/DL (ref 63–159)
NONHDLC SERPL-MCNC: 59 MG/DL
TRIGL SERPL-MCNC: 87 MG/DL (ref 30–150)

## 2025-02-11 PROCEDURE — 99999 PR PBB SHADOW E&M-EST. PATIENT-LVL III: CPT | Mod: PBBFAC,,, | Performed by: INTERNAL MEDICINE

## 2025-02-11 PROCEDURE — 80061 LIPID PANEL: CPT | Performed by: INTERNAL MEDICINE

## 2025-02-11 PROCEDURE — 3079F DIAST BP 80-89 MM HG: CPT | Mod: CPTII,S$GLB,, | Performed by: INTERNAL MEDICINE

## 2025-02-11 PROCEDURE — 1101F PT FALLS ASSESS-DOCD LE1/YR: CPT | Mod: CPTII,S$GLB,, | Performed by: INTERNAL MEDICINE

## 2025-02-11 PROCEDURE — 4010F ACE/ARB THERAPY RXD/TAKEN: CPT | Mod: CPTII,S$GLB,, | Performed by: INTERNAL MEDICINE

## 2025-02-11 PROCEDURE — 36415 COLL VENOUS BLD VENIPUNCTURE: CPT | Performed by: INTERNAL MEDICINE

## 2025-02-11 PROCEDURE — 99214 OFFICE O/P EST MOD 30 MIN: CPT | Mod: S$GLB,,, | Performed by: INTERNAL MEDICINE

## 2025-02-11 PROCEDURE — 1160F RVW MEDS BY RX/DR IN RCRD: CPT | Mod: CPTII,S$GLB,, | Performed by: INTERNAL MEDICINE

## 2025-02-11 PROCEDURE — 3075F SYST BP GE 130 - 139MM HG: CPT | Mod: CPTII,S$GLB,, | Performed by: INTERNAL MEDICINE

## 2025-02-11 PROCEDURE — 3288F FALL RISK ASSESSMENT DOCD: CPT | Mod: CPTII,S$GLB,, | Performed by: INTERNAL MEDICINE

## 2025-02-11 PROCEDURE — 1159F MED LIST DOCD IN RCRD: CPT | Mod: CPTII,S$GLB,, | Performed by: INTERNAL MEDICINE

## 2025-02-11 PROCEDURE — 1126F AMNT PAIN NOTED NONE PRSNT: CPT | Mod: CPTII,S$GLB,, | Performed by: INTERNAL MEDICINE

## 2025-02-11 PROCEDURE — G2211 COMPLEX E/M VISIT ADD ON: HCPCS | Mod: S$GLB,,, | Performed by: INTERNAL MEDICINE

## 2025-02-11 PROCEDURE — 3008F BODY MASS INDEX DOCD: CPT | Mod: CPTII,S$GLB,, | Performed by: INTERNAL MEDICINE

## 2025-02-11 RX ORDER — ZOLPIDEM TARTRATE 5 MG/1
TABLET ORAL
Qty: 30 TABLET | Refills: 0 | Status: SHIPPED | OUTPATIENT
Start: 2025-02-19

## 2025-02-11 RX ORDER — POTASSIUM CHLORIDE 20 MEQ/1
20 TABLET, EXTENDED RELEASE ORAL 2 TIMES DAILY
Qty: 180 TABLET | Refills: 3 | Status: SHIPPED | OUTPATIENT
Start: 2025-02-11

## 2025-02-11 NOTE — PROGRESS NOTES
"INTERNAL MEDICINE ESTABLISHED PATIENT VISIT NOTE    Subjective:     Chief Complaint: Follow-up  HTN, preDM     Patient ID: Efe Horowitz is a 65 y.o. 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    Still seeing Dr. Jordan for mgmt of cardiac issues and cholesterol.  Crestor decreased previously and has f/u pending to discuss whether or not it needs to be continued since her last labs showed low LDL on both Crestor and Repatha.     Dr. Rangel managing UC.  Has had some recent abd discomfort which she plans to d/w Dr. Rangel.   Has had issues getting her Creon as she was previously seeing a different provider for this and plans to d/w Dr. Rangel.     Dr. Jiang managing lupus.    Past Medical History:  Past Medical History:   Diagnosis Date    Acid reflux     Adrenal insufficiency, primary     Arthritis     Asthma     B12 deficiency anemia     Benign essential hypertension 2/7/2021    Blood transfusion 2010    Breast cancer 2/2016    Right breast infiltrating ductal CA    Cataract     Chronic pain     Deep vein thrombosis     Dry eyes     Esophagitis, unspecified     Fibromyalgia     General anesthetics causing adverse effect in therapeutic use     "slow to wake up bc I don't have an immune system    Heart murmur     History of colon polyps     Hyperlipidemia     Hypopituitary dwarfism     Iron deficiency anemia     Lupus     Migraines, neuralgic     Nonspecific ulcerative colitis  "    OP (osteoporosis)     history of reclast    Osteopenia     Pancreatic cyst     Schatzki's ring     Steroid sulfatase deficiency     Ulcerative colitis     Vitamin D deficiency disease        Home Medications:  Prior to Admission medications    Medication Sig Start Date End Date Taking? Authorizing Provider   (Magic mouthwash) 1:1:1 diphenhydrAMINE(Benadryl) 12.5mg/5ml liq, aluminum & magnesium hydroxide-simethicone (Maalox), LIDOcaine viscous 2% Swish and spit 5 mLs every 4 (four) hours as needed (mouth sores). for mouth sores 3/6/23  Yes Shonda Anguiano MD   albuterol (PROVENTIL/VENTOLIN HFA) 90 mcg/actuation inhaler Inhale 1-2 puffs into the lungs every 6 (six) hours as needed for Wheezing or Shortness of Breath. Rescue 4/17/23  Yes Ondina Ang, JOJO   amLODIPine (NORVASC) 5 MG tablet TAKE 1 TABLET BY MOUTH EVERY DAY 1/22/25  Yes Shonda Anguiano MD   azelastine (ASTELIN) 137 mcg (0.1 %) nasal spray SPRAY 1 SPRAY BY NASAL ROUTE 2 TIMES DAILY. 11/4/24  Yes Ben Levin, AC   belimumab (BENLYSTA) 200 mg/mL AtIn Inject 1 mL (200 mg total) into the skin every 7 days. 1/9/25  Yes Natali Ayoub MD   calcium carbonate (OS-RISSA) 500 mg calcium (1,250 mg) tablet Take 1 tablet by mouth once daily.   Yes Provider, Historical   cholecalciferol, vitamin D3, 125 mcg (5,000 unit) Tab Take 5,000 Units by mouth once daily.   Yes Provider, Historical   CREON 36,000-114,000- 180,000 unit CpDR TAKE TWO CAPSULES BY MOUTH THREE TIMES DAILY WITH MEALS AND ONE CAPSULE WITH EACH SNACK 4/20/22  Yes Reese Villalta MD   dexamethasone (DECADRON) 4 mg/mL injection Inject 1 mL (4 mg total) into the muscle daily as needed (Signs and symtpoms of severe adrenal insufficiency). 10/6/21  Yes Ena Srinivasan MD   diclofenac sodium (VOLTAREN) 1 % Gel APPLY 2 GRAMS TO AFFECTED AREA 4 TIMES A DAY 3/24/21  Yes Idania Allison MD   dicyclomine (BENTYL) 20 mg tablet TAKE 1 TABLET BY MOUTH EVERY 6 HOURS 12/2/24  Yes Ceferino Rangel MD    ferrous sulfate (IRON ORAL) Take by mouth. No sure dose   Yes Provider, Historical   fluticasone propionate (FLONASE) 50 mcg/actuation nasal spray 1 spray (50 mcg total) by Each Nostril route 2 (two) times daily as needed for Rhinitis or Allergies. 4/17/23  Yes Ondina Ang NP   folic acid (FOLVITE) 1 MG tablet Take 1 mg by mouth once daily.   Yes Provider, Historical   gabapentin (NEURONTIN) 300 MG capsule Take 1 capsule (300 mg total) by mouth 3 (three) times daily. 3/18/24  Yes Idania Allison MD   hydroCHLOROthiazide (HYDRODIURIL) 12.5 MG Tab Take 12.5 mg by mouth. 9/26/22  Yes Provider, Historical   hydroxychloroquine (PLAQUENIL) 200 mg tablet TAKE 1 AND 1/2 TABLETS DAILY BY MOUTH 12/10/24  Yes Becki Prabhakar, FN-BC   hydrOXYzine HCL (ATARAX) 25 MG tablet TAKE 1 TABLET BY MOUTH NIGHTLY AS NEEDED (INSOMNIA). 11/5/22  Yes Provider, Historical   loperamide (IMODIUM) 2 mg capsule TAKE 1 TO 2 CAPSULES BY MOUTH FOUR TIMES DAILY 10/3/24  Yes Brisa Jones NP   losartan (COZAAR) 50 MG tablet TAKE 1 TABLET BY MOUTH EVERY DAY 1/7/25  Yes Shonda Anguiano MD   milnacipran (SAVELLA) 100 mg Tab Take 1 tablet (100 mg total) by mouth once daily. 2/10/25  Yes Shonda Anguiano MD   multivitamin/iron/folic acid (CENTRUM WOMEN ORAL) Take 1 tablet by mouth once daily.   Yes Provider, Historical   nebivoloL (BYSTOLIC) 10 MG Tab  3/9/23  Yes Provider, Historical   niacin 100 MG Tab Take by mouth. 1 Tablet Oral At bedtime   Yes Provider, Historical   nystatin (MYCOSTATIN) cream Apply topically 2 (two) times daily. 1/23/24  Yes Shonda Anguiano MD   omeprazole (PRILOSEC) 20 MG capsule TAKE 1 CAPSULE BY MOUTH TWICE A DAY 10/24/24  Yes Shonda Anguiano MD   ondansetron (ZOFRAN) 4 MG tablet Take 1 tablet (4 mg total) by mouth every 12 (twelve) hours as needed for Nausea. 10/13/20  Yes Pretus-Tierney Packer, JOJO   oxyCODONE-acetaminophen (PERCOCET) 5-325 mg per tablet Take 1 tablet by mouth every 4 (four) hours as needed for  "Pain. 6/26/22  Yes Ambrocio Salas MD   potassium chloride SA (K-DUR,KLOR-CON) 20 MEQ tablet TAKE 1 TABLET BY MOUTH TWICE A DAY 4/11/24  Yes Shonda Anguiano MD   predniSONE (DELTASONE) 5 MG tablet TAKE 1 TABLET BY MOUTH ONCE DAILY. DOUBLE THE DOSE IN TIMES OF ILLNESS 10/23/23  Yes Ena Srinivasan MD   psyllium husk/aspartame (METAMUCIL FIBER SINGLES ORAL) Take by mouth.   Yes Provider, Historical   REPATHA SURECLICK 140 mg/mL PnIj INJECT 140 MG INTO THE SKIN EVERY 14 (FOURTEEN) DAYS 7/27/21  Yes Provider, Historical   RESTASIS 0.05 % ophthalmic emulsion INSTILL 1 DROP INTO BOTH EYES TWICE A DAY 5/20/21  Yes Vo, Joana, OD   rizatriptan (MAXALT) 10 MG tablet TAKE 1 TABLET (10 MG TOTAL) BY MOUTH 2 (TWO) TIMES DAILY AS NEEDED FOR MIGRAINE. 4/15/24  Yes Shonda Anguiano MD   rosuvastatin (CRESTOR) 5 MG tablet Take 5 mg by mouth. 7/26/24  Yes Provider, Historical   valACYclovir (VALTREX) 1000 MG tablet Take 1 tablet (1,000 mg total) by mouth every 12 (twelve) hours. for 10 days 2/3/25 2/13/25 Yes Emily Street MD   zolpidem (AMBIEN) 5 MG Tab TAKE 1 TABLET BY MOUTH NIGHTLY AS NEEDED (INSOMNIA). 1/21/25  Yes Shonda Anguiano MD   cetirizine (ZYRTEC) 10 MG tablet Take 1 tablet (10 mg total) by mouth once daily. 4/17/23 6/10/24  Ondina Ang NP   levocetirizine (XYZAL) 5 MG tablet Take 1 tablet (5 mg total) by mouth every evening. 3/6/23 3/26/24  Shonda Anguiano MD       Allergies:  Review of patient's allergies indicates:   Allergen Reactions    Aspirin      Other reaction(s): "hemorrhage"  hemorrhage    Hydrocortisone Nausea Only     Other reaction(s): sick  sick    Lactose intolerance (lactase) [lactase] Nausea And Vomiting    Vicodin [hydrocodone-acetaminophen]      Other reaction(s): hyperactivity    Asacol [mesalamine] Hallucinations     Other reaction(s): Hallucinations  hallucinations       Social History:  Social History     Tobacco Use    Smoking status: Never    Smokeless tobacco: Never   Substance Use Topics    " "Alcohol use: No     Alcohol/week: 0.0 standard drinks of alcohol    Drug use: No        Review of Systems   Constitutional:  Negative for appetite change, chills, fatigue, fever and unexpected weight change.   HENT:  Negative for congestion, hearing loss and rhinorrhea.    Eyes:  Negative for pain and visual disturbance.   Respiratory:  Negative for cough, chest tightness, shortness of breath and wheezing.    Cardiovascular:  Negative for chest pain, palpitations and leg swelling.   Gastrointestinal:  Negative for abdominal distention and abdominal pain.   Endocrine: Negative for polydipsia and polyuria.   Genitourinary:  Negative for decreased urine volume, difficulty urinating, dysuria, hematuria and vaginal discharge.   Neurological:  Negative for weakness, numbness and headaches.   Psychiatric/Behavioral:  Negative for behavioral problems and confusion.          Health Maintenance:     Immunizations:   Influenza 10/2024  TDap 8/2021  Pneumovax 6/2014, Prevnar 13 2/2021, Prevnar 20 11/2022  Shingrix 2/2022, 9/2022  COVID 2/2021, 3/2021, 8/2021 ([employer]), 11/2022, 9/2023, 9/2024  RSV 9/2023     Cancer Screening:  PAP: total hyst 2/2 fibroids, benign, ovaries removed as well.  Mammogram:  11/2024 benign  Colonoscopy: hx total proctocolectomy for UC, had pouchoscopy 12/2018 via Dr. Villalta [facility], biopsies taken which showed mild chronic inflammation on path.  EGD 12/2018 c hiatal hernia, otherwise unremarkable, bx c chronic inflammation in duodenum on path, normal gastric bx and neg H pylori  DEXA:  2/2024 c osteoporosis, followed by Dr. Srinivasan, now on Reclast, has f/u pending this summer c Dr. Sriinvasan      Objective:   /80   Pulse 77   Ht 5' 4" (1.626 m)   Wt 57.5 kg (126 lb 12.2 oz)   SpO2 98%   BMI 21.76 kg/m²        General: AAO x3, no apparent distress  HEENT: no cervical LAD, no thyroid masses appreciated  CV: RRR, no m/r/g  Pulm: Lungs CTAB, no crackles, no wheezes  Abd: soft, non tender, non distended, " no rebound, no guarding.  Extremities: no c/c/e    Labs:     Lab Results   Component Value Date    WBC 7.85 12/03/2024    HGB 13.2 12/03/2024    HCT 40.4 12/03/2024    MCV 85 12/03/2024     12/03/2024     Sodium   Date Value Ref Range Status   12/03/2024 142 136 - 145 mmol/L Final     Potassium   Date Value Ref Range Status   12/03/2024 3.9 3.5 - 5.1 mmol/L Final     Chloride   Date Value Ref Range Status   12/03/2024 106 95 - 110 mmol/L Final     CO2   Date Value Ref Range Status   12/03/2024 25 23 - 29 mmol/L Final     Glucose   Date Value Ref Range Status   12/03/2024 84 70 - 110 mg/dL Final     BUN   Date Value Ref Range Status   12/03/2024 12 8 - 23 mg/dL Final     Creatinine   Date Value Ref Range Status   12/03/2024 0.8 0.5 - 1.4 mg/dL Final     Calcium   Date Value Ref Range Status   12/03/2024 9.3 8.7 - 10.5 mg/dL Final     Total Protein   Date Value Ref Range Status   12/03/2024 6.9 6.0 - 8.4 g/dL Final     Albumin   Date Value Ref Range Status   12/03/2024 3.8 3.5 - 5.2 g/dL Final     Total Bilirubin   Date Value Ref Range Status   12/03/2024 0.3 0.1 - 1.0 mg/dL Final     Comment:     For infants and newborns, interpretation of results should be based  on gestational age, weight and in agreement with clinical  observations.    Premature Infant recommended reference ranges:  Up to 24 hours.............<8.0 mg/dL  Up to 48 hours............<12.0 mg/dL  3-5 days..................<15.0 mg/dL  6-29 days.................<15.0 mg/dL       Alkaline Phosphatase   Date Value Ref Range Status   12/03/2024 55 40 - 150 U/L Final     AST   Date Value Ref Range Status   12/03/2024 22 10 - 40 U/L Final     ALT   Date Value Ref Range Status   12/03/2024 17 10 - 44 U/L Final     Anion Gap   Date Value Ref Range Status   12/03/2024 11 8 - 16 mmol/L Final     eGFR if    Date Value Ref Range Status   07/05/2022 >60.0 >60 mL/min/1.73 m^2 Final     eGFR if non    Date Value Ref Range  Status   07/05/2022 >60.0 >60 mL/min/1.73 m^2 Final     Comment:     Calculation used to obtain the estimated glomerular filtration  rate (eGFR) is the CKD-EPI equation.        Lab Results   Component Value Date    HGBA1C 5.7 (H) 12/03/2024     Lab Results   Component Value Date    LDLCALC 41.6 (L) 02/11/2025     Lab Results   Component Value Date    TSH 1.175 01/04/2025         Assessment/Plan     Efe was seen today for follow-up.    Diagnoses and all orders for this visit:    Benign essential hypertension  At goal on Amlodipine, Losartan, HCTZ, and bystolic  Also followed by Cards  Cont meds    Prediabetes  Lab Results   Component Value Date    HGBA1C 5.7 (H) 12/03/2024     Stable on last check  Cont lifestyle modifications.    Mixed hyperlipidemia  As per HPI  Followed by Dr. Jordan  Has appt pending to discuss mgmt plan, will order labs for today  On Repatha and Crestor currently  -     Lipid Panel; Future    Ulcerative colitis with other complication, unspecified location  As per HPI  Mgmt as per Dr Rangel  Plans to contact him soon regarding her recent abd sx    Chronic pancreatitis, unspecified pancreatitis type  Exocrine pancreatic insufficiency  Followed by Dr. Villalta in the past but lost to f/u, advised to schedule since on Creon    Systemic lupus erythematosus, unspecified SLE type, unspecified organ involvement status  Chronic fatigue  Immunosuppression due to drug therapy  Pulmonary HTN  Followed by Dr. Jiang, on Benlysta  Cont routine f/u    Primary insomnia  Stable on Ambien  No relief on Hydroxyzine  Unable to take Trazodone due to interaction c Plaquenil.  -     zolpidem (AMBIEN) 5 MG Tab; TAKE 1 TABLET BY MOUTH NIGHTLY AS NEEDED (INSOMNIA).    Mouth sore  No sx currently but gets ulcers periodically and requesting refill on mouthwash to have as needed, sent today  -     (Magic mouthwash) 1:1:1 diphenhydrAMINE(Benadryl) 12.5mg/5ml liq, aluminum & magnesium hydroxide-simethicone (Maalox),  LIDOcaine viscous 2%; Swish and spit 5 mLs every 4 (four) hours as needed (mouth sores). for mouth sores    Low blood potassium  2/2 HCTZ  Cont supplement  -     potassium chloride SA (K-DUR,KLOR-CON) 20 MEQ tablet; Take 1 tablet (20 mEq total) by mouth 2 (two) times daily.    Malignant neoplasm of upper-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, unspecified estrogen receptor status  No acute issues  Cont routine checks    Secondary adrenal insufficiency  No issues    Subclinical hyperthyroidism  Lab Results   Component Value Date    TSH 1.175 01/04/2025     No issues  Normal on recent labs    Vitamin B12 deficiency  Lab Results   Component Value Date    [id number] 11/26/2021     Needs updated labs.    Vitamin D deficiency disease  Will send for updated labs.      HM as above  RTC in 4 mos per pt request, sooner if needed.    Visit complexity inherent to evaluation and management associated with medical care services that serve as the continuing focal point for all needed health care services and/or with medical care services that are part of ongoing care related to a patient's single, serious condition or a complex condition provided today.      Shonda Anguiano MD  Department of Internal Medicine - [facility]  02/11/2025

## 2025-02-12 ENCOUNTER — TELEPHONE (OUTPATIENT)
Dept: GASTROENTEROLOGY | Facility: CLINIC | Age: 65
End: 2025-02-12
Payer: MEDICARE

## 2025-02-12 NOTE — TELEPHONE ENCOUNTER
----- Message from Paco Almendarez sent at 2/12/2025 10:04 AM CST -----    ----- Message -----  From: Padmini Grande  Sent: 2/12/2025  10:02 AM CST  To: [facility] Staff    Type:  Patient Returning Call    Who Called: LINETTE LOPEZ [[id number]]    Who Left Message for Patient: unknown    Does the patient know what this is regarding? Return call for appt     Best Call Back Number: Telephone Information:  Mobile          [phone]

## 2025-02-13 ENCOUNTER — OFFICE VISIT (OUTPATIENT)
Dept: GASTROENTEROLOGY | Facility: CLINIC | Age: 65
End: 2025-02-13
Payer: MEDICARE

## 2025-02-13 DIAGNOSIS — E73.9 ADULT LACTASE DEFICIENCY: ICD-10-CM

## 2025-02-13 DIAGNOSIS — R10.31 RIGHT LOWER QUADRANT PAIN: ICD-10-CM

## 2025-02-13 DIAGNOSIS — M54.31 SCIATICA OF RIGHT SIDE: ICD-10-CM

## 2025-02-13 DIAGNOSIS — R19.7 DIARRHEA IN ADULT PATIENT: Primary | ICD-10-CM

## 2025-02-13 DIAGNOSIS — Z79.899 ENCOUNTER FOR MONITORING LONG-TERM PROTON PUMP INHIBITOR THERAPY: ICD-10-CM

## 2025-02-13 DIAGNOSIS — M32.9 SLE (SYSTEMIC LUPUS ERYTHEMATOSUS RELATED SYNDROME): ICD-10-CM

## 2025-02-13 DIAGNOSIS — K51.919 ULCERATIVE COLITIS WITH COMPLICATION, UNSPECIFIED LOCATION: ICD-10-CM

## 2025-02-13 DIAGNOSIS — K86.81 EXOCRINE PANCREATIC INSUFFICIENCY: ICD-10-CM

## 2025-02-13 DIAGNOSIS — Z51.81 ENCOUNTER FOR MONITORING LONG-TERM PROTON PUMP INHIBITOR THERAPY: ICD-10-CM

## 2025-02-13 DIAGNOSIS — R14.0 ABDOMINAL BLOATING: ICD-10-CM

## 2025-02-13 RX ORDER — PANCRELIPASE 36000; 180000; 114000 [USP'U]/1; [USP'U]/1; [USP'U]/1
2 CAPSULE, DELAYED RELEASE PELLETS ORAL
Qty: 180 CAPSULE | Refills: 5 | Status: SHIPPED | OUTPATIENT
Start: 2025-02-13 | End: 2025-08-12

## 2025-02-13 NOTE — PROGRESS NOTES
The patient location is:  In [place]  The chief complaint leading to consultation is:  Diarrhea    Visit type: audiovisual    Face to Face time with patient: 40 minutes of total time spent on the encounter, which includes face to face time and non-face to face time preparing to see the patient (eg, review of tests), Obtaining and/or reviewing separately obtained history, Documenting clinical information in the electronic or other health record, Independently interpreting results (not separately reported) and communicating results to the patient/family/caregiver, or Care coordination (not separately reported).    Each patient to whom he or she provides medical services by telemedicine is:  (1) informed of the relationship between the physician and patient and the respective role of any other health care provider with respect to management of the patient; and (2) notified that he or she may decline to receive medical services by telemedicine and may withdraw from such care at any time.    Notes:           [facility] Consultation Note    Reason for Consult:  The primary encounter diagnosis was Diarrhea in adult patient. Diagnoses of Right lower quadrant pain, Abdominal bloating, Encounter for monitoring long-term proton pump inhibitor therapy, Exocrine pancreatic insufficiency, Adult lactase deficiency, and Ulcerative colitis with complication, unspecified location were also pertinent to this visit.    PCP:   Shonda Anguiano MD:  No referring provider defined for this encounter.    Initial History of Present Illness (HPI):  This is a 65 y.o. female here for evaluation of diarrhea and intermittent abdominal pain patient with a history exocrine pancreatic insufficiency patient got off for Creon in 2023 she never refilled it she has been having diarrhea 4-5 bowel movements a day since then she has follows in IBD Clinic she has a history of ulcerative colitis status post total  proctocolectomy for ulcerative colitis.  No blood in her stool no fever no chills no shortness of breath no chest pain no nausea or vomiting she was doing well when she was taking her Creon 36,000 lipase units per pill taking 2 pills with each meal not to exceed 3 meals a day 1 pill with every snack not to exceed 2 snacks a day not to exceed 8 pills in 1 day.  She knows not to take Creon if she skipping meals fasting or not eating.  She would like further evaluation she does not think she has traveled anywhere recently no recent antibiotics will repeat 12 hour fasting blood work check for fat-soluble vitamins will do stool studies will do a CT enterography for further evaluation of her abdominal complaints to get a close look at her right lower quadrant and her small bowel.  Will restart her Creon at 36,000 lipase units per pill 2 pills with every meal not to exceed 3 meals a day 1 with every snack not to exceed 2 snacks a day never to exceed 8 pills in 1 day and not to take Creon ever if your fasting or skipping meals or not eating for whatever reason.    Abdominal pain - as above  Reflux - well controlled on her PPI  Dysphagia - no   Bowel habits - as above  GI bleeding - none  NSAID usage - none    Interval HPI 02/13/2025:  The patient's last visit with me was on 4/28/2022.      ROS:  Constitutional: No fevers, chills, No weight loss  ENT:  Well controlled heartburn on her PPI no dysphagia no odynophagia no hoarseness  CV: No chest pain, no palpitation  Pulm: No cough, No shortness of breath, no wheezing  Ophtho: No vision changes  GI: see HPI  Derm: No rash, no itching  Heme: No lymphadenopathy, No easy bruising  MSK:  Fibromyalgia  : No dysuria, No hematuria  Endo: No hot or cold intolerance, exocrine pancreatic insufficiency  Neuro: No syncope, No seizure, no strokes  Psych: No uncontrolled anxiety, No uncontrolled depression    Medical History:  has a past medical history of Acid reflux, Adrenal  insufficiency, primary, Arthritis, Asthma, B12 deficiency anemia, Benign essential hypertension (2/7/2021), Blood transfusion (2010), Breast cancer (2/2016), Cataract, Chronic pain, Deep vein thrombosis, Dry eyes, Esophagitis, unspecified, Fibromyalgia, General anesthetics causing adverse effect in therapeutic use, Heart murmur, History of colon polyps, Hyperlipidemia, Hypopituitary dwarfism, Iron deficiency anemia, Lupus, Migraines, neuralgic, Nonspecific ulcerative colitis, OP (osteoporosis), Osteopenia, Pancreatic cyst, Schatzki's ring, Steroid sulfatase deficiency, Ulcerative colitis, and Vitamin D deficiency disease.    Surgical History:  has a past surgical history that includes restorative proctectomy; total abdominal colectomy with ileostomy (2010); Cholecystectomy; Hysterectomy; Oophorectomy (Bilateral); Appendectomy; Breast biopsy (Right, 2/2016); Colon surgery; Breast surgery; Total Reduction Mammoplasty (Left, 2017); Upper endoscopic ultrasound w/ FNA; Endoscopic ultrasound of upper gastrointestinal tract (N/A, 8/6/2018); Esophagogastroduodenoscopy (N/A, 12/10/2018); Flexible sigmoidoscopy (N/A, 12/10/2018); Mastectomy; Breast reconstruction; Total colectomy; Esophagogastroduodenoscopy (N/A, 12/30/2021); Anorectal manometry (N/A, 1/13/2022); Ileoscopy (N/A, 2/16/2022); Esophagogastroduodenoscopy (N/A, 9/9/2022); Esophagogastroduodenoscopy (N/A, 3/14/2024); and Transforaminal epidural injection of steroid (Left, 8/6/2024).    Family History: family history includes Breast cancer (age of onset: 28) in her cousin; Breast cancer (age of onset: 45) in an other family member; Breast cancer (age of onset: 50) in her maternal cousin; Breast cancer (age of onset: 55) in her maternal aunt; Cancer in her maternal aunt; Cancer (age of onset: 25) in an other family member; Cancer (age of onset: 65) in her maternal uncle; Diabetes in her father; Glaucoma in her father; Hyperlipidemia in her father and mother;  "Hypertension in her father; No Known Problems in her brother, maternal grandfather, maternal grandmother, paternal aunt, paternal grandfather, paternal grandmother, paternal uncle, and sister; Pancreatic cancer in her maternal aunt and maternal uncle..     Social History:  reports that she has never smoked. She has never used smokeless tobacco. She reports that she does not drink alcohol and does not use drugs.    Review of patient's allergies indicates:   Allergen Reactions    Aspirin      Other reaction(s): "hemorrhage"  hemorrhage    Hydrocortisone Nausea Only     Other reaction(s): sick  sick    Lactose intolerance (lactase) [lactase] Nausea And Vomiting    Vicodin [hydrocodone-acetaminophen]      Other reaction(s): hyperactivity    Asacol [mesalamine] Hallucinations     Other reaction(s): Hallucinations  hallucinations       Medication List with Changes/Refills   Current Medications    (MAGIC MOUTHWASH) 1:1:1 DIPHENHYDRAMINE(BENADRYL) 12.5MG/5ML LIQ, ALUMINUM & MAGNESIUM HYDROXIDE-SIMETHICONE (MAALOX), LIDOCAINE VISCOUS 2%    Swish and spit 5 mLs every 4 (four) hours as needed (mouth sores). for mouth sores    ALBUTEROL (PROVENTIL/VENTOLIN HFA) 90 MCG/ACTUATION INHALER    Inhale 1-2 puffs into the lungs every 6 (six) hours as needed for Wheezing or Shortness of Breath. Rescue    AMLODIPINE (NORVASC) 5 MG TABLET    TAKE 1 TABLET BY MOUTH EVERY DAY    AZELASTINE (ASTELIN) 137 MCG (0.1 %) NASAL SPRAY    SPRAY 1 SPRAY BY NASAL ROUTE 2 TIMES DAILY.    BELIMUMAB (BENLYSTA) 200 MG/ML ATIN    Inject 1 mL (200 mg total) into the skin every 7 days.    CALCIUM CARBONATE (OS-RISSA) 500 MG CALCIUM (1,250 MG) TABLET    Take 1 tablet by mouth once daily.    CETIRIZINE (ZYRTEC) 10 MG TABLET    Take 1 tablet (10 mg total) by mouth once daily.    CHOLECALCIFEROL, VITAMIN D3, 125 MCG (5,000 UNIT) TAB    Take 5,000 Units by mouth once daily.    CREON 36,000-114,000- 180,000 UNIT CPDR    TAKE TWO CAPSULES BY MOUTH THREE TIMES DAILY " WITH MEALS AND ONE CAPSULE WITH EACH SNACK    DEXAMETHASONE (DECADRON) 4 MG/ML INJECTION    Inject 1 mL (4 mg total) into the muscle daily as needed (Signs and symtpoms of severe adrenal insufficiency).    DICLOFENAC SODIUM (VOLTAREN) 1 % GEL    APPLY 2 GRAMS TO AFFECTED AREA 4 TIMES A DAY    DICYCLOMINE (BENTYL) 20 MG TABLET    TAKE 1 TABLET BY MOUTH EVERY 6 HOURS    FERROUS SULFATE (IRON ORAL)    Take by mouth. No sure dose    FLUTICASONE PROPIONATE (FLONASE) 50 MCG/ACTUATION NASAL SPRAY    1 spray (50 mcg total) by Each Nostril route 2 (two) times daily as needed for Rhinitis or Allergies.    FOLIC ACID (FOLVITE) 1 MG TABLET    Take 1 mg by mouth once daily.    GABAPENTIN (NEURONTIN) 300 MG CAPSULE    Take 1 capsule (300 mg total) by mouth 3 (three) times daily.    HYDROCHLOROTHIAZIDE (HYDRODIURIL) 12.5 MG TAB    Take 12.5 mg by mouth.    HYDROXYCHLOROQUINE (PLAQUENIL) 200 MG TABLET    TAKE 1 AND 1/2 TABLETS DAILY BY MOUTH    HYDROXYZINE HCL (ATARAX) 25 MG TABLET    TAKE 1 TABLET BY MOUTH NIGHTLY AS NEEDED (INSOMNIA).    LEVOCETIRIZINE (XYZAL) 5 MG TABLET    Take 1 tablet (5 mg total) by mouth every evening.    LOPERAMIDE (IMODIUM) 2 MG CAPSULE    TAKE 1 TO 2 CAPSULES BY MOUTH FOUR TIMES DAILY    LOSARTAN (COZAAR) 50 MG TABLET    TAKE 1 TABLET BY MOUTH EVERY DAY    MILNACIPRAN (SAVELLA) 100 MG TAB    Take 1 tablet (100 mg total) by mouth once daily.    MULTIVITAMIN/IRON/FOLIC ACID (CENTRUM WOMEN ORAL)    Take 1 tablet by mouth once daily.    NEBIVOLOL (BYSTOLIC) 10 MG TAB        NIACIN 100 MG TAB    Take by mouth. 1 Tablet Oral At bedtime    NYSTATIN (MYCOSTATIN) CREAM    Apply topically 2 (two) times daily.    OMEPRAZOLE (PRILOSEC) 20 MG CAPSULE    TAKE 1 CAPSULE BY MOUTH TWICE A DAY    ONDANSETRON (ZOFRAN) 4 MG TABLET    Take 1 tablet (4 mg total) by mouth every 12 (twelve) hours as needed for Nausea.    OXYCODONE-ACETAMINOPHEN (PERCOCET) 5-325 MG PER TABLET    Take 1 tablet by mouth every 4 (four) hours as  needed for Pain.    POTASSIUM CHLORIDE SA (K-DUR,KLOR-CON) 20 MEQ TABLET    Take 1 tablet (20 mEq total) by mouth 2 (two) times daily.    PREDNISONE (DELTASONE) 5 MG TABLET    TAKE 1 TABLET BY MOUTH ONCE DAILY. DOUBLE THE DOSE IN TIMES OF ILLNESS    PSYLLIUM HUSK/ASPARTAME (METAMUCIL FIBER SINGLES ORAL)    Take by mouth.    REPATHA SURECLICK 140 MG/ML PNIJ    INJECT 140 MG INTO THE SKIN EVERY 14 (FOURTEEN) DAYS    RESTASIS 0.05 % OPHTHALMIC EMULSION    INSTILL 1 DROP INTO BOTH EYES TWICE A DAY    RIZATRIPTAN (MAXALT) 10 MG TABLET    TAKE 1 TABLET (10 MG TOTAL) BY MOUTH 2 (TWO) TIMES DAILY AS NEEDED FOR MIGRAINE.    ROSUVASTATIN (CRESTOR) 5 MG TABLET    Take 5 mg by mouth.    VALACYCLOVIR (VALTREX) 1000 MG TABLET    Take 1 tablet (1,000 mg total) by mouth every 12 (twelve) hours. for 10 days    ZOLPIDEM (AMBIEN) 5 MG TAB    TAKE 1 TABLET BY MOUTH NIGHTLY AS NEEDED (INSOMNIA).         Objective Findings:    Vital Signs:  There were no vitals taken for this visit.  There is no height or weight on file to calculate BMI.    Physical Exam:  Telemedicine video visit  General Appearance: Well appearing in no acute distress  Neurologic:  Alert and oriented x4  Psychiatric:  Normal speech mentation and affect    Labs:  Lab Results   Component Value Date    WBC 7.85 12/03/2024    HGB 13.2 12/03/2024    HCT 40.4 12/03/2024     12/03/2024    CHOL 146 02/11/2025    TRIG 87 02/11/2025    HDL 87 (H) 02/11/2025    ALT 17 12/03/2024    AST 22 12/03/2024     12/03/2024    K 3.9 12/03/2024     12/03/2024    CREATININE 0.8 12/03/2024    BUN 12 12/03/2024    CO2 25 12/03/2024    TSH 1.175 01/04/2025    INR 0.9 04/22/2021    HGBA1C 5.7 (H) 12/03/2024         Medical Decision Making:  Lab work reviewed  CT enterography talk given  Lab work talk given fasting  Stool studies talk given  Prior EGD colonoscopy report images reviewed  IBD Clinic note reviewed [facility] MRI reviewed  The Olympus scope                           GIF- ([id number]) was introduced through the                          mouth, and advanced to the third part of duodenum.   Findings:       The examined duodenum was normal. Biopsies for histology were taken        with a cold forceps for evaluation of celiac disease. Biopsies were        taken with a cold forceps for histology. Estimated blood loss was        minimal.        Patchy mildly erythematous mucosa without bleeding was found in the        cardia, in the gastric fundus, in the gastric body and in the        gastric antrum. Biopsies were taken with a cold forceps for        histology. Biopsies were taken with a cold forceps for histology.        Estimated blood loss was minimal.        The cardia and gastric fundus were normal on retroflexion.        A 3 cm hiatal hernia was found. The proximal extent of the gastric        folds (end of tubular esophagus) was 33 cm from the incisors. The        hiatal narrowing was 36 cm from the incisors. The Z-line was 33 cm        from the incisors. Z-line was sharp. No esophagitis and no columnar        esophagus and no lesions seen with NBI or white light.        The Z-line was regular and was found 33 cm from the incisors.        Biopsies were obtained from the proximal and distal esophagus with        cold forceps for histology of suspected eosinophilic esophagitis.        Estimated blood loss was minimal.   Impression:            - Normal examined duodenum. Biopsied.                          - Erythematous mucosa in the cardia, gastric                          fundus, gastric body and antrum. Biopsied.                          - 3 cm hiatal hernia.                          - Z-line regular, 33 cm from the incisors.                          - Biopsies were taken with a cold forceps for                          evaluation of eosinophilic esophagitis.   Recommendation:        - Discharge patient to home.                          - Await pathology results.                           - Telephone endoscopist for pathology results in 3                          weeks.                          - Return to GI clinic at the next available                          appointment.                          - Follow an antireflux regimen indefinitely.                          - Continue present medications. Omeprazole                          (PRILOSEC) 20 MG capsule every 12 hours                          - The findings and recommendations were discussed                          with the patient.   Attending Participation:        I personally performed the entire procedure.   Reese Villalta MD   3/14/2024   1. DUODENAL BIOPSIES:  NO SIGNIFICANT HISTOLOGIC ALTERATION  NO EVIDENCE OF CELIAC SPRUE    2. GASTRIC BIOPSIES:  NO SIGNIFICANT HISTOLOGIC ALTERATION  NO HELICOBACTER HAVE BEEN IDENTIFIED WITH AN IMMUNOHISTOCHEMICAL STAIN FOR HELICOBACTER.  THE POSITIVE AND NEGATIVE CONTROLS STAINED APPROPRIATELY    3. BIOPSIES OF PROXIMAL AND DISTAL ESOPHAGUS:  NO SIGNIFICANT HISTOLOGIC ALTERATION  INNOCUOUS SQUAMOUS MUCOSA WITH NO EOSINOPHILIC INFILTRATE IDENTIFIED    Comment: Interp By Rick Donnelly M.D., Signed on 03/20/2024   The Olympus scope GIF- ([id number]) was                          introduced through the ileoanal anastomosis via                          the anus and advanced to the distal ileum.   Findings:       The digital rectal exam findings include anal stricture.        The anal canal has an anterior angulation but is patent.        There was a rectal cuff beginning at at 1 cm from the anal verge,        characterized by healthy appearing mucosa. The cuff extended 1 cm in        length. Biopsies were taken with a cold forceps for histology.        There was an ileal pouch at 1 cm from the anal verge. This was        characterized by healthy appearing mucosa. The pouch size appeared        normal but there is some distortion of the pouch shape with the tip        of the J pouch  being proximal to an angulation in the pouch body.        Biopsies were taken with a cold forceps for histology.        The pouch inlet appeared normal.        The pre-pouch ileum appeared normal.        The site of a prior ileostomy was visualized about 30 cm proximal to        the pouch inlet and was normal appearing.   Impression:            - Anal stricture found on digital rectal exam.                          - The anal canal has an anterior angulation but is                          patent.                          - Rectal cuff with healthy appearing mucosa seen.                          Biopsied.                          - Ileal pouch with healthy appearing mucosa seen.                          Biopsied.                          - The pouch inlet is normal.                          - The pre-pouch ileum is normal.                          - The site of a prior ileostomy was visualized                          about 30 cm proximal to the pouch inlet and was                          normal appearing.   Recommendation:        - Discharge patient to home.                          - Resume previous diet today.                          - Continue present medications.                          - Await pathology results.                          - Repeat post-surgical lower GI endoscopy in 2                          years for surveillance.                          - Return to my office as previously scheduled.                          - There were no significant obstructive issues                          noted in the pouch. Suspect her symptoms are due                          to pelvic floor dysfunction. She has started                          pelvic floor therapy and we will follow up to                          assess response.   Attending Participation:        I personally performed the entire procedure.   Ceferino Rangel MD   2/16/2022     1. Ileal pouch, biopsy:  - Focal active enteritis.  - Negative for  dysplasia.  2. Rectal cuff, biopsy:  - Moderate active chronic proctitis.  - Squamous mucosa with reactive change.  - Negative for dysplasia.  - Immunostain for cytomegalovirus is negative, see comment.  COMMENT:  Appropriate positive controls are examined.    Comment: Interp By Tammi Martinez MD, Signed on 02/23/2022     IBD Clinic note Assessment/Plan:  Efe Horowitz is a 64 y.o. female with a history of ulcerative colitis status post total proctocolectomy and J pouch placement, history of pancreatic insufficiency, chronic degenerative changes in the spine, lupus, and lactose intolerance as well as pelvic floor dysfunction and pancreatic cystic lesions. The following issues were addresssed:     1. Status post total colectomy    2. Pelvic floor dysfunction    3. Pancreatic cyst    4. History of ulcerative colitis    5. Dysphagia, unspecified type       1. History of ulcerative colitis:  She has a J pouch with no signs of inflammation.  No treatment necessary.  Continue to monitor.  Plan for repeat pouchoscopy in the near future.  We will wait until her dysphagia evaluation has been completed to determine if she may need an esophageal manometry or repeat upper endoscopy along with the pouchoscopy.     2. Pelvic floor dysfunction:  Continue current management.  She is doing quite well..     3. Dysphagia:  Symptoms sound oropharyngeal in nature but there has no coughing or choking.  There may also be an esophageal component.  Recommend starting with a barium esophagram.  If this is unremarkable we will plan for a modified barium swallow.  Depending on the results may need to consider esophageal manometry were a repeat upper endoscopy.    4. Pancreatic insufficiency: Continue Creon.      5. Pancreatic cyst:  Repeat MRI stable.  Continue to monitor.           Follow up: Follow up in about 1 year (around 6/10/2025).     Visit today is associated with current or anticipated ongoing medical care related to this  patient's single serious condition/complex condition (history of ulcerative colitis).           Thank you again for sending Efe Horowitz to see Dr. Jay Rangel today at the [facility]. Please don't hesitate to contact Dr. Rangel if there are any questions regarding this evaluation, or if you have any other patients with inflammatory bowel disease for whom you would like a consultation. You can reach Dr. Rangel at [phone] or by email at [email or web address]     Ceferino Rangel MD  Department of Gastroenterology  Inflammatory Bowel Disease  Assessment:  1. Diarrhea in adult patient    2. Right lower quadrant pain    3. Abdominal bloating    4. Encounter for monitoring long-term proton pump inhibitor therapy    5. Exocrine pancreatic insufficiency    6. Adult lactase deficiency    7. Ulcerative colitis with complication, unspecified location         Recommendations:  1. 12 hour fasting blood work  2. CT enterography close attention to the right lower quadrant and small-bowel  3. Stool studies to be turned in within 4 hours of collecting  4. Okay to restart Creon 36,000 lipase units per pill 2 pills with every meal not to exceed 3 meals a day 1 pill with every snack not to exceed 2 snacks a day not to exceed a total of 8 pills in 1 day.  Patient knows that Creon as only for meals and if she skipping meals fasting or not eating for any reason she is not to take her Creon.  5. History of ulcerative colitis status post a total proctocolectomy in 2009 due to refractory disease and currently followed in IBD Clinic.  6. Continue lactose-free diet  7. 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 Quigley and [facility]   8. Return GI clinic 4 weeks for follow-up okay for telemedicine video visit  No follow-ups on file.      Order summary:  Orders Placed This Encounter    Clostridium difficile EIA    Stool  culture    CT Enterography Abd_Pelvis With Contrast    Giardia / Cryptosporidum, EIA    Stool Exam-Ova,Cysts,Parasites    Cyclospora    Micropsoridia species, PCR [facility]; Stool    Fecal fat, qualitative    Pancreatic elastase, fecal    Tissue Transglutaminase, IgA    IgA    Comprehensive Metabolic Panel    CBC Auto Differential    Lipase    Vitamin B12    Vitamin D    Gastrointestinal Pathogens Panel, PCR         Thank you so much for allowing me to participate in the care of Efe Horowitz    Reese Villalta MD    DISCLAIMER: This note was prepared with [employer] voice recognition transcription software. Garbled syntax, mangled or inadvertent pronouns, and other bizarre constructions may be attributed to that software system. While efforts were made to correct any mistakes made by this voice recognition program, some errors and/or omissions may remain in the note that were missed when the note was originally created.

## 2025-02-13 NOTE — Clinical Note
Important:  Betty please schedule Dardenise for 12 hour fasting blood work tomorrow 2nd floor [address] [place] at at 8:00 a.m. orders placed  Please have her come up to the 4th floor after she gets her blood draw and  a hat for her toilet in stool containers.  She will turn those stools in on Monday with a fresh stool sample collected within 4 hours of her turning them in on Monday  Please schedule her a telemedicine video visit follow-up with me in 4 weeks.

## 2025-02-14 ENCOUNTER — LAB VISIT (OUTPATIENT)
Dept: LAB | Facility: HOSPITAL | Age: 65
End: 2025-02-14
Attending: INTERNAL MEDICINE
Payer: MEDICARE

## 2025-02-14 DIAGNOSIS — K86.81 EXOCRINE PANCREATIC INSUFFICIENCY: ICD-10-CM

## 2025-02-14 DIAGNOSIS — R19.7 DIARRHEA IN ADULT PATIENT: ICD-10-CM

## 2025-02-14 DIAGNOSIS — R10.31 RIGHT LOWER QUADRANT PAIN: ICD-10-CM

## 2025-02-14 DIAGNOSIS — Z79.899 ENCOUNTER FOR MONITORING LONG-TERM PROTON PUMP INHIBITOR THERAPY: ICD-10-CM

## 2025-02-14 DIAGNOSIS — E73.9 ADULT LACTASE DEFICIENCY: ICD-10-CM

## 2025-02-14 DIAGNOSIS — R14.0 ABDOMINAL BLOATING: ICD-10-CM

## 2025-02-14 DIAGNOSIS — Z51.81 ENCOUNTER FOR MONITORING LONG-TERM PROTON PUMP INHIBITOR THERAPY: ICD-10-CM

## 2025-02-14 LAB
25(OH)D3+25(OH)D2 SERPL-MCNC: 49 NG/ML (ref 30–96)
ALBUMIN SERPL BCP-MCNC: 3.5 G/DL (ref 3.5–5.2)
ALP SERPL-CCNC: 48 U/L (ref 40–150)
ALT SERPL W/O P-5'-P-CCNC: 16 U/L (ref 10–44)
ANION GAP SERPL CALC-SCNC: 8 MMOL/L (ref 8–16)
AST SERPL-CCNC: 22 U/L (ref 10–40)
BASOPHILS # BLD AUTO: 0.04 K/UL (ref 0–0.2)
BASOPHILS NFR BLD: 0.6 % (ref 0–1.9)
BILIRUB SERPL-MCNC: 0.4 MG/DL (ref 0.1–1)
BUN SERPL-MCNC: 9 MG/DL (ref 8–23)
CALCIUM SERPL-MCNC: 8.8 MG/DL (ref 8.7–10.5)
CHLORIDE SERPL-SCNC: 108 MMOL/L (ref 95–110)
CO2 SERPL-SCNC: 28 MMOL/L (ref 23–29)
CREAT SERPL-MCNC: 0.7 MG/DL (ref 0.5–1.4)
DIFFERENTIAL METHOD BLD: ABNORMAL
EOSINOPHIL # BLD AUTO: 0.3 K/UL (ref 0–0.5)
EOSINOPHIL NFR BLD: 5.2 % (ref 0–8)
ERYTHROCYTE [DISTWIDTH] IN BLOOD BY AUTOMATED COUNT: 15 % (ref 11.5–14.5)
EST. GFR  (NO RACE VARIABLE): >60 ML/MIN/1.73 M^2
GLUCOSE SERPL-MCNC: 84 MG/DL (ref 70–110)
HCT VFR BLD AUTO: 40.1 % (ref 37–48.5)
HGB BLD-MCNC: 12.4 G/DL (ref 12–16)
IGA SERPL-MCNC: 113 MG/DL (ref 40–350)
IMM GRANULOCYTES # BLD AUTO: 0.02 K/UL (ref 0–0.04)
IMM GRANULOCYTES NFR BLD AUTO: 0.3 % (ref 0–0.5)
LIPASE SERPL-CCNC: 17 U/L (ref 4–60)
LYMPHOCYTES # BLD AUTO: 2.1 K/UL (ref 1–4.8)
LYMPHOCYTES NFR BLD: 33.1 % (ref 18–48)
MCH RBC QN AUTO: 27.2 PG (ref 27–31)
MCHC RBC AUTO-ENTMCNC: 30.9 G/DL (ref 32–36)
MCV RBC AUTO: 88 FL (ref 82–98)
MONOCYTES # BLD AUTO: 0.6 K/UL (ref 0.3–1)
MONOCYTES NFR BLD: 9.4 % (ref 4–15)
NEUTROPHILS # BLD AUTO: 3.2 K/UL (ref 1.8–7.7)
NEUTROPHILS NFR BLD: 51.4 % (ref 38–73)
NRBC BLD-RTO: 0 /100 WBC
PLATELET # BLD AUTO: 310 K/UL (ref 150–450)
PMV BLD AUTO: 10 FL (ref 9.2–12.9)
POTASSIUM SERPL-SCNC: 3.9 MMOL/L (ref 3.5–5.1)
PROT SERPL-MCNC: 6.3 G/DL (ref 6–8.4)
RBC # BLD AUTO: 4.56 M/UL (ref 4–5.4)
SODIUM SERPL-SCNC: 144 MMOL/L (ref 136–145)
TSH SERPL DL<=0.005 MIU/L-ACNC: 0.68 UIU/ML (ref 0.4–4)
VIT B12 SERPL-MCNC: 1565 PG/ML (ref 210–950)
WBC # BLD AUTO: 6.31 K/UL (ref 3.9–12.7)

## 2025-02-14 PROCEDURE — 84443 ASSAY THYROID STIM HORMONE: CPT | Performed by: INTERNAL MEDICINE

## 2025-02-14 PROCEDURE — 84590 ASSAY OF VITAMIN A: CPT | Performed by: INTERNAL MEDICINE

## 2025-02-14 PROCEDURE — 82306 VITAMIN D 25 HYDROXY: CPT | Performed by: INTERNAL MEDICINE

## 2025-02-14 PROCEDURE — 86364 TISS TRNSGLTMNASE EA IG CLAS: CPT | Performed by: INTERNAL MEDICINE

## 2025-02-14 PROCEDURE — 82784 ASSAY IGA/IGD/IGG/IGM EACH: CPT | Performed by: INTERNAL MEDICINE

## 2025-02-14 PROCEDURE — 36415 COLL VENOUS BLD VENIPUNCTURE: CPT | Performed by: INTERNAL MEDICINE

## 2025-02-14 PROCEDURE — 84597 ASSAY OF VITAMIN K: CPT | Performed by: INTERNAL MEDICINE

## 2025-02-14 PROCEDURE — 84446 ASSAY OF VITAMIN E: CPT | Performed by: INTERNAL MEDICINE

## 2025-02-14 PROCEDURE — 83690 ASSAY OF LIPASE: CPT | Performed by: INTERNAL MEDICINE

## 2025-02-14 PROCEDURE — 85025 COMPLETE CBC W/AUTO DIFF WBC: CPT | Performed by: INTERNAL MEDICINE

## 2025-02-14 PROCEDURE — 80053 COMPREHEN METABOLIC PANEL: CPT | Performed by: INTERNAL MEDICINE

## 2025-02-14 PROCEDURE — 82607 VITAMIN B-12: CPT | Performed by: INTERNAL MEDICINE

## 2025-02-14 RX ORDER — LOPERAMIDE HYDROCHLORIDE 2 MG/1
CAPSULE ORAL
Qty: 240 CAPSULE | Refills: 3 | Status: SHIPPED | OUTPATIENT
Start: 2025-02-14

## 2025-02-16 ENCOUNTER — PATIENT MESSAGE (OUTPATIENT)
Dept: GASTROENTEROLOGY | Facility: CLINIC | Age: 65
End: 2025-02-16
Payer: MEDICARE

## 2025-02-16 ENCOUNTER — RESULTS FOLLOW-UP (OUTPATIENT)
Dept: GASTROENTEROLOGY | Facility: CLINIC | Age: 65
End: 2025-02-16
Payer: MEDICARE

## 2025-02-16 DIAGNOSIS — A04.5 CAMPYLOBACTER DIARRHEA: Primary | ICD-10-CM

## 2025-02-16 NOTE — PROGRESS NOTES
Dareen your vitamin B12 level is elevated this most likely related to taking a multivitamin that has vitamin B12 in it or a vitamin B12 supplement if your not taking vitamin B12 in either form please let me know otherwise if your are try to take it every other day

## 2025-02-17 ENCOUNTER — LAB VISIT (OUTPATIENT)
Dept: LAB | Facility: HOSPITAL | Age: 65
End: 2025-02-17
Attending: INTERNAL MEDICINE
Payer: MEDICARE

## 2025-02-17 DIAGNOSIS — K86.81 EXOCRINE PANCREATIC INSUFFICIENCY: ICD-10-CM

## 2025-02-17 DIAGNOSIS — R14.0 ABDOMINAL BLOATING: ICD-10-CM

## 2025-02-17 DIAGNOSIS — R19.7 DIARRHEA IN ADULT PATIENT: ICD-10-CM

## 2025-02-17 DIAGNOSIS — R10.31 RIGHT LOWER QUADRANT PAIN: ICD-10-CM

## 2025-02-17 DIAGNOSIS — Z79.899 ENCOUNTER FOR MONITORING LONG-TERM PROTON PUMP INHIBITOR THERAPY: ICD-10-CM

## 2025-02-17 DIAGNOSIS — E73.9 ADULT LACTASE DEFICIENCY: ICD-10-CM

## 2025-02-17 DIAGNOSIS — Z51.81 ENCOUNTER FOR MONITORING LONG-TERM PROTON PUMP INHIBITOR THERAPY: ICD-10-CM

## 2025-02-17 LAB
C DIFF GDH STL QL: NEGATIVE
C DIFF TOX A+B STL QL IA: NEGATIVE
TTG IGA SER-ACNC: <0.2 U/ML

## 2025-02-17 PROCEDURE — 87209 SMEAR COMPLEX STAIN: CPT | Performed by: INTERNAL MEDICINE

## 2025-02-17 PROCEDURE — 87507 IADNA-DNA/RNA PROBE TQ 12-25: CPT | Performed by: INTERNAL MEDICINE

## 2025-02-17 PROCEDURE — 87329 GIARDIA AG IA: CPT | Performed by: INTERNAL MEDICINE

## 2025-02-17 PROCEDURE — 87798 DETECT AGENT NOS DNA AMP: CPT | Performed by: INTERNAL MEDICINE

## 2025-02-17 PROCEDURE — 82705 FATS/LIPIDS FECES QUAL: CPT | Performed by: INTERNAL MEDICINE

## 2025-02-17 PROCEDURE — 87207 SMEAR SPECIAL STAIN: CPT | Performed by: INTERNAL MEDICINE

## 2025-02-17 PROCEDURE — 87045 FECES CULTURE AEROBIC BACT: CPT | Performed by: INTERNAL MEDICINE

## 2025-02-17 PROCEDURE — 87427 SHIGA-LIKE TOXIN AG IA: CPT | Mod: 59 | Performed by: INTERNAL MEDICINE

## 2025-02-17 PROCEDURE — 87449 NOS EACH ORGANISM AG IA: CPT | Mod: 91 | Performed by: INTERNAL MEDICINE

## 2025-02-17 PROCEDURE — 87046 STOOL CULTR AEROBIC BACT EA: CPT | Performed by: INTERNAL MEDICINE

## 2025-02-17 PROCEDURE — 87449 NOS EACH ORGANISM AG IA: CPT | Performed by: INTERNAL MEDICINE

## 2025-02-18 ENCOUNTER — PATIENT MESSAGE (OUTPATIENT)
Dept: GASTROENTEROLOGY | Facility: CLINIC | Age: 65
End: 2025-02-18
Payer: MEDICARE

## 2025-02-18 ENCOUNTER — TELEPHONE (OUTPATIENT)
Dept: GASTROENTEROLOGY | Facility: CLINIC | Age: 65
End: 2025-02-18
Payer: MEDICARE

## 2025-02-18 LAB
A-TOCOPHEROL VIT E SERPL-MCNC: 968 UG/DL (ref 500–1800)
CRYPTOSP AG STL QL IA: NEGATIVE
E COLI SXT1 STL QL IA: NEGATIVE
E COLI SXT2 STL QL IA: NEGATIVE
G LAMBLIA AG STL QL IA: NEGATIVE
VIT A SERPL-MCNC: 50 UG/DL (ref 38–106)

## 2025-02-18 RX ORDER — AZITHROMYCIN 500 MG/1
500 TABLET, FILM COATED ORAL DAILY
Qty: 5 TABLET | Refills: 0 | Status: SHIPPED | OUTPATIENT
Start: 2025-02-18 | End: 2025-02-23

## 2025-02-18 NOTE — TELEPHONE ENCOUNTER
----- Message from Neri sent at 2/18/2025  9:13 AM CST -----  Regarding: Lab Results  Contact: [phone]  Type:  Needs Medical AdviceWho Called: Efe is calling to speak with someone about her test resultsWould the patient rather a call back or a response via [facility]? Call backBest Call Back Number: [phone] Information:

## 2025-02-19 ENCOUNTER — PATIENT MESSAGE (OUTPATIENT)
Dept: GASTROENTEROLOGY | Facility: CLINIC | Age: 65
End: 2025-02-19
Payer: MEDICARE

## 2025-02-19 LAB
BACTERIA STL CULT: ABNORMAL
BACTERIA STL CULT: ABNORMAL
ELASTASE 1, FECAL: 355
ELASTASE INTERPRETATION: NORMAL
FAT STL QL: NORMAL
NEUTRAL FAT STL QL: NORMAL

## 2025-02-19 NOTE — PROGRESS NOTES
Dardenise since your symptoms have been going on for longer than 7 days treatment is warranted for your Campylobacter infection found in your stool cultures    Recommend Azithromycin 500 mg by mouth once daily for 5 days.  Prescription sent    Please keep me posted with a follow-up within a week with a message to let me know how your doing sooner if any issues or problems    Stool Culture      Abnormal   CAMPYLOBACTER SPECIES  Included specific species antigen for C.jejuni, C.coli, C. dhaval and C.  upsaliensis

## 2025-02-20 ENCOUNTER — TELEPHONE (OUTPATIENT)
Dept: GASTROENTEROLOGY | Facility: CLINIC | Age: 65
End: 2025-02-20
Payer: MEDICARE

## 2025-02-20 LAB
ASTROVIRUS: NOT DETECTED
C COLI+JEJ+UPSA DNA STL QL NAA+NON-PROBE: NOT DETECTED
CYCLOSPORA CAYETANENSIS: NOT DETECTED
CYCLOSPORA STL SAFRANIN STN: NORMAL
ENCEPHALITOZOON BIENEUSI: NEGATIVE
ENCEPHALITOZOON SPECIES: NEGATIVE
ENTEROAGGREGATIVE E COLI: NOT DETECTED
ENTEROPATHOGENIC E COLI: NOT DETECTED
GPP - ADENOVIRUS 40/41: NOT DETECTED
GPP - CRYPTOSPORIDIUM: NOT DETECTED
GPP - ENTAMOEBA HISTOLYTICA: NOT DETECTED
GPP - ENTEROTOXIGENIC E COLI (ETEC): NOT DETECTED
GPP - GIARDIA LAMBLIA: NOT DETECTED
GPP - NOROVIRUS GI/GII: NOT DETECTED
GPP - ROTAVIRUS A: NOT DETECTED
GPP - SALMONELLA: NOT DETECTED
GPP - VIBRIO CHOLERA: NOT DETECTED
GPP - YERSINIA ENTEROCOLITICA: NOT DETECTED
LACTATE PLASV-SCNC: NOT DETECTED MMOL/L
MICROSPORIDIA SPECIMEN SOURCE: NORMAL
PHYTONADIONE SERPL-MCNC: 0.99 NMOL/L (ref 0.22–4.88)
PLESIOMONAS SHIGELLOIDES: NOT DETECTED
SAPOVIRUS: NOT DETECTED
SHIGELLA SP+EIEC IPAH STL QL NAA+PROBE: NOT DETECTED
VIBRIO: NOT DETECTED

## 2025-02-20 NOTE — TELEPHONE ENCOUNTER
----- Message from Reese Villalta MD sent at 2/18/2025 12:50 PM CST -----  Regarding: RE: B12  thanks  ----- Message -----  From: Tanesha Rothman MA  Sent: 2/18/2025  10:41 AM CST  To: Reese Villalta MD  Subject: B12                                              Spoke with patient.  You wanted her to let you know how often she was taking B12.   She has been taking it daily in a multivitamin and a B12 supplement.   She stated going forward she will take every other day.    Yvonne

## 2025-02-22 LAB — O+P STL MICRO: NORMAL

## 2025-02-24 NOTE — PROGRESS NOTES
Detail Level: Detailed Subjective:       Patient ID: Efe Horowitz is a 59 y.o. female.    Chief Complaint: Lupus    HPI:  Efe Horowitz is a 59 y.o. female with history of lupus since age 39.   Her manifestations include joint pains, headache, positive DERRICK, and a   double-stranded DNA. She also has an equivocal anticardiolipin IgM.   She has been treated with Plaquenil 1-1/2 tablets daily. Eye exam 12/2017 was normal.      In addition to lupus, she has a   history of ulcerative colitis, osteopenia, fibromyalgia as well.   In 2009 she had a colectomy with ileostomy and in May 2011 the ileostomy  was closed. History of bilateral carpal tunnel.       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  She was treated with surgical resection.  Another lesion breast suspected to be lymphoma was later felt to be fibrous tissue.      May 1, 2017 had right breast fibrous material removed and reconstruction on both breasts at [facility].       Interval History:  Reports recurrent oral ulcers.      Bilateral hip pain.  Injection improve and activity worsen.  Pain 8/10 ache.      Review of Systems   Constitutional: Positive for fatigue.   HENT: Negative for mouth sores.         Tongue sore   Eyes: Positive for redness.        Dry eyes   Respiratory: Negative for cough and shortness of breath.    Gastrointestinal: Positive for diarrhea.   Endocrine: Negative.    Genitourinary: Negative.    Musculoskeletal: Positive for arthralgias.   Skin: Negative.    Allergic/Immunologic: Negative.    Neurological: Positive for headaches.   Hematological: Negative.    Psychiatric/Behavioral: Negative.          Objective:   /77   Pulse 86   Ht 5' (1.524 m)   Wt 59.5 kg (131 lb 2.8 oz)   BMI 25.62 kg/m²   131/72   HR=88      Physical Exam   Constitutional: She is oriented to person, place, and time and well-developed, well-nourished, and in no distress.   HENT:   Head: Normocephalic and atraumatic.   Eyes: Conjunctivae and EOM are normal.   Neck:  Neck supple.   Cardiovascular: Normal rate, regular rhythm and normal heart sounds.    Pulmonary/Chest: Effort normal and breath sounds normal.   Abdominal: Soft. Bowel sounds are normal.   Neurological: She is alert and oriented to person, place, and time. Gait normal.   Skin: Skin is warm and dry.     Psychiatric: Mood and affect normal.   Musculoskeletal:   Right trochanteric bursa painful on palpation           LABS    Component      Latest Ref Rng & Units 2/15/2019   WBC      3.90 - 12.70 K/uL 5.94   RBC      4.00 - 5.40 M/uL 4.60   Hemoglobin      12.0 - 16.0 g/dL 12.1   Hematocrit      37.0 - 48.5 % 39.7   MCV      82 - 98 fL 86   MCH      27.0 - 31.0 pg 26.3 (L)   MCHC      32.0 - 36.0 g/dL 30.5 (L)   RDW      11.5 - 14.5 % 14.6 (H)   Platelets      150 - 350 K/uL 306   MPV      9.2 - 12.9 fL 10.8   Immature Granulocytes      0.0 - 0.5 % 0.5   Gran # (ANC)      1.8 - 7.7 K/uL 3.4   Immature Grans (Abs)      0.00 - 0.04 K/uL 0.03   Lymph #      1.0 - 4.8 K/uL 1.6   Mono #      0.3 - 1.0 K/uL 0.7   Eos #      0.0 - 0.5 K/uL 0.2   Baso #      0.00 - 0.20 K/uL 0.05   nRBC      0 /100 WBC 0   Gran%      38.0 - 73.0 % 57.2   Lymph%      18.0 - 48.0 % 27.4   Mono%      4.0 - 15.0 % 11.1   Eosinophil%      0.0 - 8.0 % 3.0   Basophil%      0.0 - 1.9 % 0.8   Differential Method       Automated   Sodium      136 - 145 mmol/L 140   Potassium      3.5 - 5.1 mmol/L 3.4 (L)   Chloride      95 - 110 mmol/L 100   CO2      23 - 29 mmol/L 29   Glucose      70 - 110 mg/dL 83   BUN, Bld      8 - 23 mg/dL 12   Creatinine      0.5 - 1.4 mg/dL 0.8   Calcium      8.7 - 10.5 mg/dL 9.4   Total Protein      6.0 - 8.4 g/dL 6.7   Albumin      3.5 - 5.2 g/dL 3.7   Total Bilirubin      0.1 - 1.0 mg/dL 0.4   Alkaline Phosphatase      55 - 135 U/L 55   AST      10 - 40 U/L 22   ALT      10 - 44 U/L 16   Anion Gap      8 - 16 mmol/L 11   eGFR if African American      >60 mL/min/1.73 m:2 >60.0   eGFR if non       >60  mL/min/1.73 m:2 >60.0   Specimen UA       Urine, Clean Catch   Color, UA      Yellow, Straw, Maanda Yellow   Appearance, UA      Clear Hazy (A)   pH, UA      5.0 - 8.0 5.0   Specific Gravity, UA      1.005 - 1.030 1.025   Protein, UA      Negative Negative   Glucose, UA      Negative Negative   Ketones, UA      Negative Negative   Bilirubin (UA)      Negative Negative   Occult Blood UA      Negative Negative   Nitrite, UA      Negative Negative   Leukocytes, UA      Negative Negative   Protein, Urine Random      0 - 15 mg/dL 21 (H)   Creatinine, Random Ur      15.0 - 325.0 mg/dL 197.0   Prot/Creat Ratio, Ur      0.00 - 0.20 0.11   Complement (C-4)      11 - 44 mg/dL 35   Complement (C-3)      50 - 180 mg/dL 107   CPK      20 - 180 U/L 159   ds DNA Ab      Negative 1:10 Negative 1:10   CRP      0.0 - 8.2 mg/L 0.9     Assessment:       1. Lupus. Lupus manifestations include joint pains, headache, positive DERRICK, and a   double-stranded DNA. Recent ulcers oral.  Bilateral hip pain.  Also alopecia.  SLEDAI=  2. Fatigue   3. Encounter for long-term (current) use of other medications   4. Myalgia and myositis. Fibromyalgia on gabapentin and Savella   5. Trochanteric bursitis. Bilateral now requesting injection.  6. Shoulder pain. Stable   7. Vitamin D deficiency disease. Now normal  8. Suspected Shingles. Past time for anti-viral. Did not receive treatment. Rash has improved  9. Migraine.  Did acupuncture with Dr. Tinsley.  Treated with Maxalt by primary doctor which helps.     10.  Osteopenia lumbar spine and femoral neck.  Reclast x1 stopped 3 years ago per patient due to improvement.   Preliminary DEXA osteopenia of femoral neck and lumbar spine FRAX with steroid included does not suggest treatment.  11.  Chronic steroid use.  Endocrine gives for adrenal insufficiency.  12.  Ulcerative colitis  13.  Hematuria.  Followed by nephrology  14.  Stress with dealing with son with mental illness  15.  Loss of smell since Nov/Dec.   May relate to sinus issues.  Plan:       1.  Labs reviewed.    2. Patient to continue Plaquenil 300 mg daily.  Eye exam normal 6/2018 due 12/2018 now awaiting eye exam  3. Continue neurontin 800 mg/800 mg/1200 mg in evening.  4.  Refer to ENT    Procedure Note   I  have explained the risks, benefits, and alternatives of aspiration of the joint.  The patient voices understanding and questions have been answered.  The patient agrees to proceed.    The bilateral trochanteric bursa  was prepped with 2% chlorhexidine gluconate and 70% isopropyl alcohol (ChloraPrep).  The area was numbed with topical refrigerant.  A 22 g needle was introduced into the space and no fluid was aspirated.  40 mg Kenalog and 2 cc lidocaine injected bilateral. The patient tolerated the procedure well.                    RTO in 3 months/prn.

## 2025-02-25 ENCOUNTER — TELEPHONE (OUTPATIENT)
Dept: INTERNAL MEDICINE | Facility: CLINIC | Age: 65
End: 2025-02-25

## 2025-02-25 RX ORDER — MILNACIPRAN HYDROCHLORIDE 100 MG/1
1 TABLET, FILM COATED ORAL DAILY
Qty: 90 TABLET | Refills: 3 | Status: SHIPPED | OUTPATIENT
Start: 2025-02-25

## 2025-02-25 NOTE — TELEPHONE ENCOUNTER
No care due was identified.  [facility] Embedded Care Due Messages. Reference number: [id number].   2/24/2025 10:38:05 PM CST

## 2025-02-25 NOTE — TELEPHONE ENCOUNTER
Refill Decision Note   Domenicdenise Carlos Manuel  is requesting a refill authorization.  Brief Assessment and Rationale for Refill:  Approve     Medication Therapy Plan:        Comments:     Note composed:8:54 AM 02/25/2025

## 2025-03-02 DIAGNOSIS — Z87.19 HISTORY OF ULCERATIVE COLITIS: ICD-10-CM

## 2025-03-03 ENCOUNTER — TELEPHONE (OUTPATIENT)
Dept: INTERNAL MEDICINE | Facility: CLINIC | Age: 65
End: 2025-03-03

## 2025-03-03 RX ORDER — DICYCLOMINE HYDROCHLORIDE 20 MG/1
20 TABLET ORAL EVERY 6 HOURS
Qty: 360 TABLET | Refills: 0 | Status: SHIPPED | OUTPATIENT
Start: 2025-03-03

## 2025-03-18 ENCOUNTER — TELEPHONE (OUTPATIENT)
Dept: INTERNAL MEDICINE | Facility: CLINIC | Age: 65
End: 2025-03-18

## 2025-03-20 DIAGNOSIS — F51.01 PRIMARY INSOMNIA: ICD-10-CM

## 2025-03-20 RX ORDER — ZOLPIDEM TARTRATE 5 MG/1
TABLET ORAL
Qty: 30 TABLET | Refills: 0 | Status: SHIPPED | OUTPATIENT
Start: 2025-03-20

## 2025-03-20 NOTE — TELEPHONE ENCOUNTER
No care due was identified.  [facility] Embedded Care Due Messages. Reference number: [id number].   3/20/2025 1:51:32 PM CDT

## 2025-03-21 ENCOUNTER — TELEPHONE (OUTPATIENT)
Dept: INTERNAL MEDICINE | Facility: CLINIC | Age: 65
End: 2025-03-21
Payer: MEDICARE

## 2025-03-27 DIAGNOSIS — M32.9 SYSTEMIC LUPUS ERYTHEMATOSUS, UNSPECIFIED SLE TYPE, UNSPECIFIED ORGAN INVOLVEMENT STATUS: ICD-10-CM

## 2025-03-28 RX ORDER — BELIMUMAB 200 MG/ML
200 SOLUTION SUBCUTANEOUS
Qty: 4 ML | Refills: 2 | Status: ACTIVE | OUTPATIENT
Start: 2025-03-28

## 2025-04-07 ENCOUNTER — TELEPHONE (OUTPATIENT)
Dept: OPHTHALMOLOGY | Facility: CLINIC | Age: 65
End: 2025-04-07
Payer: MEDICARE

## 2025-04-07 ENCOUNTER — OFFICE VISIT (OUTPATIENT)
Dept: OPHTHALMOLOGY | Facility: CLINIC | Age: 65
End: 2025-04-07
Payer: MEDICARE

## 2025-04-07 DIAGNOSIS — M32.9 SYSTEMIC LUPUS ERYTHEMATOSUS, UNSPECIFIED SLE TYPE, UNSPECIFIED ORGAN INVOLVEMENT STATUS: ICD-10-CM

## 2025-04-07 DIAGNOSIS — H25.11 NS (NUCLEAR SCLEROSIS), RIGHT: Primary | ICD-10-CM

## 2025-04-07 DIAGNOSIS — H26.9 CORTICAL CATARACT OF BOTH EYES: ICD-10-CM

## 2025-04-07 DIAGNOSIS — Z79.899 LONG-TERM USE OF PLAQUENIL: ICD-10-CM

## 2025-04-07 DIAGNOSIS — H25.13 NUCLEAR SCLEROSIS OF BOTH EYES: Primary | ICD-10-CM

## 2025-04-07 DIAGNOSIS — H52.7 REFRACTIVE ERROR: ICD-10-CM

## 2025-04-07 PROCEDURE — 92136 OPHTHALMIC BIOMETRY: CPT | Mod: HCNC,RT,S$GLB, | Performed by: OPHTHALMOLOGY

## 2025-04-07 PROCEDURE — 4010F ACE/ARB THERAPY RXD/TAKEN: CPT | Mod: HCNC,CPTII,S$GLB, | Performed by: OPHTHALMOLOGY

## 2025-04-07 PROCEDURE — 1101F PT FALLS ASSESS-DOCD LE1/YR: CPT | Mod: HCNC,CPTII,S$GLB, | Performed by: OPHTHALMOLOGY

## 2025-04-07 PROCEDURE — 1126F AMNT PAIN NOTED NONE PRSNT: CPT | Mod: HCNC,CPTII,S$GLB, | Performed by: OPHTHALMOLOGY

## 2025-04-07 PROCEDURE — 99999 PR PBB SHADOW E&M-EST. PATIENT-LVL III: CPT | Mod: PBBFAC,HCNC,, | Performed by: OPHTHALMOLOGY

## 2025-04-07 PROCEDURE — 92004 COMPRE OPH EXAM NEW PT 1/>: CPT | Mod: HCNC,S$GLB,, | Performed by: OPHTHALMOLOGY

## 2025-04-07 PROCEDURE — 1160F RVW MEDS BY RX/DR IN RCRD: CPT | Mod: HCNC,CPTII,S$GLB, | Performed by: OPHTHALMOLOGY

## 2025-04-07 PROCEDURE — 3288F FALL RISK ASSESSMENT DOCD: CPT | Mod: HCNC,CPTII,S$GLB, | Performed by: OPHTHALMOLOGY

## 2025-04-07 PROCEDURE — 1159F MED LIST DOCD IN RCRD: CPT | Mod: HCNC,CPTII,S$GLB, | Performed by: OPHTHALMOLOGY

## 2025-04-07 RX ORDER — HYDROCHLOROTHIAZIDE 25 MG/1
25 TABLET ORAL DAILY
COMMUNITY

## 2025-04-07 RX ORDER — PREDNISOLONE/MOXIFLOX/BROMFEN 1 %-0.5 %
1 SUSPENSION, DROPS(FINAL DOSAGE FORM)(ML) OPHTHALMIC (EYE) 3 TIMES DAILY
Qty: 8 ML | Refills: 2 | Status: SHIPPED | OUTPATIENT
Start: 2025-04-26

## 2025-04-07 NOTE — PROGRESS NOTES
Subjective:       Patient ID: Efe Horowitz is a 65 y.o. female.    Chief Complaint: Cataract    HPI    65yoF here for cataract evaluation per   Pt feels vision is getting blurrier and having more difficulty reading and   driving at night. Pt states her glasses are not helping as much now.    1. PSC OD  2. NS OU  3. SLE  4. Long term Plaquenil use  5. RE    MEDS:  Restasis BID OU  Systane AT's PRN OU    Last edited by Mayda Dey MA on 4/7/2025  8:18 AM.             Assessment:       1. Nuclear sclerosis of both eyes    2. Cortical cataract of both eyes    3. Long-term use of Plaquenil    4. Systemic lupus erythematosus, unspecified SLE type, unspecified organ involvement status    5. Refractive error        Plan:       Visually significant cataract OU -Pt. Wants Sx.    SLE on Plaquenil-DFE was WNL's OU.  RE        Cataract Surgery Consent: Patient with a visually significant cataract with difficulties of ADLs, reading, driving, night vision, glare (any and all).  Discussed with Patient/Family/Caregiver: options, risks and benefits, expectations of cataract surgery, utilized an eye model with questions and answers to facilitate discussion.  Discussed lens options and patient understands that glasses may be required for optimal vision for distance and/or near vision after cataract surgery.  The Patient/Family/Caregiver  voice good understanding and patient wishes to proceed with surgery.  The patient will likely benefit from surgery and patient signed consent for Right Eye.  CE OD 4/29/25 CNAOTO 20.0,        OS 5/13/25 CNAOTO 20.0.  Cont Plaquenil.

## 2025-04-09 ENCOUNTER — TELEPHONE (OUTPATIENT)
Dept: OPHTHALMOLOGY | Facility: CLINIC | Age: 65
End: 2025-04-09
Payer: MEDICARE

## 2025-04-09 ENCOUNTER — TELEPHONE (OUTPATIENT)
Dept: RHEUMATOLOGY | Facility: CLINIC | Age: 65
End: 2025-04-09
Payer: MEDICARE

## 2025-04-09 DIAGNOSIS — R53.83 FATIGUE, UNSPECIFIED TYPE: ICD-10-CM

## 2025-04-09 DIAGNOSIS — M32.9 SYSTEMIC LUPUS ERYTHEMATOSUS, UNSPECIFIED SLE TYPE, UNSPECIFIED ORGAN INVOLVEMENT STATUS: Primary | ICD-10-CM

## 2025-04-09 DIAGNOSIS — M79.7 FIBROMYALGIA: ICD-10-CM

## 2025-04-09 DIAGNOSIS — H25.12 NS (NUCLEAR SCLEROSIS), LEFT: Primary | ICD-10-CM

## 2025-04-09 DIAGNOSIS — Z79.52 LONG TERM CURRENT USE OF SYSTEMIC STEROIDS: ICD-10-CM

## 2025-04-09 DIAGNOSIS — Z79.899 LONG-TERM USE OF PLAQUENIL: ICD-10-CM

## 2025-04-09 NOTE — TELEPHONE ENCOUNTER
----- Message from Med Assistant Garrison sent at 4/4/2025 10:37 AM CDT -----  Regarding: patient  , spoke with patient she want to see you because she is having problems. Patient is refusing the wait list and she stated she does not want to see a nurse practitioner. Please advise.

## 2025-04-10 ENCOUNTER — LAB VISIT (OUTPATIENT)
Dept: LAB | Facility: HOSPITAL | Age: 65
End: 2025-04-10
Attending: INTERNAL MEDICINE
Payer: MEDICARE

## 2025-04-10 ENCOUNTER — TELEPHONE (OUTPATIENT)
Dept: OPHTHALMOLOGY | Facility: CLINIC | Age: 65
End: 2025-04-10
Payer: MEDICARE

## 2025-04-10 DIAGNOSIS — M32.9 SYSTEMIC LUPUS ERYTHEMATOSUS, UNSPECIFIED SLE TYPE, UNSPECIFIED ORGAN INVOLVEMENT STATUS: ICD-10-CM

## 2025-04-10 DIAGNOSIS — M79.7 FIBROMYALGIA: ICD-10-CM

## 2025-04-10 DIAGNOSIS — Z79.899 LONG-TERM USE OF PLAQUENIL: ICD-10-CM

## 2025-04-10 DIAGNOSIS — R53.83 FATIGUE, UNSPECIFIED TYPE: ICD-10-CM

## 2025-04-10 DIAGNOSIS — Z79.52 LONG TERM CURRENT USE OF SYSTEMIC STEROIDS: ICD-10-CM

## 2025-04-10 LAB
ABSOLUTE EOSINOPHIL (OHS): 0.27 K/UL
ABSOLUTE MONOCYTE (OHS): 0.8 K/UL (ref 0.3–1)
ABSOLUTE NEUTROPHIL COUNT (OHS): 5.33 K/UL (ref 1.8–7.7)
ALBUMIN SERPL BCP-MCNC: 3.5 G/DL (ref 3.5–5.2)
ALP SERPL-CCNC: 55 UNIT/L (ref 40–150)
ALT SERPL W/O P-5'-P-CCNC: 13 UNIT/L (ref 10–44)
ANION GAP (OHS): 9 MMOL/L (ref 8–16)
AST SERPL-CCNC: 17 UNIT/L (ref 11–45)
BASOPHILS # BLD AUTO: 0.05 K/UL
BASOPHILS NFR BLD AUTO: 0.6 %
BILIRUB SERPL-MCNC: 0.4 MG/DL (ref 0.1–1)
BILIRUB UR QL STRIP.AUTO: NEGATIVE
BUN SERPL-MCNC: 11 MG/DL (ref 8–23)
C3 SERPL-MCNC: 120 MG/DL (ref 50–180)
C4 COMPLEMENT (OHS): 38 MG/DL (ref 11–44)
CALCIUM SERPL-MCNC: 9.2 MG/DL (ref 8.7–10.5)
CHLORIDE SERPL-SCNC: 104 MMOL/L (ref 95–110)
CK SERPL-CCNC: 105 U/L (ref 20–180)
CLARITY UR: CLEAR
CO2 SERPL-SCNC: 30 MMOL/L (ref 23–29)
COLOR UR AUTO: YELLOW
CREAT SERPL-MCNC: 0.8 MG/DL (ref 0.5–1.4)
CREAT UR-MCNC: 87 MG/DL (ref 15–325)
CRP SERPL-MCNC: 1.3 MG/L
ERYTHROCYTE [DISTWIDTH] IN BLOOD BY AUTOMATED COUNT: 14.6 % (ref 11.5–14.5)
ERYTHROCYTE [SEDIMENTATION RATE] IN BLOOD BY PHOTOMETRIC METHOD: 6 MM/HR
GFR SERPLBLD CREATININE-BSD FMLA CKD-EPI: >60 ML/MIN/1.73/M2
GLUCOSE SERPL-MCNC: 86 MG/DL (ref 70–110)
GLUCOSE UR QL STRIP: NEGATIVE
HCT VFR BLD AUTO: 43.5 % (ref 37–48.5)
HGB BLD-MCNC: 13.8 GM/DL (ref 12–16)
HGB UR QL STRIP: NEGATIVE
IMM GRANULOCYTES # BLD AUTO: 0.04 K/UL (ref 0–0.04)
IMM GRANULOCYTES NFR BLD AUTO: 0.5 % (ref 0–0.5)
KETONES UR QL STRIP: NEGATIVE
LEUKOCYTE ESTERASE UR QL STRIP: NEGATIVE
LYMPHOCYTES # BLD AUTO: 1.9 K/UL (ref 1–4.8)
MCH RBC QN AUTO: 28 PG (ref 27–31)
MCHC RBC AUTO-ENTMCNC: 31.7 G/DL (ref 32–36)
MCV RBC AUTO: 88 FL (ref 82–98)
NITRITE UR QL STRIP: NEGATIVE
NUCLEATED RBC (/100WBC) (OHS): 0 /100 WBC
PH UR STRIP: 6 [PH]
PLATELET # BLD AUTO: 313 K/UL (ref 150–450)
PMV BLD AUTO: 11.7 FL (ref 9.2–12.9)
POTASSIUM SERPL-SCNC: 3.7 MMOL/L (ref 3.5–5.1)
PROT SERPL-MCNC: 6.9 GM/DL (ref 6–8.4)
PROT UR QL STRIP: NEGATIVE
PROT UR-MCNC: <7 MG/DL
PROT/CREAT UR: NORMAL MG/G{CREAT}
RBC # BLD AUTO: 4.92 M/UL (ref 4–5.4)
RELATIVE EOSINOPHIL (OHS): 3.2 %
RELATIVE LYMPHOCYTE (OHS): 22.6 % (ref 18–48)
RELATIVE MONOCYTE (OHS): 9.5 % (ref 4–15)
RELATIVE NEUTROPHIL (OHS): 63.6 % (ref 38–73)
SODIUM SERPL-SCNC: 143 MMOL/L (ref 136–145)
SP GR UR STRIP: 1.01
UROBILINOGEN UR STRIP-ACNC: NEGATIVE EU/DL
WBC # BLD AUTO: 8.39 K/UL (ref 3.9–12.7)

## 2025-04-10 PROCEDURE — 86160 COMPLEMENT ANTIGEN: CPT | Mod: HCNC

## 2025-04-10 PROCEDURE — 84450 TRANSFERASE (AST) (SGOT): CPT | Mod: HCNC

## 2025-04-10 PROCEDURE — 86160 COMPLEMENT ANTIGEN: CPT | Mod: 59,HCNC

## 2025-04-10 PROCEDURE — 85652 RBC SED RATE AUTOMATED: CPT | Mod: HCNC

## 2025-04-10 PROCEDURE — 82570 ASSAY OF URINE CREATININE: CPT | Mod: HCNC

## 2025-04-10 PROCEDURE — 85025 COMPLETE CBC W/AUTO DIFF WBC: CPT | Mod: HCNC

## 2025-04-10 PROCEDURE — 36415 COLL VENOUS BLD VENIPUNCTURE: CPT | Mod: HCNC,PO

## 2025-04-10 PROCEDURE — 86140 C-REACTIVE PROTEIN: CPT | Mod: HCNC

## 2025-04-10 PROCEDURE — 82550 ASSAY OF CK (CPK): CPT | Mod: HCNC

## 2025-04-10 PROCEDURE — 81003 URINALYSIS AUTO W/O SCOPE: CPT | Mod: HCNC

## 2025-04-10 PROCEDURE — 86225 DNA ANTIBODY NATIVE: CPT | Mod: HCNC

## 2025-04-10 NOTE — TELEPHONE ENCOUNTER
----- Message from Elissa sent at 4/10/2025  1:58 PM CDT -----  Regarding: FW: Consult/Advisory  Contact: [email or web address]    ----- Message -----  From: Dede Izquierdo  Sent: 4/10/2025  10:59 AM CDT  To: Jessica COWART Staff  Subject: Consult/Advisory                                  Consult/Advisory Name Of Caller:[email or web address] [address], [address] [facility] [address] [place] [id number]: [phone] Fax: [phone]   Contact Preference: Nature of call:Irma is calling to let doctor know that patient is allergic to Asprin and prednisoLONE-moxiflox-bromfen 1-0.5-0.075 % DrpS has Asprin in it. Requesting a call back

## 2025-04-11 ENCOUNTER — LAB VISIT (OUTPATIENT)
Dept: LAB | Facility: HOSPITAL | Age: 65
End: 2025-04-11
Attending: INTERNAL MEDICINE
Payer: MEDICARE

## 2025-04-11 ENCOUNTER — PATIENT MESSAGE (OUTPATIENT)
Dept: RHEUMATOLOGY | Facility: CLINIC | Age: 65
End: 2025-04-11
Payer: MEDICARE

## 2025-04-11 DIAGNOSIS — A04.5 CAMPYLOBACTER DIARRHEA: ICD-10-CM

## 2025-04-11 LAB
DSDNA ANTIBODY (OHS): NORMAL
DSDNA ANTIBODY TITER (OHS): NORMAL

## 2025-04-11 PROCEDURE — 87427 SHIGA-LIKE TOXIN AG IA: CPT | Mod: HCNC

## 2025-04-11 PROCEDURE — 87045 FECES CULTURE AEROBIC BACT: CPT | Mod: HCNC

## 2025-04-14 LAB — BACTERIA STL CULT: NORMAL

## 2025-04-16 LAB
C COLI+JEJ+UPSA DNA STL QL NAA+NON-PROBE: NEGATIVE
E COLI SXT1 STL QL IA: NEGATIVE
E COLI SXT2 STL QL IA: NEGATIVE

## 2025-04-17 ENCOUNTER — RESULTS FOLLOW-UP (OUTPATIENT)
Dept: GASTROENTEROLOGY | Facility: CLINIC | Age: 65
End: 2025-04-17

## 2025-04-17 ENCOUNTER — PATIENT MESSAGE (OUTPATIENT)
Dept: GASTROENTEROLOGY | Facility: CLINIC | Age: 65
End: 2025-04-17
Payer: MEDICARE

## 2025-04-19 ENCOUNTER — HOSPITAL ENCOUNTER (OUTPATIENT)
Dept: RADIOLOGY | Facility: HOSPITAL | Age: 65
Discharge: HOME | End: 2025-04-19
Attending: INTERNAL MEDICINE
Payer: MEDICARE

## 2025-04-19 DIAGNOSIS — R19.7 DIARRHEA IN ADULT PATIENT: ICD-10-CM

## 2025-04-19 DIAGNOSIS — R10.31 RIGHT LOWER QUADRANT PAIN: ICD-10-CM

## 2025-04-19 DIAGNOSIS — R14.0 ABDOMINAL BLOATING: ICD-10-CM

## 2025-04-19 PROCEDURE — 74177 CT ABD & PELVIS W/CONTRAST: CPT | Mod: 26,HCNC,, | Performed by: RADIOLOGY

## 2025-04-19 PROCEDURE — 25500020 PHARM REV CODE 255: Mod: HCNC | Performed by: INTERNAL MEDICINE

## 2025-04-19 PROCEDURE — 74177 CT ABD & PELVIS W/CONTRAST: CPT | Mod: TC,HCNC

## 2025-04-19 RX ADMIN — IOHEXOL 100 ML: 350 INJECTION, SOLUTION INTRAVENOUS at 09:04

## 2025-04-21 DIAGNOSIS — F51.01 PRIMARY INSOMNIA: ICD-10-CM

## 2025-04-21 DIAGNOSIS — E27.40 HYPOADRENALISM: ICD-10-CM

## 2025-04-21 RX ORDER — ZOLPIDEM TARTRATE 5 MG/1
TABLET ORAL
Qty: 30 TABLET | Refills: 0 | Status: SHIPPED | OUTPATIENT
Start: 2025-04-21

## 2025-04-21 RX ORDER — PREDNISONE 5 MG/1
TABLET ORAL
Qty: 90 TABLET | Refills: 4 | Status: SHIPPED | OUTPATIENT
Start: 2025-04-21

## 2025-04-21 NOTE — TELEPHONE ENCOUNTER
No care due was identified.  [facility] Embedded Care Due Messages. Reference number: [id number].   4/21/2025 11:16:42 AM CDT

## 2025-04-23 ENCOUNTER — TELEPHONE (OUTPATIENT)
Dept: OPHTHALMOLOGY | Facility: CLINIC | Age: 65
End: 2025-04-23
Payer: MEDICARE

## 2025-04-27 ENCOUNTER — PATIENT MESSAGE (OUTPATIENT)
Dept: GASTROENTEROLOGY | Facility: CLINIC | Age: 65
End: 2025-04-27
Payer: MEDICARE

## 2025-04-27 NOTE — H&P
"[facility]  History & Physical    Subjective:      Chief Complaint/Reason for Admission:     Efe Horowitz is a 65 y.o. female.    Past Medical History:   Diagnosis Date    Acid reflux     Adrenal insufficiency, primary     Arthritis     Asthma     B12 deficiency anemia     Benign essential hypertension 02/07/2021    Blood transfusion 2010    Breast cancer 02/2016    Right breast infiltrating ductal CA    Cataract     Chronic pain     Deep vein thrombosis     Dry eyes     Esophagitis, unspecified     Fibromyalgia     General anesthetics causing adverse effect in therapeutic use     "slow to wake up bc I don't have an immune system    Heart murmur     History of colon polyps     Hyperlipidemia     Hypopituitary dwarfism     Iron deficiency anemia     Lupus     Migraines, neuralgic     Nonspecific ulcerative colitis     OP (osteoporosis)     history of reclast    Osteopenia     Pancreatic cyst     Schatzki's ring     Steroid sulfatase deficiency     Ulcerative colitis     Vitamin D deficiency disease      Past Surgical History:   Procedure Laterality Date    ANORECTAL MANOMETRY N/A 01/13/2022    Procedure: MANOMETRY, ANORECTAL;  Surgeon: First Daniella Gastelum;  Location: [facility] (4TH FLR);  Service: Endoscopy;  Laterality: N/A;  new order placed; original order placed incorrectly  1/7 instructions handed to patient-st    APPENDECTOMY      BREAST BIOPSY Right 02/2016    IDC    BREAST RECONSTRUCTION      BREAST SURGERY      CHOLECYSTECTOMY      COLON SURGERY      ENDOSCOPIC ULTRASOUND OF UPPER GASTROINTESTINAL TRACT N/A 08/06/2018    Procedure: ULTRASOUND, ENDOSCOPIC, UPPER GI TRACT;  Surgeon: Hong Finn MD;  Location: [facility] (2ND FLR);  Service: Endoscopy;  Laterality: N/A;    ESOPHAGOGASTRODUODENOSCOPY N/A 12/10/2018    Procedure: EGD (ESOPHAGOGASTRODUODENOSCOPY);  Surgeon: Reese Villalta MD;  Location: [facility] (4TH FLR);  Service: Endoscopy;  Laterality: " N/A;    ESOPHAGOGASTRODUODENOSCOPY N/A 12/30/2021    Procedure: EGD (ESOPHAGOGASTRODUODENOSCOPY);  Surgeon: Reese Villalta MD;  Location: [facility] ENDO (2ND FLR);  Service: Endoscopy;  Laterality: N/A;  EGD for esophageal dysphagia and early satiety please schedule 1st provider available. wants this date-Dr Villalta only     fully vaccinated-GT  hx of adrenal insuffiency   12/27 arrival time confirmed with pt-rb    ESOPHAGOGASTRODUODENOSCOPY N/A 09/09/2022    Procedure: EGD (ESOPHAGOGASTRODUODENOSCOPY);  Surgeon: Reese Villalta MD;  Location: [facility] ENDO (4TH FLR);  Service: Endoscopy;  Laterality: N/A;  vacc  inst handed to pt-LW    ESOPHAGOGASTRODUODENOSCOPY N/A 03/14/2024    Procedure: EGD (ESOPHAGOGASTRODUODENOSCOPY);  Surgeon: Reese Villalta MD;  Location: [facility] ENDO (2ND FLR);  Service: Endoscopy;  Laterality: N/A;  prep instructions sent to pt via portal/ lan pt/ gastroparesis prep  3/8-lvm for precall-MS  3/11-precall complete-KPvt    FLEXIBLE SIGMOIDOSCOPY N/A 12/10/2018    Procedure: SIGMOIDOSCOPY, FLEXIBLE;  Surgeon: Reese Villalta MD;  Location: [facility] ENDO (4TH FLR);  Service: Endoscopy;  Laterality: N/A;  Recommend full split bowel prep for this study just like for a colonoscopy    HYSTERECTOMY      ILEOSCOPY N/A 02/16/2022    Procedure: ILEOSCOPY;  Surgeon: Ceferino Rangel MD;  Location: [facility] ENDO (4TH FLR);  Service: Endoscopy;  Laterality: N/A;  medical history significant for Ulcerative colitis s/p total colectomy with ileoanal anastomosis ([id number]), SLE on plaquenil, adrenal insufficiency on prednisone, breast cancer, and HTN who presents with 3 month history of bilateral lower abdominal pain. Patient with abd    MASTECTOMY      OOPHORECTOMY Bilateral     restorative proctectomy      total abdominal colectomy with ileostomy  2010    TOTAL COLECTOMY      TOTAL REDUCTION MAMMOPLASTY Left 2017    TRANSFORAMINAL EPIDURAL INJECTION OF STEROID Left 08/06/2024    Procedure: LUMBAR  TRANSFORAMINAL LEFT L4/5 AND L5/S1 DIRECT REFERRAL;  Surgeon: Azucena Muniz MD;  [facility];  Service: Pain Management;  Laterality: Left;  [phone]    UPPER ENDOSCOPIC ULTRASOUND W/ FNA       Social History[1]    No medications prior to admission.     Review of patient's allergies indicates:   Allergen Reactions    Asacol [mesalamine] Hallucinations    Aspirin      hemorrhage    Hydrocortisone Nausea Only     Feels sick    Lactose intolerance (lactase) [lactase] Nausea And Vomiting    Vicodin [hydrocodone-acetaminophen]      hyperactivity        Review of Systems   Eyes:  Positive for blurred vision.   All other systems reviewed and are negative.        Objective:      Vital Signs (Most Recent)       Vital Signs Range (Last 24H):       Physical Exam  Constitutional:       Appearance: She is well-developed.   HENT:      Head: Normocephalic.   Eyes:      Conjunctiva/sclera: Conjunctivae normal.      Pupils: Pupils are equal, round, and reactive to light.   Cardiovascular:      Rate and Rhythm: Normal rate and regular rhythm.      Heart sounds: Normal heart sounds.   Pulmonary:      Effort: Pulmonary effort is normal.      Breath sounds: Normal breath sounds.   Abdominal:      General: Bowel sounds are normal.      Palpations: Abdomen is soft.   Musculoskeletal:         General: Normal range of motion.      Cervical back: Normal range of motion and neck supple.   Skin:     General: Skin is warm.   Neurological:      Mental Status: She is alert and oriented to person, place, and time.         ASA Score: II    Mallampati Score: II    Plan for Sedation: Moderate    Patient or Family History of Anesthesia problems : No    Any changes affecting the anesthesia assessment which may have changed since the initial assessment and H and P:  No       Data Review:    ECG:     Assessment:      Cataract OD.    Plan:    CE OD.         [1]   Social History  Tobacco Use    Smoking status: Never    Smokeless tobacco:  Never   Substance Use Topics    Alcohol use: No     Alcohol/week: 0.0 standard drinks of alcohol    Drug use: No

## 2025-04-28 ENCOUNTER — TELEPHONE (OUTPATIENT)
Dept: GASTROENTEROLOGY | Facility: CLINIC | Age: 65
End: 2025-04-28
Payer: MEDICARE

## 2025-04-28 ENCOUNTER — TELEPHONE (OUTPATIENT)
Dept: ENDOSCOPY | Facility: HOSPITAL | Age: 65
End: 2025-04-28
Payer: MEDICARE

## 2025-04-28 DIAGNOSIS — K51.918 ULCERATIVE COLITIS WITH OTHER COMPLICATION, UNSPECIFIED LOCATION: Primary | ICD-10-CM

## 2025-04-28 DIAGNOSIS — Z00.00 ENCOUNTER FOR MEDICARE ANNUAL WELLNESS EXAM: ICD-10-CM

## 2025-04-28 NOTE — TELEPHONE ENCOUNTER
"----- Message from Reese Villalta MD sent at 4/27/2025  1:15 PM CDT -----  Procedure: Ceferino Rangel MD Procedure: Pouch Endoscopy Diagnosis: Ulcerative colitisProcedure Timing: Within 4-6 weeks #If within 4 weeks selected, please stanislav as high priority##If greater than 12 weeks, please select "5-12 weeks" and delay sending until 3 months prior to requested date# Location: Any SiteAdditional Scheduling Information: No scheduling concernsPrep Specifications:Standard prepIs the patient taking a GLP-1 Agonist:no but please askHave you attached a patient to this message: yes Looks like she is past due for Dr. Rangel's Recommended follow up. See BelowRecommendation:        - Discharge patient to home.                        - Resume previous diet today.                        - Continue present medications.                        - Await pathology results.                        - Repeat post-surgical lower GI endoscopy in 2                        years for surveillance.                        - Return to my office as previously scheduled.                        - There were no significant obstructive issues                        noted in the pouch. Suspect her symptoms are due                        to pelvic floor dysfunction. She has started                        pelvic floor therapy and we will follow up to                        assess response. Attending Participation:      I personally performed the entire procedure. Ceferino Rangel MD 2/16/2022  "

## 2025-04-28 NOTE — TELEPHONE ENCOUNTER
Referral for procedure from Telephone call       [facility]       Physician to perform procedure(s) Dr. MIRA Rangel  Date of Procedure (s) 5/19/25  Arrival Time 1:30 PM  Time of Procedure(s) 2:30 PM   Location of Procedure(s) [facility]  Type of Rx Prep sent to patient: Miralax  Instructions provided [facility] via [facility]    Patient was informed on the following information and verbalized understanding. Screening questionnaire reviewed with patient and complete. If procedure requires anesthesia, a responsible adult needs to be present to accompany the patient home, patient cannot drive after receiving anesthesia. Appointment details are tentative, especially check-in time. Patient will receive a prep-op call 7 days prior to confirm check-in time for procedure. If applicable the patient should contact their pharmacy to verify Rx for procedure prep is ready for pick-up. Patient was advised to call the scheduling department at [phone] if pharmacy states no Rx is available. Patient was advised to call the endoscopy scheduling department if any questions or concerns arise.      [facility] Scheduling Department

## 2025-04-28 NOTE — TELEPHONE ENCOUNTER
Please see MD note below       Looks like she is past due for Dr. Rangel's Recommended follow up. See BelowRecommendation: - Discharge patient to home. - Resume previous diet today. - Continue present medications. - Await pathology results. - Repeat post-surgical lower GI endoscopy in 2 years for surveillance. - Return to my office as previously scheduled. - There were no significant obstructive issues noted in the pouch. Suspect her symptoms are due to pelvic floor dysfunction. She has started pelvic floor therapy and we will follow up to assess response. Attending Participation: I personally performed the entire procedure. Ceferino Rangel MD 2/16/2022

## 2025-04-28 NOTE — PRE-PROCEDURE INSTRUCTIONS
Patient was given pre-procedure instructions. Patient verb understanding that a ride home is required. Patient received arrival time from the clinic.

## 2025-04-28 NOTE — TELEPHONE ENCOUNTER
Dr. Rangel ,   Does the patient needs a repeat pouch with you or dr forte  Please advise     I received this message from dr montenegro to schedule procedure   Looks like she is past due for Dr. Rangel's Recommended follow up. See BelowRecommendation: - Discharge patient to home. - Resume previous diet today. - Continue present medications. - Await pathology results. - Repeat post-surgical lower GI endoscopy in 2 years for surveillance. - Return to my office as previously scheduled. - There were no significant obstructive issues noted in the pouch. Suspect her symptoms are due to pelvic floor dysfunction. She has started pelvic floor therapy and we will follow up to assess response. Attending Participation: I personally performed the entire procedure. Ceferino Rangel MD 2/16/2022

## 2025-04-29 ENCOUNTER — HOSPITAL ENCOUNTER (OUTPATIENT)
Facility: HOSPITAL | Age: 65
Discharge: HOME | End: 2025-04-29
Attending: OPHTHALMOLOGY | Admitting: OPHTHALMOLOGY
Payer: MEDICARE

## 2025-04-29 VITALS
OXYGEN SATURATION: 100 % | DIASTOLIC BLOOD PRESSURE: 68 MMHG | RESPIRATION RATE: 16 BRPM | SYSTOLIC BLOOD PRESSURE: 126 MMHG | TEMPERATURE: 98 F | HEART RATE: 68 BPM

## 2025-04-29 DIAGNOSIS — H25.11 NUCLEAR SCLEROTIC CATARACT OF RIGHT EYE: Primary | ICD-10-CM

## 2025-04-29 PROCEDURE — 71000015 HC POSTOP RECOV 1ST HR: Performed by: OPHTHALMOLOGY

## 2025-04-29 PROCEDURE — 99152 MOD SED SAME PHYS/QHP 5/>YRS: CPT | Performed by: OPHTHALMOLOGY

## 2025-04-29 PROCEDURE — 25000003 PHARM REV CODE 250: Performed by: OPHTHALMOLOGY

## 2025-04-29 PROCEDURE — 66984 XCAPSL CTRC RMVL W/O ECP: CPT | Mod: HCNC,RT,, | Performed by: OPHTHALMOLOGY

## 2025-04-29 PROCEDURE — 36000707: Performed by: OPHTHALMOLOGY

## 2025-04-29 PROCEDURE — 99153 MOD SED SAME PHYS/QHP EA: CPT | Performed by: OPHTHALMOLOGY

## 2025-04-29 PROCEDURE — 27201423 OPTIME MED/SURG SUP & DEVICES STERILE SUPPLY: Performed by: OPHTHALMOLOGY

## 2025-04-29 PROCEDURE — 94761 N-INVAS EAR/PLS OXIMETRY MLT: CPT

## 2025-04-29 PROCEDURE — 36000706: Performed by: OPHTHALMOLOGY

## 2025-04-29 PROCEDURE — V2632 POST CHMBR INTRAOCULAR LENS: HCPCS | Performed by: OPHTHALMOLOGY

## 2025-04-29 PROCEDURE — 63600175 PHARM REV CODE 636 W HCPCS: Performed by: OPHTHALMOLOGY

## 2025-04-29 PROCEDURE — 99900035 HC TECH TIME PER 15 MIN (STAT)

## 2025-04-29 DEVICE — CLAREON ASPHERIC HYDROPHOBIC ACRYLIC IOL WITH THE AUTONOMEAUTOMATED PRE-LOADED DELIVERY SYSTEM
Type: IMPLANTABLE DEVICE | Site: EYE | Status: FUNCTIONAL
Brand: CLAREON™

## 2025-04-29 RX ORDER — MOXIFLOXACIN 5 MG/ML
1 SOLUTION/ DROPS OPHTHALMIC EVERY 5 MIN PRN
Status: COMPLETED | OUTPATIENT
Start: 2025-04-29 | End: 2025-04-29

## 2025-04-29 RX ORDER — PREDNISOLONE ACETATE 10 MG/ML
SUSPENSION/ DROPS OPHTHALMIC
Status: DISCONTINUED | OUTPATIENT
Start: 2025-04-29 | End: 2025-04-29 | Stop reason: HOSPADM

## 2025-04-29 RX ORDER — LIDOCAINE HYDROCHLORIDE 40 MG/ML
INJECTION, SOLUTION RETROBULBAR
Status: DISCONTINUED | OUTPATIENT
Start: 2025-04-29 | End: 2025-04-29 | Stop reason: HOSPADM

## 2025-04-29 RX ORDER — EPINEPHRINE 1 MG/ML
INJECTION, SOLUTION, CONCENTRATE INTRAVENOUS
Status: DISCONTINUED | OUTPATIENT
Start: 2025-04-29 | End: 2025-04-29 | Stop reason: HOSPADM

## 2025-04-29 RX ORDER — LIDOCAIN/PHENYLEPH/BSS NO.2/PF 1 %-1.5 %
SYRINGE (ML) INTRAOCULAR
Status: DISCONTINUED | OUTPATIENT
Start: 2025-04-29 | End: 2025-04-29 | Stop reason: HOSPADM

## 2025-04-29 RX ORDER — ACETAMINOPHEN 325 MG/1
650 TABLET ORAL EVERY 4 HOURS PRN
Status: CANCELLED | OUTPATIENT
Start: 2025-04-29

## 2025-04-29 RX ORDER — TETRACAINE HYDROCHLORIDE 5 MG/ML
1 SOLUTION OPHTHALMIC
Status: DISCONTINUED | OUTPATIENT
Start: 2025-04-29 | End: 2025-04-29

## 2025-04-29 RX ORDER — MIDAZOLAM HYDROCHLORIDE 1 MG/ML
1 INJECTION, SOLUTION INTRAMUSCULAR; INTRAVENOUS
Status: DISCONTINUED | OUTPATIENT
Start: 2025-04-29 | End: 2025-04-29 | Stop reason: HOSPADM

## 2025-04-29 RX ORDER — PROPARACAINE HYDROCHLORIDE 5 MG/ML
SOLUTION/ DROPS OPHTHALMIC
Status: DISCONTINUED | OUTPATIENT
Start: 2025-04-29 | End: 2025-04-29 | Stop reason: HOSPADM

## 2025-04-29 RX ORDER — HYDROCODONE BITARTRATE AND ACETAMINOPHEN 5; 325 MG/1; MG/1
1 TABLET ORAL EVERY 4 HOURS PRN
Refills: 0 | Status: CANCELLED | OUTPATIENT
Start: 2025-04-29

## 2025-04-29 RX ORDER — FENTANYL CITRATE 50 UG/ML
25 INJECTION, SOLUTION INTRAMUSCULAR; INTRAVENOUS
Status: DISCONTINUED | OUTPATIENT
Start: 2025-04-29 | End: 2025-04-29 | Stop reason: HOSPADM

## 2025-04-29 RX ORDER — CYCLOP/TROP/PROPA/PHEN/KET/WAT 1-1-0.1%
1 DROPS (EA) OPHTHALMIC (EYE)
Status: COMPLETED | OUTPATIENT
Start: 2025-04-29 | End: 2025-04-29

## 2025-04-29 RX ORDER — SODIUM CHLORIDE 9 MG/ML
INJECTION, SOLUTION INTRAVENOUS CONTINUOUS
Status: DISCONTINUED | OUTPATIENT
Start: 2025-04-29 | End: 2025-04-29 | Stop reason: HOSPADM

## 2025-04-29 RX ORDER — MOXIFLOXACIN 5 MG/ML
SOLUTION/ DROPS OPHTHALMIC
Status: DISCONTINUED | OUTPATIENT
Start: 2025-04-29 | End: 2025-04-29 | Stop reason: HOSPADM

## 2025-04-29 RX ADMIN — Medication 1 DROP: at 01:04

## 2025-04-29 RX ADMIN — MOXIFLOXACIN OPHTHALMIC 1 DROP: 5 SOLUTION/ DROPS OPHTHALMIC at 01:04

## 2025-04-29 RX ADMIN — MIDAZOLAM HYDROCHLORIDE 1 MG: 1 INJECTION, SOLUTION INTRAMUSCULAR; INTRAVENOUS at 01:04

## 2025-04-29 NOTE — OP NOTE
Operative Date:  04/29/2025    Discharge Date:  04/29/2025    Discharge Patient Home    Report Title: Operative Note      SURGEON: Bharath Lopez MD     ASSISTANT:     PREOPERATIVE DIAGNOSIS: Visually significant NSC cataract,  Right Eye.    POSTOPERATIVE DIAGNOSIS: Visually-significant NSC cataract,  Right Eye.    PROCEDURE PERFORMED: Phacoemulsification of the cataract with posterior chamber intraocular lens Right Eye.    ANESTHESIA:  Moderate Sedation with Local    The team confirmed the patient ID and re-evaluated the patient and sedation plan confirming it is suitable for the patient's condition and procedure prior to administering sedation.    COMPLICATIONS: None    ESTIMATED BLOOD LOSS: Minimal    INDICATIONS FOR PROCEDURE:   The patient is a pleasant 65 year old woman with increasing difficulties with activities of daily living secondary to a dense visually significant cataract in the Right Eye.  Discussions have been carried out with this patient concerning the options to surgery, risks, benefits and expectations.  The patient voiced good understanding and wished to proceed with the above procedure.    PROCEDURE IN DETAIL: The patient was brought to the operating room and received topical anesthetic to the eye and then was prepped and draped in the usual sterile fashion.  Using the Anaya ring and the guarded milady blade set at 0.37 mm, a partial thickness clear cornea incision was made temporally.  The paracentesis site was made at the twelve o'clock position.  Omidria was injected into the anterior chamber through the paracentesis.  Viscoat was then injected into the anterior chamber.  The eye was then reentered at the primary surgical site with a 2.4 mm keratome followed by continuous capsulotomy, hydrodissection, hydrodelineation and phacoemulsification of the cataract.  Residual cortical material was removed using automated irrigation-aspiration technique.  Healon was injected into the  posterior chamber and a CNAOTO 20.0 diopter lens was placed in the bag without difficulty. Residual viscoelastic was removed using automated irrigation-aspiration technique. The eye was re-pressurized using BSS solution and both the paracentesis site and the primary surgical site were demonstrated to be watertight at the end of the case with Weck--Saundra manipulation.  One drop of Ofloxacin and one drop of Pred acetate 1% was applied to the Right Eye .The eye was closed, patched and a Garcia shield placed.  The patient was taken to the recovery room in good and stable condition.  The patient tolerated the procedure well.  The patient was instructed to refrain from any heavy lifting, bending, stooping or straining activities, discharged home  and to follow-up in the morning for routine postoperative care with Bharath Lopez MD.

## 2025-04-29 NOTE — BRIEF OP NOTE
[facility]  Brief Operative Note    Surgery Date: 4/29/2025     Surgeons and Role:     * Bharath Lopez MD - Primary    Assisting Surgeon: None    Pre-op Diagnosis:  NS (nuclear sclerosis), right [H25.11]    Post-op Diagnosis:  Post-Op Diagnosis Codes:     * NS (nuclear sclerosis), right [H25.11]    Procedure(s) (LRB):  EXTRACTION, CATARACT, WITH IOL INSERTION (Right)    Anesthesia: RN IV Sedation    Operative Findings:     Estimated Blood Loss: * No values recorded between 4/29/2025 12:00 AM and 4/29/2025  2:27 PM *         Specimens:   Specimen (24h ago, onward)      None            * No specimens in log *        Discharge Note    OUTCOME: Patient tolerated treatment/procedure well without complication and is now ready for discharge.    DISPOSITION: Home or Self Care    FINAL DIAGNOSIS:  Nuclear sclerotic cataract of right eye    FOLLOWUP: In clinic    DISCHARGE INSTRUCTIONS:    Discharge Procedure Orders   Other restrictions (specify):   Order Comments: No heavy lifting or bending for 1 week.

## 2025-04-29 NOTE — DISCHARGE INSTRUCTIONS
WHEN YOU ARRIVE HOME FROM SURGERY:  - Resume using your PMB COMBO drops; one (1) drop twice in operative eye (Afternoon and Nighttime).  PLEASE NOTE:  - Keep your eye shield on for the remainder of the day. You may pull it down to instill drops. This will help prevent inflammation and allow the eye to rest.  - You may take over the counter pain medication such as Tylenol or Ibuprofen as directed, if needed for pain.  - Continue ALL normally prescribed eye drops and artificial tears (as needed)    1 DAY POST OPERATIVE APPOINTMENT:  Date: _________________________/ Time: __________________________  Location: [facility]- [address], [place] [address]  The day after surgey, please resume PMB COMBO drops in the operative eye three (3) times a day (morning, noon, and night).    RESTRICTIONS FOR SEVEN (7) DAYS FOLLOWING SURGERY:  - DO NOT lift anything over 10 pounds.  - DO NOT bend at the waist, only at the knees (keep head in upright position).  - DO NOT rub your eye.  - DO NOT get any water into the eye.  - DO NOT wear any makeup, lotions, or creams on/around the eye.  - Wear the protective eye shield you were given after surgery anytime you go to sleep. You may remove the shield while awake.  - Wear sunglasses when outdoors to protect eyes from UV rays.    **If you experience severe pain, loss of vision, sudden onset of flashes and/or floaters. IMMEDIATELY CALL Dr. Slater Office: [phone], AFTER HOUR [phone] OR proceed to the EMERGENCY ROOM.  DONT FORGET! ORDER YOUR DROP REFILL IF YOURE RUNNING LOW OR HAVING A SECOND EYE SURGERY SOON!!   [facility] PHONE #[phone]

## 2025-04-30 ENCOUNTER — OFFICE VISIT (OUTPATIENT)
Dept: OPHTHALMOLOGY | Facility: CLINIC | Age: 65
End: 2025-04-30
Payer: MEDICARE

## 2025-04-30 DIAGNOSIS — Z98.890 POST-OPERATIVE STATE: Primary | ICD-10-CM

## 2025-04-30 PROCEDURE — 1160F RVW MEDS BY RX/DR IN RCRD: CPT | Mod: CPTII,S$GLB,, | Performed by: OPHTHALMOLOGY

## 2025-04-30 PROCEDURE — 1159F MED LIST DOCD IN RCRD: CPT | Mod: CPTII,S$GLB,, | Performed by: OPHTHALMOLOGY

## 2025-04-30 PROCEDURE — 99999 PR PBB SHADOW E&M-EST. PATIENT-LVL III: CPT | Mod: PBBFAC,,, | Performed by: OPHTHALMOLOGY

## 2025-04-30 PROCEDURE — 99024 POSTOP FOLLOW-UP VISIT: CPT | Mod: S$GLB,,, | Performed by: OPHTHALMOLOGY

## 2025-04-30 PROCEDURE — 1101F PT FALLS ASSESS-DOCD LE1/YR: CPT | Mod: CPTII,S$GLB,, | Performed by: OPHTHALMOLOGY

## 2025-04-30 PROCEDURE — 3288F FALL RISK ASSESSMENT DOCD: CPT | Mod: CPTII,S$GLB,, | Performed by: OPHTHALMOLOGY

## 2025-04-30 PROCEDURE — 4010F ACE/ARB THERAPY RXD/TAKEN: CPT | Mod: CPTII,S$GLB,, | Performed by: OPHTHALMOLOGY

## 2025-04-30 PROCEDURE — 1126F AMNT PAIN NOTED NONE PRSNT: CPT | Mod: CPTII,S$GLB,, | Performed by: OPHTHALMOLOGY

## 2025-04-30 NOTE — PROGRESS NOTES
Subjective:       Patient ID: Efe Horowitz is a 65 y.o. female.    Chief Complaint: Post-op Evaluation    HPI    DLS: 4/07/2025    Pt here for 1 day post phaco w/IOL OD- 4/29/2025  Pt states no eye pain or discomfort and vision seems to be doing okay.     Meds:  PMB TID OD      Last edited by Tran Byrd MA on 4/30/2025  9:47 AM.             Assessment:       1. Post-operative state        Plan:       S/p CE OD- Doing well.        CPM OD.  RTC 1 wk.

## 2025-05-06 ENCOUNTER — TELEPHONE (OUTPATIENT)
Dept: INTERNAL MEDICINE | Facility: CLINIC | Age: 65
End: 2025-05-06
Payer: MEDICARE

## 2025-05-07 ENCOUNTER — TELEPHONE (OUTPATIENT)
Dept: OPHTHALMOLOGY | Facility: CLINIC | Age: 65
End: 2025-05-07
Payer: MEDICARE

## 2025-05-08 DIAGNOSIS — M79.7 FIBROMYALGIA: Primary | ICD-10-CM

## 2025-05-09 ENCOUNTER — OFFICE VISIT (OUTPATIENT)
Dept: OPHTHALMOLOGY | Facility: CLINIC | Age: 65
End: 2025-05-09
Payer: MEDICARE

## 2025-05-09 DIAGNOSIS — M79.7 FIBROMYALGIA: ICD-10-CM

## 2025-05-09 DIAGNOSIS — Z98.890 POST-OPERATIVE STATE: Primary | ICD-10-CM

## 2025-05-09 DIAGNOSIS — H25.12 NS (NUCLEAR SCLEROSIS), LEFT: ICD-10-CM

## 2025-05-09 PROCEDURE — 99999 PR PBB SHADOW E&M-EST. PATIENT-LVL I: CPT | Mod: PBBFAC,HCNC,, | Performed by: OPHTHALMOLOGY

## 2025-05-09 RX ORDER — MILNACIPRAN HYDROCHLORIDE 100 MG/1
1 TABLET, FILM COATED ORAL DAILY
Qty: 90 TABLET | Refills: 3 | Status: SHIPPED | OUTPATIENT
Start: 2025-05-09

## 2025-05-09 NOTE — PROGRESS NOTES
Subjective:       Patient ID: Efe Horowitz is a 65 y.o. female.    Chief Complaint: Post-op Evaluation    HPI    Here for 1 week S/p Phaco w/IOL OD 04-29-25    Eye meds: PMB OD TID    65 year old female states she is happy with very clear vision OD. Denies   ocular pain. Looking forward to having PCIOL OS  Last edited by Nae Gonzalez on 5/9/2025  9:16 AM.             Assessment:       1. Post-operative state    2. NS (nuclear sclerosis), left        Plan:       S/p CE OD- Doing well.    Visually significant cataract OS -Pt. Wants Sx.        CPM OD.  Cataract Surgery Consent: Patient with a visually significant cataract with difficulties of ADLs, reading, driving, night vision, glare (any and all).  Discussed with Patient/Family/Caregiver: options, risks and benefits, expectations of cataract surgery, utilized an eye model with questions and answers to facilitate discussion.  Discussed lens options and patient understands that glasses may be required for optimal vision for distance and/or near vision after cataract surgery.  The Patient/Family/Caregiver  voice good understanding and patient wishes to proceed with surgery.  The patient will likely benefit from surgery and patient signed consent for Left Eye.  CE OS 5/13/25.

## 2025-05-10 DIAGNOSIS — J30.1 SEASONAL ALLERGIC RHINITIS DUE TO POLLEN: ICD-10-CM

## 2025-05-11 NOTE — H&P
"[facility]  History & Physical    Subjective:      Chief Complaint/Reason for Admission:     Efe Horowitz is a 65 y.o. female.    Past Medical History:   Diagnosis Date    Acid reflux     Adrenal insufficiency, primary     Arthritis     Asthma     B12 deficiency anemia     Benign essential hypertension 02/07/2021    Blood transfusion 2010    Breast cancer 02/2016    Right breast infiltrating ductal CA    Cataract     Chronic pain     Deep vein thrombosis     Dry eyes     Esophagitis, unspecified     Fibromyalgia     General anesthetics causing adverse effect in therapeutic use     "slow to wake up bc I don't have an immune system    Heart murmur     History of colon polyps     Hyperlipidemia     Hypopituitary dwarfism     Iron deficiency anemia     Lupus     Migraines, neuralgic     Nonspecific ulcerative colitis     OP (osteoporosis)     history of reclast    Osteopenia     Pancreatic cyst     Schatzki's ring     Steroid sulfatase deficiency     Ulcerative colitis     Vitamin D deficiency disease      Past Surgical History:   Procedure Laterality Date    ANORECTAL MANOMETRY N/A 01/13/2022    Procedure: MANOMETRY, ANORECTAL;  Surgeon: First Daniella Gastelum;  Location: [facility] (4TH FLR);  Service: Endoscopy;  Laterality: N/A;  new order placed; original order placed incorrectly  1/7 instructions handed to patient-st    APPENDECTOMY      BREAST BIOPSY Right 02/2016    IDC    BREAST RECONSTRUCTION      BREAST SURGERY      CATARACT EXTRACTION W/  INTRAOCULAR LENS IMPLANT Right 4/29/2025    Procedure: EXTRACTION, CATARACT, WITH IOL INSERTION;  Surgeon: Bharath Lopez MD;  Location: [facility];  Service: Ophthalmology;  Laterality: Right;    CHOLECYSTECTOMY      COLON SURGERY      ENDOSCOPIC ULTRASOUND OF UPPER GASTROINTESTINAL TRACT N/A 08/06/2018    Procedure: ULTRASOUND, ENDOSCOPIC, UPPER GI TRACT;  Surgeon: Hong Finn MD;  Location: [facility] (2ND FLR);  " Service: Endoscopy;  Laterality: N/A;    ESOPHAGOGASTRODUODENOSCOPY N/A 12/10/2018    Procedure: EGD (ESOPHAGOGASTRODUODENOSCOPY);  Surgeon: Reese Villalta MD;  Location: [facility] (4TH FLR);  Service: Endoscopy;  Laterality: N/A;    ESOPHAGOGASTRODUODENOSCOPY N/A 12/30/2021    Procedure: EGD (ESOPHAGOGASTRODUODENOSCOPY);  Surgeon: Reese Villalta MD;  Location: [facility] (2ND FLR);  Service: Endoscopy;  Laterality: N/A;  EGD for esophageal dysphagia and early satiety please schedule 1st provider available. wants this date-Dr Villalta only     fully vaccinated-GT  hx of adrenal insuffiency   12/27 arrival time confirmed with pt-rb    ESOPHAGOGASTRODUODENOSCOPY N/A 09/09/2022    Procedure: EGD (ESOPHAGOGASTRODUODENOSCOPY);  Surgeon: Reese Villalta MD;  Location: [facility] (4TH FLR);  Service: Endoscopy;  Laterality: N/A;  vacc  inst handed to pt-LW    ESOPHAGOGASTRODUODENOSCOPY N/A 03/14/2024    Procedure: EGD (ESOPHAGOGASTRODUODENOSCOPY);  Surgeon: Reese Villalta MD;  Location: [facility] (2ND FLR);  Service: Endoscopy;  Laterality: N/A;  prep instructions sent to pt via portal/ lan pt/ gastroparesis prep  3/8-lvm for precall-MS  3/11-precall complete-KPvt    FLEXIBLE SIGMOIDOSCOPY N/A 12/10/2018    Procedure: SIGMOIDOSCOPY, FLEXIBLE;  Surgeon: Reese Villalta MD;  Location: [facility] (4TH FLR);  Service: Endoscopy;  Laterality: N/A;  Recommend full split bowel prep for this study just like for a colonoscopy    HYSTERECTOMY      ILEOSCOPY N/A 02/16/2022    Procedure: ILEOSCOPY;  Surgeon: Ceferino Rangel MD;  Location: [facility] (4TH FLR);  Service: Endoscopy;  Laterality: N/A;  medical history significant for Ulcerative colitis s/p total colectomy with ileoanal anastomosis (1421-9321), SLE on plaquenil, adrenal insufficiency on prednisone, breast cancer, and HTN who presents with 3 month history of bilateral lower abdominal pain. Patient with abd    MASTECTOMY      OOPHORECTOMY Bilateral      restorative proctectomy      total abdominal colectomy with ileostomy  2010    TOTAL COLECTOMY      TOTAL REDUCTION MAMMOPLASTY Left 2017    TRANSFORAMINAL EPIDURAL INJECTION OF STEROID Left 08/06/2024    Procedure: LUMBAR TRANSFORAMINAL LEFT L4/5 AND L5/S1 DIRECT REFERRAL;  Surgeon: Azucena Muniz MD;  Location: [facility];  Service: Pain Management;  Laterality: Left;  [phone]    UPPER ENDOSCOPIC ULTRASOUND W/ FNA       Social History[1]    No medications prior to admission.     Review of patient's allergies indicates:   Allergen Reactions    Asacol [mesalamine] Hallucinations    Aspirin      hemorrhage    Hydrocortisone Nausea Only     Feels sick    Lactose intolerance (lactase) [lactase] Nausea And Vomiting    Vicodin [hydrocodone-acetaminophen]      hyperactivity        Review of Systems   Eyes:  Positive for blurred vision.   All other systems reviewed and are negative.        Objective:      Vital Signs (Most Recent)       Vital Signs Range (Last 24H):  BP: ()/()   Arterial Line BP: ()/()     Physical Exam  Constitutional:       Appearance: She is well-developed.   HENT:      Head: Normocephalic.   Eyes:      Conjunctiva/sclera: Conjunctivae normal.      Pupils: Pupils are equal, round, and reactive to light.   Cardiovascular:      Rate and Rhythm: Normal rate and regular rhythm.      Heart sounds: Normal heart sounds.   Pulmonary:      Effort: Pulmonary effort is normal.      Breath sounds: Normal breath sounds.   Abdominal:      General: Bowel sounds are normal.      Palpations: Abdomen is soft.   Musculoskeletal:         General: Normal range of motion.      Cervical back: Normal range of motion and neck supple.   Skin:     General: Skin is warm.   Neurological:      Mental Status: She is alert and oriented to person, place, and time.         ASA Score: II    Mallampati Score: II    Plan for Sedation: Moderate    Patient or Family History of Anesthesia problems : No    Any changes affecting the  anesthesia assessment which may have changed since the initial assessment and H and P:  No       Data Review:    ECG:     Assessment:      Cataract OS.    Plan:    CE OS.         [1]   Social History  Tobacco Use    Smoking status: Never    Smokeless tobacco: Never   Substance Use Topics    Alcohol use: No     Alcohol/week: 0.0 standard drinks of alcohol    Drug use: No

## 2025-05-12 ENCOUNTER — TELEPHONE (OUTPATIENT)
Dept: GASTROENTEROLOGY | Facility: CLINIC | Age: 65
End: 2025-05-12
Payer: MEDICARE

## 2025-05-12 ENCOUNTER — PATIENT MESSAGE (OUTPATIENT)
Dept: CARDIOLOGY | Facility: CLINIC | Age: 65
End: 2025-05-12
Payer: MEDICARE

## 2025-05-12 RX ORDER — DULOXETIN HYDROCHLORIDE 20 MG/1
CAPSULE, DELAYED RELEASE ORAL
Refills: 0 | OUTPATIENT
Start: 2025-05-12

## 2025-05-12 RX ORDER — AZELASTINE 1 MG/ML
SPRAY, METERED NASAL
Qty: 90 ML | Refills: 4 | Status: SHIPPED | OUTPATIENT
Start: 2025-05-12

## 2025-05-12 NOTE — TELEPHONE ENCOUNTER
Spoke with Efe:  Upcoming pouchoscopy 5/19/25  States Miralax doesn't work for her  In the past, she's taken 2 Dulcolax tabs at 5pm and drinks a bottle of Mg+Citrate at 6:30pm  She states this has always worked well for her    Dr. Rangel will be updated to determine if this will suffice for her prep.

## 2025-05-12 NOTE — TELEPHONE ENCOUNTER
Note sent to primary doctor to manage the fibromyalgia.  Primary has been prescribing Savella for this patient.

## 2025-05-12 NOTE — TELEPHONE ENCOUNTER
----- Message from Jessica sent at 5/12/2025 11:26 AM CDT -----  Regarding: Call needed  Contact: [phone]  Hi,Who called: The pt Reason:Called to request a call from the nurse to get help with locating her Prep instructions. Pls call the pt at [phone] to discuss.Provider's name: Thank you. Additional Information: Thank you.

## 2025-05-12 NOTE — TELEPHONE ENCOUNTER
Instructions sent via portal have been accessed by Efe sy at 11:19am. LVM to return call if she has any further questions/concerns, ext [phone]

## 2025-05-12 NOTE — PRE-PROCEDURE INSTRUCTIONS
5/12/25 @ 1008: Pt returned RN's call. Pre-op call conducted. Pt will be accompanied by her father Agustin.    Unable to reach pt via phone. Left message with instructions along with a request for a call back. In the message, RN also stated that pt will need to be accompanied by a responsible adult on day of surgery. The following was sent to pt portal.    Dear Efe ,     Below you will find basic pre-procedure instructions in preparation for your procedure on 5/13/25 with Dr. Lopez     You should receive your arrival time within 24-48 hours of your scheduled procedure date from the  at your surgeon's office.     Nothing to eat after midnight the night before your surgery. STOP drinking clear liquids 2 hours prior to your arrival time. Anesthesia encourages this to ensure that you are adequately hydrated. Clear liquids includes water, clear juices, Gatorade, black coffee (no milk, no cream), or soda.  Clear liquids do NOT include anything with pulp or food particles (chicken broth, ice cream, yogurt, Jello, etc.)      - HOLD all oral Diabetic medications night before and morning of procedure  - HOLD all Insulin morning of procedure  - HOLD all Fluid pills morning of procedure  - HOLD all non-insulin shots until after surgery (Ozempic, Mounjaro, Trulicity, Victoza, Byetta, Wegovy and Adlyxin) (7 days prior)  - HOLD all vitamins, minerals and herbal supplements morning of procedure   - TAKE all B/P meds, EXCEPT those that contain a fluid pill (ex. Lasix, Hydroclorothiazide/HCTZ, Spirnolactone)  - USE inhalers as needed and bring AM of surgery  - USE EYE DROPS as directed  -TAKE blood thinner meds AM of surgery unless otherwise instructed by your provider to not take     - Shower and wash face with antibacterial soap (ex. Dial) for 3 mins PM prior and AM of surgery  - No powder, lotions, creams, oils, gels, ointments, makeup,  or jewelry    - Wear comfortable clothing (button up shirt)     (Patient is  required to have a responsible ride to transport home, ride may not leave while patient is in surgery)     -- [facility], 2nd floor Surgery Center, located   @ [address] [address]  2nd Floor Registration        If you have any questions or concerns please feel free to contact your surgeon's office.     In the event that you are running late or need to reschedule on the day of your procedure, please contact the pre-op desk at [phone].

## 2025-05-13 ENCOUNTER — HOSPITAL ENCOUNTER (OUTPATIENT)
Facility: HOSPITAL | Age: 65
Discharge: HOME | End: 2025-05-13
Attending: OPHTHALMOLOGY | Admitting: OPHTHALMOLOGY
Payer: MEDICARE

## 2025-05-13 VITALS
OXYGEN SATURATION: 100 % | RESPIRATION RATE: 18 BRPM | DIASTOLIC BLOOD PRESSURE: 62 MMHG | SYSTOLIC BLOOD PRESSURE: 123 MMHG | HEART RATE: 57 BPM | TEMPERATURE: 97 F

## 2025-05-13 DIAGNOSIS — H25.12 NUCLEAR SCLEROTIC CATARACT OF LEFT EYE: Primary | ICD-10-CM

## 2025-05-13 PROCEDURE — 25000003 PHARM REV CODE 250: Performed by: OPHTHALMOLOGY

## 2025-05-13 PROCEDURE — 36000706: Performed by: OPHTHALMOLOGY

## 2025-05-13 PROCEDURE — 99900035 HC TECH TIME PER 15 MIN (STAT)

## 2025-05-13 PROCEDURE — 94761 N-INVAS EAR/PLS OXIMETRY MLT: CPT

## 2025-05-13 PROCEDURE — 36000707: Performed by: OPHTHALMOLOGY

## 2025-05-13 PROCEDURE — 99153 MOD SED SAME PHYS/QHP EA: CPT | Performed by: OPHTHALMOLOGY

## 2025-05-13 PROCEDURE — 63600175 PHARM REV CODE 636 W HCPCS: Performed by: OPHTHALMOLOGY

## 2025-05-13 PROCEDURE — 66984 XCAPSL CTRC RMVL W/O ECP: CPT | Mod: 79,HCNC,LT, | Performed by: OPHTHALMOLOGY

## 2025-05-13 PROCEDURE — 99152 MOD SED SAME PHYS/QHP 5/>YRS: CPT | Performed by: OPHTHALMOLOGY

## 2025-05-13 PROCEDURE — V2632 POST CHMBR INTRAOCULAR LENS: HCPCS | Performed by: OPHTHALMOLOGY

## 2025-05-13 PROCEDURE — 71000015 HC POSTOP RECOV 1ST HR: Performed by: OPHTHALMOLOGY

## 2025-05-13 PROCEDURE — 27201423 OPTIME MED/SURG SUP & DEVICES STERILE SUPPLY: Performed by: OPHTHALMOLOGY

## 2025-05-13 DEVICE — CLAREON ASPHERIC HYDROPHOBIC ACRYLIC IOL WITH THE AUTONOMEAUTOMATED PRE-LOADED DELIVERY SYSTEM
Type: IMPLANTABLE DEVICE | Site: EYE | Status: FUNCTIONAL
Brand: CLAREON™

## 2025-05-13 RX ORDER — MOXIFLOXACIN 5 MG/ML
1 SOLUTION/ DROPS OPHTHALMIC EVERY 5 MIN PRN
Status: COMPLETED | OUTPATIENT
Start: 2025-05-13 | End: 2025-05-13

## 2025-05-13 RX ORDER — EPINEPHRINE 1 MG/ML
INJECTION, SOLUTION, CONCENTRATE INTRAVENOUS
Status: DISCONTINUED | OUTPATIENT
Start: 2025-05-13 | End: 2025-05-13 | Stop reason: HOSPADM

## 2025-05-13 RX ORDER — LIDOCAINE HYDROCHLORIDE 40 MG/ML
INJECTION, SOLUTION RETROBULBAR
Status: DISCONTINUED | OUTPATIENT
Start: 2025-05-13 | End: 2025-05-13 | Stop reason: HOSPADM

## 2025-05-13 RX ORDER — TETRACAINE HYDROCHLORIDE 5 MG/ML
1 SOLUTION OPHTHALMIC
Status: DISCONTINUED | OUTPATIENT
Start: 2025-05-13 | End: 2025-05-13

## 2025-05-13 RX ORDER — PREDNISOLONE ACETATE 10 MG/ML
SUSPENSION/ DROPS OPHTHALMIC
Status: DISCONTINUED | OUTPATIENT
Start: 2025-05-13 | End: 2025-05-13 | Stop reason: HOSPADM

## 2025-05-13 RX ORDER — PROPARACAINE HYDROCHLORIDE 5 MG/ML
SOLUTION/ DROPS OPHTHALMIC
Status: DISCONTINUED | OUTPATIENT
Start: 2025-05-13 | End: 2025-05-13 | Stop reason: HOSPADM

## 2025-05-13 RX ORDER — FENTANYL CITRATE 50 UG/ML
25 INJECTION, SOLUTION INTRAMUSCULAR; INTRAVENOUS
Status: DISCONTINUED | OUTPATIENT
Start: 2025-05-13 | End: 2025-05-13 | Stop reason: HOSPADM

## 2025-05-13 RX ORDER — CYCLOP/TROP/PROPA/PHEN/KET/WAT 1-1-0.1%
1 DROPS (EA) OPHTHALMIC (EYE)
Status: COMPLETED | OUTPATIENT
Start: 2025-05-13 | End: 2025-05-13

## 2025-05-13 RX ORDER — MIDAZOLAM HYDROCHLORIDE 1 MG/ML
1 INJECTION, SOLUTION INTRAMUSCULAR; INTRAVENOUS
Status: DISCONTINUED | OUTPATIENT
Start: 2025-05-13 | End: 2025-05-13 | Stop reason: HOSPADM

## 2025-05-13 RX ORDER — HYDROCODONE BITARTRATE AND ACETAMINOPHEN 5; 325 MG/1; MG/1
1 TABLET ORAL EVERY 4 HOURS PRN
Status: DISCONTINUED | OUTPATIENT
Start: 2025-05-13 | End: 2025-05-13 | Stop reason: HOSPADM

## 2025-05-13 RX ORDER — SODIUM CHLORIDE 9 MG/ML
INJECTION, SOLUTION INTRAVENOUS CONTINUOUS
Status: DISCONTINUED | OUTPATIENT
Start: 2025-05-13 | End: 2025-05-13 | Stop reason: HOSPADM

## 2025-05-13 RX ORDER — ACETAMINOPHEN 325 MG/1
650 TABLET ORAL EVERY 4 HOURS PRN
Status: DISCONTINUED | OUTPATIENT
Start: 2025-05-13 | End: 2025-05-13 | Stop reason: HOSPADM

## 2025-05-13 RX ORDER — MOXIFLOXACIN 5 MG/ML
SOLUTION/ DROPS OPHTHALMIC
Status: DISCONTINUED | OUTPATIENT
Start: 2025-05-13 | End: 2025-05-13 | Stop reason: HOSPADM

## 2025-05-13 RX ADMIN — MOXIFLOXACIN OPHTHALMIC 1 DROP: 5 SOLUTION/ DROPS OPHTHALMIC at 12:05

## 2025-05-13 RX ADMIN — FENTANYL CITRATE 25 MCG: 50 INJECTION, SOLUTION INTRAMUSCULAR; INTRAVENOUS at 01:05

## 2025-05-13 RX ADMIN — Medication 1 DROP: at 12:05

## 2025-05-13 RX ADMIN — MIDAZOLAM HYDROCHLORIDE 1 MG: 1 INJECTION, SOLUTION INTRAMUSCULAR; INTRAVENOUS at 01:05

## 2025-05-13 NOTE — OP NOTE
Operative Date:  05/13/2025    Discharge Date:  05/13/2025    Discharge Patient Home    Report Title: Operative Note      SURGEON: Bharath Lopez MD     ASSISTANT:     PREOPERATIVE DIAGNOSIS: Visually significant NSC cataract,  Left Eye.    POSTOPERATIVE DIAGNOSIS: Visually-significant NSC cataract,  Left Eye.    PROCEDURE PERFORMED: Phacoemulsification of the cataract with posterior chamber intraocular lens Left Eye.    ANESTHESIA:  Moderate Sedation with Local    The team confirmed the patient ID and re-evaluated the patient and sedation plan confirming it is suitable for the patient's condition and procedure prior to administering sedation.    COMPLICATIONS: None    ESTIMATED BLOOD LOSS: Minimal    INDICATIONS FOR PROCEDURE:   The patient is a pleasant 65 year old woman with increasing difficulties with activities of daily living secondary to a dense visually significant cataract in the Left Eye.  Discussions have been carried out with this patient concerning the options to surgery, risks, benefits and expectations.  The patient voiced good understanding and wished to proceed with the above procedure.    PROCEDURE IN DETAIL: The patient was brought to the operating room and received topical anesthetic to the eye and then was prepped and draped in the usual sterile fashion.  Using the Anaya ring and the guarded milady blade set at 0.37 mm, a partial thickness clear cornea incision was made temporally.  The paracentesis site was made at the six o'clock position.  Omidria was injected into the anterior chamber through the paracentesis.  Viscoat was then injected into the anterior chamber.  The eye was then reentered at the primary surgical site with a 2.4 mm keratome followed by continuous capsulotomy, hydrodissection, hydrodelineation and phacoemulsification of the cataract.  Residual cortical material was removed using automated irrigation-aspiration technique.  Healon was injected into the posterior  chamber and a CNAOTO 20.0 diopter lens was placed in the bag without difficulty. Residual viscoelastic was removed using automated irrigation-aspiration technique. The eye was re-pressurized using BSS solution and both the paracentesis site and the primary surgical site were demonstrated to be watertight at the end of the case with Weck--Saundra manipulation.  One drop of Ofloxacin and one drop of Pred acetate 1% was applied to the Left Eye .The eye was closed, patched and a Garcia shield placed.  The patient was taken to the recovery room in good and stable condition.  The patient tolerated the procedure well.  The patient was instructed to refrain from any heavy lifting, bending, stooping or straining activities, discharged home  and to follow-up in the morning for routine postoperative care with Bharath Lopez MD.

## 2025-05-13 NOTE — BRIEF OP NOTE
[facility]  Brief Operative Note    Surgery Date: 5/13/2025     Surgeons and Role:     * Bharath Lopze MD - Primary    Assisting Surgeon: None    Pre-op Diagnosis:  NS (nuclear sclerosis), left [H25.12]    Post-op Diagnosis:  Post-Op Diagnosis Codes:     * NS (nuclear sclerosis), left [H25.12]    Procedure(s) (LRB):  EXTRACTION, CATARACT, WITH IOL INSERTION (Left)    Anesthesia: RN IV Sedation    Operative Findings:     Estimated Blood Loss: * No values recorded between 5/13/2025 12:00 AM and 5/13/2025  1:36 PM *         Specimens:   Specimen (24h ago, onward)      None            * No specimens in log *        Discharge Note    OUTCOME: Patient tolerated treatment/procedure well without complication and is now ready for discharge.    DISPOSITION: Home or Self Care    FINAL DIAGNOSIS:  Nuclear sclerotic cataract of left eye    FOLLOWUP: In clinic    DISCHARGE INSTRUCTIONS:    Discharge Procedure Orders   Other restrictions (specify):   Order Comments: No heavy lifting or bending for 1 week.

## 2025-05-14 ENCOUNTER — OFFICE VISIT (OUTPATIENT)
Dept: OPHTHALMOLOGY | Facility: CLINIC | Age: 65
End: 2025-05-14
Payer: MEDICARE

## 2025-05-14 DIAGNOSIS — Z98.890 POST-OPERATIVE STATE: Primary | ICD-10-CM

## 2025-05-14 PROCEDURE — 1159F MED LIST DOCD IN RCRD: CPT | Mod: CPTII,S$GLB,, | Performed by: OPHTHALMOLOGY

## 2025-05-14 PROCEDURE — 99999 PR PBB SHADOW E&M-EST. PATIENT-LVL III: CPT | Mod: PBBFAC,,, | Performed by: OPHTHALMOLOGY

## 2025-05-14 PROCEDURE — 1126F AMNT PAIN NOTED NONE PRSNT: CPT | Mod: CPTII,S$GLB,, | Performed by: OPHTHALMOLOGY

## 2025-05-14 PROCEDURE — 1160F RVW MEDS BY RX/DR IN RCRD: CPT | Mod: CPTII,S$GLB,, | Performed by: OPHTHALMOLOGY

## 2025-05-14 PROCEDURE — 4010F ACE/ARB THERAPY RXD/TAKEN: CPT | Mod: CPTII,S$GLB,, | Performed by: OPHTHALMOLOGY

## 2025-05-14 PROCEDURE — 99024 POSTOP FOLLOW-UP VISIT: CPT | Mod: S$GLB,,, | Performed by: OPHTHALMOLOGY

## 2025-05-14 PROCEDURE — 3288F FALL RISK ASSESSMENT DOCD: CPT | Mod: CPTII,S$GLB,, | Performed by: OPHTHALMOLOGY

## 2025-05-14 PROCEDURE — 1101F PT FALLS ASSESS-DOCD LE1/YR: CPT | Mod: CPTII,S$GLB,, | Performed by: OPHTHALMOLOGY

## 2025-05-14 NOTE — PROGRESS NOTES
Subjective:       Patient ID: Efe Horowitz is a 65 y.o. female.    Chief Complaint: Post-op Evaluation    HPI    S/p phaco w/IOL OS 5/13/25    PMB TID OS    Pt here for 1 day post op OS.  Pt states doing well. Pt denies eye pain   OS.   Last edited by Leana Odell MA on 5/14/2025 10:04 AM.             Assessment:       1. Post-operative state        Plan:       S/p CE OU- Doing well.      CPM OU.  RTC 1 wk.

## 2025-05-18 ENCOUNTER — ANESTHESIA EVENT (OUTPATIENT)
Dept: ENDOSCOPY | Facility: HOSPITAL | Age: 65
End: 2025-05-18
Payer: MEDICARE

## 2025-05-19 ENCOUNTER — ANESTHESIA (OUTPATIENT)
Dept: ENDOSCOPY | Facility: HOSPITAL | Age: 65
End: 2025-05-19
Payer: MEDICARE

## 2025-05-19 ENCOUNTER — HOSPITAL ENCOUNTER (OUTPATIENT)
Facility: HOSPITAL | Age: 65
Discharge: HOME | End: 2025-05-19
Attending: INTERNAL MEDICINE | Admitting: INTERNAL MEDICINE
Payer: MEDICARE

## 2025-05-19 VITALS
BODY MASS INDEX: 20.4 KG/M2 | DIASTOLIC BLOOD PRESSURE: 56 MMHG | RESPIRATION RATE: 16 BRPM | SYSTOLIC BLOOD PRESSURE: 102 MMHG | OXYGEN SATURATION: 99 % | TEMPERATURE: 98 F | HEART RATE: 72 BPM | HEIGHT: 64 IN | WEIGHT: 119.5 LBS

## 2025-05-19 DIAGNOSIS — K51.918 ULCERATIVE COLITIS WITH OTHER COMPLICATION, UNSPECIFIED LOCATION: ICD-10-CM

## 2025-05-19 DIAGNOSIS — K52.9 INFLAMMATORY BOWEL DISEASES (IBD): ICD-10-CM

## 2025-05-19 PROCEDURE — 88305 TISSUE EXAM BY PATHOLOGIST: CPT | Mod: TC,91,HCNC | Performed by: INTERNAL MEDICINE

## 2025-05-19 PROCEDURE — 25000003 PHARM REV CODE 250: Mod: HCNC | Performed by: NURSE ANESTHETIST, CERTIFIED REGISTERED

## 2025-05-19 PROCEDURE — E9220 PRA ENDO ANESTHESIA: HCPCS | Mod: HCNC,,, | Performed by: NURSE ANESTHETIST, CERTIFIED REGISTERED

## 2025-05-19 PROCEDURE — 27201012 HC FORCEPS, HOT/COLD, DISP: Mod: HCNC | Performed by: INTERNAL MEDICINE

## 2025-05-19 PROCEDURE — 37000009 HC ANESTHESIA EA ADD 15 MINS: Mod: HCNC | Performed by: INTERNAL MEDICINE

## 2025-05-19 PROCEDURE — 63600175 PHARM REV CODE 636 W HCPCS: Mod: HCNC | Performed by: NURSE ANESTHETIST, CERTIFIED REGISTERED

## 2025-05-19 PROCEDURE — 37000008 HC ANESTHESIA 1ST 15 MINUTES: Mod: HCNC | Performed by: INTERNAL MEDICINE

## 2025-05-19 PROCEDURE — 44386 ENDOSCOPY BOWEL POUCH/BIOP: CPT | Mod: HCNC,,, | Performed by: INTERNAL MEDICINE

## 2025-05-19 PROCEDURE — 44386 ENDOSCOPY BOWEL POUCH/BIOP: CPT | Mod: HCNC | Performed by: INTERNAL MEDICINE

## 2025-05-19 RX ORDER — SODIUM CHLORIDE 9 MG/ML
INJECTION, SOLUTION INTRAVENOUS CONTINUOUS
Status: DISCONTINUED | OUTPATIENT
Start: 2025-05-19 | End: 2025-05-19 | Stop reason: HOSPADM

## 2025-05-19 RX ORDER — LIDOCAINE HYDROCHLORIDE 20 MG/ML
INJECTION INTRAVENOUS
Status: DISCONTINUED | OUTPATIENT
Start: 2025-05-19 | End: 2025-05-19

## 2025-05-19 RX ORDER — PROPOFOL 10 MG/ML
VIAL (ML) INTRAVENOUS
Status: DISCONTINUED | OUTPATIENT
Start: 2025-05-19 | End: 2025-05-19

## 2025-05-19 RX ORDER — DEXAMETHASONE SODIUM PHOSPHATE 4 MG/ML
INJECTION, SOLUTION INTRA-ARTICULAR; INTRALESIONAL; INTRAMUSCULAR; INTRAVENOUS; SOFT TISSUE
Status: DISCONTINUED | OUTPATIENT
Start: 2025-05-19 | End: 2025-05-19

## 2025-05-19 RX ADMIN — PROPOFOL 200 MCG/KG/MIN: 10 INJECTION, EMULSION INTRAVENOUS at 01:05

## 2025-05-19 RX ADMIN — PROPOFOL 60 MG: 10 INJECTION, EMULSION INTRAVENOUS at 01:05

## 2025-05-19 RX ADMIN — GLYCOPYRROLATE 0.2 MG: 0.2 INJECTION, SOLUTION INTRAMUSCULAR; INTRAVENOUS at 01:05

## 2025-05-19 RX ADMIN — LIDOCAINE HYDROCHLORIDE 50 MG: 20 INJECTION INTRAVENOUS at 01:05

## 2025-05-19 RX ADMIN — SODIUM CHLORIDE: 0.9 INJECTION, SOLUTION INTRAVENOUS at 01:05

## 2025-05-19 RX ADMIN — DEXAMETHASONE SODIUM PHOSPHATE 4 MG: 4 INJECTION, SOLUTION INTRAMUSCULAR; INTRAVENOUS at 01:05

## 2025-05-19 NOTE — ANESTHESIA POSTPROCEDURE EVALUATION
Anesthesia Post Evaluation    Patient: Efe Horowitz    Procedure(s) Performed: Procedure(s) (LRB):  ENDOSCOPY, POUCH, SMALL INTESTINE, DIAGNOSTIC (N/A)    Final Anesthesia Type: general      Patient location during evaluation: GI PACU  Patient participation: Yes- Able to Participate  Level of consciousness: awake and alert  Post-procedure vital signs: reviewed and stable  Pain management: adequate  Airway patency: patent  DAMON mitigation strategies: Multimodal analgesia  PONV status at discharge: No PONV  Anesthetic complications: no      Cardiovascular status: stable  Respiratory status: unassisted and spontaneous ventilation  Hydration status: euvolemic  Follow-up not needed.              Vitals Value Taken Time   /56 05/19/25 14:40   Temp  05/19/25 14:58   Pulse 72 05/19/25 14:40   Resp 16 05/19/25 14:40   SpO2 99 % 05/19/25 14:40         Event Time   Out of Recovery 14:44:14         Pain/Natalie Score: Natalie Score: 9 (5/19/2025  2:25 PM)

## 2025-05-19 NOTE — ANESTHESIA PREPROCEDURE EVALUATION
"                                                                                                             05/19/2025  Efe Horowitz is a 65 y.o., female.    Problem List[1]  Past Medical History:   Diagnosis Date    Acid reflux     Adrenal insufficiency, primary     Arthritis     Asthma     B12 deficiency anemia     Benign essential hypertension 02/07/2021    Blood transfusion 2010    Breast cancer 02/2016    Right breast infiltrating ductal CA    Cataract     Chronic pain     Deep vein thrombosis     Dry eyes     Esophagitis, unspecified     Fibromyalgia     General anesthetics causing adverse effect in therapeutic use     "slow to wake up bc I don't have an immune system    Heart murmur     History of colon polyps     Hyperlipidemia     Hypopituitary dwarfism     Iron deficiency anemia     Lupus     Migraines, neuralgic     Nonspecific ulcerative colitis     OP (osteoporosis)     history of reclast    Osteopenia     Pancreatic cyst     Schatzki's ring     Steroid sulfatase deficiency     Ulcerative colitis     Vitamin D deficiency disease      Past Surgical History:   Procedure Laterality Date    ANORECTAL MANOMETRY N/A 01/13/2022    Procedure: MANOMETRY, ANORECTAL;  Surgeon: First Daniella Gastelum;  Location: [facility] ([address] [place] [address]);  Service: Endoscopy;  Laterality: N/A;  new order placed; original order placed incorrectly  1/7 instructions handed to patient-st    APPENDECTOMY      BREAST BIOPSY Right 02/2016    IDC    BREAST RECONSTRUCTION      BREAST SURGERY      CATARACT EXTRACTION W/  INTRAOCULAR LENS IMPLANT Right 4/29/2025    Procedure: EXTRACTION, CATARACT, WITH IOL INSERTION;  Surgeon: Bharath Lopez MD;  Location: [facility] OR;  Service: Ophthalmology;  Laterality: Right;    CATARACT EXTRACTION W/  INTRAOCULAR LENS IMPLANT Left 5/13/2025    Procedure: EXTRACTION, CATARACT, WITH IOL INSERTION;  Surgeon: Bharath Lopez MD;  Location: [facility] OR;  Service: Ophthalmology;  Laterality: " Left;    CHOLECYSTECTOMY      COLON SURGERY      ENDOSCOPIC ULTRASOUND OF UPPER GASTROINTESTINAL TRACT N/A 08/06/2018    Procedure: ULTRASOUND, ENDOSCOPIC, UPPER GI TRACT;  Surgeon: Hong Finn MD;  Location: [facility] (2ND FLR);  Service: Endoscopy;  Laterality: N/A;    ESOPHAGOGASTRODUODENOSCOPY N/A 12/10/2018    Procedure: EGD (ESOPHAGOGASTRODUODENOSCOPY);  Surgeon: Reese Villalta MD;  Location: [facility] (4TH FLR);  Service: Endoscopy;  Laterality: N/A;    ESOPHAGOGASTRODUODENOSCOPY N/A 12/30/2021    Procedure: EGD (ESOPHAGOGASTRODUODENOSCOPY);  Surgeon: Reese Villalta MD;  Location: [facility] (2ND FLR);  Service: Endoscopy;  Laterality: N/A;  EGD for esophageal dysphagia and early satiety please schedule 1st provider available. wants this date-Dr Villalta only     fully vaccinated-GT  hx of adrenal insuffiency   12/27 arrival time confirmed with pt-rb    ESOPHAGOGASTRODUODENOSCOPY N/A 09/09/2022    Procedure: EGD (ESOPHAGOGASTRODUODENOSCOPY);  Surgeon: Reese Villalta MD;  Location: [facility] (4TH FLR);  Service: Endoscopy;  Laterality: N/A;  vacc  inst handed to pt-LW    ESOPHAGOGASTRODUODENOSCOPY N/A 03/14/2024    Procedure: EGD (ESOPHAGOGASTRODUODENOSCOPY);  Surgeon: Reese Villalta MD;  Location: [facility] (2ND FLR);  Service: Endoscopy;  Laterality: N/A;  prep instructions sent to pt via portal/ lan pt/ gastroparesis prep  3/8-lvm for precall-MS  3/11-precall complete-KPvt    FLEXIBLE SIGMOIDOSCOPY N/A 12/10/2018    Procedure: SIGMOIDOSCOPY, FLEXIBLE;  Surgeon: Reese Villalta MD;  Location: [facility] (4TH FLR);  Service: Endoscopy;  Laterality: N/A;  Recommend full split bowel prep for this study just like for a colonoscopy    HYSTERECTOMY      ILEOSCOPY N/A 02/16/2022    Procedure: ILEOSCOPY;  Surgeon: Ceferino Rangel MD;  Location: [facility] (4TH FLR);  Service: Endoscopy;  Laterality: N/A;  medical history significant for Ulcerative colitis s/p total colectomy with  ileoanal anastomosis (4226-7139), SLE on plaquenil, adrenal insufficiency on prednisone, breast cancer, and HTN who presents with 3 month history of bilateral lower abdominal pain. Patient with abd    MASTECTOMY      OOPHORECTOMY Bilateral     restorative proctectomy      total abdominal colectomy with ileostomy  2010    TOTAL COLECTOMY      TOTAL REDUCTION MAMMOPLASTY Left 2017    TRANSFORAMINAL EPIDURAL INJECTION OF STEROID Left 08/06/2024    Procedure: LUMBAR TRANSFORAMINAL LEFT L4/5 AND L5/S1 DIRECT REFERRAL;  Surgeon: Azucena Muniz MD;  Location: [facility] PAIN T;  Service: Pain Management;  Laterality: Left;  [phone]    UPPER ENDOSCOPIC ULTRASOUND W/ FNA         Pre-op Assessment    I have reviewed the Patient Summary Reports.    I have reviewed the NPO Status.   I have reviewed the Medications.     Review of Systems  Anesthesia Hx:  No problems with previous Anesthesia                Social:  Non-Smoker, Social Alcohol Use       Hematology/Oncology:  Hematology Normal   Oncology Normal                                   EENT/Dental:  EENT/Dental Normal           Cardiovascular:     Hypertension                                          Pulmonary:    Asthma                    Hepatic/GI:   PUD,  GERD                Musculoskeletal:  Musculoskeletal Normal                Neurological:  Neurology Normal                                      Endocrine:    Hyperthyroidism         Dermatological:  Skin Normal    Psych:  Psychiatric Normal                    Physical Exam  General: Alert, Oriented, Cooperative and Well nourished    Airway:  Mallampati: II   Mouth Opening: Normal  TM Distance: Normal  Tongue: Normal  Neck ROM: Normal ROM    Dental:  Intact        Anesthesia Plan  Type of Anesthesia, risks & benefits discussed:    Anesthesia Type: Gen Natural Airway  Intra-op Monitoring Plan: Standard ASA Monitors  Post Op Pain Control Plan: multimodal analgesia  Induction:  IV  Informed Consent: Informed consent  signed with the Patient and all parties understand the risks and agree with anesthesia plan.  All questions answered.   ASA Score: 2  Day of Surgery Review of History & Physical: H&P Update referred to the surgeon/provider.    Ready For Surgery From Anesthesia Perspective.     .           [1]   Patient Active Problem List  Diagnosis    Vitamin D deficiency disease    Vitamin B12 deficiency    Mixed hyperlipidemia    Lumbar and sacral spondylarthritis    Migraine syndrome    Fatigue    Secondary adrenal insufficiency    Ulcerative colitis    S/P total colectomy    GERD (gastroesophageal reflux disease)    Hypokalemia    Myalgia and myositis    Shoulder pain    Long term current use of systemic steroids    Fibromyalgia    Lupus (systemic lupus erythematosus)    Bilateral low back pain without sciatica    Proteinuria    Immunosuppression due to drug therapy    Malignant neoplasm of upper-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    Exocrine pancreatic insufficiency    Family history of pancreatic cancer    Age-related osteoporosis with current pathological fracture with delayed healing    Abnormal gait    Pancreas cyst    Pain in both hands    Pain in both feet    Nuclear sclerosis of both eyes    Long-term use of Plaquenil    Subclinical hyperthyroidism    Benign essential hypertension    Refractive error    Primary insomnia    Lack of coordination    Prediabetes    Bilateral carotid artery disease    Chronic pancreatitis, unspecified pancreatitis type    Pulmonary HTN    Sciatica, left side    Nuclear sclerotic cataract of left eye

## 2025-05-19 NOTE — H&P
"    Short Stay Endoscopy History and Physical    PCP - Shonda Anguiano MD    Procedure - Pouchoscopy  ASA - 2  Mallampati - per anesthesia  History of Anesthesia problems - no  Family history Anesthesia problems -  no     HPI:  This is a 65 y.o. female here for evaluation of :     Average Risk Screening: No  High risk screening: yes - hx of IBD  History of polyps: No  Anemia: No  Blood in stools: No  Diarrhea: yes  Abdominal Pain: No    Review of Systems:  CONSTITUTIONAL: Denies weight change,  fatigue, fevers, chills, night sweats.  CARDIOVASCULAR: Denies chest pain, shortness of breath, orthopnea and edema.  RESPIRATORY: Denies cough, hemoptysis, dyspnea, and wheezing.  GI: See HPI.    Medical History:  Past Medical History:   Diagnosis Date    Acid reflux     Adrenal insufficiency, primary     Arthritis     Asthma     B12 deficiency anemia     Benign essential hypertension 02/07/2021    Blood transfusion 2010    Breast cancer 02/2016    Right breast infiltrating ductal CA    Cataract     Chronic pain     Deep vein thrombosis     Dry eyes     Esophagitis, unspecified     Fibromyalgia     General anesthetics causing adverse effect in therapeutic use     "slow to wake up bc I don't have an immune system    Heart murmur     History of colon polyps     Hyperlipidemia     Hypopituitary dwarfism     Iron deficiency anemia     Lupus     Migraines, neuralgic     Nonspecific ulcerative colitis     OP (osteoporosis)     history of reclast    Osteopenia     Pancreatic cyst     Schatzki's ring     Steroid sulfatase deficiency     Ulcerative colitis     Vitamin D deficiency disease        Surgical History:   Past Surgical History:   Procedure Laterality Date    ANORECTAL MANOMETRY N/A 01/13/2022    Procedure: MANOMETRY, ANORECTAL;  Surgeon: First Available Stu Gastelum;  Location: [address]);  Service: Endoscopy;  Laterality: N/A;  new order placed; original order placed incorrectly  1/7 instructions handed to patient-st    " APPENDECTOMY      BREAST BIOPSY Right 02/2016    IDC    BREAST RECONSTRUCTION      BREAST SURGERY      CATARACT EXTRACTION W/  INTRAOCULAR LENS IMPLANT Right 4/29/2025    Procedure: EXTRACTION, CATARACT, WITH IOL INSERTION;  Surgeon: Bharath Lopez MD;  Location: [facility] OR;  Service: Ophthalmology;  Laterality: Right;    CATARACT EXTRACTION W/  INTRAOCULAR LENS IMPLANT Left 5/13/2025    Procedure: EXTRACTION, CATARACT, WITH IOL INSERTION;  Surgeon: Bharath Lopez MD;  Location: [facility] OR;  Service: Ophthalmology;  Laterality: Left;    CHOLECYSTECTOMY      COLON SURGERY      ENDOSCOPIC ULTRASOUND OF UPPER GASTROINTESTINAL TRACT N/A 08/06/2018    Procedure: ULTRASOUND, ENDOSCOPIC, UPPER GI TRACT;  Surgeon: Hong Finn MD;  Location: [facility] (2ND FLR);  Service: Endoscopy;  Laterality: N/A;    ESOPHAGOGASTRODUODENOSCOPY N/A 12/10/2018    Procedure: EGD (ESOPHAGOGASTRODUODENOSCOPY);  Surgeon: Reese Villalta MD;  Location: [facility] (4TH FLR);  Service: Endoscopy;  Laterality: N/A;    ESOPHAGOGASTRODUODENOSCOPY N/A 12/30/2021    Procedure: EGD (ESOPHAGOGASTRODUODENOSCOPY);  Surgeon: Reese Villalta MD;  Location: [facility] (2ND FLR);  Service: Endoscopy;  Laterality: N/A;  EGD for esophageal dysphagia and early satiety please schedule 1st provider available. wants this date-Dr Villalta only     fully vaccinated-GT  hx of adrenal insuffiency   12/27 arrival time confirmed with pt-rb    ESOPHAGOGASTRODUODENOSCOPY N/A 09/09/2022    Procedure: EGD (ESOPHAGOGASTRODUODENOSCOPY);  Surgeon: Reese Villalta MD;  Location: [facility] (4TH FLR);  Service: Endoscopy;  Laterality: N/A;  vacc  inst handed to pt-LW    ESOPHAGOGASTRODUODENOSCOPY N/A 03/14/2024    Procedure: EGD (ESOPHAGOGASTRODUODENOSCOPY);  Surgeon: Reese Villalta MD;  Location: [facility] (2ND FLR);  Service: Endoscopy;  Laterality: N/A;  prep instructions sent to pt via portal/ lan pt/ gastroparesis prep  3/8-lvm for  precall-MS  3/11-precall complete-KPvt    FLEXIBLE SIGMOIDOSCOPY N/A 12/10/2018    Procedure: SIGMOIDOSCOPY, FLEXIBLE;  Surgeon: Reese Villalta MD;  Location: [facility] ENDO (4TH FLR);  Service: Endoscopy;  Laterality: N/A;  Recommend full split bowel prep for this study just like for a colonoscopy    HYSTERECTOMY      ILEOSCOPY N/A 02/16/2022    Procedure: ILEOSCOPY;  Surgeon: Ceferino Rangel MD;  Location: [facility] ENDO (4TH FLR);  Service: Endoscopy;  Laterality: N/A;  medical history significant for Ulcerative colitis s/p total colectomy with ileoanal anastomosis ([id number]), SLE on plaquenil, adrenal insufficiency on prednisone, breast cancer, and HTN who presents with 3 month history of bilateral lower abdominal pain. Patient with abd    MASTECTOMY      OOPHORECTOMY Bilateral     restorative proctectomy      total abdominal colectomy with ileostomy  2010    TOTAL COLECTOMY      TOTAL REDUCTION MAMMOPLASTY Left 2017    TRANSFORAMINAL EPIDURAL INJECTION OF STEROID Left 08/06/2024    Procedure: LUMBAR TRANSFORAMINAL LEFT L4/5 AND L5/S1 DIRECT REFERRAL;  Surgeon: Azucena Muniz MD;  Location: [facility] PAIN T;  Service: Pain Management;  Laterality: Left;  [phone]    UPPER ENDOSCOPIC ULTRASOUND W/ FNA         Family History:   Family History   Problem Relation Name Age of Onset    Hyperlipidemia Mother      Diabetes Father      Hyperlipidemia Father      Hypertension Father      Glaucoma Father      No Known Problems Sister      No Known Problems Brother      Cancer Maternal Aunt          pancreatic cancer, late 50s at dx    Pancreatic cancer Maternal Aunt      Breast cancer Maternal Aunt  55    Cancer Maternal Uncle  65        pancreatic cancer    Pancreatic cancer Maternal Uncle      No Known Problems Paternal Aunt      No Known Problems Paternal Uncle      No Known Problems Maternal Grandmother      No Known Problems Maternal Grandfather      No Known Problems Paternal Grandmother      No Known Problems  Paternal Grandfather      Breast cancer Cousin M 1st cousin 28    Breast cancer Maternal Cousin  50    Breast cancer Other M Great aunt 45    Cancer Other nephew 25        liver cancer    Amblyopia Neg Hx      Blindness Neg Hx      Cataracts Neg Hx      Macular degeneration Neg Hx      Retinal detachment Neg Hx      Strabismus Neg Hx      Stroke Neg Hx      Thyroid disease Neg Hx      Ovarian cancer Neg Hx      Celiac disease Neg Hx      Colon cancer Neg Hx      Colon polyps Neg Hx      Esophageal cancer Neg Hx      Liver cancer Neg Hx      Rectal cancer Neg Hx      Stomach cancer Neg Hx      Ulcerative colitis Neg Hx      Inflammatory bowel disease Neg Hx         Social History:   Social History[1]    Allergies: Reviewed.    Medications:  Medications Ordered Prior to Encounter[2]    Physical Exam:  Vital Signs:   Vitals:    05/19/25 1335   BP: 109/71   Pulse: 82   Resp: 15   Temp: 98.1 °F (36.7 °C)     General Appearance: Well appearing in no acute distress  ENT: OP clear  Chest: CTA B  CV: RRR, no m/r/g  Abd: s/nt/nd/nabs  Ext: no edema    Labs:  Reviewed    IMPRESSION:  IBD surveillance    Plan:  I have explained the risks and benefits of Pouchoscopy to the patient including but not limited to bleeding, perforation, infection, and death. The patient wishes to proceed with Pouchoscopy.         [1]   Social History  Tobacco Use    Smoking status: Never    Smokeless tobacco: Never   Substance Use Topics    Alcohol use: No     Alcohol/week: 0.0 standard drinks of alcohol    Drug use: No   [2]   No current facility-administered medications on file prior to encounter.     Current Outpatient Medications on File Prior to Encounter   Medication Sig Dispense Refill    (Magic mouthwash) 1:1:1 diphenhydrAMINE(Benadryl) 12.5mg/5ml liq, aluminum & magnesium hydroxide-simethicone (Maalox), LIDOcaine viscous 2% Swish and spit 5 mLs every 4 (four) hours as needed (mouth sores). for mouth sores 90 mL 1    amLODIPine (NORVASC) 5 MG  tablet TAKE 1 TABLET BY MOUTH EVERY DAY 90 tablet 3    belimumab (BENLYSTA) 200 mg/mL AtIn Inject 1 mL (200 mg total) into the skin every 7 days. 4 mL 2    calcium carbonate (OS-RISSA) 500 mg calcium (1,250 mg) tablet Take 1 tablet by mouth once daily.      cholecalciferol, vitamin D3, 125 mcg (5,000 unit) Tab Take 5,000 Units by mouth once daily.      diclofenac sodium (VOLTAREN) 1 % Gel APPLY 2 GRAMS TO AFFECTED AREA 4 TIMES A  g 2    dicyclomine (BENTYL) 20 mg tablet TAKE 1 TABLET BY MOUTH EVERY 6 HOURS 360 tablet 0    ferrous sulfate (IRON ORAL) Take 1 tablet by mouth once daily. No sure dose      folic acid (FOLVITE) 1 MG tablet Take 1 mg by mouth once daily.      gabapentin (NEURONTIN) 300 MG capsule Take 1 capsule (300 mg total) by mouth 3 (three) times daily. 270 capsule 3    hydroCHLOROthiazide (HYDRODIURIL) 25 MG tablet Take 25 mg by mouth once daily.      hydroxychloroquine (PLAQUENIL) 200 mg tablet TAKE 1 AND 1/2 TABLETS DAILY BY MOUTH 45 tablet 5    hydrOXYzine HCL (ATARAX) 25 MG tablet TAKE 1 TABLET BY MOUTH NIGHTLY AS NEEDED (INSOMNIA).      lipase-protease-amylase (CREON) 36,000-114,000- 180,000 unit CpDR Take 2 capsules by mouth 3 (three) times daily with meals. 180 capsule 5    loperamide (IMODIUM) 2 mg capsule TAKE 1 TO 2 CAPSULES BY MOUTH FOUR TIMES DAILY 240 capsule 3    losartan (COZAAR) 50 MG tablet TAKE 1 TABLET BY MOUTH EVERY DAY 90 tablet 3    multivitamin/iron/folic acid (CENTRUM WOMEN ORAL) Take 1 tablet by mouth once daily.      nebivoloL (BYSTOLIC) 10 MG Tab Take 10 mg by mouth once daily.      niacin 100 MG Tab Take 100 mg by mouth every evening.      nystatin (MYCOSTATIN) cream Apply topically 2 (two) times daily. 30 g 0    omeprazole (PRILOSEC) 20 MG capsule TAKE 1 CAPSULE BY MOUTH TWICE A  capsule 3    ondansetron (ZOFRAN) 4 MG tablet Take 1 tablet (4 mg total) by mouth every 12 (twelve) hours as needed for Nausea. 30 tablet 6    potassium chloride SA (K-DUR,KLOR-CON) 20  MEQ tablet Take 1 tablet (20 mEq total) by mouth 2 (two) times daily. 180 tablet 3    prednisoLONE-moxiflox-bromfen 1-0.5-0.075 % DrpS Place 1 drop into the right eye 3 (three) times daily. 8 mL 2    predniSONE (DELTASONE) 5 MG tablet TAKE 1 TABLET BY MOUTH ONCE DAILY. DOUBLE THE DOSE IN TIMES OF ILLNESS 90 tablet 4    psyllium husk/aspartame (METAMUCIL FIBER SINGLES ORAL) Take 1 packet by mouth once daily.      REPATHA SURECLICK 140 mg/mL PnIj INJECT 140 MG INTO THE SKIN EVERY 14 (FOURTEEN) DAYS      RESTASIS 0.05 % ophthalmic emulsion INSTILL 1 DROP INTO BOTH EYES TWICE A  vial 3    rizatriptan (MAXALT) 10 MG tablet TAKE 1 TABLET (10 MG TOTAL) BY MOUTH 2 (TWO) TIMES DAILY AS NEEDED FOR MIGRAINE. 27 tablet 3    rosuvastatin (CRESTOR) 5 MG tablet Take 5 mg by mouth once daily.      zolpidem (AMBIEN) 5 MG Tab TAKE 1 TABLET BY MOUTH NIGHTLY AS NEEDED (INSOMNIA). 30 tablet 0    albuterol (PROVENTIL/VENTOLIN HFA) 90 mcg/actuation inhaler Inhale 1-2 puffs into the lungs every 6 (six) hours as needed for Wheezing or Shortness of Breath. Rescue 8 g 0    cetirizine (ZYRTEC) 10 MG tablet Take 1 tablet (10 mg total) by mouth once daily. 30 tablet 0    valACYclovir (VALTREX) 1000 MG tablet Take 1 tablet (1,000 mg total) by mouth every 12 (twelve) hours. for 10 days 20 tablet 0

## 2025-05-19 NOTE — [employer] PATIENT INSTRUCTIONS
Discharge Summary/Instructions after an Endoscopic Procedure  Patient Name: Efe Horowitz  Patient MRN: [id number]  Patient YOB: 1960  Monday, May 19, 2025  Ceferino Rangel MD  Dear patient,  As a result of recent federal legislation (The Federal Cures Act), you may   receive lab or pathology results from your procedure in your [facility]   account before your physician is able to contact you. Your physician or   their representative will relay the results to you with their   recommendations at their soonest availability.  Thank you,  RESTRICTIONS:  During your procedure today, you received medications for sedation.  These   medications may affect your judgment, balance and coordination.  Therefore,   for 24 hours, you have the following restrictions:   - DO NOT drive a car, operate machinery, make legal/financial decisions,   sign important papers or drink alcohol.    ACTIVITY:  Today: no heavy lifting, straining or running due to procedural   sedation/anesthesia.  The following day: return to full activity including work.  DIET:  Eat and drink normally unless instructed otherwise.     TREATMENT FOR COMMON SIDE EFFECTS:  - Mild abdominal pain, nausea, belching, bloating or excessive gas:  rest,   eat lightly and use a heating pad.  - Sore Throat: treat with throat lozenges and/or gargle with warm salt   water.  - Because air was used during the procedure, expelling large amounts of air   from your rectum or belching is normal.  - If a bowel prep was taken, you may not have a bowel movement for 1-3 days.    This is normal.  SYMPTOMS TO WATCH FOR AND REPORT TO YOUR PHYSICIAN:  1. Abdominal pain or bloating, other than gas cramps.  2. Chest pain.  3. Back pain.  4. Signs of infection such as: chills or fever occurring within 24 hours   after the procedure.  5. Rectal bleeding, which would show as bright red, maroon, or black stools.   (A tablespoon of blood from the rectum is not serious, especially if    hemorrhoids are present.)  6. Vomiting.  7. Weakness or dizziness.  GO DIRECTLY TO THE NEAREST EMERGENCY ROOM IF YOU HAVE ANY OF THE FOLLOWING:      Difficulty breathing              Chills and/or fever over 101 F   Persistent vomiting and/or vomiting blood   Severe abdominal pain   Severe chest pain   Black, tarry stools   Bleeding- more than one tablespoon   Any other symptom or condition that you feel may need urgent attention  Your doctor recommends these additional instructions:  If any biopsies were taken, your doctors clinic will contact you in 1 to 2   weeks with any results.  - Discharge patient to home.   - Resume previous diet today.   - Continue present medications.   - Await pathology results.   - Repeat post-surgical lower GI endoscopy in 2 years for surveillance.   - Return to GI office as previously scheduled.  For questions, problems or results please call your physician - Ceferino Rangel MD at Work:  [phone].  [facility] [place] [facility], EMERGENCY ROOM PHONE NUMBER: [phone]  IF A COMPLICATION OR EMERGENCY SITUATION ARISES AND YOU ARE UNABLE TO REACH   YOUR PHYSICIAN - GO DIRECTLY TO THE EMERGENCY ROOM.  Ceferino Rangel MD  5/19/2025 2:10:42 PM  This report has been verified and signed electronically.  Dear patient,  As a result of recent federal legislation (The Federal Cures Act), you may   receive lab or pathology results from your procedure in your [facility]   account before your physician is able to contact you. Your physician or   their representative will relay the results to you with their   recommendations at their soonest availability.  Thank you,  [employer]ATION

## 2025-05-19 NOTE — TRANSFER OF CARE
"Anesthesia Transfer of Care Note    Patient: Efe Horowitz    Procedure(s) Performed: Procedure(s) (LRB):  ENDOSCOPY, POUCH, SMALL INTESTINE, DIAGNOSTIC (N/A)    Patient location: GI    Anesthesia Type: MAC and general    Transport from OR: Transported from OR on room air with adequate spontaneous ventilation    Post pain: adequate analgesia    Post assessment: no apparent anesthetic complications and tolerated procedure well    Post vital signs: stable    Level of consciousness: awake and alert    Nausea/Vomiting: no nausea/vomiting    Complications: none    Transfer of care protocol was followed      Last vitals: Visit Vitals  BP (!) 92/50 (BP Location: Left arm, Patient Position: Lying)   Pulse 77   Temp 36.6 °C (97.9 °F) (Temporal)   Resp 16   Ht 5' 4" (1.626 m)   Wt 54.2 kg (119 lb 7.8 oz)   SpO2 98%   Breastfeeding No   BMI 20.51 kg/m²     "

## 2025-05-21 DIAGNOSIS — F51.01 PRIMARY INSOMNIA: ICD-10-CM

## 2025-05-21 LAB
ESTROGEN SERPL-MCNC: NORMAL PG/ML
INSULIN SERPL-ACNC: NORMAL U[IU]/ML
LAB AP CLINICAL INFORMATION: NORMAL
LAB AP GROSS DESCRIPTION: NORMAL
LAB AP PERFORMING LOCATION(S): NORMAL
LAB AP REPORT FOOTNOTES: NORMAL

## 2025-05-22 ENCOUNTER — RESULTS FOLLOW-UP (OUTPATIENT)
Dept: GASTROENTEROLOGY | Facility: HOSPITAL | Age: 65
End: 2025-05-22

## 2025-05-22 NOTE — TELEPHONE ENCOUNTER
No care due was identified.  [facility] Embedded Care Due Messages. Reference number: [id number].   5/21/2025 7:08:56 PM CDT

## 2025-05-23 ENCOUNTER — OFFICE VISIT (OUTPATIENT)
Dept: OPHTHALMOLOGY | Facility: CLINIC | Age: 65
End: 2025-05-23
Payer: MEDICARE

## 2025-05-23 DIAGNOSIS — Z98.890 POST-OPERATIVE STATE: Primary | ICD-10-CM

## 2025-05-23 PROCEDURE — 99999 PR PBB SHADOW E&M-EST. PATIENT-LVL I: CPT | Mod: PBBFAC,HCNC,, | Performed by: OPHTHALMOLOGY

## 2025-05-23 NOTE — PROGRESS NOTES
Subjective:       Patient ID: Efe Horowitz is a 65 y.o. female.    Chief Complaint: Post-op Evaluation    HPI    Here for 1 week S/p Phaco w/IOL OS 05-13-25    Eye meds: PMB OU TID     65 year old female states vision is clear OU. Denies ocular pain. Denies   itchy eyes. Denies flashes, floaters or diplopia   Last edited by Nae Gonzalez on 5/23/2025  9:54 AM.             Assessment:       1. Post-operative state        Plan:       S/p CE OU- Doing well.      CPM OU.  RTC 3 wks.

## 2025-05-26 RX ORDER — ZOLPIDEM TARTRATE 5 MG/1
TABLET ORAL
Qty: 30 TABLET | Refills: 3 | Status: SHIPPED | OUTPATIENT
Start: 2025-05-26

## 2025-05-29 DIAGNOSIS — Z87.19 HISTORY OF ULCERATIVE COLITIS: ICD-10-CM

## 2025-05-29 RX ORDER — DICYCLOMINE HYDROCHLORIDE 20 MG/1
20 TABLET ORAL EVERY 6 HOURS
Qty: 120 TABLET | Refills: 0 | Status: SHIPPED | OUTPATIENT
Start: 2025-05-29

## 2025-06-05 ENCOUNTER — TELEPHONE (OUTPATIENT)
Dept: OPHTHALMOLOGY | Facility: CLINIC | Age: 65
End: 2025-06-05
Payer: MEDICARE

## 2025-06-05 ENCOUNTER — TELEPHONE (OUTPATIENT)
Dept: OPTOMETRY | Facility: CLINIC | Age: 65
End: 2025-06-05
Payer: MEDICARE

## 2025-06-12 ENCOUNTER — OFFICE VISIT (OUTPATIENT)
Dept: OPHTHALMOLOGY | Facility: CLINIC | Age: 65
End: 2025-06-12
Payer: MEDICARE

## 2025-06-12 DIAGNOSIS — Z98.890 POST-OPERATIVE STATE: Primary | ICD-10-CM

## 2025-06-12 DIAGNOSIS — H52.7 REFRACTIVE ERROR: ICD-10-CM

## 2025-06-12 PROCEDURE — 1159F MED LIST DOCD IN RCRD: CPT | Mod: CPTII,HCNC,S$GLB, | Performed by: OPHTHALMOLOGY

## 2025-06-12 PROCEDURE — 99024 POSTOP FOLLOW-UP VISIT: CPT | Mod: HCNC,S$GLB,, | Performed by: OPHTHALMOLOGY

## 2025-06-12 PROCEDURE — 1160F RVW MEDS BY RX/DR IN RCRD: CPT | Mod: CPTII,HCNC,S$GLB, | Performed by: OPHTHALMOLOGY

## 2025-06-12 PROCEDURE — 99999 PR PBB SHADOW E&M-EST. PATIENT-LVL III: CPT | Mod: PBBFAC,HCNC,, | Performed by: OPHTHALMOLOGY

## 2025-06-12 PROCEDURE — 4010F ACE/ARB THERAPY RXD/TAKEN: CPT | Mod: CPTII,HCNC,S$GLB, | Performed by: OPHTHALMOLOGY

## 2025-06-12 NOTE — PROGRESS NOTES
Subjective:       Patient ID: Efe Horowitz is a 65 y.o. female.    Chief Complaint: No chief complaint on file.    HPI    S/p Phaco w/IOL OS 05-13-25  S/p Phaco w/IOL OD 04-29-25    Eye meds: PMB OS TID     65 year old female here for 3 week post op OU. Vision seem fine. No rx.  Last edited by Maura Rodríguez MA on 6/12/2025  1:34 PM.             Assessment:       1. Post-operative state    2. Refractive error        Plan:       S/p CE OU- Doing well.  RE-Pt does not want MRx.      Readers.  RTC Dr Magallon in 6 mos.

## 2025-06-16 ENCOUNTER — OFFICE VISIT (OUTPATIENT)
Dept: GASTROENTEROLOGY | Facility: CLINIC | Age: 65
End: 2025-06-16
Payer: MEDICARE

## 2025-06-16 VITALS
HEART RATE: 62 BPM | DIASTOLIC BLOOD PRESSURE: 74 MMHG | BODY MASS INDEX: 21.19 KG/M2 | HEIGHT: 64 IN | TEMPERATURE: 98 F | WEIGHT: 124.13 LBS | SYSTOLIC BLOOD PRESSURE: 117 MMHG

## 2025-06-16 DIAGNOSIS — R12 HEARTBURN: ICD-10-CM

## 2025-06-16 DIAGNOSIS — R13.10 DYSPHAGIA, UNSPECIFIED TYPE: ICD-10-CM

## 2025-06-16 DIAGNOSIS — R10.31 RIGHT LOWER QUADRANT PAIN: ICD-10-CM

## 2025-06-16 DIAGNOSIS — Z87.19 HISTORY OF ULCERATIVE COLITIS: Primary | ICD-10-CM

## 2025-06-16 PROCEDURE — 99999 PR PBB SHADOW E&M-EST. PATIENT-LVL V: CPT | Mod: PBBFAC,HCNC,, | Performed by: INTERNAL MEDICINE

## 2025-06-16 PROCEDURE — 99214 OFFICE O/P EST MOD 30 MIN: CPT | Mod: HCNC,S$GLB,, | Performed by: INTERNAL MEDICINE

## 2025-06-16 PROCEDURE — 4010F ACE/ARB THERAPY RXD/TAKEN: CPT | Mod: CPTII,HCNC,S$GLB, | Performed by: INTERNAL MEDICINE

## 2025-06-16 PROCEDURE — 3008F BODY MASS INDEX DOCD: CPT | Mod: CPTII,HCNC,S$GLB, | Performed by: INTERNAL MEDICINE

## 2025-06-16 PROCEDURE — 3288F FALL RISK ASSESSMENT DOCD: CPT | Mod: CPTII,HCNC,S$GLB, | Performed by: INTERNAL MEDICINE

## 2025-06-16 PROCEDURE — 3074F SYST BP LT 130 MM HG: CPT | Mod: CPTII,HCNC,S$GLB, | Performed by: INTERNAL MEDICINE

## 2025-06-16 PROCEDURE — 1125F AMNT PAIN NOTED PAIN PRSNT: CPT | Mod: CPTII,HCNC,S$GLB, | Performed by: INTERNAL MEDICINE

## 2025-06-16 PROCEDURE — 1101F PT FALLS ASSESS-DOCD LE1/YR: CPT | Mod: CPTII,HCNC,S$GLB, | Performed by: INTERNAL MEDICINE

## 2025-06-16 PROCEDURE — G2211 COMPLEX E/M VISIT ADD ON: HCPCS | Mod: HCNC,S$GLB,, | Performed by: INTERNAL MEDICINE

## 2025-06-16 PROCEDURE — 1160F RVW MEDS BY RX/DR IN RCRD: CPT | Mod: CPTII,HCNC,S$GLB, | Performed by: INTERNAL MEDICINE

## 2025-06-16 PROCEDURE — 3078F DIAST BP <80 MM HG: CPT | Mod: CPTII,HCNC,S$GLB, | Performed by: INTERNAL MEDICINE

## 2025-06-16 PROCEDURE — 1159F MED LIST DOCD IN RCRD: CPT | Mod: CPTII,HCNC,S$GLB, | Performed by: INTERNAL MEDICINE

## 2025-06-16 RX ORDER — AMLODIPINE BESYLATE 2.5 MG/1
2.5 TABLET ORAL
COMMUNITY
Start: 2025-05-27

## 2025-06-16 NOTE — PROGRESS NOTES
[facility]          Inflammatory Bowel Disease Follow Up Note         TODAY'S VISIT DATE:  6/16/2025    Reason for Consult:    Chief Complaint   Patient presents with    Ulcerative Colitis       PCP: Shonda Anguiano      Referring MD:   No ref. provider found    History of Present Illness:  Efe Horowitz who is a 65 y.o. female is being seen today at the [facility] on 06/16/2025 for inflammatory bowel disease- inflammatory bowel disease-unspecified.  She is here today for a follow up.  I recently did a pouchoscopy on her in May of 2025 that was unremarkable.  She reports that she has been having abdominal pain for the last few months.  She will note that the pain starts in the umbilical area and migrate to the right inguinal area and then to the right flank and down into the right hip.  It is not associated with any particular movements.  It is not associated with oral intake.  She notes that urinating and having bowel movements relieves the pain.  She does not have any abdominal distention.  The pain will last sometimes up to 2-3 hours until she has a bowel movement or urinates and then it will resolve.  Her workup has consisted of urinalysis that was unremarkable, blood work that was unremarkable, a CT enterography that was unremarkable, and a pouchoscopy that was unremarkable.  She also reports ongoing issues with reflux and heartburn as well as esophageal dysphagia.  She reports that pills feel like they stick in her esophagus and stay there for hours.  She had an upper endoscopy in 2024 that showed a hiatal hernia but was otherwise unremarkable.  There was no stricturing and biopsies of the esophagus were not consistent with the eosinophilic esophagitis.  She takes omeprazole 20 mg twice daily.  She takes this 30 minutes to an hour before breakfast and before supper.  She has a lot of issues with worsening regurgitation symptoms after  lying down at night.  She elevates the head of her bed properly.  She avoids late meals.  She avoids foods that trigger her reflux.  She reports that she has a around 8 bowel movements a day.  Her stools are loose.  There is no blood in his stools.    IBD History:  She has a history of ulcerative colitis and underwent total proctocolectomy in 2009 due to refractory disease.  She has not had any significant issues following her surgery with regards to inflammatory bowel disease.  She also has a history of pelvic floor disorder, a pancreatic cystic lesion that has been followed through imaging, pancreatic insufficiency, and dysphagia without any clear etiology on upper endoscopy.      Pertinent Labs:  Lab Results   Component Value Date    SEDRATE 6 04/10/2025    CRP 1.3 04/10/2025     Lab Results   Component Value Date    TTGIGA <0.2 02/14/2025     02/14/2025     Lab Results   Component Value Date    TSH 0.678 02/14/2025    FREET4 0.83 01/04/2025     Lab Results   Component Value Date    GXCJHFUJ53PY 49 02/14/2025    WTMKBPHX58 1565 (H) 02/14/2025     Lab Results   Component Value Date    HEPBSAG Non-reactive 12/03/2024    HEPBCAB Non-reactive 12/03/2024    HEPCAB Non-reactive 12/03/2024     Lab Results   Component Value Date    NOH34NFUE Non-reactive 10/27/2022     Lab Results   Component Value Date    NIL 0.34659 12/03/2024    MITOGENNIL 9.985 12/03/2024     Lab Results   Component Value Date    TPTMINTERP SEE BELOW 12/21/2021     Lab Results   Component Value Date    STOOLCULTURE  02/17/2025     No Salmonella, Shigella, Vibrio, Yersinia isolated.    STOOLCULTURE (A) 02/17/2025     CAMPYLOBACTER SPECIES  Included specific species antigen for C.jejuni, C.coli, C. dhaval and C.   upsaliensis      VREPBZHUOV7X Negative 04/11/2025    VEPEXQIUSF9K Negative 04/11/2025    CDIFFICILEAN Negative 02/17/2025    CDIFFTOX Negative 02/17/2025     Lab Results   Component Value Date    CALPROTECTIN 56.8 (H) 12/22/2021  "      Therapeutic Drug Monitoring Labs:  No results found for: "PROMETH"  No results found for: "ANSADAINIT", "INFLIXIMAB", "INFLIXINTERP"    Vaccinations:  Lab Results   Component Value Date    HEPBSAB >1000.00 12/03/2024    HEPBSAB Reactive 12/03/2024     Lab Results   Component Value Date    HEPAIGG Reactive 10/27/2022     Lab Results   Component Value Date    VARICELLAZOS 3.59 (H) 10/27/2022    VARICELLAINT Positive (A) 10/27/2022     Immunization History   Administered Date(s) Administered    COVID-19, MRNA, LN-S, PF (Pfizer) (Purple Cap) 02/20/2021, 03/12/2021, 08/16/2021    COVID-19, mRNA, LNP-S, bivalent booster, PF (PFIZER OMICRON) 11/06/2022    Hepatitis A / Hepatitis B 10/08/2008, 10/08/2008, 11/19/2008, 11/19/2008, 04/08/2009, 04/08/2009    Hepatitis A, Adult 01/10/2022, 07/11/2022    Hepatitis B (recombinant) Adjuvanted, 2 dose 01/10/2022, 02/07/2022    Influenza 10/03/2019, 08/31/2020    Influenza (FLUAD) - Quadrivalent - Adjuvanted - PF *Preferred* (65+) 09/21/2023    Influenza - Quadrivalent 10/12/2017, 10/12/2017, 09/23/2019    Influenza - Quadrivalent - PF *Preferred* (6 months and older) 08/29/2020, 08/29/2020, 11/27/2021, 11/06/2022    Influenza - Trivalent - Afluria, Fluzone MDV 10/04/2011, 10/02/2013    Influenza - Trivalent - Fluarix, Flulaval, Fluzone, Afluria - PF 09/23/2014, 09/23/2014    Influenza Split 10/02/2013    Pneumococcal Conjugate - 13 Valent 02/08/2021    Pneumococcal Conjugate - 20 Valent 11/17/2022    Pneumococcal Polysaccharide - 23 Valent 06/23/2014    RSVpreF (Arexvy) 09/21/2023    Tdap 08/23/2021    Zoster Recombinant 02/23/2022, 09/06/2022         Review of Systems  Review of Systems   Constitutional:  Negative for chills, fever and weight loss.   HENT:  Negative for sore throat.    Eyes:  Negative for pain, discharge and redness.   Respiratory:  Negative for cough, shortness of breath and wheezing.    Cardiovascular:  Negative for chest pain, orthopnea and leg swelling. " "  Gastrointestinal:  Negative for abdominal pain, blood in stool, constipation, diarrhea, heartburn, melena, nausea and vomiting.   Genitourinary:  Negative for dysuria, frequency and urgency.   Musculoskeletal:  Negative for back pain, joint pain and myalgias.   Skin:  Negative for itching and rash.   Neurological:  Negative for focal weakness and seizures.   Endo/Heme/Allergies:  Does not bruise/bleed easily.   Psychiatric/Behavioral:  Negative for depression. The patient is not nervous/anxious.        Medical History:   Past Medical History:   Diagnosis Date    Acid reflux     Adrenal insufficiency, primary     Arthritis     Asthma     B12 deficiency anemia     Benign essential hypertension 02/07/2021    Blood transfusion 2010    Breast cancer 02/2016    Right breast infiltrating ductal CA    Cataract     Chronic pain     Deep vein thrombosis     Dry eyes     Esophagitis, unspecified     Fibromyalgia     General anesthetics causing adverse effect in therapeutic use     "slow to wake up bc I don't have an immune system    Heart murmur     History of colon polyps     Hyperlipidemia     Hypopituitary dwarfism     Iron deficiency anemia     Lupus     Migraines, neuralgic     Nonspecific ulcerative colitis     OP (osteoporosis)     history of reclast    Osteopenia     Pancreatic cyst     Schatzki's ring     Steroid sulfatase deficiency     Ulcerative colitis     Vitamin D deficiency disease        Surgical History:  Past Surgical History:   Procedure Laterality Date    ANORECTAL MANOMETRY N/A 01/13/2022    Procedure: MANOMETRY, ANORECTAL;  Surgeon: First Available Stu Gastelum;  Location: [facility] ([address] [place] [address]);  Service: Endoscopy;  Laterality: N/A;  new order placed; original order placed incorrectly  1/7 instructions handed to patient-st    APPENDECTOMY      BREAST BIOPSY Right 02/2016    IDC    BREAST RECONSTRUCTION      BREAST SURGERY      CATARACT EXTRACTION W/  INTRAOCULAR LENS IMPLANT Right 4/29/2025    Procedure: " EXTRACTION, CATARACT, WITH IOL INSERTION;  Surgeon: Bharath Lopez MD;  Location: [facility] OR;  Service: Ophthalmology;  Laterality: Right;    CATARACT EXTRACTION W/  INTRAOCULAR LENS IMPLANT Left 5/13/2025    Procedure: EXTRACTION, CATARACT, WITH IOL INSERTION;  Surgeon: Bharath Lopez MD;  Location: [facility] OR;  Service: Ophthalmology;  Laterality: Left;    CHOLECYSTECTOMY      COLON SURGERY      ENDOSCOPIC ULTRASOUND OF UPPER GASTROINTESTINAL TRACT N/A 08/06/2018    Procedure: ULTRASOUND, ENDOSCOPIC, UPPER GI TRACT;  Surgeon: Hong Finn MD;  Location: [facility] (2ND FLR);  Service: Endoscopy;  Laterality: N/A;    ESOPHAGOGASTRODUODENOSCOPY N/A 12/10/2018    Procedure: EGD (ESOPHAGOGASTRODUODENOSCOPY);  Surgeon: Reese Villalta MD;  Location: [facility] (4TH FLR);  Service: Endoscopy;  Laterality: N/A;    ESOPHAGOGASTRODUODENOSCOPY N/A 12/30/2021    Procedure: EGD (ESOPHAGOGASTRODUODENOSCOPY);  Surgeon: Reese Villalta MD;  Location: [facility] (2ND FLR);  Service: Endoscopy;  Laterality: N/A;  EGD for esophageal dysphagia and early satiety please schedule 1st provider available. wants this date-Dr Villalta only     fully vaccinated-GT  hx of adrenal insuffiency   12/27 arrival time confirmed with pt-rb    ESOPHAGOGASTRODUODENOSCOPY N/A 09/09/2022    Procedure: EGD (ESOPHAGOGASTRODUODENOSCOPY);  Surgeon: Reese Villalta MD;  Location: [facility] (4TH FLR);  Service: Endoscopy;  Laterality: N/A;  vacc  inst handed to pt-LW    ESOPHAGOGASTRODUODENOSCOPY N/A 03/14/2024    Procedure: EGD (ESOPHAGOGASTRODUODENOSCOPY);  Surgeon: Reese Villalta MD;  Location: [facility] (2ND FLR);  Service: Endoscopy;  Laterality: N/A;  prep instructions sent to pt via portal/ lan pt/ gastroparesis prep  3/8-lvm for precall-MS  3/11-precall complete-KPvt    FLEXIBLE SIGMOIDOSCOPY N/A 12/10/2018    Procedure: SIGMOIDOSCOPY, FLEXIBLE;  Surgeon: Reese Villalta MD;  Location: [facility] ([address] [place] [address]);  Service:  Endoscopy;  Laterality: N/A;  Recommend full split bowel prep for this study just like for a colonoscopy    HYSTERECTOMY      ILEOSCOPY N/A 02/16/2022    Procedure: ILEOSCOPY;  Surgeon: Ceferino Rangel MD;  [facility]  Service: Endoscopy;  Laterality: N/A;  medical history significant for Ulcerative colitis s/p total colectomy with ileoanal anastomosis ([id number]), SLE on plaquenil, adrenal insufficiency on prednisone, breast cancer, and HTN who presents with 3 month history of bilateral lower abdominal pain. Patient with abd    MASTECTOMY      OOPHORECTOMY Bilateral     POUCHOSCOPY N/A 5/19/2025    Procedure: ENDOSCOPY, POUCH, SMALL INTESTINE, DIAGNOSTIC;  Surgeon: Ceferino Rangel MD;  Location: [facility] ([address] [place] [address]);  Service: Endoscopy;  Laterality: N/A;  Juan Carlos, 1/2 miralax/gatorade, instructions via portal - 4/28 kls  5/9 precall attempted/lvm/mleone  5/13 precall attempted/lvm/mleone  5/14 precall complete. pt prepping with dulcolax/mag citrate. kw    restorative proctectomy      total abdominal colectomy with ileostomy  2010    TOTAL COLECTOMY      TOTAL REDUCTION MAMMOPLASTY Left 2017    TRANSFORAMINAL EPIDURAL INJECTION OF STEROID Left 08/06/2024    Procedure: LUMBAR TRANSFORAMINAL LEFT L4/5 AND L5/S1 DIRECT REFERRAL;  Surgeon: Azucena Muniz MD;  Location: [facility];  Service: Pain Management;  Laterality: Left;  [phone]    UPPER ENDOSCOPIC ULTRASOUND W/ FNA         Family History:   Family History   Problem Relation Name Age of Onset    Hyperlipidemia Mother      Diabetes Father      Hyperlipidemia Father      Hypertension Father      Glaucoma Father      No Known Problems Sister      No Known Problems Brother      Cancer Maternal Aunt          pancreatic cancer, late 50s at dx    Pancreatic cancer Maternal Aunt      Breast cancer Maternal Aunt  55    Cancer Maternal Uncle  65        pancreatic cancer    Pancreatic cancer Maternal Uncle      No Known Problems  Paternal Aunt      No Known Problems Paternal Uncle      No Known Problems Maternal Grandmother      No Known Problems Maternal Grandfather      No Known Problems Paternal Grandmother      No Known Problems Paternal Grandfather      Breast cancer Cousin M 1st cousin 28    Breast cancer Maternal Cousin  50    Breast cancer Other M Great aunt 45    Cancer Other nephew 25        liver cancer    Amblyopia Neg Hx      Blindness Neg Hx      Cataracts Neg Hx      Macular degeneration Neg Hx      Retinal detachment Neg Hx      Strabismus Neg Hx      Stroke Neg Hx      Thyroid disease Neg Hx      Ovarian cancer Neg Hx      Celiac disease Neg Hx      Colon cancer Neg Hx      Colon polyps Neg Hx      Esophageal cancer Neg Hx      Liver cancer Neg Hx      Rectal cancer Neg Hx      Stomach cancer Neg Hx      Ulcerative colitis Neg Hx      Inflammatory bowel disease Neg Hx         Social History:   Social History     Tobacco Use    Smoking status: Never    Smokeless tobacco: Never   Substance Use Topics    Alcohol use: No     Alcohol/week: 0.0 standard drinks of alcohol    Drug use: No       Allergies: Reviewed    Home Medications:   Medication List with Changes/Refills   Current Medications    (MAGIC MOUTHWASH) 1:1:1 DIPHENHYDRAMINE(BENADRYL) 12.5MG/5ML LIQ, ALUMINUM & MAGNESIUM HYDROXIDE-SIMETHICONE (MAALOX), LIDOCAINE VISCOUS 2%    Swish and spit 5 mLs every 4 (four) hours as needed (mouth sores). for mouth sores    ALBUTEROL (PROVENTIL/VENTOLIN HFA) 90 MCG/ACTUATION INHALER    Inhale 1-2 puffs into the lungs every 6 (six) hours as needed for Wheezing or Shortness of Breath. Rescue    AMLODIPINE (NORVASC) 2.5 MG TABLET    Take 2.5 mg by mouth.    AZELASTINE (ASTELIN) 137 MCG (0.1 %) NASAL SPRAY    INSTILL 1 SPRAY BY NASAL ROUTE 2 TIMES DAILY.    BELIMUMAB (BENLYSTA) 200 MG/ML ATIN    Inject 1 mL (200 mg total) into the skin every 7 days.    CALCIUM CARBONATE (OS-RISSA) 500 MG CALCIUM (1,250 MG) TABLET    Take 1 tablet by mouth  once daily.    CETIRIZINE (ZYRTEC) 10 MG TABLET    Take 1 tablet (10 mg total) by mouth once daily.    CHOLECALCIFEROL, VITAMIN D3, 125 MCG (5,000 UNIT) TAB    Take 5,000 Units by mouth once daily.    DICLOFENAC SODIUM (VOLTAREN) 1 % GEL    APPLY 2 GRAMS TO AFFECTED AREA 4 TIMES A DAY    DICYCLOMINE (BENTYL) 20 MG TABLET    TAKE 1 TABLET BY MOUTH EVERY 6 HOURS    FERROUS SULFATE (IRON ORAL)    Take 1 tablet by mouth once daily. No sure dose    FOLIC ACID (FOLVITE) 1 MG TABLET    Take 1 mg by mouth once daily.    GABAPENTIN (NEURONTIN) 300 MG CAPSULE    Take 1 capsule (300 mg total) by mouth 3 (three) times daily.    HYDROCHLOROTHIAZIDE (HYDRODIURIL) 25 MG TABLET    Take 25 mg by mouth once daily.    HYDROXYCHLOROQUINE (PLAQUENIL) 200 MG TABLET    TAKE 1 AND 1/2 TABLETS DAILY BY MOUTH    HYDROXYZINE HCL (ATARAX) 25 MG TABLET    TAKE 1 TABLET BY MOUTH NIGHTLY AS NEEDED (INSOMNIA).    LIPASE-PROTEASE-AMYLASE (CREON) 36,000-114,000- 180,000 UNIT CPDR    Take 2 capsules by mouth 3 (three) times daily with meals.    LOPERAMIDE (IMODIUM) 2 MG CAPSULE    TAKE 1 TO 2 CAPSULES BY MOUTH FOUR TIMES DAILY    LOSARTAN (COZAAR) 50 MG TABLET    TAKE 1 TABLET BY MOUTH EVERY DAY    MILNACIPRAN (SAVELLA) 100 MG TAB    Take 1 tablet (100 mg total) by mouth once daily.    MULTIVITAMIN/IRON/FOLIC ACID (CENTRUM WOMEN ORAL)    Take 1 tablet by mouth once daily.    NEBIVOLOL (BYSTOLIC) 10 MG TAB    Take 10 mg by mouth once daily.    NIACIN 100 MG TAB    Take 100 mg by mouth every evening.    NYSTATIN (MYCOSTATIN) CREAM    Apply topically 2 (two) times daily.    OMEPRAZOLE (PRILOSEC) 20 MG CAPSULE    TAKE 1 CAPSULE BY MOUTH TWICE A DAY    ONDANSETRON (ZOFRAN) 4 MG TABLET    Take 1 tablet (4 mg total) by mouth every 12 (twelve) hours as needed for Nausea.    POTASSIUM CHLORIDE SA (K-DUR,KLOR-CON) 20 MEQ TABLET    Take 1 tablet (20 mEq total) by mouth 2 (two) times daily.    PREDNISOLONE-MOXIFLOX-BROMFEN 1-0.5-0.075 % DRPS    Place 1 drop into  "the right eye 3 (three) times daily.    PREDNISONE (DELTASONE) 5 MG TABLET    TAKE 1 TABLET BY MOUTH ONCE DAILY. DOUBLE THE DOSE IN TIMES OF ILLNESS    PSYLLIUM HUSK/ASPARTAME (METAMUCIL FIBER SINGLES ORAL)    Take 1 packet by mouth once daily.    REPATHA SURECLICK 140 MG/ML PNIJ    INJECT 140 MG INTO THE SKIN EVERY 14 (FOURTEEN) DAYS    RESTASIS 0.05 % OPHTHALMIC EMULSION    INSTILL 1 DROP INTO BOTH EYES TWICE A DAY    RIZATRIPTAN (MAXALT) 10 MG TABLET    TAKE 1 TABLET (10 MG TOTAL) BY MOUTH 2 (TWO) TIMES DAILY AS NEEDED FOR MIGRAINE.    ROSUVASTATIN (CRESTOR) 5 MG TABLET    Take 5 mg by mouth once daily.    VALACYCLOVIR (VALTREX) 1000 MG TABLET    Take 1 tablet (1,000 mg total) by mouth every 12 (twelve) hours. for 10 days    ZOLPIDEM (AMBIEN) 5 MG TAB    TAKE 1 TABLET BY MOUTH NIGHTLY AS NEEDED (INSOMNIA).   Discontinued Medications    AMLODIPINE (NORVASC) 5 MG TABLET    TAKE 1 TABLET BY MOUTH EVERY DAY       Physical Exam:  Vital Signs:  /74 (BP Location: Left arm, Patient Position: Sitting)   Pulse 62   Temp 98.1 °F (36.7 °C) (Oral)   Ht 5' 4" (1.626 m)   Wt 56.3 kg (124 lb 1.9 oz)   BMI 21.30 kg/m²   Body mass index is 21.3 kg/m².    Physical Exam  Vitals and nursing note reviewed.   Constitutional:       General: She is not in acute distress.     Appearance: Normal appearance. She is well-developed. She is not ill-appearing or toxic-appearing.   Eyes:      Conjunctiva/sclera: Conjunctivae normal.      Pupils: Pupils are equal, round, and reactive to light.   Neck:      Thyroid: No thyromegaly.   Cardiovascular:      Rate and Rhythm: Normal rate and regular rhythm.      Heart sounds: Normal heart sounds. No murmur heard.  Pulmonary:      Effort: Pulmonary effort is normal.      Breath sounds: Normal breath sounds. No wheezing or rales.   Abdominal:      General: Bowel sounds are normal. There is no distension.      Palpations: Abdomen is soft. There is no mass.      Tenderness: There is no abdominal " tenderness.   Musculoskeletal:         General: No tenderness. Normal range of motion.   Lymphadenopathy:      Cervical: No cervical adenopathy.   Skin:     Findings: No erythema or rash.   Neurological:      General: No focal deficit present.      Mental Status: She is alert and oriented to person, place, and time.   Psychiatric:         Mood and Affect: Mood normal.         Behavior: Behavior normal.         Thought Content: Thought content normal.         Judgment: Judgment normal.         Labs: reviewed and pertinent noted above    Assessment/Plan:  Efe Horowitz is a 65 y.o. female with a history of ulcerative colitis status post total proctocolectomy and J pouch placement, history of pancreatic insufficiency, chronic degenerative changes in the spine, lupus, and lactose intolerance as well as pelvic floor dysfunction and pancreatic cystic lesions. The following issues were addresssed:    1. History of ulcerative colitis    2. Dysphagia, unspecified type    3. Heartburn    4. Right lower quadrant pain      1. History of ulcerative colitis:  She has an ileoanal pouch without any complications.  She has never had a history of pouchitis or Crohn's like disease of the pouch.  Recent scope showed no evidence of active inflammation in the pouch or rectal cuff or pre pouch ileum.  She does have frequent bowel movements that are loose.  She can consider increasing Imodium.  She is currently taking 4 mg in the morning.  She can add 2 mg to 4 mg in the evening.  Given the abdominal pain issues that seem to be relieved with bowel movements this may decrease stool frequency and could potentially worsen her abdominal pain but only time will tell.    2. Pelvic floor dysfunction:  No constipation issues.  In the past she had imaging findings consistent with the pouch outlet obstruction.  This resolved with pelvic floor therapy.    3. Dysphagia:  Likely either motility or functional in nature.  Recommend barium  esophagram.  Upper endoscopy last year was unremarkable.  Also potentially hiatal hernia related.    4. Reflux:  Her symptoms are primarily regurgitation and not heartburn related.  I do not think increasing the omeprazole dose is going to be of much utility.  She does have a hiatal hernia.  Recommend Gaviscon at night.    5. Abdominal pain: Unclear what the cause of this is.  Imaging, pouchoscopy, labs, and urinalysis have been unremarkable.  Suspect this may be functional in nature.  Also maybe related to pelvic floor issues as it does improve with bowel movements and with urination.         Follow up: Follow up in about 6 months (around 12/16/2025).    Visit today is associated with current or anticipated ongoing medical care related to this patient's single serious condition/complex condition (history of ulcerative colitis).        Thank you again for sending Efe Horowitz to see Dr. Jay Rangel today at the [facility]. Please don't hesitate to contact Dr. Rangel if there are any questions regarding this evaluation, or if you have any other patients with inflammatory bowel disease for whom you would like a consultation. You can reach Dr. Rangel at [phone] or by email at [email or web address]    Ceferino Rangel MD  Department of Gastroenterology  Inflammatory Bowel Disease

## 2025-06-16 NOTE — PATIENT INSTRUCTIONS
Increase imodium  Add gaviscon to omeprazole for reflux  Barium esophagram to evaluate swallowing  Further plans based on results  Follow up in 6 months with Dr. Villalta

## 2025-06-17 RX ORDER — LOPERAMIDE HYDROCHLORIDE 2 MG/1
CAPSULE ORAL
Qty: 240 CAPSULE | Refills: 3 | Status: SHIPPED | OUTPATIENT
Start: 2025-06-17

## 2025-06-22 DIAGNOSIS — Z87.19 HISTORY OF ULCERATIVE COLITIS: ICD-10-CM

## 2025-06-23 DIAGNOSIS — M32.9 SYSTEMIC LUPUS ERYTHEMATOSUS, UNSPECIFIED SLE TYPE, UNSPECIFIED ORGAN INVOLVEMENT STATUS: ICD-10-CM

## 2025-06-23 NOTE — TELEPHONE ENCOUNTER
Allergies reviewed     Labs: up to date     LOV 6/16/25; No FOVS     Script pended for MD; Last fill date: 5/29/25

## 2025-06-23 NOTE — TELEPHONE ENCOUNTER
Refill Routing Note   Medication(s) are not appropriate for processing by [facility] for the following reason(s):        Other    [facility] action(s):  Defer        Medication Therapy Plan: Pt had OV on 6/16/25 but Dicyclomine wasn't mentioned. Unsure if pt should continue. Last fill date 5/29/25.      Appointments  past 12m or future 3m with PCP    Date Provider   Last Visit   6/16/2025 Ceferino Rangel MD   Next Visit   Visit date not found Ceferino Rangel MD   ED visits in past 90 days: 0        Note composed:8:50 AM 06/23/2025

## 2025-06-24 RX ORDER — DICYCLOMINE HYDROCHLORIDE 20 MG/1
20 TABLET ORAL EVERY 6 HOURS
Qty: 360 TABLET | Refills: 0 | Status: SHIPPED | OUTPATIENT
Start: 2025-06-24

## 2025-06-24 RX ORDER — HYDROXYCHLOROQUINE SULFATE 200 MG/1
300 TABLET, FILM COATED ORAL DAILY
Qty: 45 TABLET | Refills: 5 | Status: SHIPPED | OUTPATIENT
Start: 2025-06-24

## 2025-06-24 NOTE — TELEPHONE ENCOUNTER
Per 6/16/25 Dr Rangel OV note- suspect abd pain functional in nature.    Bentyl 20 mg PO Q6h PRN active on med list at time of last OV.   Bentyl 90 day supply prescribed 3/3/25  Allergies reviewed  Bentyl refill Rx pended for MD.

## 2025-06-25 ENCOUNTER — HOSPITAL ENCOUNTER (OUTPATIENT)
Dept: RADIOLOGY | Facility: HOSPITAL | Age: 65
Discharge: HOME | End: 2025-06-25
Attending: INTERNAL MEDICINE
Payer: MEDICARE

## 2025-06-25 ENCOUNTER — RESULTS FOLLOW-UP (OUTPATIENT)
Dept: GASTROENTEROLOGY | Facility: HOSPITAL | Age: 65
End: 2025-06-25

## 2025-06-25 DIAGNOSIS — M32.9 SYSTEMIC LUPUS ERYTHEMATOSUS, UNSPECIFIED SLE TYPE, UNSPECIFIED ORGAN INVOLVEMENT STATUS: ICD-10-CM

## 2025-06-25 DIAGNOSIS — R13.10 DYSPHAGIA, UNSPECIFIED TYPE: ICD-10-CM

## 2025-06-25 PROCEDURE — 25500020 PHARM REV CODE 255: Mod: HCNC | Performed by: INTERNAL MEDICINE

## 2025-06-25 PROCEDURE — 74220 X-RAY XM ESOPHAGUS 1CNTRST: CPT | Mod: TC,HCNC

## 2025-06-25 PROCEDURE — A9698 NON-RAD CONTRAST MATERIALNOC: HCPCS | Mod: HCNC | Performed by: INTERNAL MEDICINE

## 2025-06-25 PROCEDURE — 74220 X-RAY XM ESOPHAGUS 1CNTRST: CPT | Mod: 26,HCNC,, | Performed by: STUDENT IN AN ORGANIZED HEALTH CARE EDUCATION/TRAINING PROGRAM

## 2025-06-25 RX ADMIN — BARIUM SULFATE 200 ML: 0.6 SUSPENSION ORAL at 08:06

## 2025-06-26 RX ORDER — BELIMUMAB 200 MG/ML
200 SOLUTION SUBCUTANEOUS
Qty: 4 ML | Refills: 2 | Status: ACTIVE | OUTPATIENT
Start: 2025-06-26

## 2025-07-07 ENCOUNTER — TELEPHONE (OUTPATIENT)
Dept: INTERNAL MEDICINE | Facility: CLINIC | Age: 65
End: 2025-07-07
Payer: MEDICARE

## 2025-07-07 NOTE — PROGRESS NOTES
"INTERNAL MEDICINE ESTABLISHED PATIENT VISIT NOTE    Subjective:     Chief Complaint: Follow-up  HTN, preDM     Patient ID: Efe Horowitz is a 65 y.o. 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    Still seeing Dr. Rangel and Dr. Villalta for chronic GI issues. Had stool cx done in Feb which showed Campylobacter so was tx'ed c Azithromycin.  Had f/u stool dx in April which were neg.    Sees Dr. Jiang for lupus and doing well on Benlysta.  Sees Dr. Jordan for cardiac issues c cholesterol.  Crestor discontinued at last visit and doing well on Repatha.    On amlodipine, Losartan, HCTZ, and bystolic for HTN.  Took meds just before coming in to today's appt.    Had cataract surgery since last visit s issues.    Past Medical History:  Past Medical History:   Diagnosis Date    Acid reflux     Adrenal insufficiency, primary     Arthritis     Asthma     B12 deficiency anemia     Benign essential hypertension 02/07/2021    Blood transfusion 2010    Breast cancer 02/2016    Right breast infiltrating ductal CA    Cataract     Chronic pain     Deep vein thrombosis     Dry eyes     Esophagitis, unspecified     Fibromyalgia     General anesthetics causing adverse effect in therapeutic use     "slow to wake up bc I don't have an immune system    Heart murmur     History of colon polyps     Hyperlipidemia     Hypopituitary dwarfism     Iron deficiency anemia     Lupus     Migraines, " neuralgic     Nonspecific ulcerative colitis     OP (osteoporosis)     history of reclast    Osteopenia     Pancreatic cyst     Schatzki's ring     Steroid sulfatase deficiency     Ulcerative colitis     Vitamin D deficiency disease        Home Medications:  Prior to Admission medications    Medication Sig Start Date End Date Taking? Authorizing Provider   (Magic mouthwash) 1:1:1 diphenhydrAMINE(Benadryl) 12.5mg/5ml liq, aluminum & magnesium hydroxide-simethicone (Maalox), LIDOcaine viscous 2% Swish and spit 5 mLs every 4 (four) hours as needed (mouth sores). for mouth sores 2/11/25   Shonda Anguiano MD   albuterol (PROVENTIL/VENTOLIN HFA) 90 mcg/actuation inhaler Inhale 1-2 puffs into the lungs every 6 (six) hours as needed for Wheezing or Shortness of Breath. Rescue 4/17/23   Ondina Ang NP   amLODIPine (NORVASC) 2.5 MG tablet Take 2.5 mg by mouth.  Patient taking differently: Take 2.5 mg by mouth once daily. 5/27/25   Provider, Historical   azelastine (ASTELIN) 137 mcg (0.1 %) nasal spray INSTILL 1 SPRAY BY NASAL ROUTE 2 TIMES DAILY. 5/12/25   Ben Levin, AC   belimumab (BENLYSTA) 200 mg/mL AtIn Inject 1 mL (200 mg total) into the skin every 7 days. 6/26/25   Idania Allison MD   calcium carbonate (OS-RISSA) 500 mg calcium (1,250 mg) tablet Take 1 tablet by mouth once daily.    Provider, Historical   cetirizine (ZYRTEC) 10 MG tablet Take 1 tablet (10 mg total) by mouth once daily. 4/17/23 6/16/25  Ondina Ang NP   cholecalciferol, vitamin D3, 125 mcg (5,000 unit) Tab Take 5,000 Units by mouth once daily.    Provider, Historical   diclofenac sodium (VOLTAREN) 1 % Gel APPLY 2 GRAMS TO AFFECTED AREA 4 TIMES A DAY 3/24/21   Idania Allison MD   dicyclomine (BENTYL) 20 mg tablet TAKE 1 TABLET BY MOUTH EVERY 6 HOURS 6/24/25   Ceferino Rangel MD   ferrous sulfate (IRON ORAL) Take 1 tablet by mouth once daily. No sure dose    Provider, Historical   folic acid (FOLVITE) 1 MG tablet Take 1 mg  by mouth once daily.    Provider, Historical   gabapentin (NEURONTIN) 300 MG capsule Take 1 capsule (300 mg total) by mouth 3 (three) times daily. 3/18/24   Idania Allison MD   hydroCHLOROthiazide (HYDRODIURIL) 25 MG tablet Take 25 mg by mouth once daily.    Provider, Historical   hydroxychloroquine (PLAQUENIL) 200 mg tablet Take 1.5 tablets (300 mg total) by mouth once daily. 6/24/25   Idania Allison MD   hydrOXYzine HCL (ATARAX) 25 MG tablet TAKE 1 TABLET BY MOUTH NIGHTLY AS NEEDED (INSOMNIA). 11/5/22   Provider, Historical   lipase-protease-amylase (CREON) 36,000-114,000- 180,000 unit CpDR Take 2 capsules by mouth 3 (three) times daily with meals. 2/13/25 8/12/25  Reese Villalta MD   loperamide (IMODIUM) 2 mg capsule TAKE 1 TO 2 CAPSULES BY MOUTH FOUR TIMES DAILY 6/17/25   Brisa Saunders NP   losartan (COZAAR) 50 MG tablet TAKE 1 TABLET BY MOUTH EVERY DAY 1/7/25   Shonda Anguiano MD   milnacipran (SAVELLA) 100 mg Tab Take 1 tablet (100 mg total) by mouth once daily. 5/9/25   Natali Ayoub MD   multivitamin/iron/folic acid (CENTRUM WOMEN ORAL) Take 1 tablet by mouth once daily.    Provider, Historical   nebivoloL (BYSTOLIC) 10 MG Tab Take 10 mg by mouth once daily. 3/9/23   Provider, Historical   niacin 100 MG Tab Take 100 mg by mouth every evening.    Provider, Historical   nystatin (MYCOSTATIN) cream Apply topically 2 (two) times daily. 1/23/24   Shonda Anguiano MD   omeprazole (PRILOSEC) 20 MG capsule TAKE 1 CAPSULE BY MOUTH TWICE A DAY 10/24/24   Shonda Anguiano MD   ondansetron (ZOFRAN) 4 MG tablet Take 1 tablet (4 mg total) by mouth every 12 (twelve) hours as needed for Nausea. 10/13/20   Ancelmous-Tierney Packer, NP   potassium chloride SA (K-DUR,KLOR-CON) 20 MEQ tablet Take 1 tablet (20 mEq total) by mouth 2 (two) times daily. 2/11/25   Shonda Anguiano MD   prednisoLONE-moxiflox-bromfen 1-0.5-0.075 % DrpS Place 1 drop into the right eye 3 (three) times daily. 4/26/25   Bharath Lopez  MD CHRISTINA   predniSONE (DELTASONE) 5 MG tablet TAKE 1 TABLET BY MOUTH ONCE DAILY. DOUBLE THE DOSE IN TIMES OF ILLNESS 4/21/25   Ena Srinivasan MD   psyllium husk/aspartame (METAMUCIL FIBER SINGLES ORAL) Take 1 packet by mouth once daily.    Provider, Historical   REPATHA SURECLICK 140 mg/mL PnIj INJECT 140 MG INTO THE SKIN EVERY 14 (FOURTEEN) DAYS 7/27/21   Provider, Historical   RESTASIS 0.05 % ophthalmic emulsion INSTILL 1 DROP INTO BOTH EYES TWICE A DAY 5/20/21   Vo, Joana, OD   rizatriptan (MAXALT) 10 MG tablet TAKE 1 TABLET (10 MG TOTAL) BY MOUTH 2 (TWO) TIMES DAILY AS NEEDED FOR MIGRAINE. 4/15/24   Shonda Anguiano MD   rosuvastatin (CRESTOR) 5 MG tablet Take 5 mg by mouth once daily. 7/26/24   Provider, Historical   valACYclovir (VALTREX) 1000 MG tablet Take 1 tablet (1,000 mg total) by mouth every 12 (twelve) hours. for 10 days 2/3/25 6/16/25  Emily Street MD   zolpidem (AMBIEN) 5 MG Tab TAKE 1 TABLET BY MOUTH NIGHTLY AS NEEDED (INSOMNIA). 5/26/25   Shonda Anguiano MD       Allergies:  Review of patient's allergies indicates:   Allergen Reactions    Asacol [mesalamine] Hallucinations    Aspirin      hemorrhage    Hydrocortisone Nausea Only     Feels sick    Lactose intolerance (lactase) [lactase] Nausea And Vomiting    Vicodin [hydrocodone-acetaminophen]      hyperactivity       Social History:  Social History[1]     Review of Systems   Constitutional:  Negative for appetite change, chills, fatigue, fever and unexpected weight change.   HENT:  Negative for congestion, hearing loss and rhinorrhea.    Eyes:  Negative for pain and visual disturbance.   Respiratory:  Negative for cough, chest tightness, shortness of breath and wheezing.    Cardiovascular:  Negative for chest pain, palpitations and leg swelling.   Gastrointestinal:  Negative for abdominal distention and abdominal pain.   Endocrine: Negative for polydipsia and polyuria.   Genitourinary:  Negative for decreased urine volume, difficulty  "urinating, dysuria, hematuria and vaginal discharge.   Neurological:  Negative for weakness, numbness and headaches.   Psychiatric/Behavioral:  Negative for behavioral problems and confusion.          Health Maintenance:     Immunizations:   Influenza 10/2024  TDap 8/2021  Pneumovax 6/2014, Prevnar 13 2/2021, Prevnar 20 11/2022  Shingrix 2/2022, 9/2022  COVID 2/2021, 3/2021, 8/2021 (Pf[employer]), 11/2022, 9/2023, 9/2024  RSV 9/2023     Cancer Screening:  PAP: total hyst 2/2 fibroids, benign, ovaries removed as well.  Mammogram:  11/2024 benign, repeated ordered by Everardo Carranza, advised to schedule repeat for Nov.  Colonoscopy: hx total proctocolectomy for UC, had pouchoscopy 12/2018 via Dr. Villalta wnl, biopsies taken which showed mild chronic inflammation on path.  EGD 12/2018 c hiatal hernia, otherwise unremarkable, bx c chronic inflammation in duodenum on path, normal gastric bx and neg H pylori  DEXA:  2/2024 c osteoporosis, followed by Dr. Srinivasan, now on Reclast, has f/u pending this summer c Dr. Srinivasan    Objective:   /88   Pulse 75   Ht 5' 4" (1.626 m)   Wt 55.6 kg (122 lb 9.2 oz)   SpO2 99%   BMI 21.04 kg/m²        General: AAO x3, no apparent distress  HEENT: no cervical LAD, no thyroid masses appreciated  CV: RRR, no m/r/g  Pulm: Lungs CTAB, no crackles, no wheezes  Abd: s/NT/ND +BS  Extremities: no c/c/e    Labs:     Lab Results   Component Value Date    WBC 8.39 04/10/2025    HGB 13.8 04/10/2025    HCT 43.5 04/10/2025    MCV 88 04/10/2025     04/10/2025     Sodium   Date Value Ref Range Status   04/10/2025 143 136 - 145 mmol/L Final   02/14/2025 144 136 - 145 mmol/L Final     Potassium   Date Value Ref Range Status   04/10/2025 3.7 3.5 - 5.1 mmol/L Final   02/14/2025 3.9 3.5 - 5.1 mmol/L Final     Chloride   Date Value Ref Range Status   04/10/2025 104 95 - 110 mmol/L Final   02/14/2025 108 95 - 110 mmol/L Final     CO2   Date Value Ref Range Status   04/10/2025 30 (H) 23 - 29 mmol/L Final "   02/14/2025 28 23 - 29 mmol/L Final     Glucose   Date Value Ref Range Status   04/10/2025 86 70 - 110 mg/dL Final   02/14/2025 84 70 - 110 mg/dL Final     BUN   Date Value Ref Range Status   04/10/2025 11 8 - 23 mg/dL Final     Creatinine   Date Value Ref Range Status   04/10/2025 0.8 0.5 - 1.4 mg/dL Final     Calcium   Date Value Ref Range Status   04/10/2025 9.2 8.7 - 10.5 mg/dL Final   02/14/2025 8.8 8.7 - 10.5 mg/dL Final     Protein Total   Date Value Ref Range Status   04/10/2025 6.9 6.0 - 8.4 gm/dL Final     Total Protein   Date Value Ref Range Status   02/14/2025 6.3 6.0 - 8.4 g/dL Final     Albumin   Date Value Ref Range Status   04/10/2025 3.5 3.5 - 5.2 g/dL Final   02/14/2025 3.5 3.5 - 5.2 g/dL Final     Total Bilirubin   Date Value Ref Range Status   02/14/2025 0.4 0.1 - 1.0 mg/dL Final     Comment:     For infants and newborns, interpretation of results should be based  on gestational age, weight and in agreement with clinical  observations.    Premature Infant recommended reference ranges:  Up to 24 hours.............<8.0 mg/dL  Up to 48 hours............<12.0 mg/dL  3-5 days..................<15.0 mg/dL  6-29 days.................<15.0 mg/dL       Bilirubin Total   Date Value Ref Range Status   04/10/2025 0.4 0.1 - 1.0 mg/dL Final     Comment:     For infants and newborns, interpretation of results should be based   on gestational age, weight and in agreement with clinical   observations.    Premature Infant recommended reference ranges:   0-24 hours:  <8.0 mg/dL   24-48 hours: <12.0 mg/dL   3-5 days:    <15.0 mg/dL   6-29 days:   <15.0 mg/dL     Alkaline Phosphatase   Date Value Ref Range Status   02/14/2025 48 40 - 150 U/L Final     ALP   Date Value Ref Range Status   04/10/2025 55 40 - 150 unit/L Final     AST   Date Value Ref Range Status   04/10/2025 17 11 - 45 unit/L Final   02/14/2025 22 10 - 40 U/L Final     ALT   Date Value Ref Range Status   04/10/2025 13 10 - 44 unit/L Final   02/14/2025  16 10 - 44 U/L Final     Anion Gap   Date Value Ref Range Status   04/10/2025 9 8 - 16 mmol/L Final     eGFR if    Date Value Ref Range Status   07/05/2022 >60.0 >60 mL/min/1.73 m^2 Final     eGFR if non    Date Value Ref Range Status   07/05/2022 >60.0 >60 mL/min/1.73 m^2 Final     Comment:     Calculation used to obtain the estimated glomerular filtration  rate (eGFR) is the CKD-EPI equation.        Lab Results   Component Value Date    HGBA1C 5.7 (H) 12/03/2024     Lab Results   Component Value Date    LDLCALC 41.6 (L) 02/11/2025     Lab Results   Component Value Date    TSH 0.678 02/14/2025         Assessment/Plan     Efe was seen today for follow-up.    Diagnoses and all orders for this visit:    Benign essential hypertension  Repeat manual at goal  Cont all meds   Also followed by cards    Mixed hyperlipidemia  LDL well controlled on Repatha  No longer on Crestor  Cont meds as per Cards    Prediabetes  Lab Results   Component Value Date    HGBA1C 5.7 (H) 12/03/2024     Mild, cont dietary modifications.  Labs today  -     Hemoglobin A1C; Future  -     Comprehensive Metabolic Panel; Future    Subclinical hyperthyroidism  Lab Results   Component Value Date    TSH 0.678 02/14/2025     Normal on last check  Will monitor annually  Sooner if issues    Ulcerative colitis with other complication, unspecified location  Exocrine pancreatic insufficiency  Pancreas cyst  Followed by Gastro  Has some chronic abd pain but feels fine today  Cont routine f/u    Secondary adrenal insufficiency  No issues    Systemic lupus erythematosus, unspecified SLE type, unspecified organ involvement status  Long-term use of Plaquenil  Immunosuppression due to drug therapy  Stable on Benlysta  Followed by Dr. Jiang  No acute issues  Last labs stable.    HM as above  RTC in 4 mos, sooner if needed.    Shonda Anguiano MD  Department of Internal Medicine - [facility]  07/08/2025         [1]   Social  History  Tobacco Use    Smoking status: Never    Smokeless tobacco: Never   Substance Use Topics    Alcohol use: No     Alcohol/week: 0.0 standard drinks of alcohol    Drug use: No

## 2025-07-07 NOTE — PROGRESS NOTES
Subjective:       Patient ID: Efe Horowitz is a 62 y.o. Black or  female who presents for follow-up evaluation of Chronic Kidney Disease    HPI This is a 62 -year-old -American female with   history of systemic lupus diagnosed 18 years ago, fibromyalgia, ulcerative   colitis and appendectomy, lupus who is coming in for f/u . The patient denies any macroscopic hematuria. She complains of fatigue.   Her creatinines have been stable. She has trace proteinuria on the urine protein creatinine ratios for the past two to three years and has had microscopic hematuria on UAs off and on for the past three   years or so but no sig RBC's. She states her blood pressures at home in the 112-120's/60's-70's. Follows in oncology with breast cancer. Nose sores and head sores occasionally. Diahrrea resolved with UC. EGD tomorrow for swallowing.    PAST MEDICAL HISTORY: Lupus 16 years ago, fibromyalgia, colitis, appendectomy,   and cholecystectomy, breast cancer.     SOCIAL HISTORY: Does not smoke, does not drink. She works part-time as a   .     FAMILY HISTORY: Her dad has CKD from hypertension and diabetes. Her nephew had   a kidney transplant after a motor vehicle accident.    Review of Systems   Constitutional:  Positive for fatigue.   HENT:  Negative for mouth sores.         Nose sores and head sores previously   Eyes:  Negative for discharge.   Respiratory:  Negative for cough, shortness of breath and wheezing.    Cardiovascular:  Negative for chest pain and palpitations.   Gastrointestinal:  Positive for abdominal pain. Negative for vomiting.   Genitourinary:  Negative for dysuria, frequency, hematuria and urgency.   Musculoskeletal:  Positive for arthralgias, joint swelling and myalgias. Negative for back pain.        R knee pain.   Skin:  Negative for color change and rash.   Psychiatric/Behavioral:  Negative for confusion.    All other systems reviewed and are negative.    Objective:       Physical Exam  Vitals reviewed.   Constitutional:       Appearance: She is well-developed.   HENT:      Right Ear: External ear normal.      Left Ear: External ear normal.      Nose: Nose normal.      Mouth/Throat:      Dentition: Normal dentition.   Eyes:      General: Lids are normal.      Conjunctiva/sclera: Conjunctivae normal.      Pupils: Pupils are equal, round, and reactive to light.   Neck:      Thyroid: No thyroid mass.      Trachea: Trachea normal.   Cardiovascular:      Rate and Rhythm: Normal rate and regular rhythm.      Heart sounds: No murmur heard.    No friction rub.      Comments: No lower extremity edema  Pulmonary:      Effort: Pulmonary effort is normal. No respiratory distress.      Breath sounds: Normal breath sounds. No decreased breath sounds or rales.   Abdominal:      General: Bowel sounds are normal.      Palpations: Abdomen is soft. There is no mass.      Tenderness: There is no abdominal tenderness. There is no rebound.      Hernia: No hernia is present.   Musculoskeletal:      Comments: Trace Le edema   Skin:     General: Skin is warm and dry.      Findings: No erythema or rash.   Neurological:      Mental Status: She is alert and oriented to person, place, and time.   Psychiatric:         Mood and Affect: Mood normal. Mood is not anxious or depressed.         Behavior: Behavior normal.         Judgment: Judgment normal.      Comments: Oriented to time, place and person.       Assessment:       No diagnosis found.    Plan:         This is a 62 year-old -American female with   longstanding systemic lupus, but no history of lupus nephritis, who is coming in   for f/u    1. Renal. Her creatinines have been stable at 0.9. She has 50 mg to no   proteinuria for the past two or three years with microscopic hematuria on   several UAs intermittently but no sig RBC's. No sig proteinuria currently. She does not appear to have lupus nephritis, but will need to be monitored closely. I  do not think renal biopsy is warranted at   this time, but she does need close followup and all of this was explained in   detail to the patient. She denies any NSAIDs. previously d/leo losartan by PCP.   Hydrate well. Potassium low-on supplements.   Increase potassium intake. yony and renin levels stable.    2. Blood pressures are stable -asked her to monitor bp's.    With K being low req supplements, stopped  HCTZ 25 mg and started losartan 50 mg . Potassium low and started on kcl 20 meq daily by pcp. Increase intake of pot rich foods. Increase to 3 pot tabs a dqay as it likely is an absorption problem with her GI issues.  Change amlodipine 5 mg to nifedipine 30 mg bc of LE edema.   3. Microscopic hematuria. renal ultrasound stable. She denies any   history of kidney stones. Saw urology-ct urogram negative.  Her complements have been stable.   4.Anemia: hgb stable along with iron. On 1 tab of iron.   5: renal osteodystrophy: PTH stable, ca, phos stable.   Return in 6 months.               2.23

## 2025-07-08 ENCOUNTER — LAB VISIT (OUTPATIENT)
Dept: LAB | Facility: HOSPITAL | Age: 65
End: 2025-07-08
Attending: INTERNAL MEDICINE
Payer: MEDICARE

## 2025-07-08 ENCOUNTER — OFFICE VISIT (OUTPATIENT)
Dept: INTERNAL MEDICINE | Facility: CLINIC | Age: 65
End: 2025-07-08
Payer: MEDICARE

## 2025-07-08 VITALS
OXYGEN SATURATION: 99 % | SYSTOLIC BLOOD PRESSURE: 138 MMHG | HEIGHT: 64 IN | WEIGHT: 122.56 LBS | HEART RATE: 75 BPM | DIASTOLIC BLOOD PRESSURE: 88 MMHG | BODY MASS INDEX: 20.92 KG/M2

## 2025-07-08 DIAGNOSIS — E27.49 SECONDARY ADRENAL INSUFFICIENCY: ICD-10-CM

## 2025-07-08 DIAGNOSIS — E78.2 MIXED HYPERLIPIDEMIA: ICD-10-CM

## 2025-07-08 DIAGNOSIS — D84.821 IMMUNOSUPPRESSION DUE TO DRUG THERAPY: ICD-10-CM

## 2025-07-08 DIAGNOSIS — K51.918 ULCERATIVE COLITIS WITH OTHER COMPLICATION, UNSPECIFIED LOCATION: ICD-10-CM

## 2025-07-08 DIAGNOSIS — K86.2 PANCREAS CYST: ICD-10-CM

## 2025-07-08 DIAGNOSIS — M32.9 SYSTEMIC LUPUS ERYTHEMATOSUS, UNSPECIFIED SLE TYPE, UNSPECIFIED ORGAN INVOLVEMENT STATUS: ICD-10-CM

## 2025-07-08 DIAGNOSIS — K86.81 EXOCRINE PANCREATIC INSUFFICIENCY: ICD-10-CM

## 2025-07-08 DIAGNOSIS — Z79.899 LONG-TERM USE OF PLAQUENIL: ICD-10-CM

## 2025-07-08 DIAGNOSIS — R73.03 PREDIABETES: ICD-10-CM

## 2025-07-08 DIAGNOSIS — I10 BENIGN ESSENTIAL HYPERTENSION: Primary | ICD-10-CM

## 2025-07-08 DIAGNOSIS — E05.90 SUBCLINICAL HYPERTHYROIDISM: ICD-10-CM

## 2025-07-08 DIAGNOSIS — Z79.899 IMMUNOSUPPRESSION DUE TO DRUG THERAPY: ICD-10-CM

## 2025-07-08 LAB
ALBUMIN SERPL BCP-MCNC: 4.1 G/DL (ref 3.5–5.2)
ALP SERPL-CCNC: 52 UNIT/L (ref 40–150)
ALT SERPL W/O P-5'-P-CCNC: 21 UNIT/L (ref 10–44)
ANION GAP (OHS): 11 MMOL/L (ref 8–16)
AST SERPL-CCNC: 25 UNIT/L (ref 11–45)
BILIRUB SERPL-MCNC: 0.5 MG/DL (ref 0.1–1)
BUN SERPL-MCNC: 11 MG/DL (ref 8–23)
CALCIUM SERPL-MCNC: 9.4 MG/DL (ref 8.7–10.5)
CHLORIDE SERPL-SCNC: 103 MMOL/L (ref 95–110)
CO2 SERPL-SCNC: 29 MMOL/L (ref 23–29)
CREAT SERPL-MCNC: 0.8 MG/DL (ref 0.5–1.4)
EAG (OHS): 117 MG/DL (ref 68–131)
GFR SERPLBLD CREATININE-BSD FMLA CKD-EPI: >60 ML/MIN/1.73/M2
GLUCOSE SERPL-MCNC: 76 MG/DL (ref 70–110)
HBA1C MFR BLD: 5.7 % (ref 4–5.6)
POTASSIUM SERPL-SCNC: 3.5 MMOL/L (ref 3.5–5.1)
PROT SERPL-MCNC: 7.2 GM/DL (ref 6–8.4)
SODIUM SERPL-SCNC: 143 MMOL/L (ref 136–145)

## 2025-07-08 PROCEDURE — 99999 PR PBB SHADOW E&M-EST. PATIENT-LVL V: CPT | Mod: PBBFAC,HCNC,, | Performed by: INTERNAL MEDICINE

## 2025-07-08 PROCEDURE — 3075F SYST BP GE 130 - 139MM HG: CPT | Mod: CPTII,HCNC,S$GLB, | Performed by: INTERNAL MEDICINE

## 2025-07-08 PROCEDURE — 82040 ASSAY OF SERUM ALBUMIN: CPT | Mod: HCNC

## 2025-07-08 PROCEDURE — 99214 OFFICE O/P EST MOD 30 MIN: CPT | Mod: HCNC,S$GLB,, | Performed by: INTERNAL MEDICINE

## 2025-07-08 PROCEDURE — 36415 COLL VENOUS BLD VENIPUNCTURE: CPT | Mod: HCNC

## 2025-07-08 PROCEDURE — 3079F DIAST BP 80-89 MM HG: CPT | Mod: CPTII,HCNC,S$GLB, | Performed by: INTERNAL MEDICINE

## 2025-07-08 PROCEDURE — 1160F RVW MEDS BY RX/DR IN RCRD: CPT | Mod: CPTII,HCNC,S$GLB, | Performed by: INTERNAL MEDICINE

## 2025-07-08 PROCEDURE — 1126F AMNT PAIN NOTED NONE PRSNT: CPT | Mod: CPTII,HCNC,S$GLB, | Performed by: INTERNAL MEDICINE

## 2025-07-08 PROCEDURE — 1101F PT FALLS ASSESS-DOCD LE1/YR: CPT | Mod: CPTII,HCNC,S$GLB, | Performed by: INTERNAL MEDICINE

## 2025-07-08 PROCEDURE — 3008F BODY MASS INDEX DOCD: CPT | Mod: CPTII,HCNC,S$GLB, | Performed by: INTERNAL MEDICINE

## 2025-07-08 PROCEDURE — 83036 HEMOGLOBIN GLYCOSYLATED A1C: CPT | Mod: HCNC

## 2025-07-08 PROCEDURE — 4010F ACE/ARB THERAPY RXD/TAKEN: CPT | Mod: CPTII,HCNC,S$GLB, | Performed by: INTERNAL MEDICINE

## 2025-07-08 PROCEDURE — 1159F MED LIST DOCD IN RCRD: CPT | Mod: CPTII,HCNC,S$GLB, | Performed by: INTERNAL MEDICINE

## 2025-07-08 PROCEDURE — 3288F FALL RISK ASSESSMENT DOCD: CPT | Mod: CPTII,HCNC,S$GLB, | Performed by: INTERNAL MEDICINE

## 2025-07-30 RX ORDER — VALACYCLOVIR HYDROCHLORIDE 1 G/1
1000 TABLET, FILM COATED ORAL EVERY 12 HOURS
Qty: 20 TABLET | Refills: 0 | Status: SHIPPED | OUTPATIENT
Start: 2025-07-30 | End: 2025-08-09

## (undated) DEVICE — SOL BETADINE 5%

## (undated) DEVICE — SOL IRR STRL WATER 500ML

## (undated) DEVICE — BLADE SURG BVL ANG COAX 2.4MM

## (undated) DEVICE — GLOVE SENSICARE PI MICRO 7.5

## (undated) DEVICE — SYR SLIP TIP 1CC

## (undated) DEVICE — SHEILD & GARTERS FOX METAL EYE

## (undated) DEVICE — DUOVISC

## (undated) DEVICE — Device

## (undated) DEVICE — SYR LUER LOCK 1CC